# Patient Record
Sex: FEMALE | Race: WHITE | Employment: OTHER | URBAN - METROPOLITAN AREA
[De-identification: names, ages, dates, MRNs, and addresses within clinical notes are randomized per-mention and may not be internally consistent; named-entity substitution may affect disease eponyms.]

---

## 2017-04-14 ENCOUNTER — HISTORICAL (OUTPATIENT)
Dept: LAB | Facility: HOSPITAL | Age: 82
End: 2017-04-14

## 2017-04-28 ENCOUNTER — HISTORICAL (OUTPATIENT)
Dept: LAB | Facility: HOSPITAL | Age: 82
End: 2017-04-28

## 2017-08-01 ENCOUNTER — HISTORICAL (OUTPATIENT)
Dept: LAB | Facility: HOSPITAL | Age: 82
End: 2017-08-01

## 2017-08-03 LAB — FINAL CULTURE: NO GROWTH

## 2017-11-22 ENCOUNTER — HISTORICAL (OUTPATIENT)
Dept: LAB | Facility: HOSPITAL | Age: 82
End: 2017-11-22

## 2017-11-22 LAB
FERRITIN SERPL-MCNC: 11.5 NG/ML (ref 8–388)
IRON SATN MFR SERPL: 6.3 % (ref 20–50)
IRON SERPL-MCNC: 34 MCG/DL (ref 50–175)
PROT SERPL-MCNC: 7.3 GM/DL
TIBC SERPL-MCNC: 538 MCG/DL (ref 250–450)
TRANSFERRIN SERPL-MCNC: 405 MG/DL (ref 200–360)

## 2018-06-04 ENCOUNTER — HISTORICAL (OUTPATIENT)
Dept: ADMINISTRATIVE | Facility: HOSPITAL | Age: 83
End: 2018-06-04

## 2018-06-04 ENCOUNTER — HISTORICAL (OUTPATIENT)
Dept: LAB | Facility: HOSPITAL | Age: 83
End: 2018-06-04

## 2018-06-04 LAB
ABS NEUT (OLG): 11.5
ALBUMIN SERPL-MCNC: 4.2 GM/DL (ref 3.4–5)
ALBUMIN/GLOB SERPL: 1.31 {RATIO} (ref 1.5–2.5)
ALP SERPL-CCNC: 75 UNIT/L (ref 38–126)
ALT SERPL-CCNC: 9 UNIT/L (ref 7–52)
APPEARANCE, UA: CLEAR
AST SERPL-CCNC: 12 UNIT/L (ref 15–37)
BACTERIA #/AREA URNS AUTO: ABNORMAL /HPF
BILIRUB SERPL-MCNC: 0.4 MG/DL (ref 0.2–1)
BILIRUB UR QL STRIP: ABNORMAL MG/DL
BILIRUBIN DIRECT+TOT PNL SERPL-MCNC: 0.1 MG/DL (ref 0–0.5)
BILIRUBIN DIRECT+TOT PNL SERPL-MCNC: 0.3 MG/DL
BUN SERPL-MCNC: 25 MG/DL (ref 7–18)
CALCIUM SERPL-MCNC: 9.2 MG/DL (ref 8.5–10)
CHLORIDE SERPL-SCNC: 91 MMOL/L (ref 98–107)
CHOLEST SERPL-MCNC: 123 MG/DL (ref 0–200)
CHOLEST/HDLC SERPL: 2.1 {RATIO}
CO2 SERPL-SCNC: 31 MMOL/L (ref 21–32)
COLOR UR: YELLOW
CREAT SERPL-MCNC: 0.71 MG/DL (ref 0.6–1.3)
CREAT UR-MCNC: 200 MG/DL
ERYTHROCYTE [DISTWIDTH] IN BLOOD BY AUTOMATED COUNT: 14.1 % (ref 11.5–17)
EST. AVERAGE GLUCOSE BLD GHB EST-MCNC: 160 MG/DL
GLOBULIN SER-MCNC: 3.2 GM/DL (ref 1.2–3)
GLUCOSE (UA): NEGATIVE MG/DL
GLUCOSE SERPL-MCNC: 201 MG/DL (ref 74–106)
HBA1C MFR BLD: 7.2 % (ref 4.4–6.4)
HCT VFR BLD AUTO: 39.3 % (ref 37–47)
HDLC SERPL-MCNC: 58 MG/DL (ref 35–60)
HGB BLD-MCNC: 12 GM/DL (ref 12–16)
HGB UR QL STRIP: ABNORMAL UNIT/L
KETONES UR QL STRIP: ABNORMAL MG/DL
LDLC SERPL CALC-MCNC: 38 MG/DL (ref 0–129)
LEUKOCYTE ESTERASE UR QL STRIP: NEGATIVE UNIT/L
LYMPHOCYTES # BLD AUTO: 1.4 X10(3)/MCL (ref 0.6–3.4)
LYMPHOCYTES NFR BLD AUTO: 9.7 % (ref 13–40)
MCH RBC QN AUTO: 27.1 PG (ref 27–31.2)
MCHC RBC AUTO-ENTMCNC: 30 GM/DL (ref 32–36)
MCV RBC AUTO: 89 FL (ref 80–94)
MICROALBUMIN UR-MCNC: 80 MG/L
MICROALBUMIN/CREAT RATIO PNL UR: <30 MG/GM
MONOCYTES # BLD AUTO: 1.2 X10(3)/MCL (ref 0–1.8)
MONOCYTES NFR BLD AUTO: 8.5 % (ref 0.1–24)
NEUTROPHILS NFR BLD AUTO: 81.8 % (ref 47–80)
NITRITE UR QL STRIP.AUTO: NEGATIVE
PH UR STRIP: 5 [PH]
PLATELET # BLD AUTO: 430 X10(3)/MCL (ref 130–400)
PMV BLD AUTO: 8.2 FL
POTASSIUM SERPL-SCNC: 4.4 MMOL/L (ref 3.5–5.1)
PROT SERPL-MCNC: 7.4 GM/DL (ref 6.4–8.2)
PROT UR QL STRIP: NEGATIVE MG/DL
RBC # BLD AUTO: 4.43 X10(6)/MCL (ref 4.2–5.4)
RBC #/AREA URNS HPF: ABNORMAL /HPF
SODIUM SERPL-SCNC: 129 MMOL/L (ref 136–145)
SP GR UR STRIP: 1.02
SQUAMOUS EPITHELIAL, UA: ABNORMAL /LPF
TRIGL SERPL-MCNC: 98 MG/DL (ref 30–150)
UROBILINOGEN UR STRIP-ACNC: 0.2 MG/DL
VIT B12 SERPL-MCNC: 2403 PG/ML (ref 193–986)
VLDLC SERPL CALC-MCNC: 19.6 MG/DL
WBC # SPEC AUTO: 14.1 X10(3)/MCL (ref 4.5–11.5)
WBC #/AREA URNS AUTO: ABNORMAL /[HPF]

## 2018-12-04 ENCOUNTER — HISTORICAL (OUTPATIENT)
Dept: ADMINISTRATIVE | Facility: HOSPITAL | Age: 83
End: 2018-12-04

## 2018-12-04 LAB
BUN SERPL-MCNC: 18 MG/DL (ref 7–18)
CALCIUM SERPL-MCNC: 9.3 MG/DL (ref 8.5–10)
CHLORIDE SERPL-SCNC: 96 MMOL/L (ref 98–107)
CO2 SERPL-SCNC: 32 MMOL/L (ref 21–32)
CREAT SERPL-MCNC: 0.6 MG/DL (ref 0.6–1.3)
CREAT/UREA NIT SERPL: 30
EST. AVERAGE GLUCOSE BLD GHB EST-MCNC: 151 MG/DL
GLUCOSE SERPL-MCNC: 178 MG/DL (ref 74–106)
HBA1C MFR BLD: 6.9 % (ref 4.4–6.4)
POTASSIUM SERPL-SCNC: 4.2 MMOL/L (ref 3.5–5.1)
SODIUM SERPL-SCNC: 135 MMOL/L (ref 136–145)

## 2019-10-22 ENCOUNTER — HISTORICAL (OUTPATIENT)
Dept: ADMINISTRATIVE | Facility: HOSPITAL | Age: 84
End: 2019-10-22

## 2019-10-22 LAB
ALBUMIN SERPL-MCNC: 4.1 GM/DL (ref 3.4–5)
ALBUMIN/GLOB SERPL: 1.41 {RATIO} (ref 1.5–2.5)
ALP SERPL-CCNC: 53 UNIT/L (ref 38–126)
ALT SERPL-CCNC: 10 UNIT/L (ref 7–52)
AST SERPL-CCNC: 17 UNIT/L (ref 15–37)
BILIRUB SERPL-MCNC: 0.4 MG/DL (ref 0.2–1)
BILIRUBIN DIRECT+TOT PNL SERPL-MCNC: 0.1 MG/DL (ref 0–0.5)
BILIRUBIN DIRECT+TOT PNL SERPL-MCNC: 0.3 MG/DL
BUN SERPL-MCNC: 13 MG/DL (ref 7–18)
CALCIUM SERPL-MCNC: 9.3 MG/DL (ref 8.5–10)
CHLORIDE SERPL-SCNC: 96 MMOL/L (ref 98–107)
CHOLEST SERPL-MCNC: 135 MG/DL (ref 0–200)
CHOLEST/HDLC SERPL: 2.4 {RATIO}
CO2 SERPL-SCNC: 32 MMOL/L (ref 21–32)
CREAT SERPL-MCNC: 0.65 MG/DL (ref 0.6–1.3)
EST. AVERAGE GLUCOSE BLD GHB EST-MCNC: 148 MG/DL
GLOBULIN SER-MCNC: 2.9 GM/DL (ref 1.2–3)
GLUCOSE SERPL-MCNC: 135 MG/DL (ref 74–106)
HBA1C MFR BLD: 6.8 % (ref 4.4–6.4)
HDLC SERPL-MCNC: 56 MG/DL (ref 35–60)
LDLC SERPL CALC-MCNC: 42 MG/DL (ref 0–129)
POTASSIUM SERPL-SCNC: 4.4 MMOL/L (ref 3.5–5.1)
PROT SERPL-MCNC: 7 GM/DL (ref 6.4–8.2)
SODIUM SERPL-SCNC: 135 MMOL/L (ref 136–145)
T3FREE SERPL-MCNC: 2.52 PG/ML (ref 1.45–3.48)
T4 FREE SERPL-MCNC: 1.03 NG/DL (ref 0.76–1.46)
TRIGL SERPL-MCNC: 135 MG/DL (ref 30–150)
TSH SERPL-ACNC: 1.12 MIU/ML (ref 0.35–4.94)
VLDLC SERPL CALC-MCNC: 27 MG/DL

## 2020-05-01 ENCOUNTER — HISTORICAL (OUTPATIENT)
Dept: URGENT CARE | Facility: CLINIC | Age: 85
End: 2020-05-01

## 2020-05-28 ENCOUNTER — HISTORICAL (OUTPATIENT)
Dept: ADMINISTRATIVE | Facility: HOSPITAL | Age: 85
End: 2020-05-28

## 2020-05-28 LAB
EST. AVERAGE GLUCOSE BLD GHB EST-MCNC: 146 MG/DL
HBA1C MFR BLD: 6.7 % (ref 4.4–6.4)

## 2020-11-03 ENCOUNTER — HISTORICAL (OUTPATIENT)
Dept: ADMINISTRATIVE | Facility: HOSPITAL | Age: 85
End: 2020-11-03

## 2020-11-03 LAB
ABS NEUT (OLG): 4.9 X10(3)/MCL (ref 2.1–9.2)
ALBUMIN SERPL-MCNC: 4.4 GM/DL (ref 3.4–5)
ALBUMIN/GLOB SERPL: 1.47 {RATIO} (ref 1.5–2.5)
ALP SERPL-CCNC: 68 UNIT/L (ref 38–126)
ALT SERPL-CCNC: 12 UNIT/L (ref 7–52)
APPEARANCE, UA: CLEAR
AST SERPL-CCNC: 19 UNIT/L (ref 15–37)
BACTERIA #/AREA URNS AUTO: NORMAL /HPF
BILIRUB SERPL-MCNC: 0.4 MG/DL (ref 0.2–1)
BILIRUB UR QL STRIP: NEGATIVE MG/DL
BILIRUBIN DIRECT+TOT PNL SERPL-MCNC: 0.1 MG/DL (ref 0–0.5)
BILIRUBIN DIRECT+TOT PNL SERPL-MCNC: 0.3 MG/DL
BUN SERPL-MCNC: 18 MG/DL (ref 7–18)
CALCIUM SERPL-MCNC: 9.6 MG/DL (ref 8.5–10)
CHLORIDE SERPL-SCNC: 93 MMOL/L (ref 98–107)
CHOLEST SERPL-MCNC: 130 MG/DL (ref 0–200)
CHOLEST/HDLC SERPL: 1.9 {RATIO}
CO2 SERPL-SCNC: 32 MMOL/L (ref 21–32)
COLOR UR: YELLOW
CREAT SERPL-MCNC: 0.58 MG/DL (ref 0.6–1.3)
CREAT UR-MCNC: 50 MG/DL
DEPRECATED CALCIDIOL+CALCIFEROL SERPL-MC: 52 NG/ML (ref 30–80)
ERYTHROCYTE [DISTWIDTH] IN BLOOD BY AUTOMATED COUNT: 12.5 % (ref 11.5–17)
EST. AVERAGE GLUCOSE BLD GHB EST-MCNC: 151 MG/DL
GLOBULIN SER-MCNC: 3 GM/DL (ref 1.2–3)
GLUCOSE (UA): NEGATIVE MG/DL
GLUCOSE SERPL-MCNC: 160 MG/DL (ref 74–106)
HBA1C MFR BLD: 6.9 % (ref 4.4–6.4)
HCT VFR BLD AUTO: 41.1 % (ref 37–47)
HDLC SERPL-MCNC: 67 MG/DL (ref 35–60)
HGB BLD-MCNC: 13 GM/DL (ref 12–16)
HGB UR QL STRIP: NEGATIVE UNIT/L
KETONES UR QL STRIP: NEGATIVE MG/DL
LDLC SERPL CALC-MCNC: 32 MG/DL (ref 0–129)
LEUKOCYTE ESTERASE UR QL STRIP: NEGATIVE UNIT/L
LYMPHOCYTES # BLD AUTO: 2.3 X10(3)/MCL (ref 0.6–3.4)
LYMPHOCYTES NFR BLD AUTO: 28.3 % (ref 13–40)
MCH RBC QN AUTO: 29.4 PG (ref 27–31.2)
MCHC RBC AUTO-ENTMCNC: 32 GM/DL (ref 32–36)
MCV RBC AUTO: 93 FL (ref 80–94)
MICROALBUMIN UR-MCNC: 30 MG/L
MICROALBUMIN/CREAT RATIO PNL UR: ABNORMAL MG/GM
MONOCYTES # BLD AUTO: 0.8 X10(3)/MCL (ref 0.1–1.3)
MONOCYTES NFR BLD AUTO: 10.4 % (ref 0.1–24)
NEUTROPHILS NFR BLD AUTO: 61.3 % (ref 47–80)
NITRITE UR QL STRIP.AUTO: NEGATIVE
PH UR STRIP: 7 [PH]
PLATELET # BLD AUTO: 358 X10(3)/MCL (ref 130–400)
PMV BLD AUTO: 8.9 FL (ref 9.4–12.4)
POTASSIUM SERPL-SCNC: 4.6 MMOL/L (ref 3.5–5.1)
PROT SERPL-MCNC: 7.4 GM/DL (ref 6.4–8.2)
PROT UR QL STRIP: NEGATIVE MG/DL
RBC # BLD AUTO: 4.42 X10(6)/MCL (ref 4.2–5.4)
RBC #/AREA URNS HPF: NORMAL /HPF
SODIUM SERPL-SCNC: 134 MMOL/L (ref 136–145)
SP GR UR STRIP: 1.02
SQUAMOUS EPITHELIAL, UA: NORMAL /LPF
TRIGL SERPL-MCNC: 120 MG/DL (ref 30–150)
TSH SERPL-ACNC: 1.13 MIU/ML (ref 0.35–4.94)
UROBILINOGEN UR STRIP-ACNC: 0.2 MG/DL
VLDLC SERPL CALC-MCNC: 24 MG/DL
WBC # SPEC AUTO: 8 X10(3)/MCL (ref 4.5–11.5)
WBC #/AREA URNS AUTO: NORMAL /[HPF]

## 2021-05-05 ENCOUNTER — HISTORICAL (OUTPATIENT)
Dept: ADMINISTRATIVE | Facility: HOSPITAL | Age: 86
End: 2021-05-05

## 2021-05-05 LAB
BUN SERPL-MCNC: 14 MG/DL (ref 7–18)
CALCIUM SERPL-MCNC: 9.1 MG/DL (ref 8.5–10)
CHLORIDE SERPL-SCNC: 95 MMOL/L (ref 98–107)
CO2 SERPL-SCNC: 32 MMOL/L (ref 21–32)
CREAT SERPL-MCNC: 0.52 MG/DL (ref 0.6–1.3)
CREAT/UREA NIT SERPL: 26.9
EST. AVERAGE GLUCOSE BLD GHB EST-MCNC: 140 MG/DL
GLUCOSE SERPL-MCNC: 132 MG/DL (ref 74–106)
HBA1C MFR BLD: 6.5 % (ref 4.4–6.4)
POTASSIUM SERPL-SCNC: 4.3 MMOL/L (ref 3.5–5.1)
SODIUM SERPL-SCNC: 136 MMOL/L (ref 136–145)

## 2021-11-03 ENCOUNTER — HISTORICAL (OUTPATIENT)
Dept: ADMINISTRATIVE | Facility: HOSPITAL | Age: 86
End: 2021-11-03

## 2021-11-03 LAB
ALBUMIN SERPL-MCNC: 4.1 GM/DL (ref 3.4–5)
ALBUMIN/GLOB SERPL: 1.46 {RATIO} (ref 1.5–2.5)
ALP SERPL-CCNC: 59 UNIT/L (ref 38–126)
ALT SERPL-CCNC: 12 UNIT/L (ref 7–52)
AST SERPL-CCNC: 20 UNIT/L (ref 15–37)
BILIRUB SERPL-MCNC: 0.4 MG/DL (ref 0.2–1)
BILIRUBIN DIRECT+TOT PNL SERPL-MCNC: 0.1 MG/DL (ref 0–0.5)
BILIRUBIN DIRECT+TOT PNL SERPL-MCNC: 0.3 MG/DL
BUN SERPL-MCNC: 17 MG/DL (ref 7–18)
CALCIUM SERPL-MCNC: 9.4 MG/DL (ref 8.5–10)
CHLORIDE SERPL-SCNC: 97 MMOL/L (ref 98–107)
CHOLEST SERPL-MCNC: 130 MG/DL (ref 0–200)
CHOLEST/HDLC SERPL: 2.2 {RATIO}
CO2 SERPL-SCNC: 28 MMOL/L (ref 21–32)
CREAT SERPL-MCNC: 0.6 MG/DL (ref 0.6–1.3)
EST. AVERAGE GLUCOSE BLD GHB EST-MCNC: 148 MG/DL
GLOBULIN SER-MCNC: 2.8 GM/DL (ref 1.2–3)
GLUCOSE SERPL-MCNC: 165 MG/DL (ref 74–106)
HBA1C MFR BLD: 6.8 % (ref 4.4–6.4)
HDLC SERPL-MCNC: 60 MG/DL (ref 35–60)
LDLC SERPL CALC-MCNC: 25 MG/DL (ref 0–129)
POTASSIUM SERPL-SCNC: 4.7 MMOL/L (ref 3.5–5.1)
PROT SERPL-MCNC: 6.9 GM/DL (ref 6.4–8.2)
SODIUM SERPL-SCNC: 136 MMOL/L (ref 136–145)
TRIGL SERPL-MCNC: 111 MG/DL (ref 30–150)
VLDLC SERPL CALC-MCNC: 22.2 MG/DL

## 2022-01-01 PROCEDURE — 87088 URINE BACTERIA CULTURE: CPT | Performed by: FAMILY MEDICINE

## 2022-04-07 ENCOUNTER — HISTORICAL (OUTPATIENT)
Dept: ADMINISTRATIVE | Facility: HOSPITAL | Age: 87
End: 2022-04-07

## 2022-04-24 VITALS
OXYGEN SATURATION: 94 % | DIASTOLIC BLOOD PRESSURE: 70 MMHG | WEIGHT: 149.5 LBS | SYSTOLIC BLOOD PRESSURE: 142 MMHG | HEIGHT: 61 IN | BODY MASS INDEX: 28.22 KG/M2

## 2022-05-03 PROBLEM — E78.00 HYPERCHOLESTEROLEMIA: Status: ACTIVE | Noted: 2022-05-03

## 2022-05-03 PROBLEM — C67.4 MALIGNANT NEOPLASM OF POSTERIOR WALL OF URINARY BLADDER: Status: ACTIVE | Noted: 2017-10-12

## 2022-05-03 PROBLEM — C55 ADENOCARCINOMA OF UTERUS: Status: ACTIVE | Noted: 2022-05-03

## 2022-05-03 PROBLEM — C55 ADENOCARCINOMA OF UTERUS: Status: RESOLVED | Noted: 2022-05-03 | Resolved: 2022-05-03

## 2022-05-03 PROBLEM — I82.409 DEEP VEIN THROMBOSIS (DVT): Status: ACTIVE | Noted: 2022-05-03

## 2022-05-03 PROBLEM — E11.9 TYPE 2 DIABETES MELLITUS: Status: ACTIVE | Noted: 2022-05-03

## 2022-05-03 PROBLEM — G47.00 INSOMNIA: Status: ACTIVE | Noted: 2022-05-03

## 2022-05-03 PROBLEM — I26.99 PULMONARY EMBOLISM: Status: ACTIVE | Noted: 2022-05-03

## 2022-05-03 PROBLEM — K63.5 POLYP OF COLON: Status: ACTIVE | Noted: 2022-05-03

## 2022-05-03 PROBLEM — M85.80 OSTEOPENIA: Status: ACTIVE | Noted: 2022-05-03

## 2022-05-03 PROBLEM — J30.2 SEASONAL ALLERGIC RHINITIS: Status: ACTIVE | Noted: 2022-05-03

## 2022-05-03 PROBLEM — M19.90 ARTHRITIS: Status: ACTIVE | Noted: 2022-05-03

## 2022-05-03 PROBLEM — I10 PRIMARY HYPERTENSION: Status: ACTIVE | Noted: 2022-05-03

## 2023-01-01 ENCOUNTER — HOSPITAL ENCOUNTER (INPATIENT)
Facility: HOSPITAL | Age: 88
LOS: 25 days | Discharge: HOME HEALTH | DRG: 871 | End: 2023-06-05
Attending: STUDENT IN AN ORGANIZED HEALTH CARE EDUCATION/TRAINING PROGRAM | Admitting: HOSPITALIST
Payer: MEDICARE

## 2023-01-01 ENCOUNTER — LAB REQUISITION (OUTPATIENT)
Dept: LAB | Facility: HOSPITAL | Age: 88
End: 2023-01-01
Payer: MEDICARE

## 2023-01-01 ENCOUNTER — ANESTHESIA (OUTPATIENT)
Dept: CARDIOLOGY | Facility: HOSPITAL | Age: 88
DRG: 853 | End: 2023-01-01
Payer: MEDICARE

## 2023-01-01 ENCOUNTER — HOSPITAL ENCOUNTER (INPATIENT)
Facility: HOSPITAL | Age: 88
LOS: 25 days | Discharge: NURSING, REHAB OR LONG-TERM CARE | DRG: 853 | End: 2023-05-11
Attending: STUDENT IN AN ORGANIZED HEALTH CARE EDUCATION/TRAINING PROGRAM | Admitting: INTERNAL MEDICINE
Payer: MEDICARE

## 2023-01-01 ENCOUNTER — ANESTHESIA EVENT (OUTPATIENT)
Dept: CARDIOLOGY | Facility: HOSPITAL | Age: 88
DRG: 853 | End: 2023-01-01
Payer: MEDICARE

## 2023-01-01 ENCOUNTER — PATIENT OUTREACH (OUTPATIENT)
Dept: ADMINISTRATIVE | Facility: CLINIC | Age: 88
End: 2023-01-01
Payer: MEDICARE

## 2023-01-01 ENCOUNTER — HOSPITAL ENCOUNTER (EMERGENCY)
Facility: HOSPITAL | Age: 88
Discharge: HOME | End: 2023-02-14
Attending: EMERGENCY MEDICINE
Payer: MEDICARE

## 2023-01-01 ENCOUNTER — ANESTHESIA (OUTPATIENT)
Dept: SURGERY | Facility: HOSPITAL | Age: 88
DRG: 853 | End: 2023-01-01
Payer: MEDICARE

## 2023-01-01 ENCOUNTER — ANESTHESIA EVENT (OUTPATIENT)
Dept: SURGERY | Facility: HOSPITAL | Age: 88
DRG: 853 | End: 2023-01-01
Payer: MEDICARE

## 2023-01-01 VITALS
TEMPERATURE: 98 F | WEIGHT: 154.31 LBS | RESPIRATION RATE: 18 BRPM | SYSTOLIC BLOOD PRESSURE: 145 MMHG | DIASTOLIC BLOOD PRESSURE: 75 MMHG | BODY MASS INDEX: 27.34 KG/M2 | HEART RATE: 83 BPM | HEIGHT: 63 IN | OXYGEN SATURATION: 97 %

## 2023-01-01 VITALS
DIASTOLIC BLOOD PRESSURE: 63 MMHG | WEIGHT: 124.75 LBS | HEIGHT: 63 IN | BODY MASS INDEX: 22.11 KG/M2 | TEMPERATURE: 98 F | OXYGEN SATURATION: 95 % | SYSTOLIC BLOOD PRESSURE: 120 MMHG | RESPIRATION RATE: 18 BRPM | HEART RATE: 85 BPM

## 2023-01-01 VITALS
SYSTOLIC BLOOD PRESSURE: 149 MMHG | DIASTOLIC BLOOD PRESSURE: 73 MMHG | RESPIRATION RATE: 19 BRPM | OXYGEN SATURATION: 94 % | TEMPERATURE: 99 F | HEART RATE: 110 BPM

## 2023-01-01 DIAGNOSIS — I73.9 PAD (PERIPHERAL ARTERY DISEASE): ICD-10-CM

## 2023-01-01 DIAGNOSIS — N13.30 HYDRONEPHROSIS, UNSPECIFIED HYDRONEPHROSIS TYPE: ICD-10-CM

## 2023-01-01 DIAGNOSIS — I70.222 CRITICAL LIMB ISCHEMIA OF LEFT LOWER EXTREMITY: ICD-10-CM

## 2023-01-01 DIAGNOSIS — R78.81 MRSA BACTEREMIA: Primary | ICD-10-CM

## 2023-01-01 DIAGNOSIS — I83.899 RUPTURED VARICOSE VEIN: Primary | ICD-10-CM

## 2023-01-01 DIAGNOSIS — N39.0 URINARY TRACT INFECTION, SITE NOT SPECIFIED: ICD-10-CM

## 2023-01-01 DIAGNOSIS — E11.9 TYPE 2 DIABETES MELLITUS WITHOUT COMPLICATIONS: ICD-10-CM

## 2023-01-01 DIAGNOSIS — R53.81 DEBILITY: ICD-10-CM

## 2023-01-01 DIAGNOSIS — L97.821 NON-PRESSURE CHRONIC ULCER OF OTHER PART OF LEFT LOWER LEG LIMITED TO BREAKDOWN OF SKIN: ICD-10-CM

## 2023-01-01 DIAGNOSIS — L97.328 NON-PRESSURE CHRONIC ULCER OF LEFT ANKLE WITH OTHER SPECIFIED SEVERITY: ICD-10-CM

## 2023-01-01 DIAGNOSIS — R52 PAIN: ICD-10-CM

## 2023-01-01 DIAGNOSIS — R53.1 WEAKNESS: ICD-10-CM

## 2023-01-01 DIAGNOSIS — R53.83 FATIGUE, UNSPECIFIED TYPE: ICD-10-CM

## 2023-01-01 DIAGNOSIS — M19.90 ARTHRITIS: ICD-10-CM

## 2023-01-01 DIAGNOSIS — M85.80 OSTEOPENIA, UNSPECIFIED LOCATION: ICD-10-CM

## 2023-01-01 DIAGNOSIS — I83.023 VARICOSE VEINS OF LEFT LOWER EXTREMITY WITH ULCER OF ANKLE (CODE): ICD-10-CM

## 2023-01-01 DIAGNOSIS — A41.9 SEPSIS: ICD-10-CM

## 2023-01-01 DIAGNOSIS — R53.83 FATIGUE: ICD-10-CM

## 2023-01-01 DIAGNOSIS — I70.222 CRITICAL LIMB ISCHEMIA OF LEFT LOWER EXTREMITY: Primary | ICD-10-CM

## 2023-01-01 DIAGNOSIS — B95.62 MRSA BACTEREMIA: Primary | ICD-10-CM

## 2023-01-01 DIAGNOSIS — I10 PRIMARY HYPERTENSION: ICD-10-CM

## 2023-01-01 DIAGNOSIS — I50.32 CHRONIC DIASTOLIC CHF (CONGESTIVE HEART FAILURE): ICD-10-CM

## 2023-01-01 DIAGNOSIS — I21.4 NSTEMI (NON-ST ELEVATED MYOCARDIAL INFARCTION): ICD-10-CM

## 2023-01-01 LAB
ABO + RH BLD: NORMAL
ABORH RETYPE: NORMAL
ABS NEUT (OLG): 32.59 X10(3)/MCL (ref 2.1–9.2)
ACINETOBACTER CALCOACETICUS-BAUMANNII COMPLEX (OHS): NOT DETECTED
ALBUMIN SERPL-MCNC: 1.7 G/DL (ref 3.4–4.8)
ALBUMIN SERPL-MCNC: 1.8 G/DL (ref 3.4–4.8)
ALBUMIN SERPL-MCNC: 1.9 G/DL (ref 3.4–4.8)
ALBUMIN SERPL-MCNC: 1.9 G/DL (ref 3.4–4.8)
ALBUMIN SERPL-MCNC: 2 G/DL (ref 3.4–4.8)
ALBUMIN SERPL-MCNC: 2 G/DL (ref 3.4–4.8)
ALBUMIN SERPL-MCNC: 2.1 G/DL (ref 3.4–4.8)
ALBUMIN SERPL-MCNC: 2.2 G/DL (ref 3.4–4.8)
ALBUMIN SERPL-MCNC: 2.2 G/DL (ref 3.4–4.8)
ALBUMIN SERPL-MCNC: 2.5 G/DL (ref 3.4–4.8)
ALBUMIN SERPL-MCNC: 3.1 G/DL (ref 3.4–4.8)
ALBUMIN/GLOB SERPL: 0.4 RATIO (ref 1.1–2)
ALBUMIN/GLOB SERPL: 0.5 RATIO (ref 1.1–2)
ALBUMIN/GLOB SERPL: 0.6 RATIO (ref 1.1–2)
ALBUMIN/GLOB SERPL: 0.7 RATIO (ref 1.1–2)
ALBUMIN/GLOB SERPL: 0.7 RATIO (ref 1.1–2)
ALBUMIN/GLOB SERPL: 1 RATIO (ref 1.1–2)
ALP SERPL-CCNC: 134 UNIT/L (ref 40–150)
ALP SERPL-CCNC: 155 UNIT/L (ref 40–150)
ALP SERPL-CCNC: 53 UNIT/L (ref 40–150)
ALP SERPL-CCNC: 60 UNIT/L (ref 40–150)
ALP SERPL-CCNC: 66 UNIT/L (ref 40–150)
ALP SERPL-CCNC: 73 UNIT/L (ref 40–150)
ALP SERPL-CCNC: 81 UNIT/L (ref 40–150)
ALP SERPL-CCNC: 84 UNIT/L (ref 40–150)
ALP SERPL-CCNC: 84 UNIT/L (ref 40–150)
ALP SERPL-CCNC: 85 UNIT/L (ref 40–150)
ALP SERPL-CCNC: 94 UNIT/L (ref 40–150)
ALT SERPL-CCNC: 10 UNIT/L (ref 0–55)
ALT SERPL-CCNC: 11 UNIT/L (ref 0–55)
ALT SERPL-CCNC: 12 UNIT/L (ref 0–55)
ALT SERPL-CCNC: 14 UNIT/L (ref 0–55)
ALT SERPL-CCNC: 18 UNIT/L (ref 0–55)
ALT SERPL-CCNC: 35 UNIT/L (ref 0–55)
ALT SERPL-CCNC: 45 UNIT/L (ref 0–55)
ALT SERPL-CCNC: 6 UNIT/L (ref 0–55)
ALT SERPL-CCNC: 6 UNIT/L (ref 0–55)
ALT SERPL-CCNC: 8 UNIT/L (ref 0–55)
ALT SERPL-CCNC: <5 UNIT/L (ref 0–55)
AMMONIA PLAS-MSCNC: 44.1 UMOL/L (ref 18–72)
ANION GAP SERPL CALC-SCNC: 11 MEQ/L
ANION GAP SERPL CALC-SCNC: 11 MEQ/L
ANION GAP SERPL CALC-SCNC: 12 MEQ/L
ANION GAP SERPL CALC-SCNC: 3 MEQ/L
ANION GAP SERPL CALC-SCNC: 3 MEQ/L
ANION GAP SERPL CALC-SCNC: 5 MEQ/L
ANION GAP SERPL CALC-SCNC: 6 MEQ/L
ANION GAP SERPL CALC-SCNC: 7 MEQ/L
ANION GAP SERPL CALC-SCNC: 8 MEQ/L
ANION GAP SERPL CALC-SCNC: 9 MEQ/L
APPEARANCE UR: CLEAR
AST SERPL-CCNC: 10 UNIT/L (ref 5–34)
AST SERPL-CCNC: 14 UNIT/L (ref 5–34)
AST SERPL-CCNC: 17 UNIT/L (ref 5–34)
AST SERPL-CCNC: 31 UNIT/L (ref 5–34)
AST SERPL-CCNC: 37 UNIT/L (ref 5–34)
AST SERPL-CCNC: 5 UNIT/L (ref 5–34)
AST SERPL-CCNC: 7 UNIT/L (ref 5–34)
AST SERPL-CCNC: 7 UNIT/L (ref 5–34)
AST SERPL-CCNC: 9 UNIT/L (ref 5–34)
AV INDEX (PROSTH): 0.42
AV MEAN GRADIENT: 10 MMHG
AV PEAK GRADIENT: 18 MMHG
AV VALVE AREA: 1.31 CM2
AV VELOCITY RATIO: 0.46
BACTERIA #/AREA URNS AUTO: ABNORMAL /HPF
BACTERIA #/AREA URNS AUTO: ABNORMAL /HPF
BACTERIA #/AREA URNS AUTO: NORMAL /HPF
BACTERIA #/AREA URNS AUTO: NORMAL /HPF
BACTERIA BLD CULT: ABNORMAL
BACTERIA BLD CULT: NORMAL
BACTERIA BLD CULT: NORMAL
BACTERIA UR CULT: ABNORMAL
BACTEROIDES FRAGILIS (OHS): NOT DETECTED
BASOPHILS # BLD AUTO: 0.02 X10(3)/MCL
BASOPHILS # BLD AUTO: 0.02 X10(3)/MCL
BASOPHILS # BLD AUTO: 0.02 X10(3)/MCL (ref 0–0.2)
BASOPHILS # BLD AUTO: 0.03 X10(3)/MCL
BASOPHILS # BLD AUTO: 0.03 X10(3)/MCL
BASOPHILS # BLD AUTO: 0.03 X10(3)/MCL (ref 0–0.2)
BASOPHILS # BLD AUTO: 0.03 X10(3)/MCL (ref 0–0.2)
BASOPHILS # BLD AUTO: 0.04 X10(3)/MCL (ref 0–0.2)
BASOPHILS # BLD AUTO: 0.05 X10(3)/MCL (ref 0–0.2)
BASOPHILS # BLD AUTO: 0.06 X10(3)/MCL
BASOPHILS # BLD AUTO: 0.06 X10(3)/MCL (ref 0–0.2)
BASOPHILS # BLD AUTO: 0.07 X10(3)/MCL
BASOPHILS # BLD AUTO: 0.07 X10(3)/MCL (ref 0–0.2)
BASOPHILS # BLD AUTO: 0.08 X10(3)/MCL
BASOPHILS # BLD AUTO: 0.09 X10(3)/MCL
BASOPHILS NFR BLD AUTO: 0.1 %
BASOPHILS NFR BLD AUTO: 0.2 %
BASOPHILS NFR BLD AUTO: 0.3 %
BASOPHILS NFR BLD AUTO: 0.4 %
BASOPHILS NFR BLD AUTO: 0.4 %
BASOPHILS NFR BLD AUTO: 0.6 %
BASOPHILS NFR BLD AUTO: 0.6 %
BASOPHILS NFR BLD AUTO: 0.7 %
BASOPHILS NFR BLD AUTO: 0.8 %
BASOPHILS NFR BLD AUTO: 0.9 %
BASOPHILS NFR BLD AUTO: 1 %
BASOPHILS NFR BLD AUTO: 1 %
BILIRUB UR QL STRIP.AUTO: NEGATIVE
BILIRUB UR QL STRIP.AUTO: NEGATIVE MG/DL
BILIRUBIN DIRECT+TOT PNL SERPL-MCNC: 0.2 MG/DL
BILIRUBIN DIRECT+TOT PNL SERPL-MCNC: 0.3 MG/DL
BILIRUBIN DIRECT+TOT PNL SERPL-MCNC: 0.4 MG/DL
BILIRUBIN DIRECT+TOT PNL SERPL-MCNC: 0.5 MG/DL
BILIRUBIN DIRECT+TOT PNL SERPL-MCNC: 0.5 MG/DL
BLD PROD TYP BPU: NORMAL
BLOOD UNIT EXPIRATION DATE: NORMAL
BLOOD UNIT TYPE CODE: 6200
BNP BLD-MCNC: 1936.5 PG/ML
BNP BLD-MCNC: 2571.8 PG/ML
BSA FOR ECHO PROCEDURE: 1.49 M2
BSA FOR ECHO PROCEDURE: 1.49 M2
BUN SERPL-MCNC: 10.7 MG/DL (ref 9.8–20.1)
BUN SERPL-MCNC: 10.9 MG/DL (ref 9.8–20.1)
BUN SERPL-MCNC: 12.1 MG/DL (ref 9.8–20.1)
BUN SERPL-MCNC: 12.5 MG/DL (ref 9.8–20.1)
BUN SERPL-MCNC: 13.4 MG/DL (ref 9.8–20.1)
BUN SERPL-MCNC: 14 MG/DL (ref 9.8–20.1)
BUN SERPL-MCNC: 14.7 MG/DL (ref 9.8–20.1)
BUN SERPL-MCNC: 15.2 MG/DL (ref 9.8–20.1)
BUN SERPL-MCNC: 15.4 MG/DL (ref 9.8–20.1)
BUN SERPL-MCNC: 16.9 MG/DL (ref 9.8–20.1)
BUN SERPL-MCNC: 17.3 MG/DL (ref 9.8–20.1)
BUN SERPL-MCNC: 18 MG/DL (ref 9.8–20.1)
BUN SERPL-MCNC: 18.1 MG/DL (ref 9.8–20.1)
BUN SERPL-MCNC: 18.4 MG/DL (ref 9.8–20.1)
BUN SERPL-MCNC: 18.9 MG/DL (ref 9.8–20.1)
BUN SERPL-MCNC: 19.4 MG/DL (ref 9.8–20.1)
BUN SERPL-MCNC: 20.3 MG/DL (ref 9.8–20.1)
BUN SERPL-MCNC: 20.6 MG/DL (ref 9.8–20.1)
BUN SERPL-MCNC: 20.8 MG/DL (ref 9.8–20.1)
BUN SERPL-MCNC: 21 MG/DL (ref 9.8–20.1)
BUN SERPL-MCNC: 21.6 MG/DL (ref 9.8–20.1)
BUN SERPL-MCNC: 22.4 MG/DL (ref 9.8–20.1)
BUN SERPL-MCNC: 22.6 MG/DL (ref 9.8–20.1)
BUN SERPL-MCNC: 22.6 MG/DL (ref 9.8–20.1)
BUN SERPL-MCNC: 23.4 MG/DL (ref 9.8–20.1)
BUN SERPL-MCNC: 24.4 MG/DL (ref 9.8–20.1)
BUN SERPL-MCNC: 31.9 MG/DL (ref 9.8–20.1)
BUN SERPL-MCNC: 32.9 MG/DL (ref 9.8–20.1)
BUN SERPL-MCNC: 52 MG/DL (ref 9.8–20.1)
BUN SERPL-MCNC: 52.8 MG/DL (ref 9.8–20.1)
C AURIS DNA BLD POS QL NAA+NON-PROBE: NOT DETECTED
C GATTII+NEOFOR DNA CSF QL NAA+NON-PROBE: NOT DETECTED
CALCIUM SERPL-MCNC: 7.4 MG/DL (ref 8.4–10.2)
CALCIUM SERPL-MCNC: 7.6 MG/DL (ref 8.4–10.2)
CALCIUM SERPL-MCNC: 7.7 MG/DL (ref 8.4–10.2)
CALCIUM SERPL-MCNC: 7.7 MG/DL (ref 8.4–10.2)
CALCIUM SERPL-MCNC: 7.8 MG/DL (ref 8.4–10.2)
CALCIUM SERPL-MCNC: 7.8 MG/DL (ref 8.4–10.2)
CALCIUM SERPL-MCNC: 7.9 MG/DL (ref 8.4–10.2)
CALCIUM SERPL-MCNC: 7.9 MG/DL (ref 8.4–10.2)
CALCIUM SERPL-MCNC: 8 MG/DL (ref 8.4–10.2)
CALCIUM SERPL-MCNC: 8.1 MG/DL (ref 8.4–10.2)
CALCIUM SERPL-MCNC: 8.2 MG/DL (ref 8.4–10.2)
CALCIUM SERPL-MCNC: 8.3 MG/DL (ref 8.4–10.2)
CALCIUM SERPL-MCNC: 8.5 MG/DL (ref 8.4–10.2)
CALCIUM SERPL-MCNC: 8.7 MG/DL (ref 8.4–10.2)
CALCIUM SERPL-MCNC: 8.8 MG/DL (ref 8.4–10.2)
CALCIUM SERPL-MCNC: 8.9 MG/DL (ref 8.4–10.2)
CALCIUM SERPL-MCNC: 8.9 MG/DL (ref 8.4–10.2)
CALCIUM SERPL-MCNC: 9.2 MG/DL (ref 8.4–10.2)
CALCIUM SERPL-MCNC: 9.6 MG/DL (ref 8.4–10.2)
CALCIUM SERPL-MCNC: 9.6 MG/DL (ref 8.4–10.2)
CALCIUM SERPL-MCNC: 9.7 MG/DL (ref 8.4–10.2)
CANDIDA ALBICANS (OHS): NOT DETECTED
CANDIDA GLABRATA (OHS): NOT DETECTED
CANDIDA KRUSEI (OHS): NOT DETECTED
CANDIDA PARAPSILOSIS (OHS): NOT DETECTED
CANDIDA TROPICALIS (OHS): NOT DETECTED
CHLORIDE SERPL-SCNC: 83 MMOL/L (ref 98–111)
CHLORIDE SERPL-SCNC: 84 MMOL/L (ref 98–111)
CHLORIDE SERPL-SCNC: 85 MMOL/L (ref 98–111)
CHLORIDE SERPL-SCNC: 85 MMOL/L (ref 98–111)
CHLORIDE SERPL-SCNC: 87 MMOL/L (ref 98–111)
CHLORIDE SERPL-SCNC: 88 MMOL/L (ref 98–111)
CHLORIDE SERPL-SCNC: 88 MMOL/L (ref 98–111)
CHLORIDE SERPL-SCNC: 89 MMOL/L (ref 98–111)
CHLORIDE SERPL-SCNC: 90 MMOL/L (ref 98–111)
CHLORIDE SERPL-SCNC: 90 MMOL/L (ref 98–111)
CHLORIDE SERPL-SCNC: 91 MMOL/L (ref 98–111)
CHLORIDE SERPL-SCNC: 92 MMOL/L (ref 98–111)
CHLORIDE SERPL-SCNC: 93 MMOL/L (ref 98–111)
CHLORIDE SERPL-SCNC: 94 MMOL/L (ref 98–111)
CHLORIDE SERPL-SCNC: 95 MMOL/L (ref 98–111)
CHLORIDE SERPL-SCNC: 95 MMOL/L (ref 98–111)
CHLORIDE SERPL-SCNC: 96 MMOL/L (ref 98–111)
CO2 SERPL-SCNC: 22 MMOL/L (ref 23–31)
CO2 SERPL-SCNC: 23 MMOL/L (ref 23–31)
CO2 SERPL-SCNC: 23 MMOL/L (ref 23–31)
CO2 SERPL-SCNC: 24 MMOL/L (ref 23–31)
CO2 SERPL-SCNC: 24 MMOL/L (ref 23–31)
CO2 SERPL-SCNC: 25 MMOL/L (ref 23–31)
CO2 SERPL-SCNC: 26 MMOL/L (ref 23–31)
CO2 SERPL-SCNC: 27 MMOL/L (ref 23–31)
CO2 SERPL-SCNC: 28 MMOL/L (ref 23–31)
CO2 SERPL-SCNC: 28 MMOL/L (ref 23–31)
CO2 SERPL-SCNC: 29 MMOL/L (ref 23–31)
CO2 SERPL-SCNC: 30 MMOL/L (ref 23–31)
CO2 SERPL-SCNC: 30 MMOL/L (ref 23–31)
CO2 SERPL-SCNC: 31 MMOL/L (ref 23–31)
CO2 SERPL-SCNC: 33 MMOL/L (ref 23–31)
CO2 SERPL-SCNC: 34 MMOL/L (ref 23–31)
CO2 SERPL-SCNC: 35 MMOL/L (ref 23–31)
CO2 SERPL-SCNC: 35 MMOL/L (ref 23–31)
CO2 SERPL-SCNC: 36 MMOL/L (ref 23–31)
CO2 SERPL-SCNC: 36 MMOL/L (ref 23–31)
CO2 SERPL-SCNC: 37 MMOL/L (ref 23–31)
CO2 SERPL-SCNC: 37 MMOL/L (ref 23–31)
CO2 SERPL-SCNC: 40 MMOL/L (ref 23–31)
COLOR STL: NORMAL
COLOR UR AUTO: YELLOW
COLOR UR: ABNORMAL
COLOR UR: YELLOW
COLOR UR: YELLOW
CONSISTENCY STL: NORMAL
CORTIS SERPL-SCNC: 18 UG/DL
CREAT SERPL-MCNC: 0.53 MG/DL (ref 0.55–1.02)
CREAT SERPL-MCNC: 0.55 MG/DL (ref 0.55–1.02)
CREAT SERPL-MCNC: 0.58 MG/DL (ref 0.55–1.02)
CREAT SERPL-MCNC: 0.59 MG/DL (ref 0.55–1.02)
CREAT SERPL-MCNC: 0.6 MG/DL (ref 0.55–1.02)
CREAT SERPL-MCNC: 0.6 MG/DL (ref 0.55–1.02)
CREAT SERPL-MCNC: 0.62 MG/DL (ref 0.55–1.02)
CREAT SERPL-MCNC: 0.63 MG/DL (ref 0.55–1.02)
CREAT SERPL-MCNC: 0.63 MG/DL (ref 0.55–1.02)
CREAT SERPL-MCNC: 0.64 MG/DL (ref 0.55–1.02)
CREAT SERPL-MCNC: 0.64 MG/DL (ref 0.55–1.02)
CREAT SERPL-MCNC: 0.65 MG/DL (ref 0.55–1.02)
CREAT SERPL-MCNC: 0.66 MG/DL (ref 0.55–1.02)
CREAT SERPL-MCNC: 0.66 MG/DL (ref 0.55–1.02)
CREAT SERPL-MCNC: 0.67 MG/DL (ref 0.55–1.02)
CREAT SERPL-MCNC: 0.68 MG/DL (ref 0.55–1.02)
CREAT SERPL-MCNC: 0.68 MG/DL (ref 0.55–1.02)
CREAT SERPL-MCNC: 0.69 MG/DL (ref 0.55–1.02)
CREAT SERPL-MCNC: 0.71 MG/DL (ref 0.55–1.02)
CREAT SERPL-MCNC: 0.71 MG/DL (ref 0.55–1.02)
CREAT SERPL-MCNC: 0.87 MG/DL (ref 0.55–1.02)
CREAT SERPL-MCNC: 0.87 MG/DL (ref 0.55–1.02)
CREAT SERPL-MCNC: 0.97 MG/DL (ref 0.55–1.02)
CREAT SERPL-MCNC: 1.06 MG/DL (ref 0.55–1.02)
CREAT SERPL-MCNC: 1.17 MG/DL (ref 0.55–1.02)
CREAT SERPL-MCNC: 1.24 MG/DL (ref 0.55–1.02)
CREAT/UREA NIT SERPL: 17
CREAT/UREA NIT SERPL: 19
CREAT/UREA NIT SERPL: 24
CREAT/UREA NIT SERPL: 24
CREAT/UREA NIT SERPL: 26
CREAT/UREA NIT SERPL: 27
CREAT/UREA NIT SERPL: 29
CREAT/UREA NIT SERPL: 30
CREAT/UREA NIT SERPL: 31
CREAT/UREA NIT SERPL: 32
CREAT/UREA NIT SERPL: 34
CREAT/UREA NIT SERPL: 35
CREAT/UREA NIT SERPL: 37
CREAT/UREA NIT SERPL: 39
CROSSMATCH INTERPRETATION: NORMAL
CRP SERPL-MCNC: 116.9 MG/L
CTX-M (OHS): ABNORMAL
CV ECHO LV RWT: 0.47 CM
DISPENSE STATUS: NORMAL
DOP CALC AO PEAK VEL: 2.1 M/S
DOP CALC AO VTI: 38.5 CM
DOP CALC LVOT AREA: 3.1 CM2
DOP CALC LVOT DIAMETER: 2 CM
DOP CALC LVOT PEAK VEL: 0.96 M/S
DOP CALC LVOT STROKE VOLUME: 50.55 CM3
DOP CALC MV VTI: 36.6 CM
DOP CALCLVOT PEAK VEL VTI: 16.1 CM
E WAVE DECELERATION TIME: 193 MSEC
E/A RATIO: 0.84
E/E' RATIO: 20 M/S
ECHO LV POSTERIOR WALL: 1 CM (ref 0.6–1.1)
EJECTION FRACTION: 54 %
EJECTION FRACTION: 60 %
ENTEROBACTER CLOACAE COMPLEX (OHS): NOT DETECTED
ENTEROBACTERALES (OHS): NOT DETECTED
ENTEROCOCCUS FAECALIS (OHS): NOT DETECTED
ENTEROCOCCUS FAECIUM (OHS): NOT DETECTED
EOSINOPHIL # BLD AUTO: 0.01 X10(3)/MCL (ref 0–0.9)
EOSINOPHIL # BLD AUTO: 0.08 X10(3)/MCL (ref 0–0.9)
EOSINOPHIL # BLD AUTO: 0.17 X10(3)/MCL (ref 0–0.9)
EOSINOPHIL # BLD AUTO: 0.19 X10(3)/MCL (ref 0–0.9)
EOSINOPHIL # BLD AUTO: 0.21 X10(3)/MCL (ref 0–0.9)
EOSINOPHIL # BLD AUTO: 0.32 X10(3)/MCL (ref 0–0.9)
EOSINOPHIL # BLD AUTO: 0.33 X10(3)/MCL (ref 0–0.9)
EOSINOPHIL # BLD AUTO: 0.34 X10(3)/MCL (ref 0–0.9)
EOSINOPHIL # BLD AUTO: 0.44 X10(3)/MCL (ref 0–0.9)
EOSINOPHIL # BLD AUTO: 0.45 X10(3)/MCL (ref 0–0.9)
EOSINOPHIL # BLD AUTO: 0.51 X10(3)/MCL (ref 0–0.9)
EOSINOPHIL # BLD AUTO: 0.52 X10(3)/MCL (ref 0–0.9)
EOSINOPHIL # BLD AUTO: 0.54 X10(3)/MCL (ref 0–0.9)
EOSINOPHIL # BLD AUTO: 0.56 X10(3)/MCL (ref 0–0.9)
EOSINOPHIL # BLD AUTO: 0.56 X10(3)/MCL (ref 0–0.9)
EOSINOPHIL # BLD AUTO: 0.61 X10(3)/MCL (ref 0–0.9)
EOSINOPHIL # BLD AUTO: 0.74 X10(3)/MCL (ref 0–0.9)
EOSINOPHIL # BLD AUTO: 0.85 X10(3)/MCL (ref 0–0.9)
EOSINOPHIL # BLD AUTO: 0.87 X10(3)/MCL (ref 0–0.9)
EOSINOPHIL # BLD AUTO: 0.87 X10(3)/MCL (ref 0–0.9)
EOSINOPHIL # BLD AUTO: 1.06 X10(3)/MCL (ref 0–0.9)
EOSINOPHIL NFR BLD AUTO: 0 %
EOSINOPHIL NFR BLD AUTO: 0.1 %
EOSINOPHIL NFR BLD AUTO: 0.5 %
EOSINOPHIL NFR BLD AUTO: 1.3 %
EOSINOPHIL NFR BLD AUTO: 1.4 %
EOSINOPHIL NFR BLD AUTO: 1.8 %
EOSINOPHIL NFR BLD AUTO: 10.2 %
EOSINOPHIL NFR BLD AUTO: 2.8 %
EOSINOPHIL NFR BLD AUTO: 3 %
EOSINOPHIL NFR BLD AUTO: 3.6 %
EOSINOPHIL NFR BLD AUTO: 5 %
EOSINOPHIL NFR BLD AUTO: 5.2 %
EOSINOPHIL NFR BLD AUTO: 5.3 %
EOSINOPHIL NFR BLD AUTO: 5.4 %
EOSINOPHIL NFR BLD AUTO: 5.5 %
EOSINOPHIL NFR BLD AUTO: 5.7 %
EOSINOPHIL NFR BLD AUTO: 6.1 %
EOSINOPHIL NFR BLD AUTO: 7.3 %
EOSINOPHIL NFR BLD AUTO: 7.5 %
EOSINOPHIL NFR BLD AUTO: 8 %
EOSINOPHIL NFR BLD AUTO: 8.7 %
EOSINOPHIL NFR BLD AUTO: 9.8 %
ERYTHROCYTE [DISTWIDTH] IN BLOOD BY AUTOMATED COUNT: 14.4 % (ref 11.5–17)
ERYTHROCYTE [DISTWIDTH] IN BLOOD BY AUTOMATED COUNT: 14.5 % (ref 11.5–17)
ERYTHROCYTE [DISTWIDTH] IN BLOOD BY AUTOMATED COUNT: 14.5 % (ref 11.5–17)
ERYTHROCYTE [DISTWIDTH] IN BLOOD BY AUTOMATED COUNT: 14.6 % (ref 11.5–17)
ERYTHROCYTE [DISTWIDTH] IN BLOOD BY AUTOMATED COUNT: 14.6 % (ref 11.5–17)
ERYTHROCYTE [DISTWIDTH] IN BLOOD BY AUTOMATED COUNT: 14.7 % (ref 11.5–17)
ERYTHROCYTE [DISTWIDTH] IN BLOOD BY AUTOMATED COUNT: 14.7 % (ref 11.5–17)
ERYTHROCYTE [DISTWIDTH] IN BLOOD BY AUTOMATED COUNT: 14.9 % (ref 11.5–17)
ERYTHROCYTE [DISTWIDTH] IN BLOOD BY AUTOMATED COUNT: 15 % (ref 11.5–17)
ERYTHROCYTE [DISTWIDTH] IN BLOOD BY AUTOMATED COUNT: 15 % (ref 11.5–17)
ERYTHROCYTE [DISTWIDTH] IN BLOOD BY AUTOMATED COUNT: 15.1 % (ref 11.5–17)
ERYTHROCYTE [DISTWIDTH] IN BLOOD BY AUTOMATED COUNT: 15.1 % (ref 11.5–17)
ERYTHROCYTE [DISTWIDTH] IN BLOOD BY AUTOMATED COUNT: 15.2 % (ref 11.5–17)
ERYTHROCYTE [DISTWIDTH] IN BLOOD BY AUTOMATED COUNT: 15.3 % (ref 11.5–17)
ERYTHROCYTE [DISTWIDTH] IN BLOOD BY AUTOMATED COUNT: 15.4 % (ref 11.5–17)
ERYTHROCYTE [DISTWIDTH] IN BLOOD BY AUTOMATED COUNT: 15.4 % (ref 11.5–17)
ERYTHROCYTE [DISTWIDTH] IN BLOOD BY AUTOMATED COUNT: 15.8 % (ref 11.5–17)
ERYTHROCYTE [DISTWIDTH] IN BLOOD BY AUTOMATED COUNT: 15.8 % (ref 11.5–17)
ERYTHROCYTE [DISTWIDTH] IN BLOOD BY AUTOMATED COUNT: 15.9 % (ref 11.5–17)
ERYTHROCYTE [DISTWIDTH] IN BLOOD BY AUTOMATED COUNT: 15.9 % (ref 11.5–17)
ERYTHROCYTE [DISTWIDTH] IN BLOOD BY AUTOMATED COUNT: 16 % (ref 11.5–17)
ERYTHROCYTE [DISTWIDTH] IN BLOOD BY AUTOMATED COUNT: 16.1 % (ref 11.5–17)
ERYTHROCYTE [DISTWIDTH] IN BLOOD BY AUTOMATED COUNT: 16.2 % (ref 11.5–17)
ERYTHROCYTE [SEDIMENTATION RATE] IN BLOOD: 48 MM/HR (ref 0–20)
ESCHERICHIA COLI (OHS): NOT DETECTED
EST. AVERAGE GLUCOSE BLD GHB EST-MCNC: 134.1 MG/DL
FERRITIN SERPL-MCNC: 112.26 NG/ML (ref 4.63–204)
FLUAV AG UPPER RESP QL IA.RAPID: NOT DETECTED
FLUBV AG UPPER RESP QL IA.RAPID: NOT DETECTED
FRACTIONAL SHORTENING: 27 % (ref 28–44)
GFR SERPLBLD CREATININE-BSD FMLA CKD-EPI: 41 MLS/MIN/1.73/M2
GFR SERPLBLD CREATININE-BSD FMLA CKD-EPI: 44 MLS/MIN/1.73/M2
GFR SERPLBLD CREATININE-BSD FMLA CKD-EPI: 50 MLS/MIN/1.73/M2
GFR SERPLBLD CREATININE-BSD FMLA CKD-EPI: 55 MLS/MIN/1.73/M2
GFR SERPLBLD CREATININE-BSD FMLA CKD-EPI: >60 MLS/MIN/1.73/M2
GLOBULIN SER-MCNC: 2.9 GM/DL (ref 2.4–3.5)
GLOBULIN SER-MCNC: 3.1 GM/DL (ref 2.4–3.5)
GLOBULIN SER-MCNC: 3.3 GM/DL (ref 2.4–3.5)
GLOBULIN SER-MCNC: 3.4 GM/DL (ref 2.4–3.5)
GLOBULIN SER-MCNC: 3.5 GM/DL (ref 2.4–3.5)
GLOBULIN SER-MCNC: 3.8 GM/DL (ref 2.4–3.5)
GLOBULIN SER-MCNC: 3.8 GM/DL (ref 2.4–3.5)
GLOBULIN SER-MCNC: 3.9 GM/DL (ref 2.4–3.5)
GLOBULIN SER-MCNC: 4 GM/DL (ref 2.4–3.5)
GLOBULIN SER-MCNC: 4.1 GM/DL (ref 2.4–3.5)
GLOBULIN SER-MCNC: 4.1 GM/DL (ref 2.4–3.5)
GLUCOSE SERPL-MCNC: 103 MG/DL (ref 75–121)
GLUCOSE SERPL-MCNC: 118 MG/DL (ref 75–121)
GLUCOSE SERPL-MCNC: 120 MG/DL (ref 75–121)
GLUCOSE SERPL-MCNC: 125 MG/DL (ref 75–121)
GLUCOSE SERPL-MCNC: 127 MG/DL (ref 75–121)
GLUCOSE SERPL-MCNC: 128 MG/DL (ref 75–121)
GLUCOSE SERPL-MCNC: 130 MG/DL (ref 75–121)
GLUCOSE SERPL-MCNC: 131 MG/DL (ref 75–121)
GLUCOSE SERPL-MCNC: 141 MG/DL (ref 75–121)
GLUCOSE SERPL-MCNC: 143 MG/DL (ref 75–121)
GLUCOSE SERPL-MCNC: 149 MG/DL (ref 75–121)
GLUCOSE SERPL-MCNC: 150 MG/DL (ref 75–121)
GLUCOSE SERPL-MCNC: 157 MG/DL (ref 75–121)
GLUCOSE SERPL-MCNC: 162 MG/DL (ref 75–121)
GLUCOSE SERPL-MCNC: 162 MG/DL (ref 75–121)
GLUCOSE SERPL-MCNC: 163 MG/DL (ref 75–121)
GLUCOSE SERPL-MCNC: 165 MG/DL (ref 70–110)
GLUCOSE SERPL-MCNC: 167 MG/DL (ref 75–121)
GLUCOSE SERPL-MCNC: 170 MG/DL (ref 75–121)
GLUCOSE SERPL-MCNC: 184 MG/DL (ref 75–121)
GLUCOSE SERPL-MCNC: 188 MG/DL (ref 75–121)
GLUCOSE SERPL-MCNC: 188 MG/DL (ref 75–121)
GLUCOSE SERPL-MCNC: 195 MG/DL (ref 75–121)
GLUCOSE SERPL-MCNC: 206 MG/DL (ref 75–121)
GLUCOSE SERPL-MCNC: 208 MG/DL (ref 75–121)
GLUCOSE SERPL-MCNC: 256 MG/DL (ref 75–121)
GLUCOSE SERPL-MCNC: 313 MG/DL (ref 75–121)
GLUCOSE SERPL-MCNC: 51 MG/DL (ref 75–121)
GLUCOSE SERPL-MCNC: 51 MG/DL (ref 75–121)
GLUCOSE SERPL-MCNC: 54 MG/DL (ref 75–121)
GLUCOSE SERPL-MCNC: 54 MG/DL (ref 75–121)
GLUCOSE SERPL-MCNC: 63 MG/DL (ref 75–121)
GLUCOSE SERPL-MCNC: 72 MG/DL (ref 75–121)
GLUCOSE SERPL-MCNC: 76 MG/DL (ref 75–121)
GLUCOSE SERPL-MCNC: 89 MG/DL (ref 75–121)
GLUCOSE UR QL STRIP.AUTO: ABNORMAL MG/DL
GLUCOSE UR QL STRIP.AUTO: NEGATIVE
GLUCOSE UR QL STRIP.AUTO: NEGATIVE MG/DL
GLUCOSE UR QL STRIP.AUTO: NEGATIVE MG/DL
GP B STREP DNA CSF QL NAA+NON-PROBE: NOT DETECTED
GRAM STN SPEC: ABNORMAL
GROUP & RH: NORMAL
HAEM INFLU DNA CSF QL NAA+NON-PROBE: NOT DETECTED
HBA1C MFR BLD: 6.3 %
HCT VFR BLD AUTO: 25.1 % (ref 37–47)
HCT VFR BLD AUTO: 26.1 % (ref 37–47)
HCT VFR BLD AUTO: 26.6 % (ref 37–47)
HCT VFR BLD AUTO: 27.3 % (ref 37–47)
HCT VFR BLD AUTO: 27.6 % (ref 37–47)
HCT VFR BLD AUTO: 27.9 % (ref 37–47)
HCT VFR BLD AUTO: 28.5 % (ref 37–47)
HCT VFR BLD AUTO: 29.5 % (ref 37–47)
HCT VFR BLD AUTO: 30 % (ref 37–47)
HCT VFR BLD AUTO: 30.2 % (ref 37–47)
HCT VFR BLD AUTO: 30.3 % (ref 37–47)
HCT VFR BLD AUTO: 30.4 % (ref 37–47)
HCT VFR BLD AUTO: 30.4 % (ref 37–47)
HCT VFR BLD AUTO: 30.7 % (ref 37–47)
HCT VFR BLD AUTO: 31.7 % (ref 37–47)
HCT VFR BLD AUTO: 31.7 % (ref 37–47)
HCT VFR BLD AUTO: 32.1 % (ref 37–47)
HCT VFR BLD AUTO: 32.1 % (ref 37–47)
HCT VFR BLD AUTO: 32.3 % (ref 37–47)
HCT VFR BLD AUTO: 32.8 % (ref 37–47)
HCT VFR BLD AUTO: 33.4 % (ref 37–47)
HCT VFR BLD AUTO: 33.4 % (ref 37–47)
HCT VFR BLD AUTO: 34.2 % (ref 37–47)
HCT VFR BLD AUTO: 35 % (ref 37–47)
HCT VFR BLD AUTO: 35.2 % (ref 37–47)
HCT VFR BLD AUTO: 36.7 % (ref 37–47)
HEMOCCULT SP1 STL QL: NEGATIVE
HGB BLD-MCNC: 10 G/DL (ref 12–16)
HGB BLD-MCNC: 10.1 G/DL (ref 12–16)
HGB BLD-MCNC: 10.2 G/DL (ref 12–16)
HGB BLD-MCNC: 10.2 G/DL (ref 12–16)
HGB BLD-MCNC: 10.3 G/DL (ref 12–16)
HGB BLD-MCNC: 10.5 G/DL (ref 12–16)
HGB BLD-MCNC: 10.7 G/DL (ref 12–16)
HGB BLD-MCNC: 10.7 G/DL (ref 12–16)
HGB BLD-MCNC: 11 G/DL (ref 12–16)
HGB BLD-MCNC: 11 G/DL (ref 12–16)
HGB BLD-MCNC: 11.7 G/DL (ref 12–16)
HGB BLD-MCNC: 7.6 G/DL (ref 12–16)
HGB BLD-MCNC: 7.9 G/DL (ref 12–16)
HGB BLD-MCNC: 8.1 G/DL (ref 12–16)
HGB BLD-MCNC: 8.3 G/DL (ref 12–16)
HGB BLD-MCNC: 8.5 G/DL (ref 12–16)
HGB BLD-MCNC: 8.5 G/DL (ref 12–16)
HGB BLD-MCNC: 8.7 G/DL (ref 12–16)
HGB BLD-MCNC: 9 G/DL (ref 12–16)
HGB BLD-MCNC: 9.1 G/DL (ref 12–16)
HGB BLD-MCNC: 9.1 G/DL (ref 12–16)
HGB BLD-MCNC: 9.4 G/DL (ref 12–16)
HGB BLD-MCNC: 9.4 G/DL (ref 12–16)
HGB BLD-MCNC: 9.7 G/DL (ref 12–16)
HGB BLD-MCNC: 9.9 G/DL (ref 12–16)
HGB BLD-MCNC: 9.9 G/DL (ref 12–16)
IMM GRANULOCYTES # BLD AUTO: 0.02 X10(3)/MCL (ref 0–0.04)
IMM GRANULOCYTES # BLD AUTO: 0.03 X10(3)/MCL (ref 0–0.04)
IMM GRANULOCYTES # BLD AUTO: 0.04 X10(3)/MCL (ref 0–0.04)
IMM GRANULOCYTES # BLD AUTO: 0.04 X10(3)/MCL (ref 0–0.04)
IMM GRANULOCYTES # BLD AUTO: 0.06 X10(3)/MCL (ref 0–0.04)
IMM GRANULOCYTES # BLD AUTO: 0.08 X10(3)/MCL (ref 0–0.04)
IMM GRANULOCYTES # BLD AUTO: 0.08 X10(3)/MCL (ref 0–0.04)
IMM GRANULOCYTES # BLD AUTO: 0.1 X10(3)/MCL (ref 0–0.04)
IMM GRANULOCYTES # BLD AUTO: 0.11 X10(3)/MCL (ref 0–0.04)
IMM GRANULOCYTES # BLD AUTO: 0.12 X10(3)/MCL (ref 0–0.04)
IMM GRANULOCYTES # BLD AUTO: 0.12 X10(3)/MCL (ref 0–0.04)
IMM GRANULOCYTES # BLD AUTO: 0.13 X10(3)/MCL (ref 0–0.04)
IMM GRANULOCYTES # BLD AUTO: 0.32 X10(3)/MCL (ref 0–0.04)
IMM GRANULOCYTES NFR BLD AUTO: 0.2 %
IMM GRANULOCYTES NFR BLD AUTO: 0.3 %
IMM GRANULOCYTES NFR BLD AUTO: 0.4 %
IMM GRANULOCYTES NFR BLD AUTO: 0.5 %
IMM GRANULOCYTES NFR BLD AUTO: 0.6 %
IMM GRANULOCYTES NFR BLD AUTO: 0.7 %
IMM GRANULOCYTES NFR BLD AUTO: 0.7 %
IMM GRANULOCYTES NFR BLD AUTO: 0.8 %
IMM GRANULOCYTES NFR BLD AUTO: 0.8 %
IMM GRANULOCYTES NFR BLD AUTO: 1 %
IMM GRANULOCYTES NFR BLD AUTO: 1 %
IMM GRANULOCYTES NFR BLD AUTO: 1.1 %
IMP (OHS): ABNORMAL
INDIRECT COOMBS GEL: NORMAL
INR BLD: 1.29 (ref 0–1.3)
INSTRUMENT WBC (OLG): 34.3 X10(3)/MCL
INTERVENTRICULAR SEPTUM: 0.84 CM (ref 0.6–1.1)
IRON SATN MFR SERPL: 9 % (ref 20–50)
IRON SERPL-MCNC: 18 UG/DL (ref 50–170)
KETONES UR QL STRIP.AUTO: NEGATIVE
KETONES UR QL STRIP.AUTO: NEGATIVE MG/DL
KLEBSIELLA AEROGENES (OHS): NOT DETECTED
KLEBSIELLA OXYTOCA (OHS): NOT DETECTED
KLEBSIELLA PNEUMONIAE GROUP (OHS): NOT DETECTED
KPC (OHS): ABNORMAL
L MONOCYTOG DNA CSF QL NAA+NON-PROBE: NOT DETECTED
LACTATE SERPL-SCNC: 2 MMOL/L (ref 0.5–2.2)
LACTATE SERPL-SCNC: 2.4 MMOL/L (ref 0.5–2.2)
LACTATE SERPL-SCNC: 2.9 MMOL/L (ref 0.5–2.2)
LACTATE SERPL-SCNC: 3.5 MMOL/L (ref 0.5–2.2)
LACTATE SERPL-SCNC: 3.8 MMOL/L (ref 0.5–2.2)
LEFT ABI: 0.21
LEFT ARM BP: 155 MMHG
LEFT ATRIUM SIZE: 4.1 CM
LEFT ATRIUM VOLUME INDEX MOD: 30.3 ML/M2
LEFT ATRIUM VOLUME MOD: 45.4 CM3
LEFT DORSALIS PEDIS: 31 MMHG
LEFT INTERNAL DIMENSION IN SYSTOLE: 3.11 CM (ref 2.1–4)
LEFT POSTERIOR TIBIAL: 33 MMHG
LEFT VENTRICLE DIASTOLIC VOLUME INDEX: 54.8 ML/M2
LEFT VENTRICLE DIASTOLIC VOLUME: 82.2 ML
LEFT VENTRICLE MASS INDEX: 84 G/M2
LEFT VENTRICLE SYSTOLIC VOLUME INDEX: 25.5 ML/M2
LEFT VENTRICLE SYSTOLIC VOLUME: 38.2 ML
LEFT VENTRICULAR INTERNAL DIMENSION IN DIASTOLE: 4.28 CM (ref 3.5–6)
LEFT VENTRICULAR MASS: 126.08 G
LEUKOCYTE ESTERASE UR QL STRIP.AUTO: ABNORMAL UNIT/L
LEUKOCYTE ESTERASE UR QL STRIP.AUTO: ABNORMAL UNIT/L
LEUKOCYTE ESTERASE UR QL STRIP.AUTO: NEGATIVE
LEUKOCYTE ESTERASE UR QL STRIP.AUTO: NEGATIVE UNIT/L
LIPASE SERPL-CCNC: 4 U/L
LV LATERAL E/E' RATIO: 16.25 M/S
LV SEPTAL E/E' RATIO: 26 M/S
LVOT MG: 2 MMHG
LVOT MV: 0.64 CM/S
LYMPHOCYTES # BLD AUTO: 0.62 X10(3)/MCL (ref 0.6–4.6)
LYMPHOCYTES # BLD AUTO: 0.75 X10(3)/MCL (ref 0.6–4.6)
LYMPHOCYTES # BLD AUTO: 0.86 X10(3)/MCL (ref 0.6–4.6)
LYMPHOCYTES # BLD AUTO: 0.88 X10(3)/MCL (ref 0.6–4.6)
LYMPHOCYTES # BLD AUTO: 0.89 X10(3)/MCL (ref 0.6–4.6)
LYMPHOCYTES # BLD AUTO: 0.91 X10(3)/MCL (ref 0.6–4.6)
LYMPHOCYTES # BLD AUTO: 0.97 X10(3)/MCL (ref 0.6–4.6)
LYMPHOCYTES # BLD AUTO: 0.99 X10(3)/MCL (ref 0.6–4.6)
LYMPHOCYTES # BLD AUTO: 1 X10(3)/MCL (ref 0.6–4.6)
LYMPHOCYTES # BLD AUTO: 1 X10(3)/MCL (ref 0.6–4.6)
LYMPHOCYTES # BLD AUTO: 1.01 X10(3)/MCL (ref 0.6–4.6)
LYMPHOCYTES # BLD AUTO: 1.08 X10(3)/MCL (ref 0.6–4.6)
LYMPHOCYTES # BLD AUTO: 1.11 X10(3)/MCL (ref 0.6–4.6)
LYMPHOCYTES # BLD AUTO: 1.15 X10(3)/MCL (ref 0.6–4.6)
LYMPHOCYTES # BLD AUTO: 1.15 X10(3)/MCL (ref 0.6–4.6)
LYMPHOCYTES # BLD AUTO: 1.17 X10(3)/MCL (ref 0.6–4.6)
LYMPHOCYTES # BLD AUTO: 1.19 X10(3)/MCL (ref 0.6–4.6)
LYMPHOCYTES # BLD AUTO: 1.2 X10(3)/MCL (ref 0.6–4.6)
LYMPHOCYTES # BLD AUTO: 1.23 X10(3)/MCL (ref 0.6–4.6)
LYMPHOCYTES # BLD AUTO: 1.32 X10(3)/MCL (ref 0.6–4.6)
LYMPHOCYTES # BLD AUTO: 1.44 X10(3)/MCL (ref 0.6–4.6)
LYMPHOCYTES # BLD AUTO: 1.54 X10(3)/MCL (ref 0.6–4.6)
LYMPHOCYTES # BLD AUTO: 1.55 X10(3)/MCL (ref 0.6–4.6)
LYMPHOCYTES # BLD AUTO: 1.64 X10(3)/MCL (ref 0.6–4.6)
LYMPHOCYTES NFR BLD AUTO: 10.4 %
LYMPHOCYTES NFR BLD AUTO: 10.6 %
LYMPHOCYTES NFR BLD AUTO: 10.9 %
LYMPHOCYTES NFR BLD AUTO: 11.7 %
LYMPHOCYTES NFR BLD AUTO: 11.8 %
LYMPHOCYTES NFR BLD AUTO: 11.9 %
LYMPHOCYTES NFR BLD AUTO: 11.9 %
LYMPHOCYTES NFR BLD AUTO: 12.7 %
LYMPHOCYTES NFR BLD AUTO: 13 %
LYMPHOCYTES NFR BLD AUTO: 13.5 %
LYMPHOCYTES NFR BLD AUTO: 13.9 %
LYMPHOCYTES NFR BLD AUTO: 3.9 %
LYMPHOCYTES NFR BLD AUTO: 5.4 %
LYMPHOCYTES NFR BLD AUTO: 5.6 %
LYMPHOCYTES NFR BLD AUTO: 7.2 %
LYMPHOCYTES NFR BLD AUTO: 9.1 %
LYMPHOCYTES NFR BLD AUTO: 9.2 %
LYMPHOCYTES NFR BLD AUTO: 9.3 %
LYMPHOCYTES NFR BLD AUTO: 9.4 %
LYMPHOCYTES NFR BLD AUTO: 9.4 %
LYMPHOCYTES NFR BLD AUTO: 9.6 %
LYMPHOCYTES NFR BLD AUTO: 9.9 %
LYMPHOCYTES NFR BLD MANUAL: 0.34 X10(3)/MCL
LYMPHOCYTES NFR BLD MANUAL: 1 %
MAGNESIUM SERPL-MCNC: 1.3 MG/DL (ref 1.6–2.6)
MAGNESIUM SERPL-MCNC: 1.4 MG/DL (ref 1.6–2.6)
MAGNESIUM SERPL-MCNC: 1.5 MG/DL (ref 1.6–2.6)
MAGNESIUM SERPL-MCNC: 1.6 MG/DL (ref 1.6–2.6)
MAGNESIUM SERPL-MCNC: 1.7 MG/DL (ref 1.6–2.6)
MAGNESIUM SERPL-MCNC: 1.8 MG/DL (ref 1.6–2.6)
MAGNESIUM SERPL-MCNC: 1.9 MG/DL (ref 1.6–2.6)
MAGNESIUM SERPL-MCNC: 2 MG/DL (ref 1.6–2.6)
MAGNESIUM SERPL-MCNC: 2.1 MG/DL (ref 1.6–2.6)
MAGNESIUM SERPL-MCNC: 2.3 MG/DL (ref 1.6–2.6)
MCH RBC QN AUTO: 25.6 PG (ref 27–31)
MCH RBC QN AUTO: 25.7 PG (ref 27–31)
MCH RBC QN AUTO: 25.8 PG (ref 27–31)
MCH RBC QN AUTO: 25.9 PG (ref 27–31)
MCH RBC QN AUTO: 26 PG (ref 27–31)
MCH RBC QN AUTO: 26.1 PG (ref 27–31)
MCH RBC QN AUTO: 26.2 PG (ref 27–31)
MCH RBC QN AUTO: 26.4 PG (ref 27–31)
MCH RBC QN AUTO: 26.5 PG (ref 27–31)
MCH RBC QN AUTO: 26.5 PG (ref 27–31)
MCH RBC QN AUTO: 26.7 PG (ref 27–31)
MCH RBC QN AUTO: 26.9 PG (ref 27–31)
MCH RBC QN AUTO: 27.1 PG (ref 27–31)
MCH RBC QN AUTO: 27.2 PG (ref 27–31)
MCH RBC QN AUTO: 27.3 PG (ref 27–31)
MCH RBC QN AUTO: 27.5 PG (ref 27–31)
MCHC RBC AUTO-ENTMCNC: 29.6 G/DL (ref 33–36)
MCHC RBC AUTO-ENTMCNC: 29.6 G/DL (ref 33–36)
MCHC RBC AUTO-ENTMCNC: 29.8 G/DL (ref 33–36)
MCHC RBC AUTO-ENTMCNC: 30.1 G/DL (ref 33–36)
MCHC RBC AUTO-ENTMCNC: 30.3 G/DL (ref 33–36)
MCHC RBC AUTO-ENTMCNC: 30.5 G/DL (ref 33–36)
MCHC RBC AUTO-ENTMCNC: 30.5 G/DL (ref 33–36)
MCHC RBC AUTO-ENTMCNC: 30.7 G/DL (ref 33–36)
MCHC RBC AUTO-ENTMCNC: 31 G/DL (ref 33–36)
MCHC RBC AUTO-ENTMCNC: 31.1 G/DL (ref 33–36)
MCHC RBC AUTO-ENTMCNC: 31.1 G/DL (ref 33–36)
MCHC RBC AUTO-ENTMCNC: 31.2 G/DL (ref 33–36)
MCHC RBC AUTO-ENTMCNC: 31.2 G/DL (ref 33–36)
MCHC RBC AUTO-ENTMCNC: 31.3 G/DL (ref 33–36)
MCHC RBC AUTO-ENTMCNC: 31.4 G/DL (ref 33–36)
MCHC RBC AUTO-ENTMCNC: 31.5 G/DL (ref 33–36)
MCHC RBC AUTO-ENTMCNC: 31.5 G/DL (ref 33–36)
MCHC RBC AUTO-ENTMCNC: 31.6 G/DL (ref 33–36)
MCHC RBC AUTO-ENTMCNC: 31.9 G/DL (ref 33–36)
MCHC RBC AUTO-ENTMCNC: 32 G/DL (ref 33–36)
MCHC RBC AUTO-ENTMCNC: 32.1 G/DL (ref 33–36)
MCHC RBC AUTO-ENTMCNC: 32.6 G/DL (ref 33–36)
MCHC RBC AUTO-ENTMCNC: 32.6 G/DL (ref 33–36)
MCHC RBC AUTO-ENTMCNC: 32.9 G/DL (ref 33–36)
MCR-1 (OHS): ABNORMAL
MCV RBC AUTO: 82.4 FL (ref 80–94)
MCV RBC AUTO: 82.7 FL (ref 80–94)
MCV RBC AUTO: 83.2 FL (ref 80–94)
MCV RBC AUTO: 83.5 FL (ref 80–94)
MCV RBC AUTO: 83.7 FL (ref 80–94)
MCV RBC AUTO: 83.9 FL (ref 80–94)
MCV RBC AUTO: 84.5 FL (ref 80–94)
MCV RBC AUTO: 84.5 FL (ref 80–94)
MCV RBC AUTO: 84.6 FL (ref 80–94)
MCV RBC AUTO: 84.7 FL (ref 80–94)
MCV RBC AUTO: 84.7 FL (ref 80–94)
MCV RBC AUTO: 84.8 FL (ref 80–94)
MCV RBC AUTO: 84.8 FL (ref 80–94)
MCV RBC AUTO: 85 FL (ref 80–94)
MCV RBC AUTO: 85.1 FL (ref 80–94)
MCV RBC AUTO: 85.2 FL (ref 80–94)
MCV RBC AUTO: 85.4 FL (ref 80–94)
MCV RBC AUTO: 85.4 FL (ref 80–94)
MCV RBC AUTO: 85.6 FL (ref 80–94)
MCV RBC AUTO: 85.6 FL (ref 80–94)
MCV RBC AUTO: 85.8 FL (ref 80–94)
MCV RBC AUTO: 86.5 FL (ref 80–94)
MCV RBC AUTO: 86.8 FL (ref 80–94)
MCV RBC AUTO: 88.4 FL (ref 80–94)
MECA/C (OHS): ABNORMAL
MECA/C AND MREJ (MRSA)(OHS): DETECTED
MONOCYTES # BLD AUTO: 0.41 X10(3)/MCL (ref 0.1–1.3)
MONOCYTES # BLD AUTO: 0.66 X10(3)/MCL (ref 0.1–1.3)
MONOCYTES # BLD AUTO: 0.69 X10(3)/MCL (ref 0.1–1.3)
MONOCYTES # BLD AUTO: 0.72 X10(3)/MCL (ref 0.1–1.3)
MONOCYTES # BLD AUTO: 0.75 X10(3)/MCL (ref 0.1–1.3)
MONOCYTES # BLD AUTO: 0.76 X10(3)/MCL (ref 0.1–1.3)
MONOCYTES # BLD AUTO: 0.78 X10(3)/MCL (ref 0.1–1.3)
MONOCYTES # BLD AUTO: 0.79 X10(3)/MCL (ref 0.1–1.3)
MONOCYTES # BLD AUTO: 0.79 X10(3)/MCL (ref 0.1–1.3)
MONOCYTES # BLD AUTO: 0.82 X10(3)/MCL (ref 0.1–1.3)
MONOCYTES # BLD AUTO: 0.83 X10(3)/MCL (ref 0.1–1.3)
MONOCYTES # BLD AUTO: 0.87 X10(3)/MCL (ref 0.1–1.3)
MONOCYTES # BLD AUTO: 0.88 X10(3)/MCL (ref 0.1–1.3)
MONOCYTES # BLD AUTO: 0.9 X10(3)/MCL (ref 0.1–1.3)
MONOCYTES # BLD AUTO: 0.93 X10(3)/MCL (ref 0.1–1.3)
MONOCYTES # BLD AUTO: 0.94 X10(3)/MCL (ref 0.1–1.3)
MONOCYTES # BLD AUTO: 0.96 X10(3)/MCL (ref 0.1–1.3)
MONOCYTES # BLD AUTO: 0.96 X10(3)/MCL (ref 0.1–1.3)
MONOCYTES # BLD AUTO: 0.98 X10(3)/MCL (ref 0.1–1.3)
MONOCYTES # BLD AUTO: 1.17 X10(3)/MCL (ref 0.1–1.3)
MONOCYTES # BLD AUTO: 1.21 X10(3)/MCL (ref 0.1–1.3)
MONOCYTES # BLD AUTO: 1.37 X10(3)/MCL (ref 0.1–1.3)
MONOCYTES # BLD AUTO: 1.39 X10(3)/MCL (ref 0.1–1.3)
MONOCYTES # BLD AUTO: 2.23 X10(3)/MCL (ref 0.1–1.3)
MONOCYTES NFR BLD AUTO: 10.2 %
MONOCYTES NFR BLD AUTO: 10.9 %
MONOCYTES NFR BLD AUTO: 3.5 %
MONOCYTES NFR BLD AUTO: 5.6 %
MONOCYTES NFR BLD AUTO: 7 %
MONOCYTES NFR BLD AUTO: 7.6 %
MONOCYTES NFR BLD AUTO: 7.6 %
MONOCYTES NFR BLD AUTO: 7.7 %
MONOCYTES NFR BLD AUTO: 7.8 %
MONOCYTES NFR BLD AUTO: 7.8 %
MONOCYTES NFR BLD AUTO: 8 %
MONOCYTES NFR BLD AUTO: 8.1 %
MONOCYTES NFR BLD AUTO: 8.1 %
MONOCYTES NFR BLD AUTO: 8.4 %
MONOCYTES NFR BLD AUTO: 8.4 %
MONOCYTES NFR BLD AUTO: 8.6 %
MONOCYTES NFR BLD AUTO: 8.6 %
MONOCYTES NFR BLD AUTO: 8.7 %
MONOCYTES NFR BLD AUTO: 8.8 %
MONOCYTES NFR BLD AUTO: 8.8 %
MONOCYTES NFR BLD AUTO: 9 %
MONOCYTES NFR BLD AUTO: 9.3 %
MONOCYTES NFR BLD AUTO: 9.8 %
MONOCYTES NFR BLD AUTO: 9.8 %
MONOCYTES NFR BLD MANUAL: 1.37 X10(3)/MCL (ref 0.1–1.3)
MONOCYTES NFR BLD MANUAL: 4 %
MR PISA EROA: 0.12 CM2
MV MEAN GRADIENT: 7 MMHG
MV PEAK A VEL: 1.54 M/S
MV PEAK E VEL: 1.3 M/S
MV PEAK GRADIENT: 12 MMHG
MV STENOSIS PRESSURE HALF TIME: 87 MS
MV VALVE AREA BY CONTINUITY EQUATION: 1.38 CM2
MV VALVE AREA P 1/2 METHOD: 2.53 CM2
N MEN DNA CSF QL NAA+NON-PROBE: NOT DETECTED
NDM (OHS): ABNORMAL
NEUTROPHILS # BLD AUTO: 10.11 X10(3)/MCL (ref 2.1–9.2)
NEUTROPHILS # BLD AUTO: 11.2 X10(3)/MCL (ref 2.1–9.2)
NEUTROPHILS # BLD AUTO: 11.35 X10(3)/MCL (ref 2.1–9.2)
NEUTROPHILS # BLD AUTO: 13.13 X10(3)/MCL (ref 2.1–9.2)
NEUTROPHILS # BLD AUTO: 14.22 X10(3)/MCL (ref 2.1–9.2)
NEUTROPHILS # BLD AUTO: 24.53 X10(3)/MCL (ref 2.1–9.2)
NEUTROPHILS # BLD AUTO: 5.89 X10(3)/MCL (ref 2.1–9.2)
NEUTROPHILS # BLD AUTO: 5.93 X10(3)/MCL (ref 2.1–9.2)
NEUTROPHILS # BLD AUTO: 6.3 X10(3)/MCL (ref 2.1–9.2)
NEUTROPHILS # BLD AUTO: 6.42 X10(3)/MCL (ref 2.1–9.2)
NEUTROPHILS # BLD AUTO: 6.53 X10(3)/MCL (ref 2.1–9.2)
NEUTROPHILS # BLD AUTO: 6.85 X10(3)/MCL (ref 2.1–9.2)
NEUTROPHILS # BLD AUTO: 7.06 X10(3)/MCL (ref 2.1–9.2)
NEUTROPHILS # BLD AUTO: 7.07 X10(3)/MCL (ref 2.1–9.2)
NEUTROPHILS # BLD AUTO: 7.07 X10(3)/MCL (ref 2.1–9.2)
NEUTROPHILS # BLD AUTO: 7.24 X10(3)/MCL (ref 2.1–9.2)
NEUTROPHILS # BLD AUTO: 7.34 X10(3)/MCL (ref 2.1–9.2)
NEUTROPHILS # BLD AUTO: 7.52 X10(3)/MCL (ref 2.1–9.2)
NEUTROPHILS # BLD AUTO: 7.81 X10(3)/MCL (ref 2.1–9.2)
NEUTROPHILS # BLD AUTO: 8.08 X10(3)/MCL (ref 2.1–9.2)
NEUTROPHILS # BLD AUTO: 8.35 X10(3)/MCL (ref 2.1–9.2)
NEUTROPHILS # BLD AUTO: 8.42 X10(3)/MCL (ref 2.1–9.2)
NEUTROPHILS # BLD AUTO: 8.64 X10(3)/MCL (ref 2.1–9.2)
NEUTROPHILS # BLD AUTO: 9.56 X10(3)/MCL (ref 2.1–9.2)
NEUTROPHILS NFR BLD AUTO: 68.7 %
NEUTROPHILS NFR BLD AUTO: 70.9 %
NEUTROPHILS NFR BLD AUTO: 71.1 %
NEUTROPHILS NFR BLD AUTO: 71.9 %
NEUTROPHILS NFR BLD AUTO: 72.2 %
NEUTROPHILS NFR BLD AUTO: 72.4 %
NEUTROPHILS NFR BLD AUTO: 72.5 %
NEUTROPHILS NFR BLD AUTO: 72.9 %
NEUTROPHILS NFR BLD AUTO: 73.1 %
NEUTROPHILS NFR BLD AUTO: 73.9 %
NEUTROPHILS NFR BLD AUTO: 74.4 %
NEUTROPHILS NFR BLD AUTO: 74.4 %
NEUTROPHILS NFR BLD AUTO: 74.5 %
NEUTROPHILS NFR BLD AUTO: 74.7 %
NEUTROPHILS NFR BLD AUTO: 75.5 %
NEUTROPHILS NFR BLD AUTO: 76.2 %
NEUTROPHILS NFR BLD AUTO: 76.2 %
NEUTROPHILS NFR BLD AUTO: 77.6 %
NEUTROPHILS NFR BLD AUTO: 78.7 %
NEUTROPHILS NFR BLD AUTO: 82.7 %
NEUTROPHILS NFR BLD AUTO: 84.8 %
NEUTROPHILS NFR BLD AUTO: 85.5 %
NEUTROPHILS NFR BLD AUTO: 85.6 %
NEUTROPHILS NFR BLD AUTO: 89.8 %
NEUTROPHILS NFR BLD MANUAL: 95 %
NITRITE UR QL STRIP.AUTO: NEGATIVE
NITRITE UR QL STRIP.AUTO: POSITIVE
NRBC BLD AUTO-RTO: 0 %
OSMOLALITY SERPL: 277 MOSM/KG (ref 280–300)
OSMOLALITY UR: 276 MOSM/KG (ref 300–1300)
OXA-48-LIKE (OHS): ABNORMAL
PH UR STRIP.AUTO: 6.5 [PH]
PH UR STRIP.AUTO: 7 [PH]
PH UR STRIP.AUTO: 7.5 [PH]
PH UR STRIP.AUTO: 7.5 [PH]
PHOSPHATE SERPL-MCNC: 2.2 MG/DL (ref 2.3–4.7)
PHOSPHATE SERPL-MCNC: 2.5 MG/DL (ref 2.3–4.7)
PHOSPHATE SERPL-MCNC: 2.5 MG/DL (ref 2.3–4.7)
PHOSPHATE SERPL-MCNC: 3.1 MG/DL (ref 2.3–4.7)
PHOSPHATE SERPL-MCNC: 3.3 MG/DL (ref 2.3–4.7)
PHOSPHATE SERPL-MCNC: 3.4 MG/DL (ref 2.3–4.7)
PHOSPHATE SERPL-MCNC: 3.7 MG/DL (ref 2.3–4.7)
PISA MRMAX VEL: 5.69 M/S
PISA RADIUS: 0.6 CM
PISA TR MAX VEL: 3 M/S
PISA VN NYQUIST MS: 0.31 M/S
PISA VN NYQUIST: 0.31 M/S
PLATELET # BLD AUTO: 204 X10(3)/MCL (ref 130–400)
PLATELET # BLD AUTO: 227 X10(3)/MCL (ref 130–400)
PLATELET # BLD AUTO: 237 X10(3)/MCL (ref 130–400)
PLATELET # BLD AUTO: 242 X10(3)/MCL (ref 130–400)
PLATELET # BLD AUTO: 248 X10(3)/MCL (ref 130–400)
PLATELET # BLD AUTO: 258 X10(3)/MCL (ref 130–400)
PLATELET # BLD AUTO: 261 X10(3)/MCL (ref 130–400)
PLATELET # BLD AUTO: 271 X10(3)/MCL (ref 130–400)
PLATELET # BLD AUTO: 274 X10(3)/MCL (ref 130–400)
PLATELET # BLD AUTO: 283 X10(3)/MCL (ref 130–400)
PLATELET # BLD AUTO: 315 X10(3)/MCL (ref 130–400)
PLATELET # BLD AUTO: 321 X10(3)/MCL (ref 130–400)
PLATELET # BLD AUTO: 322 X10(3)/MCL (ref 130–400)
PLATELET # BLD AUTO: 323 X10(3)/MCL (ref 130–400)
PLATELET # BLD AUTO: 327 X10(3)/MCL (ref 130–400)
PLATELET # BLD AUTO: 329 X10(3)/MCL (ref 130–400)
PLATELET # BLD AUTO: 333 X10(3)/MCL (ref 130–400)
PLATELET # BLD AUTO: 338 X10(3)/MCL (ref 130–400)
PLATELET # BLD AUTO: 355 X10(3)/MCL (ref 130–400)
PLATELET # BLD AUTO: 361 X10(3)/MCL (ref 130–400)
PLATELET # BLD AUTO: 370 X10(3)/MCL (ref 130–400)
PLATELET # BLD AUTO: 378 X10(3)/MCL (ref 130–400)
PLATELET # BLD AUTO: 382 X10(3)/MCL (ref 130–400)
PLATELET # BLD AUTO: 385 X10(3)/MCL (ref 130–400)
PLATELET # BLD AUTO: 390 X10(3)/MCL (ref 130–400)
PLATELET # BLD AUTO: 412 X10(3)/MCL (ref 130–400)
PLATELET # BLD EST: NORMAL 10*3/UL
PMV BLD AUTO: 10.2 FL (ref 7.4–10.4)
PMV BLD AUTO: 8.5 FL (ref 7.4–10.4)
PMV BLD AUTO: 8.6 FL (ref 7.4–10.4)
PMV BLD AUTO: 8.7 FL (ref 7.4–10.4)
PMV BLD AUTO: 8.8 FL (ref 7.4–10.4)
PMV BLD AUTO: 8.9 FL (ref 7.4–10.4)
PMV BLD AUTO: 8.9 FL (ref 7.4–10.4)
PMV BLD AUTO: 9 FL (ref 7.4–10.4)
PMV BLD AUTO: 9 FL (ref 7.4–10.4)
PMV BLD AUTO: 9.1 FL (ref 7.4–10.4)
PMV BLD AUTO: 9.2 FL (ref 7.4–10.4)
PMV BLD AUTO: 9.5 FL (ref 7.4–10.4)
PMV BLD AUTO: 9.6 FL (ref 7.4–10.4)
PMV BLD AUTO: 9.7 FL (ref 7.4–10.4)
PMV BLD AUTO: 9.8 FL (ref 7.4–10.4)
PMV BLD AUTO: 9.9 FL (ref 7.4–10.4)
POCT GLUCOSE: 101 MG/DL (ref 70–110)
POCT GLUCOSE: 104 MG/DL (ref 70–110)
POCT GLUCOSE: 106 MG/DL (ref 70–110)
POCT GLUCOSE: 108 MG/DL (ref 70–110)
POCT GLUCOSE: 111 MG/DL (ref 70–110)
POCT GLUCOSE: 111 MG/DL (ref 70–110)
POCT GLUCOSE: 113 MG/DL (ref 70–110)
POCT GLUCOSE: 118 MG/DL (ref 70–110)
POCT GLUCOSE: 119 MG/DL (ref 70–110)
POCT GLUCOSE: 122 MG/DL (ref 70–110)
POCT GLUCOSE: 122 MG/DL (ref 70–110)
POCT GLUCOSE: 124 MG/DL (ref 70–110)
POCT GLUCOSE: 126 MG/DL (ref 70–110)
POCT GLUCOSE: 126 MG/DL (ref 70–110)
POCT GLUCOSE: 132 MG/DL (ref 70–110)
POCT GLUCOSE: 132 MG/DL (ref 70–110)
POCT GLUCOSE: 133 MG/DL (ref 70–110)
POCT GLUCOSE: 134 MG/DL (ref 70–110)
POCT GLUCOSE: 136 MG/DL (ref 70–110)
POCT GLUCOSE: 136 MG/DL (ref 70–110)
POCT GLUCOSE: 137 MG/DL (ref 70–110)
POCT GLUCOSE: 138 MG/DL (ref 70–110)
POCT GLUCOSE: 140 MG/DL (ref 70–110)
POCT GLUCOSE: 141 MG/DL (ref 70–110)
POCT GLUCOSE: 143 MG/DL (ref 70–110)
POCT GLUCOSE: 144 MG/DL (ref 70–110)
POCT GLUCOSE: 146 MG/DL (ref 70–110)
POCT GLUCOSE: 147 MG/DL (ref 70–110)
POCT GLUCOSE: 147 MG/DL (ref 70–110)
POCT GLUCOSE: 148 MG/DL (ref 70–110)
POCT GLUCOSE: 148 MG/DL (ref 70–110)
POCT GLUCOSE: 151 MG/DL (ref 70–110)
POCT GLUCOSE: 153 MG/DL (ref 70–110)
POCT GLUCOSE: 153 MG/DL (ref 70–110)
POCT GLUCOSE: 154 MG/DL (ref 70–110)
POCT GLUCOSE: 155 MG/DL (ref 70–110)
POCT GLUCOSE: 157 MG/DL (ref 70–110)
POCT GLUCOSE: 158 MG/DL (ref 70–110)
POCT GLUCOSE: 158 MG/DL (ref 70–110)
POCT GLUCOSE: 159 MG/DL (ref 70–110)
POCT GLUCOSE: 161 MG/DL (ref 70–110)
POCT GLUCOSE: 162 MG/DL (ref 70–110)
POCT GLUCOSE: 162 MG/DL (ref 70–110)
POCT GLUCOSE: 163 MG/DL (ref 70–110)
POCT GLUCOSE: 164 MG/DL (ref 70–110)
POCT GLUCOSE: 165 MG/DL (ref 70–110)
POCT GLUCOSE: 165 MG/DL (ref 70–110)
POCT GLUCOSE: 166 MG/DL (ref 70–110)
POCT GLUCOSE: 167 MG/DL (ref 70–110)
POCT GLUCOSE: 169 MG/DL (ref 70–110)
POCT GLUCOSE: 170 MG/DL (ref 70–110)
POCT GLUCOSE: 170 MG/DL (ref 70–110)
POCT GLUCOSE: 172 MG/DL (ref 70–110)
POCT GLUCOSE: 175 MG/DL (ref 70–110)
POCT GLUCOSE: 175 MG/DL (ref 70–110)
POCT GLUCOSE: 177 MG/DL (ref 70–110)
POCT GLUCOSE: 177 MG/DL (ref 70–110)
POCT GLUCOSE: 178 MG/DL (ref 70–110)
POCT GLUCOSE: 179 MG/DL (ref 70–110)
POCT GLUCOSE: 179 MG/DL (ref 70–110)
POCT GLUCOSE: 180 MG/DL (ref 70–110)
POCT GLUCOSE: 181 MG/DL (ref 70–110)
POCT GLUCOSE: 182 MG/DL (ref 70–110)
POCT GLUCOSE: 184 MG/DL (ref 70–110)
POCT GLUCOSE: 184 MG/DL (ref 70–110)
POCT GLUCOSE: 187 MG/DL (ref 70–110)
POCT GLUCOSE: 188 MG/DL (ref 70–110)
POCT GLUCOSE: 189 MG/DL (ref 70–110)
POCT GLUCOSE: 190 MG/DL (ref 70–110)
POCT GLUCOSE: 191 MG/DL (ref 70–110)
POCT GLUCOSE: 191 MG/DL (ref 70–110)
POCT GLUCOSE: 192 MG/DL (ref 70–110)
POCT GLUCOSE: 192 MG/DL (ref 70–110)
POCT GLUCOSE: 193 MG/DL (ref 70–110)
POCT GLUCOSE: 194 MG/DL (ref 70–110)
POCT GLUCOSE: 194 MG/DL (ref 70–110)
POCT GLUCOSE: 196 MG/DL (ref 70–110)
POCT GLUCOSE: 197 MG/DL (ref 70–110)
POCT GLUCOSE: 198 MG/DL (ref 70–110)
POCT GLUCOSE: 199 MG/DL (ref 70–110)
POCT GLUCOSE: 199 MG/DL (ref 70–110)
POCT GLUCOSE: 200 MG/DL (ref 70–110)
POCT GLUCOSE: 200 MG/DL (ref 70–110)
POCT GLUCOSE: 202 MG/DL (ref 70–110)
POCT GLUCOSE: 205 MG/DL (ref 70–110)
POCT GLUCOSE: 206 MG/DL (ref 70–110)
POCT GLUCOSE: 210 MG/DL (ref 70–110)
POCT GLUCOSE: 211 MG/DL (ref 70–110)
POCT GLUCOSE: 212 MG/DL (ref 70–110)
POCT GLUCOSE: 214 MG/DL (ref 70–110)
POCT GLUCOSE: 216 MG/DL (ref 70–110)
POCT GLUCOSE: 216 MG/DL (ref 70–110)
POCT GLUCOSE: 219 MG/DL (ref 70–110)
POCT GLUCOSE: 219 MG/DL (ref 70–110)
POCT GLUCOSE: 225 MG/DL (ref 70–110)
POCT GLUCOSE: 226 MG/DL (ref 70–110)
POCT GLUCOSE: 227 MG/DL (ref 70–110)
POCT GLUCOSE: 230 MG/DL (ref 70–110)
POCT GLUCOSE: 231 MG/DL (ref 70–110)
POCT GLUCOSE: 231 MG/DL (ref 70–110)
POCT GLUCOSE: 233 MG/DL (ref 70–110)
POCT GLUCOSE: 233 MG/DL (ref 70–110)
POCT GLUCOSE: 234 MG/DL (ref 70–110)
POCT GLUCOSE: 234 MG/DL (ref 70–110)
POCT GLUCOSE: 235 MG/DL (ref 70–110)
POCT GLUCOSE: 237 MG/DL (ref 70–110)
POCT GLUCOSE: 237 MG/DL (ref 70–110)
POCT GLUCOSE: 239 MG/DL (ref 70–110)
POCT GLUCOSE: 239 MG/DL (ref 70–110)
POCT GLUCOSE: 241 MG/DL (ref 70–110)
POCT GLUCOSE: 242 MG/DL (ref 70–110)
POCT GLUCOSE: 243 MG/DL (ref 70–110)
POCT GLUCOSE: 245 MG/DL (ref 70–110)
POCT GLUCOSE: 251 MG/DL (ref 70–110)
POCT GLUCOSE: 251 MG/DL (ref 70–110)
POCT GLUCOSE: 254 MG/DL (ref 70–110)
POCT GLUCOSE: 257 MG/DL (ref 70–110)
POCT GLUCOSE: 261 MG/DL (ref 70–110)
POCT GLUCOSE: 261 MG/DL (ref 70–110)
POCT GLUCOSE: 262 MG/DL (ref 70–110)
POCT GLUCOSE: 270 MG/DL (ref 70–110)
POCT GLUCOSE: 271 MG/DL (ref 70–110)
POCT GLUCOSE: 279 MG/DL (ref 70–110)
POCT GLUCOSE: 280 MG/DL (ref 70–110)
POCT GLUCOSE: 285 MG/DL (ref 70–110)
POCT GLUCOSE: 286 MG/DL (ref 70–110)
POCT GLUCOSE: 288 MG/DL (ref 70–110)
POCT GLUCOSE: 318 MG/DL (ref 70–110)
POCT GLUCOSE: 338 MG/DL (ref 70–110)
POCT GLUCOSE: 355 MG/DL (ref 70–110)
POCT GLUCOSE: 50 MG/DL (ref 70–110)
POCT GLUCOSE: 51 MG/DL (ref 70–110)
POCT GLUCOSE: 52 MG/DL (ref 70–110)
POCT GLUCOSE: 53 MG/DL (ref 70–110)
POCT GLUCOSE: 57 MG/DL (ref 70–110)
POCT GLUCOSE: 61 MG/DL (ref 70–110)
POCT GLUCOSE: 65 MG/DL (ref 70–110)
POCT GLUCOSE: 72 MG/DL (ref 70–110)
POCT GLUCOSE: 75 MG/DL (ref 70–110)
POCT GLUCOSE: 77 MG/DL (ref 70–110)
POCT GLUCOSE: 78 MG/DL (ref 70–110)
POCT GLUCOSE: 86 MG/DL (ref 70–110)
POCT GLUCOSE: 89 MG/DL (ref 70–110)
POCT GLUCOSE: 91 MG/DL (ref 70–110)
POCT GLUCOSE: 91 MG/DL (ref 70–110)
POCT GLUCOSE: 94 MG/DL (ref 70–110)
POCT GLUCOSE: 95 MG/DL (ref 70–110)
POCT GLUCOSE: 96 MG/DL (ref 70–110)
POCT GLUCOSE: 98 MG/DL (ref 70–110)
POCT GLUCOSE: 98 MG/DL (ref 70–110)
POCT GLUCOSE: 99 MG/DL (ref 70–110)
POTASSIUM SERPL-SCNC: 2.9 MMOL/L (ref 3.5–5.1)
POTASSIUM SERPL-SCNC: 3.3 MMOL/L (ref 3.5–5.1)
POTASSIUM SERPL-SCNC: 3.4 MMOL/L (ref 3.5–5.1)
POTASSIUM SERPL-SCNC: 3.5 MMOL/L (ref 3.5–5.1)
POTASSIUM SERPL-SCNC: 3.5 MMOL/L (ref 3.5–5.1)
POTASSIUM SERPL-SCNC: 3.6 MMOL/L (ref 3.5–5.1)
POTASSIUM SERPL-SCNC: 3.7 MMOL/L (ref 3.5–5.1)
POTASSIUM SERPL-SCNC: 3.8 MMOL/L (ref 3.5–5.1)
POTASSIUM SERPL-SCNC: 3.9 MMOL/L (ref 3.5–5.1)
POTASSIUM SERPL-SCNC: 4.1 MMOL/L (ref 3.5–5.1)
POTASSIUM SERPL-SCNC: 4.1 MMOL/L (ref 3.5–5.1)
POTASSIUM SERPL-SCNC: 4.2 MMOL/L (ref 3.5–5.1)
POTASSIUM SERPL-SCNC: 4.3 MMOL/L (ref 3.5–5.1)
POTASSIUM SERPL-SCNC: 4.3 MMOL/L (ref 3.5–5.1)
POTASSIUM SERPL-SCNC: 4.8 MMOL/L (ref 3.5–5.1)
POTASSIUM SERPL-SCNC: 5.3 MMOL/L (ref 3.5–5.1)
PREALB SERPL-MCNC: 18.3 MG/DL (ref 14–37)
PREALB SERPL-MCNC: 7.8 MG/DL (ref 14–37)
PREALB SERPL-MCNC: <3 MG/DL (ref 14–37)
PROLACTIN LEVEL (OHS): 17.28 NG/ML (ref 5.18–26.53)
PROT SERPL-MCNC: 4.9 GM/DL (ref 5.8–7.6)
PROT SERPL-MCNC: 5 GM/DL (ref 5.8–7.6)
PROT SERPL-MCNC: 5.5 GM/DL (ref 5.8–7.6)
PROT SERPL-MCNC: 5.6 GM/DL (ref 5.8–7.6)
PROT SERPL-MCNC: 5.7 GM/DL (ref 5.8–7.6)
PROT SERPL-MCNC: 5.7 GM/DL (ref 5.8–7.6)
PROT SERPL-MCNC: 5.8 GM/DL (ref 5.8–7.6)
PROT SERPL-MCNC: 5.8 GM/DL (ref 5.8–7.6)
PROT SERPL-MCNC: 5.9 GM/DL (ref 5.8–7.6)
PROT SERPL-MCNC: 6.2 GM/DL (ref 5.8–7.6)
PROT SERPL-MCNC: 6.3 GM/DL (ref 5.8–7.6)
PROT UR QL STRIP.AUTO: 30 MG/DL
PROT UR QL STRIP.AUTO: ABNORMAL
PROT UR QL STRIP.AUTO: ABNORMAL MG/DL
PROT UR QL STRIP.AUTO: NEGATIVE MG/DL
PROTEUS SPP. (OHS): NOT DETECTED
PROTHROMBIN TIME: 15.9 SECONDS (ref 12.5–14.5)
PSEUDOMONAS AERUGINOSA (OHS): NOT DETECTED
RA PRESSURE: 8 MMHG
RBC # BLD AUTO: 2.95 X10(6)/MCL (ref 4.2–5.4)
RBC # BLD AUTO: 3.08 X10(6)/MCL (ref 4.2–5.4)
RBC # BLD AUTO: 3.17 X10(6)/MCL (ref 4.2–5.4)
RBC # BLD AUTO: 3.21 X10(6)/MCL (ref 4.2–5.4)
RBC # BLD AUTO: 3.23 X10(6)/MCL (ref 4.2–5.4)
RBC # BLD AUTO: 3.3 X10(6)/MCL (ref 4.2–5.4)
RBC # BLD AUTO: 3.32 X10(6)/MCL (ref 4.2–5.4)
RBC # BLD AUTO: 3.44 X10(6)/MCL (ref 4.2–5.4)
RBC # BLD AUTO: 3.52 X10(6)/MCL (ref 4.2–5.4)
RBC # BLD AUTO: 3.55 X10(6)/MCL (ref 4.2–5.4)
RBC # BLD AUTO: 3.58 X10(6)/MCL (ref 4.2–5.4)
RBC # BLD AUTO: 3.61 X10(6)/MCL (ref 4.2–5.4)
RBC # BLD AUTO: 3.61 X10(6)/MCL (ref 4.2–5.4)
RBC # BLD AUTO: 3.64 X10(6)/MCL (ref 4.2–5.4)
RBC # BLD AUTO: 3.7 X10(6)/MCL (ref 4.2–5.4)
RBC # BLD AUTO: 3.74 X10(6)/MCL (ref 4.2–5.4)
RBC # BLD AUTO: 3.75 X10(6)/MCL (ref 4.2–5.4)
RBC # BLD AUTO: 3.75 X10(6)/MCL (ref 4.2–5.4)
RBC # BLD AUTO: 3.85 X10(6)/MCL (ref 4.2–5.4)
RBC # BLD AUTO: 3.91 X10(6)/MCL (ref 4.2–5.4)
RBC # BLD AUTO: 3.94 X10(6)/MCL (ref 4.2–5.4)
RBC # BLD AUTO: 3.94 X10(6)/MCL (ref 4.2–5.4)
RBC # BLD AUTO: 4.04 X10(6)/MCL (ref 4.2–5.4)
RBC # BLD AUTO: 4.09 X10(6)/MCL (ref 4.2–5.4)
RBC # BLD AUTO: 4.16 X10(6)/MCL (ref 4.2–5.4)
RBC # BLD AUTO: 4.44 X10(6)/MCL (ref 4.2–5.4)
RBC #/AREA URNS AUTO: <5 /HPF
RBC #/AREA URNS AUTO: <5 /HPF
RBC #/AREA URNS AUTO: ABNORMAL /HPF
RBC #/AREA URNS AUTO: ABNORMAL /HPF
RBC MORPH BLD: NORMAL
RBC UR QL AUTO: ABNORMAL UNIT/L
RBC UR QL AUTO: ABNORMAL UNIT/L
RBC UR QL AUTO: NEGATIVE
RBC UR QL AUTO: NEGATIVE UNIT/L
RIGHT ABI: 0.25
RIGHT DORSALIS PEDIS: 26 MMHG
RIGHT POSTERIOR TIBIAL: 38 MMHG
S ENT+BONG DNA STL QL NAA+NON-PROBE: NOT DETECTED
S PNEUM DNA CSF QL NAA+NON-PROBE: NOT DETECTED
SARS-COV-2 RNA RESP QL NAA+PROBE: NOT DETECTED
SERRATIA MARCESCENS (OHS): NOT DETECTED
SINUS: 2.5 CM
SODIUM SERPL-SCNC: 123 MMOL/L (ref 132–146)
SODIUM SERPL-SCNC: 124 MMOL/L (ref 132–146)
SODIUM SERPL-SCNC: 125 MMOL/L (ref 132–146)
SODIUM SERPL-SCNC: 126 MMOL/L (ref 132–146)
SODIUM SERPL-SCNC: 127 MMOL/L (ref 132–146)
SODIUM SERPL-SCNC: 128 MMOL/L (ref 132–146)
SODIUM SERPL-SCNC: 129 MMOL/L (ref 132–146)
SODIUM SERPL-SCNC: 130 MMOL/L (ref 132–146)
SODIUM SERPL-SCNC: 131 MMOL/L (ref 132–146)
SODIUM SERPL-SCNC: 132 MMOL/L (ref 132–146)
SODIUM SERPL-SCNC: 133 MMOL/L (ref 132–146)
SODIUM SERPL-SCNC: 134 MMOL/L (ref 132–146)
SODIUM SERPL-SCNC: 134 MMOL/L (ref 132–146)
SODIUM UR-SCNC: 29 MMOL/L
SP GR UR STRIP.AUTO: 1.01
SP GR UR STRIP.AUTO: 1.01
SP GR UR STRIP.AUTO: 1.01 (ref 1–1.03)
SP GR UR STRIP.AUTO: 1.03 (ref 1–1.03)
SPECIMEN OUTDATE: NORMAL
SQUAMOUS #/AREA URNS AUTO: <5 /HPF
SQUAMOUS #/AREA URNS AUTO: <5 /HPF
SQUAMOUS #/AREA URNS AUTO: ABNORMAL /HPF
SQUAMOUS #/AREA URNS AUTO: ABNORMAL /HPF
STAPHYLOCOCCUS AUREUS (OHS): DETECTED
STAPHYLOCOCCUS EPIDERMIDIS (OHS): NOT DETECTED
STAPHYLOCOCCUS LUGDUNENSIS (OHS): NOT DETECTED
STAPHYLOCOCCUS SPP. (OHS): DETECTED
STENOTROPHOMONAS MALTOPHILIA (OHS): NOT DETECTED
STREPTOCOCCUS PYOGENES (GROUP A)(OHS): NOT DETECTED
STREPTOCOCCUS SPP. (OHS): NOT DETECTED
T4 FREE SERPL-MCNC: 1.25 NG/DL (ref 0.7–1.48)
TDI LATERAL: 0.08 M/S
TDI SEPTAL: 0.05 M/S
TDI: 0.07 M/S
TIBC SERPL-MCNC: 188 UG/DL (ref 70–310)
TIBC SERPL-MCNC: 206 UG/DL (ref 250–450)
TR MAX PG: 36 MMHG
TRANSFERRIN SERPL-MCNC: 182 MG/DL
TRICUSPID ANNULAR PLANE SYSTOLIC EXCURSION: 1.52 CM
TROPONIN I SERPL-MCNC: 0.12 NG/ML (ref 0–0.04)
TROPONIN I SERPL-MCNC: 0.12 NG/ML (ref 0–0.04)
TROPONIN I SERPL-MCNC: 0.15 NG/ML (ref 0–0.04)
TROPONIN I SERPL-MCNC: 0.15 NG/ML (ref 0–0.04)
TSH SERPL-ACNC: 2.1 UIU/ML (ref 0.35–4.94)
TV REST PULMONARY ARTERY PRESSURE: 44 MMHG
UA DIPSTICK W REFLEX MICRO PNL UR: ABNORMAL
UNIT NUMBER: NORMAL
UROBILINOGEN UR STRIP-ACNC: 1
UROBILINOGEN UR STRIP-ACNC: 1 MG/DL
VANA/B (OHS): ABNORMAL
VANCOMYCIN SERPL-MCNC: 10 UG/ML (ref 15–20)
VANCOMYCIN SERPL-MCNC: 12.8 UG/ML (ref 15–20)
VANCOMYCIN SERPL-MCNC: 13.1 UG/ML (ref 15–20)
VANCOMYCIN SERPL-MCNC: 14.2 UG/ML (ref 15–20)
VANCOMYCIN SERPL-MCNC: 15 UG/ML (ref 15–20)
VANCOMYCIN SERPL-MCNC: 16.3 UG/ML (ref 15–20)
VANCOMYCIN SERPL-MCNC: 17 UG/ML (ref 15–20)
VANCOMYCIN SERPL-MCNC: 20.5 UG/ML (ref 15–20)
VANCOMYCIN SERPL-MCNC: 53.2 UG/ML (ref 15–20)
VANCOMYCIN SERPL-MCNC: 6.1 UG/ML (ref 15–20)
VANCOMYCIN TROUGH SERPL-MCNC: 16.5 UG/ML (ref 15–20)
VANCOMYCIN TROUGH SERPL-MCNC: 17.7 UG/ML (ref 15–20)
VANCOMYCIN TROUGH SERPL-MCNC: 18.2 UG/ML (ref 15–20)
VANCOMYCIN TROUGH SERPL-MCNC: 18.3 UG/ML (ref 15–20)
VANCOMYCIN TROUGH SERPL-MCNC: 18.8 UG/ML (ref 15–20)
VANCOMYCIN TROUGH SERPL-MCNC: 18.8 UG/ML (ref 15–20)
VANCOMYCIN TROUGH SERPL-MCNC: 19.3 UG/ML (ref 15–20)
VANCOMYCIN TROUGH SERPL-MCNC: 20 UG/ML (ref 15–20)
VANCOMYCIN TROUGH SERPL-MCNC: 20.8 UG/ML (ref 15–20)
VANCOMYCIN TROUGH SERPL-MCNC: 23 UG/ML (ref 15–20)
VANCOMYCIN TROUGH SERPL-MCNC: 23.5 UG/ML (ref 15–20)
VANCOMYCIN TROUGH SERPL-MCNC: 24.1 UG/ML (ref 15–20)
VANCOMYCIN TROUGH SERPL-MCNC: 9.6 UG/ML (ref 15–20)
VIM (OHS): ABNORMAL
WBC # SPEC AUTO: 10.36 X10(3)/MCL (ref 4.5–11.5)
WBC # SPEC AUTO: 10.82 X10(3)/MCL (ref 4.5–11.5)
WBC # SPEC AUTO: 10.83 X10(3)/MCL (ref 4.5–11.5)
WBC # SPEC AUTO: 11 X10(3)/MCL (ref 4.5–11.5)
WBC # SPEC AUTO: 11.4 X10(3)/MCL (ref 4.5–11.5)
WBC # SPEC AUTO: 11.8 X10(3)/MCL (ref 4.5–11.5)
WBC # SPEC AUTO: 12.3 X10(3)/MCL (ref 4.5–11.5)
WBC # SPEC AUTO: 13.4 X10(3)/MCL (ref 4.5–11.5)
WBC # SPEC AUTO: 14.2 X10(3)/MCL (ref 4.5–11.5)
WBC # SPEC AUTO: 15.8 X10(3)/MCL (ref 4.5–11.5)
WBC # SPEC AUTO: 15.9 X10(3)/MCL (ref 4.5–11.5)
WBC # SPEC AUTO: 28.7 X10(3)/MCL (ref 4.5–11.5)
WBC # SPEC AUTO: 34.3 X10(3)/MCL (ref 4.5–11.5)
WBC # SPEC AUTO: 8.36 X10(3)/MCL (ref 4.5–11.5)
WBC # SPEC AUTO: 8.44 X10(3)/MCL (ref 4.5–11.5)
WBC # SPEC AUTO: 8.56 X10(3)/MCL (ref 4.5–11.5)
WBC # SPEC AUTO: 8.86 X10(3)/MCL (ref 4.5–11.5)
WBC # SPEC AUTO: 8.95 X10(3)/MCL (ref 4.5–11.5)
WBC # SPEC AUTO: 9.21 X10(3)/MCL (ref 4.5–11.5)
WBC # SPEC AUTO: 9.36 X10(3)/MCL (ref 4.5–11.5)
WBC # SPEC AUTO: 9.72 X10(3)/MCL (ref 4.5–11.5)
WBC # SPEC AUTO: 9.77 X10(3)/MCL (ref 4.5–11.5)
WBC # SPEC AUTO: 9.84 X10(3)/MCL (ref 4.5–11.5)
WBC # SPEC AUTO: 9.87 X10(3)/MCL (ref 4.5–11.5)
WBC #/AREA URNS AUTO: <5 /HPF
WBC #/AREA URNS AUTO: <5 /HPF
WBC #/AREA URNS AUTO: ABNORMAL /HPF
WBC #/AREA URNS AUTO: ABNORMAL /HPF

## 2023-01-01 PROCEDURE — 25000003 PHARM REV CODE 250: Performed by: INTERNAL MEDICINE

## 2023-01-01 PROCEDURE — 37000008 HC ANESTHESIA 1ST 15 MINUTES: Performed by: UROLOGY

## 2023-01-01 PROCEDURE — C1751 CATH, INF, PER/CENT/MIDLINE: HCPCS

## 2023-01-01 PROCEDURE — 63600175 PHARM REV CODE 636 W HCPCS: Performed by: STUDENT IN AN ORGANIZED HEALTH CARE EDUCATION/TRAINING PROGRAM

## 2023-01-01 PROCEDURE — 94760 N-INVAS EAR/PLS OXIMETRY 1: CPT

## 2023-01-01 PROCEDURE — 80048 BASIC METABOLIC PNL TOTAL CA: CPT | Performed by: INTERNAL MEDICINE

## 2023-01-01 PROCEDURE — 93010 ELECTROCARDIOGRAM REPORT: CPT | Mod: ,,, | Performed by: INTERNAL MEDICINE

## 2023-01-01 PROCEDURE — 85025 COMPLETE CBC W/AUTO DIFF WBC: CPT | Performed by: STUDENT IN AN ORGANIZED HEALTH CARE EDUCATION/TRAINING PROGRAM

## 2023-01-01 PROCEDURE — 27000207 HC ISOLATION

## 2023-01-01 PROCEDURE — 80202 ASSAY OF VANCOMYCIN: CPT | Performed by: INTERNAL MEDICINE

## 2023-01-01 PROCEDURE — 11000004 HC SNF PRIVATE

## 2023-01-01 PROCEDURE — 25000003 PHARM REV CODE 250: Performed by: UROLOGY

## 2023-01-01 PROCEDURE — 99232 PR SUBSEQUENT HOSPITAL CARE,LEVL II: ICD-10-PCS | Mod: ,,, | Performed by: GENERAL PRACTICE

## 2023-01-01 PROCEDURE — 85025 COMPLETE CBC W/AUTO DIFF WBC: CPT | Performed by: UROLOGY

## 2023-01-01 PROCEDURE — 97530 THERAPEUTIC ACTIVITIES: CPT

## 2023-01-01 PROCEDURE — 63600175 PHARM REV CODE 636 W HCPCS: Performed by: INTERNAL MEDICINE

## 2023-01-01 PROCEDURE — A4216 STERILE WATER/SALINE, 10 ML: HCPCS | Performed by: INTERNAL MEDICINE

## 2023-01-01 PROCEDURE — 97535 SELF CARE MNGMENT TRAINING: CPT

## 2023-01-01 PROCEDURE — 97130 THER IVNTJ EA ADDL 15 MIN: CPT

## 2023-01-01 PROCEDURE — 25000003 PHARM REV CODE 250: Performed by: NURSE ANESTHETIST, CERTIFIED REGISTERED

## 2023-01-01 PROCEDURE — 82272 OCCULT BLD FECES 1-3 TESTS: CPT | Performed by: STUDENT IN AN ORGANIZED HEALTH CARE EDUCATION/TRAINING PROGRAM

## 2023-01-01 PROCEDURE — 99233 SBSQ HOSP IP/OBS HIGH 50: CPT | Mod: ,,, | Performed by: GENERAL PRACTICE

## 2023-01-01 PROCEDURE — 0240U COVID/FLU A&B PCR: CPT | Performed by: STUDENT IN AN ORGANIZED HEALTH CARE EDUCATION/TRAINING PROGRAM

## 2023-01-01 PROCEDURE — 63600175 PHARM REV CODE 636 W HCPCS: Performed by: UROLOGY

## 2023-01-01 PROCEDURE — P9016 RBC LEUKOCYTES REDUCED: HCPCS | Performed by: STUDENT IN AN ORGANIZED HEALTH CARE EDUCATION/TRAINING PROGRAM

## 2023-01-01 PROCEDURE — 25000003 PHARM REV CODE 250: Performed by: STUDENT IN AN ORGANIZED HEALTH CARE EDUCATION/TRAINING PROGRAM

## 2023-01-01 PROCEDURE — 97110 THERAPEUTIC EXERCISES: CPT

## 2023-01-01 PROCEDURE — 99233 PR SUBSEQUENT HOSPITAL CARE,LEVL III: ICD-10-PCS | Mod: ,,, | Performed by: GENERAL PRACTICE

## 2023-01-01 PROCEDURE — 99223 1ST HOSP IP/OBS HIGH 75: CPT | Mod: FS,,, | Performed by: GENERAL PRACTICE

## 2023-01-01 PROCEDURE — 85025 COMPLETE CBC W/AUTO DIFF WBC: CPT | Performed by: INTERNAL MEDICINE

## 2023-01-01 PROCEDURE — 83735 ASSAY OF MAGNESIUM: CPT | Performed by: STUDENT IN AN ORGANIZED HEALTH CARE EDUCATION/TRAINING PROGRAM

## 2023-01-01 PROCEDURE — 83735 ASSAY OF MAGNESIUM: CPT | Performed by: INTERNAL MEDICINE

## 2023-01-01 PROCEDURE — 21400001 HC TELEMETRY ROOM

## 2023-01-01 PROCEDURE — 97530 THERAPEUTIC ACTIVITIES: CPT | Mod: CQ

## 2023-01-01 PROCEDURE — 83735 ASSAY OF MAGNESIUM: CPT | Performed by: UROLOGY

## 2023-01-01 PROCEDURE — 83935 ASSAY OF URINE OSMOLALITY: CPT | Performed by: INTERNAL MEDICINE

## 2023-01-01 PROCEDURE — 81001 URINALYSIS AUTO W/SCOPE: CPT | Performed by: INTERNAL MEDICINE

## 2023-01-01 PROCEDURE — 97129 THER IVNTJ 1ST 15 MIN: CPT

## 2023-01-01 PROCEDURE — 11000001 HC ACUTE MED/SURG PRIVATE ROOM

## 2023-01-01 PROCEDURE — 97535 SELF CARE MNGMENT TRAINING: CPT | Mod: CO

## 2023-01-01 PROCEDURE — 85025 COMPLETE CBC W/AUTO DIFF WBC: CPT | Performed by: NURSE PRACTITIONER

## 2023-01-01 PROCEDURE — 84134 ASSAY OF PREALBUMIN: CPT | Performed by: INTERNAL MEDICINE

## 2023-01-01 PROCEDURE — 83880 ASSAY OF NATRIURETIC PEPTIDE: CPT | Performed by: INTERNAL MEDICINE

## 2023-01-01 PROCEDURE — 84484 ASSAY OF TROPONIN QUANT: CPT | Performed by: STUDENT IN AN ORGANIZED HEALTH CARE EDUCATION/TRAINING PROGRAM

## 2023-01-01 PROCEDURE — 99291 CRITICAL CARE FIRST HOUR: CPT

## 2023-01-01 PROCEDURE — 94761 N-INVAS EAR/PLS OXIMETRY MLT: CPT

## 2023-01-01 PROCEDURE — 83930 ASSAY OF BLOOD OSMOLALITY: CPT | Performed by: INTERNAL MEDICINE

## 2023-01-01 PROCEDURE — 80202 ASSAY OF VANCOMYCIN: CPT | Performed by: GENERAL PRACTICE

## 2023-01-01 PROCEDURE — 87077 CULTURE AEROBIC IDENTIFY: CPT | Performed by: FAMILY MEDICINE

## 2023-01-01 PROCEDURE — 80053 COMPREHEN METABOLIC PANEL: CPT | Performed by: STUDENT IN AN ORGANIZED HEALTH CARE EDUCATION/TRAINING PROGRAM

## 2023-01-01 PROCEDURE — 83735 ASSAY OF MAGNESIUM: CPT | Performed by: HOSPITALIST

## 2023-01-01 PROCEDURE — 97164 PT RE-EVAL EST PLAN CARE: CPT

## 2023-01-01 PROCEDURE — 80048 BASIC METABOLIC PNL TOTAL CA: CPT | Performed by: STUDENT IN AN ORGANIZED HEALTH CARE EDUCATION/TRAINING PROGRAM

## 2023-01-01 PROCEDURE — 81001 URINALYSIS AUTO W/SCOPE: CPT | Performed by: FAMILY MEDICINE

## 2023-01-01 PROCEDURE — 82728 ASSAY OF FERRITIN: CPT | Performed by: INTERNAL MEDICINE

## 2023-01-01 PROCEDURE — 99223 PR INITIAL HOSPITAL CARE,LEVL III: ICD-10-PCS | Mod: FS,,, | Performed by: GENERAL PRACTICE

## 2023-01-01 PROCEDURE — 25000003 PHARM REV CODE 250: Performed by: FAMILY MEDICINE

## 2023-01-01 PROCEDURE — C1769 GUIDE WIRE: HCPCS | Performed by: UROLOGY

## 2023-01-01 PROCEDURE — 99283 EMERGENCY DEPT VISIT LOW MDM: CPT | Mod: 25

## 2023-01-01 PROCEDURE — 97166 OT EVAL MOD COMPLEX 45 MIN: CPT

## 2023-01-01 PROCEDURE — 80202 ASSAY OF VANCOMYCIN: CPT | Performed by: HOSPITALIST

## 2023-01-01 PROCEDURE — 80202 ASSAY OF VANCOMYCIN: CPT | Performed by: UROLOGY

## 2023-01-01 PROCEDURE — 25000003 PHARM REV CODE 250: Performed by: EMERGENCY MEDICINE

## 2023-01-01 PROCEDURE — 99232 SBSQ HOSP IP/OBS MODERATE 35: CPT | Mod: ,,, | Performed by: GENERAL PRACTICE

## 2023-01-01 PROCEDURE — 80053 COMPREHEN METABOLIC PANEL: CPT | Performed by: NURSE PRACTITIONER

## 2023-01-01 PROCEDURE — 84100 ASSAY OF PHOSPHORUS: CPT | Performed by: INTERNAL MEDICINE

## 2023-01-01 PROCEDURE — 87077 CULTURE AEROBIC IDENTIFY: CPT | Performed by: INTERNAL MEDICINE

## 2023-01-01 PROCEDURE — 96361 HYDRATE IV INFUSION ADD-ON: CPT

## 2023-01-01 PROCEDURE — 83880 ASSAY OF NATRIURETIC PEPTIDE: CPT | Performed by: STUDENT IN AN ORGANIZED HEALTH CARE EDUCATION/TRAINING PROGRAM

## 2023-01-01 PROCEDURE — 87040 BLOOD CULTURE FOR BACTERIA: CPT | Performed by: INTERNAL MEDICINE

## 2023-01-01 PROCEDURE — 99223 PR INITIAL HOSPITAL CARE,LEVL III: ICD-10-PCS | Mod: ,,, | Performed by: SURGERY

## 2023-01-01 PROCEDURE — 82140 ASSAY OF AMMONIA: CPT

## 2023-01-01 PROCEDURE — 80053 COMPREHEN METABOLIC PANEL: CPT | Performed by: FAMILY MEDICINE

## 2023-01-01 PROCEDURE — 87040 BLOOD CULTURE FOR BACTERIA: CPT | Performed by: GENERAL PRACTICE

## 2023-01-01 PROCEDURE — 63600175 PHARM REV CODE 636 W HCPCS: Performed by: NURSE ANESTHETIST, CERTIFIED REGISTERED

## 2023-01-01 PROCEDURE — 92523 SPEECH SOUND LANG COMPREHEN: CPT

## 2023-01-01 PROCEDURE — 25000003 PHARM REV CODE 250: Performed by: HOSPITALIST

## 2023-01-01 PROCEDURE — 99232 SBSQ HOSP IP/OBS MODERATE 35: CPT | Mod: ,,, | Performed by: SURGERY

## 2023-01-01 PROCEDURE — 99232 PR SUBSEQUENT HOSPITAL CARE,LEVL II: ICD-10-PCS | Mod: ,,, | Performed by: SURGERY

## 2023-01-01 PROCEDURE — 83605 ASSAY OF LACTIC ACID: CPT | Performed by: STUDENT IN AN ORGANIZED HEALTH CARE EDUCATION/TRAINING PROGRAM

## 2023-01-01 PROCEDURE — D9220A PRA ANESTHESIA: Mod: CRNA,,, | Performed by: NURSE ANESTHETIST, CERTIFIED REGISTERED

## 2023-01-01 PROCEDURE — 63600175 PHARM REV CODE 636 W HCPCS: Performed by: HOSPITALIST

## 2023-01-01 PROCEDURE — D9220A PRA ANESTHESIA: Mod: ANES,,, | Performed by: ANESTHESIOLOGY

## 2023-01-01 PROCEDURE — 82565 ASSAY OF CREATININE: CPT | Performed by: INTERNAL MEDICINE

## 2023-01-01 PROCEDURE — 27000221 HC OXYGEN, UP TO 24 HOURS

## 2023-01-01 PROCEDURE — 80048 BASIC METABOLIC PNL TOTAL CA: CPT | Performed by: FAMILY MEDICINE

## 2023-01-01 PROCEDURE — 85027 COMPLETE CBC AUTOMATED: CPT | Performed by: STUDENT IN AN ORGANIZED HEALTH CARE EDUCATION/TRAINING PROGRAM

## 2023-01-01 PROCEDURE — 99223 1ST HOSP IP/OBS HIGH 75: CPT | Mod: ,,, | Performed by: SURGERY

## 2023-01-01 PROCEDURE — 96366 THER/PROPH/DIAG IV INF ADDON: CPT

## 2023-01-01 PROCEDURE — 25500020 PHARM REV CODE 255: Performed by: INTERNAL MEDICINE

## 2023-01-01 PROCEDURE — 36000706: Performed by: UROLOGY

## 2023-01-01 PROCEDURE — 80069 RENAL FUNCTION PANEL: CPT | Performed by: INTERNAL MEDICINE

## 2023-01-01 PROCEDURE — C2617 STENT, NON-COR, TEM W/O DEL: HCPCS | Performed by: UROLOGY

## 2023-01-01 PROCEDURE — 93005 ELECTROCARDIOGRAM TRACING: CPT

## 2023-01-01 PROCEDURE — 84146 ASSAY OF PROLACTIN: CPT | Performed by: INTERNAL MEDICINE

## 2023-01-01 PROCEDURE — 80048 BASIC METABOLIC PNL TOTAL CA: CPT | Performed by: UROLOGY

## 2023-01-01 PROCEDURE — 83690 ASSAY OF LIPASE: CPT | Performed by: STUDENT IN AN ORGANIZED HEALTH CARE EDUCATION/TRAINING PROGRAM

## 2023-01-01 PROCEDURE — 97535 SELF CARE MNGMENT TRAINING: CPT | Mod: CQ

## 2023-01-01 PROCEDURE — 80053 COMPREHEN METABOLIC PANEL: CPT | Performed by: INTERNAL MEDICINE

## 2023-01-01 PROCEDURE — 36569 INSJ PICC 5 YR+ W/O IMAGING: CPT

## 2023-01-01 PROCEDURE — D9220A PRA ANESTHESIA: ICD-10-PCS | Mod: CRNA,,, | Performed by: NURSE ANESTHETIST, CERTIFIED REGISTERED

## 2023-01-01 PROCEDURE — 97162 PT EVAL MOD COMPLEX 30 MIN: CPT

## 2023-01-01 PROCEDURE — 97530 THERAPEUTIC ACTIVITIES: CPT | Mod: CO

## 2023-01-01 PROCEDURE — 80202 ASSAY OF VANCOMYCIN: CPT | Performed by: STUDENT IN AN ORGANIZED HEALTH CARE EDUCATION/TRAINING PROGRAM

## 2023-01-01 PROCEDURE — 99223 PR INITIAL HOSPITAL CARE,LEVL III: ICD-10-PCS | Mod: ,,, | Performed by: PSYCHIATRY & NEUROLOGY

## 2023-01-01 PROCEDURE — 84439 ASSAY OF FREE THYROXINE: CPT | Performed by: INTERNAL MEDICINE

## 2023-01-01 PROCEDURE — 92610 EVALUATE SWALLOWING FUNCTION: CPT

## 2023-01-01 PROCEDURE — 84484 ASSAY OF TROPONIN QUANT: CPT | Performed by: INTERNAL MEDICINE

## 2023-01-01 PROCEDURE — 95819 EEG AWAKE AND ASLEEP: CPT

## 2023-01-01 PROCEDURE — 85610 PROTHROMBIN TIME: CPT | Performed by: STUDENT IN AN ORGANIZED HEALTH CARE EDUCATION/TRAINING PROGRAM

## 2023-01-01 PROCEDURE — 84443 ASSAY THYROID STIM HORMONE: CPT | Performed by: INTERNAL MEDICINE

## 2023-01-01 PROCEDURE — 99233 PR SUBSEQUENT HOSPITAL CARE,LEVL III: ICD-10-PCS | Mod: FS,,, | Performed by: GENERAL PRACTICE

## 2023-01-01 PROCEDURE — 84300 ASSAY OF URINE SODIUM: CPT | Performed by: INTERNAL MEDICINE

## 2023-01-01 PROCEDURE — 36430 TRANSFUSION BLD/BLD COMPNT: CPT

## 2023-01-01 PROCEDURE — 85027 COMPLETE CBC AUTOMATED: CPT | Performed by: INTERNAL MEDICINE

## 2023-01-01 PROCEDURE — 86900 BLOOD TYPING SEROLOGIC ABO: CPT | Performed by: STUDENT IN AN ORGANIZED HEALTH CARE EDUCATION/TRAINING PROGRAM

## 2023-01-01 PROCEDURE — 85025 COMPLETE CBC W/AUTO DIFF WBC: CPT | Performed by: FAMILY MEDICINE

## 2023-01-01 PROCEDURE — 85025 COMPLETE CBC W/AUTO DIFF WBC: CPT | Performed by: HOSPITALIST

## 2023-01-01 PROCEDURE — 97168 OT RE-EVAL EST PLAN CARE: CPT

## 2023-01-01 PROCEDURE — 86140 C-REACTIVE PROTEIN: CPT | Performed by: STUDENT IN AN ORGANIZED HEALTH CARE EDUCATION/TRAINING PROGRAM

## 2023-01-01 PROCEDURE — 87186 SC STD MICRODIL/AGAR DIL: CPT | Performed by: STUDENT IN AN ORGANIZED HEALTH CARE EDUCATION/TRAINING PROGRAM

## 2023-01-01 PROCEDURE — 25500020 PHARM REV CODE 255: Performed by: UROLOGY

## 2023-01-01 PROCEDURE — 84100 ASSAY OF PHOSPHORUS: CPT | Performed by: STUDENT IN AN ORGANIZED HEALTH CARE EDUCATION/TRAINING PROGRAM

## 2023-01-01 PROCEDURE — 37000009 HC ANESTHESIA EA ADD 15 MINS: Performed by: UROLOGY

## 2023-01-01 PROCEDURE — 83735 ASSAY OF MAGNESIUM: CPT | Performed by: NURSE PRACTITIONER

## 2023-01-01 PROCEDURE — 12001 RPR S/N/AX/GEN/TRNK 2.5CM/<: CPT

## 2023-01-01 PROCEDURE — 86920 COMPATIBILITY TEST SPIN: CPT | Performed by: STUDENT IN AN ORGANIZED HEALTH CARE EDUCATION/TRAINING PROGRAM

## 2023-01-01 PROCEDURE — 87088 URINE BACTERIA CULTURE: CPT | Performed by: STUDENT IN AN ORGANIZED HEALTH CARE EDUCATION/TRAINING PROGRAM

## 2023-01-01 PROCEDURE — 97110 THERAPEUTIC EXERCISES: CPT | Mod: CQ

## 2023-01-01 PROCEDURE — 99232 PR SUBSEQUENT HOSPITAL CARE,LEVL II: ICD-10-PCS | Mod: FS,,, | Performed by: GENERAL PRACTICE

## 2023-01-01 PROCEDURE — 80053 COMPREHEN METABOLIC PANEL: CPT

## 2023-01-01 PROCEDURE — 25500020 PHARM REV CODE 255: Performed by: STUDENT IN AN ORGANIZED HEALTH CARE EDUCATION/TRAINING PROGRAM

## 2023-01-01 PROCEDURE — 80048 BASIC METABOLIC PNL TOTAL CA: CPT | Performed by: HOSPITALIST

## 2023-01-01 PROCEDURE — 99233 SBSQ HOSP IP/OBS HIGH 50: CPT | Mod: FS,,, | Performed by: GENERAL PRACTICE

## 2023-01-01 PROCEDURE — C1758 CATHETER, URETERAL: HCPCS | Performed by: UROLOGY

## 2023-01-01 PROCEDURE — 82533 TOTAL CORTISOL: CPT | Performed by: INTERNAL MEDICINE

## 2023-01-01 PROCEDURE — 87077 CULTURE AEROBIC IDENTIFY: CPT | Performed by: GENERAL PRACTICE

## 2023-01-01 PROCEDURE — 82962 GLUCOSE BLOOD TEST: CPT

## 2023-01-01 PROCEDURE — 84134 ASSAY OF PREALBUMIN: CPT | Performed by: FAMILY MEDICINE

## 2023-01-01 PROCEDURE — 99232 SBSQ HOSP IP/OBS MODERATE 35: CPT | Mod: FS,,, | Performed by: GENERAL PRACTICE

## 2023-01-01 PROCEDURE — 84295 ASSAY OF SERUM SODIUM: CPT | Performed by: INTERNAL MEDICINE

## 2023-01-01 PROCEDURE — 87184 SC STD DISK METHOD PER PLATE: CPT | Performed by: NURSE PRACTITIONER

## 2023-01-01 PROCEDURE — 63600175 PHARM REV CODE 636 W HCPCS

## 2023-01-01 PROCEDURE — 85025 COMPLETE CBC W/AUTO DIFF WBC: CPT

## 2023-01-01 PROCEDURE — 71000033 HC RECOVERY, INTIAL HOUR: Performed by: UROLOGY

## 2023-01-01 PROCEDURE — 96368 THER/DIAG CONCURRENT INF: CPT

## 2023-01-01 PROCEDURE — 87154 CUL TYP ID BLD PTHGN 6+ TRGT: CPT | Performed by: STUDENT IN AN ORGANIZED HEALTH CARE EDUCATION/TRAINING PROGRAM

## 2023-01-01 PROCEDURE — 81001 URINALYSIS AUTO W/SCOPE: CPT | Performed by: STUDENT IN AN ORGANIZED HEALTH CARE EDUCATION/TRAINING PROGRAM

## 2023-01-01 PROCEDURE — 96375 TX/PRO/DX INJ NEW DRUG ADDON: CPT

## 2023-01-01 PROCEDURE — 96365 THER/PROPH/DIAG IV INF INIT: CPT

## 2023-01-01 PROCEDURE — D9220A PRA ANESTHESIA: ICD-10-PCS | Mod: ANES,,, | Performed by: ANESTHESIOLOGY

## 2023-01-01 PROCEDURE — 87040 BLOOD CULTURE FOR BACTERIA: CPT | Performed by: NURSE PRACTITIONER

## 2023-01-01 PROCEDURE — 87184 SC STD DISK METHOD PER PLATE: CPT | Performed by: STUDENT IN AN ORGANIZED HEALTH CARE EDUCATION/TRAINING PROGRAM

## 2023-01-01 PROCEDURE — 25500020 PHARM REV CODE 255: Performed by: HOSPITALIST

## 2023-01-01 PROCEDURE — 36000707: Performed by: UROLOGY

## 2023-01-01 PROCEDURE — 83036 HEMOGLOBIN GLYCOSYLATED A1C: CPT | Performed by: FAMILY MEDICINE

## 2023-01-01 PROCEDURE — 92507 TX SP LANG VOICE COMM INDIV: CPT

## 2023-01-01 PROCEDURE — 85651 RBC SED RATE NONAUTOMATED: CPT | Performed by: STUDENT IN AN ORGANIZED HEALTH CARE EDUCATION/TRAINING PROGRAM

## 2023-01-01 PROCEDURE — 99223 1ST HOSP IP/OBS HIGH 75: CPT | Mod: ,,, | Performed by: PSYCHIATRY & NEUROLOGY

## 2023-01-01 PROCEDURE — 83550 IRON BINDING TEST: CPT | Performed by: INTERNAL MEDICINE

## 2023-01-01 PROCEDURE — 93010 EKG 12-LEAD: ICD-10-PCS | Mod: ,,, | Performed by: INTERNAL MEDICINE

## 2023-01-01 PROCEDURE — 87088 URINE BACTERIA CULTURE: CPT | Performed by: FAMILY MEDICINE

## 2023-01-01 DEVICE — STENT SET URETERAL 6X24CM: Type: IMPLANTABLE DEVICE | Site: URETER | Status: FUNCTIONAL

## 2023-01-01 RX ORDER — MAGNESIUM SULFATE HEPTAHYDRATE 40 MG/ML
2 INJECTION, SOLUTION INTRAVENOUS ONCE
Status: COMPLETED | OUTPATIENT
Start: 2023-01-01 | End: 2023-01-01

## 2023-01-01 RX ORDER — INSULIN ASPART 100 [IU]/ML
5 INJECTION, SOLUTION INTRAVENOUS; SUBCUTANEOUS
Status: DISCONTINUED | OUTPATIENT
Start: 2023-01-01 | End: 2023-01-01

## 2023-01-01 RX ORDER — ONDANSETRON 2 MG/ML
4 INJECTION INTRAMUSCULAR; INTRAVENOUS ONCE
Status: CANCELLED | OUTPATIENT
Start: 2023-01-01 | End: 2023-01-01

## 2023-01-01 RX ORDER — DOCUSATE SODIUM 100 MG/1
200 CAPSULE, LIQUID FILLED ORAL 2 TIMES DAILY
Status: DISCONTINUED | OUTPATIENT
Start: 2023-01-01 | End: 2023-01-01

## 2023-01-01 RX ORDER — VANCOMYCIN HCL IN 5 % DEXTROSE 1G/250ML
1000 PLASTIC BAG, INJECTION (ML) INTRAVENOUS ONCE
Status: COMPLETED | OUTPATIENT
Start: 2023-01-01 | End: 2023-01-01

## 2023-01-01 RX ORDER — TOLVAPTAN 15 MG/1
15 TABLET ORAL ONCE
Status: COMPLETED | OUTPATIENT
Start: 2023-01-01 | End: 2023-01-01

## 2023-01-01 RX ORDER — MUPIROCIN 20 MG/G
OINTMENT TOPICAL 2 TIMES DAILY
Status: DISPENSED | OUTPATIENT
Start: 2023-01-01 | End: 2023-01-01

## 2023-01-01 RX ORDER — LIDOCAINE HYDROCHLORIDE 20 MG/ML
INJECTION INTRAVENOUS
Status: DISCONTINUED | OUTPATIENT
Start: 2023-01-01 | End: 2023-01-01

## 2023-01-01 RX ORDER — ACETAMINOPHEN 325 MG/1
650 TABLET ORAL EVERY 6 HOURS PRN
Status: CANCELLED | OUTPATIENT
Start: 2023-01-01

## 2023-01-01 RX ORDER — POLYETHYLENE GLYCOL 3350 17 G/17G
17 POWDER, FOR SOLUTION ORAL DAILY
Status: DISCONTINUED | OUTPATIENT
Start: 2023-01-01 | End: 2023-01-01 | Stop reason: HOSPADM

## 2023-01-01 RX ORDER — TRAZODONE HYDROCHLORIDE 50 MG/1
100 TABLET ORAL NIGHTLY
Status: DISCONTINUED | OUTPATIENT
Start: 2023-01-01 | End: 2023-01-01

## 2023-01-01 RX ORDER — DICLOFENAC SODIUM 10 MG/G
4 GEL TOPICAL 2 TIMES DAILY PRN
Status: DISCONTINUED | OUTPATIENT
Start: 2023-01-01 | End: 2023-01-01 | Stop reason: HOSPADM

## 2023-01-01 RX ORDER — POTASSIUM CHLORIDE 7.45 MG/ML
10 INJECTION INTRAVENOUS
Status: DISCONTINUED | OUTPATIENT
Start: 2023-01-01 | End: 2023-01-01

## 2023-01-01 RX ORDER — DEXAMETHASONE SODIUM PHOSPHATE 4 MG/ML
INJECTION, SOLUTION INTRA-ARTICULAR; INTRALESIONAL; INTRAMUSCULAR; INTRAVENOUS; SOFT TISSUE
Status: DISCONTINUED | OUTPATIENT
Start: 2023-01-01 | End: 2023-01-01

## 2023-01-01 RX ORDER — SODIUM CHLORIDE 0.9 % (FLUSH) 0.9 %
10 SYRINGE (ML) INJECTION
Status: DISCONTINUED | OUTPATIENT
Start: 2023-01-01 | End: 2023-01-01 | Stop reason: HOSPADM

## 2023-01-01 RX ORDER — PROPOFOL 10 MG/ML
INJECTION, EMULSION INTRAVENOUS
Status: DISCONTINUED | OUTPATIENT
Start: 2023-01-01 | End: 2023-01-01

## 2023-01-01 RX ORDER — METFORMIN HYDROCHLORIDE 500 MG/1
500 TABLET ORAL 2 TIMES DAILY WITH MEALS
Qty: 60 TABLET | Refills: 0 | Status: SHIPPED | OUTPATIENT
Start: 2023-01-01 | End: 2023-01-01

## 2023-01-01 RX ORDER — DOCUSATE SODIUM 100 MG/1
100 CAPSULE, LIQUID FILLED ORAL 2 TIMES DAILY PRN
Status: DISCONTINUED | OUTPATIENT
Start: 2023-01-01 | End: 2023-01-01 | Stop reason: HOSPADM

## 2023-01-01 RX ORDER — HYDROCODONE BITARTRATE AND ACETAMINOPHEN 5; 325 MG/1; MG/1
1 TABLET ORAL EVERY 8 HOURS PRN
Status: DISCONTINUED | OUTPATIENT
Start: 2023-01-01 | End: 2023-01-01 | Stop reason: HOSPADM

## 2023-01-01 RX ORDER — POLYETHYLENE GLYCOL 3350 17 G/17G
17 POWDER, FOR SOLUTION ORAL DAILY
Refills: 0
Start: 2023-01-01

## 2023-01-01 RX ORDER — TRAZODONE HYDROCHLORIDE 50 MG/1
50 TABLET ORAL NIGHTLY
Status: DISCONTINUED | OUTPATIENT
Start: 2023-01-01 | End: 2023-01-01

## 2023-01-01 RX ORDER — POTASSIUM CHLORIDE 14.9 MG/ML
20 INJECTION INTRAVENOUS ONCE
Status: COMPLETED | OUTPATIENT
Start: 2023-01-01 | End: 2023-01-01

## 2023-01-01 RX ORDER — IBUPROFEN 200 MG
24 TABLET ORAL
Status: DISCONTINUED | OUTPATIENT
Start: 2023-01-01 | End: 2023-01-01 | Stop reason: HOSPADM

## 2023-01-01 RX ORDER — CIPROFLOXACIN 250 MG/1
250 TABLET, FILM COATED ORAL EVERY 12 HOURS
Status: COMPLETED | OUTPATIENT
Start: 2023-01-01 | End: 2023-01-01

## 2023-01-01 RX ORDER — ENOXAPARIN SODIUM 100 MG/ML
40 INJECTION SUBCUTANEOUS EVERY 24 HOURS
Status: DISCONTINUED | OUTPATIENT
Start: 2023-01-01 | End: 2023-01-01 | Stop reason: HOSPADM

## 2023-01-01 RX ORDER — DOCUSATE SODIUM 50 MG/5ML
200 LIQUID ORAL 2 TIMES DAILY
Status: DISCONTINUED | OUTPATIENT
Start: 2023-01-01 | End: 2023-01-01 | Stop reason: HOSPADM

## 2023-01-01 RX ORDER — SODIUM CHLORIDE 9 MG/ML
INJECTION, SOLUTION INTRAVENOUS CONTINUOUS
Status: DISCONTINUED | OUTPATIENT
Start: 2023-01-01 | End: 2023-01-01

## 2023-01-01 RX ORDER — LIDOCAINE HYDROCHLORIDE 20 MG/ML
INJECTION, SOLUTION EPIDURAL; INFILTRATION; INTRACAUDAL; PERINEURAL
Status: DISCONTINUED | OUTPATIENT
Start: 2023-01-01 | End: 2023-01-01

## 2023-01-01 RX ORDER — TAMSULOSIN HYDROCHLORIDE 0.4 MG/1
0.4 CAPSULE ORAL DAILY
Status: DISCONTINUED | OUTPATIENT
Start: 2023-01-01 | End: 2023-01-01 | Stop reason: HOSPADM

## 2023-01-01 RX ORDER — PHENYLEPHRINE HCL IN 0.9% NACL 1 MG/10 ML
SYRINGE (ML) INTRAVENOUS
Status: DISCONTINUED | OUTPATIENT
Start: 2023-01-01 | End: 2023-01-01

## 2023-01-01 RX ORDER — DIPHENHYDRAMINE HYDROCHLORIDE 50 MG/ML
25 INJECTION INTRAMUSCULAR; INTRAVENOUS EVERY 6 HOURS PRN
Status: CANCELLED | OUTPATIENT
Start: 2023-01-01

## 2023-01-01 RX ORDER — SODIUM CHLORIDE 0.9 % (FLUSH) 0.9 %
10 SYRINGE (ML) INJECTION
Status: DISCONTINUED | OUTPATIENT
Start: 2023-01-01 | End: 2023-01-01 | Stop reason: SDUPTHER

## 2023-01-01 RX ORDER — TRAZODONE HYDROCHLORIDE 50 MG/1
150 TABLET ORAL NIGHTLY
Status: DISCONTINUED | OUTPATIENT
Start: 2023-01-01 | End: 2023-01-01 | Stop reason: HOSPADM

## 2023-01-01 RX ORDER — SODIUM,POTASSIUM PHOSPHATES 280-250MG
2 POWDER IN PACKET (EA) ORAL ONCE
Status: COMPLETED | OUTPATIENT
Start: 2023-01-01 | End: 2023-01-01

## 2023-01-01 RX ORDER — MAGNESIUM SULFATE HEPTAHYDRATE 40 MG/ML
2 INJECTION, SOLUTION INTRAVENOUS
Status: COMPLETED | OUTPATIENT
Start: 2023-01-01 | End: 2023-01-01

## 2023-01-01 RX ORDER — LANOLIN ALCOHOL/MO/W.PET/CERES
400 CREAM (GRAM) TOPICAL DAILY
Status: DISCONTINUED | OUTPATIENT
Start: 2023-01-01 | End: 2023-01-01 | Stop reason: HOSPADM

## 2023-01-01 RX ORDER — HYDROCODONE BITARTRATE AND ACETAMINOPHEN 500; 5 MG/1; MG/1
TABLET ORAL
Status: DISCONTINUED | OUTPATIENT
Start: 2023-01-01 | End: 2023-01-01 | Stop reason: HOSPADM

## 2023-01-01 RX ORDER — CARVEDILOL 3.12 MG/1
6.25 TABLET ORAL 2 TIMES DAILY
Status: DISCONTINUED | OUTPATIENT
Start: 2023-01-01 | End: 2023-01-01 | Stop reason: HOSPADM

## 2023-01-01 RX ORDER — TRAMADOL HYDROCHLORIDE 50 MG/1
50 TABLET ORAL NIGHTLY
Status: DISCONTINUED | OUTPATIENT
Start: 2023-01-01 | End: 2023-01-01

## 2023-01-01 RX ORDER — INSULIN ASPART 100 [IU]/ML
2 INJECTION, SOLUTION INTRAVENOUS; SUBCUTANEOUS
Status: DISCONTINUED | OUTPATIENT
Start: 2023-01-01 | End: 2023-01-01

## 2023-01-01 RX ORDER — TRAMADOL HYDROCHLORIDE 50 MG/1
50 TABLET ORAL
Status: COMPLETED | OUTPATIENT
Start: 2023-01-01 | End: 2023-01-01

## 2023-01-01 RX ORDER — ACETAMINOPHEN 325 MG/1
650 TABLET ORAL EVERY 6 HOURS PRN
Status: DISCONTINUED | OUTPATIENT
Start: 2023-01-01 | End: 2023-01-01 | Stop reason: HOSPADM

## 2023-01-01 RX ORDER — LANOLIN ALCOHOL/MO/W.PET/CERES
1 CREAM (GRAM) TOPICAL DAILY
Status: DISCONTINUED | OUTPATIENT
Start: 2023-01-01 | End: 2023-01-01 | Stop reason: HOSPADM

## 2023-01-01 RX ORDER — FUROSEMIDE 10 MG/ML
20 INJECTION INTRAMUSCULAR; INTRAVENOUS ONCE
Status: COMPLETED | OUTPATIENT
Start: 2023-01-01 | End: 2023-01-01

## 2023-01-01 RX ORDER — LANOLIN ALCOHOL/MO/W.PET/CERES
400 CREAM (GRAM) TOPICAL DAILY
Refills: 0
Start: 2023-01-01

## 2023-01-01 RX ORDER — INSULIN ASPART 100 [IU]/ML
0-5 INJECTION, SOLUTION INTRAVENOUS; SUBCUTANEOUS
Status: ON HOLD
Start: 2023-01-01 | End: 2023-01-01 | Stop reason: HOSPADM

## 2023-01-01 RX ORDER — SODIUM CHLORIDE 0.9 % (FLUSH) 0.9 %
10 SYRINGE (ML) INJECTION EVERY 6 HOURS
Status: DISCONTINUED | OUTPATIENT
Start: 2023-01-01 | End: 2023-01-01 | Stop reason: HOSPADM

## 2023-01-01 RX ORDER — GLUCAGON 1 MG
1 KIT INJECTION
Status: DISCONTINUED | OUTPATIENT
Start: 2023-01-01 | End: 2023-01-01 | Stop reason: HOSPADM

## 2023-01-01 RX ORDER — TRAMADOL HYDROCHLORIDE 50 MG/1
50 TABLET ORAL EVERY 6 HOURS PRN
Status: DISCONTINUED | OUTPATIENT
Start: 2023-01-01 | End: 2023-01-01 | Stop reason: HOSPADM

## 2023-01-01 RX ORDER — FUROSEMIDE 10 MG/ML
40 INJECTION INTRAMUSCULAR; INTRAVENOUS 3 TIMES DAILY
Status: COMPLETED | OUTPATIENT
Start: 2023-01-01 | End: 2023-01-01

## 2023-01-01 RX ORDER — LACTOBACILLUS ACIDOPHILUS 500MM CELL
1 CAPSULE ORAL
Start: 2023-01-01

## 2023-01-01 RX ORDER — SENNOSIDES 8.6 MG/1
8.6 TABLET ORAL DAILY PRN
Status: DISCONTINUED | OUTPATIENT
Start: 2023-01-01 | End: 2023-01-01 | Stop reason: HOSPADM

## 2023-01-01 RX ORDER — PRAVASTATIN SODIUM 40 MG/1
40 TABLET ORAL NIGHTLY
Status: DISCONTINUED | OUTPATIENT
Start: 2023-01-01 | End: 2023-01-01 | Stop reason: HOSPADM

## 2023-01-01 RX ORDER — LANOLIN ALCOHOL/MO/W.PET/CERES
400 CREAM (GRAM) TOPICAL 3 TIMES DAILY
Status: DISCONTINUED | OUTPATIENT
Start: 2023-01-01 | End: 2023-01-01

## 2023-01-01 RX ORDER — INSULIN ASPART 100 [IU]/ML
0-5 INJECTION, SOLUTION INTRAVENOUS; SUBCUTANEOUS
Status: DISCONTINUED | OUTPATIENT
Start: 2023-01-01 | End: 2023-01-01 | Stop reason: HOSPADM

## 2023-01-01 RX ORDER — LOPERAMIDE HYDROCHLORIDE 2 MG/1
2 CAPSULE ORAL 4 TIMES DAILY PRN
Status: DISCONTINUED | OUTPATIENT
Start: 2023-01-01 | End: 2023-01-01 | Stop reason: HOSPADM

## 2023-01-01 RX ORDER — MUPIROCIN 20 MG/G
OINTMENT TOPICAL 2 TIMES DAILY
Status: COMPLETED | OUTPATIENT
Start: 2023-01-01 | End: 2023-01-01

## 2023-01-01 RX ORDER — METFORMIN HYDROCHLORIDE 500 MG/1
1000 TABLET ORAL 2 TIMES DAILY WITH MEALS
Status: DISCONTINUED | OUTPATIENT
Start: 2023-01-01 | End: 2023-01-01

## 2023-01-01 RX ORDER — LIDOCAINE HYDROCHLORIDE AND EPINEPHRINE 10; 10 MG/ML; UG/ML
1 INJECTION, SOLUTION INFILTRATION; PERINEURAL
Status: DISCONTINUED | OUTPATIENT
Start: 2023-01-01 | End: 2023-01-01

## 2023-01-01 RX ORDER — LACTULOSE 10 G/15ML
30 SOLUTION ORAL ONCE
Status: COMPLETED | OUTPATIENT
Start: 2023-01-01 | End: 2023-01-01

## 2023-01-01 RX ORDER — METFORMIN HYDROCHLORIDE 500 MG/1
500 TABLET ORAL 2 TIMES DAILY WITH MEALS
Status: DISCONTINUED | OUTPATIENT
Start: 2023-01-01 | End: 2023-01-01 | Stop reason: HOSPADM

## 2023-01-01 RX ORDER — POTASSIUM CHLORIDE 20 MEQ/1
40 TABLET, EXTENDED RELEASE ORAL ONCE
Status: DISCONTINUED | OUTPATIENT
Start: 2023-01-01 | End: 2023-01-01

## 2023-01-01 RX ORDER — TRAZODONE HYDROCHLORIDE 100 MG/1
100 TABLET ORAL NIGHTLY
Status: DISCONTINUED | OUTPATIENT
Start: 2023-01-01 | End: 2023-01-01 | Stop reason: HOSPADM

## 2023-01-01 RX ORDER — LANOLIN ALCOHOL/MO/W.PET/CERES
400 CREAM (GRAM) TOPICAL 2 TIMES DAILY
Status: DISCONTINUED | OUTPATIENT
Start: 2023-01-01 | End: 2023-01-01

## 2023-01-01 RX ORDER — TALC
6 POWDER (GRAM) TOPICAL NIGHTLY PRN
Status: CANCELLED | OUTPATIENT
Start: 2023-01-01

## 2023-01-01 RX ORDER — LACTOBACILLUS ACIDOPHILUS 500MM CELL
1 CAPSULE ORAL
Status: DISCONTINUED | OUTPATIENT
Start: 2023-01-01 | End: 2023-01-01 | Stop reason: HOSPADM

## 2023-01-01 RX ORDER — TAMSULOSIN HYDROCHLORIDE 0.4 MG/1
0.4 CAPSULE ORAL DAILY
Qty: 30 CAPSULE | Refills: 11
Start: 2023-01-01 | End: 2024-05-11

## 2023-01-01 RX ORDER — FUROSEMIDE 40 MG/1
40 TABLET ORAL DAILY
Status: DISCONTINUED | OUTPATIENT
Start: 2023-01-01 | End: 2023-01-01

## 2023-01-01 RX ORDER — IBUPROFEN 200 MG
16 TABLET ORAL
Status: DISCONTINUED | OUTPATIENT
Start: 2023-01-01 | End: 2023-01-01 | Stop reason: HOSPADM

## 2023-01-01 RX ORDER — CARVEDILOL 6.25 MG/1
6.25 TABLET ORAL 2 TIMES DAILY
Qty: 60 TABLET | Refills: 11
Start: 2023-01-01 | End: 2023-01-01 | Stop reason: SDUPTHER

## 2023-01-01 RX ORDER — MAGNESIUM SULFATE 1 G/100ML
1 INJECTION INTRAVENOUS ONCE
Status: COMPLETED | OUTPATIENT
Start: 2023-01-01 | End: 2023-01-01

## 2023-01-01 RX ORDER — BISACODYL 10 MG
10 SUPPOSITORY, RECTAL RECTAL DAILY PRN
Status: DISCONTINUED | OUTPATIENT
Start: 2023-01-01 | End: 2023-01-01 | Stop reason: HOSPADM

## 2023-01-01 RX ORDER — PHENYLEPHRINE HYDROCHLORIDE 10 MG/ML
INJECTION INTRAVENOUS
Status: DISCONTINUED | OUTPATIENT
Start: 2023-01-01 | End: 2023-01-01

## 2023-01-01 RX ORDER — AMOXICILLIN 250 MG
1 CAPSULE ORAL 2 TIMES DAILY
Status: CANCELLED | OUTPATIENT
Start: 2023-01-01

## 2023-01-01 RX ORDER — POTASSIUM CHLORIDE 14.9 MG/ML
20 INJECTION INTRAVENOUS EVERY 4 HOURS
Status: COMPLETED | OUTPATIENT
Start: 2023-01-01 | End: 2023-01-01

## 2023-01-01 RX ORDER — FUROSEMIDE 10 MG/ML
40 INJECTION INTRAMUSCULAR; INTRAVENOUS ONCE
Status: COMPLETED | OUTPATIENT
Start: 2023-01-01 | End: 2023-01-01

## 2023-01-01 RX ORDER — POLYETHYLENE GLYCOL 3350 17 G/17G
17 POWDER, FOR SOLUTION ORAL DAILY PRN
Status: DISCONTINUED | OUTPATIENT
Start: 2023-01-01 | End: 2023-01-01

## 2023-01-01 RX ORDER — CALCIUM CARBONATE 200(500)MG
500 TABLET,CHEWABLE ORAL 2 TIMES DAILY PRN
Status: CANCELLED | OUTPATIENT
Start: 2023-01-01

## 2023-01-01 RX ORDER — FUROSEMIDE 10 MG/ML
40 INJECTION INTRAMUSCULAR; INTRAVENOUS ONCE
Status: DISCONTINUED | OUTPATIENT
Start: 2023-01-01 | End: 2023-01-01

## 2023-01-01 RX ORDER — TALC
6 POWDER (GRAM) TOPICAL NIGHTLY PRN
Status: DISCONTINUED | OUTPATIENT
Start: 2023-01-01 | End: 2023-01-01 | Stop reason: HOSPADM

## 2023-01-01 RX ORDER — GLIMEPIRIDE 2 MG/1
2 TABLET ORAL
Status: DISCONTINUED | OUTPATIENT
Start: 2023-01-01 | End: 2023-01-01

## 2023-01-01 RX ORDER — METHOCARBAMOL 500 MG/1
500 TABLET, FILM COATED ORAL NIGHTLY
Status: DISCONTINUED | OUTPATIENT
Start: 2023-01-01 | End: 2023-01-01

## 2023-01-01 RX ORDER — HYDROCODONE BITARTRATE AND ACETAMINOPHEN 5; 325 MG/1; MG/1
1 TABLET ORAL NIGHTLY
Status: DISCONTINUED | OUTPATIENT
Start: 2023-01-01 | End: 2023-01-01

## 2023-01-01 RX ORDER — DOCUSATE SODIUM 50 MG/5ML
200 LIQUID ORAL 2 TIMES DAILY
Status: DISCONTINUED | OUTPATIENT
Start: 2023-01-01 | End: 2023-01-01

## 2023-01-01 RX ORDER — SODIUM CHLORIDE 9 MG/ML
INJECTION, SOLUTION INTRAVENOUS CONTINUOUS
Status: ACTIVE | OUTPATIENT
Start: 2023-01-01 | End: 2023-01-01

## 2023-01-01 RX ORDER — POTASSIUM CHLORIDE 20 MEQ/1
40 TABLET, EXTENDED RELEASE ORAL ONCE
Status: COMPLETED | OUTPATIENT
Start: 2023-01-01 | End: 2023-01-01

## 2023-01-01 RX ORDER — HYDROCODONE BITARTRATE AND ACETAMINOPHEN 5; 325 MG/1; MG/1
2 TABLET ORAL NIGHTLY
Status: DISCONTINUED | OUTPATIENT
Start: 2023-01-01 | End: 2023-01-01

## 2023-01-01 RX ORDER — PROPOFOL 10 MG/ML
VIAL (ML) INTRAVENOUS
Status: DISCONTINUED | OUTPATIENT
Start: 2023-01-01 | End: 2023-01-01

## 2023-01-01 RX ORDER — ONDANSETRON 2 MG/ML
INJECTION INTRAMUSCULAR; INTRAVENOUS
Status: DISCONTINUED | OUTPATIENT
Start: 2023-01-01 | End: 2023-01-01

## 2023-01-01 RX ORDER — POTASSIUM CHLORIDE 20 MEQ/1
20 TABLET, EXTENDED RELEASE ORAL ONCE
Status: COMPLETED | OUTPATIENT
Start: 2023-01-01 | End: 2023-01-01

## 2023-01-01 RX ADMIN — ACETAMINOPHEN 325MG 650 MG: 325 TABLET ORAL at 08:04

## 2023-01-01 RX ADMIN — DICLOFENAC 4 G: 10 GEL TOPICAL at 10:05

## 2023-01-01 RX ADMIN — TRAZODONE HYDROCHLORIDE 50 MG: 50 TABLET ORAL at 10:04

## 2023-01-01 RX ADMIN — INSULIN DETEMIR 5 UNITS: 100 INJECTION, SOLUTION SUBCUTANEOUS at 09:05

## 2023-01-01 RX ADMIN — Medication 400 MG: at 09:05

## 2023-01-01 RX ADMIN — Medication 400 MG: at 08:05

## 2023-01-01 RX ADMIN — VANCOMYCIN HYDROCHLORIDE 500 MG: 500 INJECTION, POWDER, LYOPHILIZED, FOR SOLUTION INTRAVENOUS at 05:05

## 2023-01-01 RX ADMIN — Medication 1 CAPSULE: at 09:05

## 2023-01-01 RX ADMIN — CARVEDILOL 6.25 MG: 3.12 TABLET, FILM COATED ORAL at 09:05

## 2023-01-01 RX ADMIN — POTASSIUM CHLORIDE 20 MEQ: 14.9 INJECTION, SOLUTION INTRAVENOUS at 06:05

## 2023-01-01 RX ADMIN — TAMSULOSIN HYDROCHLORIDE 0.4 MG: 0.4 CAPSULE ORAL at 08:06

## 2023-01-01 RX ADMIN — POTASSIUM CHLORIDE 20 MEQ: 14.9 INJECTION, SOLUTION INTRAVENOUS at 02:05

## 2023-01-01 RX ADMIN — DOCUSATE SODIUM LIQUID 200 MG: 100 LIQUID ORAL at 09:05

## 2023-01-01 RX ADMIN — MUPIROCIN: 20 OINTMENT TOPICAL at 09:04

## 2023-01-01 RX ADMIN — Medication 1 CAPSULE: at 07:04

## 2023-01-01 RX ADMIN — Medication 1 CAPSULE: at 04:05

## 2023-01-01 RX ADMIN — TRAMADOL HYDROCHLORIDE 50 MG: 50 TABLET, COATED ORAL at 09:04

## 2023-01-01 RX ADMIN — TRAMADOL HYDROCHLORIDE 50 MG: 50 TABLET, COATED ORAL at 09:05

## 2023-01-01 RX ADMIN — Medication 1 CAPSULE: at 09:04

## 2023-01-01 RX ADMIN — Medication 1 CAPSULE: at 12:05

## 2023-01-01 RX ADMIN — SODIUM CHLORIDE, PRESERVATIVE FREE 10 ML: 5 INJECTION INTRAVENOUS at 05:04

## 2023-01-01 RX ADMIN — INSULIN ASPART 1 UNITS: 100 INJECTION, SOLUTION INTRAVENOUS; SUBCUTANEOUS at 08:05

## 2023-01-01 RX ADMIN — VANCOMYCIN HYDROCHLORIDE 500 MG: 500 INJECTION, POWDER, LYOPHILIZED, FOR SOLUTION INTRAVENOUS at 04:05

## 2023-01-01 RX ADMIN — TRAMADOL HYDROCHLORIDE 50 MG: 50 TABLET, COATED ORAL at 08:05

## 2023-01-01 RX ADMIN — HYDROCODONE BITARTRATE AND ACETAMINOPHEN 1 TABLET: 5; 325 TABLET ORAL at 08:05

## 2023-01-01 RX ADMIN — SODIUM CHLORIDE, PRESERVATIVE FREE 10 ML: 5 INJECTION INTRAVENOUS at 12:05

## 2023-01-01 RX ADMIN — METFORMIN HYDROCHLORIDE 500 MG: 500 TABLET ORAL at 09:05

## 2023-01-01 RX ADMIN — TRAMADOL HYDROCHLORIDE 50 MG: 50 TABLET, COATED ORAL at 11:05

## 2023-01-01 RX ADMIN — TRAZODONE HYDROCHLORIDE 100 MG: 50 TABLET ORAL at 08:05

## 2023-01-01 RX ADMIN — TAMSULOSIN HYDROCHLORIDE 0.4 MG: 0.4 CAPSULE ORAL at 09:05

## 2023-01-01 RX ADMIN — POTASSIUM CHLORIDE 20 MEQ: 1500 TABLET, EXTENDED RELEASE ORAL at 06:05

## 2023-01-01 RX ADMIN — Medication 1 CAPSULE: at 08:05

## 2023-01-01 RX ADMIN — Medication 1 CAPSULE: at 07:06

## 2023-01-01 RX ADMIN — TAMSULOSIN HYDROCHLORIDE 0.4 MG: 0.4 CAPSULE ORAL at 10:05

## 2023-01-01 RX ADMIN — VANCOMYCIN HYDROCHLORIDE 750 MG: 750 INJECTION, POWDER, LYOPHILIZED, FOR SOLUTION INTRAVENOUS at 09:04

## 2023-01-01 RX ADMIN — ENOXAPARIN SODIUM 40 MG: 40 INJECTION SUBCUTANEOUS at 05:05

## 2023-01-01 RX ADMIN — TRAZODONE HYDROCHLORIDE 100 MG: 100 TABLET ORAL at 08:05

## 2023-01-01 RX ADMIN — METFORMIN HYDROCHLORIDE 1000 MG: 500 TABLET ORAL at 05:05

## 2023-01-01 RX ADMIN — TRAMADOL HYDROCHLORIDE 50 MG: 50 TABLET, COATED ORAL at 10:06

## 2023-01-01 RX ADMIN — COLLAGENASE SANTYL: 250 OINTMENT TOPICAL at 08:04

## 2023-01-01 RX ADMIN — Medication 400 MG: at 10:05

## 2023-01-01 RX ADMIN — MUPIROCIN: 20 OINTMENT TOPICAL at 08:05

## 2023-01-01 RX ADMIN — Medication 1 CAPSULE: at 05:05

## 2023-01-01 RX ADMIN — ENOXAPARIN SODIUM 40 MG: 40 INJECTION SUBCUTANEOUS at 06:05

## 2023-01-01 RX ADMIN — METFORMIN HYDROCHLORIDE 500 MG: 500 TABLET ORAL at 08:05

## 2023-01-01 RX ADMIN — SODIUM CHLORIDE 1 G: 1 TABLET ORAL at 09:06

## 2023-01-01 RX ADMIN — Medication 1 CAPSULE: at 08:04

## 2023-01-01 RX ADMIN — SODIUM CHLORIDE, POTASSIUM CHLORIDE, SODIUM LACTATE AND CALCIUM CHLORIDE 500 ML: 600; 310; 30; 20 INJECTION, SOLUTION INTRAVENOUS at 07:04

## 2023-01-01 RX ADMIN — VANCOMYCIN HYDROCHLORIDE 750 MG: 750 INJECTION, POWDER, LYOPHILIZED, FOR SOLUTION INTRAVENOUS at 03:04

## 2023-01-01 RX ADMIN — FUROSEMIDE 40 MG: 40 TABLET ORAL at 09:04

## 2023-01-01 RX ADMIN — TRAMADOL HYDROCHLORIDE 50 MG: 50 TABLET, COATED ORAL at 10:05

## 2023-01-01 RX ADMIN — Medication 16 G: at 01:05

## 2023-01-01 RX ADMIN — FERROUS SULFATE TAB 325 MG (65 MG ELEMENTAL FE) 1 EACH: 325 (65 FE) TAB at 08:05

## 2023-01-01 RX ADMIN — COLLAGENASE SANTYL: 250 OINTMENT TOPICAL at 08:05

## 2023-01-01 RX ADMIN — CARVEDILOL 6.25 MG: 3.12 TABLET, FILM COATED ORAL at 08:06

## 2023-01-01 RX ADMIN — INSULIN DETEMIR 7 UNITS: 100 INJECTION, SOLUTION SUBCUTANEOUS at 08:04

## 2023-01-01 RX ADMIN — ENOXAPARIN SODIUM 40 MG: 40 INJECTION SUBCUTANEOUS at 04:05

## 2023-01-01 RX ADMIN — INSULIN DETEMIR 7 UNITS: 100 INJECTION, SOLUTION SUBCUTANEOUS at 08:05

## 2023-01-01 RX ADMIN — Medication 200 MCG: at 03:04

## 2023-01-01 RX ADMIN — INSULIN ASPART 3 UNITS: 100 INJECTION, SOLUTION INTRAVENOUS; SUBCUTANEOUS at 11:05

## 2023-01-01 RX ADMIN — SODIUM CHLORIDE 1 G: 1 TABLET ORAL at 08:05

## 2023-01-01 RX ADMIN — INSULIN ASPART 2 UNITS: 100 INJECTION, SOLUTION INTRAVENOUS; SUBCUTANEOUS at 05:05

## 2023-01-01 RX ADMIN — CARVEDILOL 6.25 MG: 3.12 TABLET, FILM COATED ORAL at 08:05

## 2023-01-01 RX ADMIN — POLYETHYLENE GLYCOL 3350 17 G: 17 POWDER, FOR SOLUTION ORAL at 09:05

## 2023-01-01 RX ADMIN — Medication 400 MG: at 09:06

## 2023-01-01 RX ADMIN — SODIUM CHLORIDE, PRESERVATIVE FREE 10 ML: 5 INJECTION INTRAVENOUS at 11:04

## 2023-01-01 RX ADMIN — MAGNESIUM SULFATE HEPTAHYDRATE 2 G: 40 INJECTION, SOLUTION INTRAVENOUS at 08:05

## 2023-01-01 RX ADMIN — PROPOFOL 25 MG: 10 INJECTION, EMULSION INTRAVENOUS at 03:04

## 2023-01-01 RX ADMIN — SODIUM CHLORIDE, PRESERVATIVE FREE 10 ML: 5 INJECTION INTRAVENOUS at 06:05

## 2023-01-01 RX ADMIN — PIPERACILLIN AND TAZOBACTAM 4.5 G: 4; .5 INJECTION, POWDER, LYOPHILIZED, FOR SOLUTION INTRAVENOUS; PARENTERAL at 08:04

## 2023-01-01 RX ADMIN — Medication 1 CAPSULE: at 05:04

## 2023-01-01 RX ADMIN — PRAVASTATIN SODIUM 40 MG: 40 TABLET ORAL at 08:06

## 2023-01-01 RX ADMIN — CARVEDILOL 6.25 MG: 3.12 TABLET, FILM COATED ORAL at 09:04

## 2023-01-01 RX ADMIN — VANCOMYCIN HYDROCHLORIDE 1000 MG: 1 INJECTION, POWDER, LYOPHILIZED, FOR SOLUTION INTRAVENOUS at 09:04

## 2023-01-01 RX ADMIN — CARVEDILOL 6.25 MG: 3.12 TABLET, FILM COATED ORAL at 08:04

## 2023-01-01 RX ADMIN — POLYETHYLENE GLYCOL 3350 17 G: 17 POWDER, FOR SOLUTION ORAL at 09:04

## 2023-01-01 RX ADMIN — SODIUM CHLORIDE, PRESERVATIVE FREE 10 ML: 5 INJECTION INTRAVENOUS at 11:05

## 2023-01-01 RX ADMIN — COLLAGENASE SANTYL: 250 OINTMENT TOPICAL at 09:05

## 2023-01-01 RX ADMIN — VANCOMYCIN HYDROCHLORIDE 500 MG: 500 INJECTION, POWDER, LYOPHILIZED, FOR SOLUTION INTRAVENOUS at 08:05

## 2023-01-01 RX ADMIN — FERROUS SULFATE TAB 325 MG (65 MG ELEMENTAL FE) 1 EACH: 325 (65 FE) TAB at 08:06

## 2023-01-01 RX ADMIN — COLLAGENASE SANTYL: 250 OINTMENT TOPICAL at 09:04

## 2023-01-01 RX ADMIN — TOLVAPTAN 15 MG: 15 TABLET ORAL at 01:05

## 2023-01-01 RX ADMIN — Medication 1 CAPSULE: at 11:04

## 2023-01-01 RX ADMIN — TRAZODONE HYDROCHLORIDE 150 MG: 50 TABLET ORAL at 08:05

## 2023-01-01 RX ADMIN — SODIUM CHLORIDE, PRESERVATIVE FREE 10 ML: 5 INJECTION INTRAVENOUS at 01:05

## 2023-01-01 RX ADMIN — TRAZODONE HYDROCHLORIDE 50 MG: 50 TABLET ORAL at 09:04

## 2023-01-01 RX ADMIN — POTASSIUM BICARBONATE 25 MEQ: 977.5 TABLET, EFFERVESCENT ORAL at 10:05

## 2023-01-01 RX ADMIN — DEXAMETHASONE SODIUM PHOSPHATE 8 MG: 4 INJECTION, SOLUTION INTRA-ARTICULAR; INTRALESIONAL; INTRAMUSCULAR; INTRAVENOUS; SOFT TISSUE at 02:04

## 2023-01-01 RX ADMIN — INSULIN ASPART 2 UNITS: 100 INJECTION, SOLUTION INTRAVENOUS; SUBCUTANEOUS at 11:06

## 2023-01-01 RX ADMIN — FERROUS SULFATE TAB 325 MG (65 MG ELEMENTAL FE) 1 EACH: 325 (65 FE) TAB at 09:05

## 2023-01-01 RX ADMIN — METFORMIN HYDROCHLORIDE 1000 MG: 500 TABLET ORAL at 07:05

## 2023-01-01 RX ADMIN — TRAZODONE HYDROCHLORIDE 100 MG: 50 TABLET ORAL at 09:05

## 2023-01-01 RX ADMIN — CIPROFLOXACIN 250 MG: 250 TABLET, COATED ORAL at 10:05

## 2023-01-01 RX ADMIN — COLLAGENASE SANTYL: 250 OINTMENT TOPICAL at 09:06

## 2023-01-01 RX ADMIN — METFORMIN HYDROCHLORIDE 1000 MG: 500 TABLET ORAL at 04:05

## 2023-01-01 RX ADMIN — GLIMEPIRIDE 2 MG: 2 TABLET ORAL at 09:05

## 2023-01-01 RX ADMIN — COLLAGENASE SANTYL: 250 OINTMENT TOPICAL at 10:04

## 2023-01-01 RX ADMIN — SODIUM CHLORIDE, PRESERVATIVE FREE 10 ML: 5 INJECTION INTRAVENOUS at 06:04

## 2023-01-01 RX ADMIN — VANCOMYCIN HYDROCHLORIDE 750 MG: 750 INJECTION, POWDER, LYOPHILIZED, FOR SOLUTION INTRAVENOUS at 11:04

## 2023-01-01 RX ADMIN — TAMSULOSIN HYDROCHLORIDE 0.4 MG: 0.4 CAPSULE ORAL at 08:05

## 2023-01-01 RX ADMIN — FUROSEMIDE 40 MG: 40 TABLET ORAL at 08:04

## 2023-01-01 RX ADMIN — PRAVASTATIN SODIUM 40 MG: 40 TABLET ORAL at 09:05

## 2023-01-01 RX ADMIN — INSULIN ASPART 2 UNITS: 100 INJECTION, SOLUTION INTRAVENOUS; SUBCUTANEOUS at 11:04

## 2023-01-01 RX ADMIN — VANCOMYCIN HYDROCHLORIDE 500 MG: 500 INJECTION, POWDER, LYOPHILIZED, FOR SOLUTION INTRAVENOUS at 09:06

## 2023-01-01 RX ADMIN — SENNOSIDES 8.6 MG: 8.6 TABLET, FILM COATED ORAL at 09:05

## 2023-01-01 RX ADMIN — TRAZODONE HYDROCHLORIDE 150 MG: 50 TABLET ORAL at 08:06

## 2023-01-01 RX ADMIN — VANCOMYCIN HYDROCHLORIDE 500 MG: 500 INJECTION, POWDER, LYOPHILIZED, FOR SOLUTION INTRAVENOUS at 12:05

## 2023-01-01 RX ADMIN — TRAMADOL HYDROCHLORIDE 50 MG: 50 TABLET, COATED ORAL at 03:06

## 2023-01-01 RX ADMIN — TRAMADOL HYDROCHLORIDE 50 MG: 50 TABLET, COATED ORAL at 08:04

## 2023-01-01 RX ADMIN — MAGNESIUM SULFATE HEPTAHYDRATE 2 G: 40 INJECTION, SOLUTION INTRAVENOUS at 11:04

## 2023-01-01 RX ADMIN — LIDOCAINE HYDROCHLORIDE 50 MG: 20 INJECTION, SOLUTION INTRAVENOUS at 02:04

## 2023-01-01 RX ADMIN — MUPIROCIN: 20 OINTMENT TOPICAL at 08:04

## 2023-01-01 RX ADMIN — ENOXAPARIN SODIUM 40 MG: 40 INJECTION SUBCUTANEOUS at 05:04

## 2023-01-01 RX ADMIN — PROPOFOL 25 MG: 10 INJECTION, EMULSION INTRAVENOUS at 04:04

## 2023-01-01 RX ADMIN — TRAMADOL HYDROCHLORIDE 50 MG: 50 TABLET, COATED ORAL at 05:06

## 2023-01-01 RX ADMIN — CARVEDILOL 6.25 MG: 3.12 TABLET, FILM COATED ORAL at 07:05

## 2023-01-01 RX ADMIN — INSULIN DETEMIR 7 UNITS: 100 INJECTION, SOLUTION SUBCUTANEOUS at 09:05

## 2023-01-01 RX ADMIN — Medication 1 CAPSULE: at 08:06

## 2023-01-01 RX ADMIN — METFORMIN HYDROCHLORIDE 500 MG: 500 TABLET ORAL at 05:05

## 2023-01-01 RX ADMIN — PRAVASTATIN SODIUM 40 MG: 40 TABLET ORAL at 08:05

## 2023-01-01 RX ADMIN — IOPAMIDOL 100 ML: 755 INJECTION, SOLUTION INTRAVENOUS at 07:04

## 2023-01-01 RX ADMIN — VANCOMYCIN HYDROCHLORIDE 500 MG: 500 INJECTION, POWDER, LYOPHILIZED, FOR SOLUTION INTRAVENOUS at 10:05

## 2023-01-01 RX ADMIN — Medication 400 MG: at 05:05

## 2023-01-01 RX ADMIN — Medication 400 MG: at 08:04

## 2023-01-01 RX ADMIN — METFORMIN HYDROCHLORIDE 1000 MG: 500 TABLET ORAL at 08:05

## 2023-01-01 RX ADMIN — FUROSEMIDE 40 MG: 10 INJECTION, SOLUTION INTRAMUSCULAR; INTRAVENOUS at 01:04

## 2023-01-01 RX ADMIN — TAMSULOSIN HYDROCHLORIDE 0.4 MG: 0.4 CAPSULE ORAL at 09:06

## 2023-01-01 RX ADMIN — INSULIN ASPART 2 UNITS: 100 INJECTION, SOLUTION INTRAVENOUS; SUBCUTANEOUS at 04:04

## 2023-01-01 RX ADMIN — MELATONIN TAB 3 MG 6 MG: 3 TAB at 08:06

## 2023-01-01 RX ADMIN — INSULIN ASPART 1 UNITS: 100 INJECTION, SOLUTION INTRAVENOUS; SUBCUTANEOUS at 09:06

## 2023-01-01 RX ADMIN — METFORMIN HYDROCHLORIDE 500 MG: 500 TABLET ORAL at 08:06

## 2023-01-01 RX ADMIN — SODIUM CHLORIDE 125 MG: 9 INJECTION, SOLUTION INTRAVENOUS at 09:05

## 2023-01-01 RX ADMIN — VANCOMYCIN HYDROCHLORIDE 500 MG: 500 INJECTION, POWDER, LYOPHILIZED, FOR SOLUTION INTRAVENOUS at 09:05

## 2023-01-01 RX ADMIN — Medication 1 CAPSULE: at 11:05

## 2023-01-01 RX ADMIN — INSULIN ASPART 2 UNITS: 100 INJECTION, SOLUTION INTRAVENOUS; SUBCUTANEOUS at 05:06

## 2023-01-01 RX ADMIN — DOCUSATE SODIUM 200 MG: 100 CAPSULE, LIQUID FILLED ORAL at 09:04

## 2023-01-01 RX ADMIN — FERROUS SULFATE TAB 325 MG (65 MG ELEMENTAL FE) 1 EACH: 325 (65 FE) TAB at 09:06

## 2023-01-01 RX ADMIN — ACETAMINOPHEN 325MG 650 MG: 325 TABLET ORAL at 06:04

## 2023-01-01 RX ADMIN — INSULIN ASPART 2 UNITS: 100 INJECTION, SOLUTION INTRAVENOUS; SUBCUTANEOUS at 05:04

## 2023-01-01 RX ADMIN — Medication 1 CAPSULE: at 11:06

## 2023-01-01 RX ADMIN — INSULIN ASPART 3 UNITS: 100 INJECTION, SOLUTION INTRAVENOUS; SUBCUTANEOUS at 11:04

## 2023-01-01 RX ADMIN — Medication 1 CAPSULE: at 05:06

## 2023-01-01 RX ADMIN — INSULIN ASPART 1 UNITS: 100 INJECTION, SOLUTION INTRAVENOUS; SUBCUTANEOUS at 07:04

## 2023-01-01 RX ADMIN — SODIUM CHLORIDE, PRESERVATIVE FREE 10 ML: 5 INJECTION INTRAVENOUS at 05:05

## 2023-01-01 RX ADMIN — ENOXAPARIN SODIUM 40 MG: 40 INJECTION SUBCUTANEOUS at 06:04

## 2023-01-01 RX ADMIN — MUPIROCIN: 20 OINTMENT TOPICAL at 10:05

## 2023-01-01 RX ADMIN — CIPROFLOXACIN 250 MG: 250 TABLET, COATED ORAL at 09:05

## 2023-01-01 RX ADMIN — INSULIN DETEMIR 7 UNITS: 100 INJECTION, SOLUTION SUBCUTANEOUS at 07:05

## 2023-01-01 RX ADMIN — SODIUM CHLORIDE, PRESERVATIVE FREE 10 ML: 5 INJECTION INTRAVENOUS at 04:05

## 2023-01-01 RX ADMIN — VANCOMYCIN HYDROCHLORIDE 500 MG: 500 INJECTION, POWDER, LYOPHILIZED, FOR SOLUTION INTRAVENOUS at 06:05

## 2023-01-01 RX ADMIN — FUROSEMIDE 40 MG: 40 TABLET ORAL at 08:05

## 2023-01-01 RX ADMIN — TRAMADOL HYDROCHLORIDE 50 MG: 50 TABLET, COATED ORAL at 04:05

## 2023-01-01 RX ADMIN — ENOXAPARIN SODIUM 40 MG: 40 INJECTION SUBCUTANEOUS at 08:04

## 2023-01-01 RX ADMIN — Medication 400 MG: at 04:04

## 2023-01-01 RX ADMIN — Medication 400 MG: at 07:05

## 2023-01-01 RX ADMIN — TRAMADOL HYDROCHLORIDE 50 MG: 50 TABLET, COATED ORAL at 09:06

## 2023-01-01 RX ADMIN — PIPERACILLIN AND TAZOBACTAM 4.5 G: 4; .5 INJECTION, POWDER, LYOPHILIZED, FOR SOLUTION INTRAVENOUS; PARENTERAL at 04:04

## 2023-01-01 RX ADMIN — MAGNESIUM SULFATE IN DEXTROSE 1 G: 10 INJECTION, SOLUTION INTRAVENOUS at 02:05

## 2023-01-01 RX ADMIN — TRAZODONE HYDROCHLORIDE 100 MG: 100 TABLET ORAL at 07:05

## 2023-01-01 RX ADMIN — Medication 1 CAPSULE: at 04:04

## 2023-01-01 RX ADMIN — POTASSIUM & SODIUM PHOSPHATES POWDER PACK 280-160-250 MG 2 PACKET: 280-160-250 PACK at 05:04

## 2023-01-01 RX ADMIN — MAGNESIUM SULFATE HEPTAHYDRATE 2 G: 40 INJECTION, SOLUTION INTRAVENOUS at 10:05

## 2023-01-01 RX ADMIN — HYDROCODONE BITARTRATE AND ACETAMINOPHEN 1 TABLET: 5; 325 TABLET ORAL at 08:06

## 2023-01-01 RX ADMIN — TRAZODONE HYDROCHLORIDE 50 MG: 50 TABLET ORAL at 08:04

## 2023-01-01 RX ADMIN — CARVEDILOL 6.25 MG: 3.12 TABLET, FILM COATED ORAL at 10:05

## 2023-01-01 RX ADMIN — METFORMIN HYDROCHLORIDE 500 MG: 500 TABLET ORAL at 04:05

## 2023-01-01 RX ADMIN — INSULIN DETEMIR 7 UNITS: 100 INJECTION, SOLUTION SUBCUTANEOUS at 09:04

## 2023-01-01 RX ADMIN — MELATONIN TAB 3 MG 6 MG: 3 TAB at 09:06

## 2023-01-01 RX ADMIN — TRAMADOL HYDROCHLORIDE 50 MG: 50 TABLET, COATED ORAL at 05:05

## 2023-01-01 RX ADMIN — TRAZODONE HYDROCHLORIDE 100 MG: 100 TABLET ORAL at 09:05

## 2023-01-01 RX ADMIN — POTASSIUM CHLORIDE 20 MEQ: 14.9 INJECTION, SOLUTION INTRAVENOUS at 10:05

## 2023-01-01 RX ADMIN — VANCOMYCIN HYDROCHLORIDE 500 MG: 500 INJECTION, POWDER, LYOPHILIZED, FOR SOLUTION INTRAVENOUS at 11:05

## 2023-01-01 RX ADMIN — Medication 400 MG: at 06:05

## 2023-01-01 RX ADMIN — DOCUSATE SODIUM 200 MG: 100 CAPSULE, LIQUID FILLED ORAL at 08:04

## 2023-01-01 RX ADMIN — VANCOMYCIN HYDROCHLORIDE 500 MG: 500 INJECTION, POWDER, LYOPHILIZED, FOR SOLUTION INTRAVENOUS at 08:06

## 2023-01-01 RX ADMIN — POLYETHYLENE GLYCOL 3350 17 G: 17 POWDER, FOR SOLUTION ORAL at 10:05

## 2023-01-01 RX ADMIN — CARVEDILOL 6.25 MG: 3.12 TABLET, FILM COATED ORAL at 09:06

## 2023-01-01 RX ADMIN — Medication 1 CAPSULE: at 04:06

## 2023-01-01 RX ADMIN — LACTULOSE 30 G: 10 SOLUTION ORAL at 03:04

## 2023-01-01 RX ADMIN — VANCOMYCIN HYDROCHLORIDE 750 MG: 750 INJECTION, POWDER, LYOPHILIZED, FOR SOLUTION INTRAVENOUS at 10:04

## 2023-01-01 RX ADMIN — MAGNESIUM SULFATE HEPTAHYDRATE 2 G: 40 INJECTION, SOLUTION INTRAVENOUS at 03:04

## 2023-01-01 RX ADMIN — INSULIN ASPART 2 UNITS: 100 INJECTION, SOLUTION INTRAVENOUS; SUBCUTANEOUS at 10:05

## 2023-01-01 RX ADMIN — PHENYLEPHRINE HYDROCHLORIDE 100 MCG: 10 INJECTION INTRAVENOUS at 04:04

## 2023-01-01 RX ADMIN — GLIMEPIRIDE 2 MG: 2 TABLET ORAL at 08:05

## 2023-01-01 RX ADMIN — Medication 1 CAPSULE: at 06:04

## 2023-01-01 RX ADMIN — Medication 16 G: at 05:05

## 2023-01-01 RX ADMIN — TRAMADOL HYDROCHLORIDE 50 MG: 50 TABLET, COATED ORAL at 06:02

## 2023-01-01 RX ADMIN — INSULIN DETEMIR 7 UNITS: 100 INJECTION, SOLUTION SUBCUTANEOUS at 10:05

## 2023-01-01 RX ADMIN — MAGNESIUM SULFATE HEPTAHYDRATE 2 G: 40 INJECTION, SOLUTION INTRAVENOUS at 03:05

## 2023-01-01 RX ADMIN — Medication 400 MG: at 03:04

## 2023-01-01 RX ADMIN — SODIUM CHLORIDE: 9 INJECTION, SOLUTION INTRAVENOUS at 04:04

## 2023-01-01 RX ADMIN — POLYETHYLENE GLYCOL 3350 17 G: 17 POWDER, FOR SOLUTION ORAL at 08:05

## 2023-01-01 RX ADMIN — TRAMADOL HYDROCHLORIDE 50 MG: 50 TABLET, COATED ORAL at 04:04

## 2023-01-01 RX ADMIN — Medication 400 MG: at 09:04

## 2023-01-01 RX ADMIN — Medication 1 CAPSULE: at 01:04

## 2023-01-01 RX ADMIN — IOPAMIDOL 100 ML: 755 INJECTION, SOLUTION INTRAVENOUS at 09:04

## 2023-01-01 RX ADMIN — Medication 1 CAPSULE: at 10:05

## 2023-01-01 RX ADMIN — SODIUM CHLORIDE: 9 INJECTION, SOLUTION INTRAVENOUS at 03:05

## 2023-01-01 RX ADMIN — INSULIN ASPART 3 UNITS: 100 INJECTION, SOLUTION INTRAVENOUS; SUBCUTANEOUS at 05:04

## 2023-01-01 RX ADMIN — TRAZODONE HYDROCHLORIDE 100 MG: 100 TABLET ORAL at 08:04

## 2023-01-01 RX ADMIN — LOPERAMIDE HYDROCHLORIDE 2 MG: 2 CAPSULE ORAL at 04:05

## 2023-01-01 RX ADMIN — INSULIN ASPART 2 UNITS: 100 INJECTION, SOLUTION INTRAVENOUS; SUBCUTANEOUS at 04:05

## 2023-01-01 RX ADMIN — DOCUSATE SODIUM 50 MG: 50 CAPSULE, LIQUID FILLED ORAL at 09:04

## 2023-01-01 RX ADMIN — TRAMADOL HYDROCHLORIDE 50 MG: 50 TABLET, COATED ORAL at 11:06

## 2023-01-01 RX ADMIN — Medication 1 CAPSULE: at 06:05

## 2023-01-01 RX ADMIN — Medication 1 CAPSULE: at 12:06

## 2023-01-01 RX ADMIN — VANCOMYCIN HYDROCHLORIDE 500 MG: 500 INJECTION, POWDER, LYOPHILIZED, FOR SOLUTION INTRAVENOUS at 01:05

## 2023-01-01 RX ADMIN — VANCOMYCIN HYDROCHLORIDE 500 MG: 500 INJECTION, POWDER, LYOPHILIZED, FOR SOLUTION INTRAVENOUS at 03:05

## 2023-01-01 RX ADMIN — PROPOFOL 100 MG: 10 INJECTION, EMULSION INTRAVENOUS at 02:04

## 2023-01-01 RX ADMIN — SODIUM CHLORIDE, SODIUM GLUCONATE, SODIUM ACETATE, POTASSIUM CHLORIDE AND MAGNESIUM CHLORIDE: 526; 502; 368; 37; 30 INJECTION, SOLUTION INTRAVENOUS at 03:04

## 2023-01-01 RX ADMIN — Medication 100 MCG: at 02:04

## 2023-01-01 RX ADMIN — Medication 400 MG: at 05:04

## 2023-01-01 RX ADMIN — SODIUM CHLORIDE 1 G: 1 TABLET ORAL at 09:05

## 2023-01-01 RX ADMIN — Medication 1 CAPSULE: at 01:05

## 2023-01-01 RX ADMIN — FERROUS SULFATE TAB 325 MG (65 MG ELEMENTAL FE) 1 EACH: 325 (65 FE) TAB at 10:05

## 2023-01-01 RX ADMIN — INSULIN ASPART 2 UNITS: 100 INJECTION, SOLUTION INTRAVENOUS; SUBCUTANEOUS at 11:05

## 2023-01-01 RX ADMIN — PRAVASTATIN SODIUM 40 MG: 40 TABLET ORAL at 10:05

## 2023-01-01 RX ADMIN — SODIUM CHLORIDE: 9 INJECTION, SOLUTION INTRAVENOUS at 10:04

## 2023-01-01 RX ADMIN — METFORMIN HYDROCHLORIDE 1000 MG: 500 TABLET ORAL at 09:05

## 2023-01-01 RX ADMIN — VANCOMYCIN HYDROCHLORIDE 750 MG: 750 INJECTION, POWDER, LYOPHILIZED, FOR SOLUTION INTRAVENOUS at 12:04

## 2023-01-01 RX ADMIN — SODIUM CHLORIDE: 9 INJECTION, SOLUTION INTRAVENOUS at 09:04

## 2023-01-01 RX ADMIN — BISACODYL 10 MG: 10 SUPPOSITORY RECTAL at 05:05

## 2023-01-01 RX ADMIN — LIDOCAINE HYDROCHLORIDE 3 ML: 20 INJECTION, SOLUTION EPIDURAL; INFILTRATION; INTRACAUDAL; PERINEURAL at 03:04

## 2023-01-01 RX ADMIN — Medication 1 CAPSULE: at 07:05

## 2023-01-01 RX ADMIN — GLIMEPIRIDE 2 MG: 2 TABLET ORAL at 07:05

## 2023-01-01 RX ADMIN — TRAMADOL HYDROCHLORIDE 50 MG: 50 TABLET, COATED ORAL at 03:05

## 2023-01-01 RX ADMIN — SODIUM CHLORIDE 1 G: 1 TABLET ORAL at 10:05

## 2023-01-01 RX ADMIN — Medication 1 CAPSULE: at 02:04

## 2023-01-01 RX ADMIN — CIPROFLOXACIN 250 MG: 250 TABLET, COATED ORAL at 08:05

## 2023-01-01 RX ADMIN — HYDROCODONE BITARTRATE AND ACETAMINOPHEN 2 TABLET: 5; 325 TABLET ORAL at 09:05

## 2023-01-01 RX ADMIN — ENOXAPARIN SODIUM 40 MG: 40 INJECTION SUBCUTANEOUS at 05:06

## 2023-01-01 RX ADMIN — IOPAMIDOL 100 ML: 755 INJECTION, SOLUTION INTRAVENOUS at 08:04

## 2023-01-01 RX ADMIN — METHOCARBAMOL 500 MG: 500 TABLET ORAL at 09:05

## 2023-01-01 RX ADMIN — METFORMIN HYDROCHLORIDE 500 MG: 500 TABLET ORAL at 05:06

## 2023-01-01 RX ADMIN — ENOXAPARIN SODIUM 40 MG: 40 INJECTION SUBCUTANEOUS at 04:04

## 2023-01-01 RX ADMIN — POLYETHYLENE GLYCOL 3350 17 G: 17 POWDER, FOR SOLUTION ORAL at 08:06

## 2023-01-01 RX ADMIN — TRAMADOL HYDROCHLORIDE 50 MG: 50 TABLET, COATED ORAL at 02:05

## 2023-01-01 RX ADMIN — INSULIN ASPART 2 UNITS: 100 INJECTION, SOLUTION INTRAVENOUS; SUBCUTANEOUS at 08:05

## 2023-01-01 RX ADMIN — POTASSIUM BICARBONATE 25 MEQ: 977.5 TABLET, EFFERVESCENT ORAL at 09:05

## 2023-01-01 RX ADMIN — METFORMIN HYDROCHLORIDE 500 MG: 500 TABLET ORAL at 07:06

## 2023-01-01 RX ADMIN — ACETAMINOPHEN 650 MG: 325 TABLET, FILM COATED ORAL at 05:06

## 2023-01-01 RX ADMIN — INSULIN ASPART 1 UNITS: 100 INJECTION, SOLUTION INTRAVENOUS; SUBCUTANEOUS at 09:05

## 2023-01-01 RX ADMIN — ONDANSETRON 4 MG: 2 INJECTION INTRAMUSCULAR; INTRAVENOUS at 03:04

## 2023-01-01 RX ADMIN — FUROSEMIDE 40 MG: 10 INJECTION, SOLUTION INTRAMUSCULAR; INTRAVENOUS at 02:04

## 2023-01-01 RX ADMIN — INSULIN ASPART 3 UNITS: 100 INJECTION, SOLUTION INTRAVENOUS; SUBCUTANEOUS at 04:05

## 2023-01-01 RX ADMIN — DOCUSATE SODIUM LIQUID 200 MG: 100 LIQUID ORAL at 08:05

## 2023-01-01 RX ADMIN — Medication 1 CAPSULE: at 12:04

## 2023-01-01 RX ADMIN — LOPERAMIDE HYDROCHLORIDE 2 MG: 2 CAPSULE ORAL at 07:05

## 2023-01-01 RX ADMIN — TRAZODONE HYDROCHLORIDE 100 MG: 50 TABLET ORAL at 10:05

## 2023-01-01 RX ADMIN — COLLAGENASE SANTYL: 250 OINTMENT TOPICAL at 08:06

## 2023-01-01 RX ADMIN — HYDROCODONE BITARTRATE AND ACETAMINOPHEN 1 TABLET: 5; 325 TABLET ORAL at 09:05

## 2023-01-01 RX ADMIN — TRAMADOL HYDROCHLORIDE 50 MG: 50 TABLET, COATED ORAL at 11:04

## 2023-01-01 RX ADMIN — DOCUSATE SODIUM LIQUID 200 MG: 100 LIQUID ORAL at 08:04

## 2023-01-01 RX ADMIN — MUPIROCIN: 20 OINTMENT TOPICAL at 09:05

## 2023-01-01 RX ADMIN — COLLAGENASE SANTYL: 250 OINTMENT TOPICAL at 06:05

## 2023-01-01 RX ADMIN — FUROSEMIDE 20 MG: 10 INJECTION, SOLUTION INTRAMUSCULAR; INTRAVENOUS at 10:05

## 2023-01-01 RX ADMIN — Medication: at 11:05

## 2023-01-01 RX ADMIN — VANCOMYCIN HYDROCHLORIDE 500 MG: 500 INJECTION, POWDER, LYOPHILIZED, FOR SOLUTION INTRAVENOUS at 12:04

## 2023-01-01 RX ADMIN — SODIUM CHLORIDE, POTASSIUM CHLORIDE, SODIUM LACTATE AND CALCIUM CHLORIDE 500 ML: 600; 310; 30; 20 INJECTION, SOLUTION INTRAVENOUS at 11:04

## 2023-01-01 RX ADMIN — COLLAGENASE SANTYL: 250 OINTMENT TOPICAL at 10:05

## 2023-01-01 RX ADMIN — Medication 400 MG: at 08:06

## 2023-01-01 RX ADMIN — MELATONIN TAB 3 MG 6 MG: 3 TAB at 09:05

## 2023-01-01 RX ADMIN — Medication 1 CAPSULE: at 03:04

## 2023-01-01 RX ADMIN — Medication 6 MG: at 08:05

## 2023-01-01 RX ADMIN — POTASSIUM BICARBONATE 25 MEQ: 977.5 TABLET, EFFERVESCENT ORAL at 11:05

## 2023-01-01 RX ADMIN — SENNOSIDES 8.6 MG: 8.6 TABLET, FILM COATED ORAL at 03:05

## 2023-01-01 RX ADMIN — COLLAGENASE SANTYL: 250 OINTMENT TOPICAL at 12:05

## 2023-01-01 RX ADMIN — DEXTROSE 125 ML: 10 SOLUTION INTRAVENOUS at 05:05

## 2023-01-01 RX ADMIN — TAMSULOSIN HYDROCHLORIDE 0.4 MG: 0.4 CAPSULE ORAL at 06:05

## 2023-01-01 RX ADMIN — FUROSEMIDE 40 MG: 40 TABLET ORAL at 09:05

## 2023-01-01 RX ADMIN — TRAZODONE HYDROCHLORIDE 100 MG: 100 TABLET ORAL at 10:05

## 2023-01-01 RX ADMIN — Medication 400 MG: at 02:04

## 2023-01-01 RX ADMIN — ACETAMINOPHEN 325MG 650 MG: 325 TABLET ORAL at 04:04

## 2023-01-01 RX ADMIN — ENOXAPARIN SODIUM 40 MG: 40 INJECTION SUBCUTANEOUS at 04:06

## 2023-01-01 RX ADMIN — METFORMIN HYDROCHLORIDE 500 MG: 500 TABLET ORAL at 04:06

## 2023-01-01 RX ADMIN — ACETAMINOPHEN 650 MG: 325 TABLET, FILM COATED ORAL at 06:05

## 2023-01-01 RX ADMIN — POTASSIUM CHLORIDE 40 MEQ: 1500 TABLET, EXTENDED RELEASE ORAL at 08:05

## 2023-01-01 RX ADMIN — MAGNESIUM SULFATE HEPTAHYDRATE 2 G: 40 INJECTION, SOLUTION INTRAVENOUS at 01:04

## 2023-01-01 RX ADMIN — Medication 200 MCG: at 02:04

## 2023-01-01 RX ADMIN — HYDROCODONE BITARTRATE AND ACETAMINOPHEN 1 TABLET: 5; 325 TABLET ORAL at 07:05

## 2023-01-01 RX ADMIN — SODIUM CHLORIDE: 9 INJECTION, SOLUTION INTRAVENOUS at 06:04

## 2023-01-01 RX ADMIN — DOCUSATE SODIUM 50 MG: 50 CAPSULE, LIQUID FILLED ORAL at 08:04

## 2023-01-01 RX ADMIN — COLLAGENASE SANTYL: 250 OINTMENT TOPICAL at 02:04

## 2023-01-01 RX ADMIN — Medication 400 MG: at 03:05

## 2023-01-01 RX ADMIN — ACETAMINOPHEN 650 MG: 325 TABLET, FILM COATED ORAL at 08:05

## 2023-01-01 RX ADMIN — DOCUSATE SODIUM 100 MG: 100 CAPSULE, LIQUID FILLED ORAL at 03:05

## 2023-01-01 RX ADMIN — TRAMADOL HYDROCHLORIDE 50 MG: 50 TABLET, COATED ORAL at 05:04

## 2023-01-01 RX ADMIN — FUROSEMIDE 40 MG: 10 INJECTION, SOLUTION INTRAMUSCULAR; INTRAVENOUS at 08:04

## 2023-01-01 RX ADMIN — SODIUM CHLORIDE, SODIUM GLUCONATE, SODIUM ACETATE, POTASSIUM CHLORIDE AND MAGNESIUM CHLORIDE: 526; 502; 368; 37; 30 INJECTION, SOLUTION INTRAVENOUS at 02:04

## 2023-01-01 RX ADMIN — METFORMIN HYDROCHLORIDE 500 MG: 500 TABLET ORAL at 09:06

## 2023-01-01 RX ADMIN — SODIUM CHLORIDE, PRESERVATIVE FREE 10 ML: 5 INJECTION INTRAVENOUS at 01:04

## 2023-01-01 RX ADMIN — SODIUM CHLORIDE, PRESERVATIVE FREE 10 ML: 5 INJECTION INTRAVENOUS at 12:04

## 2023-01-01 RX ADMIN — FUROSEMIDE 40 MG: 10 INJECTION, SOLUTION INTRAMUSCULAR; INTRAVENOUS at 09:04

## 2023-01-01 RX ADMIN — SODIUM CHLORIDE 125 MG: 9 INJECTION, SOLUTION INTRAVENOUS at 04:05

## 2023-01-01 RX ADMIN — SODIUM CHLORIDE 1 G: 1 TABLET ORAL at 08:06

## 2023-01-01 RX ADMIN — HYDROCODONE BITARTRATE AND ACETAMINOPHEN 1 TABLET: 5; 325 TABLET ORAL at 09:06

## 2023-01-01 RX ADMIN — TRAMADOL HYDROCHLORIDE 50 MG: 50 TABLET, COATED ORAL at 03:04

## 2023-01-01 RX ADMIN — SODIUM CHLORIDE, POTASSIUM CHLORIDE, SODIUM LACTATE AND CALCIUM CHLORIDE 1000 ML: 600; 310; 30; 20 INJECTION, SOLUTION INTRAVENOUS at 07:04

## 2023-01-01 RX ADMIN — Medication 1 CAPSULE: at 09:06

## 2023-01-01 RX ADMIN — TRAMADOL HYDROCHLORIDE 50 MG: 50 TABLET, COATED ORAL at 12:04

## 2023-01-01 RX ADMIN — ACETAMINOPHEN 650 MG: 325 TABLET, FILM COATED ORAL at 12:05

## 2023-01-01 RX ADMIN — INSULIN ASPART 3 UNITS: 100 INJECTION, SOLUTION INTRAVENOUS; SUBCUTANEOUS at 04:04

## 2023-01-01 RX ADMIN — INSULIN ASPART 2 UNITS: 100 INJECTION, SOLUTION INTRAVENOUS; SUBCUTANEOUS at 08:04

## 2023-01-01 RX ADMIN — ACETAMINOPHEN 325MG 650 MG: 325 TABLET ORAL at 05:04

## 2023-01-01 RX ADMIN — Medication 6 MG: at 08:04

## 2023-02-15 NOTE — ED PROVIDER NOTES
Encounter Date: 2/14/2023       History     Chief Complaint   Patient presents with    varicose vein rupture     Reported varicose vein rupture (L foot) while ambulating at home. On arrival, pt has large dressing completely saturated w blood. Dressing removed. Bleeding noted on arrival. Quickclot and ace wrap applied in triage. +PMS to LLE     Patient presents from home patient has chronic wounds to her left foot and in 1 of the shallow ulcerations patient with known varicose veins 1 of them ruptured in the bleeding was unable to be controlled patient    The history is provided by the patient, the EMS personnel and a relative.   Review of patient's allergies indicates:   Allergen Reactions    Ace inhibitors Other (See Comments)    Adhesive      Allergic to tape    Bactrim [sulfamethoxazole-trimethoprim] Other (See Comments)     Confusion, Hypoglycemia    Meperidine Other (See Comments)    Midazolam Other (See Comments)    Atorvastatin Nausea Only    Codeine Other (See Comments) and Anxiety    Tapentadol Rash     Past Medical History:   Diagnosis Date    Adenocarcinoma of uterus     Bladder cancer     Deep vein thrombosis     Essential (primary) hypertension     Heart murmur     High cholesterol     Hypertriglyceridemia     Insomnia     Osteopenia     Other pulmonary embolism without acute cor pulmonale     Personal history of colonic polyps     Rheumatoid arthritis, unspecified     Type 2 diabetes mellitus without complications      Past Surgical History:   Procedure Laterality Date    CHOLECYSTECTOMY      colonscopy  06/20/2012    E[facility]E  01/09/2013    estracapsular cataract removal   02/20/2013    insertion of intrpocular lens prosthesis   02/20/2013    phacoemulsifiaction and aspiration of cataract  02/20/2013    phacoemulsificaion and aspiration of cataract      total abdominal hysterectomy and bilateral salpingooophorectomy  1991     Family History   Problem Relation Age of Onset    Diabetes Mother     Heart  failure Mother     Cirrhosis Father     Cancer Sister     Hypertension Sister     Diabetes Sister     Celiac disease Sister     Coronary artery disease Sister     Lupus Sister     Uterine cancer Sister     Kidney failure Sister     Ovarian cysts Sister     Cancer Brother     Diabetes Brother     Coronary artery disease Brother      Social History     Tobacco Use    Smoking status: Never    Smokeless tobacco: Never   Substance Use Topics    Alcohol use: Never    Drug use: Never     Review of Systems   Constitutional:  Negative for fever.   HENT:  Negative for sore throat.    Respiratory:  Negative for shortness of breath.    Cardiovascular:  Negative for chest pain.   Gastrointestinal:  Negative for abdominal pain, diarrhea and nausea.   Musculoskeletal:  Negative for back pain.   Skin:  Negative for rash.   Neurological:  Negative for weakness.   Hematological:  Does not bruise/bleed easily.     Physical Exam     Initial Vitals [02/14/23 1706]   BP Pulse Resp Temp SpO2   (!) 122/48 (!) 120 16 97.9 °F (36.6 °C) 99 %      MAP       --         Physical Exam    Constitutional: She appears well-developed and well-nourished.   HENT:   Head: Normocephalic and atraumatic.   Mouth/Throat: Oropharynx is clear and moist.   Neck:   Normal range of motion.  Cardiovascular:  Normal rate and regular rhythm.           Pulmonary/Chest: Breath sounds normal. She has no wheezes. She has no rhonchi. She has no rales.   Abdominal: Abdomen is soft. There is no abdominal tenderness.   Musculoskeletal:         General: Normal range of motion.      Cervical back: Normal range of motion.     Neurological: She is alert and oriented to person, place, and time. GCS score is 15. GCS eye subscore is 4. GCS verbal subscore is 5. GCS motor subscore is 6.   Skin: Skin is warm and dry. Capillary refill takes less than 2 seconds.   Psychiatric: She has a normal mood and affect. Her behavior is normal. Judgment and thought content normal.       ED  Course   Lac Repair    Date/Time: 2/14/2023 7:14 PM  Performed by: Mack Pollock III, MD  Authorized by: Mack Pollock III, MD     Consent:     Consent obtained:  Verbal    Consent given by:  Patient    Risks, benefits, and alternatives were discussed: yes      Risks discussed:  Need for additional repair  Universal protocol:     Patient identity confirmed:  Verbally with patient  Anesthesia:     Anesthesia method:  Local infiltration    Local anesthetic:  Lidocaine 1% w/o epi  Laceration details:     Location:  Leg    Leg location:  L lower leg    Length (cm):  0.5    Depth (mm):  2  Pre-procedure details:     Preparation:  Patient was prepped and draped in usual sterile fashion  Exploration:     Limited defect created (wound extended): no      Hemostasis achieved with:  Direct pressure  Treatment:     Area cleansed with:  Povidone-iodine    Amount of cleaning:  Standard    Irrigation solution:  Sterile saline    Irrigation method:  Syringe    Visualized foreign bodies/material removed: no      Debridement:  None  Skin repair:     Repair method:  Sutures    Suture size:  3-0    Wound skin closure material used: Vicryl.    Suture technique:  Simple interrupted    Number of sutures:  2  Approximation:     Approximation:  Close  Repair type:     Repair type:  Intermediate  Post-procedure details:     Dressing:  Antibiotic ointment    Procedure completion:  Tolerated well, no immediate complications  Labs Reviewed - No data to display       Imaging Results    None          Medications   traMADoL tablet 50 mg (50 mg Oral Given 2/14/23 1800)     Medical Decision Making:   ED Management:  Hemostasis achieved with 2 3-0 Vicryl sutures and clotting dressing patient wrapped with Ace wrap for pressure prior to discharge Ace wrap was considerably loosened no active bleeding patient will be discharged has wound care tomorrow she will follow-up with                        Clinical Impression:   Final  diagnoses:  [I83.899] Ruptured varicose vein (Primary)        ED Disposition Condition    Discharge Stable          ED Prescriptions    None       Follow-up Information       Follow up With Specialties Details Why Contact Info    Wisam Espinosa Jr., MD Family Medicine In 3 days  [address] [place] [id number]  [phone]               Mack Pollock III, MD  02/14/23 1916

## 2023-04-10 PROBLEM — M54.50 ACUTE LEFT-SIDED LOW BACK PAIN WITHOUT SCIATICA: Status: ACTIVE | Noted: 2023-01-01

## 2023-04-17 PROBLEM — A41.9 SEPSIS: Status: ACTIVE | Noted: 2023-01-01

## 2023-04-17 NOTE — PROGRESS NOTES
[facility] - Emergency Dept  Wound Care    Patient Name:  Melodie Villarreal   MRN:  [id number]  Date: 4/17/2023  Diagnosis: Sepsis    History:     Past Medical History:   Diagnosis Date    Adenocarcinoma of uterus     Bladder cancer     Deep vein thrombosis     Essential (primary) hypertension     Heart murmur     High cholesterol     Hypertriglyceridemia     Insomnia     Osteopenia     Other pulmonary embolism without acute cor pulmonale     Personal history of colonic polyps     Rheumatoid arthritis, unspecified     Type 2 diabetes mellitus without complications        Social History     Socioeconomic History    Marital status:     Number of children: 2   Occupational History    Occupation: retired   Tobacco Use    Smoking status: Never    Smokeless tobacco: Never   Substance and Sexual Activity    Alcohol use: Never    Drug use: Never    Sexual activity: Not Currently       Precautions:     Allergies as of 04/16/2023 - Reviewed 04/16/2023   Allergen Reaction Noted    Ace inhibitors Other (See Comments) 05/03/2022    Adhesive  10/02/2017    Bactrim [sulfamethoxazole-trimethoprim] Other (See Comments) 01/17/2023    Meperidine Other (See Comments) 05/03/2022    Midazolam Other (See Comments) 05/03/2022    Atorvastatin Nausea Only 05/03/2022    Codeine Other (See Comments) and Anxiety 10/02/2017    Tapentadol Rash 05/03/2022       WOC Assessment Details/Treatment   WOCN consult visit related to left foot and ankle; daughter Bibi able to provide wound history including care at [facility] wound care clinic.  Cleansed posterior and lateral wound with Vashe, applied Santyl, then wet to dry (with Vashe) gauze, kerlix to cover and medipore tape to secure.  Patient tolerated this well, ordered BALJINDER bed and PUP.  Recommendations made to primary team for   Head to toe skin assessment q 12 hours;  Turn/reposition q 2 hours or schedule to prevent erythema;  Specialty bed, and wedge for 30 degree pelvic  tilt;  Encourage activity as ordered;  Ensure adequate nutrition/hydration for healing;   04/17/23 0845        Altered Skin Integrity 04/17/23 Left posterior Ankle Full thickness tissue loss. Subcutaneous fat may be visible but bone, tendon or muscle are not exposed   Date First Assessed: 04/17/23   Side: Left  Orientation: posterior  Location: Ankle  Description of Altered Skin Integrity: Full thickness tissue loss. Subcutaneous fat may be visible but bone, tendon or muscle are not exposed   Wound Image    Description of Altered Skin Integrity Full thickness tissue loss. Subcutaneous fat may be visible but bone, tendon or muscle are not exposed   Dressing Appearance Dried drainage   Drainage Amount Small   Appearance Yellow   Red (%), Wound Tissue Color 90 %   Yellow (%), Wound Tissue Color 10 %   Wound Length (cm) 5 cm   Wound Width (cm) 2 cm   Wound Surface Area (cm^2) 10 cm^2   Care Cleansed with:;Wound cleanser;Applied:  (Santyl)   Dressing Gauze, wet to moist;Rolled gauze  (with Vashe)        Altered Skin Integrity 04/17/23 Left lateral Ankle Full thickness tissue loss. Subcutaneous fat may be visible but bone, tendon or muscle are not exposed   Date First Assessed: 04/17/23   Side: Left  Orientation: lateral  Location: Ankle  Description of Altered Skin Integrity: Full thickness tissue loss. Subcutaneous fat may be visible but bone, tendon or muscle are not exposed   Wound Image     Description of Altered Skin Integrity Full thickness tissue loss. Subcutaneous fat may be visible but bone, tendon or muscle are not exposed   Dressing Appearance Dry;Intact;Dried drainage   Red (%), Wound Tissue Color 10 %   Yellow (%), Wound Tissue Color 90 %   Periwound Area Edematous;Redness   Wound Edges Rolled/closed   Wound Length (cm) 1.5 cm   Wound Width (cm) 1 cm   Wound Depth (cm) 0.3 cm   Wound Volume (cm^3) 0.45 cm^3   Wound Surface Area (cm^2) 1.5 cm^2   Care Cleansed with:;Wound cleanser;Applied:  (Santyl)    Dressing Gauze, wet to dry;Rolled gauze  (with Vashe)

## 2023-04-17 NOTE — CONSULTS
[facility]  Infectious Disease Consult        HISTORY OF PRESENT ILLNESS:   Information for HPI gathered primarily through discussion with patient's son given patient's clinical condition.  She is a 89-year-old female with a past medical history of hypertension, diabetes mellitus type 2, and rheumatoid arthritis, not on medication, who developed a wound to the left ankle several months ago.  Son reports began as a sore to the medial and lateral ankle and progressed further with a wound on the Achilles tendon.  She has been followed by wound care at [facility] and undergoing hyperbarics.  She was brought to the emergency room yesterday secondary to worsening fatigue, decreased oral intake, and weakness.  Admit labs showed a notable leukocytosis and lactic acidosis.  CT chest, abdomen, and pelvis was unremarkable.  She was initiated on vancomycin and Zosyn.  Blood cultures obtained on admit are positive for MRSA.  An x-ray of the left lower extremity has been performed and reading is pending.  Son currently at bedside.  Patient awake and at baseline mentation currently - oriented without issues.  Only complaint is back pain, not new or worse.  We have been consulted for input regarding gram-positive bacteremia.    PAST MEDICAL HISTORY:     Past Medical History:   Diagnosis Date    Adenocarcinoma of uterus     Bladder cancer     Deep vein thrombosis     Essential (primary) hypertension     Heart murmur     High cholesterol     Hypertriglyceridemia     Insomnia     Osteopenia     Other pulmonary embolism without acute cor pulmonale     Personal history of colonic polyps     Rheumatoid arthritis, unspecified     Type 2 diabetes mellitus without complications        PAST SURGICAL HISTORY:     Past Surgical History:   Procedure Laterality Date    CHOLECYSTECTOMY      colonscopy  06/20/2012    ECCE  01/09/2013    estracapsular cataract removal   02/20/2013    insertion of intrpocular lens prosthesis    02/20/2013    phacoemulsifiaction and aspiration of cataract  02/20/2013    phacoemulsificaion and aspiration of cataract      total abdominal hysterectomy and bilateral salpingooophorectomy  1991       ALLERGIES:   Ace inhibitors, Adhesive, Bactrim [sulfamethoxazole-trimethoprim], Meperidine, Midazolam, Atorvastatin, Codeine, and Tapentadol    FAMILY HISTORY:   Reviewed and non-contributory     SOCIAL HISTORY:     Social History     Tobacco Use    Smoking status: Never    Smokeless tobacco: Never   Substance Use Topics    Alcohol use: Never        MEDICATIONS:   Reviewed in EMR    REVIEW OF SYSTEMS:   Except as documented, all other systems reviewed and negative     PHYSICAL EXAM:   T 99.5 °F (37.5 °C)   BP (!) 124/59   P 104   RR 12   O2 (!) 92 %  GENERAL: awake, alert, oriented and in no acute distress, non-toxic appearing   HEENT: normocephalic atraumatic   NECK: supple   LUNGS: Clear bilaterally, no wheezing or rales, no accessory muscle use   CVS: Regular rate and rhythm, normal peripheral perfusion; +murmur  ABD: Soft, non-tender, non-distended, bowel sounds present  EXTREMITIES: no clubbing or cyanosis  SKIN: Warm, dry; left ankle with wounds to posterior and lateral aspects, some surrounding erythema - ?exposed Achilles   NEURO: alert and oriented, grossly without focal deficits   PSYCHIATRIC: Cooperative    LABS AND IMAGING:     Recent Labs     04/16/23 1855 04/17/23  0358   WBC 34.3* 28.7*   RBC 3.75* 3.70*   HGB 10.3* 10.1*   HCT 32.1* 32.1*   MCV 85.6 86.8   MCH 27.5 27.3   MCHC 32.1* 31.5*   RDW 14.5 14.6    283     Recent Labs     04/17/23  0031 04/17/23  0206 04/17/23  0358   LACTIC 2.9* 2.4* 2.0     Recent Labs     04/16/23 1855   INR 1.29     No results for input(s): HGBA1C, CHOL, TRIG, LDL, VLDL, HDL in the last 72 hours.   Recent Labs     04/16/23 1855 04/17/23  0358   * 129*   K 3.7 3.8   CHLORIDE 96* 95*   CO2 23 27   BUN 22.6* 22.6*   CREATININE 0.97 0.87   GLUCOSE 157* 162*    CALCIUM 9.7 9.6   MG 1.40* 2.10   ALBUMIN 2.5* 2.2*   GLOBULIN 3.4 3.5   ALKPHOS 53 73   ALT 14 18   AST 14 17   BILITOT 0.5 0.4   LIPASE 4  --      Recent Labs     04/16/23  1855 04/17/23  0206 04/17/23  0358 04/17/23  1003   BNP 2,571.8*  --   --   --    TROPONINI 0.115* 0.153* 0.147* 0.123*            CT Chest Abdomen Pelvis With Contrast (xpd)  Impression: Impression:    1. No focal infiltrate or consolidation is identified.    2. No filling defects are seen in the pulmonary arteries to suggest pulmonary embolus.    3. No CT evidence of pulmonary embolism or other acute intrathoracic pathology. No acute intraabdominal or pelvic solid organ or bowel pathology identified. Details and other findings as discussed above.    No significant discrepancy with overnight report.    Electronically signed by: Morteza Ballesteros  Date:    04/17/2023  Time:    06:38      ASSESSMENT & PLAN:   She is a 89-year-old female presenting with weakness and fatigue, found to have MRSA bacteremia.  Has a left ankle wound, following with Wound Care at [facility], concerning for source.  ID consulted for assistance with Gram-positive bacteremia.     MRSA bacteremia - ?L ankle wound as source  Chronic L ankle wound, ?exposed Achilles  Sepsis s/t #1  H/o DMII / HTN  H/o RA, not on medication  Advanced age    PLAN:  MRI L ankle with and without contrast.   Consult podiatry for input.   Repeat BCx in am.  F/u TTE.  Deescalate Zosyn to ceftriaxone.  Continue vancomycin, pharmacy dosing.   Maintain without lines if feasible.  Discussed with patient and son at bedside.     ED [facility]  Infectious Disease

## 2023-04-17 NOTE — NURSING
Nurses Note -- 4 Eyes      4/17/2023   5:59 PM      Skin assessed during: Admit      [] No Pressure Injuries Present    []Prevention Measures Documented      [x] Yes- Altered Skin Integrity Present or Discovered   [x] LDA Added if Not in Epic (Describe Wound)   [x] New Altered Skin Integrity was Present on Admit and Documented in LDA   [] Wound Image Taken    Wound Care Consulted? Yes    Attending Nurse:  Jessica Simmons RN     Second RN/Staff Member:  Gwen Flores RN

## 2023-04-17 NOTE — ED PROVIDER NOTES
Encounter Date: 4/16/2023    SCRIBE #1 NOTE: I, Bryanna Khan, am scribing for, and in the presence of,  Jose Miller MD. I have scribed the following portions of the note - Other sections scribed: HPI, ROS, PE.     History     Chief Complaint   Patient presents with    Fatigue     Pt to ED with c/o generalized weakness, dysuria, and dark colored urine since this morning. Hypotensive 66/36 initially on EMS arrival     89 year old female with history of bladder cancer, HTN, HLD, DM, and DDD presents to the ED for generalized weakness. Pt's daughter at bedside reports that pt has been generally weak, fatigued, and hypotensive since this morning. She notes that the pt has been eating normally but has not been drinking much. She says she has been complaining of back pain, but this is chronic for her. She denies fever and chills. Pt denies abdominal pain    The history is provided by the patient and a relative. No  was used.   Illness   The current episode started today. The problem occurs rarely. The problem has been unchanged. Nothing relieves the symptoms. Nothing aggravates the symptoms. Pertinent negatives include no fever and no abdominal pain.   Review of patient's allergies indicates:   Allergen Reactions    Ace inhibitors Other (See Comments)    Adhesive      Allergic to tape    Bactrim [sulfamethoxazole-trimethoprim] Other (See Comments)     Confusion, Hypoglycemia    Meperidine Other (See Comments)    Midazolam Other (See Comments)    Atorvastatin Nausea Only    Codeine Other (See Comments) and Anxiety    Tapentadol Rash     Past Medical History:   Diagnosis Date    Adenocarcinoma of uterus     Bladder cancer     Deep vein thrombosis     Essential (primary) hypertension     Heart murmur     High cholesterol     Hypertriglyceridemia     Insomnia     Osteopenia     Other pulmonary embolism without acute cor pulmonale     Personal history of colonic polyps     Rheumatoid arthritis,  unspecified     Type 2 diabetes mellitus without complications      Past Surgical History:   Procedure Laterality Date    CHOLECYSTECTOMY      colonscopy  06/20/2012    E[facility]E  01/09/2013    estracapsular cataract removal   02/20/2013    insertion of intrpocular lens prosthesis   02/20/2013    phacoemulsifiaction and aspiration of cataract  02/20/2013    phacoemulsificaion and aspiration of cataract      total abdominal hysterectomy and bilateral salpingooophorectomy  1991     Family History   Problem Relation Age of Onset    Diabetes Mother     Heart failure Mother     Cirrhosis Father     Cancer Sister     Hypertension Sister     Diabetes Sister     Celiac disease Sister     Coronary artery disease Sister     Lupus Sister     Uterine cancer Sister     Kidney failure Sister     Ovarian cysts Sister     Cancer Brother     Diabetes Brother     Coronary artery disease Brother      Social History     Tobacco Use    Smoking status: Never    Smokeless tobacco: Never   Substance Use Topics    Alcohol use: Never    Drug use: Never     Review of Systems   Constitutional:  Positive for fatigue. Negative for chills and fever.        Generalized weakness   Gastrointestinal:  Negative for abdominal pain.   Musculoskeletal:  Positive for back pain.     Physical Exam     Initial Vitals [04/16/23 1751]   BP Pulse Resp Temp SpO2   119/70 (!) 114 20 99.5 °F (37.5 °C) 97 %      MAP       --         Physical Exam    Nursing note and vitals reviewed.  Constitutional: She appears well-developed and well-nourished. She is not diaphoretic. No distress.   HENT:   Head: Normocephalic and atraumatic.   Nose: Nose normal.   Mouth/Throat: Oropharynx is clear and moist.   Eyes: EOM are normal. Pupils are equal, round, and reactive to light.   Neck: Neck supple.   Normal range of motion.  Cardiovascular:  Normal rate and regular rhythm.           No murmur heard.  Pulmonary/Chest: Breath sounds normal. No respiratory distress. She has no wheezes.  She has no rales.   Abdominal: Abdomen is soft. She exhibits no distension. There is no abdominal tenderness.   Musculoskeletal:      Cervical back: Normal range of motion and neck supple.     Neurological: She is alert and oriented to person, place, and time. She has normal strength. No cranial nerve deficit or sensory deficit.   Skin: Skin is warm. Capillary refill takes less than 2 seconds. No rash noted.       ED Course   Critical Care    Date/Time: 4/17/2023 1:56 AM  Performed by: Jose Miller MD  Authorized by: Jose Miller MD   Direct patient critical care time: 50 minutes  Total critical care time (exclusive of procedural time) : 50 minutes  Critical care time was exclusive of separately billable procedures and treating other patients.  Critical care was necessary to treat or prevent imminent or life-threatening deterioration of the following conditions: cardiac failure, circulatory failure and sepsis.  Critical care was time spent personally by me on the following activities: blood draw for specimens, development of treatment plan with patient or surrogate, discussions with consultants, examination of patient, evaluation of patient's response to treatment, obtaining history from patient or surrogate, ordering and performing treatments and interventions, ordering and review of laboratory studies, ordering and review of radiographic studies, pulse oximetry, re-evaluation of patient's condition and review of old charts.      Labs Reviewed   COMPREHENSIVE METABOLIC PANEL - Abnormal; Notable for the following components:       Result Value    Sodium Level 131 (*)     Chloride 96 (*)     Glucose Level 157 (*)     Blood Urea Nitrogen 22.6 (*)     Albumin Level 2.5 (*)     Albumin/Globulin Ratio 0.7 (*)     All other components within normal limits   LACTIC ACID, PLASMA - Abnormal; Notable for the following components:    Lactic Acid Level 3.8 (*)     All other components within normal limits    URINALYSIS, REFLEX TO URINE CULTURE - Abnormal; Notable for the following components:    Protein, UA Trace (*)     Glucose, UA 1+ (*)     All other components within normal limits   PROTIME-INR - Abnormal; Notable for the following components:    PT 15.9 (*)     All other components within normal limits   B-TYPE NATRIURETIC PEPTIDE - Abnormal; Notable for the following components:    Natriuretic Peptide 2,571.8 (*)     All other components within normal limits   TROPONIN I - Abnormal; Notable for the following components:    Troponin-I 0.115 (*)     All other components within normal limits   CBC WITH DIFFERENTIAL - Abnormal; Notable for the following components:    WBC 34.3 (*)     RBC 3.75 (*)     Hgb 10.3 (*)     Hct 32.1 (*)     MCHC 32.1 (*)     All other components within normal limits   MAGNESIUM - Abnormal; Notable for the following components:    Magnesium Level 1.40 (*)     All other components within normal limits   MANUAL DIFFERENTIAL - Abnormal; Notable for the following components:    Abs Mono 1.372 (*)     Abs Lymp 0.343 (*)     Abs Neut 32.585 (*)     All other components within normal limits   LACTIC ACID, PLASMA - Abnormal; Notable for the following components:    Lactic Acid Level 3.5 (*)     All other components within normal limits   LACTIC ACID, PLASMA - Abnormal; Notable for the following components:    Lactic Acid Level 2.9 (*)     All other components within normal limits   POCT GLUCOSE - Abnormal; Notable for the following components:    POCT Glucose 219 (*)     All other components within normal limits   LIPASE - Normal   URINALYSIS, MICROSCOPIC - Normal   BLOOD CULTURE [facility]   BLOOD CULTURE [facility]   CBC W/ AUTO DIFFERENTIAL    Narrative:     The following orders were created for panel order CBC auto differential.  Procedure                               Abnormality         Status                     ---------                               -----------         ------                     CBC  with Differential[[id number]]        Abnormal            Final result               Manual Differential[[id number]]          Abnormal            Final result                 Please view results for these tests on the individual orders.   COVID/FLU A&B PCR   LACTIC ACID, PLASMA   TROPONIN I   TROPONIN I   CBC W/ AUTO DIFFERENTIAL    Narrative:     The following orders were created for panel order CBC auto differential.  Procedure                               Abnormality         Status                     ---------                               -----------         ------                     CBC with Differential[[id number]]                                                         Please view results for these tests on the individual orders.   COMPREHENSIVE METABOLIC PANEL   CBC WITH DIFFERENTIAL        ECG Results              EKG 12-lead (Final result)  Result time 04/16/23 21:58:57      Final result by Interface, Lab In [facility] (04/16/23 21:58:57)                   Narrative:    Test Reason : R53.83,    Vent. Rate : 115 BPM     Atrial Rate : 115 BPM     P-R Int : 156 ms          QRS Dur : 146 ms      QT Int : 380 ms       P-R-T Axes : 057 092 -30 degrees     QTc Int : 525 ms    Sinus tachycardia with Premature atrial complexes  Right bundle branch block  T wave abnormality, consider inferior ischemia  Abnormal ECG  No previous ECGs available  Confirmed by Elijah Shoemaker MD ([id number]) on 4/16/2023 9:58:45 PM    Referred By: [email or web address]   SELF           Confirmed By:Elijah Shoemaker MD                                  Imaging Results              X-Ray Tibia Fibula 2 View Left (In process)                      X-Ray Foot Complete Left (In process)                      CT Head Without Contrast (Preliminary result)  Result time 04/16/23 20:09:20      Preliminary result by Tej Correia MD (04/16/23 20:09:20)                   Narrative:    START OF REPORT:  Technique: CT of the head was performed without intravenous contrast with  axial as well as coronal and sagittal images.    Comparison: None.    Dosage Information: Automated exposure control was utilized.    Clinical history: Ams.    Findings:  Hemorrhage: No acute intracranial hemorrhage is seen.  CSF spaces: The ventricles, sulci and basal cisterns all appear mildly prominent consistent with global cerebral atrophy.  Brain parenchyma: There is preservation of the grey white junction throughout. Moderate microvascular change is seen in portions of the periventricular and deep white matter tracts.  Cerebellum: Unremarkable.  Vascular: Mild atheromatous calcification of the intracranial arteries is seen.  Sella and skull base: The sella appears to be within normal limits for age.  Cerebellopontine angles: Within normal limits.  Herniation: None.  Intracranial calcifications: Incidental note is made of bilateral choroid plexus calcification. Incidental note is made of some pineal region calcification. Incidental note is made of some calcification of the falx.  Calvarium: No acute linear or depressed skull fracture is seen.    Maxillofacial Structures:  Paranasal sinuses: The visualized paranasal sinuses appear clear with no mucoperiosteal thickening or air fluid levels identified.  Orbits: The orbits appear unremarkable.  Zygomatic arches: The zygomatic arches are intact and unremarkable.  Temporal bones and mastoids: The temporal bones and mastoids appear unremarkable.  TMJ: The mandibular condyles appear normally placed with respect to the mandibular fossa.      Impression:  1. No acute intracranial process identified. Details and other findings as noted above.                          Preliminary result by Interface, Rad Results In (04/16/23 20:09:20)                   Narrative:    START OF REPORT:  Technique: CT of the head was performed without intravenous contrast with axial as well as coronal and sagittal images.    Comparison: None.    Dosage Information: Automated exposure control  was utilized.    Clinical history: Ams.    Findings:  Hemorrhage: No acute intracranial hemorrhage is seen.  CSF spaces: The ventricles, sulci and basal cisterns all appear mildly prominent consistent with global cerebral atrophy.  Brain parenchyma: There is preservation of the grey white junction throughout. Moderate microvascular change is seen in portions of the periventricular and deep white matter tracts.  Cerebellum: Unremarkable.  Vascular: Mild atheromatous calcification of the intracranial arteries is seen.  Sella and skull base: The sella appears to be within normal limits for age.  Cerebellopontine angles: Within normal limits.  Herniation: None.  Intracranial calcifications: Incidental note is made of bilateral choroid plexus calcification. Incidental note is made of some pineal region calcification. Incidental note is made of some calcification of the falx.  Calvarium: No acute linear or depressed skull fracture is seen.    Maxillofacial Structures:  Paranasal sinuses: The visualized paranasal sinuses appear clear with no mucoperiosteal thickening or air fluid levels identified.  Orbits: The orbits appear unremarkable.  Zygomatic arches: The zygomatic arches are intact and unremarkable.  Temporal bones and mastoids: The temporal bones and mastoids appear unremarkable.  TMJ: The mandibular condyles appear normally placed with respect to the mandibular fossa.      Impression:  1. No acute intracranial process identified. Details and other findings as noted above.                                         CT Chest Abdomen Pelvis With Contrast (xpd) (Preliminary result)  Result time 04/16/23 20:05:48      Preliminary result by Tej Correia MD (04/16/23 20:05:48)                   Narrative:    START OF REPORT:  Technique: CT Scan of the chest abdomen and pelvis was performed with intravenous contrast with axial as well as sagittal and, coronal images.    Dosage Information: Automated Exposure Control was  utilized.    Comparison: None.    Clinical History: Sepsis.    Findings:  Soft Tissues: A few calcifications are seen in the right breast tissue.  Neck: The visualized soft tissues of the neck appear unremarkable. The thyroid gland appear unremarkable. Note of a tortuous proximal right common carotid artery.  Mediastinum: The mediastinal structures are within normal limits.  Heart: The heart size is within normal limits. Pronounced coronary artery calcification is seen.  Aorta: Pronounced aortic calcification is seen in the thoracic aorta.  Pulmonary Arteries: No filling defects are seen in the pulmonary arteries to suggest pulmonary embolus.  Lungs: There is mild non specific dependent change at the lung bases. Mild paraseptal emphysematous change is seen in the left lower lobe. No focal infiltrate or consolidation is identified.  Pleura: There is a small right sided pleural effusion. There is a small left sided pleural effusion.  Bony Structures:  Spine: Pronounced spondylolytic changes are seen in the thoracic spine. Dextroconvex scoliosis of the thoracic spine.  Ribs: There are chronic-appearing cortical deformities involving the posterior aspect of the right 10th and 11th ribs. These may reflect healed fractures.  Abdomen:  Abdominal Wall: No abdominal wall pathology is seen.  Liver: The liver appears unremarkable.  Biliary System: No intrahepatic biliary duct dilatation is seen. The extra hepatic biliary system appears prominent which may reflect prior obstructive physiology in this patient status post cholecystectomy.  Gallbladder: Surgical clips are seen in the gallbladder fossa consistent with prior cholecystectomy.  Pancreas: Severe pancreatic atrophy is seen.  Spleen: There is heterogeneity of splenic enhancement likely reflecting flow artifact. The spleen otherwise appears grossly unremarkable.  Kidneys: The kidneys appear unremarkable with no stones cysts masses or hydronephrosis with IV contrast  decreasing sensitivity and specificity for stones.  Aorta: There is pronounced calcification of the abdominal aorta and its branches.  IVC: An IVC filter is in place.  Bowel:  Esophagus: There appears to be mild thickening versus underdistention of the distal esophagus.  Stomach: The stomach appears unremarkable.  Duodenum: Unremarkable appearing duodenum.  Small Bowel: The small bowel appears unremarkable.  Colon: Diverticula are seen in the colon. No associated inflammatory stranding or pericolonic fluid is seen to suggest diverticulitis.  Appendix: The appendix is not identified but no inflammatory changes are seen in the right lower quadrant to suggest appendicitis.  Peritoneum: No intraperitoneal free air or ascites is seen.    Pelvis:  Bladder: The bladder appears unremarkable.  Female:  Uterus: The uterus is not identified.  Ovaries: No adnexal masses are seen.    Bony structures:  Dorsal Spine: There is pronounced multilevel spondylosis of the visualized dorsal spine. Levoscoliosis of the lumbar spine is noted.  Bony Pelvis: The visualized bony structures of the pelvis appear unremarkable.      Impression:  1. No focal infiltrate or consolidation is identified.  2. No filling defects are seen in the pulmonary arteries to suggest pulmonary embolus.  3. No CT evidence of pulmonary embolism or other acute intrathoracic pathology. No acute intraabdominal or pelvic solid organ or bowel pathology identified. Details and other findings as discussed above.                          Preliminary result by Interface, Rad Results In (04/16/23 20:05:48)                   Narrative:    START OF REPORT:  Technique: CT Scan of the chest abdomen and pelvis was performed with intravenous contrast with axial as well as sagittal and, coronal images.    Dosage Information: Automated Exposure Control was utilized.    Comparison: None.    Clinical History: Sepsis.    Findings:  Soft Tissues: A few calcifications are seen in the  right breast tissue.  Neck: The visualized soft tissues of the neck appear unremarkable. The thyroid gland appear unremarkable. Note of a tortuous proximal right common carotid artery.  Mediastinum: The mediastinal structures are within normal limits.  Heart: The heart size is within normal limits. Pronounced coronary artery calcification is seen.  Aorta: Pronounced aortic calcification is seen in the thoracic aorta.  Pulmonary Arteries: No filling defects are seen in the pulmonary arteries to suggest pulmonary embolus.  Lungs: There is mild non specific dependent change at the lung bases. Mild paraseptal emphysematous change is seen in the left lower lobe. No focal infiltrate or consolidation is identified.  Pleura: There is a small right sided pleural effusion. There is a small left sided pleural effusion.  Bony Structures:  Spine: Pronounced spondylolytic changes are seen in the thoracic spine. Dextroconvex scoliosis of the thoracic spine.  Ribs: There are chronic-appearing cortical deformities involving the posterior aspect of the right 10th and 11th ribs. These may reflect healed fractures.  Abdomen:  Abdominal Wall: No abdominal wall pathology is seen.  Liver: The liver appears unremarkable.  Biliary System: No intrahepatic biliary duct dilatation is seen. The extra hepatic biliary system appears prominent which may reflect prior obstructive physiology in this patient status post cholecystectomy.  Gallbladder: Surgical clips are seen in the gallbladder fossa consistent with prior cholecystectomy.  Pancreas: Severe pancreatic atrophy is seen.  Spleen: There is heterogeneity of splenic enhancement likely reflecting flow artifact. The spleen otherwise appears grossly unremarkable.  Kidneys: The kidneys appear unremarkable with no stones cysts masses or hydronephrosis with IV contrast decreasing sensitivity and specificity for stones.  Aorta: There is pronounced calcification of the abdominal aorta and its  branches.  IVC: An IVC filter is in place.  Bowel:  Esophagus: There appears to be mild thickening versus underdistention of the distal esophagus.  Stomach: The stomach appears unremarkable.  Duodenum: Unremarkable appearing duodenum.  Small Bowel: The small bowel appears unremarkable.  Colon: Diverticula are seen in the colon. No associated inflammatory stranding or pericolonic fluid is seen to suggest diverticulitis.  Appendix: The appendix is not identified but no inflammatory changes are seen in the right lower quadrant to suggest appendicitis.  Peritoneum: No intraperitoneal free air or ascites is seen.    Pelvis:  Bladder: The bladder appears unremarkable.  Female:  Uterus: The uterus is not identified.  Ovaries: No adnexal masses are seen.    Bony structures:  Dorsal Spine: There is pronounced multilevel spondylosis of the visualized dorsal spine. Levoscoliosis of the lumbar spine is noted.  Bony Pelvis: The visualized bony structures of the pelvis appear unremarkable.      Impression:  1. No focal infiltrate or consolidation is identified.  2. No filling defects are seen in the pulmonary arteries to suggest pulmonary embolus.  3. No CT evidence of pulmonary embolism or other acute intrathoracic pathology. No acute intraabdominal or pelvic solid organ or bowel pathology identified. Details and other findings as discussed above.                                         X-Ray Chest AP Portable (In process)                      Medications   piperacillin-tazobactam (ZOSYN) 4.5 g in dextrose 5 % in water (D5W) 5 % 100 mL IVPB (MB+) (0 g Intravenous Stopped 4/17/23 0032)   vancomycin - pharmacy to dose (has no administration in time range)   sodium chloride 0.9% flush 10 mL (has no administration in time range)   melatonin tablet 6 mg (has no administration in time range)   enoxaparin injection 40 mg (has no administration in time range)   lactated ringers bolus 1,000 mL (0 mLs Intravenous Stopped 4/16/23 2000)  "  lactated ringers bolus 500 mL (0 mLs Intravenous Stopped 4/16/23 2030)   vancomycin in dextrose 5 % 1 gram/250 mL IVPB 1,000 mg (0 mg Intravenous Stopped 4/16/23 2308)   iopamidoL (ISOVUE-370) injection 100 mL (100 mLs Intravenous Given 4/16/23 2006)   magnesium sulfate 2g in water 50mL IVPB (premix) (0 g Intravenous Stopped 4/17/23 0123)   lactated ringers bolus 500 mL (0 mLs Intravenous Stopped 4/17/23 0030)     Medical Decision Making:   Clinical Tests:   Sepsis Perfusion Assessment: "I attest a sepsis perfusion exam was performed within 6 hours of sepsis, severe sepsis, or septic shock presentation, following fluid resuscitation."          Attending Attestation:           Physician Attestation for Scribe:  Physician Attestation Statement for Scribe #1: I, Jose Miller MD, reviewed documentation, as scribed by Bryanna Khan in my presence, and it is both accurate and complete.       Medical Decision Making  [unfilled]    Differential diagnosis (includes but is not limited to):   Sepsis, infection, dehydration, kidney injury, cellulitis, abscess, wound care, cystitis, pyelonephritis, pneumonia, ACS, arrhythmia    MDM Narrative  89-year-old female presents for evaluation of fatigue and generalized weakness in association with dysuria and dark colored urine earlier this morning.  Patient was hypotensive on EMS arrival.  On ED arrival patient was again hypotensive.  Sepsis workup initiated including blood cultures and lactic acid.  30 cc/kilos IV fluid bolus given, will continue to monitor closely.  Broad-spectrum antibiotics ordered.  Labs pending.  EKG reviewed.  Chest x-ray pending.  CT scans pending to rule out acute pathology of the chest, abdomen, and pelvis.  X-rays of the left foot and leg are pending to the patient's chronic wounds.  Urinalysis pending.  Telemetry monitoring independently reviewed and shows a sinus rhythm with heart rate in the 110s.    Update:  Labs reviewed, significant leukocytosis " of 34,000 with a left shift.  Hypomagnesemia which will be replaced.  Mild ALISON.  Troponin mildly elevated, will consult cardiology.  BNP elevated.  CT scans reviewed with no infectious source identified.  X-rays reviewed.  Patient's pressures improved after IV fluids.  Patient will require admission for IV antibiotics and further wound care, evaluation, and management.  Patient's family agrees with plan for admission and all questions answered at bedside.    Dispo:  Admit    My independent radiology interpretation:  As above  Point of care US (independently performed and interpreted):   Decision rules/clinical scoring:     Amount and/or Complexity of Data Reviewed  Independent historian: daughter    Summary of history:  History corroborated with the patient's daughter, who states the patient was much more weak than normal earlier today and has been having very little to no oral intake over the past several days  External data reviewed: notes from previous admissions, notes from previous ED visits, notes from clinic visits, notes from outside facilities (through [facility]), and prescription medications   Summary of data reviewed:  Prior notes reviewed in electronic medical record   Risk and benefits of testing: discussed   Labs: ordered and reviewed  Radiology: ordered and independent interpretation performed (see above or ED course)  ECG/medicine tests: ordered and independent interpretation performed (see above or ED course)  Discussion of management or test interpretation with external provider(s): discussed with hospitalist physician   Summary of discussion:  As above    Risk  Parenteral controlled substances   Drug therapy requiring intense monitoring for toxicity   Decision regarding hospitalization  Shared decision making     Critical Care  30-74 minutes     Data Reviewed/Counseling: I have personally reviewed the patient's vital signs, nursing notes, and other relevant tests, information, and imaging. I  had a detailed discussion regarding the historical points, exam findings, and any diagnostic results supporting the discharge diagnosis. I personally performed the history, PE, MDM and procedures as documented above and agree with the scribe's documentation.    Portions of this note were dictated using voice recognition software. Although it was reviewed for accuracy, some inherent voice recognition errors may have occurred and may be present in this document.       Amount and/or Complexity of Data Reviewed  Independent Historian:      Details: Pt's daughter at bedside reports that pt has been generally weak, fatigued, and hypotensive since this morning. She notes that the pt has been eating normally but has not been drinking much. She says she has been complaining of back pain, but this is chronic for her. She denies fever and chills.  External Data Reviewed: notes.  Labs: ordered.  Radiology: ordered and independent interpretation performed.  ECG/medicine tests: ordered and independent interpretation performed.            ED Course as of 04/17/23 0159   Sun Apr 16, 2023   2340 EKG 12-lead  EKG independently interpreted by me.  EKG: ST @ 105, no STEMI, QTc 525, RBBB []   2340 X-Ray Chest AP Portable  Independently visualized/reviewed by me during the ED visit.  - No lobar consolidation, no PTX, scoliosis []   2341 X-Ray Foot Complete Left  Independently visualized/reviewed by me during the ED visit.  - No acute fracture or dislocation []   2341 X-Ray Tibia Fibula 2 View Left  Independently visualized/reviewed by me during the ED visit.  - No acute fracture or dislocation []      ED Course User Index  [MC] Jose Miller MD                 Clinical Impression:   Final diagnoses:  [R53.83] Fatigue  [R52] Pain  [A41.9] Sepsis        ED Disposition Condition    Admit Stable                Jose Miller MD  04/17/23 0159

## 2023-04-17 NOTE — PROGRESS NOTES
Pharmacokinetic Initial Assessment: IV Vancomycin    Assessment/Plan:    Initiate intravenous vancomycin with loading dose of 1000 mg once with subsequent doses when random concentrations are less than 20 mcg/mL  Desired empiric serum trough concentration is 15 to 20 mcg/mL  Draw vancomycin random level on 04/17 at 1900.  Pharmacy will continue to follow and monitor vancomycin.      Please contact pharmacy at extension [phone] with any questions regarding this assessment.     Thank you for the consult,   Lenny Neville       Patient brief summary:  Melodie Villarreal is a 89 y.o. female initiated on antimicrobial therapy with IV Vancomycin for treatment of suspected sepsis    Drug Allergies:   Review of patient's allergies indicates:   Allergen Reactions    Ace inhibitors Other (See Comments)    Adhesive      Allergic to tape    Bactrim [sulfamethoxazole-trimethoprim] Other (See Comments)     Confusion, Hypoglycemia    Meperidine Other (See Comments)    Midazolam Other (See Comments)    Atorvastatin Nausea Only    Codeine Other (See Comments) and Anxiety    Tapentadol Rash       Actual Body Weight:   49.9 kg    Renal Function:   Estimated Creatinine Clearance: 29.8 mL/min (based on SCr of 0.97 mg/dL).,     Dialysis Method (if applicable):  N/A    CBC (last 72 hours):  Recent Labs   Lab Result Units 04/16/23  1855   WBC x10(3)/mcL 34.3*   Hgb g/dL 10.3*   Hct % 32.1*   Platelet x10(3)/mcL 327   Monocyte Man % 4       Metabolic Panel (last 72 hours):  Recent Labs   Lab Result Units 04/16/23  1855 04/16/23  2125   Sodium Level mmol/L 131*  --    Potassium Level mmol/L 3.7  --    Chloride mmol/L 96*  --    Carbon Dioxide mmol/L 23  --    Glucose Level mg/dL 157*  --    Glucose, UA mg/dL  --  1+*   Blood Urea Nitrogen mg/dL 22.6*  --    Creatinine mg/dL 0.97  --    Albumin Level g/dL 2.5*  --    Bilirubin Total mg/dL 0.5  --    Alkaline Phosphatase unit/L 53  --    Aspartate Aminotransferase unit/L 14  --    Alanine  Aminotransferase unit/L 14  --    Magnesium Level mg/dL 1.40*  --        Drug levels (last 3 results):  No results for input(s): VANCOMYCINRA, VANCORANDOM, VANCOMYCINPE, VANCOPEAK, VANCOMYCINTR, VANCOTROUGH in the last 72 hours.    Microbiologic Results:  Microbiology Results (last 7 days)       Procedure Component Value Units Date/Time    Blood culture x two cultures. Draw prior to antibiotics. [[id number]] Collected: 04/16/23 1855    Order Status: Resulted Specimen: Blood Updated: 04/16/23 1916    Blood culture x two cultures. Draw prior to antibiotics. [[id number]] Collected: 04/16/23 1855    Order Status: Resulted Specimen: Blood Updated: 04/16/23 1916

## 2023-04-17 NOTE — H&P
[facility] Hospital Medicine History & Physical Examination       Patient Name: Melodie Villarreal  MRN: [id number]  Patient Class: IP- Inpatient   Admission Date: 4/16/2023  5:52 PM  Length of Stay: 1  Admitting Service: Hospital Medicine   Attending Physician: Willie Pereira MD   Primary Care Provider: Wisam Espinosa Jr, MD  History source: EMR, patient and/or patient's family    CHIEF COMPLAINT   Fatigue (Pt to ED with c/o generalized weakness, dysuria, and dark colored urine since this morning. Hypotensive 66/36 initially on EMS arrival)    HISTORY OF PRESENT ILLNESS:   Patient is a 89-year-old female with a past medical history of essential hypertension, type 2 diabetes mellitus, prior DVT/IVC filter no longer on anticoagulation and additional past medical history as below who presents to the ER after she became lethargic on the day of presentation.  Daughter is at bedside and provides most of the history and states patient is normally alert and oriented, active and able to carry out own ADLs.  She states patient has chronic open wounds on her left lower extremity better followed closely by Wound Care.  Today she became drowsy/lethargic, and she activated EMS who found she was hypotensive with a blood pressure of 66/36 on arrival.      She arrived to the ER tachycardic and hypotensive, maintaining adequate sats on 3 L nasal cannula.  Laboratory work showed leukocytosis of 34 K, mild anemia, a lactic acid of 3.5, elevated BNP of 2000 and a troponin of 0.115.  Urinalysis was unremarkable, CT chest abdomen and pelvis was negative per of hormone layering embolus, pulmonary infiltrates or any intra-abdominal or pelvic pathology.  CT head was normal.  She was started on broad-spectrum antibiotics for undifferentiated sepsis admitted to the hospitalist service for further management.    PAST MEDICAL HISTORY:     Past Medical History:   Diagnosis Date    Adenocarcinoma of uterus      Bladder cancer     Deep vein thrombosis     Essential (primary) hypertension     Heart murmur     High cholesterol     Hypertriglyceridemia     Insomnia     Osteopenia     Other pulmonary embolism without acute cor pulmonale     Personal history of colonic polyps     Rheumatoid arthritis, unspecified     Type 2 diabetes mellitus without complications        PAST SURGICAL HISTORY:     Past Surgical History:   Procedure Laterality Date    CHOLECYSTECTOMY      colonscopy  06/20/2012    ECCE  01/09/2013    estracapsular cataract removal   02/20/2013    insertion of intrpocular lens prosthesis   02/20/2013    phacoemulsifiaction and aspiration of cataract  02/20/2013    phacoemulsificaion and aspiration of cataract      total abdominal hysterectomy and bilateral salpingooophorectomy  1991       ALLERGIES:   Ace inhibitors, Adhesive, Bactrim [sulfamethoxazole-trimethoprim], Meperidine, Midazolam, Atorvastatin, Codeine, and Tapentadol    FAMILY HISTORY:   Reviewed and non-contributory     SOCIAL HISTORY:     Social History     Tobacco Use    Smoking status: Never    Smokeless tobacco: Never   Substance Use Topics    Alcohol use: Never        HOME MEDICATIONS:     Prior to Admission medications    Medication Sig Start Date End Date Taking? Authorizing Provider   calcium-vitamin D3 (OS-SUSAN 500 + D3) 500 mg-5 mcg (200 unit) per tablet Take 1 tablet by mouth once daily.   Yes Historical Provider   ferrous gluconate (FERGON) 324 MG tablet Take 324 mg by mouth daily with breakfast.   Yes Historical Provider   glimepiride (AMARYL) 4 MG tablet 1 tablet in the AM and 1/2 tablet in the PM  Patient taking differently: Take 2 mg by mouth as needed. 1 tablet in the AM and 1/2 tablet in the PM 12/5/22  Yes Wisam Espinosa Jr., MD   lancets Misc Easy Touch Twist 30G Lancets to use with insurance preferred meter Diagnosis Code: E11.9.  Test once daily. 1/17/23  Yes Wisam Espinosa Jr., MD   losartan-hydrochlorothiazide 100-12.5 mg  (HYZAAR) 100-12.5 mg Tab Take 1 tablet by mouth once daily. 5/3/22 4/17/23 Yes Wisam Espinosa Jr., MD   metFORMIN (GLUCOPHAGE) 1000 MG tablet Take 1 tablet (1,000 mg total) by mouth 2 (two) times daily with meals. 5/3/22  Yes Wisam Espinosa Jr., MD   multivitamin capsule Take 1 capsule by mouth once daily.   Yes Historical Provider   pravastatin (PRAVACHOL) 40 MG tablet Take 1 tablet (40 mg total) by mouth once daily. 11/22/22 4/17/23 Yes Wisam Espinosa Jr., MD   traMADoL (ULTRAM) 50 mg tablet Take 1 tablet (50 mg total) by mouth every 6 (six) hours as needed for Pain. 3/20/23  Yes Wisam Espinosa Jr., MD   traZODone (DESYREL) 100 MG tablet Take 1 tablet (100 mg total) by mouth nightly as needed for Insomnia. 5/3/22 4/17/23 Yes Wisam Espinosa Jr., MD   blood sugar diagnostic (EASY TOUCH TEST STRIP) Strp 1 each by Misc.(Non-Drug; Combo Route) route once daily. Use to test blood glucose once daily 11/11/22   Wisam Espinosa Jr., MD   blood-glucose meter Misc Easy Touch use as directed 11/11/22   Wisam Espinosa Jr., MD   mupirocin (BACTROBAN) 2 % ointment Apply topically 3 (three) times daily. 11/28/22   Wisam Espinosa Jr., MD   SANTYL ointment APPLY TO LEFT FOOT WOUND DAILY 4/3/23   Historical Provider       REVIEW OF SYSTEMS:   Except as documented, all other systems reviewed and negative     PHYSICAL EXAM:   T 99.5 °F (37.5 °C)   /72   P 90   RR 18   O2 98 %  GENERAL:  Drowsy/lethargic  HEENT: normocephalic atraumatic   NECK: supple   LUNGS: Clear bilaterally, no wheezing or rales, no accessory muscle use   CVS: Regular rate and rhythm, normal peripheral perfusion  ABD: Soft, non-tender, non-distended, bowel sounds present  EXTREMITIES: no clubbing or cyanosis  SKIN: Warm, dry.   NEURO:  Drowsy, awakens to sternal rub briefly  PSYCHIATRIC:  Uncooperative    LABS AND IMAGING:     Recent Labs     04/16/23  1855   WBC 34.3*   RBC 3.75*   HGB 10.3*   HCT 32.1*   MCV 85.6   MCH 27.5   MCHC 32.1*   RDW 14.5         Recent Labs     04/16/23  1855 04/16/23  2211 04/17/23  0031   LACTIC 3.8* 3.5* 2.9*     Recent Labs     04/16/23  1855   INR 1.29     No results for input(s): HGBA1C, CHOL, TRIG, LDL, VLDL, HDL in the last 72 hours.   Recent Labs     04/16/23  1855   *   K 3.7   CHLORIDE 96*   CO2 23   BUN 22.6*   CREATININE 0.97   GLUCOSE 157*   CALCIUM 9.7   MG 1.40*   ALBUMIN 2.5*   GLOBULIN 3.4   ALKPHOS 53   ALT 14   AST 14   BILITOT 0.5   LIPASE 4     Recent Labs     04/16/23  1855   BNP 2,571.8*   TROPONINI 0.115*          CT Chest Abdomen Pelvis With Contrast (xpd)  START OF REPORT:  Technique: CT Scan of the chest abdomen and pelvis was performed with intravenous contrast with axial as well as sagittal and, coronal images.    Dosage Information: Automated Exposure Control was utilized.    Comparison: None.    Clinical History: Sepsis.    Findings:  Soft Tissues: A few calcifications are seen in the right breast tissue.  Neck: The visualized soft tissues of the neck appear unremarkable. The thyroid gland appear unremarkable. Note of a tortuous proximal right common carotid artery.  Mediastinum: The mediastinal structures are within normal limits.  Heart: The heart size is within normal limits. Pronounced coronary artery calcification is seen.  Aorta: Pronounced aortic calcification is seen in the thoracic aorta.  Pulmonary Arteries: No filling defects are seen in the pulmonary arteries to suggest pulmonary embolus.  Lungs: There is mild non specific dependent change at the lung bases. Mild paraseptal emphysematous change is seen in the left lower lobe. No focal infiltrate or consolidation is identified.  Pleura: There is a small right sided pleural effusion. There is a small left sided pleural effusion.  Bony Structures:  Spine: Pronounced spondylolytic changes are seen in the thoracic spine. Dextroconvex scoliosis of the thoracic spine.  Ribs: There are chronic-appearing cortical deformities involving  the posterior aspect of the right 10th and 11th ribs. These may reflect healed fractures.  Abdomen:  Abdominal Wall: No abdominal wall pathology is seen.  Liver: The liver appears unremarkable.  Biliary System: No intrahepatic biliary duct dilatation is seen. The extra hepatic biliary system appears prominent which may reflect prior obstructive physiology in this patient status post cholecystectomy.  Gallbladder: Surgical clips are seen in the gallbladder fossa consistent with prior cholecystectomy.  Pancreas: Severe pancreatic atrophy is seen.  Spleen: There is heterogeneity of splenic enhancement likely reflecting flow artifact. The spleen otherwise appears grossly unremarkable.  Kidneys: The kidneys appear unremarkable with no stones cysts masses or hydronephrosis with IV contrast decreasing sensitivity and specificity for stones.  Aorta: There is pronounced calcification of the abdominal aorta and its branches.  IVC: An IVC filter is in place.  Bowel:  Esophagus: There appears to be mild thickening versus underdistention of the distal esophagus.  Stomach: The stomach appears unremarkable.  Duodenum: Unremarkable appearing duodenum.  Small Bowel: The small bowel appears unremarkable.  Colon: Diverticula are seen in the colon. No associated inflammatory stranding or pericolonic fluid is seen to suggest diverticulitis.  Appendix: The appendix is not identified but no inflammatory changes are seen in the right lower quadrant to suggest appendicitis.  Peritoneum: No intraperitoneal free air or ascites is seen.    Pelvis:  Bladder: The bladder appears unremarkable.  Female:  Uterus: The uterus is not identified.  Ovaries: No adnexal masses are seen.    Bony structures:  Dorsal Spine: There is pronounced multilevel spondylosis of the visualized dorsal spine. Levoscoliosis of the lumbar spine is noted.  Bony Pelvis: The visualized bony structures of the pelvis appear unremarkable.    Impression:  1. No focal infiltrate  or consolidation is identified.  2. No filling defects are seen in the pulmonary arteries to suggest pulmonary embolus.  3. No CT evidence of pulmonary embolism or other acute intrathoracic pathology. No acute intraabdominal or pelvic solid organ or bowel pathology identified. Details and other findings as discussed above.  CT Head Without Contrast  START OF REPORT:  Technique: CT of the head was performed without intravenous contrast with axial as well as coronal and sagittal images.    Comparison: None.    Dosage Information: Automated exposure control was utilized.    Clinical history: Ams.    Findings:  Hemorrhage: No acute intracranial hemorrhage is seen.  CSF spaces: The ventricles, sulci and basal cisterns all appear mildly prominent consistent with global cerebral atrophy.  Brain parenchyma: There is preservation of the grey white junction throughout. Moderate microvascular change is seen in portions of the periventricular and deep white matter tracts.  Cerebellum: Unremarkable.  Vascular: Mild atheromatous calcification of the intracranial arteries is seen.  Sella and skull base: The sella appears to be within normal limits for age.  Cerebellopontine angles: Within normal limits.  Herniation: None.  Intracranial calcifications: Incidental note is made of bilateral choroid plexus calcification. Incidental note is made of some pineal region calcification. Incidental note is made of some calcification of the falx.  Calvarium: No acute linear or depressed skull fracture is seen.    Maxillofacial Structures:  Paranasal sinuses: The visualized paranasal sinuses appear clear with no mucoperiosteal thickening or air fluid levels identified.  Orbits: The orbits appear unremarkable.  Zygomatic arches: The zygomatic arches are intact and unremarkable.  Temporal bones and mastoids: The temporal bones and mastoids appear unremarkable.  TMJ: The mandibular condyles appear normally placed with respect to the mandibular  fossa.    Impression:  1. No acute intracranial process identified. Details and other findings as noted above.      ASSESSMENT & PLAN:   Sepsis, unclear etiology  Chronic left ankle/foot ulcerations, with no overt signs of infection  NSTEMI, likely type 2 demand ischemia   Elevated BNP with no reported history of CHF  Essential hypertension   Type 2 diabetes mellitus   Remote DVT/IVC filter placement, off anticoagulation    - even though her chronic wounds appear uninfected they may have been a source for bacteremia, otherwise no clear source of sepsis but she is responding very well to fluids and antibiotics  - continue close hemodynamic monitoring   - echo, telemetry monitoring, trend cardiac enzymes  - resume home medications as appropriate    DVT prophylaxis: lovenox  Code status:  DNI (confirmed with daughter at bedside)     If patient was admitted under observational status it is with my approval/permission.     At least 55 min was spent on this history and physical.  Time seen: 12:01 AM 4/17/23  Critical care time = 35 min; Critical care diagnosis = sepsis   Willie Pereira MD

## 2023-04-17 NOTE — CONSULTS
[facility]                       [address] [id number]    PATIENT NAME:       SANAM VILLARREAL  YOB: 1931  CSN:                [id number]   MRN:                [id number]  ADMIT DATE:         04/16/2023 17:52:00  PHYSICIAN:          Cleveland Vergara DPM                            CONSULTATION    DATE OF CONSULT:  04/17/2023 00:00:00    HISTORY OF PRESENT ILLNESS:  Ms. Villarreal is a 89-year-old  female who is   being seen in the emergency room for evaluation of poorly-healing wounds on the   left lower extremity.  She presented to the emergency room because of worsening   severe lower back pain which has been ongoing for a minimum of a week or more.    She saw her primary care physician, got a steroid injection which did nothing   for it.  As for the wounds, these have been an ongoing issue.  She has been   going to the [facility].  Recently had a debridement.  There was   a tentative plan to send her to see the vascular surgeon, Dr. Alcantara, and   unfortunately she was unable to attend that appointment due to her current back   issues.  She lives with her daughter who is present today.  The daughter states   that her mom was walking last week.  She has had some noted confusion and   lethargy.  She was found to have positive blood cultures.  She is currently on   vancomycin and Zosyn.  There were tentative plans for attempting MRI of the foot   and ankle area.  Unfortunately, they would not do that due to what appears to   be a previous IVC filter placed about 20 years ago.  There was also some   discussion about MRI of her spine to evaluate for possible infectious source.    They have also tried to schedule a STARR.  ID has seen the patient and has asked   to evaluate them.  No other issues.    PAST MEDICAL HISTORY:  Extensive and notable for previous DVT with PE,   adenocarcinoma of the  uterus, bladder cancer, hypercholesterolemia, heart   murmurs, insomnia, osteopenia, remote history of rheumatoid diabetes.    PAST SURGICAL HISTORY:  She has had hysterectomy, cataracts, lens implantation,   colonoscopies, cholecystectomy.    ALLERGIES:  TO ACE INHIBITORS, ADHESIVE, BACTRIM, DEMEROL, XANAX, ATORVASTATIN,   CODEINE.     SOCIAL HISTORY:  Denies tobacco, alcohol, or IV drug abuse.    FAMILY HISTORY:  Noncontributory.    PHYSICAL EXAMINATION:  GENERAL:  Reveals a thin, elderly female, currently lying in bed, does not   appear to be in any distress.  She is able to communicate, answers some simple   questions, but definitely appears to be little confused.  HEART:  Deferred.  LUNGS:  Deferred.  EXTREMITY EXAMINATION:  Vascular wise, I am unable to palpate any pedal pulses   to the extremities.  There is no cyanosis or rubor.  Elevation dependent tests   were deferred.  NEUROLOGICAL:  She claims to be able to perceive touch.  MUSCULOSKELETAL:  There are no severe deformities or contractures.  She is able   to move the feet.  DERM:  She has a small ecchymotic area over the medial malleolus of left ankle.    Laterally, there is a subcentimeter fibrotic wound in this particular site   along with the wound to the posterior aspect of the ankle extending down to the   Achilles area.  Surprisingly, other than fibrosis there is no pricila necrosis to   the area or significant signs of infectious process.  No fluctuance or   crepitation to the surrounding tissues.  Remainder of the integument is intact   on the legs and the right lower extremity.    LABORATORY DATA:  The labs were reviewed.  Notable leukocytosis which was up to   34 on admission, down to 28.7 today, H and H remained stable.    BUN and creatinine 22.6 and 0.87 respectively.  BNP was elevated at 2500.  UA   was negative.  Blood cultures positive for suspected Staph.    X-rays of the left foot reveals what appears to be fractures of the 4th and 5th    digits.  There is no destructive changes noted to the remainder of the foot.    There is no direct imaging of the ankle, which is where the majority of the   wounds are.  Echo is pending.    ASSESSMENT:    1. Ischemic-appearing wound, left lower extremity with some questionable   infectious process.  2. Suspected severe peripheral artery disease.  3. Acute back pain.    PLAN:  The patient and mother and daughter instructed as to the findings of the   physical exam.  Again, the wounds do not look grossly infected, other than for   some fibrotic tissues laterally, the Achilles area actively is overall viable,   and according to the daughter, looks markedly better from before.  She   apparently had some arterial ultrasounds done recently, and was scheduled to see   Vascular Surgery.  I do not have those results.  I will see if I can get in   touch with Wound Care to see if I can obtain those results and any recent   cultures that may have been done.  Unfortunately, she is not able to have an MRI   of abscess.  I do not think the main source is osteomyelitis, possibly the   lateral malleolus.  I will be more inclined to do a three-phase bone scan rather   than a CT.  I also find unlikely that the clinical appearance of this foot is   contributing to a white cell count of greater than 30,000.  We will continue to   follow up here.  We will see if we get worrisome cultures on that wound on the   left side.    Thank you for this consultation.        ______________________________  JOSUE Steele/AQS  DD:  04/17/2023  Time:  04:48PM  DT:  04/17/2023  Time:  06:32PM  Job #:  [id number]      CONSULTATION

## 2023-04-17 NOTE — PROGRESS NOTES
89-year-old female presenting with weakness and fatigue, found to have 2/2 MRSA bacteremia.  Has a left ankle wound, following with [facility] at [facility], concerning for source.  ID consulted for  Gram-positive bacteremia.     Patient seen and examined at bedside, son at bedside   Patient has no complaints she feels improved since admit   Blood pressure on the lower side currently on IV fluids slowly given extreme of age   White cell count is improving 28.7 currently on antibiotics   Blood cultures growing 2/2 MRSA.  Currently on IV vancomycin  Follow-up ID recs, follow-up echo results, follow-up with podiatry input   Electrolytes are low will replete with IV magnesium     DVT prophylaxis subcutaneous Lovenox

## 2023-04-18 NOTE — PROGRESS NOTES
[facility] 6th Floor Medical Telemetry  Wound Care    Patient Name:  Melodie Villarreal   MRN:  [id number]  Date: 4/18/2023  Diagnosis: Sepsis    History:     Past Medical History:   Diagnosis Date    Adenocarcinoma of uterus     Bladder cancer     Deep vein thrombosis     Essential (primary) hypertension     Heart murmur     High cholesterol     Hypertriglyceridemia     Insomnia     Osteopenia     Other pulmonary embolism without acute cor pulmonale     Personal history of colonic polyps     Rheumatoid arthritis, unspecified     Type 2 diabetes mellitus without complications        Social History     Socioeconomic History    Marital status:     Number of children: 2   Occupational History    Occupation: retired   Tobacco Use    Smoking status: Never    Smokeless tobacco: Never   Substance and Sexual Activity    Alcohol use: Never    Drug use: Never    Sexual activity: Not Currently       Precautions:     Allergies as of 04/16/2023 - Reviewed 04/16/2023   Allergen Reaction Noted    Ace inhibitors Other (See Comments) 05/03/2022    Adhesive  10/02/2017    Bactrim [sulfamethoxazole-trimethoprim] Other (See Comments) 01/17/2023    Meperidine Other (See Comments) 05/03/2022    Midazolam Other (See Comments) 05/03/2022    Atorvastatin Nausea Only 05/03/2022    Codeine Other (See Comments) and Anxiety 10/02/2017    Tapentadol Rash 05/03/2022       WOC Assessment Details/Treatment   WOCN follow up visit related to consult 04/17/23 for L foot; patient currently on BALJINDER mattress and does like it, do not see PUP pack.  Ordered from [employer].  Contact Precautions maintained, will continue to follow.  Recommendations:   Head to toe skin assessment q 12 hours;  Turn/reposition q 2 hours or schedule to prevent erythema;  Specialty bed, and wedge for 30 degree pelvic tilt;  Keep < 3 layers on bed;   Avoid foam dressings on sacrum;  Avoid adult briefs;  Encourage activity as ordered;  Ensure adequate  nutrition/hydration for healing;     04/18/23 1030   Skin Interventions   Pressure Reduction Devices specialty bed utilized

## 2023-04-18 NOTE — PROGRESS NOTES
Pharmacokinetic Assessment Follow Up: IV Vancomycin    Vancomycin serum concentration assessment(s):    The random level was drawn correctly and can be used to guide therapy at this time. The measurement is below the desired definitive target range of 15 to 20 mcg/mL.    Vancomycin Regimen Plan:  Will give a 1x dose of 750mg tonight and draw level in AM with AM labs  Re-dose when the random level is less than 20 mcg/mL, next level to be drawn at 0430 on 04/18    Scheduled Administration Times    04/17:   2200    Drug levels (last 3 results):  Recent Labs   Lab Result Units 04/17/23  1959   Vanc Lvl Random ug/ml 6.1*       Vancomycin Administrations:  vancomycin given in the last 96 hours                     vancomycin in dextrose 5 % 1 gram/250 mL IVPB 1,000 mg (mg) 1,000 mg New Bag 04/16/23 2138                    Pharmacy will continue to follow and monitor vancomycin.    Please contact pharmacy at extension [phone] for questions regarding this assessment.    Thank you for the consult,   Abundio Martinez       Patient brief summary:  Melodie Villarreal is a 89 y.o. female initiated on antimicrobial therapy with IV Vancomycin for treatment of sepsis    The patient's current regimen is pulse dosing    Drug Allergies:   Review of patient's allergies indicates:   Allergen Reactions    Ace inhibitors Other (See Comments)    Adhesive      Allergic to tape    Bactrim [sulfamethoxazole-trimethoprim] Other (See Comments)     Confusion, Hypoglycemia    Meperidine Other (See Comments)    Midazolam Other (See Comments)    Atorvastatin Nausea Only    Codeine Other (See Comments) and Anxiety    Tapentadol Rash       Actual Body Weight:   49.9 kg    Renal Function:   Estimated Creatinine Clearance: 33.2 mL/min (based on SCr of 0.87 mg/dL).,     Dialysis Method (if applicable):  N/A    CBC (last 72 hours):  Recent Labs   Lab Result Units 04/16/23  1855 04/17/23  0358   WBC x10(3)/mcL 34.3* 28.7*   Hgb g/dL 10.3* 10.1*   Hct % 32.1* 32.1*    Platelet x10(3)/mcL 327 283   Mono % %  --  7.8   Monocyte Man % 4  --    Eos % %  --  0.0   Basophil % %  --  0.2       Metabolic Panel (last 72 hours):  Recent Labs   Lab Result Units 04/16/23 1855 04/16/23 2125 04/17/23  0358   Sodium Level mmol/L 131*  --  129*   Potassium Level mmol/L 3.7  --  3.8   Chloride mmol/L 96*  --  95*   Carbon Dioxide mmol/L 23  --  27   Glucose Level mg/dL 157*  --  162*   Glucose, UA mg/dL  --  1+*  --    Blood Urea Nitrogen mg/dL 22.6*  --  22.6*   Creatinine mg/dL 0.97  --  0.87   Albumin Level g/dL 2.5*  --  2.2*   Bilirubin Total mg/dL 0.5  --  0.4   Alkaline Phosphatase unit/L 53  --  73   Aspartate Aminotransferase unit/L 14  --  17   Alanine Aminotransferase unit/L 14  --  18   Magnesium Level mg/dL 1.40*  --  2.10       Microbiologic Results:  Microbiology Results (last 7 days)       Procedure Component Value Units Date/Time    Wound Culture [[id number]]     Order Status: Sent Specimen: Wound from Ankle, Left     Anaerobic Culture [[id number]]     Order Status: Sent Specimen: Drainage from Ankle, Left     BCID2 Panel [[id number]]  (Abnormal) Collected: 04/16/23 1855    Order Status: Completed Specimen: Blood Updated: 04/17/23 0915     CTX-M (ESBL ) N/A     Comment: Note: Antimicrobial resistance can occur via multiple mechanisms. A Not Detected result for antimicrobial resistance gene(s) does not indicate antimicrobial susceptibility. Subculturing is required for species identification and susceptibility testing of   isolates.        IMP (Cabapenemase ) N/A     Comment: Note: Antimicrobial resistance can occur via multiple mechanisms. A Not Detected result for antimicrobial resistance gene(s) does not indicate antimicrobial susceptibility. Subculturing is required for species identification and susceptibility testing of   isolates.        KPC resistance gene (Carbapenemase ) N/A     Comment: Note: Antimicrobial resistance can occur via multiple  mechanisms. A Not Detected result for antimicrobial resistance gene(s) does not indicate antimicrobial susceptibility. Subculturing is required for species identification and susceptibility testing of   isolates.        mcr-1 N/A     Comment: Note: Antimicrobial resistance can occur via multiple mechanisms. A Not Detected result for antimicrobial resistance gene(s) does not indicate antimicrobial susceptibility. Subculturing is required for species identification and susceptibility testing of   isolates.        mecA ID N/A     Comment: Note: Antimicrobial resistance can occur via multiple mechanisms. A Not Detected result for antimicrobial resistance gene(s) does not indicate antimicrobial susceptibility. Subculturing is required for species identification and susceptibility testing of   isolates.        mecA/C and MREJ (MRSA) gene Detected     Comment: Note: Antimicrobial resistance can occur via multiple mechanisms. A Not Detected result for antimicrobial resistance gene(s) does not indicate antimicrobial susceptibility. Subculturing is required for species identification and susceptibility testing of   isolates.        NDM (Carbapenemase ) N/A     Comment: Note: Antimicrobial resistance can occur via multiple mechanisms. A Not Detected result for antimicrobial resistance gene(s) does not indicate antimicrobial susceptibility. Subculturing is required for species identification and susceptibility testing of   isolates.        OXA-48-like (Carbapenemase ) N/A     Comment: Note: Antimicrobial resistance can occur via multiple mechanisms. A Not Detected result for antimicrobial resistance gene(s) does not indicate antimicrobial susceptibility. Subculturing is required for species identification and susceptibility testing of   isolates.        Pedro/B (VRE gene) N/A     Comment: Note: Antimicrobial resistance can occur via multiple mechanisms. A Not Detected result for antimicrobial resistance gene(s)  does not indicate antimicrobial susceptibility. Subculturing is required for species identification and susceptibility testing of   isolates.        VIM (Carbapenemase ) N/A     Comment: Note: Antimicrobial resistance can occur via multiple mechanisms. A Not Detected result for antimicrobial resistance gene(s) does not indicate antimicrobial susceptibility. Subculturing is required for species identification and susceptibility testing of   isolates.        Enterococcus faecalis Not Detected     Enterococcus faecium Not Detected     Listeria monocytogenes Not Detected     Staphylococcus spp. Detected     Staphylococcus aureus Detected     Staphylococcus epidermidis Not Detected     Staphylococcus lugdunensis Not Detected     Streptococcus spp. Not Detected     Streptococcus agalactiae (Group B) Not Detected     Streptococcus pneumoniae Not Detected     Streptococcus pyogenes (Group A) Not Detected     Acinetobacter calcoaceticus/baumannii complex Not Detected     Bacteroides fragilis Not Detected     Enterobacterales Not Detected     Enterobacter cloacae complex Not Detected     Escherichia coli Not Detected     Klebsiella aerogenes Not Detected     Klebsiella oxytoca Not Detected     Klebsiella pneumoniae group Not Detected     Proteus spp. Not Detected     Salmonella spp. Not Detected     Serratia marcescens Not Detected     Haemophilus influenzae Not Detected     Neisseria meningitidis Not Detected     Pseudomonas aeruginosa Not Detected     Stenotrophomonas maltophilia Not Detected     Candida albicans Not Detected     Candida auris Not Detected     Candida glabrata Not Detected     Candida krusei Not Detected     Candida parapsilosis Not Detected     Candida tropicalis Not Detected     Cryptococcus neoformans/gattii Not Detected    Narrative:      The [employer] BCID2 Panel is a multiplexed nucleic acid test intended for the use with [employer] 2.0 or [employer]® Film[employer]® [employer] Systems for the  simultaneous qualitative detection and identification of multiple bacterial and yeast nucleic acids and select genetic determinants associated with antimicrobial resistance.  The VeliQ BCID2 Panel test is performed directly on blood culture samples identified as positive by a continuous monitoring blood culture system.  Results are intended to be interpreted in conjunction with Gram stain results.    Blood culture x two cultures. Draw prior to antibiotics. [[id number]]  (Abnormal) Collected: 04/16/23 1855    Order Status: Completed Specimen: Blood Updated: 04/17/23 0826     GRAM STAIN Gram Positive Cocci, probable Staphylococcus      Seen in gram stain of broth only      1 of 1 Aerobic bottle positive    Blood culture x two cultures. Draw prior to antibiotics. [[id number]]  (Abnormal) Collected: 04/16/23 1855    Order Status: Completed Specimen: Blood Updated: 04/17/23 0826     GRAM STAIN Gram Positive Cocci, probable Staphylococcus      Seen in gram stain of broth only      1 of 1 Aerobic bottle positive

## 2023-04-18 NOTE — PROGRESS NOTES
[facility]  Hospital Medicine Progress Note        Chief Complaint: weak    HPI:   89-year-old female with a past medical history of essential hypertension, type 2 diabetes mellitus, prior DVT/IVC filter no longer on anticoagulation .    presents to the ER after she became lethargic on the day of presentation.  Daughter is at bedside and provides most of the history and states patient is normally alert and oriented, active and able to carry out own ADLs.  She states patient has chronic open wounds on her left lower extremity better followed closely by Wound Care.  Today she became drowsy/lethargic, and she activated EMS who found she was hypotensive with a blood pressure of 66/36 on arrival.       She arrived to the ER tachycardic and hypotensive, maintaining adequate sats on 3 L nasal cannula.  Laboratory work showed leukocytosis of 34 K, mild anemia, a lactic acid of 3.5, elevated BNP of 2000 and a troponin of 0.115.  Urinalysis was unremarkable, CT chest abdomen and pelvis was negative per of hormone layering embolus, pulmonary infiltrates or any intra-abdominal or pelvic pathology.  CT head was normal.  She was started on broad-spectrum antibiotics for undifferentiated sepsis admitted to the hospitalist service for further management.    Interval Hx:   No family present at bedside.    No overnight events reported.  No new complaints.    Patient does complain of foot pain, nurse notified to give p.r.n. medication.  Infectious disease physician has been consulted.    Objective/physical exam:  Poor historian, alert and awake comprehension questionable, insight poor, oriented x1.  General: Appears comfortable, no acute distress.    Left foot with dressing in place clean dry intact, minimal swelling, trace edema bilaterally.    Integumentary: Warm, dry, intact.  Musculoskeletal: Purposeful movement noted.   Respiratory: No accessory muscle use. Breath sounds are equal.  Cardiovascular:  Regular rate    VITAL SIGNS: 24 HRS MIN & MAX LAST   Temp  Min: 97.4 °F (36.3 °C)  Max: 99.4 °F (37.4 °C) 98.3 °F (36.8 °C)   BP  Min: 105/62  Max: 158/79 122/69   Pulse  Min: 96  Max: 114  (!) 114     Resp  Min: 14  Max: 18 16   SpO2  Min: 92 %  Max: 100 % (!) 93 %       Echo  · The left ventricle is normal in size with concentric remodeling and low   normal systolic function.  · The estimated ejection fraction is 54%.  · Grade II left ventricular diastolic dysfunction.  · Normal right ventricular size with low normal right ventricular systolic   function.  · There is mild-to-moderate aortic valve stenosis.  · Aortic valve area is 1.31 cm2; peak velocity is 2.1 m/s; mean gradient   is 10 mmHg.  · There is mild mitral stenosis.  · The mean diastolic gradient across the mitral valve is 7 mmHg at a heart   rate of 94 bpm.  · Moderate mitral regurgitation.  · Mild tricuspid regurgitation.  · There is pulmonary hypertension.  · The estimated PA systolic pressure is 44 mmHg.       Recent Labs   Lab 04/16/23 1855 04/17/23 0358 04/18/23  0636   WBC 34.3* 28.7* 15.8*   RBC 3.75* 3.70* 3.94*   HGB 10.3* 10.1* 10.7*   HCT 32.1* 32.1* 33.4*   MCV 85.6 86.8 84.8   MCH 27.5 27.3 27.2   MCHC 32.1* 31.5* 32.0*   RDW 14.5 14.6 14.6    283 242   MPV 9.6 9.5 9.9       Recent Labs   Lab 04/16/23 1855 04/17/23 0358 04/18/23  0505   * 129* 130*   K 3.7 3.8 3.8   CO2 23 27 24   BUN 22.6* 22.6* 18.1   CREATININE 0.97 0.87 0.67   CALCIUM 9.7 9.6 9.6   MG 1.40* 2.10  --    ALBUMIN 2.5* 2.2*  --    ALKPHOS 53 73  --    ALT 14 18  --    AST 14 17  --    BILITOT 0.5 0.4  --           Microbiology Results (last 7 days)       Procedure Component Value Units Date/Time    Wound Culture [[id number]] Collected: 04/18/23 0805    Order Status: Sent Specimen: Wound from Ankle, Left     Anaerobic Culture [[id number]] Collected: 04/18/23 0805    Order Status: Sent Specimen: Drainage from Ankle, Left     Blood culture x two cultures. Draw  prior to antibiotics. [[id number]]  (Abnormal) Collected: 04/16/23 1855    Order Status: Completed Specimen: Blood Updated: 04/18/23 0651     CULTURE, BLOOD ([facility]) Staphylococcus aureus     GRAM STAIN Gram Positive Cocci, probable Staphylococcus      Seen in gram stain of broth only      1 of 1 Aerobic bottle positive    Blood culture x two cultures. Draw prior to antibiotics. [[id number]]  (Abnormal) Collected: 04/16/23 1855    Order Status: Completed Specimen: Blood Updated: 04/18/23 0650     CULTURE, BLOOD ([facility]) Staphylococcus aureus     GRAM STAIN Gram Positive Cocci, probable Staphylococcus      Seen in gram stain of broth only      1 of 1 Aerobic bottle positive    Blood Culture [[id number]] Collected: 04/18/23 0505    Order Status: Resulted Specimen: Blood Updated: 04/18/23 0516    Blood Culture [[id number]] Collected: 04/18/23 0505    Order Status: Resulted Specimen: Blood Updated: 04/18/23 0516    BCID2 Panel [[id number]]  (Abnormal) Collected: 04/16/23 1855    Order Status: Completed Specimen: Blood Updated: 04/17/23 0915     CTX-M (ESBL ) N/A     Comment: Note: Antimicrobial resistance can occur via multiple mechanisms. A Not Detected result for antimicrobial resistance gene(s) does not indicate antimicrobial susceptibility. Subculturing is required for species identification and susceptibility testing of   isolates.        IMP (Cabapenemase ) N/A     Comment: Note: Antimicrobial resistance can occur via multiple mechanisms. A Not Detected result for antimicrobial resistance gene(s) does not indicate antimicrobial susceptibility. Subculturing is required for species identification and susceptibility testing of   isolates.        KPC resistance gene (Carbapenemase ) N/A     Comment: Note: Antimicrobial resistance can occur via multiple mechanisms. A Not Detected result for antimicrobial resistance gene(s) does not indicate antimicrobial susceptibility. Subculturing is required for species  identification and susceptibility testing of   isolates.        mcr-1 N/A     Comment: Note: Antimicrobial resistance can occur via multiple mechanisms. A Not Detected result for antimicrobial resistance gene(s) does not indicate antimicrobial susceptibility. Subculturing is required for species identification and susceptibility testing of   isolates.        mecA ID N/A     Comment: Note: Antimicrobial resistance can occur via multiple mechanisms. A Not Detected result for antimicrobial resistance gene(s) does not indicate antimicrobial susceptibility. Subculturing is required for species identification and susceptibility testing of   isolates.        mecA/C and MREJ (MRSA) gene Detected     Comment: Note: Antimicrobial resistance can occur via multiple mechanisms. A Not Detected result for antimicrobial resistance gene(s) does not indicate antimicrobial susceptibility. Subculturing is required for species identification and susceptibility testing of   isolates.        NDM (Carbapenemase ) N/A     Comment: Note: Antimicrobial resistance can occur via multiple mechanisms. A Not Detected result for antimicrobial resistance gene(s) does not indicate antimicrobial susceptibility. Subculturing is required for species identification and susceptibility testing of   isolates.        OXA-48-like (Carbapenemase ) N/A     Comment: Note: Antimicrobial resistance can occur via multiple mechanisms. A Not Detected result for antimicrobial resistance gene(s) does not indicate antimicrobial susceptibility. Subculturing is required for species identification and susceptibility testing of   isolates.        Pedro/B (VRE gene) N/A     Comment: Note: Antimicrobial resistance can occur via multiple mechanisms. A Not Detected result for antimicrobial resistance gene(s) does not indicate antimicrobial susceptibility. Subculturing is required for species identification and susceptibility testing of   isolates.        VIM  (Carbapenemase ) N/A     Comment: Note: Antimicrobial resistance can occur via multiple mechanisms. A Not Detected result for antimicrobial resistance gene(s) does not indicate antimicrobial susceptibility. Subculturing is required for species identification and susceptibility testing of   isolates.        Enterococcus faecalis Not Detected     Enterococcus faecium Not Detected     Listeria monocytogenes Not Detected     Staphylococcus spp. Detected     Staphylococcus aureus Detected     Staphylococcus epidermidis Not Detected     Staphylococcus lugdunensis Not Detected     Streptococcus spp. Not Detected     Streptococcus agalactiae (Group B) Not Detected     Streptococcus pneumoniae Not Detected     Streptococcus pyogenes (Group A) Not Detected     Acinetobacter calcoaceticus/baumannii complex Not Detected     Bacteroides fragilis Not Detected     Enterobacterales Not Detected     Enterobacter cloacae complex Not Detected     Escherichia coli Not Detected     Klebsiella aerogenes Not Detected     Klebsiella oxytoca Not Detected     Klebsiella pneumoniae group Not Detected     Proteus spp. Not Detected     Salmonella spp. Not Detected     Serratia marcescens Not Detected     Haemophilus influenzae Not Detected     Neisseria meningitidis Not Detected     Pseudomonas aeruginosa Not Detected     Stenotrophomonas maltophilia Not Detected     Candida albicans Not Detected     Candida auris Not Detected     Candida glabrata Not Detected     Candida krusei Not Detected     Candida parapsilosis Not Detected     Candida tropicalis Not Detected     Cryptococcus neoformans/gattii Not Detected    Narrative:      The [facility] BCID2 Panel is a multiplexed nucleic acid test intended for the use with VantageILM® 2.0 or VantageILM® [employer] Systems for the simultaneous qualitative detection and identification of multiple bacterial and yeast nucleic acids and select genetic determinants associated with  antimicrobial resistance.  The [employer] BCID2 Panel test is performed directly on blood culture samples identified as positive by a continuous monitoring blood culture system.  Results are intended to be interpreted in conjunction with Gram stain results.             See below for Radiology    Scheduled Med:   collagenase   Topical (Top) Daily    enoxaparin  40 mg Subcutaneous Daily    Lactobacillus acidophilus  1 capsule Oral TID WM    mupirocin   Nasal BID        Continuous Infusions:   sodium chloride 0.9% 50 mL/hr at 04/17/23 1835        PRN Meds:  acetaminophen, dextrose 10%, dextrose 10%, glucagon (human recombinant), glucose, glucose, insulin aspart U-100, melatonin, sodium chloride 0.9%, Pharmacy to dose Vancomycin consult **AND** vancomycin - pharmacy to dose     Nutrition Status:  As tolerated, cardiac.  Diabetic.      Assessment/Plan:  Chronic left ankle/foot ulcerations, with no overt signs of infection  NSTEMI, likely type 2 demand ischemia   Elevated BNP with no reported history of CHF  Essential hypertension   Type 2 diabetes mellitus   Remote DVT/IVC filter placement, off anticoagulation    -----------------------------------------------------------------------------------------  MRSA bacteremia with left lower extremity/ankle/foot pain and fracture.    MRI of lumbar spine and left foot have been ordered.    Continue vancomycin.    Trough per pharmacy.    Order echo to further evaluate.  Reviewed and restarted appropriate home medications.       Consults infectious disease physician    Anticipated discharge and Disposition:        All diagnosis and differential diagnosis have been reviewed,  interpreted and communicated appropriately to care team. assessment and plan has been documented; I have personally reviewed the labs and test results that are presently available and pertinent to this hospital course; I have reviewed medical records based upon their availability.    All of the patient's questions  have been  addressed and answered. Patient's is agreeable to the above stated plan.   I will continue to monitor closely and make adjustments to medical management as needed.      Mariana Ulloa, DO   04/18/2023        This note was created with the assistance of Dragon voice recognition software. There may be transcription errors as a result of using this technology however minimal. Effort has been made to assure accuracy of transcription but any obvious errors or omissions should be clarified with the author of the document.

## 2023-04-18 NOTE — PROGRESS NOTES
Pharmacokinetic Assessment Follow Up: IV Vancomycin    Vancomycin serum concentration assessment(s):    The random level was drawn correctly and can be used to guide therapy at this time. The measurement is below the desired definitive target range of 15 to 20 mcg/mL.    Vancomycin Regimen Plan:    Re-dose with vancomycin 1000mg x1 and recheck random 12 hours after dose at 19:00 on 04/18.      Drug levels (last 3 results):  Recent Labs   Lab Result Units 04/17/23  1959 04/18/23  0505   Vanc Lvl Random ug/ml 6.1* 12.8*       Pharmacy will continue to follow and monitor vancomycin.    Please contact pharmacy at extension [phone] for questions regarding this assessment.    Thank you for the consult,   Cleopatra Larose       Patient brief summary:  Melodie Villarreal is a 89 y.o. female initiated on antimicrobial therapy with IV Vancomycin for treatment of bacteremia    The patient's current regimen is pulse dose.    Drug Allergies:   Review of patient's allergies indicates:   Allergen Reactions    Ace inhibitors Other (See Comments)    Adhesive      Allergic to tape    Bactrim [sulfamethoxazole-trimethoprim] Other (See Comments)     Confusion, Hypoglycemia    Meperidine Other (See Comments)    Midazolam Other (See Comments)    Atorvastatin Nausea Only    Codeine Other (See Comments) and Anxiety    Tapentadol Rash       Actual Body Weight:   49.9kg    Renal Function:   Estimated Creatinine Clearance: 43.1 mL/min (based on SCr of 0.67 mg/dL).,     Dialysis Method (if applicable):  N/A    CBC (last 72 hours):  Recent Labs   Lab Result Units 04/16/23  1855 04/17/23  0358 04/18/23  0636   WBC x10(3)/mcL 34.3* 28.7* 15.8*   Hgb g/dL 10.3* 10.1* 10.7*   Hct % 32.1* 32.1* 33.4*   Platelet x10(3)/mcL 327 283 242   Mono % %  --  7.8 5.6   Monocyte Man % 4  --   --    Eos % %  --  0.0 0.1   Basophil % %  --  0.2 0.1       Metabolic Panel (last 72 hours):  Recent Labs   Lab Result Units 04/16/23  1855 04/16/23  2125 04/17/23  0358  04/18/23 0505   Sodium Level mmol/L 131*  --  129* 130*   Potassium Level mmol/L 3.7  --  3.8 3.8   Chloride mmol/L 96*  --  95* 95*   Carbon Dioxide mmol/L 23  --  27 24   Glucose Level mg/dL 157*  --  162* 131*   Glucose, UA mg/dL  --  1+*  --   --    Blood Urea Nitrogen mg/dL 22.6*  --  22.6* 18.1   Creatinine mg/dL 0.97  --  0.87 0.67   Albumin Level g/dL 2.5*  --  2.2*  --    Bilirubin Total mg/dL 0.5  --  0.4  --    Alkaline Phosphatase unit/L 53  --  73  --    Aspartate Aminotransferase unit/L 14  --  17  --    Alanine Aminotransferase unit/L 14  --  18  --    Magnesium Level mg/dL 1.40*  --  2.10  --        Vancomycin Administrations:  vancomycin given in the last 96 hours                     vancomycin 750 mg in dextrose 5 % 250 mL IVPB (ready to mix) (mg) 750 mg New Bag 04/17/23 2236    vancomycin in dextrose 5 % 1 gram/250 mL IVPB 1,000 mg (mg) 1,000 mg New Bag 04/16/23 2138                    Microbiologic Results:  Microbiology Results (last 7 days)       Procedure Component Value Units Date/Time    Blood culture x two cultures. Draw prior to antibiotics. [[id number]]  (Abnormal) Collected: 04/16/23 1855    Order Status: Completed Specimen: Blood Updated: 04/18/23 0651     CULTURE, BLOOD ([facility]) Staphylococcus aureus     GRAM STAIN Gram Positive Cocci, probable Staphylococcus      Seen in gram stain of broth only      1 of 1 Aerobic bottle positive    Blood culture x two cultures. Draw prior to antibiotics. [[id number]]  (Abnormal) Collected: 04/16/23 1855    Order Status: Completed Specimen: Blood Updated: 04/18/23 0650     CULTURE, BLOOD ([facility]) Staphylococcus aureus     GRAM STAIN Gram Positive Cocci, probable Staphylococcus      Seen in gram stain of broth only      1 of 1 Aerobic bottle positive    Blood Culture [[id number]] Collected: 04/18/23 0505    Order Status: Sent Specimen: Blood Updated: 04/18/23 0516    Blood Culture [[id number]] Collected: 04/18/23 0505    Order Status: Sent Specimen: Blood  Updated: 04/18/23 0516    Wound Culture [[id number]]     Order Status: Sent Specimen: Wound from Ankle, Left     Anaerobic Culture [[id number]]     Order Status: Sent Specimen: Drainage from Ankle, Left     BCID2 Panel [[id number]]  (Abnormal) Collected: 04/16/23 1855    Order Status: Completed Specimen: Blood Updated: 04/17/23 0915     CTX-M (ESBL ) N/A     Comment: Note: Antimicrobial resistance can occur via multiple mechanisms. A Not Detected result for antimicrobial resistance gene(s) does not indicate antimicrobial susceptibility. Subculturing is required for species identification and susceptibility testing of   isolates.        IMP (Cabapenemase ) N/A     Comment: Note: Antimicrobial resistance can occur via multiple mechanisms. A Not Detected result for antimicrobial resistance gene(s) does not indicate antimicrobial susceptibility. Subculturing is required for species identification and susceptibility testing of   isolates.        KPC resistance gene (Carbapenemase ) N/A     Comment: Note: Antimicrobial resistance can occur via multiple mechanisms. A Not Detected result for antimicrobial resistance gene(s) does not indicate antimicrobial susceptibility. Subculturing is required for species identification and susceptibility testing of   isolates.        mcr-1 N/A     Comment: Note: Antimicrobial resistance can occur via multiple mechanisms. A Not Detected result for antimicrobial resistance gene(s) does not indicate antimicrobial susceptibility. Subculturing is required for species identification and susceptibility testing of   isolates.        mecA ID N/A     Comment: Note: Antimicrobial resistance can occur via multiple mechanisms. A Not Detected result for antimicrobial resistance gene(s) does not indicate antimicrobial susceptibility. Subculturing is required for species identification and susceptibility testing of   isolates.        mecA/C and MREJ (MRSA) gene Detected     Comment:  Note: Antimicrobial resistance can occur via multiple mechanisms. A Not Detected result for antimicrobial resistance gene(s) does not indicate antimicrobial susceptibility. Subculturing is required for species identification and susceptibility testing of   isolates.        NDM (Carbapenemase ) N/A     Comment: Note: Antimicrobial resistance can occur via multiple mechanisms. A Not Detected result for antimicrobial resistance gene(s) does not indicate antimicrobial susceptibility. Subculturing is required for species identification and susceptibility testing of   isolates.        OXA-48-like (Carbapenemase ) N/A     Comment: Note: Antimicrobial resistance can occur via multiple mechanisms. A Not Detected result for antimicrobial resistance gene(s) does not indicate antimicrobial susceptibility. Subculturing is required for species identification and susceptibility testing of   isolates.        Pedro/B (VRE gene) N/A     Comment: Note: Antimicrobial resistance can occur via multiple mechanisms. A Not Detected result for antimicrobial resistance gene(s) does not indicate antimicrobial susceptibility. Subculturing is required for species identification and susceptibility testing of   isolates.        VIM (Carbapenemase ) N/A     Comment: Note: Antimicrobial resistance can occur via multiple mechanisms. A Not Detected result for antimicrobial resistance gene(s) does not indicate antimicrobial susceptibility. Subculturing is required for species identification and susceptibility testing of   isolates.        Enterococcus faecalis Not Detected     Enterococcus faecium Not Detected     Listeria monocytogenes Not Detected     Staphylococcus spp. Detected     Staphylococcus aureus Detected     Staphylococcus epidermidis Not Detected     Staphylococcus lugdunensis Not Detected     Streptococcus spp. Not Detected     Streptococcus agalactiae (Group B) Not Detected     Streptococcus pneumoniae Not Detected      Streptococcus pyogenes (Group A) Not Detected     Acinetobacter calcoaceticus/baumannii complex Not Detected     Bacteroides fragilis Not Detected     Enterobacterales Not Detected     Enterobacter cloacae complex Not Detected     Escherichia coli Not Detected     Klebsiella aerogenes Not Detected     Klebsiella oxytoca Not Detected     Klebsiella pneumoniae group Not Detected     Proteus spp. Not Detected     Salmonella spp. Not Detected     Serratia marcescens Not Detected     Haemophilus influenzae Not Detected     Neisseria meningitidis Not Detected     Pseudomonas aeruginosa Not Detected     Stenotrophomonas maltophilia Not Detected     Candida albicans Not Detected     Candida auris Not Detected     Candida glabrata Not Detected     Candida krusei Not Detected     Candida parapsilosis Not Detected     Candida tropicalis Not Detected     Cryptococcus neoformans/gattii Not Detected    Narrative:      The [employer] BCID2 Panel is a multiplexed nucleic acid test intended for the use with [employer]® 2.0 or [employer]® [email or web address] Systems for the simultaneous qualitative detection and identification of multiple bacterial and yeast nucleic acids and select genetic determinants associated with antimicrobial resistance.  The [employer] BCID2 Panel test is performed directly on blood culture samples identified as positive by a continuous monitoring blood culture system.  Results are intended to be interpreted in conjunction with Gram stain results.

## 2023-04-18 NOTE — PROGRESS NOTES
[facility]  Infectious Disease Progress Note        SUBJECTIVE:   AF, VSS.  No events overnight.  Restless somewhat.      MEDICATIONS:   Reviewed in EMR    REVIEW OF SYSTEMS:   Except as documented, all other systems reviewed and negative     PHYSICAL EXAM:     Vitals:    04/18/23 1250   BP: 122/69   Pulse: (!) 114   Resp: 16   Temp: 98.3 °F (36.8 °C)       GENERAL: awake, alert, oriented and in no acute distress, non-toxic appearing   HEENT: normocephalic atraumatic   NECK: supple   LUNGS: Clear bilaterally, no wheezing or rales, no accessory muscle use   CVS: Regular rate and rhythm, normal peripheral perfusion; +murmur  ABD: Soft, non-tender, non-distended, bowel sounds present  EXTREMITIES: no clubbing or cyanosis  SKIN: Warm, dry  NEURO: alert and oriented, grossly without focal deficits   PSYCHIATRIC: Cooperative    LABS AND IMAGING:   Reviewed      ASSESSMENT & PLAN:   She is a 89-year-old female presenting with weakness and fatigue, found to have MRSA bacteremia.  Has a left ankle wound, following with Wound Care at [facility], concerning for source.  ID consulted for assistance with Gram-positive bacteremia.     MRSA bacteremia - ?L ankle wound as source  Chronic L ankle wound, ?exposed Achilles  Sepsis s/t #1  H/o DMII / HTN  H/o RA, not on medication  Advanced age    PLAN:  Get tagged WBC scan as patient unable to have MRI (s/t IVC filter).  Podiatry following - no plans for intervention, awaiting records from vascular and wound care.   F/u repeat BCx sent this am.   TTE without evidence of endocarditis.  Continue vancomycin, pharmacy dosing.   Maintain without lines if feasible.  Discussed with patient and daughter at bedside.     ED Castro  Infectious Disease

## 2023-04-19 NOTE — PROGRESS NOTES
Inpatient Nutrition Assessment    Admit Date: 4/16/2023   Total duration of encounter: 3 days     Nutrition Recommendation/Prescription     Continue current diet as tolerated  Add Boost Glucose Control TID (provides 250 kcal and 14 g protein per container)  Add MANDY BID (provides 90 kcal and 2.5 g protein per serving)  Encouraged adequate PO intake  Monitor PO intake, weight, labs    Communication of Recommendations: reviewed with patient and reviewed with family    Nutrition Assessment     Malnutrition Assessment/Nutrition-Focused Physical Exam    Malnutrition in the context of acute illness or injury  Degree of Malnutrition: non-severe (moderate) malnutrition  Energy Intake: does not meet criteria  Interpretation of Weight Loss: unable to obtain  Body Fat:mild depletion  Area of Body Fat Loss: upper arm region - triceps / biceps  Muscle Mass Loss: mild depletion  Area of Muscle Mass Loss: clavicle bone region - pectoralis major, deltoid, trapezius muscles, clavicle and acromion bone region - deltoid muscle, and scapular bone region - trapezius, supraspinus, infraspinus muscles  Fluid Accumulation: unable to obtain  Edema: unable to obtain   Reduced  Strength: unable to obtain  A minimum of two characteristics is recommended for diagnosis of either severe or non-severe malnutrition.    Chart Review    Reason Seen: continuous nutrition monitoring    Malnutrition Screening Tool Results   Have you recently lost weight without trying?: No  Have you been eating poorly because of a decreased appetite?: No   MST Score: 0     Diagnosis:  Chronic left ankle/foot ulcerations, with no overt signs of infection  NSTEMI, likely type 2 demand ischemia   Elevated BNP with no reported history of CHF  Essential hypertension   Type 2 diabetes mellitus   Remote DVT/IVC filter placement, off anticoagulation    Relevant Medical History:    Adenocarcinoma of uterus      Bladder cancer      Deep vein thrombosis      Essential  "(primary) hypertension      Heart murmur      High cholesterol      Hypertriglyceridemia      Insomnia      Osteopenia      Other pulmonary embolism without acute cor pulmonale      Personal history of colonic polyps      Rheumatoid arthritis, unspecified      Type 2 diabetes mellitus without complications        Nutrition-Related Medications: Scheduled Meds:   collagenase   Topical (Top) Daily    enoxaparin  40 mg Subcutaneous Daily    Lactobacillus acidophilus  1 capsule Oral TID WM    mupirocin   Nasal BID     Continuous Infusions:   sodium chloride 0.9% 50 mL/hr at 04/18/23 1603     PRN Meds:.acetaminophen, dextrose 10%, dextrose 10%, glucagon (human recombinant), glucose, glucose, insulin aspart U-100, melatonin, sodium chloride 0.9%, Pharmacy to dose Vancomycin consult **AND** vancomycin - pharmacy to dose    Calorie Containing IV Medications: no significant kcals from medications at this time    Nutrition-Related Labs:  4/18/2023: WBC 15.8, H/H 10.7/33.4, Na 130, Cl 95, Gluc 131    Diet/PN Order: Diet diabetic  Oral Supplement Order: none  Tube Feeding Order: none  Appetite/Oral Intake: fair/50-75% of meals  Factors Affecting Nutritional Intake: decreased appetite  Food/Oriental orthodox/Cultural Preferences: none reported  Food Allergies: none reported    Skin Integrity: wound  Wound(s):   2 full thickness wounds per report (left posterior ankle and left lateral ankle)    Comments    4/19/2023: Pt's daughter reports good appetite/PO intake prior to admit with fair appetite/PO intake currently. Pt agreeable to vanilla Boost Glucose Control. Pt denies N/V/D and chewing/swallowing difficulties. The daughter reports constipation, last BM documented as 4/15/2023. The daughter reports possible wt loss within the past 3-4 months, unable to specify wt loss. Will add MANDY BID to promote wound healing.  Encouraged adequate PO intake. Will monitor.    Anthropometrics    Height: 5' 3" (160 cm) Height Method: Stated  Last " Weight: 49.9 kg (110 lb) (23 2234) Weight Method: Bed Scale  BMI (Calculated): 19.5  BMI Classification: underweight (BMI less than 22 if >65 years of age)     Ideal Body Weight (IBW), Female: 115 lb     % Ideal Body Weight, Female (lb): 95.65 %                             Usual Weight Provided By: unable to obtain usual weight    Wt Readings from Last 5 Encounters:   23 49.9 kg (110 lb)   04/10/23 54.4 kg (120 lb)   23 85.3 kg (188 lb)   23 55.8 kg (123 lb)   12/15/22 59 kg (130 lb)     Weight Change(s) Since Admission:  Admit Weight: 49.9 kg (110 lb) (23 1751)  2023: 49.9 kg    Estimated Needs    Weight Used For Calorie Calculations: 49.9 kg (110 lb 0.2 oz)  Energy Calorie Requirements (kcal): 4641-5184 (30-35 kcal/kg)  Energy Need Method: Kcal/kg  Weight Used For Protein Calculations: 49.9 kg (110 lb 0.2 oz)  Protein Requirements:  (1.5-2.0 g/kg)  Fluid Requirements (mL): 1497 (1 mL/kcal)  Temp (24hrs), Av °F (36.7 °C), Min:97.5 °F (36.4 °C), Max:98.5 °F (36.9 °C)         Enteral Nutrition    Patient not receiving enteral nutrition at this time.    Parenteral Nutrition    Patient not receiving parenteral nutrition support at this time.    Evaluation of Received Nutrient Intake    Calories: not meeting estimated needs  Protein: not meeting estimated needs    Patient Education    Not applicable.    Nutrition Diagnosis     PES: Malnutrition related to acute illness as evidenced by mild fat/muscle wasting. (new)    Interventions/Goals     Intervention(s): general/healthful diet and commercial beverage  Goal: Consume % of meals/snacks by follow-up. (new)    Monitoring & Evaluation     Dietitian will monitor food and beverage intake, weight, electrolyte/renal panel, glucose/endocrine profile, and gastrointestinal profile.  Nutrition Risk/Follow-Up: moderate (follow-up in 3-5 days)   Please consult if re-assessment needed sooner.

## 2023-04-19 NOTE — NURSING
Gram+ cocci, probable staphylococcus x 2 aerobic bottles. Pt is on vancomycin. ID in on the case. Pt has MRSA right ankle wound. Dr Adin Pereira notified via secured message. No new orders noted. TM

## 2023-04-19 NOTE — PROGRESS NOTES
[facility]  Infectious Disease Progress Note      Patient not seen - in [place] getting tagged WBC scan.  Spoke with nursing.  No events reported overnight.  AF, VSS.    ASSESSMENT & PLAN:   She is a 89-year-old female presenting with weakness and fatigue, found to have MRSA bacteremia.  Has a left ankle wound, following with [facility] at [facility], concerning for source.  ID consulted for assistance with Gram-positive bacteremia.     MRSA bacteremia - ?L ankle wound as source  Chronic L ankle wound, ?exposed Achilles  Sepsis s/t #1  H/o DMII / HTN  H/o RA, not on medication  Advanced age    PLAN:  F/U tagged WBC scan.  BCx from yesterday remain positive.   Repeats sent today.   Likely needs STARR.   Continue vancomycin, pharmacy dosing.   Maintain without lines if feasible.  Discussed with nursing.    ED Castro  Infectious Disease

## 2023-04-19 NOTE — PROGRESS NOTES
Pharmacokinetic Assessment Follow Up: IV Vancomycin    Vancomycin serum concentration assessment(s):    The random level was drawn correctly and can be used to guide therapy at this time. The measurement is above the desired definitive target range of 15 to 20 mcg/mL.    Vancomycin Regimen Plan:    Re-dose when the random level is less than 20 mcg/mL, next level to be drawn at 0430 on 04/20    Drug levels (last 3 results):  Recent Labs   Lab Result Units 04/18/23  0505 04/18/23  1906 04/19/23  0839   Vanc Lvl Random ug/ml 12.8* 17.0 53.2*       Pharmacy will continue to follow and monitor vancomycin.    Please contact pharmacy at extension [phone] for questions regarding this assessment.    Thank you for the consult,   Cleopatra Larose       Patient brief summary:  Melodie Villarreal is a 89 y.o. female initiated on antimicrobial therapy with IV Vancomycin for treatment of bacteremia    The patient's current regimen is pulse dose    Drug Allergies:   Review of patient's allergies indicates:   Allergen Reactions    Ace inhibitors Other (See Comments)    Adhesive      Allergic to tape    Bactrim [sulfamethoxazole-trimethoprim] Other (See Comments)     Confusion, Hypoglycemia    Meperidine Other (See Comments)    Midazolam Other (See Comments)    Atorvastatin Nausea Only    Codeine Other (See Comments) and Anxiety    Tapentadol Rash       Actual Body Weight:   49.9kg    Renal Function:   Estimated Creatinine Clearance: 44.4 mL/min (based on SCr of 0.65 mg/dL).,     Dialysis Method (if applicable):  N/A    CBC (last 72 hours):  Recent Labs   Lab Result Units 04/16/23  1855 04/17/23  0358 04/18/23  0636   WBC x10(3)/mcL 34.3* 28.7* 15.8*   Hgb g/dL 10.3* 10.1* 10.7*   Hct % 32.1* 32.1* 33.4*   Platelet x10(3)/mcL 327 283 242   Mono % %  --  7.8 5.6   Monocyte Man % 4  --   --    Eos % %  --  0.0 0.1   Basophil % %  --  0.2 0.1       Metabolic Panel (last 72 hours):  Recent Labs   Lab Result Units 04/16/23  1855 04/16/23  2125  04/17/23  0358 04/18/23  0505 04/19/23  0839   Sodium Level mmol/L 131*  --  129* 130*  --    Potassium Level mmol/L 3.7  --  3.8 3.8  --    Chloride mmol/L 96*  --  95* 95*  --    Carbon Dioxide mmol/L 23  --  27 24  --    Glucose Level mg/dL 157*  --  162* 131*  --    Glucose, UA mg/dL  --  1+*  --   --   --    Blood Urea Nitrogen mg/dL 22.6*  --  22.6* 18.1  --    Creatinine mg/dL 0.97  --  0.87 0.67 0.65   Albumin Level g/dL 2.5*  --  2.2*  --   --    Bilirubin Total mg/dL 0.5  --  0.4  --   --    Alkaline Phosphatase unit/L 53  --  73  --   --    Aspartate Aminotransferase unit/L 14  --  17  --   --    Alanine Aminotransferase unit/L 14  --  18  --   --    Magnesium Level mg/dL 1.40*  --  2.10  --   --        Vancomycin Administrations:  vancomycin given in the last 96 hours                     vancomycin 750 mg in dextrose 5 % 250 mL IVPB (ready to mix) (mg) 750 mg New Bag 04/18/23 2125    vancomycin in dextrose 5 % 1 gram/250 mL IVPB 1,000 mg (mg) 1,000 mg New Bag 04/18/23 0913    vancomycin 750 mg in dextrose 5 % 250 mL IVPB (ready to mix) (mg) 750 mg New Bag 04/17/23 2236    vancomycin in dextrose 5 % 1 gram/250 mL IVPB 1,000 mg (mg) 1,000 mg New Bag 04/16/23 2138                    Microbiologic Results:  Microbiology Results (last 7 days)       Procedure Component Value Units Date/Time    Blood Culture [[id number]]  (Abnormal) Collected: 04/18/23 0505    Order Status: Completed Specimen: Blood Updated: 04/19/23 0858     CULTURE, BLOOD ([facility]) Staphylococcus aureus     GRAM STAIN Gram Positive Cocci, probable Staphylococcus      Seen in gram stain of broth only      2 of 2 bottles positive    Blood Culture [[id number]]  (Abnormal) Collected: 04/18/23 0505    Order Status: Completed Specimen: Blood Updated: 04/19/23 0857     CULTURE, BLOOD ([facility]) Staphylococcus aureus     GRAM STAIN Gram Positive Cocci, probable Staphylococcus      Seen in gram stain of broth only      2 of 2 bottles positive    Blood Culture  [[id number]] Collected: 04/19/23 0839    Order Status: Resulted Specimen: Blood from Arm, Left Updated: 04/19/23 0843    Blood Culture [[id number]] Collected: 04/19/23 0407    Order Status: Resulted Specimen: Blood Updated: 04/19/23 0746    Blood culture x two cultures. Draw prior to antibiotics. [[id number]]  (Abnormal)  (Susceptibility) Collected: 04/16/23 1855    Order Status: Completed Specimen: Blood Updated: 04/19/23 0732     CULTURE, BLOOD ([facility]) Methicillin resistant Staphylococcus aureus     GRAM STAIN Gram Positive Cocci, probable Staphylococcus      Seen in gram stain of broth only      1 of 1 Aerobic bottle positive    Blood culture x two cultures. Draw prior to antibiotics. [[id number]]  (Abnormal)  (Susceptibility) Collected: 04/16/23 1855    Order Status: Completed Specimen: Blood Updated: 04/19/23 0729     CULTURE, BLOOD ([facility]) Methicillin resistant Staphylococcus aureus     GRAM STAIN Gram Positive Cocci, probable Staphylococcus      Seen in gram stain of broth only      1 of 1 Aerobic bottle positive    Wound Culture [[id number]] Collected: 04/18/23 0805    Order Status: Sent Specimen: Wound from Ankle, Left     Anaerobic Culture [[id number]] Collected: 04/18/23 0805    Order Status: Sent Specimen: Drainage from Ankle, Left     BCID2 Panel [[id number]]  (Abnormal) Collected: 04/16/23 1855    Order Status: Completed Specimen: Blood Updated: 04/17/23 0915     CTX-M (ESBL ) N/A     Comment: Note: Antimicrobial resistance can occur via multiple mechanisms. A Not Detected result for antimicrobial resistance gene(s) does not indicate antimicrobial susceptibility. Subculturing is required for species identification and susceptibility testing of   isolates.        IMP (Cabapenemase ) N/A     Comment: Note: Antimicrobial resistance can occur via multiple mechanisms. A Not Detected result for antimicrobial resistance gene(s) does not indicate antimicrobial susceptibility. Subculturing is required for  species identification and susceptibility testing of   isolates.        KPC resistance gene (Carbapenemase ) N/A     Comment: Note: Antimicrobial resistance can occur via multiple mechanisms. A Not Detected result for antimicrobial resistance gene(s) does not indicate antimicrobial susceptibility. Subculturing is required for species identification and susceptibility testing of   isolates.        mcr-1 N/A     Comment: Note: Antimicrobial resistance can occur via multiple mechanisms. A Not Detected result for antimicrobial resistance gene(s) does not indicate antimicrobial susceptibility. Subculturing is required for species identification and susceptibility testing of   isolates.        mecA ID N/A     Comment: Note: Antimicrobial resistance can occur via multiple mechanisms. A Not Detected result for antimicrobial resistance gene(s) does not indicate antimicrobial susceptibility. Subculturing is required for species identification and susceptibility testing of   isolates.        mecA/C and MREJ (MRSA) gene Detected     Comment: Note: Antimicrobial resistance can occur via multiple mechanisms. A Not Detected result for antimicrobial resistance gene(s) does not indicate antimicrobial susceptibility. Subculturing is required for species identification and susceptibility testing of   isolates.        NDM (Carbapenemase ) N/A     Comment: Note: Antimicrobial resistance can occur via multiple mechanisms. A Not Detected result for antimicrobial resistance gene(s) does not indicate antimicrobial susceptibility. Subculturing is required for species identification and susceptibility testing of   isolates.        OXA-48-like (Carbapenemase ) N/A     Comment: Note: Antimicrobial resistance can occur via multiple mechanisms. A Not Detected result for antimicrobial resistance gene(s) does not indicate antimicrobial susceptibility. Subculturing is required for species identification and susceptibility  testing of   isolates.        Pedro/B (VRE gene) N/A     Comment: Note: Antimicrobial resistance can occur via multiple mechanisms. A Not Detected result for antimicrobial resistance gene(s) does not indicate antimicrobial susceptibility. Subculturing is required for species identification and susceptibility testing of   isolates.        VIM (Carbapenemase ) N/A     Comment: Note: Antimicrobial resistance can occur via multiple mechanisms. A Not Detected result for antimicrobial resistance gene(s) does not indicate antimicrobial susceptibility. Subculturing is required for species identification and susceptibility testing of   isolates.        Enterococcus faecalis Not Detected     Enterococcus faecium Not Detected     Listeria monocytogenes Not Detected     Staphylococcus spp. Detected     Staphylococcus aureus Detected     Staphylococcus epidermidis Not Detected     Staphylococcus lugdunensis Not Detected     Streptococcus spp. Not Detected     Streptococcus agalactiae (Group B) Not Detected     Streptococcus pneumoniae Not Detected     Streptococcus pyogenes (Group A) Not Detected     Acinetobacter calcoaceticus/baumannii complex Not Detected     Bacteroides fragilis Not Detected     Enterobacterales Not Detected     Enterobacter cloacae complex Not Detected     Escherichia coli Not Detected     Klebsiella aerogenes Not Detected     Klebsiella oxytoca Not Detected     Klebsiella pneumoniae group Not Detected     Proteus spp. Not Detected     Salmonella spp. Not Detected     Serratia marcescens Not Detected     Haemophilus influenzae Not Detected     Neisseria meningitidis Not Detected     Pseudomonas aeruginosa Not Detected     Stenotrophomonas maltophilia Not Detected     Candida albicans Not Detected     Candida auris Not Detected     Candida glabrata Not Detected     Candida krusei Not Detected     Candida parapsilosis Not Detected     Candida tropicalis Not Detected     Cryptococcus neoformans/gattii  Not Detected    Narrative:      The [employer] BCID2 Panel is a multiplexed nucleic acid test intended for the use with [employer]® 2.0 or [employer]® Digital [employer] Systems for the simultaneous qualitative detection and identification of multiple bacterial and yeast nucleic acids and select genetic determinants associated with antimicrobial resistance.  The [employer] BCID2 Panel test is performed directly on blood culture samples identified as positive by a continuous monitoring blood culture system.  Results are intended to be interpreted in conjunction with Gram stain results.

## 2023-04-19 NOTE — PROGRESS NOTES
Pharmacokinetic Assessment Follow Up: IV Vancomycin    Vancomycin serum concentration assessment(s):    Random level =17.0 mcg/ml.  Goal trough is 10-20 mcg/ml    Vancomycin Regimen Plan:    Vancomycin 750 mg x 1 and check random level on 4/19/23 at 0900.      Drug levels (last 3 results):  Recent Labs   Lab Result Units 04/17/23 1959 04/18/23  0505 04/18/23  1906   Vanc Lvl Random ug/ml 6.1* 12.8* 17.0       Pharmacy will continue to follow and monitor vancomycin.       Patient brief summary:  Melodie Villarreal is a 89 y.o. female initiated on antimicrobial therapy with IV Vancomycin for treatment of sepsis    Drug Allergies:   Review of patient's allergies indicates:   Allergen Reactions    Ace inhibitors Other (See Comments)    Adhesive      Allergic to tape    Bactrim [sulfamethoxazole-trimethoprim] Other (See Comments)     Confusion, Hypoglycemia    Meperidine Other (See Comments)    Midazolam Other (See Comments)    Atorvastatin Nausea Only    Codeine Other (See Comments) and Anxiety    Tapentadol Rash       Actual Body Weight:   49.9 kg    Renal Function:   Estimated Creatinine Clearance: 43.1 mL/min (based on SCr of 0.67 mg/dL).,     Dialysis Method (if applicable):  N/A    CBC (last 72 hours):  Recent Labs   Lab Result Units 04/16/23 1855 04/17/23 0358 04/18/23  0636   WBC x10(3)/mcL 34.3* 28.7* 15.8*   Hgb g/dL 10.3* 10.1* 10.7*   Hct % 32.1* 32.1* 33.4*   Platelet x10(3)/mcL 327 283 242   Mono % %  --  7.8 5.6   Monocyte Man % 4  --   --    Eos % %  --  0.0 0.1   Basophil % %  --  0.2 0.1       Metabolic Panel (last 72 hours):  Recent Labs   Lab Result Units 04/16/23 1855 04/16/23 2125 04/17/23 0358 04/18/23  0505   Sodium Level mmol/L 131*  --  129* 130*   Potassium Level mmol/L 3.7  --  3.8 3.8   Chloride mmol/L 96*  --  95* 95*   Carbon Dioxide mmol/L 23  --  27 24   Glucose Level mg/dL 157*  --  162* 131*   Glucose, UA mg/dL  --  1+*  --   --    Blood Urea Nitrogen mg/dL 22.6*  --  22.6* 18.1    Creatinine mg/dL 0.97  --  0.87 0.67   Albumin Level g/dL 2.5*  --  2.2*  --    Bilirubin Total mg/dL 0.5  --  0.4  --    Alkaline Phosphatase unit/L 53  --  73  --    Aspartate Aminotransferase unit/L 14  --  17  --    Alanine Aminotransferase unit/L 14  --  18  --    Magnesium Level mg/dL 1.40*  --  2.10  --        Vancomycin Administrations:  vancomycin given in the last 96 hours                     vancomycin in dextrose 5 % 1 gram/250 mL IVPB 1,000 mg (mg) 1,000 mg New Bag 04/18/23 0913    vancomycin 750 mg in dextrose 5 % 250 mL IVPB (ready to mix) (mg) 750 mg New Bag 04/17/23 2236    vancomycin in dextrose 5 % 1 gram/250 mL IVPB 1,000 mg (mg) 1,000 mg New Bag 04/16/23 2138                    Microbiologic Results:  Microbiology Results (last 7 days)       Procedure Component Value Units Date/Time    Wound Culture [[id number]] Collected: 04/18/23 0805    Order Status: Sent Specimen: Wound from Ankle, Left     Anaerobic Culture [[id number]] Collected: 04/18/23 0805    Order Status: Sent Specimen: Drainage from Ankle, Left     Blood culture x two cultures. Draw prior to antibiotics. [[id number]]  (Abnormal) Collected: 04/16/23 1855    Order Status: Completed Specimen: Blood Updated: 04/18/23 0651     CULTURE, BLOOD ([facility]) Staphylococcus aureus     GRAM STAIN Gram Positive Cocci, probable Staphylococcus      Seen in gram stain of broth only      1 of 1 Aerobic bottle positive    Blood culture x two cultures. Draw prior to antibiotics. [[id number]]  (Abnormal) Collected: 04/16/23 1855    Order Status: Completed Specimen: Blood Updated: 04/18/23 0650     CULTURE, BLOOD ([facility]) Staphylococcus aureus     GRAM STAIN Gram Positive Cocci, probable Staphylococcus      Seen in gram stain of broth only      1 of 1 Aerobic bottle positive    Blood Culture [[id number]] Collected: 04/18/23 0505    Order Status: Resulted Specimen: Blood Updated: 04/18/23 0516    Blood Culture [[id number]] Collected: 04/18/23 0505    Order Status:  Resulted Specimen: Blood Updated: 04/18/23 0516    BCID2 Panel [[id number]]  (Abnormal) Collected: 04/16/23 1855    Order Status: Completed Specimen: Blood Updated: 04/17/23 0915     CTX-M (ESBL ) N/A     Comment: Note: Antimicrobial resistance can occur via multiple mechanisms. A Not Detected result for antimicrobial resistance gene(s) does not indicate antimicrobial susceptibility. Subculturing is required for species identification and susceptibility testing of   isolates.        IMP (Cabapenemase ) N/A     Comment: Note: Antimicrobial resistance can occur via multiple mechanisms. A Not Detected result for antimicrobial resistance gene(s) does not indicate antimicrobial susceptibility. Subculturing is required for species identification and susceptibility testing of   isolates.        KPC resistance gene (Carbapenemase ) N/A     Comment: Note: Antimicrobial resistance can occur via multiple mechanisms. A Not Detected result for antimicrobial resistance gene(s) does not indicate antimicrobial susceptibility. Subculturing is required for species identification and susceptibility testing of   isolates.        mcr-1 N/A     Comment: Note: Antimicrobial resistance can occur via multiple mechanisms. A Not Detected result for antimicrobial resistance gene(s) does not indicate antimicrobial susceptibility. Subculturing is required for species identification and susceptibility testing of   isolates.        mecA ID N/A     Comment: Note: Antimicrobial resistance can occur via multiple mechanisms. A Not Detected result for antimicrobial resistance gene(s) does not indicate antimicrobial susceptibility. Subculturing is required for species identification and susceptibility testing of   isolates.        mecA/C and MREJ (MRSA) gene Detected     Comment: Note: Antimicrobial resistance can occur via multiple mechanisms. A Not Detected result for antimicrobial resistance gene(s) does not indicate  antimicrobial susceptibility. Subculturing is required for species identification and susceptibility testing of   isolates.        NDM (Carbapenemase ) N/A     Comment: Note: Antimicrobial resistance can occur via multiple mechanisms. A Not Detected result for antimicrobial resistance gene(s) does not indicate antimicrobial susceptibility. Subculturing is required for species identification and susceptibility testing of   isolates.        OXA-48-like (Carbapenemase ) N/A     Comment: Note: Antimicrobial resistance can occur via multiple mechanisms. A Not Detected result for antimicrobial resistance gene(s) does not indicate antimicrobial susceptibility. Subculturing is required for species identification and susceptibility testing of   isolates.        Pedro/B (VRE gene) N/A     Comment: Note: Antimicrobial resistance can occur via multiple mechanisms. A Not Detected result for antimicrobial resistance gene(s) does not indicate antimicrobial susceptibility. Subculturing is required for species identification and susceptibility testing of   isolates.        VIM (Carbapenemase ) N/A     Comment: Note: Antimicrobial resistance can occur via multiple mechanisms. A Not Detected result for antimicrobial resistance gene(s) does not indicate antimicrobial susceptibility. Subculturing is required for species identification and susceptibility testing of   isolates.        Enterococcus faecalis Not Detected     Enterococcus faecium Not Detected     Listeria monocytogenes Not Detected     Staphylococcus spp. Detected     Staphylococcus aureus Detected     Staphylococcus epidermidis Not Detected     Staphylococcus lugdunensis Not Detected     Streptococcus spp. Not Detected     Streptococcus agalactiae (Group B) Not Detected     Streptococcus pneumoniae Not Detected     Streptococcus pyogenes (Group A) Not Detected     Acinetobacter calcoaceticus/baumannii complex Not Detected     Bacteroides fragilis Not  Detected     Enterobacterales Not Detected     Enterobacter cloacae complex Not Detected     Escherichia coli Not Detected     Klebsiella aerogenes Not Detected     Klebsiella oxytoca Not Detected     Klebsiella pneumoniae group Not Detected     Proteus spp. Not Detected     Salmonella spp. Not Detected     Serratia marcescens Not Detected     Haemophilus influenzae Not Detected     Neisseria meningitidis Not Detected     Pseudomonas aeruginosa Not Detected     Stenotrophomonas maltophilia Not Detected     Candida albicans Not Detected     Candida auris Not Detected     Candida glabrata Not Detected     Candida krusei Not Detected     Candida parapsilosis Not Detected     Candida tropicalis Not Detected     Cryptococcus neoformans/gattii Not Detected    Narrative:      The [employer] BCID2 Panel is a multiplexed nucleic acid test intended for the use with [employer]® 2.0 or [employer] [address]® [employer] for the simultaneous qualitative detection and identification of multiple bacterial and yeast nucleic acids and select genetic determinants associated with antimicrobial resistance.  The [employer] BCID2 Panel test is performed directly on blood culture samples identified as positive by a continuous monitoring blood culture system.  Results are intended to be interpreted in conjunction with Gram stain results.

## 2023-04-19 NOTE — PLAN OF CARE
04/19/23 1049   Discharge Assessment   Assessment Type Discharge Planning Assessment   Confirmed/corrected address, phone number and insurance Yes   Confirmed Demographics Correct on Facesheet   Source of Information patient;family   When was your last doctors appointment?   (Patient's daughter-in-law reports within the month)   Communicated ERIN with patient/caregiver Yes   Reason For Admission Fatigue   People in Home child(kailey), adult   Do you expect to return to your current living situation? Yes   Do you have help at home or someone to help you manage your care at home? Yes   Who are your caregiver(s) and their phone number(s)? Daughter: William Villarreal: [phone]   Prior to hospitilization cognitive status: Unable to Assess   Current cognitive status: Alert/Oriented   Walking or Climbing Stairs ambulation difficulty, requires equipment;ambulation difficulty, assistance 1 person   Dressing/Bathing bathing difficulty, requires equipment;bathing difficulty, assistance 1 person   Home Accessibility stairs to enter home   Number of Stairs, Main Entrance four   Home Layout Able to live on 1st floor   Equipment Currently Used at Home bedside commode;glucometer;shower chair;walker, rolling;wheelchair   Readmission within 30 days? No   Patient currently being followed by outpatient case management? No   Do you currently have service(s) that help you manage your care at home? Yes   Name and Contact number of agency [facility]   Is the pt/caregiver preference to resume services with current agency Yes   Do you take prescription medications? Yes   Do you have prescription coverage? Yes   Coverage Medicare Part A & B   Do you have any problems affording any of your prescribed medications? No   Is the patient taking medications as prescribed? yes   Who is going to help you get home at discharge? Patient's son, Tao Villarreal ([phone]) or Daughter-in-law, Lakshmi Villarreal ([phone]).   How do you get to doctors  appointments? family or friend will provide   Are you on dialysis? No   Do you take coumadin? No   Discharge Plan A Home with family   Discharge Plan B Home Health   DME Needed Upon Discharge  none   Discharge Plan discussed with: Patient;Caregiver   Name(s) and Number(s) Daughter-in-law: Lakshmi Villarreal: [phone]   Discharge Barriers Identified None   OTHER   Name(s) of People in Home Patient resides with her daughter, William Villarreal.       Patient's PCP is Dr. Wisam Espinosa.  Patient is active with [facility]. SW sent available clinicals via [employer]. Patient is receiving wound care services via [facility] prior to admission.  No barriers to discharge at this time.

## 2023-04-19 NOTE — PROGRESS NOTES
[facility]  Hospital Medicine Progress Note        Chief Complaint: weak    HPI:   89-year-old female with a past medical history of essential hypertension, type 2 diabetes mellitus, prior DVT/IVC filter no longer on anticoagulation .    presents to the ER after she became lethargic on the day of presentation.  Daughter is at bedside and provides most of the history and states patient is normally alert and oriented, active and able to carry out own ADLs.  She states patient has chronic open wounds on her left lower extremity better followed closely by Wound Care.  Today she became drowsy/lethargic, and she activated EMS who found she was hypotensive with a blood pressure of 66/36 on arrival.       She arrived to the ER tachycardic and hypotensive, maintaining adequate sats on 3 L nasal cannula.  Laboratory work showed leukocytosis of 34 K, mild anemia, a lactic acid of 3.5, elevated BNP of 2000 and a troponin of 0.115.  Urinalysis was unremarkable, CT chest abdomen and pelvis was negative per of hormone layering embolus, pulmonary infiltrates or any intra-abdominal or pelvic pathology.  CT head was normal.  She was started on broad-spectrum antibiotics for undifferentiated sepsis admitted to the hospitalist service for further management.    Interval Hx:   4/4 blood cultures positive for staph aureus methicillin-resistant bacteremia, echocardiogram reveals EF of 54% with grade 2 diastolic dysfunction, mild to moderate aortic valve stenosis with velocity of 2.1 and area of 1.31.  Pulmonary hypertension is present with a systolic pressure of 44.    No overnight events reported.  Patient remains afebrile.      Infectious disease physician has been consulted, tagged white blood scan and repeat blood cultures have been ordered.  Recommend STARR if no clearance of cultures.    Spoke to daughter via phone, she seems to be very concerned that her mother is fidgety/anxious however during my  evaluation patient is calmly resting quietly in bed, and has been doing so on each evaluation.  I explained this to the daughter and will notify the nurse that if patient does become agitated/visually/anxious to please let me know so I can re-evaluate.    No indication for benzo at this time and would refrain from benzo use given patient's age.    Daughter also concern that her mother's not sleeping at night, melatonin does not work we will consider trazodone as needed.  Avoid Seroquel.    MRI was ordered of the foot and back however unable to be done at this time because patient has a old IVC filter that is incompatible with MRI machine.  NucMed Bone scan has been ordered.    Objective/physical exam:  Poor historian, alert and awake comprehension questionable, insight poor, oriented x1.  General: Appears comfortable, no acute distress.  Non fistulae, nonaggressive, nonagitated.  Resting comfortably    Left foot with dressing in place clean dry intact, minimal swelling, trace edema bilaterally.    Integumentary: Warm, dry, intact.  Musculoskeletal: Purposeful movement noted.   Respiratory: No accessory muscle use. Breath sounds are equal.  Cardiovascular: Regular rate    VITAL SIGNS: 24 HRS MIN & MAX LAST   Temp  Min: 97.5 °F (36.4 °C)  Max: 98.5 °F (36.9 °C) 97.5 °F (36.4 °C)   BP  Min: 120/69  Max: 156/87 (!) 155/81   Pulse  Min: 94  Max: 114  101     Resp  Min: 16  Max: 18 16   SpO2  Min: 91 %  Max: 96 % (!) 93 %       Echo  · The left ventricle is normal in size with concentric remodeling and low   normal systolic function.  · The estimated ejection fraction is 54%.  · Grade II left ventricular diastolic dysfunction.  · Normal right ventricular size with low normal right ventricular systolic   function.  · There is mild-to-moderate aortic valve stenosis.  · Aortic valve area is 1.31 cm2; peak velocity is 2.1 m/s; mean gradient   is 10 mmHg.  · There is mild mitral stenosis.  · The mean diastolic gradient across  the mitral valve is 7 mmHg at a heart   rate of 94 bpm.  · Moderate mitral regurgitation.  · Mild tricuspid regurgitation.  · There is pulmonary hypertension.  · The estimated PA systolic pressure is 44 mmHg.       Recent Labs   Lab 04/16/23 1855 04/17/23 0358 04/18/23  0636   WBC 34.3* 28.7* 15.8*   RBC 3.75* 3.70* 3.94*   HGB 10.3* 10.1* 10.7*   HCT 32.1* 32.1* 33.4*   MCV 85.6 86.8 84.8   MCH 27.5 27.3 27.2   MCHC 32.1* 31.5* 32.0*   RDW 14.5 14.6 14.6    283 242   MPV 9.6 9.5 9.9       Recent Labs   Lab 04/16/23 1855 04/17/23 0358 04/18/23  0505   * 129* 130*   K 3.7 3.8 3.8   CO2 23 27 24   BUN 22.6* 22.6* 18.1   CREATININE 0.97 0.87 0.67   CALCIUM 9.7 9.6 9.6   MG 1.40* 2.10  --    ALBUMIN 2.5* 2.2*  --    ALKPHOS 53 73  --    ALT 14 18  --    AST 14 17  --    BILITOT 0.5 0.4  --           Microbiology Results (last 7 days)       Procedure Component Value Units Date/Time    Blood Culture [[id number]]  (Abnormal) Collected: 04/18/23 0505    Order Status: Completed Specimen: Blood Updated: 04/19/23 0858     CULTURE, BLOOD ([facility]) Staphylococcus aureus     GRAM STAIN Gram Positive Cocci, probable Staphylococcus      Seen in gram stain of broth only      2 of 2 bottles positive    Blood Culture [[id number]]  (Abnormal) Collected: 04/18/23 0505    Order Status: Completed Specimen: Blood Updated: 04/19/23 0857     CULTURE, BLOOD ([facility]) Staphylococcus aureus     GRAM STAIN Gram Positive Cocci, probable Staphylococcus      Seen in gram stain of broth only      2 of 2 bottles positive    Blood Culture [[id number]] Collected: 04/19/23 0839    Order Status: Resulted Specimen: Blood from Arm, Left Updated: 04/19/23 0843    Blood Culture [[id number]] Collected: 04/19/23 0407    Order Status: Resulted Specimen: Blood Updated: 04/19/23 0746    Blood culture x two cultures. Draw prior to antibiotics. [[id number]]  (Abnormal)  (Susceptibility) Collected: 04/16/23 6822    Order Status: Completed Specimen: Blood  Updated: 04/19/23 0732     CULTURE, BLOOD ([facility]) Methicillin resistant Staphylococcus aureus     GRAM STAIN Gram Positive Cocci, probable Staphylococcus      Seen in gram stain of broth only      1 of 1 Aerobic bottle positive    Blood culture x two cultures. Draw prior to antibiotics. [[id number]]  (Abnormal)  (Susceptibility) Collected: 04/16/23 1855    Order Status: Completed Specimen: Blood Updated: 04/19/23 0729     CULTURE, BLOOD ([facility]) Methicillin resistant Staphylococcus aureus     GRAM STAIN Gram Positive Cocci, probable Staphylococcus      Seen in gram stain of broth only      1 of 1 Aerobic bottle positive    Wound Culture [[id number]] Collected: 04/18/23 0805    Order Status: Sent Specimen: Wound from Ankle, Left     Anaerobic Culture [[id number]] Collected: 04/18/23 0805    Order Status: Sent Specimen: Drainage from Ankle, Left     BCID2 Panel [[id number]]  (Abnormal) Collected: 04/16/23 1855    Order Status: Completed Specimen: Blood Updated: 04/17/23 0915     CTX-M (ESBL ) N/A     Comment: Note: Antimicrobial resistance can occur via multiple mechanisms. A Not Detected result for antimicrobial resistance gene(s) does not indicate antimicrobial susceptibility. Subculturing is required for species identification and susceptibility testing of   isolates.        IMP (Cabapenemase ) N/A     Comment: Note: Antimicrobial resistance can occur via multiple mechanisms. A Not Detected result for antimicrobial resistance gene(s) does not indicate antimicrobial susceptibility. Subculturing is required for species identification and susceptibility testing of   isolates.        KPC resistance gene (Carbapenemase ) N/A     Comment: Note: Antimicrobial resistance can occur via multiple mechanisms. A Not Detected result for antimicrobial resistance gene(s) does not indicate antimicrobial susceptibility. Subculturing is required for species identification and susceptibility testing of   isolates.         mcr-1 N/A     Comment: Note: Antimicrobial resistance can occur via multiple mechanisms. A Not Detected result for antimicrobial resistance gene(s) does not indicate antimicrobial susceptibility. Subculturing is required for species identification and susceptibility testing of   isolates.        mecA ID N/A     Comment: Note: Antimicrobial resistance can occur via multiple mechanisms. A Not Detected result for antimicrobial resistance gene(s) does not indicate antimicrobial susceptibility. Subculturing is required for species identification and susceptibility testing of   isolates.        mecA/C and MREJ (MRSA) gene Detected     Comment: Note: Antimicrobial resistance can occur via multiple mechanisms. A Not Detected result for antimicrobial resistance gene(s) does not indicate antimicrobial susceptibility. Subculturing is required for species identification and susceptibility testing of   isolates.        NDM (Carbapenemase ) N/A     Comment: Note: Antimicrobial resistance can occur via multiple mechanisms. A Not Detected result for antimicrobial resistance gene(s) does not indicate antimicrobial susceptibility. Subculturing is required for species identification and susceptibility testing of   isolates.        OXA-48-like (Carbapenemase ) N/A     Comment: Note: Antimicrobial resistance can occur via multiple mechanisms. A Not Detected result for antimicrobial resistance gene(s) does not indicate antimicrobial susceptibility. Subculturing is required for species identification and susceptibility testing of   isolates.        Pedro/B (VRE gene) N/A     Comment: Note: Antimicrobial resistance can occur via multiple mechanisms. A Not Detected result for antimicrobial resistance gene(s) does not indicate antimicrobial susceptibility. Subculturing is required for species identification and susceptibility testing of   isolates.        VIM (Carbapenemase ) N/A     Comment: Note: Antimicrobial  resistance can occur via multiple mechanisms. A Not Detected result for antimicrobial resistance gene(s) does not indicate antimicrobial susceptibility. Subculturing is required for species identification and susceptibility testing of   isolates.        Enterococcus faecalis Not Detected     Enterococcus faecium Not Detected     Listeria monocytogenes Not Detected     Staphylococcus spp. Detected     Staphylococcus aureus Detected     Staphylococcus epidermidis Not Detected     Staphylococcus lugdunensis Not Detected     Streptococcus spp. Not Detected     Streptococcus agalactiae (Group B) Not Detected     Streptococcus pneumoniae Not Detected     Streptococcus pyogenes (Group A) Not Detected     Acinetobacter calcoaceticus/baumannii complex Not Detected     Bacteroides fragilis Not Detected     Enterobacterales Not Detected     Enterobacter cloacae complex Not Detected     Escherichia coli Not Detected     Klebsiella aerogenes Not Detected     Klebsiella oxytoca Not Detected     Klebsiella pneumoniae group Not Detected     Proteus spp. Not Detected     Salmonella spp. Not Detected     Serratia marcescens Not Detected     Haemophilus influenzae Not Detected     Neisseria meningitidis Not Detected     Pseudomonas aeruginosa Not Detected     Stenotrophomonas maltophilia Not Detected     Candida albicans Not Detected     Candida auris Not Detected     Candida glabrata Not Detected     Candida krusei Not Detected     Candida parapsilosis Not Detected     Candida tropicalis Not Detected     Cryptococcus neoformans/gattii Not Detected    Narrative:      The [employer] BCID2 Panel is a multiplexed nucleic acid test intended for the use with CTMG® 2.0 or CTMG® [employer] for the simultaneous qualitative detection and identification of multiple bacterial and yeast nucleic acids and select genetic determinants associated with antimicrobial resistance.  The [employer] BCID2 Panel test is performed  directly on blood culture samples identified as positive by a continuous monitoring blood culture system.  Results are intended to be interpreted in conjunction with Gram stain results.             See below for Radiology    Scheduled Med:   collagenase   Topical (Top) Daily    enoxaparin  40 mg Subcutaneous Daily    Lactobacillus acidophilus  1 capsule Oral TID WM    mupirocin   Nasal BID        Continuous Infusions:   sodium chloride 0.9% 50 mL/hr at 04/18/23 1603        PRN Meds:  acetaminophen, dextrose 10%, dextrose 10%, glucagon (human recombinant), glucose, glucose, insulin aspart U-100, melatonin, sodium chloride 0.9%, Pharmacy to dose Vancomycin consult **AND** vancomycin - pharmacy to dose     Nutrition Status:  As tolerated, cardiac.  Diabetic.      Assessment/Plan:  Chronic left ankle/foot ulcerations, with no overt signs of infection  NSTEMI, likely type 2 demand ischemia   Elevated BNP with no reported history of CHF  Essential hypertension   Type 2 diabetes mellitus   Remote DVT/IVC filter placement, off anticoagulation    -----------------------------------------------------------------------------------------  MRSA bacteremia with left lower extremity/ankle/foot pain and fracture---likely source but definitive source unknown at this time.  STARR may need to be done.    NucMed bone scan ordered--followup with results.   4/4 blood cultures positive for staph aureus  Continue vancomycin, trough per pharmacy, continue to monitor renal function daily.    Trazadone as needed at bedtime.  Fall precautions in place, diabetic diet ordered.  Continue sliding scale insulin regimen, will increase to moderate dose for better control of blood glucose.    Avoid hypoglycemia.  Fasting blood glucose goal less than 180.    Reviewed and restarted appropriate home medications.   Consult PT/OT when appropriate.    Consults infectious disease physician, podiatrist    Anticipated discharge and Disposition:  Pending.     All  diagnosis and differential diagnosis have been reviewed,  interpreted and communicated appropriately to care team. assessment and plan has been documented; I have personally reviewed the labs and test results that are presently available and pertinent to this hospital course; I have reviewed medical records based upon their availability.    All of the patient's questions have been  addressed and answered. Patient's is agreeable to the above stated plan.   I will continue to monitor closely and make adjustments to medical management as needed.      Mariana Ulloa, DO   04/19/2023        This note was created with the assistance of Dragon voice recognition software. There may be transcription errors as a result of using this technology however minimal. Effort has been made to assure accuracy of transcription but any obvious errors or omissions should be clarified with the author of the document.

## 2023-04-19 NOTE — PROGRESS NOTES
[facility]                       [address] [id number]    PATIENT NAME:       SANAM BELTRAN  YOB: 1931  CSN:                [id number]   MRN:                [id number]  ADMIT DATE:         04/16/2023 17:52:00  PHYSICIAN:          Cleveland Vergara DPM                            PROGRESS NOTE    DATE:  04/18/2023 00:00:00    SUBJECTIVE:  The patient was seen today.  Family member is present.  The patient   is a little restless.  No other issues reported.  Remains on antibiotics.  She   definitely is a little bit more alert today than she was down in the ER   yesterday.    PHYSICAL EXAMINATION:  VITAL SIGNS:  Stable.  She is afebrile.    LABORATORY DATA:  Reviewed.  White cell count is trending down to 15.8.    Followup blood culture still pending.  Initial wounds grew out Staphylococcus   aureus.    Dressings are intact, left lower extremity.  There is no significant strike   through from any of the areas.  The patient still complains of discomfort to the   extremity.  No palpable pedal pulses.    ASSESSMENT:    1. Left lower extremity wounds, improving as per family.  2. History of peripheral artery disease.  3. Sepsis.    PLAN:  Continue with the current care.  Plans are for white cell labeled scan   tomorrow.  No recent cultures have been obtained at Wound Care.  Muscular,   consult Dr. Alcantara, since patient was scheduled to see him in the office and was   unable to make it because of back pain.  Continue with all other care.        ______________________________  Cleveland Vergara DPM    GAS/AQS  DD:  04/18/2023  Time:  07:02PM  DT:  04/18/2023  Time:  07:39PM  Job #:  [id number]      PROGRESS NOTE

## 2023-04-20 PROBLEM — I70.222 CRITICAL LIMB ISCHEMIA OF LEFT LOWER EXTREMITY: Status: ACTIVE | Noted: 2023-01-01

## 2023-04-20 NOTE — PROGRESS NOTES
Pharmacokinetic Assessment Follow Up: IV Vancomycin    Vancomycin serum concentration assessment(s):    The random level was drawn correctly and can be used to guide therapy at this time. The measurement is within the desired definitive target range of 15 to 20 mcg/mL.    Vancomycin Regimen Plan:    Re-dose with 15mg/kg (=750mg) x1 and re-check random on 04/21 at 0430.      Drug levels (last 3 results):  Recent Labs   Lab Result Units 04/18/23  1906 04/19/23  0839 04/20/23  0922   Vanc Lvl Random ug/ml 17.0 53.2* 16.3       Pharmacy will continue to follow and monitor vancomycin.    Please contact pharmacy at extension [phone] for questions regarding this assessment.    Thank you for the consult,   Cleopatra Larose       Patient brief summary:  Melodie Villarreal is a 89 y.o. female initiated on antimicrobial therapy with IV Vancomycin for treatment of sepsis    The patient's current regimen is pulse dose.    Drug Allergies:   Review of patient's allergies indicates:   Allergen Reactions    Ace inhibitors Other (See Comments)    Adhesive      Allergic to tape    Bactrim [sulfamethoxazole-trimethoprim] Other (See Comments)     Confusion, Hypoglycemia    Meperidine Other (See Comments)    Midazolam Other (See Comments)    Atorvastatin Nausea Only    Codeine Other (See Comments) and Anxiety    Tapentadol Rash       Actual Body Weight:   49.9kg    Renal Function:   Estimated Creatinine Clearance: 48.1 mL/min (based on SCr of 0.6 mg/dL).,     Dialysis Method (if applicable):  N/A    CBC (last 72 hours):  Recent Labs   Lab Result Units 04/18/23  0636 04/20/23  0921   WBC x10(3)/mcL 15.8* 13.4*   Hgb g/dL 10.7* 9.9*   Hct % 33.4* 30.4*   Platelet x10(3)/mcL 242 204   Mono % % 5.6 8.7   Eos % % 0.1 0.1   Basophil % % 0.1 0.1       Metabolic Panel (last 72 hours):  Recent Labs   Lab Result Units 04/18/23  0505 04/19/23  0839 04/20/23  0922   Sodium Level mmol/L 130*  --  124*   Potassium Level mmol/L 3.8  --  3.9   Chloride mmol/L 95*   --  92*   Carbon Dioxide mmol/L 24  --  23   Glucose Level mg/dL 131*  --  256*   Blood Urea Nitrogen mg/dL 18.1  --  13.4   Creatinine mg/dL 0.67 0.65 0.60   Albumin Level g/dL  --   --  2.0*   Bilirubin Total mg/dL  --   --  0.5   Alkaline Phosphatase unit/L  --   --  134   Aspartate Aminotransferase unit/L  --   --  37*   Alanine Aminotransferase unit/L  --   --  45   Magnesium Level mg/dL  --   --  1.30*   Phosphorus Level mg/dL  --   --  2.2*       Vancomycin Administrations:  vancomycin given in the last 96 hours                     vancomycin 750 mg in dextrose 5 % 250 mL IVPB (ready to mix) (mg) 750 mg New Bag 04/18/23 2125    vancomycin in dextrose 5 % 1 gram/250 mL IVPB 1,000 mg (mg) 1,000 mg New Bag 04/18/23 0913    vancomycin 750 mg in dextrose 5 % 250 mL IVPB (ready to mix) (mg) 750 mg New Bag 04/17/23 2236    vancomycin in dextrose 5 % 1 gram/250 mL IVPB 1,000 mg (mg) 1,000 mg New Bag 04/16/23 2138                    Microbiologic Results:  Microbiology Results (last 7 days)       Procedure Component Value Units Date/Time    Blood Culture [[id number]]  (Abnormal) Collected: 04/19/23 0839    Order Status: Completed Specimen: Blood from Arm, Left Updated: 04/20/23 0652     CULTURE, BLOOD ([facility]) Gram-positive coccus probable staph     GRAM STAIN Gram Positive Cocci, probable Staphylococcus      Seen in gram stain of broth only      2 of 2 bottles positive    Blood Culture [[id number]]  (Abnormal) Collected: 04/19/23 0407    Order Status: Completed Specimen: Blood Updated: 04/20/23 0652     CULTURE, BLOOD ([facility]) Gram-positive coccus probable staph     GRAM STAIN Gram Positive Cocci, probable Staphylococcus      Seen in gram stain of broth only      2 of 2 bottles positive    Blood culture x two cultures. Draw prior to antibiotics. [[id number]]  (Abnormal)  (Susceptibility) Collected: 04/16/23 1855    Order Status: Completed Specimen: Blood Updated: 04/20/23 0639     CULTURE, BLOOD ([facility]) Methicillin resistant  Staphylococcus aureus     GRAM STAIN Gram Positive Cocci, probable Staphylococcus      Seen in gram stain of broth only      1 of 1 Aerobic bottle positive    Blood Culture [[id number]]  (Abnormal)  (Susceptibility) Collected: 04/18/23 0505    Order Status: Completed Specimen: Blood Updated: 04/20/23 0636     CULTURE, BLOOD ([facility]) Methicillin resistant Staphylococcus aureus     GRAM STAIN Gram Positive Cocci, probable Staphylococcus      Seen in gram stain of broth only      2 of 2 bottles positive    Blood Culture [[id number]]  (Abnormal)  (Susceptibility) Collected: 04/18/23 0505    Order Status: Completed Specimen: Blood Updated: 04/20/23 0636     CULTURE, BLOOD ([facility]) Methicillin resistant Staphylococcus aureus     GRAM STAIN Gram Positive Cocci, probable Staphylococcus      Seen in gram stain of broth only      2 of 2 bottles positive    Blood culture x two cultures. Draw prior to antibiotics. [[id number]]  (Abnormal)  (Susceptibility) Collected: 04/16/23 1855    Order Status: Completed Specimen: Blood Updated: 04/19/23 0732     CULTURE, BLOOD ([facility]) Methicillin resistant Staphylococcus aureus     GRAM STAIN Gram Positive Cocci, probable Staphylococcus      Seen in gram stain of broth only      1 of 1 Aerobic bottle positive    Wound Culture [[id number]] Collected: 04/18/23 0805    Order Status: Sent Specimen: Wound from Ankle, Left     Anaerobic Culture [[id number]] Collected: 04/18/23 0805    Order Status: Sent Specimen: Drainage from Ankle, Left     BCID2 Panel [[id number]]  (Abnormal) Collected: 04/16/23 1855    Order Status: Completed Specimen: Blood Updated: 04/17/23 0915     CTX-M (ESBL ) N/A     Comment: Note: Antimicrobial resistance can occur via multiple mechanisms. A Not Detected result for antimicrobial resistance gene(s) does not indicate antimicrobial susceptibility. Subculturing is required for species identification and susceptibility testing of   isolates.        IMP (Cabapenemase ) N/A      Comment: Note: Antimicrobial resistance can occur via multiple mechanisms. A Not Detected result for antimicrobial resistance gene(s) does not indicate antimicrobial susceptibility. Subculturing is required for species identification and susceptibility testing of   isolates.        KPC resistance gene (Carbapenemase ) N/A     Comment: Note: Antimicrobial resistance can occur via multiple mechanisms. A Not Detected result for antimicrobial resistance gene(s) does not indicate antimicrobial susceptibility. Subculturing is required for species identification and susceptibility testing of   isolates.        mcr-1 N/A     Comment: Note: Antimicrobial resistance can occur via multiple mechanisms. A Not Detected result for antimicrobial resistance gene(s) does not indicate antimicrobial susceptibility. Subculturing is required for species identification and susceptibility testing of   isolates.        mecA ID N/A     Comment: Note: Antimicrobial resistance can occur via multiple mechanisms. A Not Detected result for antimicrobial resistance gene(s) does not indicate antimicrobial susceptibility. Subculturing is required for species identification and susceptibility testing of   isolates.        mecA/C and MREJ (MRSA) gene Detected     Comment: Note: Antimicrobial resistance can occur via multiple mechanisms. A Not Detected result for antimicrobial resistance gene(s) does not indicate antimicrobial susceptibility. Subculturing is required for species identification and susceptibility testing of   isolates.        NDM (Carbapenemase ) N/A     Comment: Note: Antimicrobial resistance can occur via multiple mechanisms. A Not Detected result for antimicrobial resistance gene(s) does not indicate antimicrobial susceptibility. Subculturing is required for species identification and susceptibility testing of   isolates.        OXA-48-like (Carbapenemase ) N/A     Comment: Note: Antimicrobial resistance  can occur via multiple mechanisms. A Not Detected result for antimicrobial resistance gene(s) does not indicate antimicrobial susceptibility. Subculturing is required for species identification and susceptibility testing of   isolates.        Pedro/B (VRE gene) N/A     Comment: Note: Antimicrobial resistance can occur via multiple mechanisms. A Not Detected result for antimicrobial resistance gene(s) does not indicate antimicrobial susceptibility. Subculturing is required for species identification and susceptibility testing of   isolates.        VIM (Carbapenemase ) N/A     Comment: Note: Antimicrobial resistance can occur via multiple mechanisms. A Not Detected result for antimicrobial resistance gene(s) does not indicate antimicrobial susceptibility. Subculturing is required for species identification and susceptibility testing of   isolates.        Enterococcus faecalis Not Detected     Enterococcus faecium Not Detected     Listeria monocytogenes Not Detected     Staphylococcus spp. Detected     Staphylococcus aureus Detected     Staphylococcus epidermidis Not Detected     Staphylococcus lugdunensis Not Detected     Streptococcus spp. Not Detected     Streptococcus agalactiae (Group B) Not Detected     Streptococcus pneumoniae Not Detected     Streptococcus pyogenes (Group A) Not Detected     Acinetobacter calcoaceticus/baumannii complex Not Detected     Bacteroides fragilis Not Detected     Enterobacterales Not Detected     Enterobacter cloacae complex Not Detected     Escherichia coli Not Detected     Klebsiella aerogenes Not Detected     Klebsiella oxytoca Not Detected     Klebsiella pneumoniae group Not Detected     Proteus spp. Not Detected     Salmonella spp. Not Detected     Serratia marcescens Not Detected     Haemophilus influenzae Not Detected     Neisseria meningitidis Not Detected     Pseudomonas aeruginosa Not Detected     Stenotrophomonas maltophilia Not Detected     Candida albicans Not  Detected     Candida auris Not Detected     Candida glabrata Not Detected     Candida krusei Not Detected     Candida parapsilosis Not Detected     Candida tropicalis Not Detected     Cryptococcus neoformans/gattii Not Detected    Narrative:      The iTagged BCID2 Panel is a multiplexed nucleic acid test intended for the use with Tape TV® 2.0 or Tape TV® Fashion To Figure Systems for the simultaneous qualitative detection and identification of multiple bacterial and yeast nucleic acids and select genetic determinants associated with antimicrobial resistance.  The iTagged BCID2 Panel test is performed directly on blood culture samples identified as positive by a continuous monitoring blood culture system.  Results are intended to be interpreted in conjunction with Gram stain results.

## 2023-04-20 NOTE — CONSULTS
[facility]   Consult Note       SUBJECTIVE:     Chief Complaint/Reason for Visit:   Chief Complaint   Patient presents with    Fatigue     Pt to ED with c/o generalized weakness, dysuria, and dark colored urine since this morning. Hypotensive 66/36 initially on EMS arrival        History of Present Illness:  Melodie Villarreal is a 89 y.o. female who presents with sepsis.  She presented 2 days ago with altered mental status and hypotension.  She was found to be bacteremic with MRSA.  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  The majority of her history was obtained from her son who was in the room.  She was given some trazodone to sleep and slightly responds to some questions.  He says she is been undergoing hyperbaric therapy at [facility] for the last month or so.  She initially had a wound in the medial aspect of her foot and now just 1 on the lateral aspect of her left ankle.  He says this has been improving.  She is diabetic.  She does not smoke.  He says she was somewhat ambulatory recently, but has significantly slowed down in the last couple of weeks to a month..    Review of patient's allergies indicates:   Allergen Reactions    Ace inhibitors Other (See Comments)    Adhesive      Allergic to tape    Bactrim [sulfamethoxazole-trimethoprim] Other (See Comments)     Confusion, Hypoglycemia    Meperidine Other (See Comments)    Midazolam Other (See Comments)    Atorvastatin Nausea Only    Codeine Other (See Comments) and Anxiety    Tapentadol Rash       Past Medical History:   Diagnosis Date    Adenocarcinoma of uterus     Bladder cancer     Deep vein thrombosis     Essential (primary) hypertension     Heart murmur     High cholesterol     Hypertriglyceridemia     Insomnia     Osteopenia     Other pulmonary embolism without acute cor pulmonale     Personal history of colonic polyps     Rheumatoid arthritis, unspecified     Type 2 diabetes mellitus without complications      Past Surgical  History:   Procedure Laterality Date    CHOLECYSTECTOMY      colonscopy  06/20/2012    ECCE  01/09/2013    estracapsular cataract removal   02/20/2013    insertion of intrpocular lens prosthesis   02/20/2013    phacoemulsifiaction and aspiration of cataract  02/20/2013    phacoemulsificaion and aspiration of cataract      total abdominal hysterectomy and bilateral salpingooophorectomy  1991     Family History   Problem Relation Age of Onset    Diabetes Mother     Heart failure Mother     Cirrhosis Father     Cancer Sister     Hypertension Sister     Diabetes Sister     Celiac disease Sister     Coronary artery disease Sister     Lupus Sister     Uterine cancer Sister     Kidney failure Sister     Ovarian cysts Sister     Cancer Brother     Diabetes Brother     Coronary artery disease Brother      Social History     Tobacco Use    Smoking status: Never    Smokeless tobacco: Never   Substance Use Topics    Alcohol use: Never    Drug use: Never        Review of Systems:  Review of Systems   Constitutional:  Negative for chills and fever.   Respiratory: Negative.     Cardiovascular: Negative.    Gastrointestinal: Negative.      OBJECTIVE:     Vital Signs (Most Recent):  Temp: 98.1 °F (36.7 °C) (04/20/23 1600)  Pulse: 97 (04/20/23 1600)  Resp: 18 (04/20/23 1600)  BP: 104/67 (04/20/23 1600)  SpO2: 96 % (04/20/23 1600)    Admission: Weight: 49.9 kg (110 lb) (04/16/23 1751)   Most Recent: Weight: 49.9 kg (110 lb) (04/17/23 2234)    Physical Exam:  Physical Exam  Constitutional:       Appearance: She is not ill-appearing.   Neck:      Vascular: No carotid bruit.   Cardiovascular:      Rate and Rhythm: Normal rate and regular rhythm.      Pulses:           Radial pulses are 2+ on the right side and 2+ on the left side.        Femoral pulses are 2+ on the right side and 2+ on the left side.       Dorsalis pedis pulses are 0 on the right side and 0 on the left side.        Posterior tibial pulses are 0 on the right side and 0  on the left side.      Comments: 2+ Brachial pulses bilaterally  Pulmonary:      Effort: Pulmonary effort is normal.      Breath sounds: Normal breath sounds. No stridor. No wheezing.   Musculoskeletal:      Right lower leg: No edema.      Left lower leg: No edema.   Feet:      Right foot:      Skin integrity: Skin integrity normal. No ulcer.      Left foot:      Skin integrity: Ulcer (there is a small ulcerated lesion on the lateral aspect of the medial malleolus about 3 mm in diameter.  It does not appear with surrounding erythema.  It does not appear actively infected.) present.      Comments: The left foot behind the medial malleolus has evidence of a previously healed wound  Skin:     General: Skin is warm.   She is obtunded but responds to verbal stimuli by opening her eyes.          ASSESSMENT/PLAN:     Bacteremia-she is being followed by ID.  This may limit any type of intervention should she require anything.  Continue antibiotics for now  Left lower extremity nonhealing wound-continue current wound care for now  Suspected peripheral arterial disease.  The patient has nonpalpable pulses in the left lower extremity.  I discussed with the family at this point the 1st step is to proceed with noninvasive studies.  We will order a left lower extremity duplex.  I did discuss with them that any intervention will need to be weighed with her functional status as well as any limitations caused by her bacteremia.  Will make further recommendations following the results of the duplex

## 2023-04-20 NOTE — PROGRESS NOTES
[facility]  Infectious Disease Progress Note        SUBJECTIVE:   AF, VSS.  No events overnight.  No complaints.  No family at bedside.     MEDICATIONS:   Reviewed in EMR    REVIEW OF SYSTEMS:   Except as documented, all other systems reviewed and negative     PHYSICAL EXAM:     Vitals:    04/20/23 1237   BP: 137/83   Pulse: 97   Resp: 18   Temp: 97.6 °F (36.4 °C)       GENERAL: awake, alert, oriented and in no acute distress, non-toxic appearing   HEENT: normocephalic atraumatic   NECK: supple   LUNGS: Clear bilaterally, no wheezing or rales, no accessory muscle use   CVS: Regular rate and rhythm, normal peripheral perfusion; +murmur  ABD: Soft, non-tender, non-distended, bowel sounds present  EXTREMITIES: no clubbing or cyanosis  SKIN: Warm, dry  NEURO: alert and oriented, grossly without focal deficits   PSYCHIATRIC: Cooperative    LABS AND IMAGING:   Reviewed      ASSESSMENT & PLAN:   She is a 89-year-old female presenting with weakness and fatigue, found to have MRSA bacteremia.  Has a left ankle wound, following with Wound Care at [facility], concerning for source.  ID consulted for assistance with Gram-positive bacteremia.     Persistent MRSA bacteremia   Chronic L ankle wound  Sepsis s/t #1  H/o DMII / HTN  H/o RA, not on medication  Advanced age    PLAN:  BCx remain positive.   Consult cardiology for STARR.  Continue vancomycin, pharmacy dosing.  Level today good.  Repeat BCx in am.  Maintain without lines if feasible.  Discussed with nursing.    ED Castro  Infectious Disease

## 2023-04-20 NOTE — PLAN OF CARE
Problem: Infection  Goal: Absence of Infection Signs and Symptoms  Outcome: Ongoing, Progressing     Problem: Adult Inpatient Plan of Care  Goal: Plan of Care Review  Outcome: Ongoing, Progressing  Goal: Patient-Specific Goal (Individualized)  Outcome: Ongoing, Progressing  Goal: Absence of Hospital-Acquired Illness or Injury  Outcome: Ongoing, Progressing  Goal: Optimal Comfort and Wellbeing  Outcome: Ongoing, Progressing  Goal: Readiness for Transition of Care  Outcome: Ongoing, Progressing     Problem: Diabetes Comorbidity  Goal: Blood Glucose Level Within Targeted Range  Outcome: Ongoing, Progressing     Problem: Adjustment to Illness (Sepsis/Septic Shock)  Goal: Optimal Coping  Outcome: Ongoing, Progressing     Problem: Glycemic Control Impaired (Sepsis/Septic Shock)  Goal: Blood Glucose Level Within Desired Range  Outcome: Ongoing, Progressing     Problem: Infection Progression (Sepsis/Septic Shock)  Goal: Absence of Infection Signs and Symptoms  Outcome: Ongoing, Progressing     Problem: Impaired Wound Healing  Goal: Optimal Wound Healing  Outcome: Ongoing, Progressing     Problem: Nutrition Impaired (Sepsis/Septic Shock)  Goal: Optimal Nutrition Intake  Outcome: Ongoing, Progressing     Problem: Fall Injury Risk  Goal: Absence of Fall and Fall-Related Injury  Outcome: Ongoing, Progressing

## 2023-04-20 NOTE — PROGRESS NOTES
[facility]                       [address] [id number]    PATIENT NAME:       SANAM BELTRAN  YOB: 1931  CSN:                [id number]   MRN:                [id number]  ADMIT DATE:         04/16/2023 17:52:00  PHYSICIAN:          Cleveland Vergara DPM                            PROGRESS NOTE    DATE:  04/20/2023 00:00:00    SUBJECTIVE:  The patient is seen today, doing well, no major complaints.  Son is   present.  Still with some positive blood cultures.  Now plans on STARR.  The   patient has not yet been seen by Vascular.  Apparently, the consult was never   called in.    OBJECTIVE:  CURRENT VITAL SIGNS:  Stable and she is afebrile.  EXTREMITIES:  Remain unchanged.  Wounds to the heel and ankle area.  More   fibrotic laterally.  There is no bone exposure.    LABORATORY DATA:  Her labs were reviewed.  White cell count is slowly coming   down to 13.4, H and H 9.9 and 30.4.  Sed rate 48, , BUN and creatinine   13.4 and 0.6.    Again, serial blood cultures have been positive.    DIAGNOSTIC DATA:  Nuclear medicine scan again indicative of cellulitis of left   lower extremity, but no isolated uptakes to suggest osteomyelitic or abscess   type process.    PLAN:  We will continue with the current care.  Again STARR scheduled.  We will   await vascular input, concerning vascular status in case we do have to do some   type of formal debridement of these areas.        ______________________________  Cleveland Vergara DPM    GAS/AQS  DD:  04/20/2023  Time:  04:51PM  DT:  04/20/2023  Time:  06:29PM  Job #:  [id number]      PROGRESS NOTE

## 2023-04-21 NOTE — ANESTHESIA PREPROCEDURE EVALUATION
04/21/2023  Melodie Villarreal is a 89 y.o., female.    Melodie Villarreal    Pre-op Diagnosis: * No surgery found *     ADMIT DIAGNOSIS 4/17/2023    Review of patient's allergies indicates:   Allergen Reactions    Ace inhibitors Other (See Comments)    Adhesive      Allergic to tape    Bactrim [sulfamethoxazole-trimethoprim] Other (See Comments)     Confusion, Hypoglycemia    Meperidine Other (See Comments)    Midazolam Other (See Comments)    Atorvastatin Nausea Only    Codeine Other (See Comments) and Anxiety    Tapentadol Rash       Current Outpatient Medications   Medication Instructions    blood sugar diagnostic (EASY TOUCH TEST STRIP) Strp 1 each, Misc.(Non-Drug; Combo Route), Daily, Use to test blood glucose once daily    blood-glucose meter Misc Easy Touch use as directed    calcium-vitamin D3 (OS-SUSAN 500 + D3) 500 mg-5 mcg (200 unit) per tablet 1 tablet, Oral, Daily    ferrous gluconate (FERGON) 324 mg, Oral, With breakfast    glimepiride (AMARYL) 4 MG tablet 1 tablet in the AM and 1/2 tablet in the PM    lancets Misc Easy Touch Twist 30G Lancets to use with insurance preferred meter Diagnosis Code: E11.9.  Test once daily.    losartan-hydrochlorothiazide 100-12.5 mg (HYZAAR) 100-12.5 mg Tab 1 tablet, Oral, Daily    metFORMIN (GLUCOPHAGE) 1,000 mg, Oral, 2 times daily with meals    multivitamin capsule 1 capsule, Oral, Daily    mupirocin (BACTROBAN) 2 % ointment Topical (Top), 3 times daily    pravastatin (PRAVACHOL) 40 mg, Oral, Daily    SANTYL ointment APPLY TO LEFT FOOT WOUND DAILY    traMADoL (ULTRAM) 50 mg, Oral, Every 6 hours PRN    traZODone (DESYREL) 100 mg, Oral, Nightly PRN       Past Medical History:   Diagnosis Date    Adenocarcinoma of uterus     Bladder cancer     Deep vein thrombosis     Essential (primary) hypertension     Heart murmur     High cholesterol      Hypertriglyceridemia     Insomnia     Osteopenia     Other pulmonary embolism without acute cor pulmonale     Personal history of colonic polyps     Rheumatoid arthritis, unspecified     Type 2 diabetes mellitus without complications    PMH includes AS, MS, MR, TR    Past Surgical History:   Procedure Laterality Date    CHOLECYSTECTOMY      colonscopy  06/20/2012    ECCE  01/09/2013    estracapsular cataract removal   02/20/2013    insertion of intrpocular lens prosthesis   02/20/2013    phacoemulsifiaction and aspiration of cataract  02/20/2013    phacoemulsificaion and aspiration of cataract      total abdominal hysterectomy and bilateral salpingooophorectomy  1991     Lab Results   Component Value Date    WBC 15.9 (H) 04/21/2023    HGB 11.7 (L) 04/21/2023    HCT 36.7 (L) 04/21/2023    MCV 82.7 04/21/2023     04/21/2023   BMP  Lab Results   Component Value Date     (L) 04/21/2023    K 3.5 04/21/2023    CO2 22 (L) 04/21/2023    BUN 15.2 04/21/2023    CREATININE 0.64 04/21/2023    CALCIUM 8.5 04/21/2023    EGFRNONAA >60 05/03/2022          Results for orders placed during the hospital encounter of 04/16/23    Echo    Interpretation Summary  · The left ventricle is normal in size with concentric remodeling and low normal systolic function.  · The estimated ejection fraction is 54%.  · Grade II left ventricular diastolic dysfunction.  · Normal right ventricular size with low normal right ventricular systolic function.  · There is mild-to-moderate aortic valve stenosis.  · Aortic valve area is 1.31 cm2; peak velocity is 2.1 m/s; mean gradient is 10 mmHg.  · There is mild mitral stenosis.  · The mean diastolic gradient across the mitral valve is 7 mmHg at a heart rate of 94 bpm.  · Moderate mitral regurgitation.  · Mild tricuspid regurgitation.  · There is pulmonary hypertension.  · The estimated PA systolic pressure is 44 mmHg.          Pre-op Assessment    I have reviewed the Patient Summary  Reports.    I have reviewed the NPO Status.   I have reviewed the Medications.     Review of Systems  Anesthesia Hx:  No problems with previous Anesthesia  Denies Family Hx of Anesthesia complications.   Denies Personal Hx of Anesthesia complications.   Social:  Non-Smoker    Hematology/Oncology:        Current/Recent Cancer. Oncology Comments: UTERINE CANCER, BLADDER CANCER   Cardiovascular:   Exercise tolerance: good Hypertension Dysrhythmias  Denies Angina.  Denies Orthopnea.  Denies PND. hyperlipidemia  Denies STERLING.  Functional Capacity good / => 4 METS  Deep Venous Thrombosis (DVT) (& PULMONARY EMBOLI)    Musculoskeletal:   Arthritis (RA)     Neurological:   Denies TIA. Denies CVA.    Endocrine:   Diabetes, type 2    Psych:  Psychiatric Normal           Physical Exam  General: Well nourished, Alert and Oriented    Airway:  Mallampati: III   Mouth Opening: Normal  TM Distance: Normal  Tongue: Normal  Neck ROM: Normal ROM    Dental:  Intact    Chest/Lungs:  Clear to auscultation    Heart:  Rate: Normal  Rhythm: Regular Rhythm  No pretibial edema  No carotid bruits      Anesthesia Plan  Type of Anesthesia, risks & benefits discussed:    Anesthesia Type: Gen Natural Airway  Intra-op Monitoring Plan: Standard ASA Monitors  Post Op Pain Control Plan: IV/PO Opioids PRN  Induction:  IV  Informed Consent: Informed consent signed with the Patient and all parties understand the risks and agree with anesthesia plan.  All questions answered. Patient consented to blood products? No  ASA Score: 4  Day of Surgery Review of History & Physical: H&P Update referred to the surgeon/provider.  Anesthesia Plan Notes: GA TIVA    Ready For Surgery From Anesthesia Perspective.     .

## 2023-04-21 NOTE — PROGRESS NOTES
[facility]  Progress Note     [facility]  Progress Note      Patient: Melodie Villarreal  MRN: [id number]  STATUS: IP- Inpatient   DOS: 4/21/2023   PCP: Wisam Espinosa Jr, MD      SUBJECTIVE  89 y.o. female with bacteremia and a left foot wound.  Patient underwent PAM and duplex yesterday.  Repeat blood cultures did show continued bacteremia.      ALLERGIES  Reviewed as documented in EMR  Ace inhibitors, Adhesive, Bactrim [sulfamethoxazole-trimethoprim], Meperidine, Midazolam, Atorvastatin, Codeine, and Tapentadol    REVIEW OF SYSTEMS:  Patient received pain medication earlier today and is somewhat obtunded making review of systems unable to be obtained    PHYSICAL EXAM  VITALS:  Temp: 97.6 °F (36.4 °C) (04/21/23 1632)  Pulse: 88 (04/21/23 1632)  Resp: 18 (04/21/23 1519)  BP: 123/77 (04/21/23 1519)  SpO2: 95 % (04/21/23 1632)    Physical Exam  Lying in bed sleeping.  Faintly arousable  Left foot dressed      RADIOLOGY  I reviewed the patient's ABIs which show severe decreased bilaterally.  This has a value of 0.25 on the right and 0.21 on the left.  Left lower extremity duplex shows monophasic flow throughout the left lower extremity suggestive of more proximal stenosis like in the aortoiliac segment.      ASSESSMENT  Critical limb ischemia     PLAN  I discussed with the patient's son that at this point she has severe PAD.  This is likely the cause of the nonhealing of her wound.  As she does have aortoiliac inflow this makes intervention more complex due to both her medical comorbidities and her age.  Even with endovascular intervention she will require placement of a stent which can not be done in the face of bacteremia.  I will continue to follow the patient.  Should she have some improvement and clear her bacteremia we can reconsider intervention, but would need a CTA of the abdomen and pelvis with runoff prior to any intervention.          Darwin Bueno MD

## 2023-04-21 NOTE — PROGRESS NOTES
Pharmacokinetic Assessment Follow Up: IV Vancomycin    Vancomycin serum concentration assessment(s):    The random level was drawn correctly and can be used to guide therapy at this time. The measurement is below the desired definitive target range of 15 to 20 mcg/mL.    Vancomycin Regimen Plan:    I will continue to pulse dose the vancomycin with a 500 mg dose due to her receiving contrast on 04/20 which can negatively effect her crcl.  I will get a random level on 04/22 with AM labs at 0430.    Drug levels (last 3 results):  Recent Labs   Lab Result Units 04/19/23  0839 04/20/23  0922 04/21/23  0630   Vanc Lvl Random ug/ml 53.2* 16.3 13.1*       Pharmacy will continue to follow and monitor vancomycin.    Please contact pharmacy at extension [phone] for questions regarding this assessment.    Thank you for the consult,   Denny Farr       Patient brief summary:  Melodie Villarreal is a 89 y.o. female initiated on antimicrobial therapy with IV Vancomycin for treatment of sepsis    The patient's current regimen is pulse dosing    Drug Allergies:   Review of patient's allergies indicates:   Allergen Reactions    Ace inhibitors Other (See Comments)    Adhesive      Allergic to tape    Bactrim [sulfamethoxazole-trimethoprim] Other (See Comments)     Confusion, Hypoglycemia    Meperidine Other (See Comments)    Midazolam Other (See Comments)    Atorvastatin Nausea Only    Codeine Other (See Comments) and Anxiety    Tapentadol Rash       Actual Body Weight:   49.9 kg    Renal Function:   Estimated Creatinine Clearance: 47.4 mL/min (based on SCr of 0.64 mg/dL).,     Dialysis Method (if applicable):  N/A    CBC (last 72 hours):  Recent Labs   Lab Result Units 04/20/23  0921 04/21/23  0630   WBC x10(3)/mcL 13.4* 15.9*   Hgb g/dL 9.9* 11.7*   Hct % 30.4* 36.7*   Platelet x10(3)/mcL 204 227   Mono % % 8.7 8.6   Eos % % 0.1 0.5   Basophil % % 0.1 0.2       Metabolic Panel (last 72 hours):  Recent Labs   Lab Result Units 04/19/23  0839  04/20/23  0922 04/21/23 0630   Sodium Level mmol/L  --  124* 127*   Potassium Level mmol/L  --  3.9 3.5   Chloride mmol/L  --  92* 93*   Carbon Dioxide mmol/L  --  23 22*   Glucose Level mg/dL  --  256* 195*   Blood Urea Nitrogen mg/dL  --  13.4 15.2   Creatinine mg/dL 0.65 0.60 0.64   Albumin Level g/dL  --  2.0*  --    Bilirubin Total mg/dL  --  0.5  --    Alkaline Phosphatase unit/L  --  134  --    Aspartate Aminotransferase unit/L  --  37*  --    Alanine Aminotransferase unit/L  --  45  --    Magnesium Level mg/dL  --  1.30* 1.80   Phosphorus Level mg/dL  --  2.2* 2.5       Vancomycin Administrations:  vancomycin given in the last 96 hours                     vancomycin 750 mg in dextrose 5 % 250 mL IVPB (ready to mix) (mg) 750 mg New Bag 04/20/23 1052    vancomycin 750 mg in dextrose 5 % 250 mL IVPB (ready to mix) (mg) 750 mg New Bag 04/18/23 2125    vancomycin in dextrose 5 % 1 gram/250 mL IVPB 1,000 mg (mg) 1,000 mg New Bag 04/18/23 0913    vancomycin 750 mg in dextrose 5 % 250 mL IVPB (ready to mix) (mg) 750 mg New Bag 04/17/23 2236                    Microbiologic Results:  Microbiology Results (last 7 days)       Procedure Component Value Units Date/Time    Blood Culture [[id number]]  (Abnormal)  (Susceptibility) Collected: 04/19/23 0407    Order Status: Completed Specimen: Blood Updated: 04/21/23 0645     CULTURE, BLOOD ([facility]) Methicillin resistant Staphylococcus aureus     GRAM STAIN Gram Positive Cocci, probable Staphylococcus      Seen in gram stain of broth only      2 of 2 bottles positive    Blood Culture [[id number]]  (Abnormal)  (Susceptibility) Collected: 04/19/23 0839    Order Status: Completed Specimen: Blood from Arm, Left Updated: 04/21/23 0645     CULTURE, BLOOD ([facility]) Methicillin resistant Staphylococcus aureus     GRAM STAIN Gram Positive Cocci, probable Staphylococcus      Seen in gram stain of broth only      2 of 2 bottles positive    Blood Culture [[id number]] Collected: 04/21/23 0630     Order Status: Sent Specimen: Blood from Hand, Left Updated: 04/21/23 0639    Blood Culture [[id number]]     Order Status: Sent Specimen: Blood     Blood culture x two cultures. Draw prior to antibiotics. [[id number]]  (Abnormal)  (Susceptibility) Collected: 04/16/23 1855    Order Status: Completed Specimen: Blood Updated: 04/20/23 0639     CULTURE, BLOOD ([facility]) Methicillin resistant Staphylococcus aureus     GRAM STAIN Gram Positive Cocci, probable Staphylococcus      Seen in gram stain of broth only      1 of 1 Aerobic bottle positive    Blood Culture [[id number]]  (Abnormal)  (Susceptibility) Collected: 04/18/23 0505    Order Status: Completed Specimen: Blood Updated: 04/20/23 0636     CULTURE, BLOOD ([facility]) Methicillin resistant Staphylococcus aureus     GRAM STAIN Gram Positive Cocci, probable Staphylococcus      Seen in gram stain of broth only      2 of 2 bottles positive    Blood Culture [[id number]]  (Abnormal)  (Susceptibility) Collected: 04/18/23 0505    Order Status: Completed Specimen: Blood Updated: 04/20/23 0636     CULTURE, BLOOD ([facility]) Methicillin resistant Staphylococcus aureus     GRAM STAIN Gram Positive Cocci, probable Staphylococcus      Seen in gram stain of broth only      2 of 2 bottles positive    Blood culture x two cultures. Draw prior to antibiotics. [[id number]]  (Abnormal)  (Susceptibility) Collected: 04/16/23 1855    Order Status: Completed Specimen: Blood Updated: 04/19/23 0732     CULTURE, BLOOD ([facility]) Methicillin resistant Staphylococcus aureus     GRAM STAIN Gram Positive Cocci, probable Staphylococcus      Seen in gram stain of broth only      1 of 1 Aerobic bottle positive    Wound Culture [[id number]] Collected: 04/18/23 0805    Order Status: Sent Specimen: Wound from Ankle, Left     Anaerobic Culture [[id number]] Collected: 04/18/23 0805    Order Status: Sent Specimen: Drainage from Ankle, Left     BCID2 Panel [[id number]]  (Abnormal) Collected: 04/16/23 1855    Order Status: Completed  Specimen: Blood Updated: 04/17/23 0915     CTX-M (ESBL ) N/A     Comment: Note: Antimicrobial resistance can occur via multiple mechanisms. A Not Detected result for antimicrobial resistance gene(s) does not indicate antimicrobial susceptibility. Subculturing is required for species identification and susceptibility testing of   isolates.        IMP (Cabapenemase ) N/A     Comment: Note: Antimicrobial resistance can occur via multiple mechanisms. A Not Detected result for antimicrobial resistance gene(s) does not indicate antimicrobial susceptibility. Subculturing is required for species identification and susceptibility testing of   isolates.        KPC resistance gene (Carbapenemase ) N/A     Comment: Note: Antimicrobial resistance can occur via multiple mechanisms. A Not Detected result for antimicrobial resistance gene(s) does not indicate antimicrobial susceptibility. Subculturing is required for species identification and susceptibility testing of   isolates.        mcr-1 N/A     Comment: Note: Antimicrobial resistance can occur via multiple mechanisms. A Not Detected result for antimicrobial resistance gene(s) does not indicate antimicrobial susceptibility. Subculturing is required for species identification and susceptibility testing of   isolates.        mecA ID N/A     Comment: Note: Antimicrobial resistance can occur via multiple mechanisms. A Not Detected result for antimicrobial resistance gene(s) does not indicate antimicrobial susceptibility. Subculturing is required for species identification and susceptibility testing of   isolates.        mecA/C and MREJ (MRSA) gene Detected     Comment: Note: Antimicrobial resistance can occur via multiple mechanisms. A Not Detected result for antimicrobial resistance gene(s) does not indicate antimicrobial susceptibility. Subculturing is required for species identification and susceptibility testing of   isolates.        NDM (Carbapenemase  ) N/A     Comment: Note: Antimicrobial resistance can occur via multiple mechanisms. A Not Detected result for antimicrobial resistance gene(s) does not indicate antimicrobial susceptibility. Subculturing is required for species identification and susceptibility testing of   isolates.        OXA-48-like (Carbapenemase ) N/A     Comment: Note: Antimicrobial resistance can occur via multiple mechanisms. A Not Detected result for antimicrobial resistance gene(s) does not indicate antimicrobial susceptibility. Subculturing is required for species identification and susceptibility testing of   isolates.        Pedro/B (VRE gene) N/A     Comment: Note: Antimicrobial resistance can occur via multiple mechanisms. A Not Detected result for antimicrobial resistance gene(s) does not indicate antimicrobial susceptibility. Subculturing is required for species identification and susceptibility testing of   isolates.        VIM (Carbapenemase ) N/A     Comment: Note: Antimicrobial resistance can occur via multiple mechanisms. A Not Detected result for antimicrobial resistance gene(s) does not indicate antimicrobial susceptibility. Subculturing is required for species identification and susceptibility testing of   isolates.        Enterococcus faecalis Not Detected     Enterococcus faecium Not Detected     Listeria monocytogenes Not Detected     Staphylococcus spp. Detected     Staphylococcus aureus Detected     Staphylococcus epidermidis Not Detected     Staphylococcus lugdunensis Not Detected     Streptococcus spp. Not Detected     Streptococcus agalactiae (Group B) Not Detected     Streptococcus pneumoniae Not Detected     Streptococcus pyogenes (Group A) Not Detected     Acinetobacter calcoaceticus/baumannii complex Not Detected     Bacteroides fragilis Not Detected     Enterobacterales Not Detected     Enterobacter cloacae complex Not Detected     Escherichia coli Not Detected     Klebsiella aerogenes  Not Detected     Klebsiella oxytoca Not Detected     Klebsiella pneumoniae group Not Detected     Proteus spp. Not Detected     Salmonella spp. Not Detected     Serratia marcescens Not Detected     Haemophilus influenzae Not Detected     Neisseria meningitidis Not Detected     Pseudomonas aeruginosa Not Detected     Stenotrophomonas maltophilia Not Detected     Candida albicans Not Detected     Candida auris Not Detected     Candida glabrata Not Detected     Candida krusei Not Detected     Candida parapsilosis Not Detected     Candida tropicalis Not Detected     Cryptococcus neoformans/gattii Not Detected    Narrative:      The [employer] BCID2 Panel is a multiplexed nucleic acid test intended for the use with [employer]® 2.0 or [employer]® [employer] Systems for the simultaneous qualitative detection and identification of multiple bacterial and yeast nucleic acids and select genetic determinants associated with antimicrobial resistance.  The [employer] BCID2 Panel test is performed directly on blood culture samples identified as positive by a continuous monitoring blood culture system.  Results are intended to be interpreted in conjunction with Gram stain results.

## 2023-04-21 NOTE — CONSULTS
Inpatient consult to Cardiology  Consult performed by: ED Lindo  Consult ordered by: ED Travis  Reason for consult: STARR    [facility] - 6th Floor Medical Telemetry  Cardiology  Consult Note    Patient Name: Melodie Villarreal  MRN: [id number]  Admission Date: 4/16/2023  Hospital Length of Stay: 5 days  Code Status: Partial Code   Attending Provider: Willie Pereira MD   Consulting Provider: ED Lindo  Primary Care Physician: Wisam Espinosa Jr, MD  Principal Problem:Sepsis    Patient information was obtained from patient and ER records.     Subjective:     Chief Complaint:  Consult for STARR    HPI:   Ms. Villarreal is a 89 year old, known to Dr. Walker who presented to [facility] on 4.16.23 with c/o generalized weakness, dysuria, and dark colored urine. She was initially hypotensive upon arrival. She has a history of bladder cancer, HTN, HLD, DM, and DDD. She has a chronic wound to her LLE and nonpalpable pulse to her LLE. Upon workup she was found to have MRSA bacteremia. [facility] is being consulted for STARR to rule out endocarditis.      PMH: bladder cancer, HTN, HLD, DM, DVT, PE and DDD.   PSH: Cholecystectomy, colonoscopy, cataract surgery  Family History: Noncontributory  Social History: Denies tobacco, alcohol, and illicit drugs    Previous Cardiac Diagnostics:   Echo 4.17.23  The left ventricle is normal in size with concentric remodeling and low normal systolic function.  The estimated ejection fraction is 54%.  Grade II left ventricular diastolic dysfunction.  Normal right ventricular size with low normal right ventricular systolic function.  There is mild-to-moderate aortic valve stenosis.  Aortic valve area is 1.31 cm2; peak velocity is 2.1 m/s; mean gradient is 10 mmHg.  There is mild mitral stenosis.  The mean diastolic gradient across the mitral valve is 7 mmHg at a heart rate of 94 bpm.  Moderate mitral regurgitation.  Mild tricuspid regurgitation.  There is pulmonary  hypertension.  The estimated PA systolic pressure is 44 mmHg.      Echo 6.1.21  The study quality is average.   The left ventricle is decreased in size.  The left ventricular ejection fraction is 50%.   Mild to moderate (1-2+) mitral regurgitation. Mitral stenosis is moderate,  Moderate calcification of the mitral valve is noted.  Mild (1+) aortic regurgitation.   Mild to moderate (1-2+) tricuspid regurgitation.  The pulmonary artery systolic pressure is 54 mmHg.       Past Medical History:   Diagnosis Date    Adenocarcinoma of uterus     Bladder cancer     Deep vein thrombosis     Essential (primary) hypertension     Heart murmur     High cholesterol     Hypertriglyceridemia     Insomnia     Osteopenia     Other pulmonary embolism without acute cor pulmonale     Personal history of colonic polyps     Rheumatoid arthritis, unspecified     Type 2 diabetes mellitus without complications        Past Surgical History:   Procedure Laterality Date    CHOLECYSTECTOMY      colonscopy  06/20/2012    E[facility]E  01/09/2013    estracapsular cataract removal   02/20/2013    insertion of intrpocular lens prosthesis   02/20/2013    phacoemulsifiaction and aspiration of cataract  02/20/2013    phacoemulsificaion and aspiration of cataract      total abdominal hysterectomy and bilateral salpingooophorectomy  1991       Review of patient's allergies indicates:   Allergen Reactions    Ace inhibitors Other (See Comments)    Adhesive      Allergic to tape    Bactrim [sulfamethoxazole-trimethoprim] Other (See Comments)     Confusion, Hypoglycemia    Meperidine Other (See Comments)    Midazolam Other (See Comments)    Atorvastatin Nausea Only    Codeine Other (See Comments) and Anxiety    Tapentadol Rash       No current facility-administered medications on file prior to encounter.     Current Outpatient Medications on File Prior to Encounter   Medication Sig    calcium-vitamin D3 (OS-SUSAN 500 + D3) 500 mg-5 mcg (200 unit) per tablet Take 1  tablet by mouth once daily.    ferrous gluconate (FERGON) 324 MG tablet Take 324 mg by mouth daily with breakfast.    glimepiride (AMARYL) 4 MG tablet 1 tablet in the AM and 1/2 tablet in the PM (Patient taking differently: Take 2 mg by mouth as needed. 1 tablet in the AM and 1/2 tablet in the PM)    lancets Misc Easy Touch Twist 30G Lancets to use with insurance preferred meter Diagnosis Code: E11.9.  Test once daily.    losartan-hydrochlorothiazide 100-12.5 mg (HYZAAR) 100-12.5 mg Tab Take 1 tablet by mouth once daily.    metFORMIN (GLUCOPHAGE) 1000 MG tablet Take 1 tablet (1,000 mg total) by mouth 2 (two) times daily with meals.    multivitamin capsule Take 1 capsule by mouth once daily.    pravastatin (PRAVACHOL) 40 MG tablet Take 1 tablet (40 mg total) by mouth once daily.    traMADoL (ULTRAM) 50 mg tablet Take 1 tablet (50 mg total) by mouth every 6 (six) hours as needed for Pain.    traZODone (DESYREL) 100 MG tablet Take 1 tablet (100 mg total) by mouth nightly as needed for Insomnia.    blood sugar diagnostic (EASY TOUCH TEST STRIP) Strp 1 each by Misc.(Non-Drug; Combo Route) route once daily. Use to test blood glucose once daily    blood-glucose meter Misc Easy Touch use as directed    mupirocin (BACTROBAN) 2 % ointment Apply topically 3 (three) times daily.    SANTYL ointment APPLY TO LEFT FOOT WOUND DAILY     Family History       Problem Relation (Age of Onset)    Cancer Sister, Brother    Celiac disease Sister    Cirrhosis Father    Coronary artery disease Sister, Brother    Diabetes Mother, Sister, Brother    Heart failure Mother    Hypertension Sister    Kidney failure Sister    Lupus Sister    Ovarian cysts Sister    Uterine cancer Sister          Tobacco Use    Smoking status: Never    Smokeless tobacco: Never   Substance and Sexual Activity    Alcohol use: Never    Drug use: Never    Sexual activity: Not Currently       Review of Systems   Constitutional:  Positive for fatigue.   Neurological:   Positive for weakness.   All other systems reviewed and are negative.    Objective:     Vital Signs (Most Recent):  Temp: 97.1 °F (36.2 °C) (04/21/23 0700)  Pulse: 97 (04/21/23 0700)  Resp: 18 (04/21/23 0819)  BP: (!) 130/91 (04/21/23 0700)  SpO2: 95 % (04/21/23 0700)   Vital Signs (24h Range):  Temp:  [97.1 °F (36.2 °C)-98.5 °F (36.9 °C)] 97.1 °F (36.2 °C)  Pulse:  [] 97  Resp:  [16-18] 18  SpO2:  [91 %-98 %] 95 %  BP: (104-144)/(67-91) 130/91     Weight: 57.6 kg (126 lb 15.8 oz)  Body mass index is 22.49 kg/m².    SpO2: 95 %         Intake/Output Summary (Last 24 hours) at 4/21/2023 0825  Last data filed at 4/21/2023 0600  Gross per 24 hour   Intake 1940 ml   Output 3300 ml   Net -1360 ml       Lines/Drains/Airways       Drain  Duration                  Urethral Catheter 04/16/23 2127 Silicone 16 Fr. 4 days              Peripheral Intravenous Line  Duration                  Peripheral IV - Single Lumen 04/19/23 0400 22 G Anterior;Right Forearm 2 days                    Significant Labs:  Recent Results (from the past 72 hour(s))   Vancomycin, Random    Collection Time: 04/18/23  7:06 PM   Result Value Ref Range    Vanc Lvl Random 17.0 15.0 - 20.0 ug/ml   POCT glucose    Collection Time: 04/18/23  7:39 PM   Result Value Ref Range    POCT Glucose 205 (H) 70 - 110 mg/dL   POCT glucose    Collection Time: 04/19/23  3:55 AM   Result Value Ref Range    POCT Glucose 242 (H) 70 - 110 mg/dL   Blood Culture    Collection Time: 04/19/23  4:07 AM    Specimen: Blood   Result Value Ref Range    CULTURE, BLOOD ([facility]) Methicillin resistant Staphylococcus aureus (A)     GRAM STAIN Gram Positive Cocci, probable Staphylococcus (AA)     GRAM STAIN Seen in gram stain of broth only (AA)     GRAM STAIN 2 of 2 bottles positive (AA)        Susceptibility    Methicillin resistant Staphylococcus aureus -  (no method available)     Clindamycin >=8 Resistant      Oxacillin >=4 Resistant      Trimethoprim/Sulfamethoxazole <=10 Sensitive       Tetracycline 2 Sensitive      Vancomycin 1 Sensitive    Blood Culture    Collection Time: 04/19/23  8:39 AM    Specimen: Arm, Left; Blood   Result Value Ref Range    CULTURE, BLOOD ([facility]) Methicillin resistant Staphylococcus aureus (A)     GRAM STAIN Gram Positive Cocci, probable Staphylococcus (AA)     GRAM STAIN Seen in gram stain of broth only (AA)     GRAM STAIN 2 of 2 bottles positive (AA)        Susceptibility    Methicillin resistant Staphylococcus aureus -  (no method available)     Clindamycin >=8 Resistant      Oxacillin >=4 Resistant      Trimethoprim/Sulfamethoxazole <=10 Sensitive      Tetracycline 2 Sensitive      Vancomycin 1 Sensitive    Vancomycin, Random    Collection Time: 04/19/23  8:39 AM   Result Value Ref Range    Vanc Lvl Random 53.2 (H) 15.0 - 20.0 ug/ml   Creatinine, Serum    Collection Time: 04/19/23  8:39 AM   Result Value Ref Range    Creatinine 0.65 0.55 - 1.02 mg/dL    eGFR >60 mls/min/1.73/m2   POCT glucose    Collection Time: 04/19/23 10:54 AM   Result Value Ref Range    POCT Glucose 237 (H) 70 - 110 mg/dL   POCT glucose    Collection Time: 04/19/23  3:46 PM   Result Value Ref Range    POCT Glucose 234 (H) 70 - 110 mg/dL   POCT glucose    Collection Time: 04/19/23  7:56 PM   Result Value Ref Range    POCT Glucose 177 (H) 70 - 110 mg/dL   POCT glucose    Collection Time: 04/20/23  5:20 AM   Result Value Ref Range    POCT Glucose 191 (H) 70 - 110 mg/dL   Sedimentation rate    Collection Time: 04/20/23  9:21 AM   Result Value Ref Range    Sed Rate 48 (H) 0 - 20 mm/hr   CBC with Differential    Collection Time: 04/20/23  9:21 AM   Result Value Ref Range    WBC 13.4 (H) 4.5 - 11.5 x10(3)/mcL    RBC 3.64 (L) 4.20 - 5.40 x10(6)/mcL    Hgb 9.9 (L) 12.0 - 16.0 g/dL    Hct 30.4 (L) 37.0 - 47.0 %    MCV 83.5 80.0 - 94.0 fL    MCH 27.2 27.0 - 31.0 pg    MCHC 32.6 (L) 33.0 - 36.0 g/dL    RDW 14.4 11.5 - 17.0 %    Platelet 204 130 - 400 x10(3)/mcL    MPV 9.7 7.4 - 10.4 fL    Neut % 84.8 %     Lymph % 5.6 %    Mono % 8.7 %    Eos % 0.1 %    Basophil % 0.1 %    Lymph # 0.75 0.6 - 4.6 x10(3)/mcL    Neut # 11.35 (H) 2.1 - 9.2 x10(3)/mcL    Mono # 1.17 0.1 - 1.3 x10(3)/mcL    Eos # 0.01 0 - 0.9 x10(3)/mcL    Baso # 0.02 0 - 0.2 x10(3)/mcL    IG# 0.10 (H) 0 - 0.04 x10(3)/mcL    IG% 0.7 %    NRBC% 0.0 %   Vancomycin, Random    Collection Time: 04/20/23  9:22 AM   Result Value Ref Range    Vanc Lvl Random 16.3 15.0 - 20.0 ug/ml   Comprehensive Metabolic Panel    Collection Time: 04/20/23  9:22 AM   Result Value Ref Range    Sodium Level 124 (L) 132 - 146 mmol/L    Potassium Level 3.9 3.5 - 5.1 mmol/L    Chloride 92 (L) 98 - 111 mmol/L    Carbon Dioxide 23 23 - 31 mmol/L    Glucose Level 256 (H) 75 - 121 mg/dL    Blood Urea Nitrogen 13.4 9.8 - 20.1 mg/dL    Creatinine 0.60 0.55 - 1.02 mg/dL    Calcium Level Total 8.1 (L) 8.4 - 10.2 mg/dL    Protein Total 4.9 (L) 5.8 - 7.6 gm/dL    Albumin Level 2.0 (L) 3.4 - 4.8 g/dL    Globulin 2.9 2.4 - 3.5 gm/dL    Albumin/Globulin Ratio 0.7 (L) 1.1 - 2.0 ratio    Bilirubin Total 0.5 <=1.5 mg/dL    Alkaline Phosphatase 134 40 - 150 unit/L    Alanine Aminotransferase 45 0 - 55 unit/L    Aspartate Aminotransferase 37 (H) 5 - 34 unit/L    eGFR >60 mls/min/1.73/m2   Magnesium    Collection Time: 04/20/23  9:22 AM   Result Value Ref Range    Magnesium Level 1.30 (L) 1.60 - 2.60 mg/dL   Phosphorus    Collection Time: 04/20/23  9:22 AM   Result Value Ref Range    Phosphorus Level 2.2 (L) 2.3 - 4.7 mg/dL   C-Reactive Protein    Collection Time: 04/20/23  9:22 AM   Result Value Ref Range    C-Reactive Protein 116.90 (H) <5.00 mg/L   POCT glucose    Collection Time: 04/20/23 10:51 AM   Result Value Ref Range    POCT Glucose 231 (H) 70 - 110 mg/dL   POCT glucose    Collection Time: 04/20/23  3:26 PM   Result Value Ref Range    POCT Glucose 279 (H) 70 - 110 mg/dL   Ankle Brachial Indices (PAM)    Collection Time: 04/20/23 10:41 PM   Result Value Ref Range    Left arm  mmHg    Left  posterior tibial 33 mmHg    Right posterior tibial 38 mmHg    Left dorsalis pedis 31 mmHg    Right dorsalis pedis 26 mmHg    Left PAM 0.21     Right PAM 0.25    Vancomycin, Random    Collection Time: 04/21/23  6:30 AM   Result Value Ref Range    Vanc Lvl Random 13.1 (L) 15.0 - 20.0 ug/ml   Basic Metabolic Panel    Collection Time: 04/21/23  6:30 AM   Result Value Ref Range    Sodium Level 127 (L) 132 - 146 mmol/L    Potassium Level 3.5 3.5 - 5.1 mmol/L    Chloride 93 (L) 98 - 111 mmol/L    Carbon Dioxide 22 (L) 23 - 31 mmol/L    Glucose Level 195 (H) 75 - 121 mg/dL    Blood Urea Nitrogen 15.2 9.8 - 20.1 mg/dL    Creatinine 0.64 0.55 - 1.02 mg/dL    BUN/Creatinine Ratio 24     Calcium Level Total 8.5 8.4 - 10.2 mg/dL    Anion Gap 12.0 mEq/L    eGFR >60 mls/min/1.73/m2   Magnesium    Collection Time: 04/21/23  6:30 AM   Result Value Ref Range    Magnesium Level 1.80 1.60 - 2.60 mg/dL   Phosphorus    Collection Time: 04/21/23  6:30 AM   Result Value Ref Range    Phosphorus Level 2.5 2.3 - 4.7 mg/dL   CBC with Differential    Collection Time: 04/21/23  6:30 AM   Result Value Ref Range    WBC 15.9 (H) 4.5 - 11.5 x10(3)/mcL    RBC 4.44 4.20 - 5.40 x10(6)/mcL    Hgb 11.7 (L) 12.0 - 16.0 g/dL    Hct 36.7 (L) 37.0 - 47.0 %    MCV 82.7 80.0 - 94.0 fL    MCH 26.4 (L) 27.0 - 31.0 pg    MCHC 31.9 (L) 33.0 - 36.0 g/dL    RDW 14.5 11.5 - 17.0 %    Platelet 227 130 - 400 x10(3)/mcL    MPV 9.5 7.4 - 10.4 fL    Neut % 82.7 %    Lymph % 7.2 %    Mono % 8.6 %    Eos % 0.5 %    Basophil % 0.2 %    Lymph # 1.15 0.6 - 4.6 x10(3)/mcL    Neut # 13.13 (H) 2.1 - 9.2 x10(3)/mcL    Mono # 1.37 (H) 0.1 - 1.3 x10(3)/mcL    Eos # 0.08 0 - 0.9 x10(3)/mcL    Baso # 0.03 0 - 0.2 x10(3)/mcL    IG# 0.13 (H) 0 - 0.04 x10(3)/mcL    IG% 0.8 %    NRBC% 0.0 %       Significant Imaging:  Imaging Results              X-Ray Tibia Fibula 2 View Left (Final result)  Result time 04/17/23 12:16:49      Final result by Ed Dee MD (04/17/23 12:16:49)                    Impression:      No acute osseous process appreciated.      Electronically signed by: Ed Dee  Date:    04/17/2023  Time:    12:16               Narrative:    EXAMINATION:  XR TIBIA FIBULA 2 VIEW LEFT    CLINICAL HISTORY:  Pain, unspecified    TECHNIQUE:  AP and lateral views of the left tibia and fibula.    COMPARISON:  No relevant comparison studies available.    FINDINGS:  Suspected soft tissue ulcer along the posterior portion of the lower leg.  No tracking subcutaneous gas appreciated.  Numerous vascular calcifications.  No acute fracture or dislocation.                                        X-Ray Foot Complete Left (Final result)  Result time 04/17/23 12:14:20      Final result by Ed Dee MD (04/17/23 12:14:20)                   Impression:      Minimally displaced 5th proximal phalanx fracture.  Possible minimally displaced 4th proximal phalanx fracture.    This report was flagged in Epic as abnormal.      Electronically signed by: Ed Dee  Date:    04/17/2023  Time:    12:14               Narrative:    EXAMINATION:  XR FOOT COMPLETE 3 VIEW LEFT    CLINICAL HISTORY:  Pain, unspecified    TECHNIQUE:  AP, lateral and oblique views of the left foot    COMPARISON:  None    FINDINGS:  Decreased overall bone mineralization.  Minimally displaced fracture of the midportion of the 5th proximal phalanx.  There may be a minimally displaced 4th proximal phalanx fracture as well.  Joint alignments are maintained.  Numerous vascular calcifications in the lower leg.                                       CT Head Without Contrast (Final result)  Result time 04/17/23 06:35:13      Final result by Morteza Ballesteros MD (04/17/23 06:35:13)                   Impression:      No acute intracranial findings identified.    No significant discrepancy with overnight report.      Electronically signed by: Morteza Ballesteros  Date:    04/17/2023  Time:    06:35               Narrative:    EXAMINATION:  CT HEAD  WITHOUT CONTRAST    CLINICAL HISTORY:  Mental status change, unknown cause;    TECHNIQUE:  Sequential axial images were performed of the brain without contrast.    Dose product length of 2679 mGycm. Automated exposure control was utilized to minimize radiation dose.    COMPARISON:  None available.    FINDINGS:  There is no intracranial mass effect, midline shift, hydrocephalus or hemorrhage. There is no sulcal effacement or low attenuation changes to suggest recent large vessel territory infarction. Chronic appearing periventricular and subcortical white matter low attenuation changes are present and are consistent with marked chronic microangiopathic ischemia. The ventricular system and sulcal markings prominence is consistent with atrophy. There is no acute extra axial fluid collection.  There is no acute depressed skull fracture.  Visualized paranasal sinuses are clear without mucosal thickening, polypoidal abnormality or air-fluid levels. Mastoid air cells aeration is optimal.                        Preliminary result by Morteza Ballesteros MD (04/16/23 20:09:20)                   Narrative:    START OF REPORT:  Technique: CT of the head was performed without intravenous contrast with axial as well as coronal and sagittal images.    Comparison: None.    Dosage Information: Automated exposure control was utilized.    Clinical history: Ams.    Findings:  Hemorrhage: No acute intracranial hemorrhage is seen.  CSF spaces: The ventricles, sulci and basal cisterns all appear mildly prominent consistent with global cerebral atrophy.  Brain parenchyma: There is preservation of the grey white junction throughout. Moderate microvascular change is seen in portions of the periventricular and deep white matter tracts.  Cerebellum: Unremarkable.  Vascular: Mild atheromatous calcification of the intracranial arteries is seen.  Sella and skull base: The sella appears to be within normal limits for age.  Cerebellopontine angles:  Within normal limits.  Herniation: None.  Intracranial calcifications: Incidental note is made of bilateral choroid plexus calcification. Incidental note is made of some pineal region calcification. Incidental note is made of some calcification of the falx.  Calvarium: No acute linear or depressed skull fracture is seen.    Maxillofacial Structures:  Paranasal sinuses: The visualized paranasal sinuses appear clear with no mucoperiosteal thickening or air fluid levels identified.  Orbits: The orbits appear unremarkable.  Zygomatic arches: The zygomatic arches are intact and unremarkable.  Temporal bones and mastoids: The temporal bones and mastoids appear unremarkable.  TMJ: The mandibular condyles appear normally placed with respect to the mandibular fossa.      Impression:  1. No acute intracranial process identified. Details and other findings as noted above.                          Preliminary result by Tej Correia MD (04/16/23 20:09:20)                   Narrative:    START OF REPORT:  Technique: CT of the head was performed without intravenous contrast with axial as well as coronal and sagittal images.    Comparison: None.    Dosage Information: Automated exposure control was utilized.    Clinical history: Ams.    Findings:  Hemorrhage: No acute intracranial hemorrhage is seen.  CSF spaces: The ventricles, sulci and basal cisterns all appear mildly prominent consistent with global cerebral atrophy.  Brain parenchyma: There is preservation of the grey white junction throughout. Moderate microvascular change is seen in portions of the periventricular and deep white matter tracts.  Cerebellum: Unremarkable.  Vascular: Mild atheromatous calcification of the intracranial arteries is seen.  Sella and skull base: The sella appears to be within normal limits for age.  Cerebellopontine angles: Within normal limits.  Herniation: None.  Intracranial calcifications: Incidental note is made of bilateral choroid plexus  calcification. Incidental note is made of some pineal region calcification. Incidental note is made of some calcification of the falx.  Calvarium: No acute linear or depressed skull fracture is seen.    Maxillofacial Structures:  Paranasal sinuses: The visualized paranasal sinuses appear clear with no mucoperiosteal thickening or air fluid levels identified.  Orbits: The orbits appear unremarkable.  Zygomatic arches: The zygomatic arches are intact and unremarkable.  Temporal bones and mastoids: The temporal bones and mastoids appear unremarkable.  TMJ: The mandibular condyles appear normally placed with respect to the mandibular fossa.      Impression:  1. No acute intracranial process identified. Details and other findings as noted above.                                         CT Chest Abdomen Pelvis With Contrast (xpd) (Final result)  Result time 04/17/23 06:38:06      Final result by Morteza Ballesteros MD (04/17/23 06:38:06)                   Impression:    Impression:    1. No focal infiltrate or consolidation is identified.    2. No filling defects are seen in the pulmonary arteries to suggest pulmonary embolus.    3. No CT evidence of pulmonary embolism or other acute intrathoracic pathology. No acute intraabdominal or pelvic solid organ or bowel pathology identified. Details and other findings as discussed above.    No significant discrepancy with overnight report.      Electronically signed by: Morteza Ballesteros  Date:    04/17/2023  Time:    06:38               Narrative:      Technique:CT Scan of the chest abdomen and pelvis was performed with intravenous contrast with axial as well as sagittal and, coronal images.    Dosage Information:Automated Exposure Control was utilized.      Comparison:None.    Clinical History:Sepsis.    Findings:    Soft Tissues:A few calcifications are seen in the right breast tissue.    Neck:The visualized soft tissues of the neck appear unremarkable. The thyroid gland appear  unremarkable. Note of a tortuous proximal right common carotid artery.    Mediastinum:The mediastinal structures are within normal limits.    Heart:The heart size is within normal limits. Pronounced coronary artery calcification is seen.    Aorta:Pronounced aortic calcification is seen in the thoracic aorta.    Lungs:There is mild non specific dependent change at the lung bases. Mild paraseptal emphysematous change is seen in the left lower lobe. No focal infiltrate or consolidation is identified.    Pleura:There is a small right sided pleural effusion. There is a small left sided pleural effusion.    Bony Structures:    Spine:Pronounced spondylolytic changes are seen in the thoracic spine. Dextroconvex scoliosis of the thoracic spine.    Ribs:There are chronic-appearing cortical deformities involving the posterior aspect of the right 10th and 11th ribs. These may reflect healed fractures.    Abdomen:    Abdominal Wall:No abdominal wall pathology is seen.    Liver:The liver appears unremarkable.    Biliary System:No intrahepatic biliary duct dilatation is seen. The extra hepatic biliary system appears prominent which may reflect prior obstructive physiology in this patient status post cholecystectomy.    Gallbladder:Surgical clips are seen in the gallbladder fossa consistent with prior cholecystectomy.    Pancreas:Severe pancreatic atrophy is seen.    Spleen:There is heterogeneity of splenic enhancement likely reflecting flow artifact. The spleen otherwise appears grossly unremarkable.    Kidneys:The kidneys appear unremarkable with no stones cysts masses or hydronephrosis with IV contrast decreasing sensitivity and specificity for stones.    Aorta:There is pronounced calcification of the abdominal aorta and its branches.    IVC:An IVC filter is in place.    Bowel:    Esophagus:There appears to be mild thickening versus underdistention of the distal esophagus.    Stomach:The stomach appears  unremarkable.    Duodenum:Unremarkable appearing duodenum.    Small Bowel:The small bowel appears unremarkable.    Colon:Diverticula are seen in the colon. No associated inflammatory stranding or pericolonic fluid is seen to suggest diverticulitis.    Appendix:The appendix is not identified but no inflammatory changes are seen in the right lower quadrant to suggest appendicitis.    Peritoneum:No intraperitoneal free air or ascites is seen.    Pelvis:    Bladder:The bladder appears unremarkable.    Female:    Uterus:The uterus is not identified.    Ovaries:No adnexal masses are seen.    Bony structures:    Dorsal Spine:There is pronounced multilevel spondylosis of the visualized dorsal spine. Levoscoliosis of the lumbar spine is noted.    Bony Pelvis:The visualized bony structures of the pelvis appear unremarkable.                        Preliminary result by Morteza Ballesteros MD (04/16/23 20:05:48)                   Narrative:    START OF REPORT:  Technique: CT Scan of the chest abdomen and pelvis was performed with intravenous contrast with axial as well as sagittal and, coronal images.    Dosage Information: Automated Exposure Control was utilized.    Comparison: None.    Clinical History: Sepsis.    Findings:  Soft Tissues: A few calcifications are seen in the right breast tissue.  Neck: The visualized soft tissues of the neck appear unremarkable. The thyroid gland appear unremarkable. Note of a tortuous proximal right common carotid artery.  Mediastinum: The mediastinal structures are within normal limits.  Heart: The heart size is within normal limits. Pronounced coronary artery calcification is seen.  Aorta: Pronounced aortic calcification is seen in the thoracic aorta.  Pulmonary Arteries: No filling defects are seen in the pulmonary arteries to suggest pulmonary embolus.  Lungs: There is mild non specific dependent change at the lung bases. Mild paraseptal emphysematous change is seen in the left lower lobe.  No focal infiltrate or consolidation is identified.  Pleura: There is a small right sided pleural effusion. There is a small left sided pleural effusion.  Bony Structures:  Spine: Pronounced spondylolytic changes are seen in the thoracic spine. Dextroconvex scoliosis of the thoracic spine.  Ribs: There are chronic-appearing cortical deformities involving the posterior aspect of the right 10th and 11th ribs. These may reflect healed fractures.  Abdomen:  Abdominal Wall: No abdominal wall pathology is seen.  Liver: The liver appears unremarkable.  Biliary System: No intrahepatic biliary duct dilatation is seen. The extra hepatic biliary system appears prominent which may reflect prior obstructive physiology in this patient status post cholecystectomy.  Gallbladder: Surgical clips are seen in the gallbladder fossa consistent with prior cholecystectomy.  Pancreas: Severe pancreatic atrophy is seen.  Spleen: There is heterogeneity of splenic enhancement likely reflecting flow artifact. The spleen otherwise appears grossly unremarkable.  Kidneys: The kidneys appear unremarkable with no stones cysts masses or hydronephrosis with IV contrast decreasing sensitivity and specificity for stones.  Aorta: There is pronounced calcification of the abdominal aorta and its branches.  IVC: An IVC filter is in place.  Bowel:  Esophagus: There appears to be mild thickening versus underdistention of the distal esophagus.  Stomach: The stomach appears unremarkable.  Duodenum: Unremarkable appearing duodenum.  Small Bowel: The small bowel appears unremarkable.  Colon: Diverticula are seen in the colon. No associated inflammatory stranding or pericolonic fluid is seen to suggest diverticulitis.  Appendix: The appendix is not identified but no inflammatory changes are seen in the right lower quadrant to suggest appendicitis.  Peritoneum: No intraperitoneal free air or ascites is seen.    Pelvis:  Bladder: The bladder appears  unremarkable.  Female:  Uterus: The uterus is not identified.  Ovaries: No adnexal masses are seen.    Bony structures:  Dorsal Spine: There is pronounced multilevel spondylosis of the visualized dorsal spine. Levoscoliosis of the lumbar spine is noted.  Bony Pelvis: The visualized bony structures of the pelvis appear unremarkable.      Impression:  1. No focal infiltrate or consolidation is identified.  2. No filling defects are seen in the pulmonary arteries to suggest pulmonary embolus.  3. No CT evidence of pulmonary embolism or other acute intrathoracic pathology. No acute intraabdominal or pelvic solid organ or bowel pathology identified. Details and other findings as discussed above.                          Preliminary result by Tej Correia MD (04/16/23 20:05:48)                   Narrative:    START OF REPORT:  Technique: CT Scan of the chest abdomen and pelvis was performed with intravenous contrast with axial as well as sagittal and, coronal images.    Dosage Information: Automated Exposure Control was utilized.    Comparison: None.    Clinical History: Sepsis.    Findings:  Soft Tissues: A few calcifications are seen in the right breast tissue.  Neck: The visualized soft tissues of the neck appear unremarkable. The thyroid gland appear unremarkable. Note of a tortuous proximal right common carotid artery.  Mediastinum: The mediastinal structures are within normal limits.  Heart: The heart size is within normal limits. Pronounced coronary artery calcification is seen.  Aorta: Pronounced aortic calcification is seen in the thoracic aorta.  Pulmonary Arteries: No filling defects are seen in the pulmonary arteries to suggest pulmonary embolus.  Lungs: There is mild non specific dependent change at the lung bases. Mild paraseptal emphysematous change is seen in the left lower lobe. No focal infiltrate or consolidation is identified.  Pleura: There is a small right sided pleural effusion. There is a small  left sided pleural effusion.  Bony Structures:  Spine: Pronounced spondylolytic changes are seen in the thoracic spine. Dextroconvex scoliosis of the thoracic spine.  Ribs: There are chronic-appearing cortical deformities involving the posterior aspect of the right 10th and 11th ribs. These may reflect healed fractures.  Abdomen:  Abdominal Wall: No abdominal wall pathology is seen.  Liver: The liver appears unremarkable.  Biliary System: No intrahepatic biliary duct dilatation is seen. The extra hepatic biliary system appears prominent which may reflect prior obstructive physiology in this patient status post cholecystectomy.  Gallbladder: Surgical clips are seen in the gallbladder fossa consistent with prior cholecystectomy.  Pancreas: Severe pancreatic atrophy is seen.  Spleen: There is heterogeneity of splenic enhancement likely reflecting flow artifact. The spleen otherwise appears grossly unremarkable.  Kidneys: The kidneys appear unremarkable with no stones cysts masses or hydronephrosis with IV contrast decreasing sensitivity and specificity for stones.  Aorta: There is pronounced calcification of the abdominal aorta and its branches.  IVC: An IVC filter is in place.  Bowel:  Esophagus: There appears to be mild thickening versus underdistention of the distal esophagus.  Stomach: The stomach appears unremarkable.  Duodenum: Unremarkable appearing duodenum.  Small Bowel: The small bowel appears unremarkable.  Colon: Diverticula are seen in the colon. No associated inflammatory stranding or pericolonic fluid is seen to suggest diverticulitis.  Appendix: The appendix is not identified but no inflammatory changes are seen in the right lower quadrant to suggest appendicitis.  Peritoneum: No intraperitoneal free air or ascites is seen.    Pelvis:  Bladder: The bladder appears unremarkable.  Female:  Uterus: The uterus is not identified.  Ovaries: No adnexal masses are seen.    Bony structures:  Dorsal Spine: There  is pronounced multilevel spondylosis of the visualized dorsal spine. Levoscoliosis of the lumbar spine is noted.  Bony Pelvis: The visualized bony structures of the pelvis appear unremarkable.      Impression:  1. No focal infiltrate or consolidation is identified.  2. No filling defects are seen in the pulmonary arteries to suggest pulmonary embolus.  3. No CT evidence of pulmonary embolism or other acute intrathoracic pathology. No acute intraabdominal or pelvic solid organ or bowel pathology identified. Details and other findings as discussed above.                                         X-Ray Chest AP Portable (Final result)  Result time 04/17/23 08:59:12      Final result by Sean Javier MD (04/17/23 08:59:12)                   Impression:      No acute chest disease is identified.      Electronically signed by: Sean Javier  Date:    04/17/2023  Time:    08:59               Narrative:    EXAMINATION:  XR CHEST AP PORTABLE    CLINICAL HISTORY:  Sepsis;, .    FINDINGS:  No alveolar consolidation, effusion, or pneumothorax is seen.   The thoracic aorta is normal  cardiac silhouette, central pulmonary vessels and mediastinum are normal in size and are grossly unremarkable.  There are scoliotic changes of the thoracolumbar spine                                      EKG:    Results for orders placed or performed during the hospital encounter of 04/16/23   EKG 12-lead    Narrative    Test Reason : R53.83,    Vent. Rate : 115 BPM     Atrial Rate : 115 BPM     P-R Int : 156 ms          QRS Dur : 146 ms      QT Int : 380 ms       P-R-T Axes : 057 092 -30 degrees     QTc Int : 525 ms    Sinus tachycardia with Premature atrial complexes  Right bundle branch block  T wave abnormality, consider inferior ischemia  Abnormal ECG  No previous ECGs available  Confirmed by Elijah Shoemaker MD ([id number]) on 4/16/2023 9:58:45 PM    Referred By: DEBIERR   SELF           Confirmed By:Elijah Shoemaker MD       Telemetry:   ST    Physical Exam  Vitals reviewed.   Constitutional:       Appearance: Normal appearance.   Cardiovascular:      Rate and Rhythm: Regular rhythm. Tachycardia present.      Pulses: Normal pulses.      Heart sounds: Normal heart sounds.      Comments: murmur  Pulmonary:      Effort: Pulmonary effort is normal.      Breath sounds: Normal breath sounds.   Abdominal:      General: Bowel sounds are normal.      Palpations: Abdomen is soft.   Musculoskeletal:         General: Normal range of motion.   Skin:     General: Skin is warm and dry.      Capillary Refill: Capillary refill takes less than 2 seconds.   Neurological:      General: No focal deficit present.      Mental Status: She is alert and oriented to person, place, and time.      Motor: Weakness present.       Home Medications:   No current facility-administered medications on file prior to encounter.     Current Outpatient Medications on File Prior to Encounter   Medication Sig Dispense Refill    calcium-vitamin D3 (OS-SUSAN 500 + D3) 500 mg-5 mcg (200 unit) per tablet Take 1 tablet by mouth once daily.      ferrous gluconate (FERGON) 324 MG tablet Take 324 mg by mouth daily with breakfast.      glimepiride (AMARYL) 4 MG tablet 1 tablet in the AM and 1/2 tablet in the PM (Patient taking differently: Take 2 mg by mouth as needed. 1 tablet in the AM and 1/2 tablet in the PM) 135 tablet 3    lancets Misc Easy Touch Twist 30G Lancets to use with insurance preferred meter Diagnosis Code: E11.9.  Test once daily. 100 each 11    losartan-hydrochlorothiazide 100-12.5 mg (HYZAAR) 100-12.5 mg Tab Take 1 tablet by mouth once daily. 90 tablet 3    metFORMIN (GLUCOPHAGE) 1000 MG tablet Take 1 tablet (1,000 mg total) by mouth 2 (two) times daily with meals. 180 tablet 3    multivitamin capsule Take 1 capsule by mouth once daily.      pravastatin (PRAVACHOL) 40 MG tablet Take 1 tablet (40 mg total) by mouth once daily. 90 tablet 3    traMADoL (ULTRAM) 50 mg tablet Take 1  tablet (50 mg total) by mouth every 6 (six) hours as needed for Pain. 120 tablet 1    traZODone (DESYREL) 100 MG tablet Take 1 tablet (100 mg total) by mouth nightly as needed for Insomnia. 90 tablet 3    blood sugar diagnostic (EASY TOUCH TEST STRIP) Strp 1 each by Misc.(Non-Drug; Combo Route) route once daily. Use to test blood glucose once daily 100 strip 3    blood-glucose meter Misc Easy Touch use as directed 1 each 0    mupirocin (BACTROBAN) 2 % ointment Apply topically 3 (three) times daily. 30 g 2    SANTYL ointment APPLY TO LEFT FOOT WOUND DAILY         Current Inpatient Medications:    Current Facility-Administered Medications:     acetaminophen tablet 650 mg, 650 mg, Oral, Q6H PRN, Willie Pereira MD, 650 mg at 04/20/23 0825    collagenase ointment, , Topical (Top), Daily, Willie Pereira MD, Given at 04/21/23 0820    dextrose 10% bolus 125 mL 125 mL, 12.5 g, Intravenous, PRN, Willie Pereira MD    dextrose 10% bolus 250 mL 250 mL, 25 g, Intravenous, PRN, Willie Pereira MD    docusate sodium capsule 50 mg, 50 mg, Oral, Daily, Mariana Ulloa DO, 50 mg at 04/20/23 0817    enoxaparin injection 40 mg, 40 mg, Subcutaneous, Daily, Willie Pereira MD, 40 mg at 04/20/23 1701    glucagon (human recombinant) injection 1 mg, 1 mg, Intramuscular, PRN, Willie Pereira MD    glucose chewable tablet 16 g, 16 g, Oral, PRN, Willie Pereira MD    glucose chewable tablet 24 g, 24 g, Oral, PRN, Willie Pereira MD    insulin aspart U-100 injection 0-5 Units, 0-5 Units, Subcutaneous, QID (AC + HS) PRN, iWllie Pereira MD, 3 Units at 04/20/23 1656    Lactobacillus acidophilus capsule 1 capsule, 1 capsule, Oral, TID WM, Shawn Gomez MD, 1 capsule at 04/21/23 0819    melatonin tablet 6 mg, 6 mg, Oral, Nightly PRN, Willie Pereira MD, 6 mg at 04/18/23 2055    mupirocin 2 % ointment, , Nasal, BID, Willie Pereira MD, Given at 04/21/23 0819    sodium chloride  0.9% flush 10 mL, 10 mL, Intravenous, PRN, Willie Pereira MD    traMADoL tablet 50 mg, 50 mg, Oral, Q6H PRN, Mariela Wesley MD, 50 mg at 04/21/23 0819    traZODone tablet 50 mg, 50 mg, Oral, QHS, Mariana Ulloa DO, 50 mg at 04/20/23 2246    Pharmacy to dose Vancomycin consult, , , Once **AND** vancomycin - pharmacy to dose, , Intravenous, pharmacy to manage frequency, Willie Pereira MD    vancomycin 500 mg in dextrose 5 % 100 mL IVPB (ready to mix), 500 mg, Intravenous, Once, Willie Pereira MD         VTE Risk Mitigation (From admission, onward)           Ordered     enoxaparin injection 40 mg  Daily         04/17/23 0156     IP VTE HIGH RISK PATIENT  Once         04/17/23 0156     Place sequential compression device  Until discontinued         04/17/23 0156                    Assessment:   MRSA bacteremia  LLE wound  Mild-mod AS  Mod MR  Pulmonary HTN  HTN  HLD  DM  DDD      Plan:     STARR today to rule out endocarditis  R/B/A discussed with patient and she wishes to proceed  Continue meds    Thank you for your consult.     ED Mcintosh  Cardiology  [facility] - 6th Floor Medical Telemetry  04/21/2023 8:25 AM

## 2023-04-21 NOTE — PROGRESS NOTES
[facility] Hospital Medicine Progress Note        Chief Complaint: Inpatient Follow-up for weakness     HPI:   89-year-old female with a past medical history of essential hypertension, type 2 diabetes mellitus, prior DVT/IVC filter no longer on anticoagulation .     presents to the ER after she became lethargic on the day of presentation.  Daughter is at bedside and provides most of the history and states patient is normally alert and oriented, active and able to carry out own ADLs.  She states patient has chronic open wounds on her left lower extremity better followed closely by Wound Care.  Today she became drowsy/lethargic, and she activated EMS who found she was hypotensive with a blood pressure of 66/36 on arrival.       She arrived to the ER tachycardic and hypotensive, maintaining adequate sats on 3 L nasal cannula.  Laboratory work showed leukocytosis of 34 K, mild anemia, a lactic acid of 3.5, elevated BNP of 2000 and a troponin of 0.115.  Urinalysis was unremarkable, CT chest abdomen and pelvis was negative for pulmonary embolus, pulmonary infiltrates or any intra-abdominal or pelvic pathology.  CT head was normal.     She was started on broad-spectrum antibiotics for undifferentiated sepsis admitted to the hospitalist service for further management.    Blood cultures remains persistently positive for MRSA bacteremia since admission. ID consulted and pt continued on IV Vancomycin. Noted left lower ext non healing ulcer. NM bone scan without clear evidence of OM. LLE arterial U/S showed monophasic flow throughout suggestive of inflow disease. CT T and L spine  with no evidence of discitis/osteomyelitis. 2D TTE is neg for valvular vegetation. Cardiology consulted for STARR.     Interval Hx:   Afebrile. STARR planned today. LLE arterial U/S showed monophasic flow throughout suggestive of inflow disease. CT T and L spine  with no evidence of discitis/osteomyelitis . Blood cultures  are repeated today.    Objective/physical exam:    General: In no acute distress, afebrile  Chest: crackles at bases , herb   Heart: RRR, +S1, S2, no appreciable murmur  Abdomen: Soft, nontender, BS +  MSK: Warm, trace to 1+ lower extremity edema, no clubbing or cyanosis  Neurologic: Alert and oriented x4, Cranial nerve II-XII intact, moves all ext      VITAL SIGNS: 24 HRS MIN & MAX LAST   Temp  Min: 96.4 °F (35.8 °C)  Max: 98.4 °F (36.9 °C) 96.4 °F (35.8 °C)   BP  Min: 104/66  Max: 130/91 104/66   Pulse  Min: 84  Max: 103  84   Resp  Min: 18  Max: 18 18   SpO2  Min: 91 %  Max: 96 % (!) 93 %         Recent Labs   Lab 04/18/23  0636 04/20/23  0921 04/21/23  0630   WBC 15.8* 13.4* 15.9*   RBC 3.94* 3.64* 4.44   HGB 10.7* 9.9* 11.7*   HCT 33.4* 30.4* 36.7*   MCV 84.8 83.5 82.7   MCH 27.2 27.2 26.4*   MCHC 32.0* 32.6* 31.9*   RDW 14.6 14.4 14.5    204 227   MPV 9.9 9.7 9.5       Recent Labs   Lab 04/16/23  1855 04/17/23  0358 04/18/23  0505 04/19/23  0839 04/20/23  0922 04/21/23  0630   * 129* 130*  --  124* 127*   K 3.7 3.8 3.8  --  3.9 3.5   CO2 23 27 24  --  23 22*   BUN 22.6* 22.6* 18.1  --  13.4 15.2   CREATININE 0.97 0.87 0.67 0.65 0.60 0.64   CALCIUM 9.7 9.6 9.6  --  8.1* 8.5   MG 1.40* 2.10  --   --  1.30* 1.80   ALBUMIN 2.5* 2.2*  --   --  2.0*  --    ALKPHOS 53 73  --   --  134  --    ALT 14 18  --   --  45  --    AST 14 17  --   --  37*  --    BILITOT 0.5 0.4  --   --  0.5  --           Microbiology Results (last 7 days)       Procedure Component Value Units Date/Time    Blood Culture [[id number]] Collected: 04/21/23 0958    Order Status: Resulted Specimen: Blood from Arm, Right Updated: 04/21/23 1001    Blood Culture [[id number]]  (Abnormal)  (Susceptibility) Collected: 04/19/23 0407    Order Status: Completed Specimen: Blood Updated: 04/21/23 0645     CULTURE, BLOOD ([facility]) Methicillin resistant Staphylococcus aureus     GRAM STAIN Gram Positive Cocci, probable Staphylococcus      Seen in gram stain  of broth only      2 of 2 bottles positive    Blood Culture [[id number]]  (Abnormal)  (Susceptibility) Collected: 04/19/23 0839    Order Status: Completed Specimen: Blood from Arm, Left Updated: 04/21/23 0645     CULTURE, BLOOD ([facility]) Methicillin resistant Staphylococcus aureus     GRAM STAIN Gram Positive Cocci, probable Staphylococcus      Seen in gram stain of broth only      2 of 2 bottles positive    Blood Culture [[id number]] Collected: 04/21/23 0630    Order Status: Resulted Specimen: Blood from Hand, Left Updated: 04/21/23 0639    Blood culture x two cultures. Draw prior to antibiotics. [[id number]]  (Abnormal)  (Susceptibility) Collected: 04/16/23 1855    Order Status: Completed Specimen: Blood Updated: 04/20/23 0639     CULTURE, BLOOD ([facility]) Methicillin resistant Staphylococcus aureus     GRAM STAIN Gram Positive Cocci, probable Staphylococcus      Seen in gram stain of broth only      1 of 1 Aerobic bottle positive    Blood Culture [[id number]]  (Abnormal)  (Susceptibility) Collected: 04/18/23 0505    Order Status: Completed Specimen: Blood Updated: 04/20/23 0636     CULTURE, BLOOD ([facility]) Methicillin resistant Staphylococcus aureus     GRAM STAIN Gram Positive Cocci, probable Staphylococcus      Seen in gram stain of broth only      2 of 2 bottles positive    Blood Culture [[id number]]  (Abnormal)  (Susceptibility) Collected: 04/18/23 0505    Order Status: Completed Specimen: Blood Updated: 04/20/23 0636     CULTURE, BLOOD ([facility]) Methicillin resistant Staphylococcus aureus     GRAM STAIN Gram Positive Cocci, probable Staphylococcus      Seen in gram stain of broth only      2 of 2 bottles positive    Blood culture x two cultures. Draw prior to antibiotics. [[id number]]  (Abnormal)  (Susceptibility) Collected: 04/16/23 1855    Order Status: Completed Specimen: Blood Updated: 04/19/23 0732     CULTURE, BLOOD ([facility]) Methicillin resistant Staphylococcus aureus     GRAM STAIN Gram Positive Cocci, probable Staphylococcus       Seen in gram stain of broth only      1 of 1 Aerobic bottle positive    Wound Culture [[id number]] Collected: 04/18/23 0805    Order Status: Sent Specimen: Wound from Ankle, Left     Anaerobic Culture [[id number]] Collected: 04/18/23 0805    Order Status: Sent Specimen: Drainage from Ankle, Left     BCID2 Panel [[id number]]  (Abnormal) Collected: 04/16/23 1855    Order Status: Completed Specimen: Blood Updated: 04/17/23 0915     CTX-M (ESBL ) N/A     Comment: Note: Antimicrobial resistance can occur via multiple mechanisms. A Not Detected result for antimicrobial resistance gene(s) does not indicate antimicrobial susceptibility. Subculturing is required for species identification and susceptibility testing of   isolates.        IMP (Cabapenemase ) N/A     Comment: Note: Antimicrobial resistance can occur via multiple mechanisms. A Not Detected result for antimicrobial resistance gene(s) does not indicate antimicrobial susceptibility. Subculturing is required for species identification and susceptibility testing of   isolates.        KPC resistance gene (Carbapenemase ) N/A     Comment: Note: Antimicrobial resistance can occur via multiple mechanisms. A Not Detected result for antimicrobial resistance gene(s) does not indicate antimicrobial susceptibility. Subculturing is required for species identification and susceptibility testing of   isolates.        mcr-1 N/A     Comment: Note: Antimicrobial resistance can occur via multiple mechanisms. A Not Detected result for antimicrobial resistance gene(s) does not indicate antimicrobial susceptibility. Subculturing is required for species identification and susceptibility testing of   isolates.        mecA ID N/A     Comment: Note: Antimicrobial resistance can occur via multiple mechanisms. A Not Detected result for antimicrobial resistance gene(s) does not indicate antimicrobial susceptibility. Subculturing is required for species identification  and susceptibility testing of   isolates.        mecA/C and MREJ (MRSA) gene Detected     Comment: Note: Antimicrobial resistance can occur via multiple mechanisms. A Not Detected result for antimicrobial resistance gene(s) does not indicate antimicrobial susceptibility. Subculturing is required for species identification and susceptibility testing of   isolates.        NDM (Carbapenemase ) N/A     Comment: Note: Antimicrobial resistance can occur via multiple mechanisms. A Not Detected result for antimicrobial resistance gene(s) does not indicate antimicrobial susceptibility. Subculturing is required for species identification and susceptibility testing of   isolates.        OXA-48-like (Carbapenemase ) N/A     Comment: Note: Antimicrobial resistance can occur via multiple mechanisms. A Not Detected result for antimicrobial resistance gene(s) does not indicate antimicrobial susceptibility. Subculturing is required for species identification and susceptibility testing of   isolates.        Pedro/B (VRE gene) N/A     Comment: Note: Antimicrobial resistance can occur via multiple mechanisms. A Not Detected result for antimicrobial resistance gene(s) does not indicate antimicrobial susceptibility. Subculturing is required for species identification and susceptibility testing of   isolates.        VIM (Carbapenemase ) N/A     Comment: Note: Antimicrobial resistance can occur via multiple mechanisms. A Not Detected result for antimicrobial resistance gene(s) does not indicate antimicrobial susceptibility. Subculturing is required for species identification and susceptibility testing of   isolates.        Enterococcus faecalis Not Detected     Enterococcus faecium Not Detected     Listeria monocytogenes Not Detected     Staphylococcus spp. Detected     Staphylococcus aureus Detected     Staphylococcus epidermidis Not Detected     Staphylococcus lugdunensis Not Detected     Streptococcus spp. Not  Detected     Streptococcus agalactiae (Group B) Not Detected     Streptococcus pneumoniae Not Detected     Streptococcus pyogenes (Group A) Not Detected     Acinetobacter calcoaceticus/baumannii complex Not Detected     Bacteroides fragilis Not Detected     Enterobacterales Not Detected     Enterobacter cloacae complex Not Detected     Escherichia coli Not Detected     Klebsiella aerogenes Not Detected     Klebsiella oxytoca Not Detected     Klebsiella pneumoniae group Not Detected     Proteus spp. Not Detected     Salmonella spp. Not Detected     Serratia marcescens Not Detected     Haemophilus influenzae Not Detected     Neisseria meningitidis Not Detected     Pseudomonas aeruginosa Not Detected     Stenotrophomonas maltophilia Not Detected     Candida albicans Not Detected     Candida auris Not Detected     Candida glabrata Not Detected     Candida krusei Not Detected     Candida parapsilosis Not Detected     Candida tropicalis Not Detected     Cryptococcus neoformans/gattii Not Detected    Narrative:      The [employer] BCID2 Panel is a multiplexed nucleic acid test intended for the use with [employer] or [employer] for the simultaneous qualitative detection and identification of multiple bacterial and yeast nucleic acids and select genetic determinants associated with antimicrobial resistance.  The [employer] [employer] BCID2 Panel test is performed directly on blood culture samples identified as positive by a continuous monitoring blood culture system.  Results are intended to be interpreted in conjunction with Gram stain results.             See below for Radiology    Scheduled Med:   collagenase   Topical (Top) Daily    docusate sodium  50 mg Oral Daily    enoxaparin  40 mg Subcutaneous Daily    [START ON 4/22/2023] furosemide  40 mg Oral Daily    Lactobacillus acidophilus  1 capsule Oral TID WM    magnesium oxide  400 mg Oral BID    mupirocin   Nasal BID    trazodone  50 mg Oral QHS         Continuous Infusions:       PRN Meds:  acetaminophen, dextrose 10%, dextrose 10%, glucagon (human recombinant), glucose, glucose, insulin aspart U-100, melatonin, sodium chloride 0.9%, traMADoL, Pharmacy to dose Vancomycin consult **AND** vancomycin - pharmacy to dose       Assessment/Plan:  Persistent MRSA Bacteremia   Sepsis due to MRSA bacteremia of unclear source   Left ankle chronic non healing wound   Acute on chronic diastolic congestive heart failure   Mild to moderate Aortic stenosis , Mod MR  Pulmonary HTN, PA pressure 44 per Echo  Diabetes Mellitus , type 2 uncontrolled   Leukocytosis   Normochromic anemia   Hyponatremia due to hypervolemia   Hypomagnesemia   Chronic low back pain      Plan-   STARR planned today.     LLE arterial U/S showed monophasic flow throughout suggestive of inflow disease. Podiatry plans for debridement based on arterial flow.     CT T and L spine  with no evidence of discitis/osteomyelitis .   Blood cultures are repeated today.  Continue Vancomycin per ID guidance     Lasix IV transitioned to oral   CXR with evidence of pulmonary vascular congestion.   Hyponatremia is due to hypervolemia in the setting of diastolic CHF exacerbation   Replete magnesium with goal Mg 2.0 or higher.      Monitor clinical course        VTE prophylaxis: Lovenox    Patient condition:  Fair    Anticipated discharge and Disposition:     TBD. Likely Home with family/Home health     All diagnosis and differential diagnosis have been reviewed; assessment and plan has been documented; I have personally reviewed the labs and test results that are presently available; I have reviewed the patients medication list; I have reviewed the consulting providers response and recommendations. I have reviewed or attempted to review medical records based upon their availability    All of the patient's questions have been  addressed and answered. Patient's is agreeable to the above stated plan. I will continue to monitor  closely and make adjustments to medical management as needed.  _____________________________________________________________________    Nutrition Status:    Radiology:  US Retroperitoneal Limited  Narrative: EXAMINATION:  US RETROPERITONEAL LIMITED    CLINICAL HISTORY:  hydronephrosis;, .    TECHNIQUE:  Transverse and longitudinal images of the kidneys  and bladder were obtained.    COMPARISON:  None    FINDINGS:  Right Kidney:    Length: 10.5 x 4.5 x 5.5 cm    Appearance: Normal echogenicity.    Collecting system: There is prominence of the left collecting system indicating a mild-to-moderate degree of hydronephrosis    Stones: None    Cyst/Mass: None    Left Kidney:    Length: 9.5 x 4.9 x 4.5 cm    Appearance: There appears to be a trace amount of perinephric fluid    Collecting system: No hydronephrosis    Stones: None    Cyst/Mass: None    Bladder:    Newton catheter is identified    Vessels:    Visualized portions of the IVC and aorta have a normal grayscale appearance.  Impression: Mild to moderate hydronephrosis on the right.    Trace amount of perinephric fluid on the left.    No other abnormalities    Electronically signed by: Sean Javier  Date:    04/21/2023  Time:    13:42  CT Thoracic Spine With Contrast  Narrative: EXAMINATION:  CT THORACIC SPINE WITH CONTRAST    CLINICAL HISTORY:  OSteomyelitis, abscess, cannot get MRI;    TECHNIQUE:  Postcontrast CT images of the thoracic spine. Axial, coronal, and sagittal reformatted images were obtained. Dose length product is 1033 mGycm. Automatic exposure control, adjustment of mA/kV or iterative reconstruction technique was used to limit radiation dose.    COMPARISON:  CT chest, abdomen and pelvis dated 04/16/2023    FINDINGS:  There is a rightward scoliotic curvature of the thoracic spine with exaggerated kyphosis.  The vertebral body heights are maintained.  There is no acute fracture identified.  There are multilevel degenerative changes with disc height  loss, marginal osteophyte formation and facet arthropathy.  There are no acute destructive bone changes identified.  There is no evidence of a drainable fluid collection.  There are small bilateral pleural effusions with overlying subsegmental atelectasis.  Impression: No evidence to suggest discitis/osteomyelitis or drainable fluid collection.    Electronically signed by: Jennifer Pineda  Date:    04/21/2023  Time:    10:42  CT Lumbar Spine With Contrast  Narrative: EXAMINATION:  CT LUMBAR SPINE WITH CONTRAST    CLINICAL HISTORY:  osteomyelitis/abscess cannot get MRI;    TECHNIQUE:  Postcontrast CT images of the lumbar spine. Axial, coronal, and sagittal reformatted images were obtained. Dose length product is 1033 mGycm. Automatic exposure control, adjustment of mA/kV or iterative reconstruction technique was used to limit radiation dose.    COMPARISON:  CT chest, abdomen and pelvis dated 04/16/2023    FINDINGS:  There are 5 non-rib-bearing lumbar type vertebral bodies. There is a leftward scoliotic curvature of the lumbar spine with grade 1 anterolisthesis of L3 and L4. The vertebral body heights are maintained. There are multilevel degenerate endplate changes. There are no definite acute destructive bone changes identified.    There is multilevel canal stenosis, moderate at L3-L4 and L4-5.  There is severe neural foraminal stenosis on the right at L3-L4 and L4-5 and on the left at L4-5 and L5-S1.    There is no drainable fluid collection identified.  There is right-sided hydronephrosis, new compared to 04/16/2023.  Impression: 1. No evidence to suggest discitis/osteomyelitis or drainable fluid collection.  2. New right-sided hydronephrosis.    Electronically signed by: Jennifer Pineda  Date:    04/21/2023  Time:    10:36      Mariela Wesley MD   04/21/2023

## 2023-04-21 NOTE — PROGRESS NOTES
[facility] Hospital Medicine Progress Note        Chief Complaint: Inpatient Follow-up for weakness     HPI:   89-year-old female with a past medical history of essential hypertension, type 2 diabetes mellitus, prior DVT/IVC filter no longer on anticoagulation .     presents to the ER after she became lethargic on the day of presentation.  Daughter is at bedside and provides most of the history and states patient is normally alert and oriented, active and able to carry out own ADLs.  She states patient has chronic open wounds on her left lower extremity better followed closely by Wound Care.  Today she became drowsy/lethargic, and she activated EMS who found she was hypotensive with a blood pressure of 66/36 on arrival.       She arrived to the ER tachycardic and hypotensive, maintaining adequate sats on 3 L nasal cannula.  Laboratory work showed leukocytosis of 34 K, mild anemia, a lactic acid of 3.5, elevated BNP of 2000 and a troponin of 0.115.  Urinalysis was unremarkable, CT chest abdomen and pelvis was negative per of hormone layering embolus, pulmonary infiltrates or any intra-abdominal or pelvic pathology.  CT head was normal.  She was started on broad-spectrum antibiotics for undifferentiated sepsis admitted to the hospitalist service for further management.  Interval Hx:   Afebrile. Repeat blood cultures from 4/19/23 again positive for Staph aureus. Pt continues to c/o weakness.  Wants to take Tramadol for chronic low back pain which has gotten worse lately. Appetite has  improved . Crackles appreciated at both lung bases on exam. Will start diuresis with Lasix. Na 124 , WBC down to 13.4    Objective/physical exam:  General: In no acute distress, afebrile  Chest: crackles at bases , herb   Heart: RRR, +S1, S2, no appreciable murmur  Abdomen: Soft, nontender, BS +  MSK: Warm, trace to 1+ lower extremity edema, no clubbing or cyanosis  Neurologic: Alert and oriented x4, Cranial  nerve II-XII intact, moves all ex    VITAL SIGNS: 24 HRS MIN & MAX LAST   Temp  Min: 97.4 °F (36.3 °C)  Max: 98.5 °F (36.9 °C) 98.4 °F (36.9 °C)   BP  Min: 104/67  Max: 144/73 114/71   Pulse  Min: 94  Max: 106  95   Resp  Min: 16  Max: 18 18   SpO2  Min: 96 %  Max: 98 % 96 %       Recent Labs   Lab 04/17/23  0358 04/18/23  0636 04/20/23  0921   WBC 28.7* 15.8* 13.4*   RBC 3.70* 3.94* 3.64*   HGB 10.1* 10.7* 9.9*   HCT 32.1* 33.4* 30.4*   MCV 86.8 84.8 83.5   MCH 27.3 27.2 27.2   MCHC 31.5* 32.0* 32.6*   RDW 14.6 14.6 14.4    242 204   MPV 9.5 9.9 9.7       Recent Labs   Lab 04/16/23  1855 04/17/23  0358 04/18/23  0505 04/19/23  0839 04/20/23  0922   * 129* 130*  --  124*   K 3.7 3.8 3.8  --  3.9   CO2 23 27 24  --  23   BUN 22.6* 22.6* 18.1  --  13.4   CREATININE 0.97 0.87 0.67 0.65 0.60   CALCIUM 9.7 9.6 9.6  --  8.1*   MG 1.40* 2.10  --   --  1.30*   ALBUMIN 2.5* 2.2*  --   --  2.0*   ALKPHOS 53 73  --   --  134   ALT 14 18  --   --  45   AST 14 17  --   --  37*   BILITOT 0.5 0.4  --   --  0.5          Microbiology Results (last 7 days)       Procedure Component Value Units Date/Time    Blood Culture [[id number]]  (Abnormal) Collected: 04/19/23 0839    Order Status: Completed Specimen: Blood from Arm, Left Updated: 04/20/23 1215     CULTURE, BLOOD ([facility]) Staphylococcus aureus     GRAM STAIN Gram Positive Cocci, probable Staphylococcus      Seen in gram stain of broth only      2 of 2 bottles positive    Blood Culture [[id number]]  (Abnormal) Collected: 04/19/23 0407    Order Status: Completed Specimen: Blood Updated: 04/20/23 1215     CULTURE, BLOOD ([facility]) Staphylococcus aureus     GRAM STAIN Gram Positive Cocci, probable Staphylococcus      Seen in gram stain of broth only      2 of 2 bottles positive    Blood culture x two cultures. Draw prior to antibiotics. [[id number]]  (Abnormal)  (Susceptibility) Collected: 04/16/23 1855    Order Status: Completed Specimen: Blood Updated: 04/20/23 0639     CULTURE,  BLOOD ([facility]) Methicillin resistant Staphylococcus aureus     GRAM STAIN Gram Positive Cocci, probable Staphylococcus      Seen in gram stain of broth only      1 of 1 Aerobic bottle positive    Blood Culture [[id number]]  (Abnormal)  (Susceptibility) Collected: 04/18/23 0505    Order Status: Completed Specimen: Blood Updated: 04/20/23 0636     CULTURE, BLOOD ([facility]) Methicillin resistant Staphylococcus aureus     GRAM STAIN Gram Positive Cocci, probable Staphylococcus      Seen in gram stain of broth only      2 of 2 bottles positive    Blood Culture [[id number]]  (Abnormal)  (Susceptibility) Collected: 04/18/23 0505    Order Status: Completed Specimen: Blood Updated: 04/20/23 0636     CULTURE, BLOOD ([facility]) Methicillin resistant Staphylococcus aureus     GRAM STAIN Gram Positive Cocci, probable Staphylococcus      Seen in gram stain of broth only      2 of 2 bottles positive    Blood culture x two cultures. Draw prior to antibiotics. [[id number]]  (Abnormal)  (Susceptibility) Collected: 04/16/23 1855    Order Status: Completed Specimen: Blood Updated: 04/19/23 0732     CULTURE, BLOOD ([facility]) Methicillin resistant Staphylococcus aureus     GRAM STAIN Gram Positive Cocci, probable Staphylococcus      Seen in gram stain of broth only      1 of 1 Aerobic bottle positive    Wound Culture [[id number]] Collected: 04/18/23 0805    Order Status: Sent Specimen: Wound from Ankle, Left     Anaerobic Culture [[id number]] Collected: 04/18/23 0805    Order Status: Sent Specimen: Drainage from Ankle, Left     BCID2 Panel [[id number]]  (Abnormal) Collected: 04/16/23 1855    Order Status: Completed Specimen: Blood Updated: 04/17/23 0915     CTX-M (ESBL ) N/A     Comment: Note: Antimicrobial resistance can occur via multiple mechanisms. A Not Detected result for antimicrobial resistance gene(s) does not indicate antimicrobial susceptibility. Subculturing is required for species identification and susceptibility testing of   isolates.         IMP (Cabapenemase ) N/A     Comment: Note: Antimicrobial resistance can occur via multiple mechanisms. A Not Detected result for antimicrobial resistance gene(s) does not indicate antimicrobial susceptibility. Subculturing is required for species identification and susceptibility testing of   isolates.        KPC resistance gene (Carbapenemase ) N/A     Comment: Note: Antimicrobial resistance can occur via multiple mechanisms. A Not Detected result for antimicrobial resistance gene(s) does not indicate antimicrobial susceptibility. Subculturing is required for species identification and susceptibility testing of   isolates.        mcr-1 N/A     Comment: Note: Antimicrobial resistance can occur via multiple mechanisms. A Not Detected result for antimicrobial resistance gene(s) does not indicate antimicrobial susceptibility. Subculturing is required for species identification and susceptibility testing of   isolates.        mecA ID N/A     Comment: Note: Antimicrobial resistance can occur via multiple mechanisms. A Not Detected result for antimicrobial resistance gene(s) does not indicate antimicrobial susceptibility. Subculturing is required for species identification and susceptibility testing of   isolates.        mecA/C and MREJ (MRSA) gene Detected     Comment: Note: Antimicrobial resistance can occur via multiple mechanisms. A Not Detected result for antimicrobial resistance gene(s) does not indicate antimicrobial susceptibility. Subculturing is required for species identification and susceptibility testing of   isolates.        NDM (Carbapenemase ) N/A     Comment: Note: Antimicrobial resistance can occur via multiple mechanisms. A Not Detected result for antimicrobial resistance gene(s) does not indicate antimicrobial susceptibility. Subculturing is required for species identification and susceptibility testing of   isolates.        OXA-48-like (Carbapenemase ) N/A      Comment: Note: Antimicrobial resistance can occur via multiple mechanisms. A Not Detected result for antimicrobial resistance gene(s) does not indicate antimicrobial susceptibility. Subculturing is required for species identification and susceptibility testing of   isolates.        Pedro/B (VRE gene) N/A     Comment: Note: Antimicrobial resistance can occur via multiple mechanisms. A Not Detected result for antimicrobial resistance gene(s) does not indicate antimicrobial susceptibility. Subculturing is required for species identification and susceptibility testing of   isolates.        VIM (Carbapenemase ) N/A     Comment: Note: Antimicrobial resistance can occur via multiple mechanisms. A Not Detected result for antimicrobial resistance gene(s) does not indicate antimicrobial susceptibility. Subculturing is required for species identification and susceptibility testing of   isolates.        Enterococcus faecalis Not Detected     Enterococcus faecium Not Detected     Listeria monocytogenes Not Detected     Staphylococcus spp. Detected     Staphylococcus aureus Detected     Staphylococcus epidermidis Not Detected     Staphylococcus lugdunensis Not Detected     Streptococcus spp. Not Detected     Streptococcus agalactiae (Group B) Not Detected     Streptococcus pneumoniae Not Detected     Streptococcus pyogenes (Group A) Not Detected     Acinetobacter calcoaceticus/baumannii complex Not Detected     Bacteroides fragilis Not Detected     Enterobacterales Not Detected     Enterobacter cloacae complex Not Detected     Escherichia coli Not Detected     Klebsiella aerogenes Not Detected     Klebsiella oxytoca Not Detected     Klebsiella pneumoniae group Not Detected     Proteus spp. Not Detected     Salmonella spp. Not Detected     Serratia marcescens Not Detected     Haemophilus influenzae Not Detected     Neisseria meningitidis Not Detected     Pseudomonas aeruginosa Not Detected     Stenotrophomonas maltophilia  Not Detected     Candida albicans Not Detected     Candida auris Not Detected     Candida glabrata Not Detected     Candida krusei Not Detected     Candida parapsilosis Not Detected     Candida tropicalis Not Detected     Cryptococcus neoformans/gattii Not Detected    Narrative:      The Imaging3 BCID2 Panel is a multiplexed nucleic acid test intended for the use with BioFire® FilmArray® 2.0 or BioFire® FilmArray® [employer] for the simultaneous qualitative detection and identification of multiple bacterial and yeast nucleic acids and select genetic determinants associated with antimicrobial resistance.  The BioHypercontexte BCID2 Panel test is performed directly on blood culture samples identified as positive by a continuous monitoring blood culture system.  Results are intended to be interpreted in conjunction with Gram stain results.             See below for Radiology    Scheduled Med:   collagenase   Topical (Top) Daily    docusate sodium  50 mg Oral Daily    enoxaparin  40 mg Subcutaneous Daily    furosemide (LASIX) injection  40 mg Intravenous TID    Lactobacillus acidophilus  1 capsule Oral TID WM    mupirocin   Nasal BID    trazodone  50 mg Oral QHS        Continuous Infusions:       PRN Meds:  acetaminophen, dextrose 10%, dextrose 10%, glucagon (human recombinant), glucose, glucose, insulin aspart U-100, melatonin, sodium chloride 0.9%, traMADoL, Pharmacy to dose Vancomycin consult **AND** vancomycin - pharmacy to dose       Assessment/Plan:  Persistent MRSA Bacteremia   Sepsis due to MRSA bacteremia of unclear source   Left ankle chronic non healing wound   Acute on chronic diastolic congestive heart failure   Mild to moderate Aortic stenosis , Mod MR  Pulmonary HTN, PA pressure 44 per Echo  Diabetes Mellitus , type 2 uncontrolled   Leukocytosis   Normochromic anemia   Hyponatremia due to hypervolemia   Hypomagnesemia   Chronic low back pain     Plan-   Continue Vancomycin per ID guidance   Repeat blood cultures  in am   ID suggested  CT thoracic and lumbar spine   ID suggested Cardiology consult for STARR  Vascular surgery consulted for evaluation of PAD in the setting of chronic non healing ulcer left foot  NM bone scan without clear evidence of OM  Podiatry is following     Lasix IV trial today for CHF exacerbation. CXR with evidence of pulmonary vascular congestion   Hyponatremia is due to hypervolemia in the setting of diastolic CHF exacerbation   Replete magnesium with goal Mg 2.0 or higher.     Monitor clinical course     Critical care diagnosis:   HF exacerbation requiring IV diuresis and moderate hypomagnesemia requiring IV replacement.     Critical care interventions: Hands-on evaluation, review of labs/radiographs/records and discussion with patient and family if present  Critical care time spent: 35 minutes          VTE prophylaxis: Lovenox    Patient condition:  fair    Anticipated discharge and Disposition:     TBD    All diagnosis and differential diagnosis have been reviewed; assessment and plan has been documented; I have personally reviewed the labs and test results that are presently available; I have reviewed the patients medication list; I have reviewed the consulting providers response and recommendations. I have reviewed or attempted to review medical records based upon their availability    All of the patient's questions have been  addressed and answered. Patient's is agreeable to the above stated plan. I will continue to monitor closely and make adjustments to medical management as needed.  _____________________________________________________________________    Nutrition Status:    Radiology:  X-Ray Chest 1 View  Narrative: EXAMINATION:  XR CHEST 1 VIEW    CPT 74570    CLINICAL HISTORY:  CHF;    COMPARISON:  April 16, 2023    FINDINGS:  Examination reveals cardiomediastinal silhouette to be unchanged as compared with the previous exam there is some increase in interstitial and pulmonary vascular  markings indicating some degree of pulmonary vascular congestion and cardiac decompensation.    There is some increased left retrocardiac density and silhouetting of the left hemidiaphragm which might be related to an infiltrate/atelectasis.    No focal consolidative changes no other interval change  Impression: Increase in interstitial and pulmonary vascular markings indicating a mild degree of pulmonary vascular congestion and cardiac decompensation.    Increased left retrocardiac density and silhouetting of the left hemidiaphragm as above    Electronically signed by: Sean Javier  Date:    04/20/2023  Time:    14:58      Mariela Wesley MD   04/20/2023

## 2023-04-21 NOTE — OP NOTE
Transesophageal Echocardiogram in Cath Lab    Procedure: STARR  Indication: Bacteremia  Physician: Adam Suarez MD  Findings:  Normal LV systolic function  Biatrial enlargement  No valvular vegetations seen  Moderate to severe aortic valvular stenosis with an QIAN of 1.0cm^2 by planimetry. Mild aortic regurgitation.  Mitral annular calcifications with moderate calcifications of the mitral valve leaflets and supporting chordae. Mild to moderate mitral regurgitation.  Mild tricuspid regurgitation  No thrombus in the left atrial appendage  Moderate plaque noted in the aorta        There were no complications.    Full Report to Follow in [facility]:    Adam Suarez MD  04/21/2023, 4:16 PM

## 2023-04-21 NOTE — PLAN OF CARE
Problem: Infection  Goal: Absence of Infection Signs and Symptoms  Outcome: Ongoing, Progressing     Problem: Adult Inpatient Plan of Care  Goal: Plan of Care Review  Outcome: Ongoing, Progressing  Goal: Patient-Specific Goal (Individualized)  Outcome: Ongoing, Progressing  Goal: Absence of Hospital-Acquired Illness or Injury  Outcome: Ongoing, Progressing  Goal: Optimal Comfort and Wellbeing  Outcome: Ongoing, Progressing  Goal: Readiness for Transition of Care  Outcome: Ongoing, Progressing     Problem: Diabetes Comorbidity  Goal: Blood Glucose Level Within Targeted Range  Outcome: Ongoing, Progressing     Problem: Adjustment to Illness (Sepsis/Septic Shock)  Goal: Optimal Coping  Outcome: Ongoing, Progressing     Problem: Bleeding (Sepsis/Septic Shock)  Goal: Absence of Bleeding  Outcome: Ongoing, Progressing     Problem: Glycemic Control Impaired (Sepsis/Septic Shock)  Goal: Blood Glucose Level Within Desired Range  Outcome: Ongoing, Progressing     Problem: Infection Progression (Sepsis/Septic Shock)  Goal: Absence of Infection Signs and Symptoms  Outcome: Ongoing, Progressing     Problem: Nutrition Impaired (Sepsis/Septic Shock)  Goal: Optimal Nutrition Intake  Outcome: Ongoing, Progressing     Problem: Impaired Wound Healing  Goal: Optimal Wound Healing  Outcome: Ongoing, Progressing     Problem: Fall Injury Risk  Goal: Absence of Fall and Fall-Related Injury  Outcome: Ongoing, Progressing     Problem: Skin Injury Risk Increased  Goal: Skin Health and Integrity  Outcome: Ongoing, Progressing

## 2023-04-21 NOTE — PHYSICIAN QUERY
PT Name: Melodie Villarreal  MR #: [id number]    DOCUMENTATION CLARIFICATION     CDS/: Steph Rankin  RN, BSN              Contact information:  [email or web address]     This form is a permanent document in the medical record.     Query Date: April 21, 2023    By submitting this query, we are merely seeking further clarification of documentation. Please utilize your independent clinical judgment when addressing the question(s) below.    The Medical Record contains the following:   Indicator   Supporting Clinical Findings Location in Medical Record   x Ulcer/Injury chronic nonhealing wound of the left foot.  She initially had a wound in the medial aspect of her foot and now just 1 on the lateral aspect of her left ankle.    Wounds to the heel and ankle area  There is no bone exposure.    Ischemic-appearing wound, left lower extremity with some questionable   infectious process.  the Achilles area actively is overall viable   Vascular Surgery, 4/20      Podiatry, 4/20       x Wound Care Consult Left posterior Ankle     Full thickness tissue loss.  Subcutaneous fat may be visible but bone, tendon or muscle are not exposed          Left lateral Ankle     Full thickness tissue loss. Subcutaneous fat may be visible but bone, tendon or muscle are not exposed       WOCN, 4/17   x Radiology Findings Nuclear medicine scan again indicative of cellulitis of left   lower extremity, but no isolated uptakes to suggest osteomyelitic or abscess   type process.   Podiatry, 4/20   x Acute/Chronic Illness History of peripheral artery disease.    do not think the main source is osteomyelitis, possibly the   lateral malleolus.    Suspected peripheral arterial disease.  The patient has nonpalpable pulses in the left lower extremity    Adenocarcinoma of uterus    Bladder cancer    Deep vein thrombosis    Essential (primary) hypertension    Heart murmur    High cholesterol    Hypertriglyceridemia    Insomnia    Osteopenia    Other pulmonary  embolism without acute cor pulmonale    Personal history of colonic polyps    Rheumatoid arthritis, unspecified    Type 2 diabetes mellitus without complications      Podiatry, 4/18    Podiatry, 4/17      Vascular Surgery, 4/20   x Medication/Treatment 1st step is to proceed with noninvasive studies.  We will order a left lower extremity duplex.  I did discuss with them that any intervention will need to be weighed with her functional status as well as any limitations caused by her bacteremia.     We will continue with the current care.  Again STARR scheduled.  We will   await vascular input, concerning vascular status in case we do have to do some   type of formal debridement of these areas.    She has been going to the [facility] [place].  Recently had a debridement.  There was   a tentative plan to send her to see the vascular surgeon   Vascular Surgery, 4/20      Podiatry, 4/20        Podiatry, 4/19    Other       Provider, please provide the integumentary diagnosis related to the documentation of Left Lateral Ankle:     [ x  ] Non-pressure ulcer, exposed fat layer   [   ] Non-pressure ulcer, other exposed depth (please specify): ____________   [  ] Clinically Undetermined     Provider, please provide the integumentary diagnosis related to the documentation of Left Posterior Ankle:     [   ] Non-pressure ulcer, exposed fat layer   [ x  ] Non-pressure ulcer, other exposed depth (please specify): ___tendon_________   [  ] Clinically Undetermined       Please document in your progress notes daily for the duration of treatment, until resolved, and include in your discharge summary.    Form No. [id number]

## 2023-04-21 NOTE — PROGRESS NOTES
[facility]                       [address] [address]    PATIENT NAME:       SANAM BELTRAN  YOB: 1931  CSN:                [id number]   MRN:                [id number]  ADMIT DATE:         04/16/2023 17:52:00  PHYSICIAN:          Cleveland Vergara DPM                            PROGRESS NOTE    DATE:  04/21/2023 00:00:00    SUBJECTIVE:  The patient was reevaluated today.  The patient's general plan is   about the same.  Plan is for STARR.  Was seen by Vascular yesterday.  Their note   was reviewed.    OBJECTIVE:  EXTREMITIES:  Remain about the same.  Lesion sites of left ankle   posterior ankle at the Achilles.  These are unchanged.    Nonpalpable pedal pulses.    ASSESSMENT:  Slow healing of left lower extremity wounds with recent cellulitis   and sepsis.    PLAN:  Continue with current care.        ______________________________  Cleveland Vergara DPM    GAS/AQS  DD:  04/21/2023  Time:  03:29PM  DT:  04/21/2023  Time:  06:25PM  Job #:  [id number]      PROGRESS NOTE

## 2023-04-21 NOTE — TRANSFER OF CARE
"Anesthesia Transfer of Care Note    Patient: Melodie Villarreal    Procedure(s) Performed: * No procedures listed *    Patient location: Cath Lab    Anesthesia Type: MAC    Transport from OR: Transported from OR on room air with adequate spontaneous ventilation    Post pain: adequate analgesia    Post assessment: no apparent anesthetic complications and tolerated procedure well    Post vital signs: stable    Level of consciousness: awake and alert    Nausea/Vomiting: no nausea/vomiting    Complications: none    Transfer of care protocol was followed      Last vitals:   Visit Vitals  /77   Pulse 99   Temp 36.1 °C (97 °F) (Axillary)   Resp 18   Ht 5' 3" (1.6 m)   Wt 57.6 kg (126 lb 15.8 oz)   SpO2 (!) 92%   Breastfeeding No   BMI 22.49 kg/m²     "

## 2023-04-22 NOTE — PROGRESS NOTES
Pharmacokinetic Assessment Follow Up: IV Vancomycin    Vancomycin serum concentration assessment(s):    The trough level was drawn correctly and can be used to guide therapy at this time. The measurement is below the desired definitive target range of 15 to 20 mcg/mL.    Vancomycin Regimen Plan:    Change regimen to Vancomycin 750 mg IV every 24 hours with next serum trough concentration measured at 60 min prior to 1000 dose on 04/25/23    Drug levels (last 3 results):  Recent Labs   Lab Result Units 04/20/23  0922 04/21/23  0630 04/22/23  0427   Vanc Lvl Random ug/ml 16.3 13.1* 10.0*       Pharmacy will continue to follow and monitor vancomycin.    Please contact pharmacy at extension [phone] for questions regarding this assessment.    Thank you for the consult,   Yanely Julian       Patient brief summary:  Melodie Villarreal is a 89 y.o. female initiated on antimicrobial therapy with IV Vancomycin for treatment of sepsis    The patient's current regimen is Vancomycin 750 mg q24h    Drug Allergies:   Review of patient's allergies indicates:   Allergen Reactions    Ace inhibitors Other (See Comments)    Adhesive      Allergic to tape    Bactrim [sulfamethoxazole-trimethoprim] Other (See Comments)     Confusion, Hypoglycemia    Meperidine Other (See Comments)    Midazolam Other (See Comments)    Atorvastatin Nausea Only    Codeine Other (See Comments) and Anxiety    Tapentadol Rash       Actual Body Weight:   57.6 kg    Renal Function:   Estimated Creatinine Clearance: 48.1 mL/min (based on SCr of 0.63 mg/dL).,     Dialysis Method (if applicable):  N/A    CBC (last 72 hours):  Recent Labs   Lab Result Units 04/20/23  0921 04/21/23  0630 04/22/23  0523   WBC x10(3)/mcL 13.4* 15.9* 14.2*   Hgb g/dL 9.9* 11.7* 10.7*   Hct % 30.4* 36.7* 32.8*   Platelet x10(3)/mcL 204 227 321   Mono % % 8.7 8.6 9.8   Eos % % 0.1 0.5 1.3   Basophil % % 0.1 0.2 0.1       Metabolic Panel (last 72 hours):  Recent Labs   Lab Result Units  04/20/23  0922 04/21/23  0630 04/22/23  0427   Sodium Level mmol/L 124* 127* 129*   Potassium Level mmol/L 3.9 3.5 4.8   Chloride mmol/L 92* 93* 91*   Carbon Dioxide mmol/L 23 22* 27   Glucose Level mg/dL 256* 195* 163*   Blood Urea Nitrogen mg/dL 13.4 15.2 17.3   Creatinine mg/dL 0.60 0.64 0.63   Albumin Level g/dL 2.0*  --  2.2*   Bilirubin Total mg/dL 0.5  --  0.4   Alkaline Phosphatase unit/L 134  --  155*   Aspartate Aminotransferase unit/L 37*  --  31   Alanine Aminotransferase unit/L 45  --  35   Magnesium Level mg/dL 1.30* 1.80 1.60   Phosphorus Level mg/dL 2.2* 2.5  --        Vancomycin Administrations:  vancomycin given in the last 96 hours                     vancomycin 500 mg in dextrose 5 % 100 mL IVPB (ready to mix) (mg) 500 mg New Bag 04/21/23 1200    vancomycin 750 mg in dextrose 5 % 250 mL IVPB (ready to mix) (mg) 750 mg New Bag 04/20/23 1052    vancomycin 750 mg in dextrose 5 % 250 mL IVPB (ready to mix) (mg) 750 mg New Bag 04/18/23 2125    vancomycin in dextrose 5 % 1 gram/250 mL IVPB 1,000 mg (mg) 1,000 mg New Bag 04/18/23 0913                    Microbiologic Results:  Microbiology Results (last 7 days)       Procedure Component Value Units Date/Time    Blood Culture [[id number]]  (Abnormal) Collected: 04/21/23 0630    Order Status: Completed Specimen: Blood from Hand, Left Updated: 04/22/23 0223     GRAM STAIN Gram Positive Cocci, probable Staphylococcus      Seen in gram stain of broth only      1 of 1 Aerobic bottle positive    Blood Culture [[id number]]  (Abnormal) Collected: 04/21/23 0958    Order Status: Completed Specimen: Blood from Arm, Right Updated: 04/22/23 0218     GRAM STAIN Gram Positive Cocci, probable Staphylococcus      Seen in gram stain of broth only      2 of 2 bottles positive    Blood Culture [[id number]]  (Abnormal)  (Susceptibility) Collected: 04/19/23 0407    Order Status: Completed Specimen: Blood Updated: 04/21/23 0645     CULTURE, BLOOD ([facility]) Methicillin resistant  Staphylococcus aureus     GRAM STAIN Gram Positive Cocci, probable Staphylococcus      Seen in gram stain of broth only      2 of 2 bottles positive    Blood Culture [[id number]]  (Abnormal)  (Susceptibility) Collected: 04/19/23 0839    Order Status: Completed Specimen: Blood from Arm, Left Updated: 04/21/23 0645     CULTURE, BLOOD ([facility]) Methicillin resistant Staphylococcus aureus     GRAM STAIN Gram Positive Cocci, probable Staphylococcus      Seen in gram stain of broth only      2 of 2 bottles positive    Blood culture x two cultures. Draw prior to antibiotics. [[id number]]  (Abnormal)  (Susceptibility) Collected: 04/16/23 1855    Order Status: Completed Specimen: Blood Updated: 04/20/23 0639     CULTURE, BLOOD ([facility]) Methicillin resistant Staphylococcus aureus     GRAM STAIN Gram Positive Cocci, probable Staphylococcus      Seen in gram stain of broth only      1 of 1 Aerobic bottle positive    Blood Culture [[id number]]  (Abnormal)  (Susceptibility) Collected: 04/18/23 0505    Order Status: Completed Specimen: Blood Updated: 04/20/23 0636     CULTURE, BLOOD ([facility]) Methicillin resistant Staphylococcus aureus     GRAM STAIN Gram Positive Cocci, probable Staphylococcus      Seen in gram stain of broth only      2 of 2 bottles positive    Blood Culture [[id number]]  (Abnormal)  (Susceptibility) Collected: 04/18/23 0505    Order Status: Completed Specimen: Blood Updated: 04/20/23 0636     CULTURE, BLOOD ([facility]) Methicillin resistant Staphylococcus aureus     GRAM STAIN Gram Positive Cocci, probable Staphylococcus      Seen in gram stain of broth only      2 of 2 bottles positive    Blood culture x two cultures. Draw prior to antibiotics. [[id number]]  (Abnormal)  (Susceptibility) Collected: 04/16/23 1855    Order Status: Completed Specimen: Blood Updated: 04/19/23 0732     CULTURE, BLOOD ([facility]) Methicillin resistant Staphylococcus aureus     GRAM STAIN Gram Positive Cocci, probable Staphylococcus      Seen in gram stain of  broth only      1 of 1 Aerobic bottle positive    Wound Culture [[id number]] Collected: 04/18/23 0805    Order Status: Sent Specimen: Wound from Ankle, Left     Anaerobic Culture [[id number]] Collected: 04/18/23 0805    Order Status: Sent Specimen: Drainage from Ankle, Left     BCID2 Panel [[id number]]  (Abnormal) Collected: 04/16/23 1855    Order Status: Completed Specimen: Blood Updated: 04/17/23 0915     CTX-M (ESBL ) N/A     Comment: Note: Antimicrobial resistance can occur via multiple mechanisms. A Not Detected result for antimicrobial resistance gene(s) does not indicate antimicrobial susceptibility. Subculturing is required for species identification and susceptibility testing of   isolates.        IMP (Cabapenemase ) N/A     Comment: Note: Antimicrobial resistance can occur via multiple mechanisms. A Not Detected result for antimicrobial resistance gene(s) does not indicate antimicrobial susceptibility. Subculturing is required for species identification and susceptibility testing of   isolates.        KPC resistance gene (Carbapenemase ) N/A     Comment: Note: Antimicrobial resistance can occur via multiple mechanisms. A Not Detected result for antimicrobial resistance gene(s) does not indicate antimicrobial susceptibility. Subculturing is required for species identification and susceptibility testing of   isolates.        mcr-1 N/A     Comment: Note: Antimicrobial resistance can occur via multiple mechanisms. A Not Detected result for antimicrobial resistance gene(s) does not indicate antimicrobial susceptibility. Subculturing is required for species identification and susceptibility testing of   isolates.        mecA ID N/A     Comment: Note: Antimicrobial resistance can occur via multiple mechanisms. A Not Detected result for antimicrobial resistance gene(s) does not indicate antimicrobial susceptibility. Subculturing is required for species identification and susceptibility testing  of   isolates.        mecA/C and MREJ (MRSA) gene Detected     Comment: Note: Antimicrobial resistance can occur via multiple mechanisms. A Not Detected result for antimicrobial resistance gene(s) does not indicate antimicrobial susceptibility. Subculturing is required for species identification and susceptibility testing of   isolates.        NDM (Carbapenemase ) N/A     Comment: Note: Antimicrobial resistance can occur via multiple mechanisms. A Not Detected result for antimicrobial resistance gene(s) does not indicate antimicrobial susceptibility. Subculturing is required for species identification and susceptibility testing of   isolates.        OXA-48-like (Carbapenemase ) N/A     Comment: Note: Antimicrobial resistance can occur via multiple mechanisms. A Not Detected result for antimicrobial resistance gene(s) does not indicate antimicrobial susceptibility. Subculturing is required for species identification and susceptibility testing of   isolates.        Pedro/B (VRE gene) N/A     Comment: Note: Antimicrobial resistance can occur via multiple mechanisms. A Not Detected result for antimicrobial resistance gene(s) does not indicate antimicrobial susceptibility. Subculturing is required for species identification and susceptibility testing of   isolates.        VIM (Carbapenemase ) N/A     Comment: Note: Antimicrobial resistance can occur via multiple mechanisms. A Not Detected result for antimicrobial resistance gene(s) does not indicate antimicrobial susceptibility. Subculturing is required for species identification and susceptibility testing of   isolates.        Enterococcus faecalis Not Detected     Enterococcus faecium Not Detected     Listeria monocytogenes Not Detected     Staphylococcus spp. Detected     Staphylococcus aureus Detected     Staphylococcus epidermidis Not Detected     Staphylococcus lugdunensis Not Detected     Streptococcus spp. Not Detected     Streptococcus  agalactiae (Group B) Not Detected     Streptococcus pneumoniae Not Detected     Streptococcus pyogenes (Group A) Not Detected     Acinetobacter calcoaceticus/baumannii complex Not Detected     Bacteroides fragilis Not Detected     Enterobacterales Not Detected     Enterobacter cloacae complex Not Detected     Escherichia coli Not Detected     Klebsiella aerogenes Not Detected     Klebsiella oxytoca Not Detected     Klebsiella pneumoniae group Not Detected     Proteus spp. Not Detected     Salmonella spp. Not Detected     Serratia marcescens Not Detected     Haemophilus influenzae Not Detected     Neisseria meningitidis Not Detected     Pseudomonas aeruginosa Not Detected     Stenotrophomonas maltophilia Not Detected     Candida albicans Not Detected     Candida auris Not Detected     Candida glabrata Not Detected     Candida krusei Not Detected     Candida parapsilosis Not Detected     Candida tropicalis Not Detected     Cryptococcus neoformans/gattii Not Detected    Narrative:      The [employer] BCID2 Panel is a multiplexed nucleic acid test intended for the use with [employer]0 or [employer] NXTM Systems for the simultaneous qualitative detection and identification of multiple bacterial and yeast nucleic acids and select genetic determinants associated with antimicrobial resistance.  The [employer] BCID2 Panel test is performed directly on blood culture samples identified as positive by a continuous monitoring blood culture system.  Results are intended to be interpreted in conjunction with Gram stain results.

## 2023-04-22 NOTE — CONSULTS
consultation     Reason for consultation right-sided hydronephrosis    History of present illness:  This patient is a 89-year-old white female who is admitted to the hospital with diagnosis of sepsis.  Her initial urinalysis was clear and CT scan with contrast was not suggestive of any stones or obstruction.  She had an ultrasound recently which showed some right-sided hydronephrosis.  I was asked to see the patient for further evaluation.      Chart was reviewed for past history is and medications and allergies.      Vital signs:  Patient is afebrile with normal vital signs with some hypoxemia.      Physical exam:  Elderly white female in no acute distress    Laboratory:  Patient has hyponatremia and hypochloremia with good renal function and she has leukocytosis which is improving and stable anemia at 10.7 and 32.8.      Radiologic studies:  I reviewed the images of the ultrasound as well as report from the ultrasound and the CT scan.  CT scan is in perfect study for possible stone given use of contrast.      Impression:  Persistent sepsis of unclear etiology with new right-sided hydronephrosis    Recommendation:  We will get noncontrast CT scan of the abdomen and pelvis to further elucidate cause of hydronephrosis.    Clayton Del Valle MD

## 2023-04-22 NOTE — ANESTHESIA POSTPROCEDURE EVALUATION
Anesthesia Post Evaluation    Patient: Melodie Villarreal    Procedure(s) Performed: * No procedures listed *    Final Anesthesia Type: MAC      Patient location during evaluation: Cath Lab  Patient participation: Yes- Able to Participate  Level of consciousness: awake  Post-procedure vital signs: reviewed and stable  Pain management: adequate  Airway patency: patent  OMAR mitigation strategies: Multimodal analgesia    Anesthetic complications: no      Cardiovascular status: stable  Respiratory status: unassisted  Hydration status: euvolemic  Follow-up not needed.          Vitals Value Taken Time   /78 04/21/23 1923   Temp 36.7 °C (98.1 °F) 04/21/23 1923   Pulse 92 04/21/23 1923   Resp 20 04/21/23 1923   SpO2 92 % 04/21/23 1923         No case tracking events are documented in the log.      Pain/Malou Score: Pain Rating Prior to Med Admin: 5 (4/21/2023  8:19 AM)  Pain Rating Post Med Admin: 1 (4/21/2023  9:19 AM)

## 2023-04-22 NOTE — PROGRESS NOTES
[facility]                       [address] [address]    PATIENT NAME:       SANAM BELTRAN  YOB: 1931  CSN:                [id number]   MRN:                [id number]  ADMIT DATE:         04/16/2023 17:52:00  PHYSICIAN:          Cleveland Vergara DPM                            PROGRESS NOTE    DATE:  04/22/2023 00:00:00    SUBJECTIVE:  Ms. Beltran is seen this morning.  The daughter-in-law is present   today.  The patient overall is doing about the same.  Daughter-in-law is   concerned that the patient is not getting antibiotics nor that she has had a   bowel movement in several days.  She has MiraLAX scheduled for today.  The   patient is on vancomycin.  She has had serial positive blood cultures.  She was   also re-evaluated by Vascular Surgery yesterday.  Their note reviewed.  Her   vascular status is quite severely depressed with ABIs at 0.25 on the right and   0.21 on the left.  No potential plans for intervention at this point secondary   to the persistent bacteremia.  She has had sequential positive blood cultures.    Her white cell labelled scan was suggestive of cellulitis in the left lower   extremity, but no evidence of any retained abscess and/or osteomyelitic process  localized.  She cannot have an MRI secondary to previous vascular stents.    She had a wound care done.  Pains appear to be controlled.  The potential for   change in antibiotics as per ID's note and Urology consult.    CURRENT PHYSICAL EXAM:  Her vital signs are stable and she is afebrile.    LABORATORY DATA:  Labs reveal continued slow improvement in her white cell count   down to 14.2 today, H and H remained stable.  Most recent sed rate on the 20th   was 48.  CRP was 116.  BUN and creatinine are 17.3 and 0.63 respectively.    Dressings are intact. Did not evaluate wounds today secondary to dressings having been  done.    ASSESSMENT:  Persistent bacteremia.  Source is not yet clear.    PLAN:  We will continue with the current local wound care.  Antibiotics as per   ID.  Potential for Urology consult secondary to persistent hydronephrosis.  The   concern would be as if there is no other source that can be found, then the only   reasonable etiology would be of the left lower extremity wounds.  I would be   very concerned about having to do any sort of debridements on these areas   because of the severity of her arterial disease and the potential that this   would potentially worsen the situation in that area.  Again, as per the family,   there has been improvement in the extremities, primarily in the wound   appearance.  We will continue with the current care.  I assured the   daughter-in-law that she is getting antibiotics, her vancomycin daily, and she   does have MiraLAX schedule for today.        ______________________________  JOSUE Steele/MARIA LUISA  DD:  04/22/2023  Time:  09:46AM  DT:  04/22/2023  Time:  10:11AM  Job #:  [id number]      PROGRESS NOTE

## 2023-04-22 NOTE — PROGRESS NOTES
[facility] - 6th Floor Medical Telemetry  Cardiology  Progress Note    Patient Name: Melodie Villarreal  MRN: [id number]  Admission Date: 4/16/2023  Hospital Length of Stay: 6 days  Code Status: Partial Code   Attending Provider: Mariela Wesley MD   Consulting Provider: ED Jackson  Primary Care Physician: Wisam Espinosa Jr, MD  Principal Problem:Sepsis    Patient information was obtained from patient and ER records.     Subjective:     Chief Complaint:  Consult for STARR    HPI:   Ms. Villarreal is a 89 year old, known to Dr. Walker who presented to [facility] on 4.16.23 with c/o generalized weakness, dysuria, and dark colored urine. She was initially hypotensive upon arrival. She has a history of bladder cancer, HTN, HLD, DM, and DDD. She has a chronic wound to her LLE and nonpalpable pulse to her LLE. Upon workup she was found to have MRSA bacteremia. [facility] is being consulted for STARR to rule out endocarditis.    Hospital Course:  04.22.23: Patient s/p STARR which was negative for valvular vegetations but does reveal moderate to severe AS. Family is not interested in pursuing further evaluation of valve due to age.     PMH: bladder cancer, HTN, HLD, DM, DVT, PE and DDD.   PSH: Cholecystectomy, colonoscopy, cataract surgery  Family History: Noncontributory  Social History: Denies tobacco, alcohol, and illicit drugs    Previous Cardiac Diagnostics:   STARR 04.21.23:  Normal LV systolic function.Biatrial enlargement. No valvular vegetations seen. Moderate to severe aortic valvular stenosis with an QIAN of 1.0cm^2 by planimetry. Mild aortic regurgitation. Mitral annular calcifications with moderate calcifications of the mitral valve leaflets and supporting chordae. Mild to moderate mitral regurgitation. Mild tricuspid regurgitation. No thrombus in the left atrial appendage. Moderate plaque noted in the aorta    Arterial US LLE 04.20.23:  LLE monophasic flow throughout suggestive of inflow disease. No focal stenotic  lesion noted.   Bilaterally severely decreased ABIs     Echo 4.17.23  The left ventricle is normal in size with concentric remodeling and low normal systolic function.  The estimated ejection fraction is 54%.  Grade II left ventricular diastolic dysfunction.  Normal right ventricular size with low normal right ventricular systolic function.  There is mild-to-moderate aortic valve stenosis.  Aortic valve area is 1.31 cm2; peak velocity is 2.1 m/s; mean gradient is 10 mmHg.  There is mild mitral stenosis.  The mean diastolic gradient across the mitral valve is 7 mmHg at a heart rate of 94 bpm.  Moderate mitral regurgitation.  Mild tricuspid regurgitation.  There is pulmonary hypertension.  The estimated PA systolic pressure is 44 mmHg.    Echo 6.1.21  The study quality is average.   The left ventricle is decreased in size.  The left ventricular ejection fraction is 50%.   Mild to moderate (1-2+) mitral regurgitation. Mitral stenosis is moderate,  Moderate calcification of the mitral valve is noted.  Mild (1+) aortic regurgitation.   Mild to moderate (1-2+) tricuspid regurgitation.  The pulmonary artery systolic pressure is 54 mmHg.     Review of patient's allergies indicates:   Allergen Reactions    Ace inhibitors Other (See Comments)    Adhesive      Allergic to tape    Bactrim [sulfamethoxazole-trimethoprim] Other (See Comments)     Confusion, Hypoglycemia    Meperidine Other (See Comments)    Midazolam Other (See Comments)    Atorvastatin Nausea Only    Codeine Other (See Comments) and Anxiety    Tapentadol Rash       Review of Systems   Constitutional:  Positive for fatigue.   Neurological:  Positive for weakness.   All other systems reviewed and are negative.    Objective:     Vital Signs (Most Recent):  Temp: 98.7 °F (37.1 °C) (04/22/23 0831)  Pulse: 100 (04/22/23 0831)  Resp: 14 (04/22/23 0831)  BP: 119/64 (04/22/23 0831)  SpO2: (!) 92 % (04/22/23 0831)   Vital Signs (24h Range):  Temp:  [96.4 °F (35.8 °C)-98.7  °F (37.1 °C)] 98.7 °F (37.1 °C)  Pulse:  [] 100  Resp:  [14-20] 14  SpO2:  [92 %-95 %] 92 %  BP: (104-136)/(64-85) 119/64     Weight: 57.6 kg (126 lb 15.8 oz)  Body mass index is 22.49 kg/m².    SpO2: (!) 92 %         Intake/Output Summary (Last 24 hours) at 4/22/2023 1048  Last data filed at 4/22/2023 0528  Gross per 24 hour   Intake 250 ml   Output 450 ml   Net -200 ml         Lines/Drains/Airways       Drain  Duration                  Urethral Catheter 04/16/23 2127 Silicone 16 Fr. 5 days              Peripheral Intravenous Line  Duration                  Peripheral IV - Single Lumen 04/19/23 0400 22 G Anterior;Right Forearm 3 days                    Significant Labs:  Recent Results (from the past 72 hour(s))   POCT glucose    Collection Time: 04/19/23 10:54 AM   Result Value Ref Range    POCT Glucose 237 (H) 70 - 110 mg/dL   POCT glucose    Collection Time: 04/19/23  3:46 PM   Result Value Ref Range    POCT Glucose 234 (H) 70 - 110 mg/dL   POCT glucose    Collection Time: 04/19/23  7:56 PM   Result Value Ref Range    POCT Glucose 177 (H) 70 - 110 mg/dL   POCT glucose    Collection Time: 04/20/23  5:20 AM   Result Value Ref Range    POCT Glucose 191 (H) 70 - 110 mg/dL   Sedimentation rate    Collection Time: 04/20/23  9:21 AM   Result Value Ref Range    Sed Rate 48 (H) 0 - 20 mm/hr   CBC with Differential    Collection Time: 04/20/23  9:21 AM   Result Value Ref Range    WBC 13.4 (H) 4.5 - 11.5 x10(3)/mcL    RBC 3.64 (L) 4.20 - 5.40 x10(6)/mcL    Hgb 9.9 (L) 12.0 - 16.0 g/dL    Hct 30.4 (L) 37.0 - 47.0 %    MCV 83.5 80.0 - 94.0 fL    MCH 27.2 27.0 - 31.0 pg    MCHC 32.6 (L) 33.0 - 36.0 g/dL    RDW 14.4 11.5 - 17.0 %    Platelet 204 130 - 400 x10(3)/mcL    MPV 9.7 7.4 - 10.4 fL    Neut % 84.8 %    Lymph % 5.6 %    Mono % 8.7 %    Eos % 0.1 %    Basophil % 0.1 %    Lymph # 0.75 0.6 - 4.6 x10(3)/mcL    Neut # 11.35 (H) 2.1 - 9.2 x10(3)/mcL    Mono # 1.17 0.1 - 1.3 x10(3)/mcL    Eos # 0.01 0 - 0.9 x10(3)/mcL     Baso # 0.02 0 - 0.2 x10(3)/mcL    IG# 0.10 (H) 0 - 0.04 x10(3)/mcL    IG% 0.7 %    NRBC% 0.0 %   Vancomycin, Random    Collection Time: 04/20/23  9:22 AM   Result Value Ref Range    Vanc Lvl Random 16.3 15.0 - 20.0 ug/ml   Comprehensive Metabolic Panel    Collection Time: 04/20/23  9:22 AM   Result Value Ref Range    Sodium Level 124 (L) 132 - 146 mmol/L    Potassium Level 3.9 3.5 - 5.1 mmol/L    Chloride 92 (L) 98 - 111 mmol/L    Carbon Dioxide 23 23 - 31 mmol/L    Glucose Level 256 (H) 75 - 121 mg/dL    Blood Urea Nitrogen 13.4 9.8 - 20.1 mg/dL    Creatinine 0.60 0.55 - 1.02 mg/dL    Calcium Level Total 8.1 (L) 8.4 - 10.2 mg/dL    Protein Total 4.9 (L) 5.8 - 7.6 gm/dL    Albumin Level 2.0 (L) 3.4 - 4.8 g/dL    Globulin 2.9 2.4 - 3.5 gm/dL    Albumin/Globulin Ratio 0.7 (L) 1.1 - 2.0 ratio    Bilirubin Total 0.5 <=1.5 mg/dL    Alkaline Phosphatase 134 40 - 150 unit/L    Alanine Aminotransferase 45 0 - 55 unit/L    Aspartate Aminotransferase 37 (H) 5 - 34 unit/L    eGFR >60 mls/min/1.73/m2   Magnesium    Collection Time: 04/20/23  9:22 AM   Result Value Ref Range    Magnesium Level 1.30 (L) 1.60 - 2.60 mg/dL   Phosphorus    Collection Time: 04/20/23  9:22 AM   Result Value Ref Range    Phosphorus Level 2.2 (L) 2.3 - 4.7 mg/dL   C-Reactive Protein    Collection Time: 04/20/23  9:22 AM   Result Value Ref Range    C-Reactive Protein 116.90 (H) <5.00 mg/L   POCT glucose    Collection Time: 04/20/23 10:51 AM   Result Value Ref Range    POCT Glucose 231 (H) 70 - 110 mg/dL   POCT glucose    Collection Time: 04/20/23  3:26 PM   Result Value Ref Range    POCT Glucose 279 (H) 70 - 110 mg/dL   POCT glucose    Collection Time: 04/20/23  9:53 PM   Result Value Ref Range    POCT Glucose 164 (H) 70 - 110 mg/dL   Ankle Brachial Indices (PAM)    Collection Time: 04/20/23 10:41 PM   Result Value Ref Range    Left arm  mmHg    Left posterior tibial 33 mmHg    Right posterior tibial 38 mmHg    Left dorsalis pedis 31 mmHg    Right  dorsalis pedis 26 mmHg    Left PAM 0.21     Right PAM 0.25    Vancomycin, Random    Collection Time: 04/21/23  6:30 AM   Result Value Ref Range    Vanc Lvl Random 13.1 (L) 15.0 - 20.0 ug/ml   Blood Culture    Collection Time: 04/21/23  6:30 AM    Specimen: Hand, Left; Blood   Result Value Ref Range    GRAM STAIN Gram Positive Cocci, probable Staphylococcus (AA)     GRAM STAIN Seen in gram stain of broth only (AA)     GRAM STAIN 1 of 1 Aerobic bottle positive (AA)    Basic Metabolic Panel    Collection Time: 04/21/23  6:30 AM   Result Value Ref Range    Sodium Level 127 (L) 132 - 146 mmol/L    Potassium Level 3.5 3.5 - 5.1 mmol/L    Chloride 93 (L) 98 - 111 mmol/L    Carbon Dioxide 22 (L) 23 - 31 mmol/L    Glucose Level 195 (H) 75 - 121 mg/dL    Blood Urea Nitrogen 15.2 9.8 - 20.1 mg/dL    Creatinine 0.64 0.55 - 1.02 mg/dL    BUN/Creatinine Ratio 24     Calcium Level Total 8.5 8.4 - 10.2 mg/dL    Anion Gap 12.0 mEq/L    eGFR >60 mls/min/1.73/m2   Magnesium    Collection Time: 04/21/23  6:30 AM   Result Value Ref Range    Magnesium Level 1.80 1.60 - 2.60 mg/dL   Phosphorus    Collection Time: 04/21/23  6:30 AM   Result Value Ref Range    Phosphorus Level 2.5 2.3 - 4.7 mg/dL   CBC with Differential    Collection Time: 04/21/23  6:30 AM   Result Value Ref Range    WBC 15.9 (H) 4.5 - 11.5 x10(3)/mcL    RBC 4.44 4.20 - 5.40 x10(6)/mcL    Hgb 11.7 (L) 12.0 - 16.0 g/dL    Hct 36.7 (L) 37.0 - 47.0 %    MCV 82.7 80.0 - 94.0 fL    MCH 26.4 (L) 27.0 - 31.0 pg    MCHC 31.9 (L) 33.0 - 36.0 g/dL    RDW 14.5 11.5 - 17.0 %    Platelet 227 130 - 400 x10(3)/mcL    MPV 9.5 7.4 - 10.4 fL    Neut % 82.7 %    Lymph % 7.2 %    Mono % 8.6 %    Eos % 0.5 %    Basophil % 0.2 %    Lymph # 1.15 0.6 - 4.6 x10(3)/mcL    Neut # 13.13 (H) 2.1 - 9.2 x10(3)/mcL    Mono # 1.37 (H) 0.1 - 1.3 x10(3)/mcL    Eos # 0.08 0 - 0.9 x10(3)/mcL    Baso # 0.03 0 - 0.2 x10(3)/mcL    IG# 0.13 (H) 0 - 0.04 x10(3)/mcL    IG% 0.8 %    NRBC% 0.0 %   Blood Culture     Collection Time: 04/21/23  9:58 AM    Specimen: Arm, Right; Blood   Result Value Ref Range    GRAM STAIN Gram Positive Cocci, probable Staphylococcus (AA)     GRAM STAIN Seen in gram stain of broth only (AA)     GRAM STAIN 2 of 2 bottles positive (AA)    POCT glucose    Collection Time: 04/21/23 10:49 AM   Result Value Ref Range    POCT Glucose 202 (H) 70 - 110 mg/dL   Transesophageal echo (STARR)    Collection Time: 04/21/23  4:17 PM   Result Value Ref Range    BSA 1.49 m2   POCT glucose    Collection Time: 04/21/23  4:42 PM   Result Value Ref Range    POCT Glucose 167 (H) 70 - 110 mg/dL   POCT glucose    Collection Time: 04/21/23  9:20 PM   Result Value Ref Range    POCT Glucose 262 (H) 70 - 110 mg/dL   Vancomycin, Random    Collection Time: 04/22/23  4:27 AM   Result Value Ref Range    Vanc Lvl Random 10.0 (L) 15.0 - 20.0 ug/ml   Magnesium    Collection Time: 04/22/23  4:27 AM   Result Value Ref Range    Magnesium Level 1.60 1.60 - 2.60 mg/dL   Comprehensive Metabolic Panel    Collection Time: 04/22/23  4:27 AM   Result Value Ref Range    Sodium Level 129 (L) 132 - 146 mmol/L    Potassium Level 4.8 3.5 - 5.1 mmol/L    Chloride 91 (L) 98 - 111 mmol/L    Carbon Dioxide 27 23 - 31 mmol/L    Glucose Level 163 (H) 75 - 121 mg/dL    Blood Urea Nitrogen 17.3 9.8 - 20.1 mg/dL    Creatinine 0.63 0.55 - 1.02 mg/dL    Calcium Level Total 8.8 8.4 - 10.2 mg/dL    Protein Total 6.3 5.8 - 7.6 gm/dL    Albumin Level 2.2 (L) 3.4 - 4.8 g/dL    Globulin 4.1 (H) 2.4 - 3.5 gm/dL    Albumin/Globulin Ratio 0.5 (L) 1.1 - 2.0 ratio    Bilirubin Total 0.4 <=1.5 mg/dL    Alkaline Phosphatase 155 (H) 40 - 150 unit/L    Alanine Aminotransferase 35 0 - 55 unit/L    Aspartate Aminotransferase 31 5 - 34 unit/L    eGFR >60 mls/min/1.73/m2   CBC with Differential    Collection Time: 04/22/23  5:23 AM   Result Value Ref Range    WBC 14.2 (H) 4.5 - 11.5 x10(3)/mcL    RBC 3.94 (L) 4.20 - 5.40 x10(6)/mcL    Hgb 10.7 (L) 12.0 - 16.0 g/dL    Hct 32.8 (L)  37.0 - 47.0 %    MCV 83.2 80.0 - 94.0 fL    MCH 27.2 27.0 - 31.0 pg    MCHC 32.6 (L) 33.0 - 36.0 g/dL    RDW 14.7 11.5 - 17.0 %    Platelet 321 130 - 400 x10(3)/mcL    MPV 9.8 7.4 - 10.4 fL    Neut % 78.7 %    Lymph % 9.3 %    Mono % 9.8 %    Eos % 1.3 %    Basophil % 0.1 %    Lymph # 1.32 0.6 - 4.6 x10(3)/mcL    Neut # 11.20 (H) 2.1 - 9.2 x10(3)/mcL    Mono # 1.39 (H) 0.1 - 1.3 x10(3)/mcL    Eos # 0.19 0 - 0.9 x10(3)/mcL    Baso # 0.02 0 - 0.2 x10(3)/mcL    IG# 0.12 (H) 0 - 0.04 x10(3)/mcL    IG% 0.8 %    NRBC% 0.0 %       Significant Imaging:  Imaging Results              X-Ray Tibia Fibula 2 View Left (Final result)  Result time 04/17/23 12:16:49      Final result by Ed Dee MD (04/17/23 12:16:49)                   Impression:      No acute osseous process appreciated.      Electronically signed by: Ed Dee  Date:    04/17/2023  Time:    12:16               Narrative:    EXAMINATION:  XR TIBIA FIBULA 2 VIEW LEFT    CLINICAL HISTORY:  Pain, unspecified    TECHNIQUE:  AP and lateral views of the left tibia and fibula.    COMPARISON:  No relevant comparison studies available.    FINDINGS:  Suspected soft tissue ulcer along the posterior portion of the lower leg.  No tracking subcutaneous gas appreciated.  Numerous vascular calcifications.  No acute fracture or dislocation.                                        X-Ray Foot Complete Left (Final result)  Result time 04/17/23 12:14:20      Final result by Ed Dee MD (04/17/23 12:14:20)                   Impression:      Minimally displaced 5th proximal phalanx fracture.  Possible minimally displaced 4th proximal phalanx fracture.    This report was flagged in Epic as abnormal.      Electronically signed by: Ed Dee  Date:    04/17/2023  Time:    12:14               Narrative:    EXAMINATION:  XR FOOT COMPLETE 3 VIEW LEFT    CLINICAL HISTORY:  Pain, unspecified    TECHNIQUE:  AP, lateral and oblique views of the left  foot    COMPARISON:  None    FINDINGS:  Decreased overall bone mineralization.  Minimally displaced fracture of the midportion of the 5th proximal phalanx.  There may be a minimally displaced 4th proximal phalanx fracture as well.  Joint alignments are maintained.  Numerous vascular calcifications in the lower leg.                                       CT Head Without Contrast (Final result)  Result time 04/17/23 06:35:13      Final result by Morteza Ballesteros MD (04/17/23 06:35:13)                   Impression:      No acute intracranial findings identified.    No significant discrepancy with overnight report.      Electronically signed by: Morteza Ballesteros  Date:    04/17/2023  Time:    06:35               Narrative:    EXAMINATION:  CT HEAD WITHOUT CONTRAST    CLINICAL HISTORY:  Mental status change, unknown cause;    TECHNIQUE:  Sequential axial images were performed of the brain without contrast.    Dose product length of 2679 mGycm. Automated exposure control was utilized to minimize radiation dose.    COMPARISON:  None available.    FINDINGS:  There is no intracranial mass effect, midline shift, hydrocephalus or hemorrhage. There is no sulcal effacement or low attenuation changes to suggest recent large vessel territory infarction. Chronic appearing periventricular and subcortical white matter low attenuation changes are present and are consistent with marked chronic microangiopathic ischemia. The ventricular system and sulcal markings prominence is consistent with atrophy. There is no acute extra axial fluid collection.  There is no acute depressed skull fracture.  Visualized paranasal sinuses are clear without mucosal thickening, polypoidal abnormality or air-fluid levels. Mastoid air cells aeration is optimal.                        Preliminary result by Morteza Ballesteros MD (04/16/23 20:09:20)                   Narrative:    START OF REPORT:  Technique: CT of the head was performed without intravenous contrast  with axial as well as coronal and sagittal images.    Comparison: None.    Dosage Information: Automated exposure control was utilized.    Clinical history: Ams.    Findings:  Hemorrhage: No acute intracranial hemorrhage is seen.  CSF spaces: The ventricles, sulci and basal cisterns all appear mildly prominent consistent with global cerebral atrophy.  Brain parenchyma: There is preservation of the grey white junction throughout. Moderate microvascular change is seen in portions of the periventricular and deep white matter tracts.  Cerebellum: Unremarkable.  Vascular: Mild atheromatous calcification of the intracranial arteries is seen.  Sella and skull base: The sella appears to be within normal limits for age.  Cerebellopontine angles: Within normal limits.  Herniation: None.  Intracranial calcifications: Incidental note is made of bilateral choroid plexus calcification. Incidental note is made of some pineal region calcification. Incidental note is made of some calcification of the falx.  Calvarium: No acute linear or depressed skull fracture is seen.    Maxillofacial Structures:  Paranasal sinuses: The visualized paranasal sinuses appear clear with no mucoperiosteal thickening or air fluid levels identified.  Orbits: The orbits appear unremarkable.  Zygomatic arches: The zygomatic arches are intact and unremarkable.  Temporal bones and mastoids: The temporal bones and mastoids appear unremarkable.  TMJ: The mandibular condyles appear normally placed with respect to the mandibular fossa.      Impression:  1. No acute intracranial process identified. Details and other findings as noted above.                          Preliminary result by Tej Correia MD (04/16/23 20:09:20)                   Narrative:    START OF REPORT:  Technique: CT of the head was performed without intravenous contrast with axial as well as coronal and sagittal images.    Comparison: None.    Dosage Information: Automated exposure control  was utilized.    Clinical history: Ams.    Findings:  Hemorrhage: No acute intracranial hemorrhage is seen.  CSF spaces: The ventricles, sulci and basal cisterns all appear mildly prominent consistent with global cerebral atrophy.  Brain parenchyma: There is preservation of the grey white junction throughout. Moderate microvascular change is seen in portions of the periventricular and deep white matter tracts.  Cerebellum: Unremarkable.  Vascular: Mild atheromatous calcification of the intracranial arteries is seen.  Sella and skull base: The sella appears to be within normal limits for age.  Cerebellopontine angles: Within normal limits.  Herniation: None.  Intracranial calcifications: Incidental note is made of bilateral choroid plexus calcification. Incidental note is made of some pineal region calcification. Incidental note is made of some calcification of the falx.  Calvarium: No acute linear or depressed skull fracture is seen.    Maxillofacial Structures:  Paranasal sinuses: The visualized paranasal sinuses appear clear with no mucoperiosteal thickening or air fluid levels identified.  Orbits: The orbits appear unremarkable.  Zygomatic arches: The zygomatic arches are intact and unremarkable.  Temporal bones and mastoids: The temporal bones and mastoids appear unremarkable.  TMJ: The mandibular condyles appear normally placed with respect to the mandibular fossa.      Impression:  1. No acute intracranial process identified. Details and other findings as noted above.                                         CT Chest Abdomen Pelvis With Contrast (xpd) (Final result)  Result time 04/17/23 06:38:06      Final result by Morteza Ballesteros MD (04/17/23 06:38:06)                   Impression:    Impression:    1. No focal infiltrate or consolidation is identified.    2. No filling defects are seen in the pulmonary arteries to suggest pulmonary embolus.    3. No CT evidence of pulmonary embolism or other acute  intrathoracic pathology. No acute intraabdominal or pelvic solid organ or bowel pathology identified. Details and other findings as discussed above.    No significant discrepancy with overnight report.      Electronically signed by: Morteza Ballesteros  Date:    04/17/2023  Time:    06:38               Narrative:      Technique:CT Scan of the chest abdomen and pelvis was performed with intravenous contrast with axial as well as sagittal and, coronal images.    Dosage Information:Automated Exposure Control was utilized.      Comparison:None.    Clinical History:Sepsis.    Findings:    Soft Tissues:A few calcifications are seen in the right breast tissue.    Neck:The visualized soft tissues of the neck appear unremarkable. The thyroid gland appear unremarkable. Note of a tortuous proximal right common carotid artery.    Mediastinum:The mediastinal structures are within normal limits.    Heart:The heart size is within normal limits. Pronounced coronary artery calcification is seen.    Aorta:Pronounced aortic calcification is seen in the thoracic aorta.    Lungs:There is mild non specific dependent change at the lung bases. Mild paraseptal emphysematous change is seen in the left lower lobe. No focal infiltrate or consolidation is identified.    Pleura:There is a small right sided pleural effusion. There is a small left sided pleural effusion.    Bony Structures:    Spine:Pronounced spondylolytic changes are seen in the thoracic spine. Dextroconvex scoliosis of the thoracic spine.    Ribs:There are chronic-appearing cortical deformities involving the posterior aspect of the right 10th and 11th ribs. These may reflect healed fractures.    Abdomen:    Abdominal Wall:No abdominal wall pathology is seen.    Liver:The liver appears unremarkable.    Biliary System:No intrahepatic biliary duct dilatation is seen. The extra hepatic biliary system appears prominent which may reflect prior obstructive physiology in this patient  status post cholecystectomy.    Gallbladder:Surgical clips are seen in the gallbladder fossa consistent with prior cholecystectomy.    Pancreas:Severe pancreatic atrophy is seen.    Spleen:There is heterogeneity of splenic enhancement likely reflecting flow artifact. The spleen otherwise appears grossly unremarkable.    Kidneys:The kidneys appear unremarkable with no stones cysts masses or hydronephrosis with IV contrast decreasing sensitivity and specificity for stones.    Aorta:There is pronounced calcification of the abdominal aorta and its branches.    IVC:An IVC filter is in place.    Bowel:    Esophagus:There appears to be mild thickening versus underdistention of the distal esophagus.    Stomach:The stomach appears unremarkable.    Duodenum:Unremarkable appearing duodenum.    Small Bowel:The small bowel appears unremarkable.    Colon:Diverticula are seen in the colon. No associated inflammatory stranding or pericolonic fluid is seen to suggest diverticulitis.    Appendix:The appendix is not identified but no inflammatory changes are seen in the right lower quadrant to suggest appendicitis.    Peritoneum:No intraperitoneal free air or ascites is seen.    Pelvis:    Bladder:The bladder appears unremarkable.    Female:    Uterus:The uterus is not identified.    Ovaries:No adnexal masses are seen.    Bony structures:    Dorsal Spine:There is pronounced multilevel spondylosis of the visualized dorsal spine. Levoscoliosis of the lumbar spine is noted.    Bony Pelvis:The visualized bony structures of the pelvis appear unremarkable.                        Preliminary result by Morteza Ballesteros MD (04/16/23 20:05:48)                   Narrative:    START OF REPORT:  Technique: CT Scan of the chest abdomen and pelvis was performed with intravenous contrast with axial as well as sagittal and, coronal images.    Dosage Information: Automated Exposure Control was utilized.    Comparison: None.    Clinical History:  Sepsis.    Findings:  Soft Tissues: A few calcifications are seen in the right breast tissue.  Neck: The visualized soft tissues of the neck appear unremarkable. The thyroid gland appear unremarkable. Note of a tortuous proximal right common carotid artery.  Mediastinum: The mediastinal structures are within normal limits.  Heart: The heart size is within normal limits. Pronounced coronary artery calcification is seen.  Aorta: Pronounced aortic calcification is seen in the thoracic aorta.  Pulmonary Arteries: No filling defects are seen in the pulmonary arteries to suggest pulmonary embolus.  Lungs: There is mild non specific dependent change at the lung bases. Mild paraseptal emphysematous change is seen in the left lower lobe. No focal infiltrate or consolidation is identified.  Pleura: There is a small right sided pleural effusion. There is a small left sided pleural effusion.  Bony Structures:  Spine: Pronounced spondylolytic changes are seen in the thoracic spine. Dextroconvex scoliosis of the thoracic spine.  Ribs: There are chronic-appearing cortical deformities involving the posterior aspect of the right 10th and 11th ribs. These may reflect healed fractures.  Abdomen:  Abdominal Wall: No abdominal wall pathology is seen.  Liver: The liver appears unremarkable.  Biliary System: No intrahepatic biliary duct dilatation is seen. The extra hepatic biliary system appears prominent which may reflect prior obstructive physiology in this patient status post cholecystectomy.  Gallbladder: Surgical clips are seen in the gallbladder fossa consistent with prior cholecystectomy.  Pancreas: Severe pancreatic atrophy is seen.  Spleen: There is heterogeneity of splenic enhancement likely reflecting flow artifact. The spleen otherwise appears grossly unremarkable.  Kidneys: The kidneys appear unremarkable with no stones cysts masses or hydronephrosis with IV contrast decreasing sensitivity and specificity for stones.  Aorta:  There is pronounced calcification of the abdominal aorta and its branches.  IVC: An IVC filter is in place.  Bowel:  Esophagus: There appears to be mild thickening versus underdistention of the distal esophagus.  Stomach: The stomach appears unremarkable.  Duodenum: Unremarkable appearing duodenum.  Small Bowel: The small bowel appears unremarkable.  Colon: Diverticula are seen in the colon. No associated inflammatory stranding or pericolonic fluid is seen to suggest diverticulitis.  Appendix: The appendix is not identified but no inflammatory changes are seen in the right lower quadrant to suggest appendicitis.  Peritoneum: No intraperitoneal free air or ascites is seen.    Pelvis:  Bladder: The bladder appears unremarkable.  Female:  Uterus: The uterus is not identified.  Ovaries: No adnexal masses are seen.    Bony structures:  Dorsal Spine: There is pronounced multilevel spondylosis of the visualized dorsal spine. Levoscoliosis of the lumbar spine is noted.  Bony Pelvis: The visualized bony structures of the pelvis appear unremarkable.      Impression:  1. No focal infiltrate or consolidation is identified.  2. No filling defects are seen in the pulmonary arteries to suggest pulmonary embolus.  3. No CT evidence of pulmonary embolism or other acute intrathoracic pathology. No acute intraabdominal or pelvic solid organ or bowel pathology identified. Details and other findings as discussed above.                          Preliminary result by Tej Correia MD (04/16/23 20:05:48)                   Narrative:    START OF REPORT:  Technique: CT Scan of the chest abdomen and pelvis was performed with intravenous contrast with axial as well as sagittal and, coronal images.    Dosage Information: Automated Exposure Control was utilized.    Comparison: None.    Clinical History: Sepsis.    Findings:  Soft Tissues: A few calcifications are seen in the right breast tissue.  Neck: The visualized soft tissues of the neck  appear unremarkable. The thyroid gland appear unremarkable. Note of a tortuous proximal right common carotid artery.  Mediastinum: The mediastinal structures are within normal limits.  Heart: The heart size is within normal limits. Pronounced coronary artery calcification is seen.  Aorta: Pronounced aortic calcification is seen in the thoracic aorta.  Pulmonary Arteries: No filling defects are seen in the pulmonary arteries to suggest pulmonary embolus.  Lungs: There is mild non specific dependent change at the lung bases. Mild paraseptal emphysematous change is seen in the left lower lobe. No focal infiltrate or consolidation is identified.  Pleura: There is a small right sided pleural effusion. There is a small left sided pleural effusion.  Bony Structures:  Spine: Pronounced spondylolytic changes are seen in the thoracic spine. Dextroconvex scoliosis of the thoracic spine.  Ribs: There are chronic-appearing cortical deformities involving the posterior aspect of the right 10th and 11th ribs. These may reflect healed fractures.  Abdomen:  Abdominal Wall: No abdominal wall pathology is seen.  Liver: The liver appears unremarkable.  Biliary System: No intrahepatic biliary duct dilatation is seen. The extra hepatic biliary system appears prominent which may reflect prior obstructive physiology in this patient status post cholecystectomy.  Gallbladder: Surgical clips are seen in the gallbladder fossa consistent with prior cholecystectomy.  Pancreas: Severe pancreatic atrophy is seen.  Spleen: There is heterogeneity of splenic enhancement likely reflecting flow artifact. The spleen otherwise appears grossly unremarkable.  Kidneys: The kidneys appear unremarkable with no stones cysts masses or hydronephrosis with IV contrast decreasing sensitivity and specificity for stones.  Aorta: There is pronounced calcification of the abdominal aorta and its branches.  IVC: An IVC filter is in place.  Bowel:  Esophagus: There  appears to be mild thickening versus underdistention of the distal esophagus.  Stomach: The stomach appears unremarkable.  Duodenum: Unremarkable appearing duodenum.  Small Bowel: The small bowel appears unremarkable.  Colon: Diverticula are seen in the colon. No associated inflammatory stranding or pericolonic fluid is seen to suggest diverticulitis.  Appendix: The appendix is not identified but no inflammatory changes are seen in the right lower quadrant to suggest appendicitis.  Peritoneum: No intraperitoneal free air or ascites is seen.    Pelvis:  Bladder: The bladder appears unremarkable.  Female:  Uterus: The uterus is not identified.  Ovaries: No adnexal masses are seen.    Bony structures:  Dorsal Spine: There is pronounced multilevel spondylosis of the visualized dorsal spine. Levoscoliosis of the lumbar spine is noted.  Bony Pelvis: The visualized bony structures of the pelvis appear unremarkable.      Impression:  1. No focal infiltrate or consolidation is identified.  2. No filling defects are seen in the pulmonary arteries to suggest pulmonary embolus.  3. No CT evidence of pulmonary embolism or other acute intrathoracic pathology. No acute intraabdominal or pelvic solid organ or bowel pathology identified. Details and other findings as discussed above.                                         X-Ray Chest AP Portable (Final result)  Result time 04/17/23 08:59:12      Final result by Sean Javier MD (04/17/23 08:59:12)                   Impression:      No acute chest disease is identified.      Electronically signed by: Sean Javier  Date:    04/17/2023  Time:    08:59               Narrative:    EXAMINATION:  XR CHEST AP PORTABLE    CLINICAL HISTORY:  Sepsis;, .    FINDINGS:  No alveolar consolidation, effusion, or pneumothorax is seen.   The thoracic aorta is normal  cardiac silhouette, central pulmonary vessels and mediastinum are normal in size and are grossly unremarkable.  There are  scoliotic changes of the thoracolumbar spine                                    Telemetry:  SR    Physical Exam  Vitals reviewed.   Constitutional:       Appearance: Normal appearance.   Cardiovascular:      Rate and Rhythm: Normal rate and regular rhythm.      Pulses: Normal pulses.      Heart sounds: Murmur heard.      Comments: murmur  Pulmonary:      Effort: Pulmonary effort is normal.      Breath sounds: Normal breath sounds.   Abdominal:      General: Bowel sounds are normal.      Palpations: Abdomen is soft.   Musculoskeletal:         General: Normal range of motion.   Skin:     General: Skin is warm and dry.      Capillary Refill: Capillary refill takes less than 2 seconds.   Neurological:      General: No focal deficit present.      Mental Status: She is alert and oriented to person, place, and time.      Motor: Weakness present.     Current Inpatient Medications:    Current Facility-Administered Medications:     acetaminophen tablet 650 mg, 650 mg, Oral, Q6H PRN, Willie Pereira MD, 650 mg at 04/20/23 0825    collagenase ointment, , Topical (Top), Daily, Willie Pereira MD, Given at 04/22/23 0929    dextrose 10% bolus 125 mL 125 mL, 12.5 g, Intravenous, PRN, Willie Pereira MD    dextrose 10% bolus 250 mL 250 mL, 25 g, Intravenous, PRN, Willie Pereira MD    docusate sodium capsule 50 mg, 50 mg, Oral, Daily, Mariana Ulloa, , 50 mg at 04/22/23 0927    enoxaparin injection 40 mg, 40 mg, Subcutaneous, Daily, Willie Pereira MD, 40 mg at 04/21/23 1640    furosemide tablet 40 mg, 40 mg, Oral, Daily, Mariela Wesley MD, 40 mg at 04/22/23 0948    glucagon (human recombinant) injection 1 mg, 1 mg, Intramuscular, PRN, Willie Pereira MD    glucose chewable tablet 16 g, 16 g, Oral, PRN, Willie Pereira MD    glucose chewable tablet 24 g, 24 g, Oral, PRN, Willie Pereira MD    insulin aspart U-100 injection 0-5 Units, 0-5 Units, Subcutaneous, QID (AC + HS) PRN,  Willie Pereira MD, 3 Units at 04/20/23 1656    insulin detemir U-100 injection 7 Units, 7 Units, Subcutaneous, QHS, Mariela Wesley MD, 7 Units at 04/21/23 2100    Lactobacillus acidophilus capsule 1 capsule, 1 capsule, Oral, TID WM, Shawn Gomez MD, 1 capsule at 04/22/23 0927    magnesium oxide tablet 400 mg, 400 mg, Oral, BID, Mariela Wesley MD, 400 mg at 04/22/23 0927    melatonin tablet 6 mg, 6 mg, Oral, Nightly PRN, Willie Pereira MD, 6 mg at 04/18/23 2055    mupirocin 2 % ointment, , Nasal, BID, Willie Pereira MD, Given at 04/22/23 0929    polyethylene glycol packet 17 g, 17 g, Oral, Daily PRN, Mariela Wesley MD, 17 g at 04/22/23 0927    sodium chloride 0.9% flush 10 mL, 10 mL, Intravenous, PRN, Willie Pereira MD    traMADoL tablet 50 mg, 50 mg, Oral, Q6H PRN, Mariela Wesley MD, 50 mg at 04/21/23 2111    traZODone tablet 50 mg, 50 mg, Oral, QHS, Mariana Ulloa DO, 50 mg at 04/21/23 2100    Pharmacy to dose Vancomycin consult, , , Once **AND** vancomycin - pharmacy to dose, , Intravenous, pharmacy to manage frequency, Willie Pereira MD    vancomycin 750 mg in dextrose 5 % 250 mL IVPB (ready to mix), 750 mg, Intravenous, Q24H, Willie Pereira MD, Last Rate: 250 mL/hr at 04/22/23 0927, 750 mg at 04/22/23 0927         VTE Risk Mitigation (From admission, onward)           Ordered     enoxaparin injection 40 mg  Daily         04/17/23 0156     IP VTE HIGH RISK PATIENT  Once         04/17/23 0156     Place sequential compression device  Until discontinued         04/17/23 0156                    Assessment:   MRSA bacteremia  --STARR 04.21.23: no valvular vegetations  VHD  --moderate to severe AS  --Mod MR  HTN  HLD  T2DM  PAD LLE with wound  DDD    Plan:   Family does not want to undergo evaluation of severe AS due to age.  STARR was negative for endocarditis    CIS signing off. Reconsult if needed.     Carola Thompson, ED  Cardiology  [facility] -  6th Floor Medical Telemetry  04/22/2023 8:25 AM

## 2023-04-22 NOTE — PROGRESS NOTES
I was not able to physically see the patient today but I have reviewed her chart, labs and imaging.     Patient with no clear source of her bacteremia. CT of the spine with no clear spinal involvement, no abscess, STARR done with no vegetation, yet she remains bacteremic. She has no abscess in her lower extremity and no evidence of joint seeding on physical exam. She has also reached appropriate Vancomycin levels. If blood cultures collected today remain positive, we may have to switch her coverage to Daptomycin and Ceftaroline as salvage therapy as there is no clear source that has not been controlled yet.     She does have a R sided hydronephrosis despite martinez catheter in place, would consult urology in am for evaluation as this could be a source of her persistent bacteremia.

## 2023-04-22 NOTE — PROGRESS NOTES
[facility] Hospital Medicine Progress Note        Chief Complaint: Inpatient Follow-up for weakness    HPI:   89-year-old female with a past medical history of essential hypertension, type 2 diabetes mellitus, prior DVT/IVC filter no longer on anticoagulation .     presents to the ER after she became lethargic on the day of presentation.  Daughter is at bedside and provides most of the history and states patient is normally alert and oriented, active and able to carry out own ADLs.  She states patient has chronic open wounds on her left lower extremity better followed closely by Wound Care.  Today she became drowsy/lethargic, and she activated EMS who found she was hypotensive with a blood pressure of 66/36 on arrival.       She arrived to the ER tachycardic and hypotensive, maintaining adequate sats on 3 L nasal cannula.  Laboratory work showed leukocytosis of 34 K, mild anemia, a lactic acid of 3.5, elevated BNP of 2000 and a troponin of 0.115.  Urinalysis was unremarkable, CT chest abdomen and pelvis was negative for pulmonary embolus, pulmonary infiltrates or any intra-abdominal or pelvic pathology.  CT head was normal.      She was started on broad-spectrum antibiotics for undifferentiated sepsis admitted to the hospitalist service for further management.     Blood cultures remains persistently positive for MRSA bacteremia since admission. ID consulted and pt continued on IV Vancomycin. Noted left lower ext non healing ulcer. NM bone scan without clear evidence of OM. LLE arterial U/S showed monophasic flow throughout suggestive of inflow disease. CT T and L spine  with no evidence of discitis/osteomyelitis. 2D TTE is neg for valvular vegetation. Cardiology consulted for STARR.       Interval Hx:   Blood cultures from 4/21 is again positive for GPC probable staph. S/P STARR yesterday no vegetation. ID note reviewed. Plan to switch to Daptomycin and Ceftaroline if blood culture remains  positive. Will wait for ID to address . I will go ahead repeat another set of blood cultures tomorrow am. Urology consult placed to evaluate Rt hydronephrosis not relieved by Newton decompression.      Objective/physical exam:  General: In no acute distress, afebrile  Chest: crackles at bases , herb   Heart: RRR, +S1, S2, no appreciable murmur  Abdomen: Soft, nontender, BS +  MSK: Warm, trace to 1+ lower extremity edema, no clubbing or cyanosis  Neurologic: Alert and oriented x4, Cranial nerve II-XII intact, moves all ext    VITAL SIGNS: 24 HRS MIN & MAX LAST   Temp  Min: 97.8 °F (36.6 °C)  Max: 98.7 °F (37.1 °C) 97.8 °F (36.6 °C)   BP  Min: 118/63  Max: 136/85 133/71   Pulse  Min: 77  Max: 100  100   Resp  Min: 14  Max: 20 16   SpO2  Min: 85 %  Max: 95 % 95 %       Recent Labs   Lab 04/20/23  0921 04/21/23  0630 04/22/23  0523   WBC 13.4* 15.9* 14.2*   RBC 3.64* 4.44 3.94*   HGB 9.9* 11.7* 10.7*   HCT 30.4* 36.7* 32.8*   MCV 83.5 82.7 83.2   MCH 27.2 26.4* 27.2   MCHC 32.6* 31.9* 32.6*   RDW 14.4 14.5 14.7    227 321   MPV 9.7 9.5 9.8       Recent Labs   Lab 04/17/23  0358 04/18/23  0505 04/20/23  0922 04/21/23  0630 04/22/23  0427   *   < > 124* 127* 129*   K 3.8   < > 3.9 3.5 4.8   CO2 27   < > 23 22* 27   BUN 22.6*   < > 13.4 15.2 17.3   CREATININE 0.87   < > 0.60 0.64 0.63   CALCIUM 9.6   < > 8.1* 8.5 8.8   MG 2.10  --  1.30* 1.80 1.60   ALBUMIN 2.2*  --  2.0*  --  2.2*   ALKPHOS 73  --  134  --  155*   ALT 18  --  45  --  35   AST 17  --  37*  --  31   BILITOT 0.4  --  0.5  --  0.4    < > = values in this interval not displayed.          Microbiology Results (last 7 days)       Procedure Component Value Units Date/Time    Blood Culture [[id number]]  (Abnormal) Collected: 04/21/23 0630    Order Status: Completed Specimen: Blood from Hand, Left Updated: 04/22/23 0223     GRAM STAIN Gram Positive Cocci, probable Staphylococcus      Seen in gram stain of broth only      1 of 1 Aerobic bottle positive     Blood Culture [[id number]]  (Abnormal) Collected: 04/21/23 0958    Order Status: Completed Specimen: Blood from Arm, Right Updated: 04/22/23 0218     GRAM STAIN Gram Positive Cocci, probable Staphylococcus      Seen in gram stain of broth only      2 of 2 bottles positive    Blood Culture [[id number]]  (Abnormal)  (Susceptibility) Collected: 04/19/23 0407    Order Status: Completed Specimen: Blood Updated: 04/21/23 0645     CULTURE, BLOOD ([facility]) Methicillin resistant Staphylococcus aureus     GRAM STAIN Gram Positive Cocci, probable Staphylococcus      Seen in gram stain of broth only      2 of 2 bottles positive    Blood Culture [[id number]]  (Abnormal)  (Susceptibility) Collected: 04/19/23 0839    Order Status: Completed Specimen: Blood from Arm, Left Updated: 04/21/23 0645     CULTURE, BLOOD ([facility]) Methicillin resistant Staphylococcus aureus     GRAM STAIN Gram Positive Cocci, probable Staphylococcus      Seen in gram stain of broth only      2 of 2 bottles positive    Blood culture x two cultures. Draw prior to antibiotics. [[id number]]  (Abnormal)  (Susceptibility) Collected: 04/16/23 1855    Order Status: Completed Specimen: Blood Updated: 04/20/23 0639     CULTURE, BLOOD ([facility]) Methicillin resistant Staphylococcus aureus     GRAM STAIN Gram Positive Cocci, probable Staphylococcus      Seen in gram stain of broth only      1 of 1 Aerobic bottle positive    Blood Culture [[id number]]  (Abnormal)  (Susceptibility) Collected: 04/18/23 0505    Order Status: Completed Specimen: Blood Updated: 04/20/23 0636     CULTURE, BLOOD ([facility]) Methicillin resistant Staphylococcus aureus     GRAM STAIN Gram Positive Cocci, probable Staphylococcus      Seen in gram stain of broth only      2 of 2 bottles positive    Blood Culture [[id number]]  (Abnormal)  (Susceptibility) Collected: 04/18/23 0505    Order Status: Completed Specimen: Blood Updated: 04/20/23 0636     CULTURE, BLOOD ([facility]) Methicillin resistant Staphylococcus aureus     GRAM  STAIN Gram Positive Cocci, probable Staphylococcus      Seen in gram stain of broth only      2 of 2 bottles positive    Blood culture x two cultures. Draw prior to antibiotics. [[id number]]  (Abnormal)  (Susceptibility) Collected: 04/16/23 1855    Order Status: Completed Specimen: Blood Updated: 04/19/23 0732     CULTURE, BLOOD ([facility]) Methicillin resistant Staphylococcus aureus     GRAM STAIN Gram Positive Cocci, probable Staphylococcus      Seen in gram stain of broth only      1 of 1 Aerobic bottle positive    Wound Culture [[id number]] Collected: 04/18/23 0805    Order Status: Sent Specimen: Wound from Ankle, Left     Anaerobic Culture [[id number]] Collected: 04/18/23 0805    Order Status: Sent Specimen: Drainage from Ankle, Left     BCID2 Panel [[id number]]  (Abnormal) Collected: 04/16/23 1855    Order Status: Completed Specimen: Blood Updated: 04/17/23 0915     CTX-M (ESBL ) N/A     Comment: Note: Antimicrobial resistance can occur via multiple mechanisms. A Not Detected result for antimicrobial resistance gene(s) does not indicate antimicrobial susceptibility. Subculturing is required for species identification and susceptibility testing of   isolates.        IMP (Cabapenemase ) N/A     Comment: Note: Antimicrobial resistance can occur via multiple mechanisms. A Not Detected result for antimicrobial resistance gene(s) does not indicate antimicrobial susceptibility. Subculturing is required for species identification and susceptibility testing of   isolates.        KPC resistance gene (Carbapenemase ) N/A     Comment: Note: Antimicrobial resistance can occur via multiple mechanisms. A Not Detected result for antimicrobial resistance gene(s) does not indicate antimicrobial susceptibility. Subculturing is required for species identification and susceptibility testing of   isolates.        mcr-1 N/A     Comment: Note: Antimicrobial resistance can occur via multiple mechanisms. A Not Detected  result for antimicrobial resistance gene(s) does not indicate antimicrobial susceptibility. Subculturing is required for species identification and susceptibility testing of   isolates.        mecA ID N/A     Comment: Note: Antimicrobial resistance can occur via multiple mechanisms. A Not Detected result for antimicrobial resistance gene(s) does not indicate antimicrobial susceptibility. Subculturing is required for species identification and susceptibility testing of   isolates.        mecA/C and MREJ (MRSA) gene Detected     Comment: Note: Antimicrobial resistance can occur via multiple mechanisms. A Not Detected result for antimicrobial resistance gene(s) does not indicate antimicrobial susceptibility. Subculturing is required for species identification and susceptibility testing of   isolates.        NDM (Carbapenemase ) N/A     Comment: Note: Antimicrobial resistance can occur via multiple mechanisms. A Not Detected result for antimicrobial resistance gene(s) does not indicate antimicrobial susceptibility. Subculturing is required for species identification and susceptibility testing of   isolates.        OXA-48-like (Carbapenemase ) N/A     Comment: Note: Antimicrobial resistance can occur via multiple mechanisms. A Not Detected result for antimicrobial resistance gene(s) does not indicate antimicrobial susceptibility. Subculturing is required for species identification and susceptibility testing of   isolates.        Pedro/B (VRE gene) N/A     Comment: Note: Antimicrobial resistance can occur via multiple mechanisms. A Not Detected result for antimicrobial resistance gene(s) does not indicate antimicrobial susceptibility. Subculturing is required for species identification and susceptibility testing of   isolates.        VIM (Carbapenemase ) N/A     Comment: Note: Antimicrobial resistance can occur via multiple mechanisms. A Not Detected result for antimicrobial resistance gene(s) does  not indicate antimicrobial susceptibility. Subculturing is required for species identification and susceptibility testing of   isolates.        Enterococcus faecalis Not Detected     Enterococcus faecium Not Detected     Listeria monocytogenes Not Detected     Staphylococcus spp. Detected     Staphylococcus aureus Detected     Staphylococcus epidermidis Not Detected     Staphylococcus lugdunensis Not Detected     Streptococcus spp. Not Detected     Streptococcus agalactiae (Group B) Not Detected     Streptococcus pneumoniae Not Detected     Streptococcus pyogenes (Group A) Not Detected     Acinetobacter calcoaceticus/baumannii complex Not Detected     Bacteroides fragilis Not Detected     Enterobacterales Not Detected     Enterobacter cloacae complex Not Detected     Escherichia coli Not Detected     Klebsiella aerogenes Not Detected     Klebsiella oxytoca Not Detected     Klebsiella pneumoniae group Not Detected     Proteus spp. Not Detected     Salmonella spp. Not Detected     Serratia marcescens Not Detected     Haemophilus influenzae Not Detected     Neisseria meningitidis Not Detected     Pseudomonas aeruginosa Not Detected     Stenotrophomonas maltophilia Not Detected     Candida albicans Not Detected     Candida auris Not Detected     Candida glabrata Not Detected     Candida krusei Not Detected     Candida parapsilosis Not Detected     Candida tropicalis Not Detected     Cryptococcus neoformans/gattii Not Detected    Narrative:      The pbsi BCID2 Panel is a multiplexed nucleic acid test intended for the use with Neurovance® 2.0 or Neurovance® Treventis Systems for the simultaneous qualitative detection and identification of multiple bacterial and yeast nucleic acids and select genetic determinants associated with antimicrobial resistance.  The [employer] test companye BCID2 Panel test is performed directly on blood culture samples identified as positive by a continuous monitoring blood culture system.   Results are intended to be interpreted in conjunction with Gram stain results.             See below for Radiology    Scheduled Med:   collagenase   Topical (Top) Daily    docusate sodium  50 mg Oral Daily    enoxaparin  40 mg Subcutaneous Daily    furosemide  40 mg Oral Daily    insulin detemir U-100  7 Units Subcutaneous QHS    Lactobacillus acidophilus  1 capsule Oral TID WM    magnesium oxide  400 mg Oral BID    mupirocin   Nasal BID    trazodone  50 mg Oral QHS    vancomycin (VANCOCIN) IVPB  750 mg Intravenous Q24H        Continuous Infusions:       PRN Meds:  acetaminophen, dextrose 10%, dextrose 10%, glucagon (human recombinant), glucose, glucose, insulin aspart U-100, melatonin, polyethylene glycol, sodium chloride 0.9%, traMADoL, Pharmacy to dose Vancomycin consult **AND** vancomycin - pharmacy to dose       Assessment/Plan:  Persistent MRSA Bacteremia   Sepsis due to MRSA bacteremia of unclear source   Left ankle chronic non healing wound   Acute on chronic diastolic congestive heart failure   Mild to moderate Aortic stenosis , Mod MR  Pulmonary HTN, PA pressure 44 per Echo  Diabetes Mellitus , type 2 uncontrolled   Leukocytosis   Normochromic anemia   Hyponatremia due to hypervolemia   Hypomagnesemia   Chronic low back pain      Plan-   Blood cultures from 4/21 is again positive for GPC probable staph.   S/P STARR yesterday no vegetation.   ID note reviewed. Plan to switch to Daptomycin and Ceftaroline if blood culture remains positive.   Will wait for ID to address .   I will go ahead repeat another set of blood cultures tomorrow am.   Urology consult placed to evaluate Rt hydronephrosis not relieved by Newton decompression.          LLE arterial U/S showed monophasic flow throughout suggestive of inflow disease. Podiatry plans for debridement based on arterial flow.      CT T and L spine  with no evidence of discitis/osteomyelitis .   Blood cultures are repeated today.  Continue Vancomycin per ID guidance       Lasix IV transitioned to oral   CXR with evidence of pulmonary vascular congestion.   Hyponatremia is due to hypervolemia in the setting of diastolic CHF exacerbation   Replete magnesium with goal Mg 2.0 or higher.      Monitor clinical course     VTE prophylaxis: Lovenox     Patient condition:  Fair     Anticipated discharge and Disposition:     TBD. Likely Home with family/Home health        All diagnosis and differential diagnosis have been reviewed; assessment and plan has been documented; I have personally reviewed the labs and test results that are presently available; I have reviewed the patients medication list; I have reviewed the consulting providers response and recommendations. I have reviewed or attempted to review medical records based upon their availability    All of the patient's questions have been  addressed and answered. Patient's is agreeable to the above stated plan. I will continue to monitor closely and make adjustments to medical management as needed.  _____________________________________________________________________    Nutrition Status:    Radiology:  Ankle Brachial Indices (PAM)  Bilaterally severely decreased ABIs.  CV Ultrasound doppler arterial leg left  Left lower extremity monophasic flow throughout is suggestive of inflow   disease. No focal stenotic lesion was noted.  US Retroperitoneal Limited  Narrative: EXAMINATION:  US RETROPERITONEAL LIMITED    CLINICAL HISTORY:  hydronephrosis;, .    TECHNIQUE:  Transverse and longitudinal images of the kidneys  and bladder were obtained.    COMPARISON:  None    FINDINGS:  Right Kidney:    Length: 10.5 x 4.5 x 5.5 cm    Appearance: Normal echogenicity.    Collecting system: There is prominence of the left collecting system indicating a mild-to-moderate degree of hydronephrosis    Stones: None    Cyst/Mass: None    Left Kidney:    Length: 9.5 x 4.9 x 4.5 cm    Appearance: There appears to be a trace amount of perinephric  fluid    Collecting system: No hydronephrosis    Stones: None    Cyst/Mass: None    Bladder:    Newton catheter is identified    Vessels:    Visualized portions of the IVC and aorta have a normal grayscale appearance.  Impression: Mild to moderate hydronephrosis on the right.    Trace amount of perinephric fluid on the left.    No other abnormalities    Electronically signed by: Sean Javier  Date:    04/21/2023  Time:    13:42  CT Thoracic Spine With Contrast  Narrative: EXAMINATION:  CT THORACIC SPINE WITH CONTRAST    CLINICAL HISTORY:  OSteomyelitis, abscess, cannot get MRI;    TECHNIQUE:  Postcontrast CT images of the thoracic spine. Axial, coronal, and sagittal reformatted images were obtained. Dose length product is 1033 mGycm. Automatic exposure control, adjustment of mA/kV or iterative reconstruction technique was used to limit radiation dose.    COMPARISON:  CT chest, abdomen and pelvis dated 04/16/2023    FINDINGS:  There is a rightward scoliotic curvature of the thoracic spine with exaggerated kyphosis.  The vertebral body heights are maintained.  There is no acute fracture identified.  There are multilevel degenerative changes with disc height loss, marginal osteophyte formation and facet arthropathy.  There are no acute destructive bone changes identified.  There is no evidence of a drainable fluid collection.  There are small bilateral pleural effusions with overlying subsegmental atelectasis.  Impression: No evidence to suggest discitis/osteomyelitis or drainable fluid collection.    Electronically signed by: Jennifer Pineda  Date:    04/21/2023  Time:    10:42  CT Lumbar Spine With Contrast  Narrative: EXAMINATION:  CT LUMBAR SPINE WITH CONTRAST    CLINICAL HISTORY:  osteomyelitis/abscess cannot get MRI;    TECHNIQUE:  Postcontrast CT images of the lumbar spine. Axial, coronal, and sagittal reformatted images were obtained. Dose length product is 1033 mGycm. Automatic exposure control,  adjustment of mA/kV or iterative reconstruction technique was used to limit radiation dose.    COMPARISON:  CT chest, abdomen and pelvis dated 04/16/2023    FINDINGS:  There are 5 non-rib-bearing lumbar type vertebral bodies. There is a leftward scoliotic curvature of the lumbar spine with grade 1 anterolisthesis of L3 and L4. The vertebral body heights are maintained. There are multilevel degenerate endplate changes. There are no definite acute destructive bone changes identified.    There is multilevel canal stenosis, moderate at L3-L4 and L4-5.  There is severe neural foraminal stenosis on the right at L3-L4 and L4-5 and on the left at L4-5 and L5-S1.    There is no drainable fluid collection identified.  There is right-sided hydronephrosis, new compared to 04/16/2023.  Impression: 1. No evidence to suggest discitis/osteomyelitis or drainable fluid collection.  2. New right-sided hydronephrosis.    Electronically signed by: Jennifer Pineda  Date:    04/21/2023  Time:    10:36      Mariela Wesley MD   04/22/2023

## 2023-04-23 NOTE — PROGRESS NOTES
progress note    Follow-up regarding new right-sided hydronephrosis and ongoing staph sepsis    I reviewed available studies.    Vital signs:  Patient afebrile.    Physical exam: Patient in no distress     Laboratory:  Improving leukocytosis.  Serum electrolytes reveal hyponatremia and hypochloremia.  Renal function is good and stable or improving    Radiologic studies:  I reviewed the CT scan and some other older studies.  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  Diuretic renal scan would answer questions regarding obstruction.    Impression: Right-sided hydronephrosis which is probably not clinically relevant oral contributing to staph sepsis.      Recommendation:  I will schedule her for a diuretic renal scan and consider stenting if anyone feels strongly that this is contributing to her staph sepsis.      Clayton Del Valle MD

## 2023-04-23 NOTE — PROGRESS NOTES
[facility] Hospital Medicine Progress Note        Chief Complaint: Inpatient Follow-up for weakness.     HPI:   89-year-old female with a past medical history of essential hypertension, type 2 diabetes mellitus, prior DVT/IVC filter no longer on anticoagulation .     presents to the ER after she became lethargic on the day of presentation.  Daughter is at bedside and provides most of the history and states patient is normally alert and oriented, active and able to carry out own ADLs.  She states patient has chronic open wounds on her left lower extremity better followed closely by Wound Care.  Today she became drowsy/lethargic, and she activated EMS who found she was hypotensive with a blood pressure of 66/36 on arrival.       She arrived to the ER tachycardic and hypotensive, maintaining adequate sats on 3 L nasal cannula.  Laboratory work showed leukocytosis of 34 K, mild anemia, a lactic acid of 3.5, elevated BNP of 2000 and a troponin of 0.115.  Urinalysis was unremarkable, CT chest abdomen and pelvis was negative for pulmonary embolus, pulmonary infiltrates or any intra-abdominal or pelvic pathology.  CT head was normal.      She was started on broad-spectrum antibiotics for undifferentiated sepsis admitted to the hospitalist service for further management.     Blood cultures remains persistently positive for MRSA bacteremia since admission. ID consulted and pt continued on IV Vancomycin. Noted left lower ext non healing ulcer. NM bone scan without clear evidence of OM. LLE arterial U/S showed monophasic flow throughout suggestive of inflow disease. CT T and L spine  with no evidence of discitis/osteomyelitis. 2D TTE is neg for valvular vegetation. Cardiology consulted for STARR and pt underwent on 4/22/23 revealed no valvular vegetations. Pt is noted to have RT hydronephrosis despite Newton decompression . Urology consulted to evaluate persistent Rt hydronephrosis.      Interval Hx:    Blood cultures from 4/21 is again positive for GPC probable staph. STARR on 4/22 no vegetation. ID note reviewed. Plan to switch to Daptomycin and Ceftaroline if blood culture remains positive. Will wait for ID to address . New set of blood cultures repeated today. Urology consult obtained for Rt hydronephrosis not relieved by Newton decompression. CT abd plevis showed rectal fecal impaction     Objective/physical exam:    General: In no acute distress, afebrile  Chest: crackles at bases , herb   Heart: RRR, +S1, S2, no appreciable murmur  Abdomen: Soft, nontender, BS +  MSK: Warm, trace to 1+ lower extremity edema, no clubbing or cyanosis  Neurologic: Alert and oriented x4, Cranial nerve II-XII intact, moves all ext    VITAL SIGNS: 24 HRS MIN & MAX LAST   Temp  Min: 97.4 °F (36.3 °C)  Max: 98.7 °F (37.1 °C) 97.5 °F (36.4 °C)   BP  Min: 99/58  Max: 163/71 (!) 163/71     Pulse  Min: 89  Max: 103  94   Resp  Min: 18  Max: 18 18   SpO2  Min: 91 %  Max: 97 % (!) 93 %         Recent Labs   Lab 04/21/23  0630 04/22/23  0523 04/23/23  0820   WBC 15.9* 14.2* 11.8*   RBC 4.44 3.94* 3.58*   HGB 11.7* 10.7* 9.7*   HCT 36.7* 32.8* 29.5*   MCV 82.7 83.2 82.4   MCH 26.4* 27.2 27.1   MCHC 31.9* 32.6* 32.9*   RDW 14.5 14.7 14.7    321 355   MPV 9.5 9.8 9.2       Recent Labs   Lab 04/17/23  0358 04/18/23  0505 04/20/23  0922 04/21/23  0630 04/22/23  0427 04/23/23  0820   *   < > 124* 127* 129* 128*   K 3.8   < > 3.9 3.5 4.8 3.7   CO2 27   < > 23 22* 27 30   BUN 22.6*   < > 13.4 15.2 17.3 16.9   CREATININE 0.87   < > 0.60 0.64 0.63 0.53*   CALCIUM 9.6   < > 8.1* 8.5 8.8 8.3*   MG 2.10  --  1.30* 1.80 1.60 1.50*   ALBUMIN 2.2*  --  2.0*  --  2.2*  --    ALKPHOS 73  --  134  --  155*  --    ALT 18  --  45  --  35  --    AST 17  --  37*  --  31  --    BILITOT 0.4  --  0.5  --  0.4  --     < > = values in this interval not displayed.          Microbiology Results (last 7 days)       Procedure Component Value Units Date/Time     Blood Culture [[id number]]  (Abnormal) Collected: 04/21/23 0958    Order Status: Completed Specimen: Blood from Arm, Right Updated: 04/23/23 0935     CULTURE, BLOOD ([facility]) Staphylococcus aureus     GRAM STAIN Gram Positive Cocci, probable Staphylococcus      Seen in gram stain of broth only      2 of 2 bottles positive    Blood Culture [[id number]]  (Abnormal) Collected: 04/21/23 0630    Order Status: Completed Specimen: Blood from Hand, Left Updated: 04/23/23 0935     CULTURE, BLOOD ([facility]) Staphylococcus aureus     GRAM STAIN Gram Positive Cocci, probable Staphylococcus      Seen in gram stain of broth only      1 of 1 Aerobic bottle positive    Blood Culture [[id number]] Collected: 04/23/23 0820    Order Status: Resulted Specimen: Blood from Arm, Right Updated: 04/23/23 0830    Blood Culture [[id number]] Collected: 04/23/23 0826    Order Status: Resulted Specimen: Blood from Arm, Right Updated: 04/23/23 0830    Blood Culture [[id number]]  (Abnormal)  (Susceptibility) Collected: 04/19/23 0407    Order Status: Completed Specimen: Blood Updated: 04/21/23 0645     CULTURE, BLOOD ([facility]) Methicillin resistant Staphylococcus aureus     GRAM STAIN Gram Positive Cocci, probable Staphylococcus      Seen in gram stain of broth only      2 of 2 bottles positive    Blood Culture [[id number]]  (Abnormal)  (Susceptibility) Collected: 04/19/23 0839    Order Status: Completed Specimen: Blood from Arm, Left Updated: 04/21/23 0645     CULTURE, BLOOD ([facility]) Methicillin resistant Staphylococcus aureus     GRAM STAIN Gram Positive Cocci, probable Staphylococcus      Seen in gram stain of broth only      2 of 2 bottles positive    Blood culture x two cultures. Draw prior to antibiotics. [[id number]]  (Abnormal)  (Susceptibility) Collected: 04/16/23 1855    Order Status: Completed Specimen: Blood Updated: 04/20/23 0639     CULTURE, BLOOD ([facility]) Methicillin resistant Staphylococcus aureus     GRAM STAIN Gram Positive Cocci, probable Staphylococcus       Seen in gram stain of broth only      1 of 1 Aerobic bottle positive    Blood Culture [[id number]]  (Abnormal)  (Susceptibility) Collected: 04/18/23 0505    Order Status: Completed Specimen: Blood Updated: 04/20/23 0636     CULTURE, BLOOD ([facility]) Methicillin resistant Staphylococcus aureus     GRAM STAIN Gram Positive Cocci, probable Staphylococcus      Seen in gram stain of broth only      2 of 2 bottles positive    Blood Culture [[id number]]  (Abnormal)  (Susceptibility) Collected: 04/18/23 0505    Order Status: Completed Specimen: Blood Updated: 04/20/23 0636     CULTURE, BLOOD ([facility]) Methicillin resistant Staphylococcus aureus     GRAM STAIN Gram Positive Cocci, probable Staphylococcus      Seen in gram stain of broth only      2 of 2 bottles positive    Blood culture x two cultures. Draw prior to antibiotics. [[id number]]  (Abnormal)  (Susceptibility) Collected: 04/16/23 1855    Order Status: Completed Specimen: Blood Updated: 04/19/23 0732     CULTURE, BLOOD ([facility]) Methicillin resistant Staphylococcus aureus     GRAM STAIN Gram Positive Cocci, probable Staphylococcus      Seen in gram stain of broth only      1 of 1 Aerobic bottle positive    Wound Culture [[id number]] Collected: 04/18/23 0805    Order Status: Sent Specimen: Wound from Ankle, Left     Anaerobic Culture [[id number]] Collected: 04/18/23 0805    Order Status: Sent Specimen: Drainage from Ankle, Left     BCID2 Panel [[id number]]  (Abnormal) Collected: 04/16/23 1855    Order Status: Completed Specimen: Blood Updated: 04/17/23 0915     CTX-M (ESBL ) N/A     Comment: Note: Antimicrobial resistance can occur via multiple mechanisms. A Not Detected result for antimicrobial resistance gene(s) does not indicate antimicrobial susceptibility. Subculturing is required for species identification and susceptibility testing of   isolates.        IMP (Cabapenemase ) N/A     Comment: Note: Antimicrobial resistance can occur via multiple mechanisms. A Not  Detected result for antimicrobial resistance gene(s) does not indicate antimicrobial susceptibility. Subculturing is required for species identification and susceptibility testing of   isolates.        KPC resistance gene (Carbapenemase ) N/A     Comment: Note: Antimicrobial resistance can occur via multiple mechanisms. A Not Detected result for antimicrobial resistance gene(s) does not indicate antimicrobial susceptibility. Subculturing is required for species identification and susceptibility testing of   isolates.        mcr-1 N/A     Comment: Note: Antimicrobial resistance can occur via multiple mechanisms. A Not Detected result for antimicrobial resistance gene(s) does not indicate antimicrobial susceptibility. Subculturing is required for species identification and susceptibility testing of   isolates.        mecA ID N/A     Comment: Note: Antimicrobial resistance can occur via multiple mechanisms. A Not Detected result for antimicrobial resistance gene(s) does not indicate antimicrobial susceptibility. Subculturing is required for species identification and susceptibility testing of   isolates.        mecA/C and MREJ (MRSA) gene Detected     Comment: Note: Antimicrobial resistance can occur via multiple mechanisms. A Not Detected result for antimicrobial resistance gene(s) does not indicate antimicrobial susceptibility. Subculturing is required for species identification and susceptibility testing of   isolates.        NDM (Carbapenemase ) N/A     Comment: Note: Antimicrobial resistance can occur via multiple mechanisms. A Not Detected result for antimicrobial resistance gene(s) does not indicate antimicrobial susceptibility. Subculturing is required for species identification and susceptibility testing of   isolates.        OXA-48-like (Carbapenemase ) N/A     Comment: Note: Antimicrobial resistance can occur via multiple mechanisms. A Not Detected result for antimicrobial resistance  gene(s) does not indicate antimicrobial susceptibility. Subculturing is required for species identification and susceptibility testing of   isolates.        Pedro/B (VRE gene) N/A     Comment: Note: Antimicrobial resistance can occur via multiple mechanisms. A Not Detected result for antimicrobial resistance gene(s) does not indicate antimicrobial susceptibility. Subculturing is required for species identification and susceptibility testing of   isolates.        VIM (Carbapenemase ) N/A     Comment: Note: Antimicrobial resistance can occur via multiple mechanisms. A Not Detected result for antimicrobial resistance gene(s) does not indicate antimicrobial susceptibility. Subculturing is required for species identification and susceptibility testing of   isolates.        Enterococcus faecalis Not Detected     Enterococcus faecium Not Detected     Listeria monocytogenes Not Detected     Staphylococcus spp. Detected     Staphylococcus aureus Detected     Staphylococcus epidermidis Not Detected     Staphylococcus lugdunensis Not Detected     Streptococcus spp. Not Detected     Streptococcus agalactiae (Group B) Not Detected     Streptococcus pneumoniae Not Detected     Streptococcus pyogenes (Group A) Not Detected     Acinetobacter calcoaceticus/baumannii complex Not Detected     Bacteroides fragilis Not Detected     Enterobacterales Not Detected     Enterobacter cloacae complex Not Detected     Escherichia coli Not Detected     Klebsiella aerogenes Not Detected     Klebsiella oxytoca Not Detected     Klebsiella pneumoniae group Not Detected     Proteus spp. Not Detected     Salmonella spp. Not Detected     Serratia marcescens Not Detected     Haemophilus influenzae Not Detected     Neisseria meningitidis Not Detected     Pseudomonas aeruginosa Not Detected     Stenotrophomonas maltophilia Not Detected     Candida albicans Not Detected     Candida auris Not Detected     Candida glabrata Not Detected     Candida  krusei Not Detected     Candida parapsilosis Not Detected     Candida tropicalis Not Detected     Cryptococcus neoformans/gattii Not Detected    Narrative:      The [employer] (previously [employer] BCID2 Panel is a multiplexed nucleic acid test intended for the use with Central Desktop® 2.0 or Central Desktop® [employer] Systems for the simultaneous qualitative detection and identification of multiple bacterial and yeast nucleic acids and select genetic determinants associated with antimicrobial resistance.  The [employer] BCID2 Panel test is performed directly on blood culture samples identified as positive by a continuous monitoring blood culture system.  Results are intended to be interpreted in conjunction with Gram stain results.             See below for Radiology    Scheduled Med:   collagenase   Topical (Top) Daily    docusate sodium  50 mg Oral Daily    enoxaparin  40 mg Subcutaneous Daily    furosemide  40 mg Oral Daily    insulin detemir U-100  7 Units Subcutaneous QHS    Lactobacillus acidophilus  1 capsule Oral TID WM    lactulose 10 gram/15 ml  30 g Oral Once    magnesium oxide  400 mg Oral BID    trazodone  50 mg Oral QHS    vancomycin (VANCOCIN) IVPB  750 mg Intravenous Q24H        Continuous Infusions:       PRN Meds:  acetaminophen, dextrose 10%, dextrose 10%, glucagon (human recombinant), glucose, glucose, insulin aspart U-100, melatonin, polyethylene glycol, sodium chloride 0.9%, traMADoL, Pharmacy to dose Vancomycin consult **AND** vancomycin - pharmacy to dose       Assessment/Plan:  Persistent MRSA Bacteremia   Sepsis due to MRSA bacteremia of unclear source   Left ankle chronic non healing wound   Acute on chronic diastolic congestive heart failure   Mild to moderate Aortic stenosis , Mod MR  Pulmonary HTN, PA pressure 44 per Echo  Diabetes Mellitus , type 2 uncontrolled   Leukocytosis   Normochromic anemia   Hyponatremia due to hypervolemia   Hypomagnesemia   Chronic low back pain    Constipation and colonic fecal  impaction     Plan-   Blood cultures from 4/21 is again positive for GPC probable staph.   S/P STARR on 4/22/23  no vegetation.   ID note reviewed. Plan to switch to Daptomycin and Ceftaroline if blood culture remains positive.   Will wait for ID to address .   New  set of blood cultures repeated today   Urology consulted  to evaluate Rt hydronephrosis not relieved by Newton decompression.   CT abd/pelvis showed colonic fecal impaction. Bowel regimen initiated         Lasix IV transitioned to oral   CXR with evidence of pulmonary vascular congestion.   Hyponatremia is due to hypervolemia in the setting of diastolic CHF exacerbation   Replete magnesium with goal Mg 2.0 or higher.      Monitor clinical course      VTE prophylaxis: Lovenox      Patient condition:  Fair      Anticipated discharge and Disposition:     TBD. Likely Home with family/Home health         All diagnosis and differential diagnosis have been reviewed; assessment and plan has been documented; I have personally reviewed the labs and test results that are presently available; I have reviewed the patients medication list; I have reviewed the consulting providers response and recommendations. I have reviewed or attempted to review medical records based upon their availability    All of the patient's questions have been  addressed and answered. Patient's is agreeable to the above stated plan. I will continue to monitor closely and make adjustments to medical management as needed.  _____________________________________________________________________    Nutrition Status:    Radiology:  CT Abdomen Pelvis  Without Contrast  Narrative: EXAMINATION:  CT ABDOMEN PELVIS WITHOUT CONTRAST    CLINICAL HISTORY:  Sepsis;New right hydronephrosis;    TECHNIQUE:  CT imaging of the abdomen and pelvis without intravenous contrast. Axial, coronal and sagittal reformatted images reviewed. Dose length product is 630 mGycm. Automatic exposure control, adjustment of mA/kV or  iterative reconstruction technique used to limit radiation dose.    COMPARISON:  CT 04/16/2023    FINDINGS:  Assessment of the visceral organs and vasculature is limited by the lack of IV contrast.    Liver/biliary: No concerning hepatic findings. Prior cholecystectomy.  Stable biliary tree.    Pancreas: Atrophy of the pancreatic body and tail.  Cystic lesion in the pancreatic head measures up to 24 mm.    Spleen: Normal.    Adrenals: Normal.    Genitourinary: Mild-to-moderate right-sided hydronephrosis.  Mildly hyperdense material within the right renal collecting system.  No significant right ureteral or left-sided collecting system dilatation.  Bladder decompressed with a catheter.    Stomach/bowel: Moderate to large volume stool in the colon with mild stranding adjacent to the rectum.  No small bowel dilatation.    Lymph nodes and peritoneum: No pathologically enlarged lymph node identified with noncontrast technique. No ascites or free air.    Vasculature: Numerous aortic and iliac artery calcifications.  IVC filter.    Abdominal wall: Mild subcutaneous edema along the flanks.    Lung bases: Trace left pleural fluid.    Bones: No acute osseous findings.  Impression: 1. Mild to moderate right hydronephrosis new since 04/16/2023 with no significant right ureteral dilatation.  Hyperdense material within the right renal collecting system is favored excreted IV contrast, although hemorrhage also possible.  2. Moderate to large volume colonic stool with suspected mild proctitis.  3. 24 mm cystic lesion in the head of the pancreas.  Depending on prior imaging and comorbidities, six-month follow-up abdominal MRI/MRCP can be considered for surveillance.    Electronically signed by: Ed Dee  Date:    04/23/2023  Time:    08:35      Mariela Wesley MD   04/23/2023

## 2023-04-24 NOTE — PROGRESS NOTES
[facility]  Infectious Disease Progress Note        SUBJECTIVE:   Afebrile over the weekend with stable vital signs. No complaints on my exam      MEDICATIONS:   Reviewed in EMR    REVIEW OF SYSTEMS:   Except as documented, all other systems reviewed and negative     PHYSICAL EXAM:   T 97.9 °F (36.6 °C)   /63   P 110   RR 16   O2 95 %  GENERAL: awake, alert, oriented and in no acute distress, non-toxic appearing   HEENT: normocephalic atraumatic   NECK: supple   LUNGS: Clear bilaterally, no wheezing or rales, no accessory muscle use   CVS: Regular rate and rhythm, normal peripheral perfusion  ABD: Soft, non-tender, non-distended, bowel sounds present  EXTREMITIES: no clubbing or cyanosis  SKIN: Warm, dry.   NEURO: alert and oriented, grossly without focal deficits   PSYCHIATRIC: Cooperative    LABS AND IMAGING:     Recent Labs     04/22/23  0523 04/23/23  0820   WBC 14.2* 11.8*   RBC 3.94* 3.58*   HGB 10.7* 9.7*   HCT 32.8* 29.5*   MCV 83.2 82.4   MCH 27.2 27.1   MCHC 32.6* 32.9*   RDW 14.7 14.7    355     No results for input(s): LACTIC in the last 72 hours.  No results for input(s): INR, APTT, D-DIMER in the last 72 hours.  No results for input(s): HGBA1C, CHOL, TRIG, LDL, VLDL, HDL in the last 72 hours.   Recent Labs     04/22/23  0427 04/23/23  0820   * 128*   K 4.8 3.7   CHLORIDE 91* 91*   CO2 27 30   BUN 17.3 16.9   CREATININE 0.63 0.53*   GLUCOSE 163* 141*   CALCIUM 8.8 8.3*   MG 1.60 1.50*   PHOS  --  2.5   ALBUMIN 2.2*  --    GLOBULIN 4.1*  --    ALKPHOS 155*  --    ALT 35  --    AST 31  --    BILITOT 0.4  --      No results for input(s): BNP, CPK, TROPONINI in the last 72 hours.       NM Kidney W Flow and Function W Pharmacological  Narrative: EXAMINATION:  NM KIDNEY W FLOW AND FUNCTION W PHARMACOLOGICAL    CLINICAL HISTORY:  UPJ obstruction suspected (Ped 0-18y);    TECHNIQUE:  Dynamic renal scintigraphy was performed in the posterior projection following  intravenous administration of 6.2 mCi of Mercaptylacetyltriglycine. Clearance phase of the study was stimulated with 28 mg of Lasix intravenously.    COMPARISON:  CT abdomen pelvis April 22, 2023    FINDINGS:  There was delayed perfusion to the right kidney during flow phase of the exam.  There was also delay in achievement of the right kidney cortical plateau and time to peak occurred in 25.48 minutes.  There was subsequent accumulation of radioisotope within dilated right kidney collecting system.  There was no drop of the right kidney excretory curve both prior to and post administration of Lasix and right kidney time half was not achieved.    Left kidney cortical plateau was achieved within 3.48 minutes.  Left kidney transcortical phase is unremarkable.  Left kidney collecting system is without dilatation.  There was adequate drop of the left kidney excretory curve both prior to and post administration of Lasix.    Quantitative analysis of the kidneys was performed and show mild split function discrepancy. The right kidney total function is 45 % and left kidney 55 %. The right renal normalized effective plasma flow is 129 ml/minute and the left kidney 159 ml/minute.  Impression: 1. Dilated and obstructed right kidney collecting system without response post Lasix.    2. Unremarkable left kidney.    Electronically signed by: Morteza Ballesteros  Date:    04/24/2023  Time:    14:39      ASSESSMENT & PLAN:   She is a 89-year-old female presenting with weakness and fatigue, found to have MRSA bacteremia.  Has a left ankle wound, following with Wound Care at [facility], concerning for source.  ID consulted for assistance with Gram-positive bacteremia.      Persistent MRSA bacteremia   Chronic L ankle wound  Sepsis s/t #1  H/o DMII / HTN  H/o RA, not on medication  Advanced age     PLAN:  - 4/23 Blood cultures negative thus far  - STARR without any evidence of vegetation  - No clear source of bacteremia  - Further urologic eval with  diuretic renal scan today. May need renal stenting. Will await urology recs  - Continue vancomycin dosing per pharmacy  - Maintain IV access with peripheral lines if feasible.   - Continue to monitor labs and clinical status  - Discussed with patient and daughter at bedside.     ED [place]  Infectious Disease

## 2023-04-24 NOTE — PHYSICIAN QUERY
PT Name: Melodie Villarreal  MR #: [id number]    DOCUMENTATION CLARIFICATION     CDS/: Steph Rankin RN, BSN              Contact information: [email or web address]   This form is a permanent document in the medical record.     Query Date: April 24, 2023    By submitting this query, we are merely seeking further clarification of documentation. Please utilize your independent clinical judgment when addressing the question(s) below.    The Medical Record contains the following:   Indicators   Supporting Clinical Findings Location in Medical Record   x AMS, Confusion,  LOC, etc.  confusion and lethargy  appears to be little confused    still tenuous mental status but could consider low dose anxiolytic  Daughter who lives with her reports that on 04/15/2023, patient was in her usual state of health but on 04/16 she was more fatigued from waking up and developed confusion and lethargy through the day     alert and awake comprehension questionable, insight poor, oriented x1    Obtunded  son at bedside reports she received pain medication for back pain    obtunded but responds to verbal stimuli by opening her eyes.  was given some trazodone to sleep and slightly responds to some questions    Alert and oriented x4   Podiatry, 4/17      ID, 4/17        H&P, 4/19    ID, 4/20      Vascular Surgery, 4/20      HM, PN, 4/23   x Acute/Chronic Illness Sepsis, unclear etiology  Chronic left ankle/foot ulcerations, with no overt signs of infection  NSTEMI, likely type 2 demand ischemia     ultrasound recently which showed some right-sided hydronephrosis  hyponatremia and hypochloremia   stable anemia     Hyponatremia due to hypervolemia   Hypomagnesemia   Chronic low back pain    Constipation and colonic fecal impaction    H&P, 4/17        Urology, 4/22        HM, PN, 4/23     x Radiology Findings Impression:   No acute intracranial findings identified.   No significant discrepancy with overnight report. CTH, 4/16         x Electrolyte  Imbalance  04/16/23 18:55 04/17/23 03:58 04/18/23 05:05 04/20/23 09:22   Sodium 131 (L) 129 (L) 130 (L) 124 (L)      04/16/23 18:55 04/17/23 03:58 04/18/23 05:05 04/20/23 09:22   Chloride 96 (L) 95 (L) 95 (L) 92 (L)        04/16/23 18:55 04/17/23 03:58 04/18/23 05:05 04/20/23 09:22   Calcium 9.7 9.6 9.6 8.1 (L)      04/20/23 09:22 04/21/23 06:30   Phosphorus 2.2 (L) 2.5      04/16/23 18:55 04/17/23 03:58 04/20/23 09:22 04/21/23 06:30 04/22/23 04:27   Magnesium 1.40 (L) 2.10 1.30 (L) 1.80 1.60    LAB                                                     x Medication melatonin tablet 6 mg       magnesium oxide tablet 400 mg       traMADoL tablet 50 mg       traZODone tablet 50 mg       magnesium sulfate 2g in water 50mL IVPB    potassium, sodium phosphates 280-160-250 mg packet 2 packet    MAR, 4/18    MAR, 4/22-23    MAR, 4/22-23    MAR, 4/21-23    MAR, 4/20    MAR, 4/20       x Treatment         responding very well to fluids and antibiotics  - continue close hemodynamic monitoring   - echo, telemetry monitoring, trend cardiac enzymes  - resume home medications as appropriate    still tenuous mental status but could consider low dose anxiolytic    Continue vancomycin, trough per pharmacy, continue to monitor renal function daily.  Trazadone as needed at bedtime.  Fall precautions in place, diabetic diet ordered    We will get noncontrast CT scan of the abdomen and pelvis to further elucidate cause of hydronephrosis.   H&P, 4/17          ID, 4/17    H&P, 4/19        Urology, 4/22      Other       The noted clinical guidelines are only system guidelines and do not replace the providers clinical judgment.    The National [place] of Neurologic Disorders and Stroke (NINDS) of the NIH describes encephalopathy as any diffuse disease of the brain that alters brain function or structure.    Provider, please specify the diagnosis or diagnoses associated with above clinical findings.  [ x  ] Metabolic Encephalopathy - Due  to electrolyte imbalance, metabolic derangements, or infectious processes, includes Septic Encephalopathy, Uremic Encephalopathy     [   ] Toxic Encephalopathy - Due to drugs, chemicals, or other toxic substances     [   ] Other Encephalopathy (please specify): ____________________     [   ] Other neurological condition- Includes Post-ictal altered mental status (please specify condition): __________     [   ]  Clinically Undetermined       Please document in your progress notes daily for the duration of treatment until resolved, and include in your discharge summary.    References:  ALEJANDRO Larios RN, CCDS. (2018, June 9). Notes from the Instructor: Encephalopathy tips. Retrieved October 22, 2020, from [email or web address]    ICD-9-CM Coding Clinic First Quarter 2013, Effective with discharges: October 21, 2013 [facility] [employer] § Seizure with encephalopathy due to postictal state (2013).    ICD-10-CM/[employer] Integrated Codebook (Version V.20.8.10.0) [Computer software]. (2020). Retrieved October 21, 2020.    National [employer] of Neurological Disorders and Stroke. (2019, March 27). Retrieved October 22, 2020, from https://www.ninds.nih.gov/Disorders/All-Disorders/Atkosudlgocgvi-Asmgpakjtus-Aqra    Form No. 11444

## 2023-04-24 NOTE — PROGRESS NOTES
Inpatient Nutrition Assessment    Admit Date: 4/16/2023   Total duration of encounter: 8 days     Nutrition Recommendation/Prescription     Continue current diet as tolerated  Continue Boost Glucose Control TID (provides 250 kcal and 14 g protein per container)  Continue MANDY BID (provides 90 kcal and 2.5 g protein per serving)  Encouraged adequate PO intake  Monitor PO intake, weight, labs    Communication of Recommendations: reviewed with nurse    Nutrition Assessment     Malnutrition Assessment/Nutrition-Focused Physical Exam    Malnutrition in the context of acute illness or injury  Degree of Malnutrition: non-severe (moderate) malnutrition  Energy Intake: does not meet criteria  Interpretation of Weight Loss: unable to obtain  Body Fat:mild depletion  Area of Body Fat Loss: upper arm region - triceps / biceps  Muscle Mass Loss: mild depletion  Area of Muscle Mass Loss: clavicle bone region - pectoralis major, deltoid, trapezius muscles, clavicle and acromion bone region - deltoid muscle, and scapular bone region - trapezius, supraspinus, infraspinus muscles  Fluid Accumulation: unable to obtain  Edema: unable to obtain   Reduced  Strength: unable to obtain  A minimum of two characteristics is recommended for diagnosis of either severe or non-severe malnutrition.    Chart Review    Reason Seen: continuous nutrition monitoring and follow-up    Malnutrition Screening Tool Results   Have you recently lost weight without trying?: No  Have you been eating poorly because of a decreased appetite?: No   MST Score: 0     Diagnosis:  Chronic left ankle/foot ulcerations, with no overt signs of infection  NSTEMI, likely type 2 demand ischemia   Elevated BNP with no reported history of CHF  Essential hypertension   Type 2 diabetes mellitus   Remote DVT/IVC filter placement, off anticoagulation    Relevant Medical History:    Adenocarcinoma of uterus      Bladder cancer      Deep vein thrombosis      Essential (primary)  hypertension      Heart murmur      High cholesterol      Hypertriglyceridemia      Insomnia      Osteopenia      Other pulmonary embolism without acute cor pulmonale      Personal history of colonic polyps      Rheumatoid arthritis, unspecified      Type 2 diabetes mellitus without complications        Nutrition-Related Medications: Scheduled Meds:   carvediloL  6.25 mg Oral BID    collagenase   Topical (Top) Daily    docusate sodium  200 mg Oral BID    enoxaparin  40 mg Subcutaneous Daily    furosemide  40 mg Oral Daily    insulin detemir U-100  7 Units Subcutaneous QHS    Lactobacillus acidophilus  1 capsule Oral TID WM    magnesium oxide  400 mg Oral TID    polyethylene glycol  17 g Oral Daily    trazodone  50 mg Oral QHS    vancomycin (VANCOCIN) IVPB  750 mg Intravenous Q24H     Continuous Infusions:      PRN Meds:.acetaminophen, dextrose 10%, dextrose 10%, glucagon (human recombinant), glucose, glucose, insulin aspart U-100, melatonin, sodium chloride 0.9%, traMADoL, Pharmacy to dose Vancomycin consult **AND** vancomycin - pharmacy to dose    Calorie Containing IV Medications: no significant kcals from medications at this time    Nutrition-Related Labs:  4/18/2023: WBC 15.8, H/H 10.7/33.4, Na 130, Cl 95, Gluc 131  4/23/2023: WBC 11.8, H/H 9.7/29.5, Na 128, Cl 91, Crea 0.53, Ca 8.3, Mg 1.50, Gluc 141    Diet/PN Order: Diet diabetic  Oral Supplement Order: Boost Glucose Control and Tanner  Tube Feeding Order: none  Appetite/Oral Intake: fair/50-75% of meals  Factors Affecting Nutritional Intake: decreased appetite  Food/Congregational/Cultural Preferences: none reported  Food Allergies: none reported    Skin Integrity: wound  Wound(s):   2 full thickness wounds per report (left posterior ankle and left lateral ankle)    Comments    4/19/2023: Pt's daughter reports good appetite/PO intake prior to admit with fair appetite/PO intake currently. Pt agreeable to vanilla Boost Glucose Control. Pt denies N/V/D and  "chewing/swallowing difficulties. The daughter reports constipation, last BM documented as 4/15/2023. The daughter reports possible wt loss within the past 3-4 months, unable to specify wt loss. Will add MANDY BID to promote wound healing.  Encouraged adequate PO intake. Will monitor.    2023: Pt busy at time of rounds. Pt has ~50% PO intake documented. No reported N/V. CT of abdomen/pelvis showed rectal  fecal impaction. Last BM documented as . No tolerance issues reported. Boost Glucose Control TID and MANDY BID ordered. Will monitor.    Anthropometrics    Height: 5' 3" (160 cm) Height Method: Stated  Last Weight: 56.6 kg (124 lb 12.5 oz) (23 0508) Weight Method: Bed Scale  BMI (Calculated): 22.1  BMI Classification: underweight (BMI less than 22 if >65 years of age)     Ideal Body Weight (IBW), Female: 115 lb     % Ideal Body Weight, Female (lb): 95.65 %                             Usual Weight Provided By: unable to obtain usual weight    Wt Readings from Last 5 Encounters:   23 56.6 kg (124 lb 12.5 oz)   04/10/23 54.4 kg (120 lb)   23 85.3 kg (188 lb)   23 55.8 kg (123 lb)   12/15/22 59 kg (130 lb)     Weight Change(s) Since Admission:  Admit Weight: 49.9 kg (110 lb) (23 1751)  2023: 49.9 kg  2023: 56.6 kg    Estimated Needs    Weight Used For Calorie Calculations: 49.9 kg (110 lb 0.2 oz)  Energy Calorie Requirements (kcal): 9963-9652 (30-35 kcal/kg)  Energy Need Method: Kcal/kg  Weight Used For Protein Calculations: 49.9 kg (110 lb 0.2 oz)  Protein Requirements:  (1.5-2.0 g/kg)  Fluid Requirements (mL): 1497 (1 mL/kcal)  Temp (24hrs), Av.8 °F (36.6 °C), Min:97.1 °F (36.2 °C), Max:98.1 °F (36.7 °C)         Enteral Nutrition    Patient not receiving enteral nutrition at this time.    Parenteral Nutrition    Patient not receiving parenteral nutrition support at this time.    Evaluation of Received Nutrient Intake    Calories: not meeting estimated " needs  Protein: not meeting estimated needs    Patient Education    Not applicable.    Nutrition Diagnosis     PES: Malnutrition related to acute illness as evidenced by mild fat/muscle wasting. (continues)    Interventions/Goals     Intervention(s): general/healthful diet and commercial beverage  Goal: Consume % of meals/snacks by follow-up. (goal progressing)    Monitoring & Evaluation     Dietitian will monitor food and beverage intake, weight, electrolyte/renal panel, glucose/endocrine profile, and gastrointestinal profile.  Nutrition Risk/Follow-Up: moderate (follow-up in 3-5 days)   Please consult if re-assessment needed sooner.

## 2023-04-24 NOTE — PROGRESS NOTES
.UROLOGY  PROGRESS  NOTE    Melodie Villarreal 8/12/1931  [id number]  4/24/2023    Lying in bed, daughter at bedside  No labs today  Slightly hypertensive, stable HR, afebrile  Good urine output      Intake/Output:  No intake/output data recorded.  I/O last 3 completed shifts:  In: 600 [P.O.:600]  Out: 1500 [Urine:1500]       Exam:    NAD  Card RRR  Resp unlabored      Recent Results (from the past 24 hour(s))   POCT glucose    Collection Time: 04/23/23  8:09 PM   Result Value Ref Range    POCT Glucose 210 (H) 70 - 110 mg/dL         Assessment:  Sepsis, staph  Right hydornephrosis      Plan:  Awaiting renal scan, will make further recs once this is complete  Hold Lovenox today in case we need to place stent tomorrow    ED Forbes

## 2023-04-24 NOTE — PROGRESS NOTES
[facility]                       [address] [address]    PATIENT NAME:       SANAM BELTRAN  YOB: 1931  CSN:                [id number]   MRN:                [id number]  ADMIT DATE:         04/16/2023 17:52:00  PHYSICIAN:          Cleveland Vergara DPM                            PROGRESS NOTE    DATE:  04/24/2023 00:00:00    SUBJECTIVE:  The patient is seen today.  She was also seen by Wound Care   earlier.  Potential plans for stent placement tomorrow secondary to   hydronephrosis.  No other issues.  The patient overall states that she is not   feeling well.  Son is present.    PHYSICAL EXAMINATION:  VITAL SIGNS:  Stable.  Currently afebrile.    LABORATORY STUDIES:  Labs reviewed.  White cell counts again down to 11.8   yesterday and nothing posted for today.    Repeat cultures thus far are pending, but negative at 24 hours.    No changes in the left lower extremity wounds again, a fibrous plug type   lesion over the lateral malleolus with area over the posterior Achilles area, in   which there is a tendon noted to the area.  Heel and medial area are unchanged.    No palpable pulses.  Right side remains stable.    ASSESSMENT:    1. Severe peripheral artery disease with slow healing of lower extremity wounds.  2. Bacteremia.    PLAN:  Continue with current care.  No plans on the surgical debridement.        ______________________________  Cleveland Vergara DPM    GAS/AQS  DD:  04/24/2023  Time:  05:22PM  DT:  04/24/2023  Time:  06:40PM  Job #:  [id number]      PROGRESS NOTE

## 2023-04-24 NOTE — PROGRESS NOTES
[facility] Hospital Medicine Progress Note        Chief Complaint: Inpatient Follow-up for weakness.     HPI:   89-year-old female with a past medical history of essential hypertension, type 2 diabetes mellitus, prior DVT/IVC filter no longer on anticoagulation .     presents to the ER after she became lethargic on the day of presentation.  Daughter is at bedside and provides most of the history and states patient is normally alert and oriented, active and able to carry out own ADLs.  She states patient has chronic open wounds on her left lower extremity better followed closely by Wound Care.  Today she became drowsy/lethargic, and she activated EMS who found she was hypotensive with a blood pressure of 66/36 on arrival.       She arrived to the ER tachycardic and hypotensive, maintaining adequate sats on 3 L nasal cannula.  Laboratory work showed leukocytosis of 34 K, mild anemia, a lactic acid of 3.5, elevated BNP of 2000 and a troponin of 0.115.  Urinalysis was unremarkable, CT chest abdomen and pelvis was negative for pulmonary embolus, pulmonary infiltrates or any intra-abdominal or pelvic pathology.  CT head was normal.      She was started on broad-spectrum antibiotics for undifferentiated sepsis admitted to the hospitalist service for further management.     Blood cultures remains persistently positive for MRSA bacteremia since admission. ID consulted and pt continued on IV Vancomycin. Noted left lower ext non healing ulcer. NM bone scan without clear evidence of OM. LLE arterial U/S showed monophasic flow throughout suggestive of inflow disease. CT T and L spine  with no evidence of discitis/osteomyelitis. 2D TTE is neg for valvular vegetation. Cardiology consulted for STARR and pt underwent on 4/22/23 revealed no valvular vegetations. Pt is noted to have RT hydronephrosis despite Newton decompression . Urology consulted to evaluate persistent Rt hydronephrosis.  Interval Hx:    Afebrile. Blood cultures from 4/23 x 2 now no growth x 24h . This is the fist neg blood culture. Urology plans for diuretic renal scan  and possible renal stenting tomorrow. Lovenox held. NPO after midnight.     Objective/physical exam:  General: In no acute distress, afebrile  Chest: crackles at bases , herb   Heart: RRR, +S1, S2, no appreciable murmur  Abdomen: Soft, nontender, BS +  MSK: Warm, trace to 1+ lower extremity edema, no clubbing or cyanosis  Neurologic: Alert and oriented x4, Cranial nerve II-XII intact, moves all ext.      VITAL SIGNS: 24 HRS MIN & MAX LAST   Temp  Min: 97.1 °F (36.2 °C)  Max: 98.1 °F (36.7 °C) 97.9 °F (36.6 °C)   BP  Min: 101/57  Max: 159/85 123/63   Pulse  Min: 83  Max: 118  110   Resp  Min: 16  Max: 18 16   SpO2  Min: 93 %  Max: 96 % 95 %       Recent Labs   Lab 04/21/23  0630 04/22/23  0523 04/23/23  0820   WBC 15.9* 14.2* 11.8*   RBC 4.44 3.94* 3.58*   HGB 11.7* 10.7* 9.7*   HCT 36.7* 32.8* 29.5*   MCV 82.7 83.2 82.4   MCH 26.4* 27.2 27.1   MCHC 31.9* 32.6* 32.9*   RDW 14.5 14.7 14.7    321 355   MPV 9.5 9.8 9.2       Recent Labs   Lab 04/20/23  0922 04/21/23  0630 04/22/23  0427 04/23/23  0820   * 127* 129* 128*   K 3.9 3.5 4.8 3.7   CO2 23 22* 27 30   BUN 13.4 15.2 17.3 16.9   CREATININE 0.60 0.64 0.63 0.53*   CALCIUM 8.1* 8.5 8.8 8.3*   MG 1.30* 1.80 1.60 1.50*   ALBUMIN 2.0*  --  2.2*  --    ALKPHOS 134  --  155*  --    ALT 45  --  35  --    AST 37*  --  31  --    BILITOT 0.5  --  0.4  --           Microbiology Results (last 7 days)       Procedure Component Value Units Date/Time    Blood Culture [[id number]]  (Normal) Collected: 04/23/23 0820    Order Status: Completed Specimen: Blood from Arm, Right Updated: 04/24/23 1101     CULTURE, BLOOD ([facility]) No Growth At 24 Hours    Blood Culture [[id number]]  (Normal) Collected: 04/23/23 0826    Order Status: Completed Specimen: Blood from Arm, Right Updated: 04/24/23 1101     CULTURE, BLOOD ([facility]) No Growth At 24 Hours     Blood Culture [[id number]]  (Abnormal)  (Susceptibility) Collected: 04/21/23 0630    Order Status: Completed Specimen: Blood from Hand, Left Updated: 04/24/23 0635     CULTURE, BLOOD ([facility]) Methicillin resistant Staphylococcus aureus     GRAM STAIN Gram Positive Cocci, probable Staphylococcus      Seen in gram stain of broth only      1 of 1 Aerobic bottle positive    Blood Culture [[id number]]  (Abnormal)  (Susceptibility) Collected: 04/21/23 0958    Order Status: Completed Specimen: Blood from Arm, Right Updated: 04/24/23 0634     CULTURE, BLOOD ([facility]) Methicillin resistant Staphylococcus aureus     GRAM STAIN Gram Positive Cocci, probable Staphylococcus      Seen in gram stain of broth only      2 of 2 bottles positive    Blood Culture [[id number]]  (Abnormal)  (Susceptibility) Collected: 04/19/23 0407    Order Status: Completed Specimen: Blood Updated: 04/21/23 0645     CULTURE, BLOOD ([facility]) Methicillin resistant Staphylococcus aureus     GRAM STAIN Gram Positive Cocci, probable Staphylococcus      Seen in gram stain of broth only      2 of 2 bottles positive    Blood Culture [[id number]]  (Abnormal)  (Susceptibility) Collected: 04/19/23 0839    Order Status: Completed Specimen: Blood from Arm, Left Updated: 04/21/23 0645     CULTURE, BLOOD ([facility]) Methicillin resistant Staphylococcus aureus     GRAM STAIN Gram Positive Cocci, probable Staphylococcus      Seen in gram stain of broth only      2 of 2 bottles positive    Blood culture x two cultures. Draw prior to antibiotics. [[id number]]  (Abnormal)  (Susceptibility) Collected: 04/16/23 1855    Order Status: Completed Specimen: Blood Updated: 04/20/23 0639     CULTURE, BLOOD ([facility]) Methicillin resistant Staphylococcus aureus     GRAM STAIN Gram Positive Cocci, probable Staphylococcus      Seen in gram stain of broth only      1 of 1 Aerobic bottle positive    Blood Culture [[id number]]  (Abnormal)  (Susceptibility) Collected: 04/18/23 0505    Order Status: Completed  Specimen: Blood Updated: 04/20/23 0636     CULTURE, BLOOD ([facility]) Methicillin resistant Staphylococcus aureus     GRAM STAIN Gram Positive Cocci, probable Staphylococcus      Seen in gram stain of broth only      2 of 2 bottles positive    Blood Culture [[id number]]  (Abnormal)  (Susceptibility) Collected: 04/18/23 0505    Order Status: Completed Specimen: Blood Updated: 04/20/23 0636     CULTURE, BLOOD ([facility]) Methicillin resistant Staphylococcus aureus     GRAM STAIN Gram Positive Cocci, probable Staphylococcus      Seen in gram stain of broth only      2 of 2 bottles positive    Blood culture x two cultures. Draw prior to antibiotics. [[id number]]  (Abnormal)  (Susceptibility) Collected: 04/16/23 1855    Order Status: Completed Specimen: Blood Updated: 04/19/23 0732     CULTURE, BLOOD ([facility]) Methicillin resistant Staphylococcus aureus     GRAM STAIN Gram Positive Cocci, probable Staphylococcus      Seen in gram stain of broth only      1 of 1 Aerobic bottle positive    Wound Culture [[id number]] Collected: 04/18/23 0805    Order Status: Sent Specimen: Wound from Ankle, Left     Anaerobic Culture [[id number]] Collected: 04/18/23 0805    Order Status: Sent Specimen: Drainage from Ankle, Left              See below for Radiology    Scheduled Med:   carvediloL  6.25 mg Oral BID    collagenase   Topical (Top) Daily    docusate sodium  200 mg Oral BID    enoxaparin  40 mg Subcutaneous Daily    furosemide  40 mg Oral Daily    insulin detemir U-100  7 Units Subcutaneous QHS    Lactobacillus acidophilus  1 capsule Oral TID WM    magnesium oxide  400 mg Oral TID    polyethylene glycol  17 g Oral Daily    trazodone  50 mg Oral QHS    vancomycin (VANCOCIN) IVPB  750 mg Intravenous Q24H        Continuous Infusions:       PRN Meds:  acetaminophen, dextrose 10%, dextrose 10%, glucagon (human recombinant), glucose, glucose, insulin aspart U-100, melatonin, sodium chloride 0.9%, traMADoL, Pharmacy to dose Vancomycin consult **AND**  vancomycin - pharmacy to dose       Assessment/Plan:  Persistent MRSA Bacteremia   Sepsis due to MRSA bacteremia of unclear source   Left ankle chronic non healing wound   Acute on chronic diastolic congestive heart failure   Mild to moderate Aortic stenosis , Mod MR  Pulmonary HTN, PA pressure 44 per Echo  Diabetes Mellitus , type 2 uncontrolled   Leukocytosis   Normochromic anemia   Hyponatremia due to hypervolemia   Hypomagnesemia   Chronic low back pain    Constipation and colonic fecal impaction      Plan-   Blood cultures x2  from 4/23 is now neg x 24h. This is first neg blood culture     S/P STARR on 4/22/23  no vegetation.   Urology consulted  to evaluate Rt hydronephrosis not relieved by Newton decompression. plans for diuretic renal scan  and possible renal stenting tomorrow.        Lasix IV transitioned to oral    Hyponatremia is due to hypervolemia in the setting of diastolic CHF exacerbation   Replete magnesium with goal Mg 2.0 or higher.     CT abd/pelvis showed colonic fecal impaction. Bowel regimen initiated     Monitor clinical course        VTE prophylaxis: Lovenox     Patient condition:  Fair     Anticipated discharge and Disposition:     TBD. Likely Home with family/Home health     All diagnosis and differential diagnosis have been reviewed; assessment and plan has been documented; I have personally reviewed the labs and test results that are presently available; I have reviewed the patients medication list; I have reviewed the consulting providers response and recommendations. I have reviewed or attempted to review medical records based upon their availability    All of the patient's questions have been  addressed and answered. Patient's is agreeable to the above stated plan. I will continue to monitor closely and make adjustments to medical management as needed.  _____________________________________________________________________    Nutrition Status:    Radiology:  NM Kidney W Flow and Function  W Pharmacological  Narrative: EXAMINATION:  NM KIDNEY W FLOW AND FUNCTION W PHARMACOLOGICAL    CLINICAL HISTORY:  UPJ obstruction suspected (Ped 0-18y);    TECHNIQUE:  Dynamic renal scintigraphy was performed in the posterior projection following intravenous administration of 6.2 mCi of Mercaptylacetyltriglycine. Clearance phase of the study was stimulated with 28 mg of Lasix intravenously.    COMPARISON:  CT abdomen pelvis April 22, 2023    FINDINGS:  There was delayed perfusion to the right kidney during flow phase of the exam.  There was also delay in achievement of the right kidney cortical plateau and time to peak occurred in 25.48 minutes.  There was subsequent accumulation of radioisotope within dilated right kidney collecting system.  There was no drop of the right kidney excretory curve both prior to and post administration of Lasix and right kidney time half was not achieved.    Left kidney cortical plateau was achieved within 3.48 minutes.  Left kidney transcortical phase is unremarkable.  Left kidney collecting system is without dilatation.  There was adequate drop of the left kidney excretory curve both prior to and post administration of Lasix.    Quantitative analysis of the kidneys was performed and show mild split function discrepancy. The right kidney total function is 45 % and left kidney 55 %. The right renal normalized effective plasma flow is 129 ml/minute and the left kidney 159 ml/minute.  Impression: 1. Dilated and obstructed right kidney collecting system without response post Lasix.    2. Unremarkable left kidney.    Electronically signed by: Morteza Ballesteros  Date:    04/24/2023  Time:    14:39      Mariela Wesley MD   04/24/2023

## 2023-04-24 NOTE — PROGRESS NOTES
[facility] 6th Floor Medical Telemetry  Wound Care    Patient Name:  Melodie Villarreal   MRN:  [id number]  Date: 4/24/2023  Diagnosis: Sepsis    History:     Past Medical History:   Diagnosis Date    Adenocarcinoma of uterus     Bladder cancer     Deep vein thrombosis     Essential (primary) hypertension     Heart murmur     High cholesterol     Hypertriglyceridemia     Insomnia     Osteopenia     Other pulmonary embolism without acute cor pulmonale     Personal history of colonic polyps     Rheumatoid arthritis, unspecified     Type 2 diabetes mellitus without complications        Social History     Socioeconomic History    Marital status:     Number of children: 2   Occupational History    Occupation: retired   Tobacco Use    Smoking status: Never    Smokeless tobacco: Never   Substance and Sexual Activity    Alcohol use: Never    Drug use: Never    Sexual activity: Not Currently       Precautions:     Allergies as of 04/16/2023 - Reviewed 04/16/2023   Allergen Reaction Noted    Ace inhibitors Other (See Comments) 05/03/2022    Adhesive  10/02/2017    Bactrim [sulfamethoxazole-trimethoprim] Other (See Comments) 01/17/2023    Meperidine Other (See Comments) 05/03/2022    Midazolam Other (See Comments) 05/03/2022    Atorvastatin Nausea Only 05/03/2022    Codeine Other (See Comments) and Anxiety 10/02/2017    Tapentadol Rash 05/03/2022       WOC Assessment Details/Treatment   WOCN follow up visit related to consult 04/17/23 for left foot and ankle wounds; Contact precautions maintained, daughter Bibi present, Achilles and malleolus wounds cleansed with Vashe, Santyl then Vashe soaked wet to dry 4x4s, kerlix to cover, medipore to secure.  L heel DTI padded during kerlix wrap. Orders placed for podous boots, will continue to follow.  Recommendations:   Head to toe skin assessment q 12 hours;  Turn/reposition q 2 hours or schedule to prevent erythema;  Specialty bed, and wedge for 30 degree pelvic  tilt;  Avoid adult briefs;  Encourage activity as ordered;  Ensure adequate nutrition/hydration for healing;     04/24/23 1110        Altered Skin Integrity 04/17/23 Left posterior Ankle Full thickness tissue loss. Subcutaneous fat may be visible but bone, tendon or muscle are not exposed   Date First Assessed: 04/17/23   Side: Left  Orientation: posterior  Location: Ankle  Description of Altered Skin Integrity: Full thickness tissue loss. Subcutaneous fat may be visible but bone, tendon or muscle are not exposed   Wound Image    Red (%), Wound Tissue Color 60 %   Yellow (%), Wound Tissue Color 40 %   Care Cleansed with:  (Vashe)   Dressing   (Santyl/Vashe wet to dry/Kerlix)        Altered Skin Integrity 04/17/23 Left lateral Ankle Full thickness tissue loss. Subcutaneous fat may be visible but bone, tendon or muscle are not exposed   Date First Assessed: 04/17/23   Side: Left  Orientation: lateral  Location: Ankle  Description of Altered Skin Integrity: Full thickness tissue loss. Subcutaneous fat may be visible but bone, tendon or muscle are not exposed   Wound Image    Red (%), Wound Tissue Color 10 %   Yellow (%), Wound Tissue Color 90 %   Care Cleansed with:  (Vashe)   Dressing   (Santyl/Vashe wet to dry/Kerlix)        Altered Skin Integrity 04/24/23 Left Heel Purple or maroon localized area of discolored intact skin or non-intact skin or a blood-filled blister.   Date First Assessed: 04/24/23   Side: Left  Location: Heel  Description of Altered Skin Integrity: Purple or maroon localized area of discolored intact skin or non-intact skin or a blood-filled blister.   Wound Image    Care Cleansed with:  (Remedy)

## 2023-04-25 PROBLEM — N13.30 HYDRONEPHROSIS: Status: ACTIVE | Noted: 2023-01-01

## 2023-04-25 NOTE — OP NOTE
[facility]  Surgery Department  Operative Note    SUMMARY     Date of Procedure: 4/25/2023     Procedure:  Cystoscopy with bilateral retrograde pyelograms and right ureteral stent placement.    Surgeon: Anyi Bearden        Pre-Operative Diagnosis:  Right hydronephrosis with ureteral obstruction.    Post-Operative Diagnosis:   Same  Anesthesia: General    Operative Findings:  Urologic consultation was requested on this patient with a history of bladder cancer for right-sided hydronephrosis.  She was admitted with MRSA sepsis.  No obvious primary was identified.  CT and ultrasound demonstrated right-sided hydronephrosis.  Although this appeared to be at the UPJ with no obvious obstructing lesion, a diuretic renal scan confirmed obstruction of the right collecting system.  She was consented for the above procedure.      Notably she has a history of superficial bladder cancer.  Her most recent surveillance cystoscopy was in August of 2022 and she was found to be with no evidence of disease.      After signed written consent was obtained she was taken to the operating suite.  General anesthesia was administered.  She was placed in the dorsal lithotomy position the area of the genitalia was prepped and draped sterilely.  The 22 Syriac cystourethroscope was inserted into the urethra.  The bladder was viewed in its entirety and found to be without urothelial lesions.  There was some small areas of erythema in the midline bladder base consistent with Newton catheter irritation.  There was moderate trabeculation but no urothelial lesions.  Clear efflux was noted from each ureteral orifice.  Right retrograde pyelogram was performed in the usual fashion with normal ureter.  There was some mild narrowing at the UPJ and filling of a hydronephrotic renal pelvis but no calyceal blunting.  Upon removal of the catheter, prompt drainage of the dye from the collecting system was  noted.  This disputes the findings on the renal scan of obstruction.  Nevertheless with the concern for MRSA in her urine, the ureteral catheter was advanced to the renal pelvis followed by a guidewire.  No pus was seen emanating from the collecting system.    Despite what appeared to be normal drainage from the collecting system, it was felt most prudent to leave a ureteral stent to see if this improves her clinical picture.  A 6 Angolan by 24 cm double-J ureteral stent was advanced in the usual fashion with a good proximal and distal coil.      Left retrograde pyelogram was performed with with normal findings.  The renal pelvis was not dilated there was prompt drainage from the collecting system with no filling defects.    The bladder was drained the endoscope was removed and a 16 Angolan Newton catheter was inserted for postoperative bladder drainage.  Patient was awakened transferred to recovery stable.    Estimated Blood Loss (EBL): * No values recorded between 4/25/2023  3:07 PM and 4/25/2023  3:21 PM *           Implants:   Implant Name Type Inv. Item Serial No.  Lot No. LRB No. Used Action   STENT SET URETERAL [id number] - [id number]  STENT SET URETERAL [id number]  [employer]. [id number] Right 1 Implanted       Specimens:   Specimen (24h ago, onward)      None                    Condition: Stable    Disposition: PACU - hemodynamically stable.    Attestation: I was present and scrubbed for the entire procedure.

## 2023-04-25 NOTE — PT/OT/SLP PROGRESS
Physical Therapy      Patient Name:  Melodie Villarreal   MRN:  [id number]    Patient not seen today secondary to pt just leaving for sx per RN . Will follow-up tomorrow.

## 2023-04-25 NOTE — ANESTHESIA PROCEDURE NOTES
Intubation    Date/Time: 4/25/2023 2:45 PM  Performed by: Naun Petty CRNA  Authorized by: Peyton Munoz MD     Intubation:     Induction:  Intravenous    Intubated:  Postinduction    Mask Ventilation:  Easy with oral airway    Attempts:  1    Attempted By:  CRNA    Difficult Airway Encountered?: No      Airway Device:  Supraglottic airway/LMA    Airway Device Size:  3.0    Style/Cuff Inflation:  Cuffed (inflated to minimal occlusive pressure)    Inflation Amount (mL):  18    Placement Verified By:  Capnometry    Complicating Factors:  None    Findings Post-Intubation:  BS equal bilateral

## 2023-04-25 NOTE — PLAN OF CARE
Problem: Infection  Goal: Absence of Infection Signs and Symptoms  Outcome: Ongoing, Progressing     Problem: Adult Inpatient Plan of Care  Goal: Plan of Care Review  Outcome: Ongoing, Progressing  Goal: Patient-Specific Goal (Individualized)  Outcome: Ongoing, Progressing  Goal: Absence of Hospital-Acquired Illness or Injury  Outcome: Ongoing, Progressing  Goal: Optimal Comfort and Wellbeing  Outcome: Ongoing, Progressing  Goal: Readiness for Transition of Care  Outcome: Ongoing, Progressing     Problem: Diabetes Comorbidity  Goal: Blood Glucose Level Within Targeted Range  Outcome: Ongoing, Progressing     Problem: Adjustment to Illness (Sepsis/Septic Shock)  Goal: Optimal Coping  Outcome: Ongoing, Progressing     Problem: Bleeding (Sepsis/Septic Shock)  Goal: Absence of Bleeding  Outcome: Ongoing, Progressing     Problem: Infection Progression (Sepsis/Septic Shock)  Goal: Absence of Infection Signs and Symptoms  Outcome: Ongoing, Progressing     Problem: Fall Injury Risk  Goal: Absence of Fall and Fall-Related Injury  Outcome: Ongoing, Progressing

## 2023-04-25 NOTE — PROGRESS NOTES
[facility] - 6th Floor Medical Telemetry  Wound Care    Patient Name:  Melodie Villarreal   MRN:  [id number]  Date: 4/25/2023  Diagnosis: Sepsis    History:     Past Medical History:   Diagnosis Date    Adenocarcinoma of uterus     Bladder cancer     Deep vein thrombosis     Essential (primary) hypertension     Heart murmur     High cholesterol     Hypertriglyceridemia     Insomnia     Osteopenia     Other pulmonary embolism without acute cor pulmonale     Personal history of colonic polyps     Rheumatoid arthritis, unspecified     Type 2 diabetes mellitus without complications        Social History     Socioeconomic History    Marital status:     Number of children: 2   Occupational History    Occupation: retired   Tobacco Use    Smoking status: Never    Smokeless tobacco: Never   Substance and Sexual Activity    Alcohol use: Never    Drug use: Never    Sexual activity: Not Currently       Precautions:     Allergies as of 04/16/2023 - Reviewed 04/16/2023   Allergen Reaction Noted    Ace inhibitors Other (See Comments) 05/03/2022    Adhesive  10/02/2017    Bactrim [sulfamethoxazole-trimethoprim] Other (See Comments) 01/17/2023    Meperidine Other (See Comments) 05/03/2022    Midazolam Other (See Comments) 05/03/2022    Atorvastatin Nausea Only 05/03/2022    Codeine Other (See Comments) and Anxiety 10/02/2017    Tapentadol Rash 05/03/2022       WO Assessment Details/Treatment     Follow up visit, left foot wounds cleansed and assessed and redressed, noting PODUS boots in use.        04/25/23 1030        Altered Skin Integrity 04/17/23 Left posterior Ankle Full thickness tissue loss. Subcutaneous fat may be visible but bone, tendon or muscle are not exposed   Date First Assessed: 04/17/23   Side: Left  Orientation: posterior  Location: Ankle  Description of Altered Skin Integrity: Full thickness tissue loss. Subcutaneous fat may be visible but bone, tendon or muscle are not exposed   Wound Image     Description of Altered Skin Integrity Full thickness tissue loss. Subcutaneous fat may be visible but bone, tendon or muscle are not exposed   Dressing Appearance Moist drainage   Drainage Amount Scant   Drainage Characteristics/Odor Serosanguineous   Appearance Red   Tissue loss description Full thickness   Red (%), Wound Tissue Color 95 %   Periwound Area Intact   Wound Edges Defined   Wound Length (cm) 7 cm   Wound Width (cm) 1 cm   Wound Depth (cm) 0.2 cm   Wound Volume (cm^3) 1.4 cm^3   Wound Surface Area (cm^2) 7 cm^2   Care Antimicrobial agent   Dressing Gauze, wet to dry   Off Loading Off loading shoe  (PODUS)   Dressing Change Due 04/26/23        Altered Skin Integrity 04/17/23 Left lateral Ankle Full thickness tissue loss. Subcutaneous fat may be visible but bone, tendon or muscle are not exposed   Date First Assessed: 04/17/23   Side: Left  Orientation: lateral  Location: Ankle  Description of Altered Skin Integrity: Full thickness tissue loss. Subcutaneous fat may be visible but bone, tendon or muscle are not exposed   Wound Image    Description of Altered Skin Integrity Full thickness tissue loss. Subcutaneous fat may be visible but bone, tendon or muscle are not exposed   Dressing Appearance Moist drainage   Drainage Amount Small   Drainage Characteristics/Odor Serous   Appearance Red;Slough;Yellow   Tissue loss description Full thickness   Yellow (%), Wound Tissue Color 80 %   Wound Edges Defined   Wound Length (cm) 1.5 cm   Wound Width (cm) 1.5 cm   Wound Depth (cm) 0.2 cm   Wound Volume (cm^3) 0.45 cm^3   Wound Surface Area (cm^2) 2.25 cm^2   Care Antimicrobial agent   Dressing Gauze, wet to dry   Off Loading Off loading shoe   Dressing Change Due 04/26/23       Recommendations made to primary team for continued local wound care and pressure injury prevention measures . Orders placed.     04/25/2023

## 2023-04-25 NOTE — PROGRESS NOTES
[facility] Hospital Medicine Progress Note        Chief Complaint: Inpatient Follow-up for  weakness.      HPI:   89-year-old female with a past medical history of essential hypertension, type 2 diabetes mellitus, prior DVT/IVC filter no longer on anticoagulation .     presents to the ER after she became lethargic on the day of presentation.  Daughter is at bedside and provides most of the history and states patient is normally alert and oriented, active and able to carry out own ADLs.  She states patient has chronic open wounds on her left lower extremity better followed closely by Wound Care.  Today she became drowsy/lethargic, and she activated EMS who found she was hypotensive with a blood pressure of 66/36 on arrival.       She arrived to the ER tachycardic and hypotensive, maintaining adequate sats on 3 L nasal cannula.  Laboratory work showed leukocytosis of 34 K, mild anemia, a lactic acid of 3.5, elevated BNP of 2000 and a troponin of 0.115.  Urinalysis was unremarkable, CT chest abdomen and pelvis was negative for pulmonary embolus, pulmonary infiltrates or any intra-abdominal or pelvic pathology.  CT head was normal.      She was started on broad-spectrum antibiotics for undifferentiated sepsis admitted to the hospitalist service for further management.     Blood cultures remains persistently positive for MRSA bacteremia since admission. ID consulted and pt continued on IV Vancomycin. Noted left lower ext non healing ulcer. NM bone scan without clear evidence of OM. LLE arterial U/S showed monophasic flow throughout suggestive of inflow disease. CT T and L spine  with no evidence of discitis/osteomyelitis. 2D TTE is neg for valvular vegetation. Cardiology consulted for STARR and pt underwent on 4/22/23 revealed no valvular vegetations. Pt is noted to have RT hydronephrosis despite Newton decompression . Urology consulted to evaluate persistent Rt hydronephrosis.    Interval Hx:    No acute events overnight.  Patient NPO for cystoscopy with possible stent placement today with Urology.  No new complaints.  Afebrile overnight.  Vitals are stable.     Objective/physical exam:  General: In no acute distress, afebrile  Chest: crackles at bases , herb   Heart: RRR, +S1, S2, no appreciable murmur  Abdomen: Soft, nontender, BS +  MSK: Warm, trace to 1+ lower extremity edema, no clubbing or cyanosis  Neurologic: Alert and oriented x4, Cranial nerve II-XII intact, moves all ext.  VITAL SIGNS: 24 HRS MIN & MAX LAST   Temp  Min: 97.7 °F (36.5 °C)  Max: 98.2 °F (36.8 °C) 97.7 °F (36.5 °C)   BP  Min: 101/57  Max: 159/85 127/72   Pulse  Min: 79  Max: 110  92   Resp  Min: 16  Max: 18 17   SpO2  Min: 93 %  Max: 97 % (!) 93 %         Recent Labs   Lab 04/22/23  0523 04/23/23  0820 04/25/23  0350   WBC 14.2* 11.8* 12.3*   RBC 3.94* 3.58* 4.16*   HGB 10.7* 9.7* 11.0*   HCT 32.8* 29.5* 35.2*   MCV 83.2 82.4 84.6   MCH 27.2 27.1 26.4*   MCHC 32.6* 32.9* 31.3*   RDW 14.7 14.7 15.0    355 361   MPV 9.8 9.2 9.0       Recent Labs   Lab 04/20/23  0922 04/21/23  0630 04/22/23  0427 04/23/23  0820 04/25/23  0350   *   < > 129* 128* 129*   K 3.9   < > 4.8 3.7 3.9   CO2 23   < > 27 30 33*   BUN 13.4   < > 17.3 16.9 20.3*   CREATININE 0.60   < > 0.63 0.53* 0.59   CALCIUM 8.1*   < > 8.8 8.3* 8.7   MG 1.30*   < > 1.60 1.50* 1.60   ALBUMIN 2.0*  --  2.2*  --   --    ALKPHOS 134  --  155*  --   --    ALT 45  --  35  --   --    AST 37*  --  31  --   --    BILITOT 0.5  --  0.4  --   --     < > = values in this interval not displayed.          Microbiology Results (last 7 days)       Procedure Component Value Units Date/Time    Blood Culture [[id number]]  (Normal) Collected: 04/23/23 0820    Order Status: Completed Specimen: Blood from Arm, Right Updated: 04/24/23 1101     CULTURE, BLOOD ([facility]) No Growth At 24 Hours    Blood Culture [[id number]]  (Normal) Collected: 04/23/23 0826    Order Status: Completed Specimen:  Blood from Arm, Right Updated: 04/24/23 1101     CULTURE, BLOOD ([facility]) No Growth At 24 Hours    Blood Culture [[id number]]  (Abnormal)  (Susceptibility) Collected: 04/21/23 0630    Order Status: Completed Specimen: Blood from Hand, Left Updated: 04/24/23 0635     CULTURE, BLOOD ([facility]) Methicillin resistant Staphylococcus aureus     GRAM STAIN Gram Positive Cocci, probable Staphylococcus      Seen in gram stain of broth only      1 of 1 Aerobic bottle positive    Blood Culture [[id number]]  (Abnormal)  (Susceptibility) Collected: 04/21/23 0958    Order Status: Completed Specimen: Blood from Arm, Right Updated: 04/24/23 0634     CULTURE, BLOOD ([facility]) Methicillin resistant Staphylococcus aureus     GRAM STAIN Gram Positive Cocci, probable Staphylococcus      Seen in gram stain of broth only      2 of 2 bottles positive    Blood Culture [[id number]]  (Abnormal)  (Susceptibility) Collected: 04/19/23 0407    Order Status: Completed Specimen: Blood Updated: 04/21/23 0645     CULTURE, BLOOD ([facility]) Methicillin resistant Staphylococcus aureus     GRAM STAIN Gram Positive Cocci, probable Staphylococcus      Seen in gram stain of broth only      2 of 2 bottles positive    Blood Culture [[id number]]  (Abnormal)  (Susceptibility) Collected: 04/19/23 0839    Order Status: Completed Specimen: Blood from Arm, Left Updated: 04/21/23 0645     CULTURE, BLOOD ([facility]) Methicillin resistant Staphylococcus aureus     GRAM STAIN Gram Positive Cocci, probable Staphylococcus      Seen in gram stain of broth only      2 of 2 bottles positive    Blood culture x two cultures. Draw prior to antibiotics. [[id number]]  (Abnormal)  (Susceptibility) Collected: 04/16/23 1855    Order Status: Completed Specimen: Blood Updated: 04/20/23 0639     CULTURE, BLOOD ([facility]) Methicillin resistant Staphylococcus aureus     GRAM STAIN Gram Positive Cocci, probable Staphylococcus      Seen in gram stain of broth only      1 of 1 Aerobic bottle positive    Blood Culture  [[id number]]  (Abnormal)  (Susceptibility) Collected: 04/18/23 0505    Order Status: Completed Specimen: Blood Updated: 04/20/23 0636     CULTURE, BLOOD ([facility]) Methicillin resistant Staphylococcus aureus     GRAM STAIN Gram Positive Cocci, probable Staphylococcus      Seen in gram stain of broth only      2 of 2 bottles positive    Blood Culture [[id number]]  (Abnormal)  (Susceptibility) Collected: 04/18/23 0505    Order Status: Completed Specimen: Blood Updated: 04/20/23 0636     CULTURE, BLOOD ([facility]) Methicillin resistant Staphylococcus aureus     GRAM STAIN Gram Positive Cocci, probable Staphylococcus      Seen in gram stain of broth only      2 of 2 bottles positive    Blood culture x two cultures. Draw prior to antibiotics. [[id number]]  (Abnormal)  (Susceptibility) Collected: 04/16/23 1855    Order Status: Completed Specimen: Blood Updated: 04/19/23 0732     CULTURE, BLOOD ([facility]) Methicillin resistant Staphylococcus aureus     GRAM STAIN Gram Positive Cocci, probable Staphylococcus      Seen in gram stain of broth only      1 of 1 Aerobic bottle positive    Wound Culture [[id number]] Collected: 04/18/23 0805    Order Status: Sent Specimen: Wound from Ankle, Left     Anaerobic Culture [[id number]] Collected: 04/18/23 0805    Order Status: Sent Specimen: Drainage from Ankle, Left              See below for Radiology    Scheduled Med:   carvediloL  6.25 mg Oral BID    collagenase   Topical (Top) Daily    docusate sodium  200 mg Oral BID    enoxaparin  40 mg Subcutaneous Daily    furosemide  40 mg Oral Daily    insulin detemir U-100  7 Units Subcutaneous QHS    Lactobacillus acidophilus  1 capsule Oral TID WM    magnesium oxide  400 mg Oral TID    polyethylene glycol  17 g Oral Daily    trazodone  50 mg Oral QHS    vancomycin (VANCOCIN) IVPB  750 mg Intravenous Q24H        Continuous Infusions:       PRN Meds:  acetaminophen, dextrose 10%, dextrose 10%, glucagon (human recombinant), glucose, glucose, insulin aspart U-100,  melatonin, sodium chloride 0.9%, traMADoL, Pharmacy to dose Vancomycin consult **AND** vancomycin - pharmacy to dose       Assessment/Plan:   Persistent MRSA Bacteremia   Sepsis due to MRSA bacteremia of unclear source   Left ankle chronic non healing wound   Acute on chronic diastolic congestive heart failure   Mild to moderate Aortic stenosis , Mod MR  Pulmonary HTN, PA pressure 44 per Echo  Diabetes Mellitus , type 2 uncontrolled   Leukocytosis   Normochromic anemia   Hyponatremia due to hypervolemia   Hypomagnesemia   Chronic low back pain    Constipation and colonic fecal impaction      Blood cultures from 04/23 remained negative at this time.  She will get a 48 hour update later today.    There was a mild bump in her leukocytosis today.  Twelve point 3  Infectious Disease is following along.  If cultures do turn out positive there was consideration for adding rifampin.  Will continue with vancomycin for now.  Unfortunately still do not have a source of her bacteremia.  Jace from 04/22 showed no vegetation.  She does have some lower extremity wound secondary to severe peripheral arterial disease.  Podiatry is following along but wound seem to be healing.    Urology taken to the OR today for cystoscopy and likely stent placement.  She is NPO after midnight.  Will follow up any further recommendations.  Her labs are stable.  Monitoring her hyponatremia.  One hundred twenty-nine today.  Her Lasix was changed to oral yesterday.    Otherwise her vital signs are stable.  Will continue to monitor her closely.    Critical care diagnosis: sepsis, iv antibiotics  Critical care interventions: hands on evaluation, review of labs/radiographs/records and discussions with family  Critical care time spent: >32 minutes    Patient condition:  Stable    Anticipated discharge and Disposition: TBD      All diagnosis and differential diagnosis have been reviewed; assessment and plan has been documented; I have personally reviewed the  labs and test results that are presently available; I have reviewed the patients medication list; I have reviewed the consulting providers response and recommendations. I have reviewed or attempted to review medical records based upon their availability    All of the patient's questions have been  addressed and answered. Patient's is agreeable to the above stated plan. I will continue to monitor closely and make adjustments to medical management as needed.  _____________________________________________________________________      Radiology:  NM Kidney W Flow and Function W Pharmacological  Narrative: EXAMINATION:  NM KIDNEY W FLOW AND FUNCTION W PHARMACOLOGICAL    CLINICAL HISTORY:  UPJ obstruction suspected (Ped 0-18y);    TECHNIQUE:  Dynamic renal scintigraphy was performed in the posterior projection following intravenous administration of 6.2 mCi of Mercaptylacetyltriglycine. Clearance phase of the study was stimulated with 28 mg of Lasix intravenously.    COMPARISON:  CT abdomen pelvis April 22, 2023    FINDINGS:  There was delayed perfusion to the right kidney during flow phase of the exam.  There was also delay in achievement of the right kidney cortical plateau and time to peak occurred in 25.48 minutes.  There was subsequent accumulation of radioisotope within dilated right kidney collecting system.  There was no drop of the right kidney excretory curve both prior to and post administration of Lasix and right kidney time half was not achieved.    Left kidney cortical plateau was achieved within 3.48 minutes.  Left kidney transcortical phase is unremarkable.  Left kidney collecting system is without dilatation.  There was adequate drop of the left kidney excretory curve both prior to and post administration of Lasix.    Quantitative analysis of the kidneys was performed and show mild split function discrepancy. The right kidney total function is 45 % and left kidney 55 %. The right renal normalized  effective plasma flow is 129 ml/minute and the left kidney 159 ml/minute.  Impression: 1. Dilated and obstructed right kidney collecting system without response post Lasix.    2. Unremarkable left kidney.    Electronically signed by: Morteza Ballesteros  Date:    04/24/2023  Time:    14:39      Sheldon Ely MD   04/25/2023

## 2023-04-25 NOTE — PROGRESS NOTES
H&P reviewed. The patient was examined and there are no changes to the H&P.         Patient on surgery schedule for cysto with right ureteral stent placement. Today. Patient and family deny questions.

## 2023-04-25 NOTE — PROGRESS NOTES
Pharmacokinetic Assessment Follow Up: IV Vancomycin    Vancomycin serum concentration assessment(s):  The trough level was drawn correctly and can be used to guide therapy at this time. The measurement is below the desired definitive target range of 15 to 20 mcg/mL.    Vancomycin Regimen Plan:  Change regimen to Vancomycin 750 mg IV every 12 hours with next serum trough concentration measured at 2200 prior to fourth dose on 04/26    Scheduled Administration Times  1100  2300      Drug levels (last 3 results):  Recent Labs   Lab Result Units 04/25/23  0916   Vancomycin Trough ug/ml 9.6*       Vancomycin Administrations:  vancomycin given in the last 96 hours                     vancomycin 750 mg in dextrose 5 % 250 mL IVPB (ready to mix) (mg) 750 mg New Bag 04/24/23 0933     750 mg New Bag 04/23/23 0958     750 mg New Bag 04/22/23 0927    vancomycin 500 mg in dextrose 5 % 100 mL IVPB (ready to mix) (mg) 500 mg New Bag 04/21/23 1200                    Pharmacy will continue to follow and monitor vancomycin.    Please contact pharmacy at extension [phone] for questions regarding this assessment.    Thank you for the consult,   Maira Hunter, JoaoD       Patient brief summary:  Melodie Villarreal is a 89 y.o. female initiated on antimicrobial therapy with IV Vancomycin for treatment of bacteremia    The patient's current regimen is 750 mg IVPB Q12H    Drug Allergies:   Review of patient's allergies indicates:   Allergen Reactions    Ace inhibitors Other (See Comments)    Adhesive      Allergic to tape    Bactrim [sulfamethoxazole-trimethoprim] Other (See Comments)     Confusion, Hypoglycemia    Meperidine Other (See Comments)    Midazolam Other (See Comments)    Atorvastatin Nausea Only    Codeine Other (See Comments) and Anxiety    Tapentadol Rash       Actual Body Weight:   56.6 kg    Renal Function:   Estimated Creatinine Clearance: 51.4 mL/min (based on SCr of 0.59 mg/dL).,     Dialysis Method (if applicable):  N/A    CBC  (last 72 hours):  Recent Labs   Lab Result Units 04/23/23  0820 04/25/23  0350   WBC x10(3)/mcL 11.8* 12.3*   Hgb g/dL 9.7* 11.0*   Hct % 29.5* 35.2*   Platelet x10(3)/mcL 355 361   Mono % % 10.2 7.6   Eos % % 1.8 1.4   Basophil % % 0.2 0.6       Metabolic Panel (last 72 hours):  Recent Labs   Lab Result Units 04/23/23  0820 04/25/23  0350   Sodium Level mmol/L 128* 129*   Potassium Level mmol/L 3.7 3.9   Chloride mmol/L 91* 87*   Carbon Dioxide mmol/L 30 33*   Glucose Level mg/dL 141* 143*   Blood Urea Nitrogen mg/dL 16.9 20.3*   Creatinine mg/dL 0.53* 0.59   Magnesium Level mg/dL 1.50* 1.60   Phosphorus Level mg/dL 2.5  --        Microbiologic Results:  Microbiology Results (last 7 days)       Procedure Component Value Units Date/Time    Blood Culture [[id number]]  (Normal) Collected: 04/23/23 0820    Order Status: Completed Specimen: Blood from Arm, Right Updated: 04/24/23 1101     CULTURE, BLOOD ([facility]) No Growth At 24 Hours    Blood Culture [[id number]]  (Normal) Collected: 04/23/23 0826    Order Status: Completed Specimen: Blood from Arm, Right Updated: 04/24/23 1101     CULTURE, BLOOD ([facility]) No Growth At 24 Hours    Blood Culture [[id number]]  (Abnormal)  (Susceptibility) Collected: 04/21/23 0630    Order Status: Completed Specimen: Blood from Hand, Left Updated: 04/24/23 0635     CULTURE, BLOOD ([facility]) Methicillin resistant Staphylococcus aureus     GRAM STAIN Gram Positive Cocci, probable Staphylococcus      Seen in gram stain of broth only      1 of 1 Aerobic bottle positive    Blood Culture [[id number]]  (Abnormal)  (Susceptibility) Collected: 04/21/23 0958    Order Status: Completed Specimen: Blood from Arm, Right Updated: 04/24/23 0634     CULTURE, BLOOD ([facility]) Methicillin resistant Staphylococcus aureus     GRAM STAIN Gram Positive Cocci, probable Staphylococcus      Seen in gram stain of broth only      2 of 2 bottles positive    Blood Culture [[id number]]  (Abnormal)  (Susceptibility) Collected: 04/19/23 0407     Order Status: Completed Specimen: Blood Updated: 04/21/23 0645     CULTURE, BLOOD ([facility]) Methicillin resistant Staphylococcus aureus     GRAM STAIN Gram Positive Cocci, probable Staphylococcus      Seen in gram stain of broth only      2 of 2 bottles positive    Blood Culture [[id number]]  (Abnormal)  (Susceptibility) Collected: 04/19/23 0839    Order Status: Completed Specimen: Blood from Arm, Left Updated: 04/21/23 0645     CULTURE, BLOOD ([facility]) Methicillin resistant Staphylococcus aureus     GRAM STAIN Gram Positive Cocci, probable Staphylococcus      Seen in gram stain of broth only      2 of 2 bottles positive    Blood culture x two cultures. Draw prior to antibiotics. [[id number]]  (Abnormal)  (Susceptibility) Collected: 04/16/23 1855    Order Status: Completed Specimen: Blood Updated: 04/20/23 0639     CULTURE, BLOOD ([facility]) Methicillin resistant Staphylococcus aureus     GRAM STAIN Gram Positive Cocci, probable Staphylococcus      Seen in gram stain of broth only      1 of 1 Aerobic bottle positive    Blood Culture [[id number]]  (Abnormal)  (Susceptibility) Collected: 04/18/23 0505    Order Status: Completed Specimen: Blood Updated: 04/20/23 0636     CULTURE, BLOOD ([facility]) Methicillin resistant Staphylococcus aureus     GRAM STAIN Gram Positive Cocci, probable Staphylococcus      Seen in gram stain of broth only      2 of 2 bottles positive    Blood Culture [[id number]]  (Abnormal)  (Susceptibility) Collected: 04/18/23 0505    Order Status: Completed Specimen: Blood Updated: 04/20/23 0636     CULTURE, BLOOD ([facility]) Methicillin resistant Staphylococcus aureus     GRAM STAIN Gram Positive Cocci, probable Staphylococcus      Seen in gram stain of broth only      2 of 2 bottles positive    Blood culture x two cultures. Draw prior to antibiotics. [[id number]]  (Abnormal)  (Susceptibility) Collected: 04/16/23 1855    Order Status: Completed Specimen: Blood Updated: 04/19/23 0732     CULTURE, BLOOD ([facility]) Methicillin  resistant Staphylococcus aureus     GRAM STAIN Gram Positive Cocci, probable Staphylococcus      Seen in gram stain of broth only      1 of 1 Aerobic bottle positive

## 2023-04-25 NOTE — TRANSFER OF CARE
"Anesthesia Transfer of Care Note    Patient: Melodie Villarreal    Procedure(s) Performed: Procedure(s) (LRB):  CYSTOSCOPY, WITH URETERAL STENT INSERTION (Right)    Patient location: PACU    Anesthesia Type: general    Transport from OR: Transported from OR on room air with adequate spontaneous ventilation    Post pain: adequate analgesia    Post assessment: no apparent anesthetic complications and tolerated procedure well    Post vital signs: stable    Level of consciousness: awake    Nausea/Vomiting: no nausea/vomiting    Complications: none    Transfer of care protocol was followed      Last vitals:   Visit Vitals  BP (!) 109/56 (BP Location: Right arm, Patient Position: Lying)   Pulse 92   Temp 36.3 °C (97.4 °F) (Skin)   Resp 20   Ht 5' 3" (1.6 m)   Wt 56.6 kg (124 lb 12.5 oz)   SpO2 99%   Breastfeeding No   BMI 22.10 kg/m²     "

## 2023-04-25 NOTE — ANESTHESIA PREPROCEDURE EVALUATION
04/25/2023  Melodie Villarreal is a 89 y.o., female with a past medical history of essential hypertension, type 2 diabetes mellitus, prior DVT/IVC filter no longer on anticoagulation .  Admitted with sepsis/bacteremia  Unknown source.  Hydronephroaia found on CT  Had STARR: EF 60% QIAN 1 cm2    Pre-op Assessment    I have reviewed the Patient Summary Reports.     I have reviewed the Nursing Notes. I have reviewed the NPO Status.   I have reviewed the Medications.     Review of Systems      Physical Exam  General: Well nourished, Cooperative, Alert and Oriented    Airway:  Mallampati: II   Mouth Opening: Normal  TM Distance: Normal  Tongue: Normal  Neck ROM: Normal ROM    Dental:  Edentulous        Anesthesia Plan  Type of Anesthesia, risks & benefits discussed:    Anesthesia Type: Gen Supraglottic Airway  Intra-op Monitoring Plan: Standard ASA Monitors  Post Op Pain Control Plan: multimodal analgesia  Induction:  IV  Airway Plan: Direct, Post-Induction  Informed Consent: Informed consent signed with the Patient representative and all parties understand the risks and agree with anesthesia plan.  All questions answered. Patient consented to blood products? Yes  ASA Score: 3  Day of Surgery Review of History & Physical: H&P Update referred to the surgeon/provider.    Ready For Surgery From Anesthesia Perspective.     .

## 2023-04-25 NOTE — ANESTHESIA POSTPROCEDURE EVALUATION
Anesthesia Post Evaluation    Patient: Melodie Villarreal    Procedure(s) Performed: Procedure(s) (LRB):  CYSTOSCOPY, WITH URETERAL STENT INSERTION (Right)    Final Anesthesia Type: general      Patient location during evaluation: PACU  Patient participation: Yes- Able to Participate  Level of consciousness: awake and alert  Post-procedure vital signs: reviewed and stable  Pain management: adequate  Airway patency: patent      Anesthetic complications: no      Cardiovascular status: hemodynamically stable  Respiratory status: unassisted  Hydration status: euvolemic  Follow-up not needed.          Vitals Value Taken Time   /64 04/25/23 1621   Temp 36.3 °C (97.4 °F) 04/25/23 1543   Pulse 83 04/25/23 1625   Resp 16 04/25/23 1625   SpO2 100 % 04/25/23 1617   Vitals shown include unvalidated device data.      Event Time   Out of Recovery 04/25/2023 16:25:32         Pain/Malou Score: Pain Rating Prior to Med Admin: 10 (4/25/2023  8:25 AM)  Pain Rating Post Med Admin: 2 (4/25/2023  9:25 AM)  Malou Score: 8 (4/25/2023  3:40 PM)

## 2023-04-26 NOTE — PROGRESS NOTES
[facility]  Infectious Disease Progress Note        SUBJECTIVE:   Had JJ stent placed in ureter by urology today, poorly communicative on my evaluation shortly post op. Son reports slow improvement in the past 2-3 days.       MEDICATIONS:   Reviewed in EMR    REVIEW OF SYSTEMS:   Except as documented, all other systems reviewed and negative     PHYSICAL EXAM:   T 97.8 °F (36.6 °C)   /63   P 91   RR 16   O2 97 %  GENERAL: awake, poorly communicative, seen post operatively  HEENT: normocephalic atraumatic   NECK: supple   LUNGS: Clear bilaterally, no wheezing or rales, no accessory muscle use   CVS: Regular rate and rhythm, normal peripheral perfusion  ABD: Soft, non-tender, non-distended, bowel sounds present  EXTREMITIES: LLE wound in dressing   SKIN: Warm, dry.   NEURO: alert and oriented, grossly without focal deficits   PSYCHIATRIC: Cooperative    LABS AND IMAGING:     Recent Labs     04/23/23  0820 04/25/23  0350   WBC 11.8* 12.3*   RBC 3.58* 4.16*   HGB 9.7* 11.0*   HCT 29.5* 35.2*   MCV 82.4 84.6   MCH 27.1 26.4*   MCHC 32.9* 31.3*   RDW 14.7 15.0    361     No results for input(s): LACTIC in the last 72 hours.  No results for input(s): INR, APTT, D-DIMER in the last 72 hours.  No results for input(s): HGBA1C, CHOL, TRIG, LDL, VLDL, HDL in the last 72 hours.   Recent Labs     04/23/23  0820 04/25/23  0350   * 129*   K 3.7 3.9   CHLORIDE 91* 87*   CO2 30 33*   BUN 16.9 20.3*   CREATININE 0.53* 0.59   GLUCOSE 141* 143*   CALCIUM 8.3* 8.7   MG 1.50* 1.60   PHOS 2.5  --      No results for input(s): BNP, CPK, TROPONINI in the last 72 hours.       SURG FL Surgery Fluoro Usage  See OP Notes for results.     IMPRESSION: See OP Notes for results.     This procedure was auto-finalized by: Virtual Radiologist  Transesophageal echo (STARR)  · Normal left ventricular systolic function. The estimated ejection   fraction is 60%.  · Normal right ventricular size with normal  right ventricular systolic   function.  · Mild biatrial enlargement.  · No valvular vegetations seen.  · There is moderate-to-severe aortic valve stenosis. Aortic valve area is   1cm^2 by planimetry.  · Mild aortic regurgitation.  · Mild mitral regurgitation.  · Mild tricuspid regurgitation.  · Grade 3 plaque present in the ascending aorta, transverse aorta and   descending aorta.  · No pericardial effusion.         ASSESSMENT & PLAN:   She is a 89-year-old female presenting with weakness and fatigue, found to have MRSA bacteremia.  Has a left ankle wound, following with Wound Care at [facility], concerning for source.  ID consulted for assistance with Gram-positive bacteremia.      Persistent MRSA bacteremia   Chronic L ankle wound  Sepsis s/t #1  H/o DMII / HTN  H/o RA, not on medication  Advanced age    -4/23 Blood cultures remain without growth at 48hrs  -STARR without any evidence of vegetation  -Cystoscopy with placement of JJ stent by urology 04/25 with no signs of purulence behind the obstruction, unlikely to be the source  -Remaining concern of the lower extremity should the cultures from 04/23 remain positive      PLAN:  - Continue vancomycin dosing per pharmacy  - Maintain IV access with peripheral lines if feasible.   - Continue to monitor labs and clinical status  - Discussed with patient and son at bedside as well as primary team     Martin Ruvalcaba MD  Infectious Disease  [facility]

## 2023-04-26 NOTE — PROGRESS NOTES
UROLOGY  PROGRESS  NOTE    Melodie Villarreal 8/12/1931  [id number]  4/26/2023    S/p cysto with bilateral retrograde pyelograms and right ureteral stent placement POD 1    Lying in bed, daughter at bedside  No complaints  Urine with a little bloody sediment, to be expected  WBC 11.8, H&H 11 & 35  VSS, afebrile  400 mL UO charted overnight however,  bag with adequate urine during rounds      Intake/Output:  No intake/output data recorded.  I/O last 3 completed shifts:  In: 980 [P.O.:480; IV Piggyback:500]  Out: 2900 [Urine:2900]       Exam:    NAD  Card RRR  Resp unlabored   yellow urine with some bloody sediment draining to  bag      Recent Results (from the past 24 hour(s))   POCT glucose    Collection Time: 04/25/23  4:42 PM   Result Value Ref Range    POCT Glucose 179 (H) 70 - 110 mg/dL   POCT glucose    Collection Time: 04/25/23  7:54 PM   Result Value Ref Range    POCT Glucose 338 (H) 70 - 110 mg/dL   POCT glucose    Collection Time: 04/25/23  8:46 PM   Result Value Ref Range    POCT Glucose 355 (H) 70 - 110 mg/dL   POCT glucose    Collection Time: 04/26/23  5:13 AM   Result Value Ref Range    POCT Glucose 288 (H) 70 - 110 mg/dL   CBC with Differential    Collection Time: 04/26/23  5:39 AM   Result Value Ref Range    WBC 11.8 (H) 4.5 - 11.5 x10(3)/mcL    RBC 4.09 (L) 4.20 - 5.40 x10(6)/mcL    Hgb 11.0 (L) 12.0 - 16.0 g/dL    Hct 35.0 (L) 37.0 - 47.0 %    MCV 85.6 80.0 - 94.0 fL    MCH 26.9 (L) 27.0 - 31.0 pg    MCHC 31.4 (L) 33.0 - 36.0 g/dL    RDW 15.1 11.5 - 17.0 %    Platelet 382 130 - 400 x10(3)/mcL    MPV 8.8 7.4 - 10.4 fL    Neut % 85.6 %    Lymph % 9.4 %    Mono % 3.5 %    Eos % 0.1 %    Basophil % 0.3 %    Lymph # 1.11 0.6 - 4.6 x10(3)/mcL    Neut # 10.11 (H) 2.1 - 9.2 x10(3)/mcL    Mono # 0.41 0.1 - 1.3 x10(3)/mcL    Eos # 0.01 0 - 0.9 x10(3)/mcL    Baso # 0.03 0 - 0.2 x10(3)/mcL    IG# 0.13 (H) 0 - 0.04 x10(3)/mcL    IG% 1.1 %    NRBC% 0.0 %   Basic Metabolic Panel    Collection Time: 04/26/23  6:20 AM    Result Value Ref Range    Sodium Level 127 (L) 132 - 146 mmol/L    Potassium Level 4.3 3.5 - 5.1 mmol/L    Chloride 85 (L) 98 - 111 mmol/L    Carbon Dioxide 31 23 - 31 mmol/L    Glucose Level 313 (H) 75 - 121 mg/dL    Blood Urea Nitrogen 24.4 (H) 9.8 - 20.1 mg/dL    Creatinine 0.69 0.55 - 1.02 mg/dL    BUN/Creatinine Ratio 35     Calcium Level Total 8.5 8.4 - 10.2 mg/dL    Anion Gap 11.0 mEq/L    eGFR >60 mls/min/1.73/m2   Magnesium    Collection Time: 04/26/23  6:20 AM   Result Value Ref Range    Magnesium Level 1.80 1.60 - 2.60 mg/dL   POCT glucose    Collection Time: 04/26/23 10:29 AM   Result Value Ref Range    POCT Glucose 237 (H) 70 - 110 mg/dL         Assessment:  -Sepsis, staph  -Right hydronephrosis s/p cysto with bilateral RGP and right ureteral stent placement, there was no purulent drainage from her right kidney or evidence of tumor recurrence and her both kidneys were draining well despite reading of renal scan showing obstruction on the right side. Therefore, her urine or kidney's are not the source of her sepsis.      Plan:  D/c Aman in AM, will have her follow up in a few weeks to remove her stent in the office as an outpatient. No further  recs at this time.     Anne Shelton, SEBASTIANP

## 2023-04-26 NOTE — PLAN OF CARE
Problem: Occupational Therapy  Goal: Occupational Therapy Goal  Description: Goals to be met by: 5/10/23     Patient will increase functional independence with ADLs by performing:    UE Dressing with Contact Guard Assistance.  Grooming while EOB with Contact Guard Assistance.  Sitting at edge of bed x10 minutes with Contact Guard Assistance.  Pt will perform sit <> stand with mod A provided using RW in order to increase pt's safety and independence with LB dressing and toileting tasks.    Outcome: Ongoing, Progressing

## 2023-04-26 NOTE — PLAN OF CARE
Problem: Adult Inpatient Plan of Care  Goal: Plan of Care Review  Outcome: Ongoing, Progressing  Goal: Patient-Specific Goal (Individualized)  Outcome: Ongoing, Progressing  Goal: Absence of Hospital-Acquired Illness or Injury  Outcome: Ongoing, Progressing  Goal: Optimal Comfort and Wellbeing  Outcome: Ongoing, Progressing  Goal: Readiness for Transition of Care  Outcome: Ongoing, Progressing     Problem: Diabetes Comorbidity  Goal: Blood Glucose Level Within Targeted Range  Outcome: Ongoing, Progressing     Problem: Adjustment to Illness (Sepsis/Septic Shock)  Goal: Optimal Coping  Outcome: Ongoing, Progressing     Problem: Bleeding (Sepsis/Septic Shock)  Goal: Absence of Bleeding  Outcome: Ongoing, Progressing     Problem: Glycemic Control Impaired (Sepsis/Septic Shock)  Goal: Blood Glucose Level Within Desired Range  Outcome: Ongoing, Progressing     Problem: Infection Progression (Sepsis/Septic Shock)  Goal: Absence of Infection Signs and Symptoms  Outcome: Ongoing, Progressing     Problem: Fall Injury Risk  Goal: Absence of Fall and Fall-Related Injury  Outcome: Ongoing, Progressing     Problem: Skin Injury Risk Increased  Goal: Skin Health and Integrity  Outcome: Ongoing, Progressing

## 2023-04-26 NOTE — PLAN OF CARE
Problem: Physical Therapy  Goal: Physical Therapy Goal  Description: Goals to be met by: 2023     Patient will increase functional independence with mobility by performin. Supine to sit with Stand-by Assistance  2. Sit to stand transfer with Contact Guard Assistance  3. Gait  x 150 feet with Contact Guard Assistance using Rolling Walker.   4. Pt will ascend/descend 3 steps with HR and Min A.     Outcome: Ongoing, Progressing

## 2023-04-26 NOTE — PROGRESS NOTES
[facility] Hospital Medicine Progress Note        Chief Complaint: Inpatient Follow-up for weakness.      HPI:   89-year-old female with a past medical history of essential hypertension, type 2 diabetes mellitus, prior DVT/IVC filter no longer on anticoagulation .     presents to the ER after she became lethargic on the day of presentation.  Daughter is at bedside and provides most of the history and states patient is normally alert and oriented, active and able to carry out own ADLs.  She states patient has chronic open wounds on her left lower extremity better followed closely by Wound Care.  Today she became drowsy/lethargic, and she activated EMS who found she was hypotensive with a blood pressure of 66/36 on arrival.       She arrived to the ER tachycardic and hypotensive, maintaining adequate sats on 3 L nasal cannula.  Laboratory work showed leukocytosis of 34 K, mild anemia, a lactic acid of 3.5, elevated BNP of 2000 and a troponin of 0.115.  Urinalysis was unremarkable, CT chest abdomen and pelvis was negative for pulmonary embolus, pulmonary infiltrates or any intra-abdominal or pelvic pathology.  CT head was normal.      She was started on broad-spectrum antibiotics for undifferentiated sepsis admitted to the hospitalist service for further management.     Blood cultures remains persistently positive for MRSA bacteremia since admission. ID consulted and pt continued on IV Vancomycin. Noted left lower ext non healing ulcer. NM bone scan without clear evidence of OM. LLE arterial U/S showed monophasic flow throughout suggestive of inflow disease. CT T and L spine  with no evidence of discitis/osteomyelitis. 2D TTE is neg for valvular vegetation. Cardiology consulted for STARR and pt underwent on 4/22/23 revealed no valvular vegetations. Pt is noted to have RT hydronephrosis despite Newton decompression . Urology consulted to evaluate persistent Rt hydronephrosis.  She was taken  to the OR on 04/25 for cystoscopy and right JJ stent placement     Interval Hx:   Afebrile and vital stable.  No changes overnight.  Family at the bedside.  Mental status unchanged     Objective/physical exam:  General: In no acute distress, afebrile  Chest: crackles at bases , herb   Heart: RRR, +S1, S2, no appreciable murmur  Abdomen: Soft, nontender, BS +  MSK: Warm, trace to 1+ lower extremity edema, no clubbing or cyanosis  Neurologic: Alert and oriented x4, Cranial nerve II-XII intact, moves all ext.    VITAL SIGNS: 24 HRS MIN & MAX LAST   Temp  Min: 97.4 °F (36.3 °C)  Max: 98.1 °F (36.7 °C) 98.1 °F (36.7 °C)   BP  Min: 109/56  Max: 149/68 111/80   Pulse  Min: 68  Max: 101  89   Resp  Min: 15  Max: 24 18   SpO2  Min: 93 %  Max: 100 % (!) 93 %         Recent Labs   Lab 04/23/23  0820 04/25/23  0350 04/26/23  0539   WBC 11.8* 12.3* 11.8*   RBC 3.58* 4.16* 4.09*   HGB 9.7* 11.0* 11.0*   HCT 29.5* 35.2* 35.0*   MCV 82.4 84.6 85.6   MCH 27.1 26.4* 26.9*   MCHC 32.9* 31.3* 31.4*   RDW 14.7 15.0 15.1    361 382   MPV 9.2 9.0 8.8       Recent Labs   Lab 04/20/23  0922 04/21/23  0630 04/22/23  0427 04/23/23  0820 04/25/23  0350 04/26/23  0620   *   < > 129* 128* 129* 127*   K 3.9   < > 4.8 3.7 3.9 4.3   CO2 23   < > 27 30 33* 31   BUN 13.4   < > 17.3 16.9 20.3* 24.4*   CREATININE 0.60   < > 0.63 0.53* 0.59 0.69   CALCIUM 8.1*   < > 8.8 8.3* 8.7 8.5   MG 1.30*   < > 1.60 1.50* 1.60 1.80   ALBUMIN 2.0*  --  2.2*  --   --   --    ALKPHOS 134  --  155*  --   --   --    ALT 45  --  35  --   --   --    AST 37*  --  31  --   --   --    BILITOT 0.5  --  0.4  --   --   --     < > = values in this interval not displayed.          Microbiology Results (last 7 days)       Procedure Component Value Units Date/Time    Blood Culture [[id number]]  (Normal) Collected: 04/23/23 0820    Order Status: Completed Specimen: Blood from Arm, Right Updated: 04/25/23 1100     CULTURE, BLOOD ([facility]) No Growth At 48 Hours    Blood Culture  [[id number]]  (Normal) Collected: 04/23/23 0826    Order Status: Completed Specimen: Blood from Arm, Right Updated: 04/25/23 1100     CULTURE, BLOOD ([facility]) No Growth At 48 Hours    Blood Culture [[id number]]  (Abnormal)  (Susceptibility) Collected: 04/21/23 0630    Order Status: Completed Specimen: Blood from Hand, Left Updated: 04/24/23 0635     CULTURE, BLOOD ([facility]) Methicillin resistant Staphylococcus aureus     GRAM STAIN Gram Positive Cocci, probable Staphylococcus      Seen in gram stain of broth only      1 of 1 Aerobic bottle positive    Blood Culture [[id number]]  (Abnormal)  (Susceptibility) Collected: 04/21/23 0958    Order Status: Completed Specimen: Blood from Arm, Right Updated: 04/24/23 0634     CULTURE, BLOOD ([facility]) Methicillin resistant Staphylococcus aureus     GRAM STAIN Gram Positive Cocci, probable Staphylococcus      Seen in gram stain of broth only      2 of 2 bottles positive    Blood Culture [[id number]]  (Abnormal)  (Susceptibility) Collected: 04/19/23 0407    Order Status: Completed Specimen: Blood Updated: 04/21/23 0645     CULTURE, BLOOD ([facility]) Methicillin resistant Staphylococcus aureus     GRAM STAIN Gram Positive Cocci, probable Staphylococcus      Seen in gram stain of broth only      2 of 2 bottles positive    Blood Culture [[id number]]  (Abnormal)  (Susceptibility) Collected: 04/19/23 0839    Order Status: Completed Specimen: Blood from Arm, Left Updated: 04/21/23 0645     CULTURE, BLOOD ([facility]) Methicillin resistant Staphylococcus aureus     GRAM STAIN Gram Positive Cocci, probable Staphylococcus      Seen in gram stain of broth only      2 of 2 bottles positive    Blood culture x two cultures. Draw prior to antibiotics. [[id number]]  (Abnormal)  (Susceptibility) Collected: 04/16/23 1855    Order Status: Completed Specimen: Blood Updated: 04/20/23 0639     CULTURE, BLOOD ([facility]) Methicillin resistant Staphylococcus aureus     GRAM STAIN Gram Positive Cocci, probable Staphylococcus      Seen in  gram stain of broth only      1 of 1 Aerobic bottle positive    Blood Culture [[id number]]  (Abnormal)  (Susceptibility) Collected: 04/18/23 0505    Order Status: Completed Specimen: Blood Updated: 04/20/23 0636     CULTURE, BLOOD ([facility]) Methicillin resistant Staphylococcus aureus     GRAM STAIN Gram Positive Cocci, probable Staphylococcus      Seen in gram stain of broth only      2 of 2 bottles positive    Blood Culture [[id number]]  (Abnormal)  (Susceptibility) Collected: 04/18/23 0505    Order Status: Completed Specimen: Blood Updated: 04/20/23 0636     CULTURE, BLOOD ([facility]) Methicillin resistant Staphylococcus aureus     GRAM STAIN Gram Positive Cocci, probable Staphylococcus      Seen in gram stain of broth only      2 of 2 bottles positive    Blood culture x two cultures. Draw prior to antibiotics. [[id number]]  (Abnormal)  (Susceptibility) Collected: 04/16/23 1855    Order Status: Completed Specimen: Blood Updated: 04/19/23 0732     CULTURE, BLOOD ([facility]) Methicillin resistant Staphylococcus aureus     GRAM STAIN Gram Positive Cocci, probable Staphylococcus      Seen in gram stain of broth only      1 of 1 Aerobic bottle positive             See below for Radiology    Scheduled Med:   carvediloL  6.25 mg Oral BID    collagenase   Topical (Top) Daily    docusate sodium  200 mg Oral BID    enoxaparin  40 mg Subcutaneous Daily    furosemide  40 mg Oral Daily    insulin detemir U-100  7 Units Subcutaneous QHS    Lactobacillus acidophilus  1 capsule Oral TID WM    magnesium oxide  400 mg Oral TID    polyethylene glycol  17 g Oral Daily    trazodone  50 mg Oral QHS    vancomycin (VANCOCIN) IVPB  750 mg Intravenous Q12H        Continuous Infusions:       PRN Meds:  acetaminophen, dextrose 10%, dextrose 10%, docusate sodium, glucagon (human recombinant), glucose, glucose, insulin aspart U-100, melatonin, senna, sodium chloride 0.9%, traMADoL, Pharmacy to dose Vancomycin consult **AND** vancomycin - pharmacy to dose        Assessment/Plan:   Persistent MRSA Bacteremia   Sepsis due to MRSA bacteremia of unclear source   Left ankle chronic non healing wound   Acute on chronic diastolic congestive heart failure   Mild to moderate Aortic stenosis , Mod MR  Pulmonary HTN, PA pressure 44 per Echo  Diabetes Mellitus , type 2 uncontrolled   Leukocytosis   Normochromic anemia   Hyponatremia due to hypervolemia   Hypomagnesemia   Chronic low back pain    Constipation and colonic fecal impaction      Blood cultures from 04/23 are negative times 48 hours.    Discussed the case with Infectious Disease yesterday.  Unfortunately we do not have a true source of her MRSA growing in previous blood cultures.  We are originally suspecting that this may be coming from the kidney but on cystoscopy yesterday did not appear that there was true obstruction.  However stent was placed in her leukocytosis is coming back down.    She remains on IV vancomycin for now and will likely require an extended course.    Podiatry is following along for left lower extremity ischemic wounds.  He is are healing well.  If her cultures do end up coming back positive may need further exploration though.    She is back on oral Lasix at 40 mg daily after being held yesterday.  Monitoring her sodium.  127 today.  Overall due to her age and multiple comorbidities her long-term prognosis remains guarded.     Critical care diagnosis: sepsis, iv antibiotics  Critical care interventions: hands on evaluation, review of labs/radiographs/records and discussions with family  Critical care time spent: >32 minutes       Patient condition:  Stable    Anticipated discharge and Disposition: TBD      All diagnosis and differential diagnosis have been reviewed; assessment and plan has been documented; I have personally reviewed the labs and test results that are presently available; I have reviewed the patients medication list; I have reviewed the consulting providers response and  recommendations. I have reviewed or attempted to review medical records based upon their availability    All of the patient's questions have been  addressed and answered. Patient's is agreeable to the above stated plan. I will continue to monitor closely and make adjustments to medical management as needed.  _____________________________________________________________________      Radiology:  SURG FL Surgery Fluoro Usage  See OP Notes for results.     IMPRESSION: See OP Notes for results.     This procedure was auto-finalized by: Virtual Radiologist  Transesophageal echo (STARR)  · Normal left ventricular systolic function. The estimated ejection   fraction is 60%.  · Normal right ventricular size with normal right ventricular systolic   function.  · Mild biatrial enlargement.  · No valvular vegetations seen.  · There is moderate-to-severe aortic valve stenosis. Aortic valve area is   1cm^2 by planimetry.  · Mild aortic regurgitation.  · Mild mitral regurgitation.  · Mild tricuspid regurgitation.  · Grade 3 plaque present in the ascending aorta, transverse aorta and   descending aorta.  · No pericardial effusion.         Sheldon Ely MD   04/26/2023

## 2023-04-26 NOTE — PT/OT/SLP EVAL
Physical Therapy Evaluation    Patient Name:  Melodie Villarreal   MRN:  [id number]    Recommendations:     Discharge Recommendations: nursing facility, skilled   Discharge Equipment Recommendations: none (manish lift if pt returns home)  Barriers to discharge: None    Assessment:     Melodie Villarreal is a 89 y.o. female admitted with a medical diagnosis of Sepsis, MRSA, L ankle/foot ulcers, NSTEMI, bacteremia, severe PAD, R hydronephrosis s/p stent placement, CHF, AS, MR.  She presents with the following impairments/functional limitations: weakness, impaired endurance, impaired self care skills, impaired functional mobility, gait instability, impaired balance, pain, impaired skin. At baseline, pt ambulates short distances with a rollator walker and lives with her daughter. Pt's daughter also assists her with ADLs. Currently, the pt is needing Max A for bed mobility and transfers w RW. Pt with poor balance and is very tender to LLE. Pt may require SNF placement at d/c. If pt and dtr insist on returning home, they may require a manish lift.     Rehab Prognosis: Good; patient would benefit from acute skilled PT services to address these deficits and reach maximum level of function.    Recent Surgery: Procedure(s) (LRB):  CYSTOSCOPY, WITH URETERAL STENT INSERTION (Right) 1 Day Post-Op    Plan:     During this hospitalization, patient to be seen 6 x/week to address the identified rehab impairments via gait training, therapeutic activities, therapeutic exercises, wheelchair management/training and progress toward the following goals:    Plan of Care Expires:  05/26/23    Subjective     Chief Complaint: LLE soreness  Patient/Family Comments/goals: to get stronger  Pain/Comfort:  Pain Rating 1: 0/10    Patients cultural, spiritual, Latter day conflicts given the current situation: no    Living Environment:  One story home w dtr, 3 steps to enter w HR.   Prior to admission, patients level of function was ambulatory with walker, needed A  with ADLS.  Equipment used at home: rollator, wheelchair, shower chair, bedside commode, glucometer, hospital bed.  Upon discharge, patient will have assistance from dtr.    Objective:     Communicated with nurse prior to session.  Patient found supine with SCD, PRAFO, martinez catheter  upon PT entry to room.    General Precautions: Standard, contact, fall  Orthopedic Precautions:    Braces: N/A  Respiratory Status: Room air      Exams:  Cognitive Exam:  Patient is oriented to Person  RLE ROM: WNL  RLE Strength: Deficits: 3/5 grossly  LLE ROM: WNL  LLE Strength: Deficits: 3/5 grossly  Skin integrity: Wound L achilles, dressing on.      Functional Mobility:  Bed Mobility:     Rolling Left:  maximal assistance  Rolling Right: maximal assistance  Scooting: maximal assistance  Supine to Sit: maximal assistance  Sit to Supine: maximal assistance  Transfers:     Sit to Stand:  maximal assistance with rolling walker  Balance: Min-Mod A for static sitting balance due to retropulsion.       AM-PAC 6 CLICK MOBILITY  Total Score:10       Treatment & Education:      Patient and daughter/s provided with verbal education regarding turning Q2.  Understanding was verbalized.  Cleaned pavithra area due to small BM and applied barrier cream.    Patient left right sidelying with all lines intact and call button in reach.    GOALS:   Multidisciplinary Problems       Physical Therapy Goals          Problem: Physical Therapy    Goal Priority Disciplines Outcome Goal Variances Interventions   Physical Therapy Goal     PT, PT/OT Ongoing, Progressing     Description: Goals to be met by: 2023     Patient will increase functional independence with mobility by performin. Supine to sit with Stand-by Assistance  2. Sit to stand transfer with Contact Guard Assistance  3. Gait  x 150 feet with Contact Guard Assistance using Rolling Walker.   4. Pt will ascend/descend 3 steps with HR and Min A.                          History:     Past  Medical History:   Diagnosis Date    Adenocarcinoma of uterus     Bladder cancer     Deep vein thrombosis     Essential (primary) hypertension     Heart murmur     High cholesterol     Hydronephrosis 4/25/2023    Hypertriglyceridemia     Insomnia     Osteopenia     Other pulmonary embolism without acute cor pulmonale     Personal history of colonic polyps     Rheumatoid arthritis, unspecified     Type 2 diabetes mellitus without complications        Past Surgical History:   Procedure Laterality Date    CHOLECYSTECTOMY      colonscopy  06/20/2012    CYSTOSCOPY W/ URETERAL STENT PLACEMENT Right 4/25/2023    Procedure: CYSTOSCOPY, WITH URETERAL STENT INSERTION;  Surgeon: Anyi Bearden MD;  Location: [facility];  Service: Urology;  Laterality: Right;    ECCE  01/09/2013    estracapsular cataract removal   02/20/2013    insertion of intrpocular lens prosthesis   02/20/2013    phacoemulsifiaction and aspiration of cataract  02/20/2013    phacoemulsificaion and aspiration of cataract      total abdominal hysterectomy and bilateral salpingooophorectomy  1991       Time Tracking:     PT Received On: 04/26/23  PT Start Time: 0938     PT Stop Time: 1013  PT Total Time (min): 35 min     Billable Minutes: Evaluation 35      04/26/2023

## 2023-04-26 NOTE — PROGRESS NOTES
[facility]                       [address] [id number]    PATIENT NAME:       SANAM BELTRAN  YOB: 1931  CSN:                [id number]   MRN:                [id number]  ADMIT DATE:         04/16/2023 17:52:00  PHYSICIAN:          Cleveland Vergara DPM                            PROGRESS NOTE    DATE:  04/25/2023 00:00:00    SUBJECTIVE:  The patient is seen early today.  No major issues.  Overall, still   feeling about the same.  Her last blood culture was negative.    PHYSICAL EXAMINATION:  VITAL SIGNS:  Stable and afebrile.    Dressings were changed by Wound Care.  Photos are evaluated.  No changes in the   wound appearance at all.    ASSESSMENT:  Ischemic left lower extremity wounds.    PLAN:  Continue with local care.        ______________________________  Cleveland Vergara DPM    GAS/AQS  DD:  04/25/2023  Time:  07:20PM  DT:  04/25/2023  Time:  09:15PM  Job #:  [id number]      PROGRESS NOTE

## 2023-04-26 NOTE — PT/OT/SLP EVAL
Occupational Therapy  Evaluation    Name: Melodie Villarreal  MRN: [id number]  Admitting Diagnosis: Sepsis  Recent Surgery: Procedure(s) (LRB):  CYSTOSCOPY, WITH URETERAL STENT INSERTION (Right) 1 Day Post-Op    Recommendations:     Discharge Recommendations: nursing facility, skilled  Discharge Equipment Recommendations: TBD, pending progress  Barriers to discharge: medical dx    Assessment:     Melodie Villarreal is a 89 y.o. female with a medical diagnosis of Sepsis. She presents with c/o pain in BU/LE with mvt and pain in back while seated EOB. Performance deficits affecting function: weakness, impaired endurance, impaired self care skills, impaired functional mobility, impaired balance, impaired cognition, decreased lower extremity function, decreased safety awareness, pain, impaired skin.      Rehab Prognosis: Fair; patient would benefit from acute skilled OT services to address these deficits and reach maximum level of function.       Plan:     Patient to be seen 5 x/week to address the above listed problems via self-care/home management, therapeutic activities, therapeutic exercises  Plan of Care Expires: 05/10/23  Plan of Care Reviewed with: patient, daughter, son    Subjective     Chief Complaint: Pt c/o pain in BU/LE with mvt and pain in back while seated EOB. RN notified.  Patient/Family Comments/goals: Return to PLOF.    Occupational Profile:  Living Environment: Lives with daughter in [facility] with 3 YESENIA and R HR. Owns walk-in shower.  Previous level of function: Pt required assist to perform ADLs, dep A with IADLs, and pt was not driving. Pt also required SBA to ambulate short distances using rollator.  DME: Pt owns rollator, transport wheelchair, shower chair, bedside commode, hospital bed, glucometer  Assistance upon Discharge: Pt's daughter and son. However, pt's daughter uses SC to ambulate at times. Pt's son lives next door and is retired, but he reported that he still performs small jobs. Pt would benefit from  placement at this time to increase functional independence and decrease burden of care.    Patients cultural, spiritual, Adventism conflicts given the current situation: no    Objective:     Communicated with: RN prior to session.  Patient found HOB elevated with R wedge, B pressure relief boots, peripheral IV, telemetry, martinez catheter upon OT entry to room.    General Precautions: Standard, fall, contact pxns, NPO  Respiratory Status: Room air    Occupational Performance:    Bed Mobility:    Pt t/f from lying with HOB elevated to seated EOB with max A and vc/tcs provided, using bed rail.  Patient completed Sit to Supine with dep A.    Functional Mobility/Transfers:  Pt attempted to perform sit <> stand with max A and vcs provided, using RW. However, pt was unsuccessful 2/2 RLE sliding forward. Pt was unable to attempt sit <> stand again 2/2 posterior trunk lean and increased difficulty following simple command to anteriorly shift weight, requiring dep A to t/f from sit > supine in order to increase pt's safety and decrease risk of fall.    Activities of Daily Living:  Lower Body Dressing: Dep A to don/doff B socks.    Cognitive/Visual Perceptual:  Cognitive/Psychosocial Skills:     -       Oriented to: Pt was only able to recall name. Required education on , place, time, and situation.   -       Follows Commands/attention: Difficulty following simple commands.  -       Memory: Impaired.  -       Safety awareness/insight to disability: impaired     Physical Exam:  Balance:    -       Pt required min-mod A to assume static sitting balance initially, and was then able to briefly maintain balance with SBA provided. However, pt began to require max A and max vcs to maintain static sitting balance 2/2 posterior trunk lean.  Upper Extremity Range of Motion:     -       BUEs: WFL with only min deficits noted in AROM of B shoulder flex  Upper Extremity Strength:    -       BUEs: Grossly 4- to 4/5 except B shoulder  flex=3-/5    Therapeutic Positioning  Risk for acquired pressure injuries is increased due to impaired mobility.    OT interventions performed during the course of today's session in an effort to prevent and/or reduce acquired pressure injuries:   Therapeutic positioning completed   Positioning plan established with care team (OT spoke with RN about placing pt on turning schedule)    Skin assessment:  Body skin assessment completed.   Findings: known area of altered skin integrity at LLE (dressing in place)    OT recommendations for therapeutic positioning throughout hospitalization:   Follow [facility] Pressure Injury Prevention Protocol  Geomat recommended for sacral protection while [facility]  [employer]    Patient Education:  Patient and son provided with verbal education regarding OT role/goals/POC and current discharge recommendation.     Patient left HOB elevated with wedge positioned underneath L aspect of trunk, B pressure relief boots donned, all lines intact, call button in reach, pt's son present, and RN aware.    GOALS:   Multidisciplinary Problems       Occupational Therapy Goals          Problem: Occupational Therapy    Goal Priority Disciplines Outcome Interventions   Occupational Therapy Goal     OT, PT/OT Ongoing, Progressing    Description: Goals to be met by: 5/10/23     Patient will increase functional independence with ADLs by performing:    UE Dressing with Contact Guard Assistance.  LE Dressing with Moderate Assistance and Assistive Devices as needed.  Grooming while EOB with Contact Guard Assistance.  Sitting at edge of bed x10 minutes with Contact Guard Assistance.                         History:     Past Medical History:   Diagnosis Date    Adenocarcinoma of uterus     Bladder cancer     Deep vein thrombosis     Essential (primary) hypertension     Heart murmur     High cholesterol     Hydronephrosis 4/25/2023    Hypertriglyceridemia     Insomnia     Osteopenia     Other pulmonary embolism  without acute cor pulmonale     Personal history of colonic polyps     Rheumatoid arthritis, unspecified     Type 2 diabetes mellitus without complications          Past Surgical History:   Procedure Laterality Date    CHOLECYSTECTOMY      colonscopy  06/20/2012    CYSTOSCOPY W/ URETERAL STENT PLACEMENT Right 4/25/2023    Procedure: CYSTOSCOPY, WITH URETERAL STENT INSERTION;  Surgeon: Anyi Bearden MD;  Location: [facility];  Service: Urology;  Laterality: Right;    ECCE  01/09/2013    estracapsular cataract removal   02/20/2013    insertion of intrpocular lens prosthesis   02/20/2013    phacoemulsifiaction and aspiration of cataract  02/20/2013    phacoemulsificaion and aspiration of cataract      total abdominal hysterectomy and bilateral salpingooophorectomy  1991       Time Tracking:     OT Date of Treatment: 4/26/23  OT Start Time: 1235  OT Stop Time: 1308  OT Total Time (min): 33 min    Billable Minutes: Evaluation Mod complexity 33 mins    4/26/2023

## 2023-04-26 NOTE — PROGRESS NOTES
[facility]  Infectious Disease Progress Note        SUBJECTIVE:   AF, VSS.  No issues overnight.  Daughter at bedside.  Patient without complaints.       MEDICATIONS:   Reviewed in EMR    REVIEW OF SYSTEMS:   Except as documented, all other systems reviewed and negative     PHYSICAL EXAM:   T 97.6 °F (36.4 °C)   /66   P 78   RR 18   O2 95 %  GENERAL: awake, in bed; NAD  HEENT: normocephalic atraumatic   NECK: supple   LUNGS: Clear bilaterally, no wheezing or rales, no accessory muscle use   CVS: Regular rate and rhythm, normal peripheral perfusion  ABD: Soft, non-tender, non-distended, bowel sounds present  EXTREMITIES: LLE wound in dressing   SKIN: Warm, dry.   NEURO: alert and oriented, grossly without focal deficits   PSYCHIATRIC: Cooperative    LABS AND IMAGING:     Recent Labs     04/25/23  0350 04/26/23  0539   WBC 12.3* 11.8*   RBC 4.16* 4.09*   HGB 11.0* 11.0*   HCT 35.2* 35.0*   MCV 84.6 85.6   MCH 26.4* 26.9*   MCHC 31.3* 31.4*   RDW 15.0 15.1    382       No results for input(s): LACTIC in the last 72 hours.  No results for input(s): INR, APTT, D-DIMER in the last 72 hours.  No results for input(s): HGBA1C, CHOL, TRIG, LDL, VLDL, HDL in the last 72 hours.   Recent Labs     04/25/23  0350 04/26/23  0620   * 127*   K 3.9 4.3   CHLORIDE 87* 85*   CO2 33* 31   BUN 20.3* 24.4*   CREATININE 0.59 0.69   GLUCOSE 143* 313*   CALCIUM 8.7 8.5   MG 1.60 1.80       No results for input(s): BNP, CPK, TROPONINI in the last 72 hours.       ASSESSMENT & PLAN:   She is a 89-year-old female presenting with weakness and fatigue, found to have MRSA bacteremia.  Has a left ankle wound, following with Wound Care at [facility], concerning for source.  ID consulted for assistance with Gram-positive bacteremia.      Persistent MRSA bacteremia   Chronic L ankle wound  Sepsis s/t #1  H/o DMII / HTN  H/o RA, not on medication  Advanced age        PLAN:  Repeat BCx negative thus far.    Continue vancomycin, pharmacy adjusting dose for subtherapeutic trough.  F/u repeat level later today.    Maintain IV access with peripheral lines if feasible.   Monitor clinically.   Discussed with nursing and daughter.

## 2023-04-27 NOTE — PLAN OF CARE
04/27/23 1607   Discharge Reassessment   Assessment Type Discharge Planning Reassessment   Discharge Plan discussed with: Adult children   Discharge Plan A Home Health   Discharge Plan B Home Health   Post-Acute Status   Post-Acute Authorization Home Health     Patient is current with [facility]. List of infusion providers given to son.  FOC obtained for [facility]. Referral sent via [facility]. Notified Rafy

## 2023-04-27 NOTE — PT/OT/SLP PROGRESS
Physical Therapy Treatment    Patient Name:  Melodie Villarreal   MRN:  [id number]    Recommendations:     Discharge Recommendations: nursing facility, skilled (or d/c home with 24hr assistance)  Discharge Equipment Recommendations: lift device  Barriers to discharge: None    Assessment:     Melodie Villarreal is a 89 y.o. female admitted with a medical diagnosis of Sepsis.  She presents with the following impairments/functional limitations: weakness, impaired endurance, impaired self care skills, impaired functional mobility, gait instability, impaired balance, pain, impaired skin.  Pt assisted to EOB 3 times today, however pt kept sliding forward due to retropulsion and the fact that the bed is too high and pt is unable to touch her feet to the ground in sitting. Pt is unable to sit safely for very long at EOB. Also performed supine ROM exercises BLE. Called sizewize bed rep to replace bed with one that lowers closer to the ground so that pt can actually stand and get OOB safely. Daughter reports they plan on bringing pt home after d/c, and not to SNF. Pt will require manish lift at this time if she d/c home soon.     Rehab Prognosis: Fair; patient would benefit from acute skilled PT services to address these deficits and reach maximum level of function.    Recent Surgery: Procedure(s) (LRB):  CYSTOSCOPY, WITH URETERAL STENT INSERTION (Right) 2 Days Post-Op    Plan:     During this hospitalization, patient to be seen 6 x/week to address the identified rehab impairments via gait training, therapeutic activities, therapeutic exercises, wheelchair management/training and progress toward the following goals:    Plan of Care Expires:  05/26/23    Subjective     Chief Complaint: RLE soreness with movement  Patient/Family Comments/goals: to get OOB  Pain/Comfort:  Pain Rating 1: 0/10      Objective:     Communicated with nurse prior to session.  Patient found right sidelying with peripheral IV, pressure relief boots, telemetry upon PT  entry to room.     General Precautions: Standard, contact, fall  Orthopedic Precautions:    Braces: N/A  Respiratory Status: Room air  Skin Integrity: Wound L heel      Functional Mobility:  Bed Mobility:     Scooting: maximal assistance  Supine to Sit: maximal assistance and of 2 persons  Sit to Supine: maximal assistance and of 2 persons  Balance: Mod-Max A for sitting balance at EOB. Attempted 3 x. Pt worked on forward weight shifting. Retropulsion and tries to keep LE in extension, VC to bend knees.     Therapeutic Activities/Exercises:      Education:  Patient and daughter/s provided with verbal education regarding PT POC.  Understanding was verbalized.     Patient left right sidelying with all lines intact, call button in reach, and nurse notified..    GOALS:   Multidisciplinary Problems       Physical Therapy Goals          Problem: Physical Therapy    Goal Priority Disciplines Outcome Goal Variances Interventions   Physical Therapy Goal     PT, PT/OT Ongoing, Progressing     Description: Goals to be met by: 2023     Patient will increase functional independence with mobility by performin. Supine to sit with Stand-by Assistance  2. Sit to stand transfer with Contact Guard Assistance  3. Gait  x 150 feet with Contact Guard Assistance using Rolling Walker.   4. Pt will ascend/descend 3 steps with HR and Min A.                          Time Tracking:     PT Received On: 23  PT Start Time: 1043     PT Stop Time: 1117  PT Total Time (min): 34 min     Billable Minutes: Therapeutic Activity 34    Treatment Type: Treatment  PT/PTA: PT     Number of PTA visits since last PT visit: 2023

## 2023-04-27 NOTE — PROGRESS NOTES
[facility]  Infectious Disease Progress Note        SUBJECTIVE:   Doing well today.  No events overnight.  AF, VSS.  Son at bedside.  Patient without complaints.       MEDICATIONS:   Reviewed in EMR    REVIEW OF SYSTEMS:   Except as documented, all other systems reviewed and negative     PHYSICAL EXAM:   T 97.7 °F (36.5 °C)   /67   P 76   RR 18   O2 (!) 90 %  GENERAL: awake, in bed; NAD  HEENT: normocephalic atraumatic   NECK: supple   LUNGS: Clear bilaterally, no wheezing or rales, no accessory muscle use   CVS: Regular rate and rhythm, normal peripheral perfusion  ABD: Soft, non-tender, non-distended, bowel sounds present  EXTREMITIES: LLE wound in dressing   SKIN: Warm, dry.   NEURO: alert and oriented, grossly without focal deficits   PSYCHIATRIC: Cooperative    LABS AND IMAGING:     Recent Labs     04/26/23  0539 04/27/23 0419   WBC 11.8* 11.4   RBC 4.09* 3.85*   HGB 11.0* 10.2*   HCT 35.0* 32.3*   MCV 85.6 83.9   MCH 26.9* 26.5*   MCHC 31.4* 31.6*   RDW 15.1 15.0    385       No results for input(s): LACTIC in the last 72 hours.  No results for input(s): INR, APTT, D-DIMER in the last 72 hours.  No results for input(s): HGBA1C, CHOL, TRIG, LDL, VLDL, HDL in the last 72 hours.   Recent Labs     04/25/23  0350 04/26/23  0620 04/27/23  0419   * 127* 126*   K 3.9 4.3 3.6   CHLORIDE 87* 85* 84*   CO2 33* 31 36*   BUN 20.3* 24.4* 23.4*   CREATININE 0.59 0.69 0.63   GLUCOSE 143* 313* 127*   CALCIUM 8.7 8.5 8.9   MG 1.60 1.80  --        No results for input(s): BNP, CPK, TROPONINI in the last 72 hours.       ASSESSMENT & PLAN:   She is a 89-year-old female presenting with weakness and fatigue, found to have MRSA bacteremia.  Has a left ankle wound, following with Wound Care at [facility], concerning for source.  ID consulted for assistance with Gram-positive bacteremia.      Persistent MRSA bacteremia   Chronic L ankle wound  Sepsis s/t #1  H/o DMII / HTN  H/o RA, not on  medication  Advanced age        PLAN:  Repeat BCx remain negative.  OK to place PICC today.  Consult CM for assistance with OPAT.  Continue vancomycin, pharmacy dosing - plan a 6 week course from clearance, end date: 6/4.  Weekly CBC, CMP, CRP, ESR, vancomycin level.  Fax results to us at [phone].  F/u with us as clinic schedule allows.   Will sign off.  Please call if need arises.   Discussed with son and Dr. Villeda.

## 2023-04-27 NOTE — PT/OT/SLP PROGRESS
Occupational Therapy   Treatment    Name: Melodie Villarreal  MRN: [id number]  Admitting Diagnosis:  Sepsis  2 Days Post-Op    Recommendations:     Discharge Recommendations: nursing facility, skilled. However, pt's family is insisting on taking pt home post-d/c. Pt will require 24/7 care.  Discharge Equipment Recommendations: Tashia lift (Note: OT provided education to pt's son on Tashia Lift during session. Pt's family will require training prior to d/c if they are still adamant about taking pt home)  Barriers to discharge: medical dx    Assessment:     Melodie Villarreal is a 89 y.o. female with a medical diagnosis of Sepsis. She presents with pain in BLE with mvt and pain in back. Performance deficits affecting function are weakness, impaired endurance, impaired self care skills, impaired functional mobility, impaired balance, impaired cognition, decreased lower extremity function, decreased safety awareness, pain, impaired skin.     Rehab Prognosis: Fair-Poor; patient would benefit from acute skilled OT services to address these deficits and reach maximum level of function.       Plan:     Patient to be seen 5 x/week to address the above listed problems via self-care/home management, therapeutic activities, therapeutic exercises  Plan of Care Expires: 05/10/23  Plan of Care Reviewed with: patient, son    Subjective     Pain/Comfort:  Pt presents with pain in BLE with mvt and pain in back.    Objective:     Communicated with: RN prior to session.  Patient found HOB elevated with R wedge, telemetry, PRAFOs upon OT entry to room.    General Precautions: Standard, fall, contact pxns  Respiratory Status: Room air     Occupational Performance:     Bed Mobility:    Pt t/f from lying with HOB elevated to seated EOB using bed rail with max A x2, increased time, and vc/tcs provided.  Patient completed Sit to Supine with max A x2 provided.  Pt performed lateral t/f to new hospital bed with dep A x3 provided.    Functional  Mobility/Transfers:  Patient completed Sit <> Stand Transfer with max A x2, no AD, and with pt's B feet blocked.    Activities of Daily Living:  Lower Body Dressing: Dep A to don/doff B socks.    Balance:   - Pt required max A and max vcs to assume static sitting balance while EOB 2/2 posterior trunk lean. Pt also required assist to flex B knees while EOB. However, pt able to progress to SBA, using bed rail for support.    Therapeutic Positioning  OT interventions performed during the course of today's session in an effort to prevent and/or reduce acquired pressure injuries:   Therapeutic positioning completed     Education:  Pt's son provided with verbal education regarding DME recommendation (Tashia Lift) since pt's family is adamant about taking pt home. Understanding was verbalized, however additional teaching warranted.    Patient left HOB elevated with B PRAFOs, PureWick, small anil pad positioned between pt's BLEs, pillow positioned underneath each UE, all lines intact, call button in reach, pt's son present, and RN aware.  Note: Wedge was not utilized in order to adhere to turning schedule. RN aware.    GOALS:   Multidisciplinary Problems       Occupational Therapy Goals          Problem: Occupational Therapy    Goal Priority Disciplines Outcome Interventions   Occupational Therapy Goal     OT, PT/OT Ongoing, Progressing    Description: Goals to be met by: 5/10/23     Patient will increase functional independence with ADLs by performing:    UE Dressing with Contact Guard Assistance.  LE Dressing with Moderate Assistance and Assistive Devices as needed.  Grooming while EOB with Contact Guard Assistance.  Sitting at edge of bed x10 minutes with Contact Guard Assistance.                         Time Tracking:     OT Date of Treatment: 4/27/23  OT Start Time: 1545  OT Stop Time: 1611  OT Total Time (min): 26 min    Billable Minutes: Self Care/Home Management 26 mins    OT/STANISLAW: OT     Number of STANISLAW visits since  last OT visit: 1 4/27/2023

## 2023-04-27 NOTE — PROGRESS NOTES
Hospital Medicine  Progress Note    Patient Name: Melodie Villarreal  MRN: [id number]  Status: IP- Inpatient   Admission Date: 4/16/2023  Length of Stay: 11      CC: hospital follow-up for MRSA bacteremia       SUBJECTIVE   89-year-old female with a history that includes type 2 diabetes mellitus, prior DVT/IVC filter no longer on anticoagulation and non-healing left ankle wound, presented to the ED on 4/16 after she became lethargic. Patient normally alert and oriented, active and able to carry out own ADLs.  She apparently has chronic open wounds on her left lower extremity  followed closely by Wound Care.  On the day of presentation she became drowsy/lethargic, and EMS activated, she was found to be hypotensive with a blood pressure of 66/36 on arrival, requiring 3 L nasal cannula.  Laboratory work showed leukocytosis of 34,000, mild anemia, lactic acid of 3.5, elevated BNP of 2000 and a troponin of 0.115.  Urinalysis was unremarkable, CT chest abdomen and pelvis was negative for pulmonary embolus, pulmonary infiltrates or any intra-abdominal or pelvic pathology.  CT head was normal.  She was started on broad-spectrum antibiotics for undifferentiated sepsis admitted to the hospitalist services for further management.  Blood cultures returned positive for MRSA bacteremia. ID consulted and pt continued on IV Vancomycin. Noted left lower extremity non-healing ulcer. NM bone scan without clear evidence of OM. LLE arterial U/S showed monophasic flow throughout suggestive of inflow disease. CT T and L spine  with no evidence of discitis/osteomyelitis. 2D TTE negative for valvular vegetation. Cardiology consulted for STARR, performed on 4/22, negative valvular vegetations. Pt is noted to have  hydronephrosis despite Newton decompression. Urology consulted to evaluate persistent Rt hydronephrosis.  She was taken to the OR on 04/25 for cystoscopy and right ureteral stent placement.  Blood cultures persistently positive, 4/16, 4/18,  4/19, 4/21.  Eventually cultures from 4/23 remained negative.  Vascular surgery consulted for PAD, CTA run-off noted severe flow-limiting disease in the left common femoral.  Vascular surgery considering left femoral endarterectomy.    Today: Patient seen and examined at bedside, and chart reviewed.  CTA results noted, Vascular surgery was considering left femoral endarterectomy, though family would like to defer intervention for now.  Most recent blood cultures now negative @ 4 days.  No new complaints.      MEDICATIONS   Scheduled   carvediloL  6.25 mg Oral BID    collagenase   Topical (Top) Daily    docusate sodium  200 mg Oral BID    enoxaparin  40 mg Subcutaneous Daily    furosemide  40 mg Oral Daily    insulin detemir U-100  7 Units Subcutaneous QHS    Lactobacillus acidophilus  1 capsule Oral TID WM    magnesium oxide  400 mg Oral TID    polyethylene glycol  17 g Oral Daily    trazodone  50 mg Oral QHS    vancomycin (VANCOCIN) IVPB  750 mg Intravenous Q12H     Continuous Infusions  None      PHYSICAL EXAM   VITALS: T 97.9 °F (36.6 °C)   /64   P 74   RR 18   O2 (!) 91 %    GENERAL: Awake and in NAD  LUNGS: CTA anteriorly  CVS: Normal rate  GI/: Soft, NT, bowel sounds positive.  EXTREMITIES: Radial pulse 2+  NEURO: AAOx3  PSYCH: Cooperative      LABS   CBC  Recent Labs     04/26/23  0539 04/27/23  0419   WBC 11.8* 11.4   RBC 4.09* 3.85*   HGB 11.0* 10.2*   HCT 35.0* 32.3*   MCV 85.6 83.9   MCH 26.9* 26.5*   MCHC 31.4* 31.6*   RDW 15.1 15.0    385     CHEM  Recent Labs     04/25/23  0350 04/26/23  0620 04/27/23  0419   * 127* 126*   K 3.9 4.3 3.6   CHLORIDE 87* 85* 84*   CO2 33* 31 36*   BUN 20.3* 24.4* 23.4*   CREATININE 0.59 0.69 0.63   GLUCOSE 143* 313* 127*   CALCIUM 8.7 8.5 8.9   MG 1.60 1.80  --          MICROBIOLOGY     Microbiology Results (last 7 days)       Procedure Component Value Units Date/Time    Blood Culture [[id number]]  (Normal) Collected: 04/23/23 0820    Order Status:  Completed Specimen: Blood from Arm, Right Updated: 04/27/23 1100     CULTURE, BLOOD ([facility]) No Growth At 96 Hours    Blood Culture [[id number]]  (Normal) Collected: 04/23/23 0826    Order Status: Completed Specimen: Blood from Arm, Right Updated: 04/27/23 1100     CULTURE, BLOOD ([facility]) No Growth At 96 Hours    Blood Culture [[id number]]  (Abnormal)  (Susceptibility) Collected: 04/21/23 0630    Order Status: Completed Specimen: Blood from Hand, Left Updated: 04/24/23 0635     CULTURE, BLOOD ([facility]) Methicillin resistant Staphylococcus aureus     GRAM STAIN Gram Positive Cocci, probable Staphylococcus      Seen in gram stain of broth only      1 of 1 Aerobic bottle positive    Blood Culture [[id number]]  (Abnormal)  (Susceptibility) Collected: 04/21/23 0958    Order Status: Completed Specimen: Blood from Arm, Right Updated: 04/24/23 0634     CULTURE, BLOOD ([facility]) Methicillin resistant Staphylococcus aureus     GRAM STAIN Gram Positive Cocci, probable Staphylococcus      Seen in gram stain of broth only      2 of 2 bottles positive    Blood Culture [[id number]]  (Abnormal)  (Susceptibility) Collected: 04/19/23 0407    Order Status: Completed Specimen: Blood Updated: 04/21/23 0645     CULTURE, BLOOD ([facility]) Methicillin resistant Staphylococcus aureus     GRAM STAIN Gram Positive Cocci, probable Staphylococcus      Seen in gram stain of broth only      2 of 2 bottles positive    Blood Culture [[id number]]  (Abnormal)  (Susceptibility) Collected: 04/19/23 0839    Order Status: Completed Specimen: Blood from Arm, Left Updated: 04/21/23 0645     CULTURE, BLOOD ([facility]) Methicillin resistant Staphylococcus aureus     GRAM STAIN Gram Positive Cocci, probable Staphylococcus      Seen in gram stain of broth only      2 of 2 bottles positive              DIAGNOSTICS   CTA Runoff ABD PEL Bilat Lower Ext  Narrative:  VASCULATURE:  Aorta: Atherosclerosis without aneurysm.  Mesenteric arteries: Greater than 50% stenosis at the origin of the  superior mesenteric artery.  Celiac and ZACHARY are patent.  Renal arteries:Atherosclerosis with less than 50% stenosis at the origin of the renal arteries.  Right:  Iliac arteries: Atherosclerosis with estimated 50% stenosis at the proximal common iliac artery.  Patent runoff to the groin.  Femoral: Diffuse, multifocal atherosclerosis with less than 50% stenosis.  Popliteal: Atherosclerosis without significant stenosis.  Tibioperoneal trunk: Patent tibioperoneal trunk.  Diffuse calcific atherosclerotic change limits evaluation for luminal patency below the trifurcation.  Anterior tibialis does appear patent through the dorsalis pedis.  Left:  Iliac arteries: Atherosclerosis with greater than 50% stenosis of the common iliac artery.  Femoral: Calcific atherosclerosis with occlusion versus near complete occlusion at the common femoral artery.  There is flow within the superficial femoral artery and deep femoral branches.  Diffuse atherosclerosis of the superficial femoral artery with occlusion distally.  Popliteal: Reconstitution of flow at the popliteal artery.  Atherosclerosis without significant stenosis.  Tibioperoneal trunk: Patent tibioperoneal trunk.  Diffuse calcific atherosclerotic change limits evaluation for luminal patency below the trifurcation.  Anterior tibialis does appear patent through the dorsalis pedis.  IVC filter.  HEART: Cardiomegaly.  There are dense calcifications at the mitral valve annulus.  There are calcifications at the aortic valve.  Coronary artery calcifications.  LUNG BASES: Small pleural effusions with basilar atelectasis.  LIVER: No appreciable mass by arterial phase evaluation.  BILIARY: Gallbladder is surgically absent.  PANCREAS: Cyst at the head of the pancreas.  Please see diagnostic CT report 04/22/2023.  SPLEEN: Normal in size  ADRENALS: No mass.  KIDNEYS/URETERS: Right ureteral stent is seen.  No hydronephrosis.  GI TRACT/MESENTERY:  Colonic diverticulosis without acute  inflammatory change. Moderate stool burden.  PERITONEUM: No free fluid.No free air.  LYMPH NODES: No enlarged lymph nodes by size criteria.  BLADDER: Collapsed about a Newton catheter.  REPRODUCTIVE ORGANS: Uterus is surgically absent.  SOFT TISSUES: Body wall edema.  Knee joint effusions.  BONES: Polyarticular arthritis.  Impression:   1. Severe, diffuse calcific atherosclerosis.  This particularly limits evaluation of flow below the trifurcation bilaterally.  2. Significant stenosis of the left common iliac artery, severe narrowing versus occlusion at the left common femoral artery, occlusion at the distal superficial femoral artery.  Collateral reconstitution of flow at the popliteal artery.  3. Stenosis at the origin of the superior mesenteric artery.  Patent celiac and inferior mesenteric artery.  Electronically signed by: Loretta Mcclain  Date:    04/26/2023  Time:    13:36        ASSESSMENT   Persistent MRSA Sepsis/Bacteremia, now with clearance as of 4/23  Left ankle chronic non healing wound   Acute on chronic diastolic heart failure   Mild to moderate Aortic stenosis  Pulmonary HTN  NIDDM II  Normochromic anemia   Hyponatremia due to hypervolemia   Hypomagnesemia   Chronic low back pain   Constipation associated with colonic fecal impaction    PLAN   Cultures no growth at 4 days, suspect may be able to get PICC soon  Family declining LLE intervention at this time  Continue with IV Vanc as planned x 6 weeks from negative culture  Will consult CM for Home health and IV antibiotics  Otherwise continue current management at this time        Prophylaxis: SC Lovenox        Crescencio Villeda MD  [facility]

## 2023-04-27 NOTE — PROGRESS NOTES
Pharmacokinetic Assessment Follow Up: IV Vancomycin    Vancomycin serum concentration assessment(s):    The trough level was drawn correctly and can be used to guide therapy at this time. The measurement is within the desired definitive target range of 15 to 20 mcg/mL.    Vancomycin Regimen Plan:    Continue regimen to Vancomycin 750 mg IV every 12 hours with next serum trough concentration measured at 60 min prior to 1200 dose on 04/28    Drug levels (last 3 results):  Recent Labs   Lab Result Units 04/25/23  0916 04/26/23  2309   Vancomycin Trough ug/ml 9.6* 20.0       Pharmacy will continue to follow and monitor vancomycin.    Please contact pharmacy at extension [phone] for questions regarding this assessment.    Thank you for the consult,   Johny Momin       Patient brief summary:  Melodie Villarreal is a 89 y.o. female initiated on antimicrobial therapy with IV Vancomycin for treatment of bacteremia    The patient's current regimen is 750mg iv every 12 hrs    Drug Allergies:   Review of patient's allergies indicates:   Allergen Reactions    Ace inhibitors Other (See Comments)    Adhesive      Allergic to tape    Bactrim [sulfamethoxazole-trimethoprim] Other (See Comments)     Confusion, Hypoglycemia    Meperidine Other (See Comments)    Midazolam Other (See Comments)    Atorvastatin Nausea Only    Codeine Other (See Comments) and Anxiety    Tapentadol Rash       Actual Body Weight:   56.6kg    Renal Function:   Estimated Creatinine Clearance: 43.9 mL/min (based on SCr of 0.69 mg/dL).,     Dialysis Method (if applicable):  N/A    CBC (last 72 hours):  Recent Labs   Lab Result Units 04/25/23  0350 04/26/23  0539   WBC x10(3)/mcL 12.3* 11.8*   Hgb g/dL 11.0* 11.0*   Hct % 35.2* 35.0*   Platelet x10(3)/mcL 361 382   Mono % % 7.6 3.5   Eos % % 1.4 0.1   Basophil % % 0.6 0.3       Metabolic Panel (last 72 hours):  Recent Labs   Lab Result Units 04/25/23  0350 04/26/23  0620   Sodium Level mmol/L 129* 127*   Potassium Level  mmol/L 3.9 4.3   Chloride mmol/L 87* 85*   Carbon Dioxide mmol/L 33* 31   Glucose Level mg/dL 143* 313*   Blood Urea Nitrogen mg/dL 20.3* 24.4*   Creatinine mg/dL 0.59 0.69   Magnesium Level mg/dL 1.60 1.80       Vancomycin Administrations:  vancomycin given in the last 96 hours                     vancomycin 750 mg in dextrose 5 % 250 mL IVPB (ready to mix) (mg) 750 mg New Bag 04/26/23 1125     750 mg New Bag 04/25/23 2355     750 mg New Bag  1032    vancomycin 750 mg in dextrose 5 % 250 mL IVPB (ready to mix) (mg) 750 mg New Bag 04/24/23 0933     750 mg New Bag 04/23/23 0958                    Microbiologic Results:  Microbiology Results (last 7 days)       Procedure Component Value Units Date/Time    Blood Culture [[id number]]  (Normal) Collected: 04/23/23 0820    Order Status: Completed Specimen: Blood from Arm, Right Updated: 04/26/23 1100     CULTURE, BLOOD ([facility]) No Growth At 72 Hours    Blood Culture [[id number]]  (Normal) Collected: 04/23/23 0826    Order Status: Completed Specimen: Blood from Arm, Right Updated: 04/26/23 1100     CULTURE, BLOOD ([facility]) No Growth At 72 Hours    Blood Culture [[id number]]  (Abnormal)  (Susceptibility) Collected: 04/21/23 0630    Order Status: Completed Specimen: Blood from Hand, Left Updated: 04/24/23 0635     CULTURE, BLOOD ([facility]) Methicillin resistant Staphylococcus aureus     GRAM STAIN Gram Positive Cocci, probable Staphylococcus      Seen in gram stain of broth only      1 of 1 Aerobic bottle positive    Blood Culture [[id number]]  (Abnormal)  (Susceptibility) Collected: 04/21/23 0958    Order Status: Completed Specimen: Blood from Arm, Right Updated: 04/24/23 0634     CULTURE, BLOOD ([facility]) Methicillin resistant Staphylococcus aureus     GRAM STAIN Gram Positive Cocci, probable Staphylococcus      Seen in gram stain of broth only      2 of 2 bottles positive    Blood Culture [[id number]]  (Abnormal)  (Susceptibility) Collected: 04/19/23 0407    Order Status: Completed Specimen:  Blood Updated: 04/21/23 0645     CULTURE, BLOOD ([facility]) Methicillin resistant Staphylococcus aureus     GRAM STAIN Gram Positive Cocci, probable Staphylococcus      Seen in gram stain of broth only      2 of 2 bottles positive    Blood Culture [[id number]]  (Abnormal)  (Susceptibility) Collected: 04/19/23 0839    Order Status: Completed Specimen: Blood from Arm, Left Updated: 04/21/23 0645     CULTURE, BLOOD ([facility]) Methicillin resistant Staphylococcus aureus     GRAM STAIN Gram Positive Cocci, probable Staphylococcus      Seen in gram stain of broth only      2 of 2 bottles positive    Blood culture x two cultures. Draw prior to antibiotics. [[id number]]  (Abnormal)  (Susceptibility) Collected: 04/16/23 1855    Order Status: Completed Specimen: Blood Updated: 04/20/23 0639     CULTURE, BLOOD ([facility]) Methicillin resistant Staphylococcus aureus     GRAM STAIN Gram Positive Cocci, probable Staphylococcus      Seen in gram stain of broth only      1 of 1 Aerobic bottle positive    Blood Culture [[id number]]  (Abnormal)  (Susceptibility) Collected: 04/18/23 0505    Order Status: Completed Specimen: Blood Updated: 04/20/23 0636     CULTURE, BLOOD ([facility]) Methicillin resistant Staphylococcus aureus     GRAM STAIN Gram Positive Cocci, probable Staphylococcus      Seen in gram stain of broth only      2 of 2 bottles positive    Blood Culture [[id number]]  (Abnormal)  (Susceptibility) Collected: 04/18/23 0505    Order Status: Completed Specimen: Blood Updated: 04/20/23 0636     CULTURE, BLOOD ([facility]) Methicillin resistant Staphylococcus aureus     GRAM STAIN Gram Positive Cocci, probable Staphylococcus      Seen in gram stain of broth only      2 of 2 bottles positive

## 2023-04-27 NOTE — PROGRESS NOTES
[facility] [place]ascular Surgery  Progress Note     I have reviewed the images of the CTA performed on Ms. Villarreal.  This shows some occlusive disease in the iliacs bilaterally, however none of it appears to be severely flow-limiting.  Her left common femoral artery has a high-grade calcified plaque which is near occlusive.  Beyond this the SFA and popliteal are patent, however her SFA does occlude for a short to moderate segment and reconstitutes the popliteal above the knee.  From this she does appear to have three-vessel runoff.  I discussed with her daughter that I think at this point it is not unreasonable to attempt a femoral endarterectomy.  This will likely provide a significant improvement in flow to the left lower extremity allow her to heal her wound.  I did discuss with her that there is a chance this may be an adequate and we may need to come back later and try to treat her SFA occlusion, however I think it her age trying to do the least amount possible at this point is likely the best course of action.  Her daughter stated that she will discuss this with her brother as they are both almendarez of  for their mother.  I did explain to her that there is risk with the procedure based on her age, however I think she is at risk for nonhealing of her wound and possible amputation of the lower extremity if she does not have revascularization.  I did also discuss that they can consider no further procedures on her based on her age.  At this point her blood cultures have had no growth to date over the last 72 hours.  We will wait to plan to do any of this until she has cleared blood cultures.  They want to wait until tomorrow to make a final decision.

## 2023-04-28 NOTE — PROGRESS NOTES
Pharmacokinetic Assessment Follow Up: IV Vancomycin    Vancomycin serum concentration assessment(s):    The trough level was drawn correctly and can be used to guide therapy at this time. The measurement is above the desired definitive target range of 15 to 20 mcg/mL.    Vancomycin Regimen Plan:    Discontinue the scheduled vancomycin regimen and re-dose when the random level is less than 20 mcg/mL, next level to be drawn at 0430 on 04/29/23.    Drug levels (last 3 results):  Recent Labs   Lab Result Units 04/26/23  2309 04/28/23  1059   Vancomycin Trough ug/ml 20.0 23.5*       Pharmacy will continue to follow and monitor vancomycin.    Please contact pharmacy at extension [phone] for questions regarding this assessment.    Thank you for the consult,   Yanely Julian       Patient brief summary:  Melodie Villarreal is a 89 y.o. female initiated on antimicrobial therapy with IV Vancomycin for treatment of bacteremia    The patient's current regimen is on hold. Will re-dose when level is less than 20 mcg/ml    Drug Allergies:   Review of patient's allergies indicates:   Allergen Reactions    Ace inhibitors Other (See Comments)    Adhesive      Allergic to tape    Bactrim [sulfamethoxazole-trimethoprim] Other (See Comments)     Confusion, Hypoglycemia    Meperidine Other (See Comments)    Midazolam Other (See Comments)    Atorvastatin Nausea Only    Codeine Other (See Comments) and Anxiety    Tapentadol Rash       Actual Body Weight:   56.6 kg    Renal Function:   Estimated Creatinine Clearance: 55.1 mL/min (based on SCr of 0.55 mg/dL).,     Dialysis Method (if applicable):  N/A    CBC (last 72 hours):  Recent Labs   Lab Result Units 04/26/23  0539 04/27/23  0419 04/28/23  0459   WBC x10(3)/mcL 11.8* 11.4 11.0   Hgb g/dL 11.0* 10.2* 9.9*   Hct % 35.0* 32.3* 31.7*   Platelet x10(3)/mcL 382 385 412*   Mono % % 3.5 8.4 8.8   Eos % % 0.1 2.8 3.0   Basophil % % 0.3 0.4 0.4       Metabolic Panel (last 72 hours):  Recent Labs   Lab  Result Units 04/26/23  0620 04/27/23  0419 04/28/23  0459   Sodium Level mmol/L 127* 126* 132   Potassium Level mmol/L 4.3 3.6 3.9   Chloride mmol/L 85* 84* 85*   Carbon Dioxide mmol/L 31 36* 40*   Glucose Level mg/dL 313* 127* 120   Blood Urea Nitrogen mg/dL 24.4* 23.4* 21.6*   Creatinine mg/dL 0.69 0.63 0.55   Magnesium Level mg/dL 1.80  --  1.90       Vancomycin Administrations:  vancomycin given in the last 96 hours                     vancomycin 750 mg in dextrose 5 % 250 mL IVPB (ready to mix) (mg) 750 mg New Bag 04/28/23 1126     750 mg New Bag 04/27/23 2310     750 mg New Bag  1149     750 mg New Bag  0057     750 mg New Bag 04/26/23 1125     750 mg New Bag 04/25/23 2355     750 mg New Bag  1032                    Microbiologic Results:  Microbiology Results (last 7 days)       Procedure Component Value Units Date/Time    Blood Culture [[id number]]  (Normal) Collected: 04/23/23 0820    Order Status: Completed Specimen: Blood from Arm, Right Updated: 04/28/23 1100     CULTURE, BLOOD ([facility]) No Growth at 5 days    Blood Culture [[id number]]  (Normal) Collected: 04/23/23 0826    Order Status: Completed Specimen: Blood from Arm, Right Updated: 04/28/23 1100     CULTURE, BLOOD ([facility]) No Growth at 5 days    Blood Culture [[id number]]  (Abnormal)  (Susceptibility) Collected: 04/21/23 0630    Order Status: Completed Specimen: Blood from Hand, Left Updated: 04/24/23 0635     CULTURE, BLOOD ([facility]) Methicillin resistant Staphylococcus aureus     GRAM STAIN Gram Positive Cocci, probable Staphylococcus      Seen in gram stain of broth only      1 of 1 Aerobic bottle positive    Blood Culture [[id number]]  (Abnormal)  (Susceptibility) Collected: 04/21/23 0958    Order Status: Completed Specimen: Blood from Arm, Right Updated: 04/24/23 0634     CULTURE, BLOOD ([facility]) Methicillin resistant Staphylococcus aureus     GRAM STAIN Gram Positive Cocci, probable Staphylococcus      Seen in gram stain of broth only      2 of 2 bottles  positive

## 2023-04-28 NOTE — PROGRESS NOTES
Pharmacokinetic Assessment Follow Up: IV Vancomycin    Vancomycin serum concentration assessment(s):    The trough level was drawn correctly and can be used to guide therapy at this time. The measurement is above the desired definitive target range of 15 to 20 mcg/mL.    Vancomycin Regimen Plan:    Discontinue the scheduled vancomycin regimen and re-dose when the random level is less than 20 mcg/mL, next level to be drawn at 0430 on 04/29/23.    Drug levels (last 3 results):  Recent Labs   Lab Result Units 04/26/23  2309 04/28/23  1059   Vancomycin Trough ug/ml 20.0 23.5*       Pharmacy will continue to follow and monitor vancomycin.    Please contact pharmacy at extension [phone] for questions regarding this assessment.    Thank you for the consult,   Yanely Julian       Patient brief summary:  Melodie Villarreal is a 89 y.o. female initiated on antimicrobial therapy with IV Vancomycin for treatment of sepsis    The patient's current regimen is on hold. Will re-dose when Vancomycin level is less than 20 mcg/ml     Drug Allergies:   Review of patient's allergies indicates:   Allergen Reactions    Ace inhibitors Other (See Comments)    Adhesive      Allergic to tape    Bactrim [sulfamethoxazole-trimethoprim] Other (See Comments)     Confusion, Hypoglycemia    Meperidine Other (See Comments)    Midazolam Other (See Comments)    Atorvastatin Nausea Only    Codeine Other (See Comments) and Anxiety    Tapentadol Rash       Actual Body Weight:   56.6 kg    Renal Function:   Estimated Creatinine Clearance: 55.1 mL/min (based on SCr of 0.55 mg/dL).,     Dialysis Method (if applicable):  N/A    CBC (last 72 hours):  Recent Labs   Lab Result Units 04/26/23  0539 04/27/23  0419 04/28/23  0459   WBC x10(3)/mcL 11.8* 11.4 11.0   Hgb g/dL 11.0* 10.2* 9.9*   Hct % 35.0* 32.3* 31.7*   Platelet x10(3)/mcL 382 385 412*   Mono % % 3.5 8.4 8.8   Eos % % 0.1 2.8 3.0   Basophil % % 0.3 0.4 0.4       Metabolic Panel (last 72 hours):  Recent Labs    Lab Result Units 04/26/23  0620 04/27/23  0419 04/28/23  0459   Sodium Level mmol/L 127* 126* 132   Potassium Level mmol/L 4.3 3.6 3.9   Chloride mmol/L 85* 84* 85*   Carbon Dioxide mmol/L 31 36* 40*   Glucose Level mg/dL 313* 127* 120   Blood Urea Nitrogen mg/dL 24.4* 23.4* 21.6*   Creatinine mg/dL 0.69 0.63 0.55   Magnesium Level mg/dL 1.80  --  1.90       Vancomycin Administrations:  vancomycin given in the last 96 hours                     vancomycin 750 mg in dextrose 5 % 250 mL IVPB (ready to mix) (mg) 750 mg New Bag 04/28/23 1126     750 mg New Bag 04/27/23 2310     750 mg New Bag  1149     750 mg New Bag  0057     750 mg New Bag 04/26/23 1125     750 mg New Bag 04/25/23 2355     750 mg New Bag  1032                    Microbiologic Results:  Microbiology Results (last 7 days)       Procedure Component Value Units Date/Time    Blood Culture [[id number]]  (Normal) Collected: 04/23/23 0820    Order Status: Completed Specimen: Blood from Arm, Right Updated: 04/28/23 1100     CULTURE, BLOOD ([facility]) No Growth at 5 days    Blood Culture [[id number]]  (Normal) Collected: 04/23/23 0826    Order Status: Completed Specimen: Blood from Arm, Right Updated: 04/28/23 1100     CULTURE, BLOOD ([facility]) No Growth at 5 days    Blood Culture [[id number]]  (Abnormal)  (Susceptibility) Collected: 04/21/23 0630    Order Status: Completed Specimen: Blood from Hand, Left Updated: 04/24/23 0635     CULTURE, BLOOD ([facility]) Methicillin resistant Staphylococcus aureus     GRAM STAIN Gram Positive Cocci, probable Staphylococcus      Seen in gram stain of broth only      1 of 1 Aerobic bottle positive    Blood Culture [[id number]]  (Abnormal)  (Susceptibility) Collected: 04/21/23 0958    Order Status: Completed Specimen: Blood from Arm, Right Updated: 04/24/23 0634     CULTURE, BLOOD ([facility]) Methicillin resistant Staphylococcus aureus     GRAM STAIN Gram Positive Cocci, probable Staphylococcus      Seen in gram stain of broth only      2 of 2 bottles  positive

## 2023-04-28 NOTE — PT/OT/SLP PROGRESS
Occupational Therapy      Patient Name:  Melodie Villarreal   MRN:  [id number]    Patient currently with wound care nurse, will follow up as able.    4/28/2023

## 2023-04-28 NOTE — PLAN OF CARE
04/28/23 1327   Medicare Message   Important Message from Medicare regarding Discharge Appeal Rights Given to patient/caregiver;Explained to patient/caregiver     IMM reviewed and given to pt's son Tao. He voiced understanding.

## 2023-04-28 NOTE — PLAN OF CARE
Sunday with [employer] talked with pt's dgt Lesvia and son Tao about cost which will be approx $310/ week.   I went and spoke to Tao who is present in room and he stated he and his wife are discussing if they can afford to do the IV meds at home. I told him another option is SNF. He said he needed a couple of days to figure out the finance. I told him I would f/u on Monday. We discuss SNF/Swing options and he stated [facility] would be close to them and a good choice if they can not do the home IV abx. I updated Dr Villeda and Sunday with [employer].

## 2023-04-28 NOTE — PROGRESS NOTES
Hospital Medicine  Progress Note    Patient Name: Melodie Villarreal  MRN: [id number]  Status: IP- Inpatient   Admission Date: 4/16/2023  Length of Stay: 12      CC: hospital follow-up for MRSA bacteremia       SUBJECTIVE   89-year-old female with a history that includes type 2 diabetes mellitus, prior DVT/IVC filter no longer on anticoagulation and non-healing left ankle wound, presented to the ED on 4/16 after she became lethargic. Patient normally alert and oriented, active and able to carry out own ADLs.  She apparently has chronic open wounds on her left lower extremity  followed closely by Wound Care.  On the day of presentation she became drowsy/lethargic, and EMS activated, she was found to be hypotensive with a blood pressure of 66/36 on arrival, requiring 3 L nasal cannula.  Laboratory work showed leukocytosis of 34,000, mild anemia, lactic acid of 3.5, elevated BNP of 2000 and a troponin of 0.115.  Urinalysis was unremarkable, CT chest abdomen and pelvis was negative for pulmonary embolus, pulmonary infiltrates or any intra-abdominal or pelvic pathology.  CT head was normal.  She was started on broad-spectrum antibiotics for undifferentiated sepsis admitted to the hospitalist services for further management.  Blood cultures returned positive for MRSA bacteremia. ID consulted and pt continued on IV Vancomycin. Noted left lower extremity non-healing ulcer. NM bone scan without clear evidence of OM. LLE arterial U/S showed monophasic flow throughout suggestive of inflow disease. CT T and L spine  with no evidence of discitis/osteomyelitis. 2D TTE negative for valvular vegetation. Cardiology consulted for STARR, performed on 4/22, negative valvular vegetations. Pt is noted to have  hydronephrosis despite Newton decompression. Urology consulted to evaluate persistent Rt hydronephrosis.  She was taken to the OR on 04/25 for cystoscopy and right ureteral stent placement.  Blood cultures persistently positive, 4/16, 4/18,  4/19, 4/21.  Eventually cultures from 4/23 remained negative.  Vascular surgery consulted for PAD, CTA run-off noted severe flow-limiting disease in the left common femoral.  Vascular surgery considering left femoral endarterectomy, though family defering intervention for now.    Today: Patient seen and examined at bedside, and chart reviewed.        MEDICATIONS   Scheduled   carvediloL  6.25 mg Oral BID    collagenase   Topical (Top) Daily    docusate sodium  200 mg Oral BID    enoxaparin  40 mg Subcutaneous Daily    furosemide  40 mg Oral Daily    insulin detemir U-100  7 Units Subcutaneous QHS    Lactobacillus acidophilus  1 capsule Oral TID WM    magnesium oxide  400 mg Oral TID    polyethylene glycol  17 g Oral Daily    sodium chloride 0.9%  10 mL Intravenous Q6H    trazodone  50 mg Oral QHS     Continuous Infusions  None      PHYSICAL EXAM   VITALS: T 98.4 °F (36.9 °C)   /60   P 89   RR 20   O2 (!) 81 %    GENERAL: Awake and in NAD  LUNGS: CTA anteriorly  CVS: Normal rate  GI/: Soft, NT, bowel sounds positive.  EXTREMITIES: Radial pulse 2+  NEURO: AAOx3  PSYCH: Cooperative      LABS   CBC  Recent Labs     04/27/23  0419 04/28/23  0459   WBC 11.4 11.0   RBC 3.85* 3.75*   HGB 10.2* 9.9*   HCT 32.3* 31.7*   MCV 83.9 84.5   MCH 26.5* 26.4*   MCHC 31.6* 31.2*   RDW 15.0 14.9    412*       CHEM  Recent Labs     04/26/23  0620 04/27/23  0419 04/28/23  0459   * 126* 132   K 4.3 3.6 3.9   CHLORIDE 85* 84* 85*   CO2 31 36* 40*   BUN 24.4* 23.4* 21.6*   CREATININE 0.69 0.63 0.55   GLUCOSE 313* 127* 120   CALCIUM 8.5 8.9 8.5   MG 1.80  --  1.90         MICROBIOLOGY     Microbiology Results (last 7 days)       Procedure Component Value Units Date/Time    Blood Culture [[id number]]  (Normal) Collected: 04/23/23 0820    Order Status: Completed Specimen: Blood from Arm, Right Updated: 04/28/23 1100     CULTURE, BLOOD ([facility]) No Growth at 5 days    Blood Culture [[id number]]  (Normal) Collected: 04/23/23 0806     Order Status: Completed Specimen: Blood from Arm, Right Updated: 04/28/23 1100     CULTURE, BLOOD ([facility]) No Growth at 5 days    Blood Culture [[id number]]  (Abnormal)  (Susceptibility) Collected: 04/21/23 0630    Order Status: Completed Specimen: Blood from Hand, Left Updated: 04/24/23 0635     CULTURE, BLOOD ([facility]) Methicillin resistant Staphylococcus aureus     GRAM STAIN Gram Positive Cocci, probable Staphylococcus      Seen in gram stain of broth only      1 of 1 Aerobic bottle positive    Blood Culture [[id number]]  (Abnormal)  (Susceptibility) Collected: 04/21/23 0958    Order Status: Completed Specimen: Blood from Arm, Right Updated: 04/24/23 0634     CULTURE, BLOOD ([facility]) Methicillin resistant Staphylococcus aureus     GRAM STAIN Gram Positive Cocci, probable Staphylococcus      Seen in gram stain of broth only      2 of 2 bottles positive            ASSESSMENT   Persistent MRSA Sepsis/Bacteremia, now with clearance as of 4/23  Left ankle chronic non healing wound   Acute on chronic diastolic heart failure   Mild to moderate Aortic stenosis  Pulmonary HTN  NIDDM II  Normochromic anemia   Hyponatremia due to hypervolemia   Hypomagnesemia   Chronic low back pain   Constipation associated with colonic fecal impaction    PLAN   Continue with IV Vanc as planned x 6 weeks end date 6/4  CM working on Home health and IV antibiotics  Monitoring H&H, which has been down trending  Plan for discharge once H&H stable.  Otherwise continue current management at this time        Prophylaxis: OPAL Villeda MD  [facility]

## 2023-04-28 NOTE — PLAN OF CARE
Problem: Infection  Goal: Absence of Infection Signs and Symptoms  Outcome: Ongoing, Not Progressing     Problem: Diabetes Comorbidity  Goal: Blood Glucose Level Within Targeted Range  Outcome: Ongoing, Not Progressing     Problem: Adult Inpatient Plan of Care  Goal: Plan of Care Review  Outcome: Ongoing, Not Progressing  Goal: Patient-Specific Goal (Individualized)  Outcome: Ongoing, Not Progressing  Goal: Absence of Hospital-Acquired Illness or Injury  Outcome: Ongoing, Not Progressing  Goal: Optimal Comfort and Wellbeing  Outcome: Ongoing, Not Progressing  Goal: Readiness for Transition of Care  Outcome: Ongoing, Not Progressing     Problem: Adjustment to Illness (Sepsis/Septic Shock)  Goal: Optimal Coping  Outcome: Ongoing, Not Progressing     Problem: Bleeding (Sepsis/Septic Shock)  Goal: Absence of Bleeding  Outcome: Ongoing, Not Progressing     Problem: Glycemic Control Impaired (Sepsis/Septic Shock)  Goal: Blood Glucose Level Within Desired Range  Outcome: Ongoing, Not Progressing     Problem: Infection Progression (Sepsis/Septic Shock)  Goal: Absence of Infection Signs and Symptoms  Outcome: Ongoing, Not Progressing

## 2023-04-28 NOTE — PROGRESS NOTES
Inpatient Nutrition Assessment    Admit Date: 4/16/2023   Total duration of encounter: 12 days     Nutrition Recommendation/Prescription     Continue current diet as tolerated  Implement food preferences.  Continue Boost Glucose Control TID (provides 250 kcal and 14 g protein per container)  Continue MANDY BID (provides 90 kcal and 2.5 g protein per serving)  Encouraged adequate PO intake  Monitor PO intake, weight, labs    Communication of Recommendations: reviewed with patient and reviewed with family    Nutrition Assessment     Malnutrition Assessment/Nutrition-Focused Physical Exam    Malnutrition in the context of acute illness or injury  Degree of Malnutrition: non-severe (moderate) malnutrition  Energy Intake: does not meet criteria  Interpretation of Weight Loss: unable to obtain  Body Fat:mild depletion  Area of Body Fat Loss: upper arm region - triceps / biceps  Muscle Mass Loss: mild depletion  Area of Muscle Mass Loss: clavicle bone region - pectoralis major, deltoid, trapezius muscles, clavicle and acromion bone region - deltoid muscle, and scapular bone region - trapezius, supraspinus, infraspinus muscles  Fluid Accumulation: unable to obtain  Edema: unable to obtain   Reduced  Strength: unable to obtain  A minimum of two characteristics is recommended for diagnosis of either severe or non-severe malnutrition.    Chart Review    Reason Seen: continuous nutrition monitoring and follow-up    Malnutrition Screening Tool Results   Have you recently lost weight without trying?: No  Have you been eating poorly because of a decreased appetite?: No   MST Score: 0     Diagnosis:  Chronic left ankle/foot ulcerations, with no overt signs of infection  NSTEMI, likely type 2 demand ischemia   Elevated BNP with no reported history of CHF  Essential hypertension   Type 2 diabetes mellitus   Remote DVT/IVC filter placement, off anticoagulation    Relevant Medical History:    Adenocarcinoma of uterus      Bladder  cancer      Deep vein thrombosis      Essential (primary) hypertension      Heart murmur      High cholesterol      Hypertriglyceridemia      Insomnia      Osteopenia      Other pulmonary embolism without acute cor pulmonale      Personal history of colonic polyps      Rheumatoid arthritis, unspecified      Type 2 diabetes mellitus without complications        Nutrition-Related Medications: Scheduled Meds:   carvediloL  6.25 mg Oral BID    collagenase   Topical (Top) Daily    docusate sodium  200 mg Oral BID    enoxaparin  40 mg Subcutaneous Daily    furosemide  40 mg Oral Daily    insulin detemir U-100  7 Units Subcutaneous QHS    Lactobacillus acidophilus  1 capsule Oral TID WM    magnesium oxide  400 mg Oral TID    polyethylene glycol  17 g Oral Daily    sodium chloride 0.9%  10 mL Intravenous Q6H    trazodone  50 mg Oral QHS     Continuous Infusions:      PRN Meds:.acetaminophen, dextrose 10%, dextrose 10%, docusate sodium, glucagon (human recombinant), glucose, glucose, insulin aspart U-100, melatonin, senna, sodium chloride 0.9%, Flushing PICC Protocol **AND** sodium chloride 0.9% **AND** sodium chloride 0.9%, traMADoL, Pharmacy to dose Vancomycin consult **AND** vancomycin - pharmacy to dose    Calorie Containing IV Medications: no significant kcals from medications at this time    Nutrition-Related Labs:  4/18/2023: WBC 15.8, H/H 10.7/33.4, Na 130, Cl 95, Gluc 131  4/23/2023: WBC 11.8, H/H 9.7/29.5, Na 128, Cl 91, Crea 0.53, Ca 8.3, Mg 1.50, Gluc 141  4/28/2023: H/H 9.9/31.7, Cl 85, BUN 21.6, Gluc 120    Diet/PN Order: Diet diabetic  Oral Supplement Order: Boost Glucose Control and Tanner  Tube Feeding Order: none  Appetite/Oral Intake: fair/50-75% of meals  Factors Affecting Nutritional Intake: decreased appetite  Food/Jewish/Cultural Preferences: none reported  Food Allergies: none reported    Skin Integrity: wound  Wound(s):      Altered Skin Integrity 04/17/23 Left posterior Ankle Full thickness tissue  "loss. Subcutaneous fat may be visible but bone, tendon or muscle are not exposed-Tissue loss description: Full thickness       Altered Skin Integrity 04/17/23 Left lateral Ankle Full thickness tissue loss. Subcutaneous fat may be visible but bone, tendon or muscle are not exposed-Tissue loss description: Full thickness 2 full thickness wounds per report (left posterior ankle and left lateral ankle)    Comments    4/19/2023: Pt's daughter reports good appetite/PO intake prior to admit with fair appetite/PO intake currently. Pt agreeable to vanilla Boost Glucose Control. Pt denies N/V/D and chewing/swallowing difficulties. The daughter reports constipation, last BM documented as 4/15/2023. The daughter reports possible wt loss within the past 3-4 months, unable to specify wt loss. Will add MANDY BID to promote wound healing.  Encouraged adequate PO intake. Will monitor.    4/24/2023: Pt busy at time of rounds. Pt has ~50% PO intake documented. No reported N/V. CT of abdomen/pelvis showed rectal  fecal impaction. Last BM documented as 4/23. No tolerance issues reported. Boost Glucose Control TID and MANDY BID ordered. Will monitor.    4/28/2023: The family member reports fair/PO intake. Pt receives Boost Glucose Control and MANDY BID. Obtained and implemented food preferences. Pt's family member denies N/V/D but reports constipation. Last BM documented as 4/23/2023. Pt receiving docusate sodium and miralax. Encouraged adequate PO intake. Will monitor.    Anthropometrics    Height: 5' 3" (160 cm) Height Method: Stated  Last Weight: 56.6 kg (124 lb 12.5 oz) (04/24/23 0508) Weight Method: Bed Scale  BMI (Calculated): 22.1  BMI Classification: underweight (BMI less than 22 if >65 years of age)     Ideal Body Weight (IBW), Female: 115 lb     % Ideal Body Weight, Female (lb): 95.65 %                             Usual Weight Provided By: unable to obtain usual weight    Wt Readings from Last 5 Encounters:   04/24/23 56.6 kg " (124 lb 12.5 oz)   04/10/23 54.4 kg (120 lb)   23 85.3 kg (188 lb)   23 55.8 kg (123 lb)   12/15/22 59 kg (130 lb)     Weight Change(s) Since Admission:  Admit Weight: 49.9 kg (110 lb) (23 1751)  2023: 49.9 kg  2023: 56.6 kg  2023: no new wts logged.    Estimated Needs    Weight Used For Calorie Calculations: 49.9 kg (110 lb 0.2 oz)  Energy Calorie Requirements (kcal): 3854-8407 (30-35 kcal/kg)  Energy Need Method: Kcal/kg  Weight Used For Protein Calculations: 49.9 kg (110 lb 0.2 oz)  Protein Requirements:  (1.5-2.0 g/kg)  Fluid Requirements (mL): 1497 (1 mL/kcal)  Temp (24hrs), Av °F (36.7 °C), Min:97.3 °F (36.3 °C), Max:98.4 °F (36.9 °C)         Enteral Nutrition    Patient not receiving enteral nutrition at this time.    Parenteral Nutrition    Patient not receiving parenteral nutrition support at this time.    Evaluation of Received Nutrient Intake    Calories: meeting estimated needs  Protein: not meeting estimated needs    Patient Education    Not applicable.    Nutrition Diagnosis     PES: Malnutrition related to acute illness as evidenced by mild fat/muscle wasting. (continues)    Interventions/Goals     Intervention(s): general/healthful diet and commercial beverage  Goal: Consume % of meals/snacks by follow-up. (goal progressing)    Monitoring & Evaluation     Dietitian will monitor food and beverage intake, weight, electrolyte/renal panel, glucose/endocrine profile, and gastrointestinal profile.  Nutrition Risk/Follow-Up: moderate (follow-up in 3-5 days)   Please consult if re-assessment needed sooner.

## 2023-04-28 NOTE — PROCEDURES
"Melodie Villarreal is a 89 y.o. female patient.    Temp: 97.3 °F (36.3 °C) (04/27/23 2001)  Pulse: 88 (04/27/23 2001)  Resp: 18 (04/27/23 2001)  BP: 121/62 (04/27/23 2001)  SpO2: (!) 93 % (04/27/23 2001)  Weight: 56.6 kg (124 lb 12.5 oz) (04/24/23 5688)  Height: 5' 3" (160 cm) (04/17/23 8829)    PICC  Date/Time: 4/27/2023 8:45 PM  Consent Done: Yes  Time out: Immediately prior to procedure a time out was called to verify the correct patient, procedure, equipment, support staff and site/side marked as required  Indications: vascular access and med administration  Anesthesia: local infiltration  Local anesthetic: lidocaine 1% without epinephrine  Anesthetic Total (mL): 4  Preparation: skin prepped with ChloraPrep  Skin prep agent dried: skin prep agent completely dried prior to procedure  Sterile barriers: all five maximum sterile barriers used - cap, mask, sterile gown, sterile gloves, and large sterile sheet  Hand hygiene: hand hygiene performed prior to central venous catheter insertion  Location details: left brachial  Catheter type: double lumen  Catheter size: 5 Fr  Catheter Length: 38cm    Ultrasound guidance: yes  Vessel Caliber: medium and patent, compressibility normal  Needle advanced into vessel with real time Ultrasound guidance.  Guidewire confirmed in vessel.  Sterile sheath used.  Number of attempts: 1  Post-procedure: blood return through all ports, chlorhexidine patch and sterile dressing applied    Assessment: placement verified by x-ray        Name Romi Gaitan  4/27/2023    "

## 2023-04-28 NOTE — PROGRESS NOTES
[facility]  Progress Note     Discussed with the patient her family this morning.  They have elected to forego surgery at this time.  They understand the risk of nonhealing of the wound.  We have discussed that they would like to take her home and let her get some strength before considering any procedures.  I have discussed with them that we will see her in the office in about 6 weeks to evaluate her and see how she is doing.  If they have any issues prior to this they can call for an appointment and I will get her seen sooner.

## 2023-04-28 NOTE — PROGRESS NOTES
[facility] 6th Floor Medical Telemetry  Wound Care    Patient Name:  Melodie Villarreal   MRN:  [id number]  Date: 4/28/2023  Diagnosis: Sepsis    History:     Past Medical History:   Diagnosis Date    Adenocarcinoma of uterus     Bladder cancer     Deep vein thrombosis     Essential (primary) hypertension     Heart murmur     High cholesterol     Hydronephrosis 4/25/2023    Hypertriglyceridemia     Insomnia     Osteopenia     Other pulmonary embolism without acute cor pulmonale     Personal history of colonic polyps     Rheumatoid arthritis, unspecified     Type 2 diabetes mellitus without complications        Social History     Socioeconomic History    Marital status:     Number of children: 2   Occupational History    Occupation: retired   Tobacco Use    Smoking status: Never    Smokeless tobacco: Never   Substance and Sexual Activity    Alcohol use: Never    Drug use: Never    Sexual activity: Not Currently       Precautions:     Allergies as of 04/16/2023 - Reviewed 04/16/2023   Allergen Reaction Noted    Ace inhibitors Other (See Comments) 05/03/2022    Adhesive  10/02/2017    Bactrim [sulfamethoxazole-trimethoprim] Other (See Comments) 01/17/2023    Meperidine Other (See Comments) 05/03/2022    Midazolam Other (See Comments) 05/03/2022    Atorvastatin Nausea Only 05/03/2022    Codeine Other (See Comments) and Anxiety 10/02/2017    Tapentadol Rash 05/03/2022       WOC Assessment Details/Treatment   WOCN follow up visit related to consult 04/17/23 for left foot and ankle;  daughter Bibi present in room.  Dressing change L lateral ankle, Achilles, heel.  Recommendations made to primary team:  Head to toe skin assessment q 12 hours;  Turn/reposition q 2 hours or schedule to prevent erythema;  Specialty bed, and wedge for 30 degree pelvic tilt, podous boots on for offloading;  Keep clean and dry with < 3 layers on bed;   Avoid foam dressings on sacrum;  Avoid adult briefs;  Encourage activity as  ordered;  Ensure adequate nutrition/hydration for healing;     04/28/23 0950        Altered Skin Integrity 04/17/23 Left posterior Ankle Full thickness tissue loss. Subcutaneous fat may be visible but bone, tendon or muscle are not exposed   Date First Assessed: 04/17/23   Altered Skin Integrity Present on Admission - Did Patient arrive to the hospital with altered skin?: yes  Side: Left  Orientation: posterior  Location: Ankle  Description of Altered Skin Integrity: Full thickness tissue...   Wound Image    Description of Altered Skin Integrity Full thickness tissue loss. Subcutaneous fat may be visible but bone, tendon or muscle are not exposed   Dressing Appearance Dried drainage   Tissue loss description Full thickness   Black (%), Wound Tissue Color 10 %   Red (%), Wound Tissue Color 80 %   Yellow (%), Wound Tissue Color 10 %   Wound Edges Rolled/closed   Wound Length (cm) 7 cm   Wound Width (cm) 1.5 cm   Wound Surface Area (cm^2) 10.5 cm^2   Care Cleansed with:  (Vashe)   Dressing Gauze, wet to dry;Rolled gauze  (Santyl)   Dressing Change Due 04/29/23        Altered Skin Integrity 04/17/23 Left lateral Ankle Full thickness tissue loss. Subcutaneous fat may be visible but bone, tendon or muscle are not exposed   Date First Assessed: 04/17/23   Altered Skin Integrity Present on Admission - Did Patient arrive to the hospital with altered skin?: yes  Side: Left  Orientation: lateral  Location: Ankle  Description of Altered Skin Integrity: Full thickness tissue l...   Wound Image    Description of Altered Skin Integrity Full thickness tissue loss. Subcutaneous fat may be visible but bone, tendon or muscle are not exposed   Dressing Appearance Dried drainage   Tissue loss description Full thickness   Red (%), Wound Tissue Color 10 %   Yellow (%), Wound Tissue Color 90 %   Wound Edges Rolled/closed   Wound Length (cm) 1.5 cm   Wound Width (cm) 1.5 cm   Wound Surface Area (cm^2) 2.25 cm^2   Care Cleansed  with:;Applied:  (Vashe)   Dressing Gauze, wet to dry;Rolled gauze  (Santyl)        Altered Skin Integrity 04/24/23 Left Heel Purple or maroon localized area of discolored intact skin or non-intact skin or a blood-filled blister.   Date First Assessed: 04/24/23   Altered Skin Integrity Present on Admission - Did Patient arrive to the hospital with altered skin?: yes  Side: Left  Location: Heel  Description of Altered Skin Integrity: Purple or maroon localized area of discolored ...   Wound Image    Description of Altered Skin Integrity Purple or maroon localized area of discolored intact skin or non-intact skin or a blood-filled blister.   Drainage Amount None   Care Cleansed with:  (Vashe)   Skin Interventions   Pressure Reduction Devices specialty bed utilized   Pressure Reduction Techniques heels elevated off bed  (podous boots)

## 2023-04-28 NOTE — PLAN OF CARE
Lisa with [employer] called me this am and stated pt's oop cost is high and she spoke to a family member who told her they will discuss with family. Pt's nurse told me family in room wanted to speak to CM.  I went and spoke to pt's sone Tao Villarreal [phone] and he said the price [employer] gave them was too expensive and inquired if there were other companies to check prices. Mr Burger stated he did want his mom to come home and not to a facility.  Verbal FOC obtained and [facility] [employer] [facility]. I spoke to Sunday with [employer] and she will run insurance to check cost.

## 2023-04-28 NOTE — PT/OT/SLP PROGRESS
Physical Therapy      Patient Name:  Mleodie Villarreal   MRN:  [id number]    Pt asleep upon arrival with son present. Son stated that pt didn't sleep the night before. Checked in with pt again at 1510, still asleep. Will f/u tomorrow.

## 2023-04-29 NOTE — PROGRESS NOTES
Hospital Medicine  Progress Note    Patient Name: Melodie Villarreal  MRN: [id number]  Status: IP- Inpatient   Admission Date: 4/16/2023  Length of Stay: 13      CC: hospital follow-up for MRSA bacteremia       SUBJECTIVE   89-year-old female with a history that includes type 2 diabetes mellitus, prior DVT/IVC filter no longer on anticoagulation and non-healing left ankle wound, presented to the ED on 4/16 after she became lethargic. Patient normally alert and oriented, active and able to carry out own ADLs.  She apparently has chronic open wounds on her left lower extremity  followed closely by Wound Care.  On the day of presentation she became drowsy/lethargic, and EMS activated, she was found to be hypotensive with a blood pressure of 66/36 on arrival, requiring 3 L nasal cannula.  Laboratory work showed leukocytosis of 34,000, mild anemia, lactic acid of 3.5, elevated BNP of 2000 and a troponin of 0.115.  Urinalysis was unremarkable, CT chest abdomen and pelvis was negative for pulmonary embolus, pulmonary infiltrates or any intra-abdominal or pelvic pathology.  CT head was normal.  She was started on broad-spectrum antibiotics for undifferentiated sepsis admitted to the hospitalist services for further management.  Blood cultures returned positive for MRSA bacteremia. ID consulted and pt continued on IV Vancomycin. Noted left lower extremity non-healing ulcer. NM bone scan without clear evidence of OM. LLE arterial U/S showed monophasic flow throughout suggestive of inflow disease. CT T and L spine  with no evidence of discitis/osteomyelitis. 2D TTE negative for valvular vegetation. Cardiology consulted for STARR, performed on 4/22, negative valvular vegetations. Pt is noted to have  hydronephrosis despite Newton decompression. Urology consulted to evaluate persistent Rt hydronephrosis.  She was taken to the OR on 04/25 for cystoscopy and right ureteral stent placement.  Blood cultures persistently positive, 4/16, 4/18,  4/19, 4/21.  Eventually cultures from 4/23 remained negative.  Vascular surgery consulted for PAD, CTA run-off noted severe flow-limiting disease in the left common femoral.  Vascular surgery considering left femoral endarterectomy, though family defering intervention for now.  CM consulted for home IV antibiotics.    Today: Patient seen and examined at bedside, and chart reviewed.   No new complaints today.  Family members she eating much more.  H&H is trending up now.      MEDICATIONS   Scheduled   carvediloL  6.25 mg Oral BID    collagenase   Topical (Top) Daily    docusate  200 mg Oral BID    enoxaparin  40 mg Subcutaneous Daily    furosemide  40 mg Oral Daily    insulin detemir U-100  7 Units Subcutaneous QHS    Lactobacillus acidophilus  1 capsule Oral TID WM    magnesium oxide  400 mg Oral TID    polyethylene glycol  17 g Oral Daily    sodium chloride 0.9%  10 mL Intravenous Q6H    trazodone  50 mg Oral QHS     Continuous Infusions  None      PHYSICAL EXAM   VITALS: T 97.9 °F (36.6 °C)   /71   P 86   RR 17   O2 95 %    GENERAL: Awake and in NAD  LUNGS: CTA anteriorly  CVS: Normal rate  GI/: Soft, NT, bowel sounds positive.  EXTREMITIES: Radial pulse 2+  NEURO: AAOx3  PSYCH: Cooperative      LABS   CBC  Recent Labs     04/28/23  0459 04/29/23  0420   WBC 11.0 11.8*   RBC 3.75* 3.91*   HGB 9.9* 10.2*   HCT 31.7* 33.4*   MCV 84.5 85.4   MCH 26.4* 26.1*   MCHC 31.2* 30.5*   RDW 14.9 15.1   * 370       CHEM  Recent Labs     04/27/23  0419 04/28/23  0459   * 132   K 3.6 3.9   CHLORIDE 84* 85*   CO2 36* 40*   BUN 23.4* 21.6*   CREATININE 0.63 0.55   GLUCOSE 127* 120   CALCIUM 8.9 8.5   MG  --  1.90         MICROBIOLOGY     Microbiology Results (last 7 days)       Procedure Component Value Units Date/Time    Blood Culture [[id number]]  (Normal) Collected: 04/23/23 0820    Order Status: Completed Specimen: Blood from Arm, Right Updated: 04/28/23 1100     CULTURE, BLOOD ([facility]) No Growth at 5 days     Blood Culture [[id number]]  (Normal) Collected: 04/23/23 0826    Order Status: Completed Specimen: Blood from Arm, Right Updated: 04/28/23 1100     CULTURE, BLOOD ([facility]) No Growth at 5 days    Blood Culture [[id number]]  (Abnormal)  (Susceptibility) Collected: 04/21/23 0630    Order Status: Completed Specimen: Blood from Hand, Left Updated: 04/24/23 0635     CULTURE, BLOOD ([facility]) Methicillin resistant Staphylococcus aureus     GRAM STAIN Gram Positive Cocci, probable Staphylococcus      Seen in gram stain of broth only      1 of 1 Aerobic bottle positive    Blood Culture [[id number]]  (Abnormal)  (Susceptibility) Collected: 04/21/23 0958    Order Status: Completed Specimen: Blood from Arm, Right Updated: 04/24/23 0634     CULTURE, BLOOD ([facility]) Methicillin resistant Staphylococcus aureus     GRAM STAIN Gram Positive Cocci, probable Staphylococcus      Seen in gram stain of broth only      2 of 2 bottles positive            ASSESSMENT   Persistent MRSA Sepsis/Bacteremia, now with clearance as of 4/23  Left ankle chronic non healing wound   Acute on chronic diastolic heart failure   Mild to moderate Aortic stenosis  Pulmonary HTN  NIDDM II  Normochromic anemia   Hyponatremia due to hypervolemia   Hypomagnesemia   Chronic low back pain   Constipation associated with colonic fecal impaction    PLAN   Continue with IV Vanc as planned x 6 weeks end date 6/4  CM working on Home health and feasibility IV antibiotics  Medically stable once arrangements for ongoing IV antibiotics arranged  Otherwise continue current management in the interim        Prophylaxis: OPAL Villeda MD  [facility]

## 2023-04-29 NOTE — PROGRESS NOTES
Pharmacokinetic Assessment Follow Up: IV Vancomycin    Vancomycin serum concentration assessment(s):    The random level was drawn correctly and can be used to guide therapy at this time. The measurement is within the desired definitive target range of 15 to 20 mcg/mL.    Vancomycin Regimen Plan:    Vancomycin 750 mg x 1 and check random on 4/30/23 at 1400.    Drug levels (last 3 results):  Recent Labs   Lab Result Units 04/26/23  2309 04/28/23  1059 04/29/23  0420   Vanc Lvl Random ug/ml  --   --  20.5*   Vancomycin Trough ug/ml 20.0 23.5*  --             Patient brief summary:  Melodie Villarreal is a 89 y.o. female initiated on antimicrobial therapy with IV Vancomycin for treatment of bacteremia      Drug Allergies:   Review of patient's allergies indicates:   Allergen Reactions    Ace inhibitors Other (See Comments)    Adhesive      Allergic to tape    Bactrim [sulfamethoxazole-trimethoprim] Other (See Comments)     Confusion, Hypoglycemia    Meperidine Other (See Comments)    Midazolam Other (See Comments)    Atorvastatin Nausea Only    Codeine Other (See Comments) and Anxiety    Tapentadol Rash       Actual Body Weight:   56.5 kg    Renal Function:   Estimated Creatinine Clearance: 45.9 mL/min (based on SCr of 0.66 mg/dL).,     Dialysis Method (if applicable):  N/A    CBC (last 72 hours):  Recent Labs   Lab Result Units 04/27/23 0419 04/28/23 0459 04/29/23  0420   WBC x10(3)/mcL 11.4 11.0 11.8*   Hgb g/dL 10.2* 9.9* 10.2*   Hct % 32.3* 31.7* 33.4*   Platelet x10(3)/mcL 385 412* 370   Mono % % 8.4 8.8  --    Eos % % 2.8 3.0  --    Basophil % % 0.4 0.4  --        Metabolic Panel (last 72 hours):  Recent Labs   Lab Result Units 04/27/23 0419 04/28/23  0459 04/29/23  0420   Sodium Level mmol/L 126* 132  --    Potassium Level mmol/L 3.6 3.9  --    Chloride mmol/L 84* 85*  --    Carbon Dioxide mmol/L 36* 40*  --    Glucose Level mg/dL 127* 120  --    Blood Urea Nitrogen mg/dL 23.4* 21.6*  --    Creatinine mg/dL 0.63  0.55 0.66   Magnesium Level mg/dL  --  1.90  --        Vancomycin Administrations:  vancomycin given in the last 96 hours                     vancomycin 750 mg in dextrose 5 % 250 mL IVPB (ready to mix) (mg) 750 mg New Bag 04/28/23 1126     750 mg New Bag 04/27/23 2310     750 mg New Bag  1149     750 mg New Bag  0057     750 mg New Bag 04/26/23 1125     750 mg New Bag 04/25/23 2355                    Microbiologic Results:  Microbiology Results (last 7 days)       Procedure Component Value Units Date/Time    Blood Culture [[id number]]  (Normal) Collected: 04/23/23 0820    Order Status: Completed Specimen: Blood from Arm, Right Updated: 04/28/23 1100     CULTURE, BLOOD ([facility]) No Growth at 5 days    Blood Culture [[id number]]  (Normal) Collected: 04/23/23 0826    Order Status: Completed Specimen: Blood from Arm, Right Updated: 04/28/23 1100     CULTURE, BLOOD ([facility]) No Growth at 5 days    Blood Culture [[id number]]  (Abnormal)  (Susceptibility) Collected: 04/21/23 0630    Order Status: Completed Specimen: Blood from Hand, Left Updated: 04/24/23 0635     CULTURE, BLOOD ([facility]) Methicillin resistant Staphylococcus aureus     GRAM STAIN Gram Positive Cocci, probable Staphylococcus      Seen in gram stain of broth only      1 of 1 Aerobic bottle positive    Blood Culture [[id number]]  (Abnormal)  (Susceptibility) Collected: 04/21/23 0958    Order Status: Completed Specimen: Blood from Arm, Right Updated: 04/24/23 0634     CULTURE, BLOOD ([facility]) Methicillin resistant Staphylococcus aureus     GRAM STAIN Gram Positive Cocci, probable Staphylococcus      Seen in gram stain of broth only      2 of 2 bottles positive

## 2023-04-29 NOTE — PLAN OF CARE
Problem: Infection  Goal: Absence of Infection Signs and Symptoms  Outcome: Ongoing, Not Progressing     Problem: Adult Inpatient Plan of Care  Goal: Plan of Care Review  Outcome: Ongoing, Not Progressing  Goal: Patient-Specific Goal (Individualized)  Outcome: Ongoing, Not Progressing  Goal: Absence of Hospital-Acquired Illness or Injury  Outcome: Ongoing, Not Progressing  Goal: Optimal Comfort and Wellbeing  Outcome: Ongoing, Not Progressing  Goal: Readiness for Transition of Care  Outcome: Ongoing, Not Progressing     Problem: Diabetes Comorbidity  Goal: Blood Glucose Level Within Targeted Range  Outcome: Ongoing, Not Progressing

## 2023-04-30 NOTE — PT/OT/SLP PROGRESS
Physical Therapy Treatment    Patient Name:  Melodie Villarreal   MRN:  [id number]    Recommendations:     Discharge Recommendations: nursing facility, skilled (or d/c home with 24hr assistance)  Discharge Equipment Recommendations: lift device  Barriers to discharge: None    Assessment:     Melodie Villarreal is a 89 y.o. female admitted with a medical diagnosis of Sepsis.  She presents with the following impairments/functional limitations: weakness, impaired endurance, impaired self care skills, impaired functional mobility, gait instability, impaired balance, pain, impaired skin.    Rehab Prognosis: Good; patient would benefit from acute skilled PT services to address these deficits and reach maximum level of function.    Recent Surgery: Procedure(s) (LRB):  CYSTOSCOPY, WITH URETERAL STENT INSERTION (Right) 5 Days Post-Op    Plan:     During this hospitalization, patient to be seen 6 x/week to address the identified rehab impairments via gait training, therapeutic activities, therapeutic exercises, wheelchair management/training and progress toward the following goals:    Plan of Care Expires:  05/26/23    Subjective     Chief Complaint: None  Pain/Comfort:  0/10      Objective:     Communicated with NSG prior to session.  Patient found right sidelying upon PT entry to room.     General Precautions: Standard, contact, fall  Orthopedic Precautions:    Braces: N/A  Respiratory Status: Room air  Skin Integrity: Visible skin intact      Functional Mobility:  Bed Mobility:     Rolling Left:  moderate assistance  Rolling Right: moderate assistance  Scooting: moderate assistance  Bridging: moderate assistance  Supine to Sit: moderate assistance  Transfers:     Sit to Stand:  moderate assistance with rolling walker  Bed to Chair: moderate assistance with  hand-held assist  using  Squat Pivot  Balance: max assist sitting EOB until posture corrected to break posterior lean, then pt was sba for EOB sitting balance       PT began to  mobilize patient and she was noted soiled with large BM. Nsg notified and PT assisted in cleaning patient and changing. Patient left up in chair with all lines intact and call button in reach.    GOALS:   Multidisciplinary Problems       Physical Therapy Goals          Problem: Physical Therapy    Goal Priority Disciplines Outcome Goal Variances Interventions   Physical Therapy Goal     PT, PT/OT Ongoing, Progressing     Description: Goals to be met by: 2023     Patient will increase functional independence with mobility by performin. Supine to sit with Stand-by Assistance  2. Sit to stand transfer with Contact Guard Assistance  3. Gait  x 150 feet with Contact Guard Assistance using Rolling Walker.   4. Pt will ascend/descend 3 steps with HR and Min A.                          Time Tracking:     Billable Minutes: Therapeutic Activity 38    Treatment Type: Treatment  PT/PTA: PTA     Number of PTA visits since last PT visit: 2     2023

## 2023-04-30 NOTE — PROGRESS NOTES
Pharmacokinetic Assessment Follow Up: IV Vancomycin    Vancomycin serum concentration assessment(s):    The random level was drawn correctly and can be used to guide therapy at this time. The measurement is within the desired definitive target range of 15 to 20 mcg/mL.    Vancomycin Regimen Plan:    Vancomycin 750 mg x 1 and check random level at 1500 pm 5/1/23    Drug levels (last 3 results):  Recent Labs   Lab Result Units 04/28/23  1059 04/29/23  0420 04/30/23  1412   Vanc Lvl Random ug/ml  --  20.5* 15.0   Vancomycin Trough ug/ml 23.5*  --   --           Patient brief summary:  Melodie Villarreal is a 89 y.o. female initiated on antimicrobial therapy with IV Vancomycin for treatment of bacteremia    Drug Allergies:   Review of patient's allergies indicates:   Allergen Reactions    Ace inhibitors Other (See Comments)    Adhesive      Allergic to tape    Bactrim [sulfamethoxazole-trimethoprim] Other (See Comments)     Confusion, Hypoglycemia    Meperidine Other (See Comments)    Midazolam Other (See Comments)    Atorvastatin Nausea Only    Codeine Other (See Comments) and Anxiety    Tapentadol Rash       Actual Body Weight:   56.6 kg    Renal Function:   Estimated Creatinine Clearance: 45.9 mL/min (based on SCr of 0.66 mg/dL).,     Dialysis Method (if applicable):  N/A    CBC (last 72 hours):  Recent Labs   Lab Result Units 04/28/23  0459 04/29/23  0420   WBC x10(3)/mcL 11.0 11.8*   Hgb g/dL 9.9* 10.2*   Hct % 31.7* 33.4*   Platelet x10(3)/mcL 412* 370   Mono % % 8.8  --    Eos % % 3.0  --    Basophil % % 0.4  --        Metabolic Panel (last 72 hours):  Recent Labs   Lab Result Units 04/28/23  0459 04/29/23  0420   Sodium Level mmol/L 132  --    Potassium Level mmol/L 3.9  --    Chloride mmol/L 85*  --    Carbon Dioxide mmol/L 40*  --    Glucose Level mg/dL 120  --    Blood Urea Nitrogen mg/dL 21.6*  --    Creatinine mg/dL 0.55 0.66   Magnesium Level mg/dL 1.90  --        Vancomycin Administrations:  vancomycin given in  the last 96 hours                     vancomycin 750 mg in dextrose 5 % 250 mL IVPB (ready to mix) (mg) 750 mg New Bag 04/29/23 1556    vancomycin 750 mg in dextrose 5 % 250 mL IVPB (ready to mix) (mg) 750 mg New Bag 04/28/23 1126     750 mg New Bag 04/27/23 2310     750 mg New Bag  1149     750 mg New Bag  0057                    Microbiologic Results:  Microbiology Results (last 7 days)       Procedure Component Value Units Date/Time    Blood Culture [[id number]]  (Normal) Collected: 04/23/23 0820    Order Status: Completed Specimen: Blood from Arm, Right Updated: 04/28/23 1100     CULTURE, BLOOD ([facility]) No Growth at 5 days    Blood Culture [[id number]]  (Normal) Collected: 04/23/23 0826    Order Status: Completed Specimen: Blood from Arm, Right Updated: 04/28/23 1100     CULTURE, BLOOD ([facility]) No Growth at 5 days    Blood Culture [[id number]]  (Abnormal)  (Susceptibility) Collected: 04/21/23 0630    Order Status: Completed Specimen: Blood from Hand, Left Updated: 04/24/23 0635     CULTURE, BLOOD ([facility]) Methicillin resistant Staphylococcus aureus     GRAM STAIN Gram Positive Cocci, probable Staphylococcus      Seen in gram stain of broth only      1 of 1 Aerobic bottle positive    Blood Culture [[id number]]  (Abnormal)  (Susceptibility) Collected: 04/21/23 0958    Order Status: Completed Specimen: Blood from Arm, Right Updated: 04/24/23 0634     CULTURE, BLOOD ([facility]) Methicillin resistant Staphylococcus aureus     GRAM STAIN Gram Positive Cocci, probable Staphylococcus      Seen in gram stain of broth only      2 of 2 bottles positive

## 2023-04-30 NOTE — PROGRESS NOTES
Hospital Medicine  Progress Note    Patient Name: Melodie Villarreal  MRN: [id number]  Status: IP- Inpatient   Admission Date: 4/16/2023  Length of Stay: 14      CC: hospital follow-up for MRSA bacteremia       SUBJECTIVE   89-year-old female with a history that includes type 2 diabetes mellitus, prior DVT/IVC filter no longer on anticoagulation and non-healing left ankle wound, presented to the ED on 4/16 after she became lethargic. Patient normally alert and oriented, active and able to carry out own ADLs.  She apparently has chronic open wounds on her left lower extremity  followed closely by Wound Care.  On the day of presentation she became drowsy/lethargic, and EMS activated, she was found to be hypotensive with a blood pressure of 66/36 on arrival, requiring 3 L nasal cannula.  Laboratory work showed leukocytosis of 34,000, mild anemia, lactic acid of 3.5, elevated BNP of 2000 and a troponin of 0.115.  Urinalysis was unremarkable, CT chest abdomen and pelvis was negative for pulmonary embolus, pulmonary infiltrates or any intra-abdominal or pelvic pathology.  CT head was normal.  She was started on broad-spectrum antibiotics for undifferentiated sepsis admitted to the hospitalist services for further management.  Blood cultures returned positive for MRSA bacteremia. ID consulted and pt continued on IV Vancomycin. Noted left lower extremity non-healing ulcer. NM bone scan without clear evidence of OM. LLE arterial U/S showed monophasic flow throughout suggestive of inflow disease. CT T and L spine  with no evidence of discitis/osteomyelitis. 2D TTE negative for valvular vegetation. Cardiology consulted for STARR, performed on 4/22, negative valvular vegetations. Pt is noted to have  hydronephrosis despite Newton decompression. Urology consulted to evaluate persistent Rt hydronephrosis.  She was taken to the OR on 04/25 for cystoscopy and right ureteral stent placement.  Blood cultures persistently positive, 4/16, 4/18,  Lm to rc to nl 4/19, 4/21.  Eventually cultures from 4/23 remained negative.  Vascular surgery consulted for PAD, CTA run-off noted severe flow-limiting disease in the left common femoral.  Vascular surgery considering left femoral endarterectomy, though family defering intervention for now.  CM consulted for home IV antibiotics.    Today: Patient seen and examined at bedside, and chart reviewed.   No new complaints today.  Doing well.      MEDICATIONS   Scheduled   carvediloL  6.25 mg Oral BID    collagenase   Topical (Top) Daily    docusate  200 mg Oral BID    enoxaparin  40 mg Subcutaneous Daily    furosemide  40 mg Oral Daily    insulin detemir U-100  7 Units Subcutaneous QHS    Lactobacillus acidophilus  1 capsule Oral TID WM    magnesium oxide  400 mg Oral TID    polyethylene glycol  17 g Oral Daily    sodium chloride 0.9%  10 mL Intravenous Q6H    trazodone  100 mg Oral QHS     Continuous Infusions  None      PHYSICAL EXAM   VITALS: T 98 °F (36.7 °C)   BP (!) 147/66   P 82   RR 20   O2 (!) 92 %    GENERAL: Awake and in NAD  LUNGS: CTA anteriorly  CVS: Normal rate  GI/: Soft, NT, bowel sounds positive.  EXTREMITIES: Radial pulse 2+  NEURO: AAOx3  PSYCH: Cooperative      LABS   CBC  Recent Labs     04/28/23 0459 04/29/23 0420   WBC 11.0 11.8*   RBC 3.75* 3.91*   HGB 9.9* 10.2*   HCT 31.7* 33.4*   MCV 84.5 85.4   MCH 26.4* 26.1*   MCHC 31.2* 30.5*   RDW 14.9 15.1   * 370     CHEM  Recent Labs     04/28/23 0459 04/29/23  0420     --    K 3.9  --    CHLORIDE 85*  --    CO2 40*  --    BUN 21.6*  --    CREATININE 0.55 0.66   GLUCOSE 120  --    CALCIUM 8.5  --    MG 1.90  --        MICROBIOLOGY     Microbiology Results (last 7 days)       Procedure Component Value Units Date/Time    Blood Culture [[id number]]  (Normal) Collected: 04/23/23 0820    Order Status: Completed Specimen: Blood from Arm, Right Updated: 04/28/23 1100     CULTURE, BLOOD ([facility]) No Growth at 5 days    Blood Culture [[id number]]  (Normal)  Collected: 04/23/23 0826    Order Status: Completed Specimen: Blood from Arm, Right Updated: 04/28/23 1100     CULTURE, BLOOD ([facility]) No Growth at 5 days    Blood Culture [[id number]]  (Abnormal)  (Susceptibility) Collected: 04/21/23 0630    Order Status: Completed Specimen: Blood from Hand, Left Updated: 04/24/23 0635     CULTURE, BLOOD ([facility]) Methicillin resistant Staphylococcus aureus     GRAM STAIN Gram Positive Cocci, probable Staphylococcus      Seen in gram stain of broth only      1 of 1 Aerobic bottle positive    Blood Culture [[id number]]  (Abnormal)  (Susceptibility) Collected: 04/21/23 0958    Order Status: Completed Specimen: Blood from Arm, Right Updated: 04/24/23 0634     CULTURE, BLOOD ([facility]) Methicillin resistant Staphylococcus aureus     GRAM STAIN Gram Positive Cocci, probable Staphylococcus      Seen in gram stain of broth only      2 of 2 bottles positive            ASSESSMENT   Persistent MRSA Sepsis/Bacteremia, now with clearance as of 4/23  Left ankle chronic non healing wound   Acute on chronic diastolic heart failure   Mild to moderate Aortic stenosis  Pulmonary HTN  NIDDM II  Normochromic anemia   Hyponatremia due to hypervolemia   Hypomagnesemia   Chronic low back pain   Constipation associated with colonic fecal impaction    PLAN   Continue with IV Vanc as planned x 6 weeks end date 6/4  CM working on Home health and feasibility of outpatient IV antibiotics  Medically stable once arrangements for ongoing IV antibiotics arranged  Otherwise continue current management in the interim        Prophylaxis: OPAL Villeda MD  [facility]

## 2023-05-01 NOTE — PLAN OF CARE
I spoke to pt's dgt Lesvia and she stated she would like referral to [facility] for the long term IV abx. Verbal FOC obtained and placed in pt chart. Referral faxed to [facility] d/t unable to send via [facility] at this time. I spoke to Margo at [facility] in regards of fax.

## 2023-05-01 NOTE — PT/OT/SLP PROGRESS
Physical Therapy Treatment    Patient Name:  Melodie Villarreal   MRN:  [id number]    Recommendations:     Discharge Recommendations: nursing facility, skilled, other (see comments) (noted family is hoping pt will be able to come home. Daughter has used a Tashia lift in the past)  Discharge Equipment Recommendations: lift device  Barriers to discharge:  Decreased functional mobility    Assessment:     Melodie Villarreal is a 89 y.o. female admitted with a medical diagnosis of Sepsis.  She presents with the following impairments/functional limitations: weakness, gait instability, impaired endurance, impaired balance, impaired functional mobility, impaired self care skills, impaired cognition, decreased safety awareness.    Rehab Prognosis: Good; patient would benefit from acute skilled PT services to address these deficits and reach maximum level of function.    Recent Surgery: Procedure(s) (LRB):  CYSTOSCOPY, WITH URETERAL STENT INSERTION (Right) 6 Days Post-Op    Plan:     During this hospitalization, patient to be seen 6 x/week to address the identified rehab impairments via gait training, therapeutic activities, therapeutic exercises and progress toward the following goals:    Plan of Care Expires:  05/26/23    Subjective     Chief Complaint: chronic pain  Pain/Comfort:  0/10      Objective:     Communicated with NSG prior to session.  Patient found HOB elevated upon PTA entry to room. Daughter in room    General Precautions: Standard, contact, fall  Orthopedic Precautions: N/A  Braces: N/A  Respiratory Status: Room air  Skin Integrity: Visible skin intact      Functional Mobility:  Bed Mobility:   Supine<->sit with max assist  Transfers:     Sit to Stand:  max assist sit<->stand poor ability to stance fully erect  Balance: L lateral lean, able to correct and maintain ~4 minutes before fatigue noted and increased L lateral lean.        Education provided with daughter using demonstration for transfers and gait belt provided.  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 Declined education on tashia lift today.     GOALS:   Multidisciplinary Problems       Physical Therapy Goals          Problem: Physical Therapy    Goal Priority Disciplines Outcome Goal Variances Interventions   Physical Therapy Goal     PT, PT/OT Ongoing, Progressing     Description: Goals to be met by: 2023     Patient will increase functional independence with mobility by performin. Supine to sit with Stand-by Assistance  2. Sit to stand transfer with Contact Guard Assistance  3. Gait  x 150 feet with Contact Guard Assistance using Rolling Walker.   4. Pt will ascend/descend 3 steps with HR and Min A.                          Time Tracking:     Billable Minutes: Therapeutic Activity 2 units    Treatment Type: Treatment  PT/PTA: PTA     Number of PTA visits since last PT visit: 3     2023

## 2023-05-01 NOTE — PROGRESS NOTES
Pharmacokinetic Assessment Follow Up: IV Vancomycin    Vancomycin serum concentration assessment(s):    The trough level was drawn correctly and can be used to guide therapy at this time. The measurement is above the desired definitive target range of 15 to 20 mcg/mL.    Vancomycin Regimen Plan:    Change regimen to Vancomycin 500 mg IV every 12 hours with next serum trough concentration measured at 0530 prior to 3rd dose on 5/03    Scheduled Administration Times    5/01:  1830  5/02:  0630  1830  5/01:  0630* - TROUGH DUE AT 0530      Drug levels (last 3 results):  Recent Labs   Lab Result Units 04/29/23  0420 04/30/23  1412 05/01/23  1458   Vanc Lvl Random ug/ml 20.5* 15.0 14.2*       Vancomycin Administrations:  vancomycin given in the last 96 hours                     vancomycin 750 mg in dextrose 5 % 250 mL IVPB (ready to mix) (mg) 750 mg New Bag 04/30/23 1551    vancomycin 750 mg in dextrose 5 % 250 mL IVPB (ready to mix) (mg) 750 mg New Bag 04/29/23 1556    vancomycin 750 mg in dextrose 5 % 250 mL IVPB (ready to mix) (mg) 750 mg New Bag 04/28/23 1126     750 mg New Bag 04/27/23 2310                    Pharmacy will continue to follow and monitor vancomycin.    Please contact pharmacy at extension [phone] for questions regarding this assessment.    Thank you for the consult,   Abundio Martinez       Patient brief summary:  Melodie Villarreal is a 89 y.o. female initiated on antimicrobial therapy with IV Vancomycin for treatment of bacteremia    The patient's current regimen is 750mg q12h    Drug Allergies:   Review of patient's allergies indicates:   Allergen Reactions    Ace inhibitors Other (See Comments)    Adhesive      Allergic to tape    Bactrim [sulfamethoxazole-trimethoprim] Other (See Comments)     Confusion, Hypoglycemia    Meperidine Other (See Comments)    Midazolam Other (See Comments)    Atorvastatin Nausea Only    Codeine Other (See Comments) and Anxiety    Tapentadol Rash       Actual Body Weight:   56.6  kg     Renal Function:   Estimated Creatinine Clearance: 45.9 mL/min (based on SCr of 0.66 mg/dL).,     Dialysis Method (if applicable):  N/A    CBC (last 72 hours):  Recent Labs   Lab Result Units 04/29/23  0420   WBC x10(3)/mcL 11.8*   Hgb g/dL 10.2*   Hct % 33.4*   Platelet x10(3)/mcL 370       Metabolic Panel (last 72 hours):  Recent Labs   Lab Result Units 04/29/23  0420   Creatinine mg/dL 0.66       Microbiologic Results:  Microbiology Results (last 7 days)       Procedure Component Value Units Date/Time    Blood Culture [[id number]]  (Normal) Collected: 04/23/23 0820    Order Status: Completed Specimen: Blood from Arm, Right Updated: 04/28/23 1100     CULTURE, BLOOD ([facility]) No Growth at 5 days    Blood Culture [[id number]]  (Normal) Collected: 04/23/23 0826    Order Status: Completed Specimen: Blood from Arm, Right Updated: 04/28/23 1100     CULTURE, BLOOD ([facility]) No Growth at 5 days

## 2023-05-01 NOTE — PT/OT/SLP PROGRESS
Occupational Therapy   Treatment    Name: Melodie Villarreal  MRN: [id number]  Admitting Diagnosis:  Sepsis  6 Days Post-Op    Recommendations:     Discharge Recommendations: nursing facility, skilled (noted that family is weighing on d/c home with family vs to SNF)  Discharge Equipment Recommendations:  walker, rolling  Barriers to discharge:       Assessment:     Melodie Villarreal is a 89 y.o. female with a medical diagnosis of Sepsis.  Performance deficits affecting function are weakness, impaired endurance, impaired self care skills, impaired functional mobility, impaired balance, impaired cognition, decreased lower extremity function, decreased safety awareness, pain, impaired skin.     Rehab Prognosis:  Fair; patient would benefit from acute skilled OT services to address these deficits and reach maximum level of function.       Plan:     Patient to be seen 5 x/week to address the above listed problems via self-care/home management, therapeutic activities, therapeutic exercises  Plan of Care Expires: 05/10/23  Plan of Care Reviewed with: patient, son    Subjective     Pain/Comfort:       Objective:     Communicated with: RN prior to session.  Patient found HOB elevated with   upon OT entry to room.    General Precautions: Standard,      Orthopedic Precautions:   Braces:       Occupational Performance:     Bed Mobility:    Patient completed Rolling/Turning to Left with  total assistance  Patient completed Rolling/Turning to Right with total assistance  Patient completed Supine to Sit with total assistance  Patient completed Sit to Supine with total assistance     Activities of Daily Living:  Grooming: setup assistance sitting EOB brushing teeth.  Toileting: total assistance in sidelying following bowel movement    Therapeutic Positioning    OT interventions performed during the course of today's session in an effort to prevent and/or reduce acquired pressure injuries:   Education on Pressure Ulcer Prevention  provided  Therapeutic positioning completed     Patient Education:  Patient and daughter/s provided with verbal education regarding OT role/goals/POC and pressure ulcer prevention.  Understanding was verbalized.      Patient left HOB elevated with all lines intact and call button in reach    GOALS:   Multidisciplinary Problems       Occupational Therapy Goals          Problem: Occupational Therapy    Goal Priority Disciplines Outcome Interventions   Occupational Therapy Goal     OT, PT/OT Ongoing, Progressing    Description: Goals to be met by: 5/10/23     Patient will increase functional independence with ADLs by performing:    UE Dressing with Contact Guard Assistance.  LE Dressing with Moderate Assistance and Assistive Devices as needed.  Grooming while EOB with Contact Guard Assistance.  Sitting at edge of bed x10 minutes with Contact Guard Assistance.                         Time Tracking:     OT Date of Treatment: 05/01/23  OT Start Time: 1020  OT Stop Time: 1058  OT Total Time (min): 38 min    Billable Minutes:Self Care/Home Management 2  Therapeutic Activity 1    OT/STANISLAW: STANISLAW     Number of STANISLAW visits since last OT visit: 2    5/1/2023

## 2023-05-01 NOTE — PLAN OF CARE
Spoke with pt's dgt Lesvia this am. She is asking for a total price from [facility] and also to send referral to [facility] for a comparison quote. I will send referral to [facility] via [employer]. I messaged Sunday with [employer] and she will contact Lesvia. Lesvia did stated if the home IV infusion is not feasible for them they would like referral to [facility].

## 2023-05-01 NOTE — PROGRESS NOTES
[facility] Hospital Medicine Progress Note           CC: hospital follow-up for MRSA bacteremia         SUBJECTIVE   89-year-old female with a history that includes type 2 diabetes mellitus, prior DVT/IVC filter no longer on anticoagulation and non-healing left ankle wound, presented to the ED on 4/16 after she became lethargic. Patient normally alert and oriented, active and able to carry out own ADLs.  She apparently has chronic open wounds on her left lower extremity  followed closely by Wound Care.  On the day of presentation she became drowsy/lethargic, and EMS activated, she was found to be hypotensive with a blood pressure of 66/36 on arrival, requiring 3 L nasal cannula.  Laboratory work showed leukocytosis of 34,000, mild anemia, lactic acid of 3.5, elevated BNP of 2000 and a troponin of 0.115.  Urinalysis was unremarkable, CT chest abdomen and pelvis was negative for pulmonary embolus, pulmonary infiltrates or any intra-abdominal or pelvic pathology.  CT head was normal.  She was started on broad-spectrum antibiotics for undifferentiated sepsis admitted to the hospitalist services for further management.  Blood cultures returned positive for MRSA bacteremia. ID consulted and pt continued on IV Vancomycin. Noted left lower extremity non-healing ulcer. NM bone scan without clear evidence of OM. LLE arterial U/S showed monophasic flow throughout suggestive of inflow disease. CT T and L spine  with no evidence of discitis/osteomyelitis. 2D TTE negative for valvular vegetation. Cardiology consulted for STARR, performed on 4/22, negative valvular vegetations. Pt is noted to have  hydronephrosis despite Newton decompression. Urology consulted to evaluate persistent Rt hydronephrosis.  She was taken to the OR on 04/25 for cystoscopy and right ureteral stent placement.  Blood cultures persistently positive, 4/16, 4/18, 4/19, 4/21.  Eventually cultures from 4/23 remained negative.   Vascular surgery consulted for PAD, CTA run-off noted severe flow-limiting disease in the left common femoral.  Vascular surgery considering left femoral endarterectomy, though family defering intervention for now.  CM consulted for home IV antibiotics.       Today: Patient seen and examined at bedside, and chart reviewed.   No new complaints today.  Doing well.  No new complaints - patient stool is soft   Plans for swing bed in place       Exam  GENERAL: Awake and in NAD  LUNGS: CTA anteriorly  CVS: Normal rate  GI/: Soft, NT, bowel sounds positive.  EXTREMITIES: Radial pulse 2+  NEURO: AAOx3  PSYCH: Cooperative     ASSESSMENT   Persistent MRSA Sepsis/Bacteremia, now with clearance as of 4/23  Left ankle chronic non healing wound   Acute on chronic diastolic heart failure   Mild to moderate Aortic stenosis  Pulmonary HTN  NIDDM II  Normochromic anemia   Hyponatremia due to hypervolemia   Hypomagnesemia   Chronic low back pain   Constipation associated with colonic fecal impaction     PLAN   Continue with IV Vanc as planned x 6 weeks end date 6/4  CM working on Home health and feasibility of outpatient IV antibiotics  Medically stable once arrangements for ongoing IV antibiotics arranged  Otherwise continue current management in the interim           Prophylaxis: SC Lovenox         VITAL SIGNS: 24 HRS MIN & MAX LAST   Temp  Min: 97.7 °F (36.5 °C)  Max: 99.3 °F (37.4 °C) 97.8 °F (36.6 °C)   BP  Min: 93/53  Max: 142/56 (!) 93/53     Pulse  Min: 84  Max: 99  84   Resp  Min: 18  Max: 18 18   SpO2  Min: 83 %  Max: 94 % (!) 94 %         Recent Labs   Lab 04/27/23  0419 04/28/23  0459 04/29/23  0420   WBC 11.4 11.0 11.8*   RBC 3.85* 3.75* 3.91*   HGB 10.2* 9.9* 10.2*   HCT 32.3* 31.7* 33.4*   MCV 83.9 84.5 85.4   MCH 26.5* 26.4* 26.1*   MCHC 31.6* 31.2* 30.5*   RDW 15.0 14.9 15.1    412* 370   MPV 8.8 8.6 8.7       Recent Labs   Lab 04/25/23  0350 04/26/23  0620 04/27/23  0419 04/28/23  0459 04/29/23  0420   NA  129* 127* 126* 132  --    K 3.9 4.3 3.6 3.9  --    CO2 33* 31 36* 40*  --    BUN 20.3* 24.4* 23.4* 21.6*  --    CREATININE 0.59 0.69 0.63 0.55 0.66   CALCIUM 8.7 8.5 8.9 8.5  --    MG 1.60 1.80  --  1.90  --           Microbiology Results (last 7 days)       Procedure Component Value Units Date/Time    Blood Culture [[id number]]  (Normal) Collected: 04/23/23 0820    Order Status: Completed Specimen: Blood from Arm, Right Updated: 04/28/23 1100     CULTURE, BLOOD ([facility]) No Growth at 5 days    Blood Culture [[id number]]  (Normal) Collected: 04/23/23 0826    Order Status: Completed Specimen: Blood from Arm, Right Updated: 04/28/23 1100     CULTURE, BLOOD ([facility]) No Growth at 5 days             See below for Radiology    Scheduled Med:   carvediloL  6.25 mg Oral BID    collagenase   Topical (Top) Daily    docusate  200 mg Oral BID    enoxaparin  40 mg Subcutaneous Daily    furosemide  40 mg Oral Daily    insulin detemir U-100  7 Units Subcutaneous QHS    Lactobacillus acidophilus  1 capsule Oral TID WM    magnesium oxide  400 mg Oral TID    polyethylene glycol  17 g Oral Daily    sodium chloride 0.9%  10 mL Intravenous Q6H    trazodone  100 mg Oral QHS        Continuous Infusions:       PRN Meds:  acetaminophen, dextrose 10%, dextrose 10%, docusate sodium, glucagon (human recombinant), glucose, glucose, insulin aspart U-100, melatonin, senna, Flushing PICC Protocol **AND** sodium chloride 0.9% **AND** sodium chloride 0.9%, traMADoL, Pharmacy to dose Vancomycin consult **AND** vancomycin - pharmacy to dose       Assessment/Plan:      VTE prophylaxis:     Patient condition:  Stable/Fair/Guarded/ Serious/ Critical    Anticipated discharge and Disposition:         All diagnosis and differential diagnosis have been reviewed; assessment and plan has been documented; I have personally reviewed the labs and test results that are presently available; I have reviewed the patients medication list; I have reviewed the consulting  providers response and recommendations. I have reviewed or attempted to review medical records based upon their availability    All of the patient's questions have been  addressed and answered. Patient's is agreeable to the above stated plan. I will continue to monitor closely and make adjustments to medical management as needed.  _____________________________________________________________________    Nutrition Status:    Radiology:  X-Ray Chest 1 View for Line/Tube Placement  Narrative: EXAMINATION:  XR CHEST 1 VIEW FOR LINE/TUBE PLACEMENT    CLINICAL HISTORY:  PICC line;    TECHNIQUE:  Single frontal portable view of the chest was performed.    COMPARISON:  04/20/2023    FINDINGS:  Left-sided PICC line projects over the distal SVC.  Impression: As above.    Electronically signed by: Jerzy Sweet  Date:    04/27/2023  Time:    21:38      Shawn Gomez MD   05/01/2023

## 2023-05-02 PROBLEM — R53.83 FATIGUE: Status: ACTIVE | Noted: 2023-01-01

## 2023-05-02 PROBLEM — R53.1 WEAKNESS: Status: ACTIVE | Noted: 2023-01-01

## 2023-05-02 NOTE — PT/OT/SLP PROGRESS
Physical Therapy Treatment    Patient Name:  Melodie Villarreal   MRN:  [id number]    Recommendations:     Discharge Recommendations: Swing bed  Discharge Equipment Recommendations: lift device  Barriers to discharge:  Decreased functional mobility    Assessment:     Melodie Villarreal is a 89 y.o. female admitted with a medical diagnosis of Sepsis.  She presents with the following impairments/functional limitations: weakness, gait instability, impaired endurance, impaired balance, impaired functional mobility, impaired self care skills, impaired cognition, decreased safety awareness.    Rehab Prognosis: Fair; patient would benefit from acute skilled PT services to address these deficits and reach maximum level of function.    Recent Surgery: Procedure(s) (LRB):  CYSTOSCOPY, WITH URETERAL STENT INSERTION (Right) 7 Days Post-Op    Plan:     During this hospitalization, patient to be seen 6 x/week to address the identified rehab impairments via gait training, therapeutic activities, therapeutic exercises and progress toward the following goals:    Plan of Care Expires:  05/26/23    Subjective     Chief Complaint: L ankle; unable to rate  Pain/Comfort:  0/10      Objective:     Communicated with RN prior to session.  Patient found HOB elevated upon PTA entry to room. Daughter in room    General Precautions: Standard, contact, fall  Orthopedic Precautions: N/A  Braces: N/A  Respiratory Status: Room air  Skin Integrity: Visible skin intact      Functional Mobility:  Bed Mobility:   Supine<->sit with total assist  Transfers:     Sit to Stand x 2 trials with max assist sit<->stand poor ability to stance fully erect  Balance: Improved sitting balance without UE support. Initially had a L lateral lean but easily improved with tactile cues.          GOALS:   Multidisciplinary Problems       Physical Therapy Goals          Problem: Physical Therapy    Goal Priority Disciplines Outcome Goal Variances Interventions   Physical Therapy Goal      PT, PT/OT Ongoing, Progressing     Description: Goals to be met by: 2023     Patient will increase functional independence with mobility by performin. Supine to sit with Stand-by Assistance  2. Sit to stand transfer with Contact Guard Assistance  3. Gait  x 150 feet with Contact Guard Assistance using Rolling Walker.   4. Pt will ascend/descend 3 steps with HR and Min A.                          Time Tracking:     Billable Minutes: Therapeutic Activity 2 units    Treatment Type: Treatment  PT/PTA: PTA     Number of PTA visits since last PT visit: 4     2023

## 2023-05-02 NOTE — PROGRESS NOTES
[facility] Hospital Medicine Progress Note           CC: hospital follow-up for MRSA bacteremia         SUBJECTIVE   89-year-old female with a history that includes type 2 diabetes mellitus, prior DVT/IVC filter no longer on anticoagulation and non-healing left ankle wound, presented to the ED on 4/16 after she became lethargic. Patient normally alert and oriented, active and able to carry out own ADLs.  She apparently has chronic open wounds on her left lower extremity  followed closely by Wound Care.  On the day of presentation she became drowsy/lethargic, and EMS activated, she was found to be hypotensive with a blood pressure of 66/36 on arrival, requiring 3 L nasal cannula.  Laboratory work showed leukocytosis of 34,000, mild anemia, lactic acid of 3.5, elevated BNP of 2000 and a troponin of 0.115.  Urinalysis was unremarkable, CT chest abdomen and pelvis was negative for pulmonary embolus, pulmonary infiltrates or any intra-abdominal or pelvic pathology.  CT head was normal.  She was started on broad-spectrum antibiotics for undifferentiated sepsis admitted to the hospitalist services for further management.  Blood cultures returned positive for MRSA bacteremia. ID consulted and pt continued on IV Vancomycin. Noted left lower extremity non-healing ulcer. NM bone scan without clear evidence of OM. LLE arterial U/S showed monophasic flow throughout suggestive of inflow disease. CT T and L spine  with no evidence of discitis/osteomyelitis. 2D TTE negative for valvular vegetation. Cardiology consulted for STARR, performed on 4/22, negative valvular vegetations. Pt is noted to have  hydronephrosis despite Newton decompression. Urology consulted to evaluate persistent Rt hydronephrosis.  She was taken to the OR on 04/25 for cystoscopy and right ureteral stent placement.  Blood cultures persistently positive, 4/16, 4/18, 4/19, 4/21.  Eventually cultures from 4/23 remained negative.   "Vascular surgery consulted for PAD, CTA run-off noted severe flow-limiting disease in the left common femoral.  Vascular surgery considering left femoral endarterectomy, though family defering intervention for now.  CM consulted for home IV antibiotics.         Interval Hx:  Patient seen and examined at bedside, and chart reviewed.   No new complaints from patient today.  Family and Nursing staff were concerned about multiple episodes of "staring spells"    Chart was reviewed,HDS  , On Labs- Hyponatremia, Hypokalemic    Exam  GENERAL: Awake and in NAD  LUNGS: CTA anteriorly  CVS: Normal rate  GI/: Soft, NT, bowel sounds positive.  EXTREMITIES: Radial pulse 2+  NEURO: AAOx3  PSYCH: Cooperative              VITAL SIGNS: 24 HRS MIN & MAX LAST   Temp  Min: 97.5 °F (36.4 °C)  Max: 99.1 °F (37.3 °C) 97.8 °F (36.6 °C)   BP  Min: 123/67  Max: 148/71 138/72     Pulse  Min: 78  Max: 95  83   Resp  Min: 16  Max: 18 18   SpO2  Min: 89 %  Max: 96 % (!) 90 %         Recent Labs   Lab 04/28/23  0459 04/29/23  0420 05/02/23  1545   WBC 11.0 11.8* 10.36   RBC 3.75* 3.91* 3.74*   HGB 9.9* 10.2* 10.0*   HCT 31.7* 33.4* 31.7*   MCV 84.5 85.4 84.8   MCH 26.4* 26.1* 26.7*   MCHC 31.2* 30.5* 31.5*   RDW 14.9 15.1 15.2   * 370 378   MPV 8.6 8.7 9.2       Recent Labs   Lab 04/26/23  0620 04/27/23  0419 04/28/23  0459 04/29/23  0420 05/02/23  1203   * 126* 132  --  127*   K 4.3 3.6 3.9  --  2.9*   CO2 31 36* 40*  --  37*   BUN 24.4* 23.4* 21.6*  --  14.0   CREATININE 0.69 0.63 0.55 0.66 0.64   CALCIUM 8.5 8.9 8.5  --  8.3*   MG 1.80  --  1.90  --   --    ALBUMIN  --   --   --   --  1.9*   ALKPHOS  --   --   --   --  84   ALT  --   --   --   --  11   AST  --   --   --   --  14   BILITOT  --   --   --   --  0.4          Microbiology Results (last 7 days)       Procedure Component Value Units Date/Time    Blood Culture [[id number]]  (Normal) Collected: 04/23/23 0820    Order Status: Completed Specimen: Blood from Arm, Right " Updated: 04/28/23 1100     CULTURE, BLOOD ([facility]) No Growth at 5 days    Blood Culture [[id number]]  (Normal) Collected: 04/23/23 0826    Order Status: Completed Specimen: Blood from Arm, Right Updated: 04/28/23 1100     CULTURE, BLOOD ([facility]) No Growth at 5 days             See below for Radiology    Scheduled Med:   carvediloL  6.25 mg Oral BID    collagenase   Topical (Top) Daily    docusate  200 mg Oral BID    enoxaparin  40 mg Subcutaneous Daily    furosemide  40 mg Oral Daily    insulin detemir U-100  7 Units Subcutaneous QHS    Lactobacillus acidophilus  1 capsule Oral TID WM    magnesium oxide  400 mg Oral TID    polyethylene glycol  17 g Oral Daily    sodium chloride 0.9%  10 mL Intravenous Q6H    tamsulosin  0.4 mg Oral Daily    trazodone  100 mg Oral QHS    vancomycin (VANCOCIN) IVPB  500 mg Intravenous Q12H        Continuous Infusions:       PRN Meds:  acetaminophen, dextrose 10%, dextrose 10%, docusate sodium, glucagon (human recombinant), glucose, glucose, insulin aspart U-100, melatonin, senna, Flushing PICC Protocol **AND** sodium chloride 0.9% **AND** sodium chloride 0.9%, traMADoL, Pharmacy to dose Vancomycin consult **AND** vancomycin - pharmacy to dose       Assessment/Plan:    ASSESSMENT   Persistent MRSA Sepsis/Bacteremia, now with clearance as of 4/23  Left ankle chronic non healing wound   Encephalopathy, Staring Spells  Acute on chronic diastolic heart failure   Mild to moderate Aortic stenosis  Pulmonary HTN  NIDDM II  Normochromic anemia   Hyponatremia   Hypokalemia  Hypomagnesemia   Chronic low back pain   Constipation associated with colonic fecal impaction     PLAN   Continue with IV Vanc as planned x 6 weeks end date 6/4  CM working on Home health and feasibility of outpatient IV antibiotics  CT head and EEG were ordered,Neurology was consulted for staring spells given concern for seizures  Monitor Sodium,Check Urine Sodium, Osm, Serum Osm, Avoid overcorrection  Replete  potassium, Monitor  Potassium  BMP ordered   Continue appropriate home medications        Critical care Time Spent>35 min   VTE prophylaxis: lovenox    Patient condition:  Critical    Anticipated discharge and Disposition:   TBD      All diagnosis and differential diagnosis have been reviewed; assessment and plan has been documented; I have personally reviewed the labs and test results that are presently available; I have reviewed the patients medication list; I have reviewed the consulting providers response and recommendations. I have reviewed or attempted to review medical records based upon their availability    All of the patient's questions have been  addressed and answered. Patient's is agreeable to the above stated plan. I will continue to monitor closely and make adjustments to medical management as needed.  _____________________________________________________________________    Nutrition Status:    Radiology:  CT Head Without Contrast  Narrative: EXAMINATION:  CT HEAD WITHOUT CONTRAST    CLINICAL HISTORY:  Mental status change, unknown cause;    TECHNIQUE:  Low dose axial images were obtained through the head.  Coronal and sagittal reformations were also performed. Contrast was not administered.    Automatic exposure control was utilized to reduce the patient's radiation dose.    DLP= 936    COMPARISON:  04/16/2023    FINDINGS:  No acute intracranial hemorrhage, edema or mass. No acute parenchymal abnormality.    Diffuse cerebral atrophy with concordant ventricular enlargement.    Scattered hypodensities throughout the deep periventricular white matter.    The osseous structures are normal.    The mastoid air cells are clear.    Debris within the bilateral auditory canals.    The globes and orbital contents are normal bilaterally.    The visualized maxillary, ethmoid and sphenoid sinuses are clear.  Impression: No acute intracranial abnormality identified.  Findings of mild microvascular ischemic disease.    Electronically  signed by: Jerzy Sweet  Date:    05/02/2023  Time:    11:34      Mady Diaz MD   05/02/2023

## 2023-05-02 NOTE — PROCEDURES
EEG REPORT         DATE OF THE STUDY:    05/1/2023      REFERRING PROVIDER:   Dr. Crescencio Villeda      REASON FOR THE STUDY:    Encephalopathy/staring spells      CONDITION OF THE RECORDING:    This is an 19 channel EEG utilizing the 10-20 International System for electrode placement including (18) cephalic and (1) EKG and standard montages.  All impedance were verified and noted to be within standards.  The recording was done for a period of 25 minutes.      DESCRIPTION:    The EEG is characterized by an admixture of medium voltage irregular theta and some delta waves.  Best background rhythm attained is 5 Hz.  Definite lateralizing or epileptiform abnormalities were not detected.        IMPRESSION:   Abnormal EEG due to findings consistent with a nonspecific bilateral brain dysfunction.  Electrographic seizures were not detected.                  Tin Alarcon MD

## 2023-05-02 NOTE — PT/OT/SLP PROGRESS
Occupational Therapy      Patient Name:  Melodie Villarreal   MRN:  [id number]    Patient off floor at this time. Will follow-up as able.    5/2/2023

## 2023-05-03 PROBLEM — G93.40 ENCEPHALOPATHY: Status: ACTIVE | Noted: 2023-01-01

## 2023-05-03 NOTE — PROGRESS NOTES
[facility] Hospital Medicine Progress Note           CC: Hospital follow-up for MRSA bacteremia         SUBJECTIVE   89-year-old female with a history that includes type 2 diabetes mellitus, prior DVT/IVC filter no longer on anticoagulation and non-healing left ankle wound, presented to the ED on 4/16 after she became lethargic. Patient normally alert and oriented, active and able to carry out own ADLs.  She apparently has chronic open wounds on her left lower extremity  followed closely by Wound Care.  On the day of presentation she became drowsy/lethargic, and EMS activated, she was found to be hypotensive with a blood pressure of 66/36 on arrival, requiring 3 L nasal cannula.  Laboratory work showed leukocytosis of 34,000, mild anemia, lactic acid of 3.5, elevated BNP of 2000 and a troponin of 0.115.  Urinalysis was unremarkable, CT chest abdomen and pelvis was negative for pulmonary embolus, pulmonary infiltrates or any intra-abdominal or pelvic pathology.  CT head was normal.  She was started on broad-spectrum antibiotics for undifferentiated sepsis admitted to the hospitalist services for further management.  Blood cultures returned positive for MRSA bacteremia. ID consulted and pt continued on IV Vancomycin. Noted left lower extremity non-healing ulcer. NM bone scan without clear evidence of OM. LLE arterial U/S showed monophasic flow throughout suggestive of inflow disease. CT T and L spine  with no evidence of discitis/osteomyelitis. 2D TTE negative for valvular vegetation. Cardiology consulted for STARR, performed on 4/22, negative valvular vegetations. Pt is noted to have  hydronephrosis despite Newton decompression. Urology consulted to evaluate persistent Rt hydronephrosis.  She was taken to the OR on 04/25 for cystoscopy and right ureteral stent placement.  Blood cultures persistently positive, 4/16, 4/18, 4/19, 4/21.  Eventually cultures from 4/23 remained negative.   "Vascular surgery consulted for PAD, CTA run-off noted severe flow-limiting disease in the left common femoral.  Vascular surgery considering left femoral endarterectomy, though family defering intervention for now.  CM consulted for home IV antibiotics.         Interval Hx:  Patient seen and examined at bedside, and chart reviewed.   No new complaints from patient today.  Family and Nursing staff were concerned about multiple episodes of "staring spells", EEG was ordered, Neurology was consulted.    Chart was reviewed,HDS  , On Labs- Hyponatremia          Exam  GENERAL: Awake and in NAD  LUNGS: CTA anteriorly  CVS: Normal rate  GI/: Soft, NT, bowel sounds positive.  EXTREMITIES: Radial pulse 2+  NEURO: AAOx3  PSYCH: Cooperative              VITAL SIGNS: 24 HRS MIN & MAX LAST   Temp  Min: 97.5 °F (36.4 °C)  Max: 99 °F (37.2 °C) 97.6 °F (36.4 °C)   BP  Min: 94/54  Max: 148/70 (!) 148/70     Pulse  Min: 79  Max: 89  84   Resp  Min: 16  Max: 20 20   SpO2  Min: 86 %  Max: 96 % (!) 92 %         Recent Labs   Lab 04/28/23  0459 04/29/23  0420 05/02/23  1545   WBC 11.0 11.8* 10.36   RBC 3.75* 3.91* 3.74*   HGB 9.9* 10.2* 10.0*   HCT 31.7* 33.4* 31.7*   MCV 84.5 85.4 84.8   MCH 26.4* 26.1* 26.7*   MCHC 31.2* 30.5* 31.5*   RDW 14.9 15.1 15.2   * 370 378   MPV 8.6 8.7 9.2       Recent Labs   Lab 04/28/23  0459 04/29/23  0420 05/02/23  1203 05/02/23  1647 05/03/23  1111     --  127* 128* 128*   K 3.9  --  2.9* 3.4* 4.2   CO2 40*  --  37* 37* 35*   BUN 21.6*  --  14.0 20.8* 18.4   CREATININE 0.55   < > 0.64 0.68 0.62   CALCIUM 8.5  --  8.3* 8.3* 8.3*   MG 1.90  --   --   --   --    ALBUMIN  --   --  1.9*  --  2.0*   ALKPHOS  --   --  84  --   --    ALT  --   --  11  --   --    AST  --   --  14  --   --    BILITOT  --   --  0.4  --   --     < > = values in this interval not displayed.          Microbiology Results (last 7 days)       Procedure Component Value Units Date/Time    Blood Culture [[id number]]  (Normal) " Collected: 04/23/23 0820    Order Status: Completed Specimen: Blood from Arm, Right Updated: 04/28/23 1100     CULTURE, BLOOD ([facility]) No Growth at 5 days    Blood Culture [[id number]]  (Normal) Collected: 04/23/23 0826    Order Status: Completed Specimen: Blood from Arm, Right Updated: 04/28/23 1100     CULTURE, BLOOD ([facility]) No Growth at 5 days             See below for Radiology    Scheduled Med:   carvediloL  6.25 mg Oral BID    collagenase   Topical (Top) Daily    docusate  200 mg Oral BID    enoxaparin  40 mg Subcutaneous Daily    insulin detemir U-100  7 Units Subcutaneous QHS    Lactobacillus acidophilus  1 capsule Oral TID WM    magnesium oxide  400 mg Oral TID    polyethylene glycol  17 g Oral Daily    sodium chloride 0.9%  10 mL Intravenous Q6H    tamsulosin  0.4 mg Oral Daily    trazodone  100 mg Oral QHS    vancomycin (VANCOCIN) IVPB  500 mg Intravenous Q12H            Continuous Infusions:   sodium chloride 0.9% 75 mL/hr at 05/03/23 1502            PRN Meds:  acetaminophen, dextrose 10%, dextrose 10%, docusate sodium, glucagon (human recombinant), glucose, glucose, insulin aspart U-100, melatonin, senna, Flushing PICC Protocol **AND** sodium chloride 0.9% **AND** sodium chloride 0.9%, traMADoL, Pharmacy to dose Vancomycin consult **AND** vancomycin - pharmacy to dose       Assessment/Plan:    ASSESSMENT   Persistent MRSA Sepsis/Bacteremia, now with clearance as of 4/23  Left ankle chronic non healing wound   Encephalopathy, Staring Spells  Hyponatremia   Hypokalemia  Hypomagnesemia   Acute on chronic diastolic heart failure   Mild to moderate Aortic stenosis  Pulmonary HTN  NIDDM II  Normochromic anemia   Chronic low back pain   Constipation associated with colonic fecal impaction     PLAN   Continue with IV Vanc as planned x 6 weeks end date 6/4  CM working on Home health and feasibility of outpatient IV antibiotics  CT head and EEG were ordered,Neurology was consulted for staring spells given concern for  seizures  Monitor Sodium,Nephrology was consulted,UrNa low and UrOsm low in the setting of loop diuretics,will stop Loop diuretics, giving 1 L NS @ 75/hr, rpt BMP ordered   Replete  electrolytes  Continue appropriate home medications        Critical care Time Spent>35 min       VTE prophylaxis: lovenox    Patient condition:  Critical    Anticipated discharge and Disposition:   TBD, placement      All diagnosis and differential diagnosis have been reviewed; assessment and plan has been documented; I have personally reviewed the labs and test results that are presently available; I have reviewed the patients medication list; I have reviewed the consulting providers response and recommendations. I have reviewed or attempted to review medical records based upon their availability    All of the patient's questions have been  addressed and answered. Patient's is agreeable to the above stated plan. I will continue to monitor closely and make adjustments to medical management as needed.  _____________________________________________________________________    Nutrition Status:    Radiology:  CT Head Without Contrast  Narrative: EXAMINATION:  CT HEAD WITHOUT CONTRAST    CLINICAL HISTORY:  Mental status change, unknown cause;    TECHNIQUE:  Low dose axial images were obtained through the head.  Coronal and sagittal reformations were also performed. Contrast was not administered.    Automatic exposure control was utilized to reduce the patient's radiation dose.    DLP= 936    COMPARISON:  04/16/2023    FINDINGS:  No acute intracranial hemorrhage, edema or mass. No acute parenchymal abnormality.    Diffuse cerebral atrophy with concordant ventricular enlargement.    Scattered hypodensities throughout the deep periventricular white matter.    The osseous structures are normal.    The mastoid air cells are clear.    Debris within the bilateral auditory canals.    The globes and orbital contents are normal bilaterally.    The  visualized maxillary, ethmoid and sphenoid sinuses are clear.  Impression: No acute intracranial abnormality identified.  Findings of mild microvascular ischemic disease.    Electronically signed by: Jerzy Sweet  Date:    05/02/2023  Time:    11:34      Mady Diaz MD   05/03/2023

## 2023-05-03 NOTE — PHYSICIAN QUERY
PT Name: Melodie Villarreal  MR #: [id number]     DOCUMENTATION CLARIFICATION      CDS/: Steph Rankin  RN, BSN             Contact information: [email or web address]   This form is a permanent document in the medical record.    Query Date: May 3, 2023    By submitting this query, we are merely seeking further clarification of documentation to reflect the severity of illness of your patient. Please utilize your independent clinical judgment when addressing the question(s) below.     The Medical Record contains the following:   Indicators   Supporting Clinical Findings Location in Medical Record    Chest Pain, Angina      Coronary Artery Disease     x EKG  EKG, 4/16                                                                                     x Troponin  04/16/23 18:55 04/17/23 02:06 04/17/23 03:58 04/17/23 10:03   Troponin I 0.115 (H) 0.153 (H) 0.147 (H) 0.123 (H      LAB   x Echo Results The left ventricle is normal in size with concentric remodeling and low normal systolic function.  The estimated ejection fraction is 54%.  Grade II left ventricular diastolic dysfunction.  Normal right ventricular size with low normal right ventricular systolic function.  There is mild-to-moderate aortic valve stenosis.  Aortic valve area is 1.31 cm2; peak velocity is 2.1 m/s; mean gradient is 10 mmHg.  There is mild mitral stenosis.  The mean diastolic gradient across the mitral valve is 7 mmHg at a heart rate of 94 bpm.  Moderate mitral regurgitation.  Mild tricuspid regurgitation.  There is pulmonary hypertension.  The estimated PA systolic pressure is 44 mmHg.    Echo, 4/17    Angiography     x Documentation of acute cardiac condition NSTEMI, likely type 2 demand ischemia     Acute on chronic diastolic congestive heart failure    H&P, 4/17    HM, PN, 4/26   x Medication/Treatment - echo, telemetry monitoring, trend cardiac enzymes  - resume home medications as appropriate    STARR today to rule out endocarditis  Continue  meds   H&P, 4/17      Cardiology, 4/21    Other        Provider, please clarify the diagnosis related to the above documentation:  [   ] NSTEMI     [  x ] NSTEMI/Myocardial Infarction Type 2 due to (please specify)sepsis: __________     [   ] Demand Ischemia     [   ] Demand Ischemia without Acute Myocardial Infarction     [   ] Elevated troponin only (without corresponding diagnosis)     [   ] Other Cardiac Diagnosis (please specify): _______________       Please document in your progress notes daily for the duration of treatment until resolved, and include in your discharge summary.    Form No. [id number]

## 2023-05-03 NOTE — PLAN OF CARE
"Rec'd communication from Yumiko CM with [facility] that transfer could take place today.  Per nurse Wright pt would have to transfer by [facility].  Communication to Dr. Diaz re: d/c and Dr. Diaz states she is awaiting Neuro as pt was c/o " dizzy spells" yesterday.  Communication sent to Yumiko with [facility] and updated nurse Adriana.  "

## 2023-05-03 NOTE — ASSESSMENT & PLAN NOTE
Multifactorial    Will order extended EEG   MRI brain w w/o (if able to have it with IVC filter)  Metabolic insults, including electrolyte correction, per primary team   Fall precautions  Continue PT/OT

## 2023-05-03 NOTE — PT/OT/SLP PROGRESS
Physical Therapy Treatment    Patient Name:  Melodie Villarreal   MRN:  [id number]    Recommendations:     Discharge Recommendations: Swing bed  Discharge Equipment Recommendations: lift device  Barriers to discharge:  Decreased functional mobility    Assessment:     Melodie Villarreal is a 89 y.o. female admitted with a medical diagnosis of Sepsis.  She presents with the following impairments/functional limitations: weakness, gait instability, impaired endurance, impaired balance, impaired functional mobility, impaired self care skills, impaired cognition, decreased safety awareness.    Noted questionable change in neuro status. Neurology consulted, EEG and CT of head negative thus far. Cleared to continue therapy. Conference with evaluating PT prior to session.     Rehab Prognosis: Fair; patient would benefit from acute skilled PT services to address these deficits and reach maximum level of function.    Recent Surgery: Procedure(s) (LRB):  CYSTOSCOPY, WITH URETERAL STENT INSERTION (Right) 8 Days Post-Op    Plan:     During this hospitalization, patient to be seen 6 x/week to address the identified rehab impairments via gait training, therapeutic activities, therapeutic exercises and progress toward the following goals:    Plan of Care Expires:  05/26/23    Subjective     Chief Complaint: no pain at rest noted; R knee with ROM  Pain/Comfort:  0/10      Objective:     Communicated with RN prior to session.  Patient found HOB elevated upon PTA entry to room. Son in room.     General Precautions: Standard, fall, contact  Orthopedic Precautions: N/A  Braces: N/A  Respiratory Status: Room air  Skin Integrity: Visible skin intact      Functional Mobility:  Bed Mobility:   Supine<->sit with total assist  Balance: Anxiety and fear of falling upon sitting EOB however able to calm and distract pt. Improved posterior lean with distraction. Max assist to maintain midline followed by ~ 1 minute of min assist then began to require max assist  again. Pt requesting to return to bed.     Therex:   RLE LAQ's x 9 reps; pain with knee flexion noted.   LLE LAQ's x 2 reps; pt declined to continue and requested to return to bed.          GOALS:   Multidisciplinary Problems       Physical Therapy Goals          Problem: Physical Therapy    Goal Priority Disciplines Outcome Goal Variances Interventions   Physical Therapy Goal     PT, PT/OT Ongoing, Progressing     Description: Goals to be met by: 2023     Patient will increase functional independence with mobility by performin. Supine to sit with Stand-by Assistance  2. Sit to stand transfer with Contact Guard Assistance  3. Gait  x 150 feet with Contact Guard Assistance using Rolling Walker.   4. Pt will ascend/descend 3 steps with HR and Min A.                          Time Tracking:     Billable Minutes: Therapeutic Activity 1 unit    Treatment Type: Treatment  PT/PTA: PTA     Number of PTA visits since last PT visit: 2023

## 2023-05-03 NOTE — PROGRESS NOTES
Pharmacokinetic Assessment Follow Up: IV Vancomycin    Vancomycin serum concentration assessment(s):    The trough level was drawn correctly and can be used to guide therapy at this time. The measurement is within the desired definitive target range of 15 to 20 mcg/mL.    Vancomycin Regimen Plan:    Continue regimen to Vancomycin 500 mg IV every 12 hours with next serum trough concentration measured at 1100 prior to the dose on 05/06    Drug levels (last 3 results):  Recent Labs   Lab Result Units 04/30/23  1412 05/01/23  1458 05/03/23  1111   Vanc Lvl Random ug/ml 15.0 14.2*  --    Vancomycin Trough ug/ml  --   --  17.7       Pharmacy will continue to follow and monitor vancomycin.    Please contact pharmacy at extension [phone] for questions regarding this assessment.    Thank you for the consult,   Roney Delvalle, PharmD       Patient brief summary:  Melodie Villarreal is a 89 y.o. female initiated on antimicrobial therapy with IV Vancomycin for treatment of sepsis    The patient's current regimen is vancomycin 500 mg IV Q12H    Drug Allergies:   Review of patient's allergies indicates:   Allergen Reactions    Ace inhibitors Other (See Comments)    Adhesive      Allergic to tape    Bactrim [sulfamethoxazole-trimethoprim] Other (See Comments)     Confusion, Hypoglycemia    Meperidine Other (See Comments)    Midazolam Other (See Comments)    Atorvastatin Nausea Only    Codeine Other (See Comments) and Anxiety    Tapentadol Rash       Actual Body Weight:   56.6 kg    Renal Function:   Estimated Creatinine Clearance: 44.6 mL/min (based on SCr of 0.68 mg/dL).,     Dialysis Method (if applicable):  N/A    CBC (last 72 hours):  Recent Labs   Lab Result Units 05/02/23  1545   WBC x10(3)/mcL 10.36   Hgb g/dL 10.0*   Hct % 31.7*   Platelet x10(3)/mcL 378   Mono % % 9.0   Eos % % 5.4   Basophil % % 0.8       Metabolic Panel (last 72 hours):  Recent Labs   Lab Result Units 05/02/23  1203 05/02/23  1647 05/03/23  0500   Sodium Level  mmol/L 127* 128*  --    Urine Sodium mmol/L  --   --  29.0   Potassium Level mmol/L 2.9* 3.4*  --    Chloride mmol/L 84* 84*  --    Carbon Dioxide mmol/L 37* 37*  --    Glucose Level mg/dL 54* 150*  --    Blood Urea Nitrogen mg/dL 14.0 20.8*  --    Creatinine mg/dL 0.64 0.68  --    Albumin Level g/dL 1.9*  --   --    Bilirubin Total mg/dL 0.4  --   --    Alkaline Phosphatase unit/L 84  --   --    Aspartate Aminotransferase unit/L 14  --   --    Alanine Aminotransferase unit/L 11  --   --        Vancomycin Administrations:  vancomycin given in the last 96 hours                     vancomycin 500 mg in dextrose 5 % 100 mL IVPB (ready to mix) (mg) 500 mg New Bag 05/02/23 2358     500 mg New Bag  1152     500 mg New Bag 05/01/23 2051    vancomycin 750 mg in dextrose 5 % 250 mL IVPB (ready to mix) (mg) 750 mg New Bag 04/30/23 1551    vancomycin 750 mg in dextrose 5 % 250 mL IVPB (ready to mix) (mg) 750 mg New Bag 04/29/23 1556                    Microbiologic Results:  Microbiology Results (last 7 days)       Procedure Component Value Units Date/Time    Blood Culture [[id number]]  (Normal) Collected: 04/23/23 0820    Order Status: Completed Specimen: Blood from Arm, Right Updated: 04/28/23 1100     CULTURE, BLOOD ([facility]) No Growth at 5 days    Blood Culture [[id number]]  (Normal) Collected: 04/23/23 0826    Order Status: Completed Specimen: Blood from Arm, Right Updated: 04/28/23 1100     CULTURE, BLOOD ([facility]) No Growth at 5 days

## 2023-05-03 NOTE — CONSULTS
Nephrology  Consults  Chief Complaint   Patient presents with    Fatigue     Pt to ED with c/o generalized weakness, dysuria, and dark colored urine since this morning. Hypotensive 66/36 initially on EMS arrival     HPI: 89-year-old female presents to the ER on 4/17/21 after she became lethargic.  She hypotensive and has chronic open wounds on her left lower extremity better followed closely by Wound Care.  She was admitted and treated for MRSA sepsis, d-CHF, (R) hydro with stent placement, non-healing LLE wounds with bilat iliac occlusive disease and encephalopathy. We were consulted for hyponatremia.  Daughter states patient is normally alert and oriented, active and able to carry out own ADLs.  She states patient     Review of Systems   All other systems negative except for HPI      Past Medical History:   Diagnosis Date    Adenocarcinoma of uterus     Bladder cancer     Deep vein thrombosis     Essential (primary) hypertension     Heart murmur     High cholesterol     Hydronephrosis 4/25/2023    Hypertriglyceridemia     Insomnia     Osteopenia     Other pulmonary embolism without acute cor pulmonale     Personal history of colonic polyps     Rheumatoid arthritis, unspecified     Type 2 diabetes mellitus without complications         Past Surgical History:   Procedure Laterality Date    CHOLECYSTECTOMY      colonscopy  06/20/2012    CYSTOSCOPY W/ URETERAL STENT PLACEMENT Right 4/25/2023    Procedure: CYSTOSCOPY, WITH URETERAL STENT INSERTION;  Surgeon: Anyi Bearden MD;  Location: [facility];  Service: Urology;  Laterality: Right;    ECCE  01/09/2013    estracapsular cataract removal   02/20/2013    insertion of intrpocular lens prosthesis   02/20/2013    phacoemulsifiaction and aspiration of cataract  02/20/2013    phacoemulsificaion and aspiration of cataract      total abdominal hysterectomy and bilateral salpingooophorectomy  1991        Family History   Problem Relation Age of Onset    Diabetes  Mother     Heart failure Mother     Cirrhosis Father     Cancer Sister     Hypertension Sister     Diabetes Sister     Celiac disease Sister     Coronary artery disease Sister     Lupus Sister     Uterine cancer Sister     Kidney failure Sister     Ovarian cysts Sister     Cancer Brother     Diabetes Brother     Coronary artery disease Brother         Social History     Socioeconomic History    Marital status:     Number of children: 2   Occupational History    Occupation: retired   Tobacco Use    Smoking status: Never    Smokeless tobacco: Never   Substance and Sexual Activity    Alcohol use: Never    Drug use: Never    Sexual activity: Not Currently        Review of patient's allergies indicates:   Allergen Reactions    Ace inhibitors Other (See Comments)    Adhesive      Allergic to tape    Bactrim [sulfamethoxazole-trimethoprim] Other (See Comments)     Confusion, Hypoglycemia    Meperidine Other (See Comments)    Midazolam Other (See Comments)    Atorvastatin Nausea Only    Codeine Other (See Comments) and Anxiety    Tapentadol Rash     Current Outpatient Medications   Medication Instructions    blood sugar diagnostic (EASY TOUCH TEST STRIP) Strp 1 each, Misc.(Non-Drug; Combo Route), Daily, Use to test blood glucose once daily    blood-glucose meter Misc Easy Touch use as directed    calcium-vitamin D3 (OS-SUSAN 500 + D3) 500 mg-5 mcg (200 unit) per tablet 1 tablet, Oral, Daily    ferrous gluconate (FERGON) 324 mg, Oral, With breakfast    glimepiride (AMARYL) 4 MG tablet 1 tablet in the AM and 1/2 tablet in the PM    lancets Misc Easy Touch Twist 30G Lancets to use with insurance preferred meter Diagnosis Code: E11.9.  Test once daily.    losartan-hydrochlorothiazide 100-12.5 mg (HYZAAR) 100-12.5 mg Tab 1 tablet, Oral, Daily    metFORMIN (GLUCOPHAGE) 1,000 mg, Oral, 2 times daily with meals    multivitamin capsule 1 capsule, Oral, Daily    mupirocin (BACTROBAN) 2 % ointment Topical (Top), 3 times daily     pravastatin (PRAVACHOL) 40 mg, Oral, Daily    SANTYL ointment APPLY TO LEFT FOOT WOUND DAILY    traMADoL (ULTRAM) 50 mg, Oral, Every 6 hours PRN    traZODone (DESYREL) 100 mg, Oral, Nightly PRN     Objective   VITAL SIGNS: 24 HR MIN & MAX LAST    Temp  Min: 97.5 °F (36.4 °C)  Max: 99 °F (37.2 °C)  97.5 °F (36.4 °C)        BP  Min: 94/54  Max: 138/72  118/69     Pulse  Min: 79  Max: 97  80     Resp  Min: 16  Max: 18  16    SpO2  Min: 86 %  Max: 96 %  96 %      Wt Readings from Last 3 Encounters:   04/24/23 56.6 kg (124 lb 12.5 oz)   04/10/23 54.4 kg (120 lb)   02/09/23 85.3 kg (188 lb)       Intake/Output Summary (Last 24 hours) at 5/3/2023 1306  Last data filed at 5/3/2023 0500  Gross per 24 hour   Intake 1500 ml   Output 180 ml   Net 1320 ml     General:  Alert and answering questions  Psychiatric:  Cooperative, Appropriate mood & affect.    Eye:  Within normal limits, Normal conjunctiva.    HENT:  Normocephalic, Oral mucosa is moist.    Respiratory: Bilaterally CTA, un-labored.    Cardiovascular:  Normal rate, 1-2 BLE edema.    Gastrointestinal:  Soft, NT, ND  Musculoskeletal:  Normal strength, No deformity.    Integumentary:  Warm, No rash.    Recent Labs     05/02/23  1203 05/02/23  1647 05/03/23  1111   * 128* 128*   K 2.9* 3.4* 4.2   CHLORIDE 84* 84* 83*   CO2 37* 37* 35*   BUN 14.0 20.8* 18.4   CREATININE 0.64 0.68 0.62   GLUCOSE 54* 150* 63*   CALCIUM 8.3* 8.3* 8.3*   PHOS  --   --  3.3   ALBUMIN 1.9*  --  2.0*      Recent Labs     05/02/23  1545   WBC 10.36   HGB 10.0*             ASSESSMENT PLAN      HypoNa  Met Alk  D-CHF, grade 2  VHD, mm-AS, mi-MS, mo-MR, mi-TR  BLE PAD with non healing LLE wound  MRSA sepsis  (R) hydro, s/p stent    UrNa low and UrOsm low in the setting of loop diuretics.  Stop Loop, give 1 L NS @ 75/hr, monitor Resp status (d-CHF)

## 2023-05-03 NOTE — HPI
89-year-old female with PMH DM 2, DVT s/p IVC filter, and nonhealing left ankle wound who presented to ED on 04/16 for lethargy and blank staring spells.  Hospitalization has been complicated by bacteremia/sepsis, acute on chronic diastolic heart failure, pulmonary hypertension, hyponatremia, hypokalemia, and constipation.  Patient's daughter at bedside reports nonhealing left ankle wound began around November of 2022 which is when family also started to notice changes in her cognition.  Patient's daughter reports prior to this patient was able to perform ADLs independently and ambulate without assistance.  Patient's daughter reports since then patient has had altered cognition, ambulates with walker, and she now lives with her mother.  She also reported that blank staring spells began just PTA, and have happened periodically throughout this admission.  Denies known history of seizures.    CT head without on 05/02 unrevealing for acute intracranial abnormalities.  Labs significant for normocytic anemia, hyponatremia, and hypokalemia, otherwise unremarkable.  Initial EEG unrevealing for seizure activity.

## 2023-05-03 NOTE — SUBJECTIVE & OBJECTIVE
Past Medical History:   Diagnosis Date    Adenocarcinoma of uterus     Bladder cancer     Deep vein thrombosis     Essential (primary) hypertension     Heart murmur     High cholesterol     Hydronephrosis 4/25/2023    Hypertriglyceridemia     Insomnia     Osteopenia     Other pulmonary embolism without acute cor pulmonale     Personal history of colonic polyps     Rheumatoid arthritis, unspecified     Type 2 diabetes mellitus without complications        Past Surgical History:   Procedure Laterality Date    CHOLECYSTECTOMY      colonscopy  06/20/2012    CYSTOSCOPY W/ URETERAL STENT PLACEMENT Right 4/25/2023    Procedure: CYSTOSCOPY, WITH URETERAL STENT INSERTION;  Surgeon: Anyi Bearden MD;  Location: [facility];  Service: Urology;  Laterality: Right;    ECCE  01/09/2013    estracapsular cataract removal   02/20/2013    insertion of intrpocular lens prosthesis   02/20/2013    phacoemulsifiaction and aspiration of cataract  02/20/2013    phacoemulsificaion and aspiration of cataract      total abdominal hysterectomy and bilateral salpingooophorectomy  1991       Review of patient's allergies indicates:   Allergen Reactions    Ace inhibitors Other (See Comments)    Adhesive      Allergic to tape    Bactrim [sulfamethoxazole-trimethoprim] Other (See Comments)     Confusion, Hypoglycemia    Meperidine Other (See Comments)    Midazolam Other (See Comments)    Atorvastatin Nausea Only    Codeine Other (See Comments) and Anxiety    Tapentadol Rash       Current Neurological Medications:     No current facility-administered medications on file prior to encounter.     Current Outpatient Medications on File Prior to Encounter   Medication Sig    calcium-vitamin D3 (OS-SUSAN 500 + D3) 500 mg-5 mcg (200 unit) per tablet Take 1 tablet by mouth once daily.    ferrous gluconate (FERGON) 324 MG tablet Take 324 mg by mouth daily with breakfast.    glimepiride (AMARYL) 4 MG tablet 1 tablet in the AM and 1/2 tablet in the PM  (Patient taking differently: Take 2 mg by mouth as needed. 1 tablet in the AM and 1/2 tablet in the PM)    lancets Misc Easy Touch Twist 30G Lancets to use with insurance preferred meter Diagnosis Code: E11.9.  Test once daily.    losartan-hydrochlorothiazide 100-12.5 mg (HYZAAR) 100-12.5 mg Tab Take 1 tablet by mouth once daily.    metFORMIN (GLUCOPHAGE) 1000 MG tablet Take 1 tablet (1,000 mg total) by mouth 2 (two) times daily with meals.    multivitamin capsule Take 1 capsule by mouth once daily.    pravastatin (PRAVACHOL) 40 MG tablet Take 1 tablet (40 mg total) by mouth once daily.    traMADoL (ULTRAM) 50 mg tablet Take 1 tablet (50 mg total) by mouth every 6 (six) hours as needed for Pain.    traZODone (DESYREL) 100 MG tablet Take 1 tablet (100 mg total) by mouth nightly as needed for Insomnia.    blood sugar diagnostic (EASY TOUCH TEST STRIP) Strp 1 each by Misc.(Non-Drug; Combo Route) route once daily. Use to test blood glucose once daily    blood-glucose meter Misc Easy Touch use as directed    mupirocin (BACTROBAN) 2 % ointment Apply topically 3 (three) times daily.    SANTYL ointment APPLY TO LEFT FOOT WOUND DAILY     Family History       Problem Relation (Age of Onset)    Cancer Sister, Brother    Celiac disease Sister    Cirrhosis Father    Coronary artery disease Sister, Brother    Diabetes Mother, Sister, Brother    Heart failure Mother    Hypertension Sister    Kidney failure Sister    Lupus Sister    Ovarian cysts Sister    Uterine cancer Sister          Tobacco Use    Smoking status: Never    Smokeless tobacco: Never   Substance and Sexual Activity    Alcohol use: Never    Drug use: Never    Sexual activity: Not Currently     Review of Systems  A 14pt ros was reviewed & is negative unless o/w documented in the hpi    Objective:     Vital Signs (Most Recent):  Temp: 97.5 °F (36.4 °C) (05/03/23 0859)  Pulse: 80 (05/03/23 0859)  Resp: 16 (05/03/23 0859)  BP: 118/69 (05/03/23 0859)  SpO2: 96 %  (05/03/23 0859) Vital Signs (24h Range):  Temp:  [97.5 °F (36.4 °C)-99 °F (37.2 °C)] 97.5 °F (36.4 °C)  Pulse:  [79-97] 80  Resp:  [16-18] 16  SpO2:  [86 %-96 %] 96 %  BP: ()/(54-73) 118/69     Weight: 56.6 kg (124 lb 12.5 oz)  Body mass index is 22.1 kg/m².    Physical Exam  Vitals reviewed.   Constitutional:       General: She is awake.      Appearance: Normal appearance.   HENT:      Head: Normocephalic and atraumatic.      Nose: Nose normal.      Mouth/Throat:      Mouth: Mucous membranes are moist.      Pharynx: Oropharynx is clear.   Eyes:      Extraocular Movements: Extraocular movements intact and EOM normal.      Pupils: Pupils are equal, round, and reactive to light.   Pulmonary:      Effort: Pulmonary effort is normal.   Musculoskeletal:         General: Normal range of motion.      Cervical back: Normal range of motion.   Skin:     General: Skin is warm and dry.   Neurological:      Mental Status: She is alert.   Psychiatric:         Speech: Speech normal.         Behavior: Behavior is cooperative.       NEUROLOGICAL EXAMINATION:     MENTAL STATUS   Oriented to person.   Oriented to place.   Disoriented to time. Disoriented to year and day.   Follows 1 step commands.   Attention: decreased. Concentration: decreased.   Speech: speech is normal   Level of consciousness: alert  Knowledge: poor.   Able to name object.     CRANIAL NERVES     CN II   Visual fields full to confrontation.     CN III, IV, VI   Pupils are equal, round, and reactive to light.  Extraocular motions are normal.   Nystagmus: none   Conjugate gaze: present    CN V   Facial sensation intact.     CN VII   Facial expression full, symmetric.     MOTOR EXAM   Right arm pronator drift: absent  Left arm pronator drift: absent    Strength   Left strength: LLE weaker d/t infection  Right deltoid: 5/5  Left deltoid: 5/5  Right biceps: 5/5  Left biceps: 5/5  Right triceps: 5/5  Left triceps: 5/5  Right quadriceps: 4/5  Left quadriceps:  3/5  Right hamstrin/5  Left hamstring: 3/5  Right glutei: 4/5  Left glutei: 3/5  Right anterior tibial: 4/5  Left anterior tibial: 2/5  Right posterior tibial: 4/5  Left posterior tibial: 2/5    REFLEXES     Reflexes   Right plantar: normal  Left plantar: normal  Right ankle clonus: absent  Left ankle clonus: absent    SENSORY EXAM   Light touch normal.     Significant Labs:   Recent Lab Results         23  1111   23  0500   23  1647   23  1545        Ammonia       44.1       Anion Gap     7.0         Baso #       0.08       Basophil %       0.8       BUN     20.8         BUN/CREAT RATIO     31         Calcium     8.3         Chloride     84         CO2     37         Creatinine     0.68         eGFR     >60         Eos #       0.56       Eosinophil %       5.4       Glucose     150         Hematocrit       31.7       Hemoglobin       10.0       Immature Grans (Abs)       0.04       Immature Granulocytes       0.4       Lymph #       1.23       LYMPH %       11.9       MCH       26.7       MCHC       31.5       MCV       84.8       Mono #       0.93       Mono %       9.0       MPV       9.2       Neut #       7.52       Neut %       72.5       nRBC       0.0       Osmolality     277         Osmolality, Urine   276           Platelets       378       Potassium     3.4         RBC       3.74       RDW       15.2       Sodium     128         Sodium, Urine   29.0           Vancomycin-Trough 17.7             WBC       10.36               Significant Imaging:   CT head w/o 2023:  FINDINGS:  No acute intracranial hemorrhage, edema or mass. No acute parenchymal abnormality.     Diffuse cerebral atrophy with concordant ventricular enlargement.     Scattered hypodensities throughout the deep periventricular white matter.     The osseous structures are normal.     The mastoid air cells are clear.     Debris within the bilateral auditory canals.     The globes and orbital contents are normal  bilaterally.     The visualized maxillary, ethmoid and sphenoid sinuses are clear.     Impression:     No acute intracranial abnormality identified.  Findings of mild microvascular ischemic disease.     EEG 5/2/2023:  IMPRESSION:   Abnormal EEG due to findings consistent with a nonspecific bilateral brain dysfunction.  Electrographic seizures were not detected.    I have reviewed all pertinent imaging results/findings within the past 24 hours.

## 2023-05-03 NOTE — PT/OT/SLP PROGRESS
Occupational Therapy   Treatment    Name: Melodie Villarreal  MRN: [id number]  Admitting Diagnosis:  Sepsis  8 Days Post-Op    Recommendations:     Discharge Recommendations: nursing facility, basic  Discharge Equipment Recommendations:  walker, rolling  Barriers to discharge:       Assessment:     Melodie Villarreal is a 89 y.o. female with a medical diagnosis of Sepsis.  Performance deficits affecting function are weakness, impaired endurance, impaired self care skills, impaired functional mobility, impaired balance, impaired cognition, decreased lower extremity function, decreased safety awareness, pain, impaired skin.     Rehab Prognosis:  Good; patient would benefit from acute skilled OT services to address these deficits and reach maximum level of function.       Plan:     Patient to be seen 5 x/week to address the above listed problems via self-care/home management, therapeutic activities, therapeutic exercises  Plan of Care Expires: 05/10/23  Plan of Care Reviewed with: patient, son    Subjective     Pain/Comfort:       Objective:     Communicated with: RN prior to session.  Patient found HOB elevated with   upon OT entry to room.    General Precautions: Standard,      Orthopedic Precautions:   Braces:       Occupational Performance:     Bed Mobility:    Patient completed Rolling/Turning to Left with  maximal assistance  Patient completed Rolling/Turning to Right with maximal assistance       Activities of Daily Living:  Toileting: total assistance in sidelying following urination and bowel movement with Rn called to change sacral protection barrier.      Therapeutic Positioning    OT interventions performed during the course of today's session in an effort to prevent and/or reduce acquired pressure injuries:   Education on Pressure Ulcer Prevention provided      Patient Education:  daughter/s provided with verbal education regarding OT role/goals/POC.  Understanding was verbalized.      Patient left HOB elevated with all  lines intact and call button in reach    GOALS:   Multidisciplinary Problems       Occupational Therapy Goals          Problem: Occupational Therapy    Goal Priority Disciplines Outcome Interventions   Occupational Therapy Goal     OT, PT/OT Ongoing, Progressing    Description: Goals to be met by: 5/10/23     Patient will increase functional independence with ADLs by performing:    UE Dressing with Contact Guard Assistance.  LE Dressing with Moderate Assistance and Assistive Devices as needed.  Grooming while EOB with Contact Guard Assistance.  Sitting at edge of bed x10 minutes with Contact Guard Assistance.                         Time Tracking:     OT Date of Treatment: 05/03/23  OT Start Time: 1209  OT Stop Time: 1233  OT Total Time (min): 24 min    Billable Minutes:Self Care/Home Management 2    OT/STANISLAW: STANISLAW     Number of STANISLAW visits since last OT visit: 3    5/3/2023

## 2023-05-03 NOTE — CONSULTS
[facility] - 6th Floor Medical Telemetry  Neurology  Consult Note    Patient Name: Melodie Villarreal  MRN: [id number]  Admission Date: 4/16/2023  Hospital Length of Stay: 17 days  Code Status: Partial Code   Attending Provider: Crescencio Villeda MD   Consulting Provider: Valarie Daniel [facility]  Primary Care Physician: Wisam Espinosa Jr, MD  Principal Problem:Sepsis    Inpatient consult to Neurology  Consult performed by: SEBASTIAN Sterling  Consult ordered by: Mady Diaz MD         Subjective:     Chief Complaint:       HPI:   89-year-old female with PMH DM 2, DVT s/p IVC filter, and nonhealing left ankle wound who presented to ED on 04/16 for lethargy and blank staring spells.  Hospitalization has been complicated by bacteremia/sepsis, acute on chronic diastolic heart failure, pulmonary hypertension, hyponatremia, hypokalemia, and constipation.  Patient's daughter at bedside reports nonhealing left ankle wound began around November of 2022 which is when family also started to notice changes in her cognition.  Patient's daughter reports prior to this patient was able to perform ADLs independently and ambulate without assistance.  Patient's daughter reports since then patient has had altered cognition, ambulates with walker, and she now lives with her mother.  She also reported that blank staring spells began just PTA, and have happened periodically throughout this admission.  Denies known history of seizures.    CT head without on 05/02 unrevealing for acute intracranial abnormalities.  Labs significant for normocytic anemia, hyponatremia, and hypokalemia, otherwise unremarkable.  Initial EEG unrevealing for seizure activity.       Past Medical History:   Diagnosis Date    Adenocarcinoma of uterus     Bladder cancer     Deep vein thrombosis     Essential (primary) hypertension     Heart murmur     High cholesterol     Hydronephrosis 4/25/2023    Hypertriglyceridemia     Insomnia     Osteopenia      Other pulmonary embolism without acute cor pulmonale     Personal history of colonic polyps     Rheumatoid arthritis, unspecified     Type 2 diabetes mellitus without complications        Past Surgical History:   Procedure Laterality Date    CHOLECYSTECTOMY      colonscopy  06/20/2012    CYSTOSCOPY W/ URETERAL STENT PLACEMENT Right 4/25/2023    Procedure: CYSTOSCOPY, WITH URETERAL STENT INSERTION;  Surgeon: Anyi Bearden MD;  Location: [facility];  Service: Urology;  Laterality: Right;    ECCE  01/09/2013    estracapsular cataract removal   02/20/2013    insertion of intrpocular lens prosthesis   02/20/2013    phacoemulsifiaction and aspiration of cataract  02/20/2013    phacoemulsificaion and aspiration of cataract      total abdominal hysterectomy and bilateral salpingooophorectomy  1991       Review of patient's allergies indicates:   Allergen Reactions    Ace inhibitors Other (See Comments)    Adhesive      Allergic to tape    Bactrim [sulfamethoxazole-trimethoprim] Other (See Comments)     Confusion, Hypoglycemia    Meperidine Other (See Comments)    Midazolam Other (See Comments)    Atorvastatin Nausea Only    Codeine Other (See Comments) and Anxiety    Tapentadol Rash       Current Neurological Medications:     No current facility-administered medications on file prior to encounter.     Current Outpatient Medications on File Prior to Encounter   Medication Sig    calcium-vitamin D3 (OS-SUSAN 500 + D3) 500 mg-5 mcg (200 unit) per tablet Take 1 tablet by mouth once daily.    ferrous gluconate (FERGON) 324 MG tablet Take 324 mg by mouth daily with breakfast.    glimepiride (AMARYL) 4 MG tablet 1 tablet in the AM and 1/2 tablet in the PM (Patient taking differently: Take 2 mg by mouth as needed. 1 tablet in the AM and 1/2 tablet in the PM)    lancets Misc Easy Touch Twist 30G Lancets to use with insurance preferred meter Diagnosis Code: E11.9.  Test once daily.     losartan-hydrochlorothiazide 100-12.5 mg (HYZAAR) 100-12.5 mg Tab Take 1 tablet by mouth once daily.    metFORMIN (GLUCOPHAGE) 1000 MG tablet Take 1 tablet (1,000 mg total) by mouth 2 (two) times daily with meals.    multivitamin capsule Take 1 capsule by mouth once daily.    pravastatin (PRAVACHOL) 40 MG tablet Take 1 tablet (40 mg total) by mouth once daily.    traMADoL (ULTRAM) 50 mg tablet Take 1 tablet (50 mg total) by mouth every 6 (six) hours as needed for Pain.    traZODone (DESYREL) 100 MG tablet Take 1 tablet (100 mg total) by mouth nightly as needed for Insomnia.    blood sugar diagnostic (EASY TOUCH TEST STRIP) Strp 1 each by Misc.(Non-Drug; Combo Route) route once daily. Use to test blood glucose once daily    blood-glucose meter Misc Easy Touch use as directed    mupirocin (BACTROBAN) 2 % ointment Apply topically 3 (three) times daily.    SANTYL ointment APPLY TO LEFT FOOT WOUND DAILY     Family History       Problem Relation (Age of Onset)    Cancer Sister, Brother    Celiac disease Sister    Cirrhosis Father    Coronary artery disease Sister, Brother    Diabetes Mother, Sister, Brother    Heart failure Mother    Hypertension Sister    Kidney failure Sister    Lupus Sister    Ovarian cysts Sister    Uterine cancer Sister          Tobacco Use    Smoking status: Never    Smokeless tobacco: Never   Substance and Sexual Activity    Alcohol use: Never    Drug use: Never    Sexual activity: Not Currently     Review of Systems  A 14pt ros was reviewed & is negative unless o/w documented in the hpi    Objective:     Vital Signs (Most Recent):  Temp: 97.5 °F (36.4 °C) (05/03/23 0859)  Pulse: 80 (05/03/23 0859)  Resp: 16 (05/03/23 0859)  BP: 118/69 (05/03/23 0859)  SpO2: 96 % (05/03/23 0859) Vital Signs (24h Range):  Temp:  [97.5 °F (36.4 °C)-99 °F (37.2 °C)] 97.5 °F (36.4 °C)  Pulse:  [79-97] 80  Resp:  [16-18] 16  SpO2:  [86 %-96 %] 96 %  BP: ()/(54-73) 118/69     Weight: 56.6 kg (124 lb  12.5 oz)  Body mass index is 22.1 kg/m².    Physical Exam  Vitals reviewed.   Constitutional:       General: She is awake.      Appearance: Normal appearance.   HENT:      Head: Normocephalic and atraumatic.      Nose: Nose normal.      Mouth/Throat:      Mouth: Mucous membranes are moist.      Pharynx: Oropharynx is clear.   Eyes:      Extraocular Movements: Extraocular movements intact and EOM normal.      Pupils: Pupils are equal, round, and reactive to light.   Pulmonary:      Effort: Pulmonary effort is normal.   Musculoskeletal:         General: Normal range of motion.      Cervical back: Normal range of motion.   Skin:     General: Skin is warm and dry.   Neurological:      Mental Status: She is alert.   Psychiatric:         Speech: Speech normal.         Behavior: Behavior is cooperative.       NEUROLOGICAL EXAMINATION:     MENTAL STATUS   Oriented to person.   Oriented to place.   Disoriented to time. Disoriented to year and day.   Follows 1 step commands.   Attention: decreased. Concentration: decreased.   Speech: speech is normal   Level of consciousness: alert  Knowledge: poor.   Able to name object.     CRANIAL NERVES     CN II   Visual fields full to confrontation.     CN III, IV, VI   Pupils are equal, round, and reactive to light.  Extraocular motions are normal.   Nystagmus: none   Conjugate gaze: present    CN V   Facial sensation intact.     CN VII   Facial expression full, symmetric.     MOTOR EXAM   Right arm pronator drift: absent  Left arm pronator drift: absent    Strength   Left strength: LLE weaker d/t infection  Right deltoid: 5/5  Left deltoid: 5/5  Right biceps: 5/5  Left biceps: 5/5  Right triceps: 5/5  Left triceps: 5/5  Right quadriceps: 4/5  Left quadriceps: 3/5  Right hamstrin/5  Left hamstring: 3/5  Right glutei: 4/5  Left glutei: 3/5  Right anterior tibial: 4/5  Left anterior tibial: 2/5  Right posterior tibial: 4/5  Left posterior tibial: 2/5    REFLEXES     Reflexes   Right  plantar: normal  Left plantar: normal  Right ankle clonus: absent  Left ankle clonus: absent    SENSORY EXAM   Light touch normal.     Significant Labs:   Recent Lab Results         05/03/23  1111   05/03/23  0500   05/02/23  1647   05/02/23  1545        Ammonia       44.1       Anion Gap     7.0         Baso #       0.08       Basophil %       0.8       BUN     20.8         BUN/CREAT RATIO     31         Calcium     8.3         Chloride     84         CO2     37         Creatinine     0.68         eGFR     >60         Eos #       0.56       Eosinophil %       5.4       Glucose     150         Hematocrit       31.7       Hemoglobin       10.0       Immature Grans (Abs)       0.04       Immature Granulocytes       0.4       Lymph #       1.23       LYMPH %       11.9       MCH       26.7       MCHC       31.5       MCV       84.8       Mono #       0.93       Mono %       9.0       MPV       9.2       Neut #       7.52       Neut %       72.5       nRBC       0.0       Osmolality     277         Osmolality, Urine   276           Platelets       378       Potassium     3.4         RBC       3.74       RDW       15.2       Sodium     128         Sodium, Urine   29.0           Vancomycin-Trough 17.7             WBC       10.36               Significant Imaging:   CT head w/o 5/2/2023:  FINDINGS:  No acute intracranial hemorrhage, edema or mass. No acute parenchymal abnormality.     Diffuse cerebral atrophy with concordant ventricular enlargement.     Scattered hypodensities throughout the deep periventricular white matter.     The osseous structures are normal.     The mastoid air cells are clear.     Debris within the bilateral auditory canals.     The globes and orbital contents are normal bilaterally.     The visualized maxillary, ethmoid and sphenoid sinuses are clear.     Impression:     No acute intracranial abnormality identified.  Findings of mild microvascular ischemic disease.     EEG 5/2/2023:  IMPRESSION:    Abnormal EEG due to findings consistent with a nonspecific bilateral brain dysfunction.  Electrographic seizures were not detected.    I have reviewed all pertinent imaging results/findings within the past 24 hours.    Assessment and Plan:     Encephalopathy  Multifactorial    Will order extended EEG   MRI brain w w/o (if able to have it with IVC filter)  Metabolic insults, including electrolyte correction, per primary team   Fall precautions  Continue PT/OT        VTE Risk Mitigation (From admission, onward)         Ordered     enoxaparin injection 40 mg  Daily         04/17/23 0156     IP VTE HIGH RISK PATIENT  Once         04/17/23 0156     Place sequential compression device  Until discontinued         04/17/23 0156                Thank you for your consult. Further recommendations to follow per MD Valarie Daniel, [facility]  Inpatient Neurology  [facility] - 6th Floor Medical Telemetry

## 2023-05-03 NOTE — PROGRESS NOTES
Inpatient Nutrition Assessment    Admit Date: 4/16/2023   Total duration of encounter: 17 days     Nutrition Recommendation/Prescription     Continue current diet as tolerated  Implement food preferences.  Continue Boost Glucose Control TID (provides 250 kcal and 14 g protein per container)  Continue MANDY BID (provides 90 kcal and 2.5 g protein per serving)  Encouraged adequate PO intake  Monitor PO intake, weight, labs    Communication of Recommendations: reviewed with patient and reviewed with family    Nutrition Assessment     Malnutrition Assessment/Nutrition-Focused Physical Exam    Malnutrition in the context of acute illness or injury  Degree of Malnutrition: non-severe (moderate) malnutrition  Energy Intake: does not meet criteria  Interpretation of Weight Loss: unable to obtain  Body Fat:mild depletion  Area of Body Fat Loss: upper arm region - triceps / biceps  Muscle Mass Loss: mild depletion  Area of Muscle Mass Loss: clavicle bone region - pectoralis major, deltoid, trapezius muscles, clavicle and acromion bone region - deltoid muscle, and scapular bone region - trapezius, supraspinus, infraspinus muscles  Fluid Accumulation: unable to obtain  Edema: unable to obtain   Reduced  Strength: unable to obtain  A minimum of two characteristics is recommended for diagnosis of either severe or non-severe malnutrition.    Chart Review    Reason Seen: continuous nutrition monitoring and follow-up    Malnutrition Screening Tool Results   Have you recently lost weight without trying?: No  Have you been eating poorly because of a decreased appetite?: No   MST Score: 0     Diagnosis:  Chronic left ankle/foot ulcerations, with no overt signs of infection  NSTEMI, likely type 2 demand ischemia   Elevated BNP with no reported history of CHF  Essential hypertension   Type 2 diabetes mellitus   Remote DVT/IVC filter placement, off anticoagulation    Relevant Medical History:    Adenocarcinoma of uterus      Bladder  cancer      Deep vein thrombosis      Essential (primary) hypertension      Heart murmur      High cholesterol      Hypertriglyceridemia      Insomnia      Osteopenia      Other pulmonary embolism without acute cor pulmonale      Personal history of colonic polyps      Rheumatoid arthritis, unspecified      Type 2 diabetes mellitus without complications        Nutrition-Related Medications: Scheduled Meds:   carvediloL  6.25 mg Oral BID    collagenase   Topical (Top) Daily    docusate  200 mg Oral BID    enoxaparin  40 mg Subcutaneous Daily    insulin detemir U-100  7 Units Subcutaneous QHS    Lactobacillus acidophilus  1 capsule Oral TID WM    magnesium oxide  400 mg Oral TID    polyethylene glycol  17 g Oral Daily    sodium chloride 0.9%  10 mL Intravenous Q6H    tamsulosin  0.4 mg Oral Daily    trazodone  100 mg Oral QHS    vancomycin (VANCOCIN) IVPB  500 mg Intravenous Q12H     Continuous Infusions:   sodium chloride 0.9%         PRN Meds:.acetaminophen, dextrose 10%, dextrose 10%, docusate sodium, glucagon (human recombinant), glucose, glucose, insulin aspart U-100, melatonin, senna, Flushing PICC Protocol **AND** sodium chloride 0.9% **AND** sodium chloride 0.9%, traMADoL, Pharmacy to dose Vancomycin consult **AND** vancomycin - pharmacy to dose    Calorie Containing IV Medications: no significant kcals from medications at this time    Nutrition-Related Labs:  4/18/2023: WBC 15.8, H/H 10.7/33.4, Na 130, Cl 95, Gluc 131  4/23/2023: WBC 11.8, H/H 9.7/29.5, Na 128, Cl 91, Crea 0.53, Ca 8.3, Mg 1.50, Gluc 141  4/28/2023: H/H 9.9/31.7, Cl 85, BUN 21.6, Gluc 120  5/3/2023: Na 128, Cl 83, Ca 8.3, Alb 2.0, Gluc 63    Diet/PN Order: Diet diabetic  Oral Supplement Order: Boost Glucose Control and Tanner  Tube Feeding Order: none  Appetite/Oral Intake: fair/50-75% of meals  Factors Affecting Nutritional Intake: decreased appetite  Food/Protestant/Cultural Preferences: none reported  Food Allergies: none reported    Skin  "Integrity: wound  Wound(s):      Altered Skin Integrity 04/17/23 Left posterior Ankle Full thickness tissue loss. Subcutaneous fat may be visible but bone, tendon or muscle are not exposed-Tissue loss description: Full thickness       Altered Skin Integrity 04/17/23 Left lateral Ankle Full thickness tissue loss. Subcutaneous fat may be visible but bone, tendon or muscle are not exposed-Tissue loss description: Full thickness 2 full thickness wounds per report (left posterior ankle and left lateral ankle)    Comments    4/19/2023: Pt's daughter reports good appetite/PO intake prior to admit with fair appetite/PO intake currently. Pt agreeable to vanilla Boost Glucose Control. Pt denies N/V/D and chewing/swallowing difficulties. The daughter reports constipation, last BM documented as 4/15/2023. The daughter reports possible wt loss within the past 3-4 months, unable to specify wt loss. Will add MANDY BID to promote wound healing.  Encouraged adequate PO intake. Will monitor.    4/24/2023: Pt busy at time of rounds. Pt has ~50% PO intake documented. No reported N/V. CT of abdomen/pelvis showed rectal  fecal impaction. Last BM documented as 4/23. No tolerance issues reported. Boost Glucose Control TID and MANDY BID ordered. Will monitor.    4/28/2023: The family member reports fair/PO intake. Pt receives Boost Glucose Control and MANDY BID. Obtained and implemented food preferences. Pt's family member denies N/V/D but reports constipation. Last BM documented as 4/23/2023. Pt receiving docusate sodium and miralax. Encouraged adequate PO intake. Will monitor.    5/3/2023: Pt's daughter reports 50-75% PO intake with a good appetite. The daughter denies N/V/D/C. Obtained and implemented food preferences. Boost Glucose Control TID and MANDY BID ordered. Last BM documented as 5/3/2023. Encouraged adequate PO intake. Will monitor.    Anthropometrics    Height: 5' 3" (160 cm) Height Method: Stated  Last Weight: 56.6 kg (124 lb " 12.5 oz) (23 0508) Weight Method: Bed Scale  BMI (Calculated): 22.1  BMI Classification: underweight (BMI less than 22 if >65 years of age)     Ideal Body Weight (IBW), Female: 115 lb     % Ideal Body Weight, Female (lb): 95.65 %                             Usual Weight Provided By: unable to obtain usual weight    Wt Readings from Last 5 Encounters:   23 56.6 kg (124 lb 12.5 oz)   04/10/23 54.4 kg (120 lb)   23 85.3 kg (188 lb)   23 55.8 kg (123 lb)   12/15/22 59 kg (130 lb)     Weight Change(s) Since Admission:  Admit Weight: 49.9 kg (110 lb) (23 1751)  2023: 49.9 kg  2023: 56.6 kg  2023: no new wts logged.  5/3/2023: no new wts logged.    Estimated Needs    Weight Used For Calorie Calculations: 49.9 kg (110 lb 0.2 oz)  Energy Calorie Requirements (kcal): 5200-7065 (30-35 kcal/kg)  Energy Need Method: Kcal/kg  Weight Used For Protein Calculations: 49.9 kg (110 lb 0.2 oz)  Protein Requirements:  (1.5-2.0 g/kg)  Fluid Requirements (mL): 1497 (1 mL/kcal)  Temp (24hrs), Av.1 °F (36.7 °C), Min:97.5 °F (36.4 °C), Max:99 °F (37.2 °C)         Enteral Nutrition    Patient not receiving enteral nutrition at this time.    Parenteral Nutrition    Patient not receiving parenteral nutrition support at this time.    Evaluation of Received Nutrient Intake    Calories: meeting estimated needs (with ONS)  Protein: meeting estimated needs (with ONS)    Patient Education    Not applicable.    Nutrition Diagnosis     PES: Malnutrition related to acute illness as evidenced by mild fat/muscle wasting. (continues)    Interventions/Goals     Intervention(s): general/healthful diet and commercial beverage  Goal: Consume % of meals/snacks by follow-up. (goal progressing)    Monitoring & Evaluation     Dietitian will monitor food and beverage intake, weight, electrolyte/renal panel, glucose/endocrine profile, and gastrointestinal profile.  Nutrition Risk/Follow-Up: moderate  (follow-up in 3-5 days)   Please consult if re-assessment needed sooner.

## 2023-05-03 NOTE — PHYSICIAN QUERY
PT Name: Melodie Villarreal  MR #: [id number]    DOCUMENTATION CLARIFICATION     CDS/: Steph Rankin  RN, BSN             Contact information:  [email or web address]     This form is a permanent document in the medical record.     Query Date: May 3, 2023    By submitting this query, we are merely seeking further clarification of documentation.. Please utilize your independent clinical judgment when addressing the question(s) below.    The medical record contains the following:   Indicators  Supporting Clinical Findings Location in Medical Record   x Energy Intake ~50% PO intake documented    5/3/2023: Pt's daughter reports 50-75% PO intake with a good appetite   RD, 5/3   x Weight Loss daughter reports possible wt loss within the past 3-4 months, unable to specify wt loss.    RD, 5/3   x Fat Loss Body Fat:mild depletion  Area of Body Fat Loss: upper arm region - triceps / biceps   RD, 5/3   x Muscle Loss Muscle Mass Loss: mild depletion  Area of Muscle Mass Loss: clavicle bone region - pectoralis major, deltoid, trapezius muscles, clavicle and acromion bone region - deltoid muscle, and scapular bone region - trapezius, supraspinus, infraspinus muscles   RD, 5/3    Edema/Fluid Accumulation      Reduced  Strength (by dynamometer)     x Weight, BMI, Usual Body Weight BMI (Calculated): 22.1    Last Weight: 56.6 kg (124 lb 12.5 oz) (04/24/23 0508)     04/10/23 54.4 kg (120 lb)  02/09/23 85.3 kg (188 lb)     RD, 5/3    Delayed Wound Healing     x Registered Dietician Diagnosis non-severe (moderate) malnutrition   RD, 5/3   x Acute or Chronic Illness history that includes type 2 diabetes mellitus, prior DVT/IVC filter no longer on anticoagulation and non-healing left ankle wound      Persistent MRSA Sepsis/Bacteremia, now with clearance as of 4/23  Left ankle chronic non healing wound   Encephalopathy, Staring Spells  Acute on chronic diastolic heart failure   Mild to moderate Aortic stenosis  Pulmonary HTN  NIDDM  II  Normochromic anemia   Hyponatremia   Hypokalemia  Hypomagnesemia   Chronic low back pain   Constipation associated with colonic fecal impaction   HM, PN, 5/2    Social or Environmental Circumstances     x Treatment Will add MANDY BID to promote wound healing.  Encouraged adequate PO intake. Will monitor.    Pt receiving docusate sodium and miralax. Encouraged adequate PO intake.    Monitor Sodium,Check Urine Sodium, Osm, Serum Osm, Avoid overcorrection  Replete  potassium, Monitor Potassium   RD, 5/3        HM, PN, 5/2    Other       Academy of Nutrition and Dietetics (Academy) and the American Society for Parenteral and Enteral Nutrition (A.S.P.E.N.) Clinical Characteristics to support Malnutrition   Malnutrition in the Context of Acute Illness or Injury Malnutrition in the Context of Chronic Illness or Injury Malnutrition in the Context of Social or Environmental Circumstances   Malnutrition Level Moderate Severe Moderate Severe   Moderate   Severe   Energy Intake <75%                   >7 days <50%                 >5 days <75%           >1 month <75%                      >1 month   <75% for >3 months   <50% for >1 month   Weight Loss   1-2% in 1 week >2% in 1 week 5% in 1 month >5% in 1 month 5% in 1 month >5% in 1 month    5% in 1 month >5% in 1 month 7.5% in 3 months >7.5% in 3 months 7.5% in 3 months >7.5% in 3 months    7.5% in 3 months >7.5% in 3 months 10% in 6 months >10% in 6 months 10% in 6 months >10% in 6 months        20% in 1 year                    >20% in 1 year                                                                  20% in 1 year                            >20% in 1 year                                                  Subcutaneous Fat Loss Mild  Moderate  Mild  Severe    Mild   Severe   Muscle Loss Mild  Moderate  Mild  Severe    Mild   Severe   Edema/Fluid Accumulation Mild Moderate to severe  Mild  Severe   Mild   Severe   Reduced  Strength         (based on standards supplied  by  of dynamometer) N/A Measurably reduced N/A Measurably reduced N/A Measurably reduced     Criteria for mild malnutrition is defined as 1 characteristic outlined above within the established moderate or severe parameters.  A minimum of 2 out of the 6 characteristics noted above are recommended for a diagnosis of moderate or severe malnutrition.  Chronic illness/injury is a disease/condition lasting 3 months or longer.    The noted clinical guidelines are only system guidelines and do not replace the providers clinical judgment.    Provider, please specify diagnosis or diagnoses associated with above clinical findings.    [  ] Mild Malnutrition - 1 characteristic outlined above within the established moderate or severe parameters     [ x ] Moderate Malnutrition - a minimum of 2 of the 6 moderate malnutrition characteristics noted above      [  ] Other Nutritional Diagnosis (please specify): _______     [  ] Clinically Undetermined       Please document in your progress notes daily for the duration of treatment until resolved and  include in your discharge summary.      References:    BLANQUITA Campbell, & BRANDYN Mathews (2022, April). Assessment and management of anorexia and cachexia in palliative care. Retrieved May 23, 2022, from [email or web address]/assessment-and-management-[email or web address]-anorexia-and-cachexia-in-palliative-care?kebipCbt=4560&source=see_link     ERASTO Uribe, PhD, RD, JUDI, CATHY Gomez, PhD, RN, SANDRA Beth MD, PhD, Jania WALKER A., MS, RD, [facility], ISIDRO Bingham, MS, RD, The Academy Malnutrition Work Group, The A.S.P.E.N. Board of Directors. (2012). Consensus Statement: Academy of Nutrition and Dietetics and American Society for Parenteral and Enteral Nutrition: Characteristics Recommended for the Identification and Documentation of Adult Malnutrition (Undernutrition). Journal of Parenteral and Enteral Nutrition, 36(3), 275-283. doi:10.1177/7091092697481362     Form No. 34308

## 2023-05-04 NOTE — PT/OT/SLP PROGRESS
Occupational Therapy   Treatment    Name: Melodie Villarreal  MRN: [id number]  Admitting Diagnosis:  Sepsis  9 Days Post-Op    Recommendations:     Discharge Recommendations: nursing facility, basic, nursing facility, skilled  Discharge Equipment Recommendations:  walker, rolling  Barriers to discharge:       Assessment:     Melodie Villarreal is a 89 y.o. female with a medical diagnosis of Sepsis.  Performance deficits affecting function are weakness, impaired endurance, impaired self care skills, impaired functional mobility, impaired balance, impaired cognition, decreased lower extremity function, decreased safety awareness, pain, impaired skin.     Rehab Prognosis:  Good; patient would benefit from acute skilled OT services to address these deficits and reach maximum level of function.       Plan:     Patient to be seen 5 x/week to address the above listed problems via self-care/home management, therapeutic activities, therapeutic exercises  Plan of Care Expires: 05/10/23  Plan of Care Reviewed with: patient, son    Subjective     Pain/Comfort:       Objective:     Communicated with: RN prior to session.  Patient found HOB elevated with   upon OT entry to room.    General Precautions: Standard,      Orthopedic Precautions:   Braces:       Occupational Performance:     Bed Mobility:    Patient completed Rolling/Turning to Left with  maximal assistance  Patient completed Rolling/Turning to Right with maximal assistance     Functional Mobility:   Bed to chair transfer using manish lift requiring total A for back to bed transfer    Activities of Daily Living:  Toileting: total assistance in sidelying following urination and bowel movement with Rn called to change sacral protection barrier.      Therapeutic Positioning    OT interventions performed during the course of today's session in an effort to prevent and/or reduce acquired pressure injuries:   Education on Pressure Ulcer Prevention provided      Patient  Education:  daughter/s provided with verbal education regarding OT role/goals/POC.  Understanding was verbalized.      Patient left HOB elevated with all lines intact and call button in reach    GOALS:   Multidisciplinary Problems       Occupational Therapy Goals          Problem: Occupational Therapy    Goal Priority Disciplines Outcome Interventions   Occupational Therapy Goal     OT, PT/OT Ongoing, Progressing    Description: Goals to be met by: 5/10/23     Patient will increase functional independence with ADLs by performing:    UE Dressing with Contact Guard Assistance.  LE Dressing with Moderate Assistance and Assistive Devices as needed.  Grooming while EOB with Contact Guard Assistance.  Sitting at edge of bed x10 minutes with Contact Guard Assistance.                         Time Tracking:     OT Date of Treatment: 05/04/23  OT Start Time: 1227  OT Stop Time: 1308  OT Total Time (min): 41 min    Billable Minutes:Self Care/Home Management 2  Therapeutic Activity 1    OT/STANISLAW: STANISLAW     Number of STANISLAW visits since last OT visit: 4    5/4/2023

## 2023-05-04 NOTE — PROGRESS NOTES
[facility] Hospital Medicine Progress Note        Chief Complaint: Inpatient Follow-up for MRSA bacteremia    HPI:   89-year-old female with a history that includes type 2 diabetes mellitus, prior DVT/IVC filter no longer on anticoagulation and non-healing left ankle wound, presented to the ED on 4/16 after she became lethargic. Patient normally alert and oriented, active and able to carry out own ADLs.  She apparently has chronic open wounds on her left lower extremity  followed closely by Wound Care.  On the day of presentation she became drowsy/lethargic, and EMS activated, she was found to be hypotensive with a blood pressure of 66/36 on arrival, requiring 3 L nasal cannula.  Laboratory work showed leukocytosis of 34,000, mild anemia, lactic acid of 3.5, elevated BNP of 2000 and a troponin of 0.115.  Urinalysis was unremarkable, CT chest abdomen and pelvis was negative for pulmonary embolus, pulmonary infiltrates or any intra-abdominal or pelvic pathology.  CT head was normal.  She was started on broad-spectrum antibiotics for undifferentiated sepsis admitted to the hospitalist services for further management.  Blood cultures returned positive for MRSA bacteremia. ID consulted and pt continued on IV Vancomycin. Noted left lower extremity non-healing ulcer. NM bone scan without clear evidence of OM. LLE arterial U/S showed monophasic flow throughout suggestive of inflow disease. CT T and L spine  with no evidence of discitis/osteomyelitis. 2D TTE negative for valvular vegetation. Cardiology consulted for STARR, performed on 4/22, negative valvular vegetations. Pt is noted to have  hydronephrosis despite Newton decompression. Urology consulted to evaluate persistent Rt hydronephrosis.  She was taken to the OR on 04/25 for cystoscopy and right ureteral stent placement.  Blood cultures persistently positive, 4/16, 4/18, 4/19, 4/21.  Eventually cultures from 4/23 remained negative.   Vascular surgery consulted for PAD, CTA run-off noted severe flow-limiting disease in the left common femoral.  Vascular surgery considering left femoral endarterectomy, though family defering intervention for now.  CM consulted for home IV antibiotics.       Interval Hx:  Patient had a good night and rested well.  Daughters at the bedside.  No new complaints.  She did have an episode of staring yesterday evening.  Neurology rounded last night.  Awaiting recommendations.  She does have confirm placement per case management notes.    Objective/physical exam:  General: In no acute distress, afebrile  Chest: Clear to auscultation bilaterally  Heart: RRR, +S1, S2, no appreciable murmur  Abdomen: Soft, nontender, BS +  MSK: Warm, no lower extremity edema, no clubbing or cyanosis  Neurologic: Alert and oriented x4, Cranial nerve II-XII intact, Strength 5/5 in all 4 extremities    VITAL SIGNS: 24 HRS MIN & MAX LAST   Temp  Min: 97.5 °F (36.4 °C)  Max: 98.1 °F (36.7 °C) 98.1 °F (36.7 °C)   BP  Min: 97/52  Max: 149/80 (!) 149/80     Pulse  Min: 80  Max: 91  80   Resp  Min: 16  Max: 20 16   SpO2  Min: 90 %  Max: 100 % 100 %       Recent Labs   Lab 04/29/23  0420 05/02/23  1545 05/04/23  0446   WBC 11.8* 10.36 9.72   RBC 3.91* 3.74* 4.04*   HGB 10.2* 10.0* 10.5*   HCT 33.4* 31.7* 34.2*   MCV 85.4 84.8 84.7   MCH 26.1* 26.7* 26.0*   MCHC 30.5* 31.5* 30.7*   RDW 15.1 15.2 15.3    378 390   MPV 8.7 9.2 9.5       Recent Labs   Lab 04/28/23  0459 04/29/23  0420 05/02/23  1203 05/02/23  1647 05/03/23  1111 05/03/23  2247 05/04/23  0446     --  127* 128* 128* 127* 130*   K 3.9  --  2.9* 3.4* 4.2  --  4.1   CO2 40*  --  37* 37* 35*  --  35*   BUN 21.6*  --  14.0 20.8* 18.4  --  14.7   CREATININE 0.55   < > 0.64 0.68 0.62  --  0.62   CALCIUM 8.5  --  8.3* 8.3* 8.3*  --  8.5   MG 1.90  --   --   --   --   --  2.00   ALBUMIN  --   --  1.9*  --  2.0*  --  2.1*   ALKPHOS  --   --  84  --   --   --   --    ALT  --   --  11  --    --   --   --    AST  --   --  14  --   --   --   --    BILITOT  --   --  0.4  --   --   --   --     < > = values in this interval not displayed.          Microbiology Results (last 7 days)       Procedure Component Value Units Date/Time    Blood Culture [[id number]]  (Normal) Collected: 04/23/23 0820    Order Status: Completed Specimen: Blood from Arm, Right Updated: 04/28/23 1100     CULTURE, BLOOD ([facility]) No Growth at 5 days    Blood Culture [[id number]]  (Normal) Collected: 04/23/23 0826    Order Status: Completed Specimen: Blood from Arm, Right Updated: 04/28/23 1100     CULTURE, BLOOD ([facility]) No Growth at 5 days             See below for Radiology    Scheduled Med:   carvediloL  6.25 mg Oral BID    collagenase   Topical (Top) Daily    docusate  200 mg Oral BID    enoxaparin  40 mg Subcutaneous Daily    insulin detemir U-100  7 Units Subcutaneous QHS    Lactobacillus acidophilus  1 capsule Oral TID WM    magnesium oxide  400 mg Oral TID    polyethylene glycol  17 g Oral Daily    sodium chloride 0.9%  10 mL Intravenous Q6H    tamsulosin  0.4 mg Oral Daily    trazodone  100 mg Oral QHS    vancomycin (VANCOCIN) IVPB  500 mg Intravenous Q12H        Continuous Infusions:       PRN Meds:  acetaminophen, dextrose 10%, dextrose 10%, docusate sodium, glucagon (human recombinant), glucose, glucose, insulin aspart U-100, melatonin, senna, Flushing PICC Protocol **AND** sodium chloride 0.9% **AND** sodium chloride 0.9%, traMADoL, Pharmacy to dose Vancomycin consult **AND** vancomycin - pharmacy to dose       Assessment/Plan:   Persistent MRSA Sepsis/Bacteremia, now with clearance as of 4/23  Left ankle chronic non healing wound   Encephalopathy, Staring Spells  Hyponatremia   Hypokalemia  Hypomagnesemia   Acute on chronic diastolic heart failure   Mild to moderate Aortic stenosis  Pulmonary HTN  NIDDM II  Normochromic anemia   Chronic low back pain   Constipation associated with colonic fecal impaction    Labs and vital stable.     She remains on IV vancomycin with expected end date of 06/04.    Case management is working on home health with outpatient antibiotics.  This has been set up and we ready when she is cleared for discharge.    Sodium levels continue to improve.  Appreciate Nephrology recommendations.  Holding her Lasix.  She was off IV fluids as of right now.    Neurology was consulted yesterday due to patient having staring episodes.  CT of the head on 05/02 did not show any acute disease.  She is unable to have an MRI due to IVC filter.  Per Neurology note from yesterday could potentially repeat CT today in his stable discharge home.  She was not currently on any antiepileptic medications.    Patient condition:  Stable    Anticipated discharge and Disposition: TBD      All diagnosis and differential diagnosis have been reviewed; assessment and plan has been documented; I have personally reviewed the labs and test results that are presently available; I have reviewed the patients medication list; I have reviewed the consulting providers response and recommendations. I have reviewed or attempted to review medical records based upon their availability    All of the patient's questions have been  addressed and answered. Patient's is agreeable to the above stated plan. I will continue to monitor closely and make adjustments to medical management as needed.  _____________________________________________________________________      Radiology:  CT Head Without Contrast  Narrative: EXAMINATION:  CT HEAD WITHOUT CONTRAST    CLINICAL HISTORY:  Mental status change, unknown cause;    TECHNIQUE:  Low dose axial images were obtained through the head.  Coronal and sagittal reformations were also performed. Contrast was not administered.    Automatic exposure control was utilized to reduce the patient's radiation dose.    DLP= 936    COMPARISON:  04/16/2023    FINDINGS:  No acute intracranial hemorrhage, edema or mass. No acute parenchymal  abnormality.    Diffuse cerebral atrophy with concordant ventricular enlargement.    Scattered hypodensities throughout the deep periventricular white matter.    The osseous structures are normal.    The mastoid air cells are clear.    Debris within the bilateral auditory canals.    The globes and orbital contents are normal bilaterally.    The visualized maxillary, ethmoid and sphenoid sinuses are clear.  Impression: No acute intracranial abnormality identified.  Findings of mild microvascular ischemic disease.    Electronically signed by: Jerzy Sweet  Date:    05/02/2023  Time:    11:34      Sheldon Ely MD   05/04/2023

## 2023-05-04 NOTE — NURSING
[facility] 6th Floor Medical Telemetry  Wound Care    Patient Name:  Melodie Villarreal   MRN:  [id number]  Date: 5/4/2023  Diagnosis: Sepsis    History:     Past Medical History:   Diagnosis Date    Adenocarcinoma of uterus     Bladder cancer     Deep vein thrombosis     Essential (primary) hypertension     Heart murmur     High cholesterol     Hydronephrosis 4/25/2023    Hypertriglyceridemia     Insomnia     Osteopenia     Other pulmonary embolism without acute cor pulmonale     Personal history of colonic polyps     Rheumatoid arthritis, unspecified     Type 2 diabetes mellitus without complications        Social History     Socioeconomic History    Marital status:     Number of children: 2   Occupational History    Occupation: retired   Tobacco Use    Smoking status: Never    Smokeless tobacco: Never   Substance and Sexual Activity    Alcohol use: Never    Drug use: Never    Sexual activity: Not Currently       Precautions:     Allergies as of 04/16/2023 - Reviewed 04/16/2023   Allergen Reaction Noted    Ace inhibitors Other (See Comments) 05/03/2022    Adhesive  10/02/2017    Bactrim [sulfamethoxazole-trimethoprim] Other (See Comments) 01/17/2023    Meperidine Other (See Comments) 05/03/2022    Midazolam Other (See Comments) 05/03/2022    Atorvastatin Nausea Only 05/03/2022    Codeine Other (See Comments) and Anxiety 10/02/2017    Tapentadol Rash 05/03/2022       WOC Assessment Details/Treatment   Patient seen for follow up wound assessment. Patient wake and alert, daughter at bedside. Patient with Potus boots in place. Patient denies pain at site.     05/04/23 1330   WOCN Assessment   WOCN Total Time (mins) 45   Visit Date 05/04/23   Visit Time 1330   Consult Type Follow Up   WOCN Speciality Wound   Wound pressure   Number of Wounds 2   Intervention assessed;changed;applied   Teaching on-going   Pressure Injury Prevention    Heel protection technique Heel boot        Altered Skin Integrity  04/17/23 Left posterior Ankle Full thickness tissue loss. Subcutaneous fat may be visible but bone, tendon or muscle are not exposed   Date First Assessed: 04/17/23   Altered Skin Integrity Present on Admission - Did Patient arrive to the hospital with altered skin?: yes  Side: Left  Orientation: posterior  Location: Ankle  Description of Altered Skin Integrity: Full thickness tissue...   Wound Image    Description of Altered Skin Integrity Full thickness tissue loss with exposed bone, tendon, or muscle. Often includes undermining and tunneling. May extend into muscle and/or supporting structures.   Dressing Appearance Intact   Drainage Amount Moderate   Drainage Characteristics/Odor Clear;Serous   Appearance Eschar;Slough;Muscle;Tendon   Tissue loss description Full thickness   Black (%), Wound Tissue Color 25 %   Red (%), Wound Tissue Color 50 %   Yellow (%), Wound Tissue Color 25 %   Periwound Area Intact   Wound Edges Defined   Wound Length (cm) 7.2 cm   Wound Width (cm) 1 cm   Wound Depth (cm) 0.1 cm   Wound Volume (cm^3) 0.72 cm^3   Wound Surface Area (cm^2) 7.2 cm^2   Care Wound cleanser   Dressing Applied  (Santyl, vashe moistened gauze, kerlix, tape)        Altered Skin Integrity 04/17/23 Left lateral Ankle Full thickness tissue loss. Subcutaneous fat may be visible but bone, tendon or muscle are not exposed   Date First Assessed: 04/17/23   Altered Skin Integrity Present on Admission - Did Patient arrive to the hospital with altered skin?: yes  Side: Left  Orientation: lateral  Location: Ankle  Description of Altered Skin Integrity: Full thickness tissue l...   Wound Image    Description of Altered Skin Integrity Full thickness tissue loss. Base is covered by slough and/or eschar in the wound bed   Dressing Appearance Intact   Drainage Amount Small   Drainage Characteristics/Odor Clear;Serous   Appearance Slough   Yellow (%), Wound Tissue Color 100 %   Periwound Area Intact   Wound Edges Defined   Wound  Length (cm) 1 cm   Wound Width (cm) 1 cm   Wound Depth (cm) 0.2 cm   Wound Volume (cm^3) 0.2 cm^3   Wound Surface Area (cm^2) 1 cm^2   Care Wound cleanser   Dressing Applied  (Santyl, vashe moistened gauze, kerlix, tape)         Recommendations made to primary team to continue present treatment coarse.    05/04/2023

## 2023-05-04 NOTE — PROGRESS NOTES
Renal    HPI: 89-year-old female presents to the ER on 4/17/21 after she became lethargic.  She hypotensive and has chronic open wounds on her left lower extremity better followed closely by Wound Care.  She was admitted and treated for MRSA sepsis, d-CHF, (R) hydro with stent placement, non-healing LLE wounds with bilat iliac occlusive disease and encephalopathy. We were consulted for hyponatremia.  Daughter states patient is normally alert and oriented, active and able to carry out own ADLs.  She states patient     Chief complaint:   OK    Interval History:  Stopped lasix and gave 1 L NS yesterday, no new issues. Daughter-in-law in the room.  States pt doing well    ROS:  all else Neg     carvediloL  6.25 mg Oral BID    collagenase   Topical (Top) Daily    docusate  200 mg Oral BID    enoxaparin  40 mg Subcutaneous Daily    insulin detemir U-100  7 Units Subcutaneous QHS    Lactobacillus acidophilus  1 capsule Oral TID WM    magnesium oxide  400 mg Oral TID    polyethylene glycol  17 g Oral Daily    sodium chloride 0.9%  10 mL Intravenous Q6H    tamsulosin  0.4 mg Oral Daily    trazodone  100 mg Oral QHS    vancomycin (VANCOCIN) IVPB  500 mg Intravenous Q12H       VITAL SIGNS: 24 HR MIN & MAX LAST    Temp  Min: 96.5 °F (35.8 °C)  Max: 98.1 °F (36.7 °C)  97.5 °F (36.4 °C)        BP  Min: 97/52  Max: 178/63  (!) 172/74     Pulse  Min: 76  Max: 91  90     Resp  Min: 16  Max: 20  16    SpO2  Min: 90 %  Max: 100 %  96 %      Wt Readings from Last 3 Encounters:   04/24/23 56.6 kg (124 lb 12.5 oz)   04/10/23 54.4 kg (120 lb)   02/09/23 85.3 kg (188 lb)       Intake/Output Summary (Last 24 hours) at 5/4/2023 1419  Last data filed at 5/4/2023 0500  Gross per 24 hour   Intake 120 ml   Output 400 ml   Net -280 ml     A&O x 4  EOMI  (B) symmetrical  Unlabored  Soft, NT, ND  Edema none    Recent Labs     05/03/23  1111 05/03/23  2247 05/04/23  0446   * 127* 130*   K 4.2  --  4.1   CHLORIDE 83*  --  87*   CO2 35*  --  35*    BUN 18.4  --  14.7   CREATININE 0.62  --  0.62   GLUCOSE 63*  --  72*   CALCIUM 8.3*  --  8.5   MG  --   --  2.00   PHOS 3.3  --  3.4   ALBUMIN 2.0*  --  2.1*      Recent Labs     05/02/23  1545 05/04/23  0446   WBC 10.36 9.72   HGB 10.0* 10.5*   HCT 31.7* 34.2*    390       ASSESSMENT / PLAN    HypoNa, better  Met Alk, stable  D-CHF, grade 2  VHD, mm-AS, mi-MS, mo-MR, mi-TR  BLE PAD with non healing LLE wound  MRSA sepsis on vanc  (R) hydro, s/p stent     Give another 1 L NS @ 75/hr, monitor Resp status (d-CHF)  Stay off diuretics unless she gets SOB/ Pulm edema.  Mag replaced. D/c MagOx TID

## 2023-05-04 NOTE — PT/OT/SLP RE-EVAL
Physical Therapy Re-Evaluation    Patient Name:  Melodie Villarreal   MRN:  [id number]    Recommendations:     Discharge Recommendations: nursing facility, skilled   Discharge Equipment Recommendations: lift device   Barriers to discharge: Impaired mobility and Ongoing medical needs    Assessment:     Melodie Villarreal is a 89 y.o. female admitted with a medical diagnosis of Sepsis, MRSA, L ankle wound, encephalopathy, staring spells, hyponatremia.  She presents with the following impairments/functional limitations: weakness, gait instability, impaired endurance, impaired balance, impaired functional mobility, impaired self care skills, impaired cognition, decreased safety awareness. At baseline, pt ambulates short distances with a rollator walker and lives with her daughter. Pt's daughter also assists her with ADLs. Since being hospitalized, pt has declined significantly in her mobility. She is requiring Max A for bed mobility and transfers. She is unable to ambulate safely at this time. Continue to recommend SNF at d/c.     Rehab Prognosis: Fair; patient would benefit from acute skilled PT services to address these deficits and reach maximum level of function.    Recent Surgery: Procedure(s) (LRB):  CYSTOSCOPY, WITH URETERAL STENT INSERTION (Right) 9 Days Post-Op    Plan:     During this hospitalization, patient to be seen 6 x/week to address the identified rehab impairments via gait training, therapeutic activities, therapeutic exercises, wheelchair management/training and progress toward the following goals:    Plan of Care Expires:  05/26/23    Subjective     Chief Complaint: needing to be changed, PT and RN assisted  Patient/Family Comments/goals: to go home  Pain/Comfort:  Location - Side 1: Left  Location 1: leg  Pain Addressed 1: Reposition    Patients cultural, spiritual, Jainism conflicts given the current situation: no    Objective:     Communicated with nurse prior to session.  Patient found supine with  telemetry, PureWick, pressure relief boots  upon PT entry to room.    General Precautions: Standard, contact, fall  Orthopedic Precautions:N/A   Braces: N/A  Respiratory Status: Room air      Exams:  RLE ROM: Deficits: impaired knee flexion to 90deg  RLE Strength: Deficits: grossly 3/5  LLE ROM: Deficits: impaired knee flexion to 90deg  LLE Strength: Deficits: grossly 3/5  Skin integrity:  dressing to sacrum, wound to L achilles area      Functional Mobility:  Bed Mobility:     Scooting: moderate assistance  Supine to Sit: maximal assistance  Transfers:     Bed to Chair: maximal assistance with  hand-held assist  using  Stand Pivot  Balance: Min A for sitting balance at EOB due to posterior lean, improving from eval.       AM-PAC 6 CLICK MOBILITY  Total Score:10       Treatment & Education:    Patient provided with verbal education regarding PT POC.  Understanding was verbalized.     Patient left up in chair with all lines intact, call button in reach, and nurse notified. Pillows placed under pt's bottom for comfort. Tashia sling also under pt in case pt is too fatigued to transfer back later.    GOALS:   Multidisciplinary Problems       Physical Therapy Goals          Problem: Physical Therapy    Goal Priority Disciplines Outcome Goal Variances Interventions   Physical Therapy Goal     PT, PT/OT Ongoing, Progressing     Description: Goals to be met by: 2023     Patient will increase functional independence with mobility by performin. Supine to sit with Stand-by Assistance  2. Sit to stand transfer with Contact Guard Assistance  3. Gait  x 150 feet with Contact Guard Assistance using Rolling Walker.   4. Pt will ascend/descend 3 steps with HR and Min A.                          History:     Past Medical History:   Diagnosis Date    Adenocarcinoma of uterus     Bladder cancer     Deep vein thrombosis     Essential (primary) hypertension     Heart murmur     High cholesterol     Hydronephrosis 2023     Hypertriglyceridemia     Insomnia     Osteopenia     Other pulmonary embolism without acute cor pulmonale     Personal history of colonic polyps     Rheumatoid arthritis, unspecified     Type 2 diabetes mellitus without complications        Past Surgical History:   Procedure Laterality Date    CHOLECYSTECTOMY      colonscopy  06/20/2012    CYSTOSCOPY W/ URETERAL STENT PLACEMENT Right 4/25/2023    Procedure: CYSTOSCOPY, WITH URETERAL STENT INSERTION;  Surgeon: Anyi Bearden MD;  Location: [facility];  Service: Urology;  Laterality: Right;    ECCE  01/09/2013    estracapsular cataract removal   02/20/2013    insertion of intrpocular lens prosthesis   02/20/2013    phacoemulsifiaction and aspiration of cataract  02/20/2013    phacoemulsificaion and aspiration of cataract      total abdominal hysterectomy and bilateral salpingooophorectomy  1991       Time Tracking:     PT Received On: 05/04/23  PT Start Time: 0948     PT Stop Time: 1019  PT Total Time (min): 31 min     Billable Minutes: Re-eval 31      05/04/2023

## 2023-05-04 NOTE — PROGRESS NOTES
[facility]                       [address] [id number]    PATIENT NAME:       SANAM BELTRAN  YOB: 1931  CSN:                [id number]   MRN:                [id number]  ADMIT DATE:         04/16/2023 17:52:00  PHYSICIAN:          Cleveland Vergara DPM                            PROGRESS NOTE    DATE:  05/04/2023 00:00:00    SUBJECTIVE:  The patient was seen by Wound Care today.  Status remains about the   same.  Discharge is still pending.  She was seen by Nephrology and Medicine   today.    PHYSICAL EXAM:  VITAL SIGNS:  Stable, currently afebrile.    Dressings are intact.  I was able to evaluate photos from earlier today from   Wound Care.  Again, did not pull dressings down.  Some increasing granulation   tissue overlying the Achilles tendon  posteriorly.  Lateral ankle is still   fibrotic.    ASSESSMENT:  Severe peripheral artery disease with chronic lower extremity   wounds, poor healing.    PLAN:  Continue with current care.  We will reassess in a week.        ______________________________  Cleveland Vergara DPM    GAS/AQS  DD:  05/04/2023  Time:  03:32PM  DT:  05/04/2023  Time:  06:11PM  Job #:  [id number]      PROGRESS NOTE

## 2023-05-05 NOTE — PROGRESS NOTES
Renal    HPI: 89-year-old female presents to the ER on 4/17/21 after she became lethargic.  She hypotensive and has chronic open wounds on her left lower extremity better followed closely by Wound Care.  She was admitted and treated for MRSA sepsis, d-CHF, (R) hydro with stent placement, non-healing LLE wounds with bilat iliac occlusive disease and encephalopathy. We were consulted for hyponatremia.  Daughter states patient is normally alert and oriented, active and able to carry out own ADLs.  She states patient     Chief complaint:   None    Interval History:  Another 1 L NS yesterday, no new issues.    ROS:  all else Neg     carvediloL  6.25 mg Oral BID    collagenase   Topical (Top) Daily    docusate  200 mg Oral BID    enoxaparin  40 mg Subcutaneous Daily    insulin detemir U-100  7 Units Subcutaneous QHS    Lactobacillus acidophilus  1 capsule Oral TID WM    polyethylene glycol  17 g Oral Daily    sodium chloride 0.9%  10 mL Intravenous Q6H    tamsulosin  0.4 mg Oral Daily    trazodone  100 mg Oral QHS    vancomycin (VANCOCIN) IVPB  500 mg Intravenous Q12H       VITAL SIGNS: 24 HR MIN & MAX LAST    Temp  Min: 97.3 °F (36.3 °C)  Max: 99.3 °F (37.4 °C)  97.7 °F (36.5 °C)        BP  Min: 128/71  Max: 170/76  (!) 170/76     Pulse  Min: 73  Max: 92  73     Resp  Min: 16  Max: 18  16    SpO2  Min: 92 %  Max: 97 %  95 %      Wt Readings from Last 3 Encounters:   04/24/23 56.6 kg (124 lb 12.5 oz)   04/10/23 54.4 kg (120 lb)   02/09/23 85.3 kg (188 lb)       Intake/Output Summary (Last 24 hours) at 5/5/2023 1329  Last data filed at 5/5/2023 0500  Gross per 24 hour   Intake 1045 ml   Output 1100 ml   Net -55 ml     Resting  A&O x 4  EOMI  (B) symmetrical  Unlabored  Soft, NT, ND  Edema none    Recent Labs     05/03/23  1111 05/03/23  2247 05/04/23  0446 05/05/23  0414   NA  --  127* 130* 132   K  --   --  4.1 3.4*   CHLORIDE  --   --  87* 91*   CO2  --   --  35* 33*   BUN  --   --  14.7 10.7   CREATININE  --   --  0.62  0.62   GLUCOSE  --   --  72* 51*   CALCIUM  --   --  8.5 8.1*   MG   < >  --  2.00 1.80   PHOS  --   --  3.4 3.1   ALBUMIN  --   --  2.1* 1.9*    < > = values in this interval not displayed.      Recent Labs     05/02/23  1545 05/04/23  0446   WBC 10.36 9.72   HGB 10.0* 10.5*   HCT 31.7* 34.2*    390       ASSESSMENT / PLAN    HypoNa. Resolved after a total of 2 L NS.  Met Alk, stable  D-CHF, grade 2  VHD, mm-AS, mi-MS, mo-MR, mi-TR  BLE PAD with non healing LLE wound  MRSA sepsis on vanc  (R) hydro, s/p stent       Kcl 20 x 2 today  MgSO4 3 g x 1  Stay off diuretics unless she gets SOB/ Pulm edema.    Will sign off  No f/u needed

## 2023-05-05 NOTE — PT/OT/SLP PROGRESS
Occupational Therapy   Treatment    Name: Melodie Villarreal  MRN: [id number]  Admitting Diagnosis:  Sepsis  10 Days Post-Op    Recommendations:     Discharge Recommendations: nursing facility, basic  Discharge Equipment Recommendations:  walker, rolling  Barriers to discharge:       Assessment:     Melodie Villarreal is a 89 y.o. female with a medical diagnosis of Sepsis.  Performance deficits affecting function are weakness, impaired endurance, impaired self care skills, impaired functional mobility, impaired balance, impaired cognition, decreased lower extremity function, decreased safety awareness, pain, impaired skin.     Rehab Prognosis:  Good; patient would benefit from acute skilled OT services to address these deficits and reach maximum level of function.       Plan:     Patient to be seen 5 x/week to address the above listed problems via self-care/home management, therapeutic activities, therapeutic exercises  Plan of Care Expires: 05/10/23  Plan of Care Reviewed with: patient, son    Subjective     Pain/Comfort:       Objective:     Communicated with: RN prior to session.  Patient found HOB elevated with   upon OT entry to room.    General Precautions: Standard,      Orthopedic Precautions:   Braces:       Occupational Performance:     Bed Mobility:    Patient completed Rolling/Turning to Left with  maximal assistance  Patient completed Rolling/Turning to Right with maximal assistance       Activities of Daily Living:  Toileting: total assistance in sidelying following urination and bowel movement with Rn called to change sacral protection barrier.      Therapeutic Positioning    OT interventions performed during the course of today's session in an effort to prevent and/or reduce acquired pressure injuries:   Education on Pressure Ulcer Prevention provided      Patient Education:  daughter/s provided with verbal education regarding OT role/goals/POC.  Understanding was verbalized.      Patient left HOB elevated with  all lines intact and call button in reach    GOALS:   Multidisciplinary Problems       Occupational Therapy Goals          Problem: Occupational Therapy    Goal Priority Disciplines Outcome Interventions   Occupational Therapy Goal     OT, PT/OT Ongoing, Progressing    Description: Goals to be met by: 5/10/23     Patient will increase functional independence with ADLs by performing:    UE Dressing with Contact Guard Assistance.  LE Dressing with Moderate Assistance and Assistive Devices as needed.  Grooming while EOB with Contact Guard Assistance.  Sitting at edge of bed x10 minutes with Contact Guard Assistance.                         Time Tracking:     OT Date of Treatment: 05/05/23  OT Start Time: 1339  OT Stop Time: 1415  OT Total Time (min): 36 min    Billable Minutes:Self Care/Home Management 2    OT/STANISLAW: STANISLAW     Number of STANISLAW visits since last OT visit: 5 5/5/2023

## 2023-05-05 NOTE — PROGRESS NOTES
[facility] Hospital Medicine Progress Note        Chief Complaint: Inpatient Follow-up for MRSA bacteremia     HPI:   89-year-old female with a history that includes type 2 diabetes mellitus, prior DVT/IVC filter no longer on anticoagulation and non-healing left ankle wound, presented to the ED on 4/16 after she became lethargic. Patient normally alert and oriented, active and able to carry out own ADLs.  She apparently has chronic open wounds on her left lower extremity  followed closely by Wound Care.  On the day of presentation she became drowsy/lethargic, and EMS activated, she was found to be hypotensive with a blood pressure of 66/36 on arrival, requiring 3 L nasal cannula.  Laboratory work showed leukocytosis of 34,000, mild anemia, lactic acid of 3.5, elevated BNP of 2000 and a troponin of 0.115.  Urinalysis was unremarkable, CT chest abdomen and pelvis was negative for pulmonary embolus, pulmonary infiltrates or any intra-abdominal or pelvic pathology.  CT head was normal.  She was started on broad-spectrum antibiotics for undifferentiated sepsis admitted to the hospitalist services for further management.  Blood cultures returned positive for MRSA bacteremia. ID consulted and pt continued on IV Vancomycin. Noted left lower extremity non-healing ulcer. NM bone scan without clear evidence of OM. LLE arterial U/S showed monophasic flow throughout suggestive of inflow disease. CT T and L spine  with no evidence of discitis/osteomyelitis. 2D TTE negative for valvular vegetation. Cardiology consulted for STARR, performed on 4/22, negative valvular vegetations. Pt is noted to have  hydronephrosis despite Newton decompression. Urology consulted to evaluate persistent Rt hydronephrosis.  She was taken to the OR on 04/25 for cystoscopy and right ureteral stent placement.  Blood cultures persistently positive, 4/16, 4/18, 4/19, 4/21.  Eventually cultures from 4/23 remained negative.   Vascular surgery consulted for PAD, CTA run-off noted severe flow-limiting disease in the left common femoral.  Vascular surgery considering left femoral endarterectomy, though family defering intervention for now.  CM consulted for home IV antibiotics.        Interval Hx:  Patient has not had any further staring episodes per the nurse.  Neurology had recommended repeating CT of the head couple days ago.  Will get it today and see if there is any changes.  Low suspicion for acute stroke or seizure activity.  She overall feeling well.  Discussed labs in that her sodium is back within normal limits.  Blood pressure is mildly elevated but stable.  She was working well with PT and OT.  Daughter is at the bedside.     Objective/physical exam:  General: In no acute distress, afebrile  Chest: Clear to auscultation bilaterally  Heart: RRR, +S1, S2, no appreciable murmur  Abdomen: Soft, nontender, BS +  MSK: Warm, no lower extremity edema, no clubbing or cyanosis  Neurologic: Alert and oriented x4, Cranial nerve II-XII intact, Strength 5/5 in all 4 extremities       VITAL SIGNS: 24 HRS MIN & MAX LAST   Temp  Min: 96.5 °F (35.8 °C)  Max: 99.3 °F (37.4 °C) 97.3 °F (36.3 °C)   BP  Min: 128/71  Max: 178/63 (!) 152/67     Pulse  Min: 76  Max: 92  76   Resp  Min: 16  Max: 18 16   SpO2  Min: 92 %  Max: 97 % 95 %       Recent Labs   Lab 04/29/23  0420 05/02/23  1545 05/04/23  0446   WBC 11.8* 10.36 9.72   RBC 3.91* 3.74* 4.04*   HGB 10.2* 10.0* 10.5*   HCT 33.4* 31.7* 34.2*   MCV 85.4 84.8 84.7   MCH 26.1* 26.7* 26.0*   MCHC 30.5* 31.5* 30.7*   RDW 15.1 15.2 15.3    378 390   MPV 8.7 9.2 9.5       Recent Labs   Lab 05/02/23  1203 05/02/23  1647 05/03/23  1111 05/03/23  2247 05/04/23  0446 05/05/23  0414   *   < > 128* 127* 130* 132   K 2.9*   < > 4.2  --  4.1 3.4*   CO2 37*   < > 35*  --  35* 33*   BUN 14.0   < > 18.4  --  14.7 10.7   CREATININE 0.64   < > 0.62  --  0.62 0.62   CALCIUM 8.3*   < > 8.3*  --  8.5 8.1*   MG   --   --   --   --  2.00 1.80   ALBUMIN 1.9*  --  2.0*  --  2.1* 1.9*   ALKPHOS 84  --   --   --   --   --    ALT 11  --   --   --   --   --    AST 14  --   --   --   --   --    BILITOT 0.4  --   --   --   --   --     < > = values in this interval not displayed.          Microbiology Results (last 7 days)       Procedure Component Value Units Date/Time    Blood Culture [[id number]]  (Normal) Collected: 04/23/23 0820    Order Status: Completed Specimen: Blood from Arm, Right Updated: 04/28/23 1100     CULTURE, BLOOD ([facility]) No Growth at 5 days    Blood Culture [[id number]]  (Normal) Collected: 04/23/23 0826    Order Status: Completed Specimen: Blood from Arm, Right Updated: 04/28/23 1100     CULTURE, BLOOD ([facility]) No Growth at 5 days             See below for Radiology    Scheduled Med:   carvediloL  6.25 mg Oral BID    collagenase   Topical (Top) Daily    docusate  200 mg Oral BID    enoxaparin  40 mg Subcutaneous Daily    insulin detemir U-100  7 Units Subcutaneous QHS    Lactobacillus acidophilus  1 capsule Oral TID WM    polyethylene glycol  17 g Oral Daily    sodium chloride 0.9%  10 mL Intravenous Q6H    tamsulosin  0.4 mg Oral Daily    trazodone  100 mg Oral QHS    vancomycin (VANCOCIN) IVPB  500 mg Intravenous Q12H        Continuous Infusions:       PRN Meds:  acetaminophen, dextrose 10%, dextrose 10%, docusate sodium, glucagon (human recombinant), glucose, glucose, insulin aspart U-100, melatonin, senna, Flushing PICC Protocol **AND** sodium chloride 0.9% **AND** sodium chloride 0.9%, traMADoL, Pharmacy to dose Vancomycin consult **AND** vancomycin - pharmacy to dose       Assessment/Plan:   Persistent MRSA Sepsis/Bacteremia, now with clearance as of 4/23  Left ankle chronic non healing wound   Encephalopathy, Staring Spells  Hyponatremia   Hypokalemia  Hypomagnesemia   Acute on chronic diastolic heart failure   Mild to moderate Aortic stenosis  Pulmonary HTN  NIDDM II  Normochromic anemia   Chronic low back pain    Constipation associated with colonic fecal impaction     Replace potassium this morning with a 1 time oral dose of 40 mEq.    Continue IV vancomycin with expected end date of 06/04.    Case management was looking into home health with outpatient antibiotic infusion but PT and OT notes from yesterday are recommending sniff.  Will need to further look into this.  Appreciate Nephrology recommendations.  We continue to hold her Lasix.  Sodium is within normal limits this morning.  IV fluids have been discontinued.  Podiatry continues to follow.  No new issues or recommendations.    If the CT of the head remained stable can start finalizing discharge plans.     Patient condition:  Stable    Anticipated discharge and Disposition: TBD      All diagnosis and differential diagnosis have been reviewed; assessment and plan has been documented; I have personally reviewed the labs and test results that are presently available; I have reviewed the patients medication list; I have reviewed the consulting providers response and recommendations. I have reviewed or attempted to review medical records based upon their availability    All of the patient's questions have been  addressed and answered. Patient's is agreeable to the above stated plan. I will continue to monitor closely and make adjustments to medical management as needed.  _____________________________________________________________________      Radiology:  CT Head Without Contrast  Narrative: EXAMINATION:  CT HEAD WITHOUT CONTRAST    CLINICAL HISTORY:  Mental status change, unknown cause;    TECHNIQUE:  Low dose axial images were obtained through the head.  Coronal and sagittal reformations were also performed. Contrast was not administered.    Automatic exposure control was utilized to reduce the patient's radiation dose.    DLP= 936    COMPARISON:  04/16/2023    FINDINGS:  No acute intracranial hemorrhage, edema or mass. No acute parenchymal abnormality.    Diffuse  cerebral atrophy with concordant ventricular enlargement.    Scattered hypodensities throughout the deep periventricular white matter.    The osseous structures are normal.    The mastoid air cells are clear.    Debris within the bilateral auditory canals.    The globes and orbital contents are normal bilaterally.    The visualized maxillary, ethmoid and sphenoid sinuses are clear.  Impression: No acute intracranial abnormality identified.  Findings of mild microvascular ischemic disease.    Electronically signed by: Jerzy Sweet  Date:    05/02/2023  Time:    11:34      Sheldon Ely MD   05/05/2023

## 2023-05-05 NOTE — PT/OT/SLP PROGRESS
Physical Therapy      Patient Name:  Melodie Villarreal   MRN:  [id number]    Patient working with RIVERA, will follow up if schedule allows.

## 2023-05-06 NOTE — PROGRESS NOTES
[facility] Hospital Medicine Progress Note        Chief Complaint: Inpatient Follow-up for  MRSA bacteremia    HPI:   89-year-old female with a history that includes type 2 diabetes mellitus, prior DVT/IVC filter no longer on anticoagulation and non-healing left ankle wound, presented to the ED on 4/16 after she became lethargic. Patient normally alert and oriented, active and able to carry out own ADLs.  She apparently has chronic open wounds on her left lower extremity  followed closely by Wound Care.  On the day of presentation she became drowsy/lethargic, and EMS activated, she was found to be hypotensive with a blood pressure of 66/36 on arrival, requiring 3 L nasal cannula.  Laboratory work showed leukocytosis of 34,000, mild anemia, lactic acid of 3.5, elevated BNP of 2000 and a troponin of 0.115.  Urinalysis was unremarkable, CT chest abdomen and pelvis was negative for pulmonary embolus, pulmonary infiltrates or any intra-abdominal or pelvic pathology.  CT head was normal.  She was started on broad-spectrum antibiotics for undifferentiated sepsis admitted to the hospitalist services for further management.  Blood cultures returned positive for MRSA bacteremia. ID consulted and pt continued on IV Vancomycin. Noted left lower extremity non-healing ulcer. NM bone scan without clear evidence of OM. LLE arterial U/S showed monophasic flow throughout suggestive of inflow disease. CT T and L spine  with no evidence of discitis/osteomyelitis. 2D TTE negative for valvular vegetation. Cardiology consulted for STARR, performed on 4/22, negative valvular vegetations. Pt is noted to have  hydronephrosis despite Newton decompression. Urology consulted to evaluate persistent Rt hydronephrosis.  She was taken to the OR on 04/25 for cystoscopy and right ureteral stent placement.  Blood cultures persistently positive, 4/16, 4/18, 4/19, 4/21.  Eventually cultures from 4/23 remained negative.   Vascular surgery consulted for PAD, CTA run-off noted severe flow-limiting disease in the left common femoral.  Vascular surgery considering left femoral endarterectomy, though family defering intervention for now.  CM consulted for home IV antibiotics.  Interval Hx:   Pt is awake , oriented to self and person. Daughter at bedside report mental status is much better. Serum sodium low again. 127 today. BNP is 1936. Await Nephro input regarding diuresis.     Objective/physical exam:  General: In no acute distress, afebrile  Chest: crackles at bases , herb   Heart: RRR, +S1, S2, no appreciable murmur  Abdomen: Soft, nontender, BS +  MSK: Warm, trace to 1+ lower extremity edema, no clubbing or cyanosis  Neurologic: Alert and oriented x4, Cranial nerve II-XII intact, moves all ext       VITAL SIGNS: 24 HRS MIN & MAX LAST   Temp  Min: 97.6 °F (36.4 °C)  Max: 98.3 °F (36.8 °C) 98.3 °F (36.8 °C)   BP  Min: 114/69  Max: 158/73 138/62   Pulse  Min: 73  Max: 92  92   Resp  Min: 18  Max: 20 18   SpO2  Min: 91 %  Max: 95 % 95 %       Recent Labs   Lab 05/02/23  1545 05/04/23  0446 05/06/23  0602   WBC 10.36 9.72 8.95   RBC 3.74* 4.04* 3.61*   HGB 10.0* 10.5* 9.4*   HCT 31.7* 34.2* 30.2*   MCV 84.8 84.7 83.7   MCH 26.7* 26.0* 26.0*   MCHC 31.5* 30.7* 31.1*   RDW 15.2 15.3 15.3    390 323   MPV 9.2 9.5 9.1       Recent Labs   Lab 05/02/23  1203 05/02/23  1647 05/03/23  1111 05/03/23  2247 05/04/23  0446 05/05/23  0414 05/06/23  0602   *   < > 128*   < > 130* 132 127*   K 2.9*   < > 4.2  --  4.1 3.4* 5.3*   CO2 37*   < > 35*  --  35* 33* 28   BUN 14.0   < > 18.4  --  14.7 10.7 12.5   CREATININE 0.64   < > 0.62  --  0.62 0.62 0.67   CALCIUM 8.3*   < > 8.3*  --  8.5 8.1* 8.1*   MG  --   --   --   --  2.00 1.80 2.30   ALBUMIN 1.9*  --  2.0*  --  2.1* 1.9*  --    ALKPHOS 84  --   --   --   --   --   --    ALT 11  --   --   --   --   --   --    AST 14  --   --   --   --   --   --    BILITOT 0.4  --   --   --   --   --   --      < > = values in this interval not displayed.          Microbiology Results (last 7 days)       ** No results found for the last 168 hours. **             See below for Radiology    Scheduled Med:   carvediloL  6.25 mg Oral BID    collagenase   Topical (Top) Daily    docusate  200 mg Oral BID    enoxaparin  40 mg Subcutaneous Daily    insulin detemir U-100  7 Units Subcutaneous QHS    Lactobacillus acidophilus  1 capsule Oral TID WM    polyethylene glycol  17 g Oral Daily    sodium chloride 0.9%  10 mL Intravenous Q6H    tamsulosin  0.4 mg Oral Daily    trazodone  100 mg Oral QHS    vancomycin (VANCOCIN) IVPB  500 mg Intravenous Q12H        Continuous Infusions:       PRN Meds:  acetaminophen, dextrose 10%, dextrose 10%, docusate sodium, glucagon (human recombinant), glucose, glucose, insulin aspart U-100, melatonin, senna, Flushing PICC Protocol **AND** sodium chloride 0.9% **AND** sodium chloride 0.9%, traMADoL, Pharmacy to dose Vancomycin consult **AND** vancomycin - pharmacy to dose       Assessment/Plan:  Persistent MRSA Sepsis/Bacteremia, now with clearance as of 4/23  Left ankle chronic non healing wound   Encephalopathy, Staring Spells  Hyponatremia   Hypokalemia  Hypomagnesemia   Acute on chronic diastolic heart failure   Mild to moderate Aortic stenosis  Pulmonary HTN  NIDDM II  Normochromic anemia   Chronic low back pain   Constipation associated with colonic fecal impaction     Plan-   Serum sodium low again. 127 today. BNP is 1936. Await Nephro input regarding diuresis.   Continue with IV Vanc as planned x 6 weeks end date 6/4  CM working on Home health and feasibility of outpatient IV antibiotics  Repeat CT head today again neg for acute intracranial process.   Mental status improved per daughter at bedside and no further staring spell reported . Speech is at baseline.       VTE prophylaxis: Lovenox     Patient condition:  Fair     Anticipated discharge and Disposition:     TBD    All diagnosis and  differential diagnosis have been reviewed; assessment and plan has been documented; I have personally reviewed the labs and test results that are presently available; I have reviewed the patients medication list; I have reviewed the consulting providers response and recommendations. I have reviewed or attempted to review medical records based upon their availability    All of the patient's questions have been  addressed and answered. Patient's is agreeable to the above stated plan. I will continue to monitor closely and make adjustments to medical management as needed.  _____________________________________________________________________    Nutrition Status:    Radiology:  X-Ray Chest 1 View  Narrative: EXAMINATION:  XR CHEST 1 VIEW    CLINICAL HISTORY:  CHF;    COMPARISON:  27 April 2023    FINDINGS:  Portable frontal view of the chest was obtained. Stable left PICC.  The heart is not significantly enlarged.  There is aortic atherosclerosis.  Similar prominent central pulmonary vasculature.  No new dense consolidation or pneumothorax.  Impression: Little interval change.    Electronically signed by: Pola Giraldo  Date:    05/06/2023  Time:    10:08  CT Head Without Contrast  Narrative: EXAMINATION:  CT HEAD WITHOUT CONTRAST    CLINICAL HISTORY:  Mental status change, unknown cause;    TECHNIQUE:  CT imaging of the head performed from the skull base to the vertex without intravenous contrast.  mGycm. Automatic exposure control, adjustment of mA/kV or iterative reconstruction technique was used to reduce radiation.    COMPARISON:  Yesterday    FINDINGS:  There is no acute cortical infarct, hemorrhage or mass lesion.  There is no new parenchymal attenuation abnormality.  Ventricular size is stable.  There are vascular calcifications.  Impression: No acute intracranial findings or significant interval change compared to yesterday.    Electronically signed by: Pola  Enzo  Date:    05/06/2023  Time:    07:55      Mariela Wesley MD   05/06/2023

## 2023-05-06 NOTE — PROGRESS NOTES
Pharmacokinetic Assessment Follow Up: IV Vancomycin    Vancomycin serum concentration assessment(s):    The trough level was drawn correctly and can be used to guide therapy at this time. The measurement is within the desired definitive target range of 15 to 20 mcg/mL.    Vancomycin Regimen Plan:    Continue regimen to Vancomycin 500 mg IV every 12 hours with next serum trough concentration measured at 11:00 prior to 9th dose on 05/10    Drug levels (last 3 results):  Recent Labs   Lab Result Units 05/06/23  1101   Vancomycin Trough ug/ml 16.5       Pharmacy will continue to follow and monitor vancomycin.    Please contact pharmacy at extension [phone] for questions regarding this assessment.    Thank you for the consult,   Cleopatra Larose       Patient brief summary:  Melodie Villarreal is a 89 y.o. female initiated on antimicrobial therapy with IV Vancomycin for treatment of bacteremia    The patient's current regimen is vancomycin 500mg q12h    Drug Allergies:   Review of patient's allergies indicates:   Allergen Reactions    Ace inhibitors Other (See Comments)    Adhesive      Allergic to tape    Bactrim [sulfamethoxazole-trimethoprim] Other (See Comments)     Confusion, Hypoglycemia    Meperidine Other (See Comments)    Midazolam Other (See Comments)    Atorvastatin Nausea Only    Codeine Other (See Comments) and Anxiety    Tapentadol Rash       Actual Body Weight:   56.6kg    Renal Function:   Estimated Creatinine Clearance: 45.2 mL/min (based on SCr of 0.67 mg/dL).,     Dialysis Method (if applicable):  N/A    CBC (last 72 hours):  Recent Labs   Lab Result Units 05/04/23  0446 05/06/23  0602   WBC x10(3)/mcL 9.72 8.95   Hgb g/dL 10.5* 9.4*   Hct % 34.2* 30.2*   Platelet x10(3)/mcL 390 323   Mono % % 9.3 10.9   Eos % % 5.3 5.0   Basophil % % 0.9 1.0       Metabolic Panel (last 72 hours):  Recent Labs   Lab Result Units 05/03/23  2247 05/04/23  0446 05/05/23  0414 05/06/23  0602   Sodium Level mmol/L 127* 130* 132 127*    Potassium Level mmol/L  --  4.1 3.4* 5.3*   Chloride mmol/L  --  87* 91* 92*   Carbon Dioxide mmol/L  --  35* 33* 28   Glucose Level mg/dL  --  72* 51* 130*   Blood Urea Nitrogen mg/dL  --  14.7 10.7 12.5   Creatinine mg/dL  --  0.62 0.62 0.67   Albumin Level g/dL  --  2.1* 1.9*  --    Magnesium Level mg/dL  --  2.00 1.80 2.30   Phosphorus Level mg/dL  --  3.4 3.1  --        Vancomycin Administrations:  vancomycin given in the last 96 hours                     vancomycin 500 mg in dextrose 5 % 100 mL IVPB (ready to mix) (mg) 500 mg New Bag 05/06/23 0055     500 mg New Bag 05/05/23 1209     500 mg New Bag  0050     500 mg New Bag 05/04/23 1307     500 mg New Bag 05/03/23 2327     500 mg New Bag  1248     500 mg New Bag 05/02/23 2358                    Microbiologic Results:  Microbiology Results (last 7 days)       ** No results found for the last 168 hours. **

## 2023-05-07 NOTE — PROGRESS NOTES
[facility] Hospital Medicine Progress Note        Chief Complaint: Inpatient Follow-up for MRSA bacteremia     HPI:   89-year-old female with a history that includes type 2 diabetes mellitus, prior DVT/IVC filter no longer on anticoagulation and non-healing left ankle wound, presented to the ED on 4/16 after she became lethargic. Patient normally alert and oriented, active and able to carry out own ADLs.  She apparently has chronic open wounds on her left lower extremity  followed closely by Wound Care.  On the day of presentation she became drowsy/lethargic, and EMS activated, she was found to be hypotensive with a blood pressure of 66/36 on arrival, requiring 3 L nasal cannula.  Laboratory work showed leukocytosis of 34,000, mild anemia, lactic acid of 3.5, elevated BNP of 2000 and a troponin of 0.115.  Urinalysis was unremarkable, CT chest abdomen and pelvis was negative for pulmonary embolus, pulmonary infiltrates or any intra-abdominal or pelvic pathology.  CT head was normal.  She was started on broad-spectrum antibiotics for undifferentiated sepsis admitted to the hospitalist services for further management.  Blood cultures returned positive for MRSA bacteremia. ID consulted and pt continued on IV Vancomycin. Noted left lower extremity non-healing ulcer. NM bone scan without clear evidence of OM. LLE arterial U/S showed monophasic flow throughout suggestive of inflow disease. CT T and L spine  with no evidence of discitis/osteomyelitis. 2D TTE negative for valvular vegetation. Cardiology consulted for STARR, performed on 4/22, negative valvular vegetations. Pt is noted to have  hydronephrosis despite Newton decompression. Urology consulted to evaluate persistent Rt hydronephrosis.  She was taken to the OR on 04/25 for cystoscopy and right ureteral stent placement.  Blood cultures persistently positive, 4/16, 4/18, 4/19, 4/21.  Eventually cultures from 4/23 remained negative.   Vascular surgery consulted for PAD, CTA run-off noted severe flow-limiting disease in the left common femoral.  Vascular surgery considering left femoral endarterectomy, though family defering intervention for now.  CM consulted for home IV antibiotics.  Interval Hx:   Afebrile with Tmax 99.3. BP is variable with occasional marginal readings. On RA. Na 126 today. Nephrology recs noted for hyponatremia. Pt remains with extreme generalized weakness.     Objective/physical exam:  General: In no acute distress, afebrile  Chest: clear to auscultation herb  Heart: RRR, +S1, S2, no appreciable murmur  Abdomen: Soft, nontender, BS +  MSK: Warm, trace lower extremity edema, no clubbing or cyanosis  Neurologic: Alert and oriented x3, Cranial nerve II-XII intact, moves all ext    VITAL SIGNS: 24 HRS MIN & MAX LAST   Temp  Min: 98.2 °F (36.8 °C)  Max: 99.3 °F (37.4 °C) 98.2 °F (36.8 °C)   BP  Min: 108/58  Max: 136/56 (!) 108/58   Pulse  Min: 76  Max: 97  88   Resp  Min: 16  Max: 18 18   SpO2  Min: 90 %  Max: 97 % (!) 93 %       Recent Labs   Lab 05/02/23  1545 05/04/23  0446 05/06/23  0602   WBC 10.36 9.72 8.95   RBC 3.74* 4.04* 3.61*   HGB 10.0* 10.5* 9.4*   HCT 31.7* 34.2* 30.2*   MCV 84.8 84.7 83.7   MCH 26.7* 26.0* 26.0*   MCHC 31.5* 30.7* 31.1*   RDW 15.2 15.3 15.3    390 323   MPV 9.2 9.5 9.1       Recent Labs   Lab 05/02/23  1203 05/02/23  1647 05/03/23  1111 05/03/23  2247 05/04/23  0446 05/05/23  0414 05/06/23  0602 05/07/23  0940   *   < > 128*   < > 130* 132 127* 126*   K 2.9*   < > 4.2  --  4.1 3.4* 5.3* 4.2   CO2 37*   < > 35*  --  35* 33* 28 31   BUN 14.0   < > 18.4  --  14.7 10.7 12.5 12.1   CREATININE 0.64   < > 0.62  --  0.62 0.62 0.67 0.71   CALCIUM 8.3*   < > 8.3*  --  8.5 8.1* 8.1* 8.2*   MG  --   --   --   --  2.00 1.80 2.30  --    ALBUMIN 1.9*  --  2.0*  --  2.1* 1.9*  --   --    ALKPHOS 84  --   --   --   --   --   --   --    ALT 11  --   --   --   --   --   --   --    AST 14  --   --   --    --   --   --   --    BILITOT 0.4  --   --   --   --   --   --   --     < > = values in this interval not displayed.          Microbiology Results (last 7 days)       ** No results found for the last 168 hours. **             See below for Radiology    Scheduled Med:   carvediloL  6.25 mg Oral BID    collagenase   Topical (Top) Daily    docusate  200 mg Oral BID    enoxaparin  40 mg Subcutaneous Daily    insulin detemir U-100  7 Units Subcutaneous QHS    Lactobacillus acidophilus  1 capsule Oral TID WM    polyethylene glycol  17 g Oral Daily    sodium chloride 0.9%  10 mL Intravenous Q6H    tamsulosin  0.4 mg Oral Daily    trazodone  100 mg Oral QHS    vancomycin (VANCOCIN) IVPB  500 mg Intravenous Q12H        Continuous Infusions:       PRN Meds:  acetaminophen, dextrose 10%, dextrose 10%, docusate sodium, glucagon (human recombinant), glucose, glucose, insulin aspart U-100, melatonin, senna, Flushing PICC Protocol **AND** sodium chloride 0.9% **AND** sodium chloride 0.9%, traMADoL, Pharmacy to dose Vancomycin consult **AND** vancomycin - pharmacy to dose       Assessment/Plan:  Persistent MRSA Sepsis/Bacteremia, now with clearance as of 4/23  Left ankle chronic non healing wound   Encephalopathy, Staring Spells- resolved   Electrolyte abnormality- Hyponatremia, Hypokalemia, Hypomagnesemia   Acute on chronic diastolic heart failure   Mild to moderate Aortic stenosis  Pulmonary HTN  NIDDM II  Normochromic anemia   Chronic low back pain   Constipation associated with colonic fecal impaction     Plan-   Serum sodium low again. 126 today. BNP is 1936.  Nephro input noted. Suggested Pt  appears clinically euvolemic with suspicion for some SIADH. Samsca 15 mg x 1 dose today and assess response. Pt will eventually need fluid restriction. Diuretic treatment not recommended.     Continue with IV Vanc as planned x 6 weeks end date 6/4.    CM working on Home health and feasibility of outpatient IV antibiotics, however pt is  extremely weak in general and debilitated. PT/OT suggested SNF placement.        VTE prophylaxis: Lovenox    Patient condition:  Fair    Anticipated discharge and Disposition:     SNF placement     All diagnosis and differential diagnosis have been reviewed; assessment and plan has been documented; I have personally reviewed the labs and test results that are presently available; I have reviewed the patients medication list; I have reviewed the consulting providers response and recommendations. I have reviewed or attempted to review medical records based upon their availability    All of the patient's questions have been  addressed and answered. Patient's is agreeable to the above stated plan. I will continue to monitor closely and make adjustments to medical management as needed.  _____________________________________________________________________    Nutrition Status:    Radiology:  X-Ray Chest 1 View  Narrative: EXAMINATION:  XR CHEST 1 VIEW    CLINICAL HISTORY:  CHF;    COMPARISON:  27 April 2023    FINDINGS:  Portable frontal view of the chest was obtained. Stable left PICC.  The heart is not significantly enlarged.  There is aortic atherosclerosis.  Similar prominent central pulmonary vasculature.  No new dense consolidation or pneumothorax.  Impression: Little interval change.    Electronically signed by: Pola Giraldo  Date:    05/06/2023  Time:    10:08  CT Head Without Contrast  Narrative: EXAMINATION:  CT HEAD WITHOUT CONTRAST    CLINICAL HISTORY:  Mental status change, unknown cause;    TECHNIQUE:  CT imaging of the head performed from the skull base to the vertex without intravenous contrast.  mGycm. Automatic exposure control, adjustment of mA/kV or iterative reconstruction technique was used to reduce radiation.    COMPARISON:  Yesterday    FINDINGS:  There is no acute cortical infarct, hemorrhage or mass lesion.  There is no new parenchymal attenuation abnormality.  Ventricular size is stable.   There are vascular calcifications.  Impression: No acute intracranial findings or significant interval change compared to yesterday.    Electronically signed by: Pola Giraldo  Date:    05/06/2023  Time:    07:55      Mariela Wesley MD   05/07/2023

## 2023-05-07 NOTE — PROGRESS NOTES
Chief complaint: weak    Interval History:  Pt lying in bed, family reports she is unable to get up, wobbles when PT sits her up.  She is drinking lots of H2O, no SOB/CP/swelling    Review of Systems   Constitutional: Negative.    HENT: Negative.     Respiratory: Negative.     Cardiovascular: Negative.    Gastrointestinal: Negative.    Genitourinary: Negative.    Musculoskeletal: Negative.    Neurological:  Positive for weakness.   Psychiatric/Behavioral: Negative.      all else Neg     carvediloL  6.25 mg Oral BID    collagenase   Topical (Top) Daily    docusate  200 mg Oral BID    enoxaparin  40 mg Subcutaneous Daily    insulin detemir U-100  7 Units Subcutaneous QHS    Lactobacillus acidophilus  1 capsule Oral TID WM    polyethylene glycol  17 g Oral Daily    sodium chloride 0.9%  10 mL Intravenous Q6H    tamsulosin  0.4 mg Oral Daily    trazodone  100 mg Oral QHS    vancomycin (VANCOCIN) IVPB  500 mg Intravenous Q12H       Objective     VITAL SIGNS: 24 HR MIN & MAX LAST    Temp  Min: 98.3 °F (36.8 °C)  Max: 99.3 °F (37.4 °C)  98.8 °F (37.1 °C)    BP  Min: 116/52  Max: 138/62  (!) 117/53     Pulse  Min: 76  Max: 97  80     Resp  Min: 16  Max: 18  18    SpO2  Min: 90 %  Max: 97 %  96 %      Wt Readings from Last 3 Encounters:   04/24/23 56.6 kg (124 lb 12.5 oz)   04/10/23 54.4 kg (120 lb)   02/09/23 85.3 kg (188 lb)       Intake/Output Summary (Last 24 hours) at 5/7/2023 1416  Last data filed at 5/7/2023 0600  Gross per 24 hour   Intake 1170 ml   Output 950 ml   Net 220 ml       Physical Exam  Constitutional:       General: She is not in acute distress.  Cardiovascular:      Rate and Rhythm: Normal rate.      Heart sounds: Murmur heard.   Pulmonary:      Effort: Pulmonary effort is normal.      Breath sounds: Normal breath sounds.   Abdominal:      General: Bowel sounds are normal.      Palpations: Abdomen is soft.      Tenderness: There is no abdominal tenderness.   Musculoskeletal:         General: No  swelling.      Cervical back: Normal range of motion.   Neurological:      Mental Status: She is alert and oriented to person, place, and time.      Motor: Weakness present.        Recent Labs     05/05/23  0414 05/06/23  0602 05/07/23  0940    127* 126*   K 3.4* 5.3* 4.2   CHLORIDE 91* 92* 88*   CO2 33* 28 31   BUN 10.7 12.5 12.1   CREATININE 0.62 0.67 0.71   GLUCOSE 51* 130* 149*   CALCIUM 8.1* 8.1* 8.2*   MG 1.80 2.30  --    PHOS 3.1  --   --    ALBUMIN 1.9*  --   --       Recent Labs     05/06/23  0602   WBC 8.95   HGB 9.4*   HCT 30.2*            Assessment & Plan     HypoNa. Initially corrected p IV NS though Na back down to 126.  BNP elevated though likely chronically elevated in setting of severe AS. She appears clinically euvolemic breathing normally on RA with clear CXR, no edema, suspect some degree of SIADH, will give samsca 15 mg x 1, assess response.  If Na improved tomorrow will institute fluid restriction 1 L and adjust from there to maintain serum Na>=130.  Check TFT's to thyroid dz  Met Alk, stable  D-CHF, grade 2 - clinically euvolemic, hold diuretics for now, may ultimately need fluid restriction  VHD, mod-severe AS, mi-MS, mo-MR, mi-TR  BLE PAD with non healing LLE wound  MRSA sepsis on vanc  (R) hydro, s/p stent - outpt removal in a few weeks with Dr Gauthier  Anemia - Fe profile in am, H/H stable

## 2023-05-07 NOTE — PLAN OF CARE
Problem: Infection  Goal: Absence of Infection Signs and Symptoms  Outcome: Ongoing, Progressing     Problem: Adult Inpatient Plan of Care  Goal: Plan of Care Review  Outcome: Ongoing, Progressing  Goal: Patient-Specific Goal (Individualized)  Outcome: Ongoing, Progressing  Goal: Absence of Hospital-Acquired Illness or Injury  Outcome: Ongoing, Progressing  Goal: Optimal Comfort and Wellbeing  Outcome: Ongoing, Progressing  Goal: Readiness for Transition of Care  Outcome: Ongoing, Progressing     Problem: Diabetes Comorbidity  Goal: Blood Glucose Level Within Targeted Range  Outcome: Ongoing, Progressing     Problem: Adjustment to Illness (Sepsis/Septic Shock)  Goal: Optimal Coping  Outcome: Ongoing, Progressing     Problem: Bleeding (Sepsis/Septic Shock)  Goal: Absence of Bleeding  Outcome: Ongoing, Progressing     Problem: Glycemic Control Impaired (Sepsis/Septic Shock)  Goal: Blood Glucose Level Within Desired Range  Outcome: Ongoing, Progressing     Problem: Infection Progression (Sepsis/Septic Shock)  Goal: Absence of Infection Signs and Symptoms  Outcome: Ongoing, Progressing     Problem: Nutrition Impaired (Sepsis/Septic Shock)  Goal: Optimal Nutrition Intake  Outcome: Ongoing, Progressing     Problem: Impaired Wound Healing  Goal: Optimal Wound Healing  Outcome: Ongoing, Progressing     Problem: Fall Injury Risk  Goal: Absence of Fall and Fall-Related Injury  Outcome: Ongoing, Progressing

## 2023-05-08 NOTE — PLAN OF CARE
Problem: Infection  Goal: Absence of Infection Signs and Symptoms  Outcome: Ongoing, Progressing     Problem: Adult Inpatient Plan of Care  Goal: Plan of Care Review  Outcome: Ongoing, Progressing  Goal: Patient-Specific Goal (Individualized)  Outcome: Ongoing, Progressing  Goal: Absence of Hospital-Acquired Illness or Injury  Outcome: Ongoing, Progressing  Goal: Optimal Comfort and Wellbeing  Outcome: Ongoing, Progressing  Goal: Readiness for Transition of Care  Outcome: Ongoing, Progressing     Problem: Diabetes Comorbidity  Goal: Blood Glucose Level Within Targeted Range  Outcome: Ongoing, Progressing     Problem: Adjustment to Illness (Sepsis/Septic Shock)  Goal: Optimal Coping  Outcome: Ongoing, Progressing     Problem: Glycemic Control Impaired (Sepsis/Septic Shock)  Goal: Blood Glucose Level Within Desired Range  Outcome: Ongoing, Progressing     Problem: Infection Progression (Sepsis/Septic Shock)  Goal: Absence of Infection Signs and Symptoms  Outcome: Ongoing, Progressing     Problem: Fall Injury Risk  Goal: Absence of Fall and Fall-Related Injury  Outcome: Ongoing, Progressing     Problem: Impaired Wound Healing  Goal: Optimal Wound Healing  Outcome: Ongoing, Progressing     Problem: Skin Injury Risk Increased  Goal: Skin Health and Integrity  Outcome: Ongoing, Progressing

## 2023-05-08 NOTE — PLAN OF CARE
Problem: Occupational Therapy  Goal: Occupational Therapy Goal  Description: Goals to be met by: 5/22/23     Patient will increase functional independence with ADLs by performing:    UE Dressing with Contact Guard Assistance.  LE Dressing with Moderate Assistance and Assistive Devices as needed.  Grooming while EOB with Contact Guard Assistance.  Sitting at edge of bed x10 minutes with Contact Guard Assistance.  Pt will perform sit <> stand with Moderate Assistance. - New goal.    Outcome: Ongoing, Progressing

## 2023-05-08 NOTE — PT/OT/SLP RE-EVAL
Occupational Therapy   Re-evaluation    Name: Melodie Villarreal  MRN: [id number]  Admitting Diagnosis:  Sepsis  Recent Surgery: Procedure(s) (LRB):  CYSTOSCOPY, WITH URETERAL STENT INSERTION (Right) 13 Days Post-Op    Recommendations:     Discharge Recommendations: nursing facility, skilled   Discharge Equipment Recommendations: Tashia lift  Barriers to discharge: medical dx, placement    Assessment:     Melodie Villarreal is a 89 y.o. female with a medical diagnosis of Sepsis. She presents confused and disoriented. Pt also presents with c/o pain in B knees with mvt. Performance deficits affecting function are weakness, impaired endurance, impaired self care skills, impaired functional mobility, impaired balance, impaired cognition, decreased lower extremity function, decreased upper extremity function, decreased safety awareness, pain, impaired skin.      Rehab Prognosis: Fair; patient would benefit from acute skilled OT services to address these deficits and reach maximum level of function.       Plan:     Patient to be seen 3 x/week, 4 x/week, 5 x/week to address the above listed problems via self-care/home management, therapeutic activities, therapeutic exercises, wheelchair management/training  Plan of Care Expires: 05/22/23  Plan of Care Reviewed with: patient, son    Subjective     Chief Complaint: Pt c/o pain in B knees with mvt.  Communicated with: RN prior to session.  Pain/Comfort:  Location - Side 1: Bilateral  Location 1: knee    Objective:     Communicated with: RN prior to session. Patient found HOB elevated with: telemetry, pulse ox (continuous), PRAFO, PureWick upon OT entry to room.    General Precautions: Standard, fall, contact pxns  Respiratory Status: Room air    Occupational Performance:    Bed Mobility:    Patient t/f from lying with HOB slightly elevated to seated EOB with max A x2 and vcs provided, using bed rail.  Patient completed Sit to Supine with max A x2 and vcs provided.    Functional  Mobility/Transfers:  Patient completed Sit <> Stand Transfers (x2 trials) with dep A and vcs provided for proper hand placement and to assume upright standing posture with no assistive device. Pt demonstrated increased difficulty following simple command for proper hand placement, and pt also demonstrated increased difficulty assuming upright standing posture.    Activities of Daily Living:  Lower Body Dressing: Dep A to don/doff B socks at bed level.    Cognitive/Visual Perceptual:  Cognitive/Psychosocial Skills:     -       Oriented to: Pt able to state name and city. However, pt required education on , year, hospital, and situation.   -       Follows Commands/attention:Occasional difficulty following simple commands  -       Safety awareness/insight to disability: impaired     Physical Exam:  Balance:    -       Pt required min-mod A to assume static sitting balance initially, but was able to progress to SBA. Note: Pt required assist to flex B knees while seated EOB.  Upper Extremity Range of Motion:     -       BUEs: WFL with only min deficits noted in AROM of B shoulder flex  Upper Extremity Strength:    -       BUEs: Grossly 4/5 except B shoulder flex=3-/5    Therapeutic Positioning  Risk for acquired pressure injuries is increased due to impaired mobility.    OT interventions performed during the course of today's session in an effort to prevent and/or reduce acquired pressure injuries:  Therapeutic positioning completed     Skin assessment:      Findings: known area of altered skin integrity at LLE (dressing in place)    OT recommendations for therapeutic positioning throughout hospitalization:   Follow [facility] Pressure Injury Prevention Protocol  Geomat recommended for sacral protection while [facility]  [employer]    Patient left HOB elevated with PRAFOs, PureWick, all lines intact, call button in reach, and pt's son present.  Note: Pt left without wedge in order to adhere to turning schedule.    GOALS:    Multidisciplinary Problems       Occupational Therapy Goals          Problem: Occupational Therapy    Goal Priority Disciplines Outcome Interventions   Occupational Therapy Goal     OT, PT/OT Ongoing, Progressing    Description: Goals to be met by: 5/22/23     Patient will increase functional independence with ADLs by performing:    UE Dressing with Contact Guard Assistance.  LE Dressing with Moderate Assistance and Assistive Devices as needed.  Grooming while EOB with Contact Guard Assistance.  Sitting at edge of bed x10 minutes with Contact Guard Assistance.  Pt will perform sit <> stand with Moderate Assistance. - New goal.                       History:     Past Medical History:   Diagnosis Date    Adenocarcinoma of uterus     Bladder cancer     Deep vein thrombosis     Essential (primary) hypertension     Heart murmur     High cholesterol     Hydronephrosis 4/25/2023    Hypertriglyceridemia     Insomnia     Osteopenia     Other pulmonary embolism without acute cor pulmonale     Personal history of colonic polyps     Rheumatoid arthritis, unspecified     Type 2 diabetes mellitus without complications          Past Surgical History:   Procedure Laterality Date    CHOLECYSTECTOMY      colonscopy  06/20/2012    CYSTOSCOPY W/ URETERAL STENT PLACEMENT Right 4/25/2023    Procedure: CYSTOSCOPY, WITH URETERAL STENT INSERTION;  Surgeon: Anyi Bearden MD;  Location: [facility];  Service: Urology;  Laterality: Right;    ECCE  01/09/2013    estracapsular cataract removal   02/20/2013    insertion of intrpocular lens prosthesis   02/20/2013    phacoemulsifiaction and aspiration of cataract  02/20/2013    phacoemulsificaion and aspiration of cataract      total abdominal hysterectomy and bilateral salpingooophorectomy  1991       Time Tracking:     OT Date of Treatment: 5/8/23  OT Start Time: 1539  OT Stop Time: 1603  OT Total Time (min): 24 min    Billable Minutes: Re-eval 24 mins    5/8/2023

## 2023-05-08 NOTE — CONSULTS
Melodie Villarreal 8/12/1931  [id number]  5/8/2023    CONSULTING PHYSICIAN: Mariela Wesley MD    REASON FOR CONSULTATION:  Stent removal    HPI:  The patient is a 89-year-old female with a history of bladder cancer who was initially admitted to the hospital for MRSA sepsis on 04/14/2023.  Urology was consulted due to right hydronephrosis on ultrasound.  CT did not reveal any obstructing urolithiasis.  Renal scan was obtained which revealed a dilated and obstructed right kidney collecting system without response post Lasix.  She was taken to the operating room on 04/25/2023 with Dr. Bearden for cystoscopy with bilateral retrograde pyelograms and right ureteral stent placement.  Intraop there was some mild narrowing at UPJ, no purulent drainage from her right kidney or evidence of urothelial tumor recurrence, both kidneys were draining well.  Kidney/urine was not felt to be the source of her sepsis.  She was planned to follow up with the office outpatient for stent removal.  She has remained in the hospital over the last few weeks.  Plan is for treatment with IV vancomycin for 6 weeks, end date 06/04/2023.    Patient continues with some generalized weakness.  Neurology has evaluated her for some staring spells.  Imaging without any evidence of stroke, suspected encephalopathy.  Her vital signs have been stable.  She is afebrile.  She is making great urine. Lab work on 04/06 reveals WBC 8.9, H&H 9.4/30.2.  Labs from today reveal BUN and creatinine 18.4/0.6.  Blood cultures on 04/23/2023 with no growth at 5 days.  Blood cultures from 04/21/2023 with MRSA.    The patient is resting in bed.  Her son is at the bedside.  She denies any abdominal or flank pain.  She is voiding without issues.  External urine collection system in place.  She denies any dysuria, hematuria.  Working on SNF placement.    Past Medical History:   Diagnosis Date    Adenocarcinoma of uterus     Bladder cancer     Deep vein thrombosis     Essential  (primary) hypertension     Heart murmur     High cholesterol     Hydronephrosis 4/25/2023    Hypertriglyceridemia     Insomnia     Osteopenia     Other pulmonary embolism without acute cor pulmonale     Personal history of colonic polyps     Rheumatoid arthritis, unspecified     Type 2 diabetes mellitus without complications      Past Surgical History:   Procedure Laterality Date    CHOLECYSTECTOMY      colonscopy  06/20/2012    CYSTOSCOPY W/ URETERAL STENT PLACEMENT Right 4/25/2023    Procedure: CYSTOSCOPY, WITH URETERAL STENT INSERTION;  Surgeon: Anyi Bearden MD;  Location: [facility] OR;  Service: Urology;  Laterality: Right;    ECCE  01/09/2013    estracapsular cataract removal   02/20/2013    insertion of intrpocular lens prosthesis   02/20/2013    phacoemulsifiaction and aspiration of cataract  02/20/2013    phacoemulsificaion and aspiration of cataract      total abdominal hysterectomy and bilateral salpingooophorectomy  1991     Family History   Problem Relation Age of Onset    Diabetes Mother     Heart failure Mother     Cirrhosis Father     Cancer Sister     Hypertension Sister     Diabetes Sister     Celiac disease Sister     Coronary artery disease Sister     Lupus Sister     Uterine cancer Sister     Kidney failure Sister     Ovarian cysts Sister     Cancer Brother     Diabetes Brother     Coronary artery disease Brother      Social History     Tobacco Use    Smoking status: Never    Smokeless tobacco: Never   Substance Use Topics    Alcohol use: Never    Drug use: Never     Current Facility-Administered Medications   Medication Dose Route Frequency Provider Last Rate Last Admin    acetaminophen tablet 650 mg  650 mg Oral Q6H PRN Anyi Bearden MD   650 mg at 04/20/23 0825    carvediloL tablet 6.25 mg  6.25 mg Oral BID Anyi Bearden MD   6.25 mg at 05/08/23 0823    collagenase ointment   Topical (Top) Daily Anyi Bearden MD   Given at 05/06/23 1238    dextrose 10%  bolus 125 mL 125 mL  12.5 g Intravenous PRN Anyi Bearden MD   Stopped at 05/05/23 0609    dextrose 10% bolus 250 mL 250 mL  25 g Intravenous PRN Anyi Bearden MD        docusate 50 mg/5 mL liquid 200 mg  200 mg Oral BID Crescencio Villeda MD   200 mg at 05/07/23 0843    docusate sodium capsule 100 mg  100 mg Oral BID PRN Anyi Bearden MD        enoxaparin injection 40 mg  40 mg Subcutaneous Daily Anyi Bearden MD   40 mg at 05/08/23 0513    glucagon (human recombinant) injection 1 mg  1 mg Intramuscular PRN Anyi Bearden MD        glucose chewable tablet 16 g  16 g Oral PRN Anyi Bearden MD        glucose chewable tablet 24 g  24 g Oral PRN Anyi Bearden MD        insulin aspart U-100 injection 0-5 Units  0-5 Units Subcutaneous QID (AC + HS) PRN Anyi Bearden MD   2 Units at 05/04/23 2018    insulin detemir U-100 injection 7 Units  7 Units Subcutaneous QHS Anyi Bearden MD   7 Units at 05/07/23 1951    Lactobacillus acidophilus capsule 1 capsule  1 capsule Oral TID  Anyi Bearden MD   1 capsule at 05/08/23 0823    melatonin tablet 6 mg  6 mg Oral Nightly PRN Anyi Bearden MD   6 mg at 05/05/23 2041    polyethylene glycol packet 17 g  17 g Oral Daily Anyi Bearden MD   17 g at 04/28/23 0904    senna tablet 8.6 mg  8.6 mg Oral Daily PRN Anyi Bearden MD        sodium chloride 0.9% flush 10 mL  10 mL Intravenous Q6H Crescencio Villeda MD   10 mL at 05/08/23 0502    And    sodium chloride 0.9% flush 10 mL  10 mL Intravenous PRN Crescencio Villeda MD        tamsulosin 24 hr capsule 0.4 mg  0.4 mg Oral Daily Anyi Bearden MD   0.4 mg at 05/08/23 0823    traMADoL tablet 50 mg  50 mg Oral Q6H PRN Anyi Bearden MD   50 mg at 05/07/23 2039    traZODone tablet 100 mg  100 mg Oral QHS Crescencio Villeda MD   100 mg at 05/07/23 1951    vancomycin - pharmacy to dose   Intravenous  pharmacy to manage frequency Anyi Bearden MD        vancomycin 500 mg in dextrose 5 % 100 mL IVPB (ready to mix)  500 mg Intravenous Q12H Crescencio Villeda MD   Stopped at 05/08/23 1245     Review of patient's allergies indicates:   Allergen Reactions    Ace inhibitors Other (See Comments)    Adhesive      Allergic to tape    Bactrim [sulfamethoxazole-trimethoprim] Other (See Comments)     Confusion, Hypoglycemia    Meperidine Other (See Comments)    Midazolam Other (See Comments)    Atorvastatin Nausea Only    Codeine Other (See Comments) and Anxiety    Tapentadol Rash     ROS: 12 point review of systems negative other than the HPI    PHYSICAL EXAM:  Vitals:    05/08/23 0744 05/08/23 0800 05/08/23 0823 05/08/23 1258   BP: (!) 168/73  (!) 168/73 (!) 162/79   BP Location: Right arm      Patient Position: Lying      Pulse: 88 76 88 87   Resp: 16   16   Temp: 97.9 °F (36.6 °C)   98.3 °F (36.8 °C)   TempSrc: Oral   Oral   SpO2: 98% (!) 94%  96%   Weight:       Height:           Intake/Output Summary (Last 24 hours) at 5/8/2023 1310  Last data filed at 5/8/2023 0600  Gross per 24 hour   Intake 480 ml   Output 3400 ml   Net -2920 ml     GEN:  NAD  HEENT: NCAT, PERRLA, EOMI, OP clear, nares patent  CV: RRR  RESP: Even and unlabored  ABD: soft, NTND  : external urine collection system in place. Clear yellow urine in cannister  EXT: no C/C/E  NEURO: no focal deficits, MAEW, AAOx4    LABS:  Recent Results (from the past 24 hour(s))   Basic Metabolic Panel    Collection Time: 05/08/23  5:12 AM   Result Value Ref Range    Sodium Level 134 132 - 146 mmol/L    Potassium Level 3.9 3.5 - 5.1 mmol/L    Chloride 92 (L) 98 - 111 mmol/L    Carbon Dioxide 33 (H) 23 - 31 mmol/L    Glucose Level 51 (L) 75 - 121 mg/dL    Blood Urea Nitrogen 18.4 9.8 - 20.1 mg/dL    Creatinine 0.60 0.55 - 1.02 mg/dL    BUN/Creatinine Ratio 31     Calcium Level Total 8.9 8.4 - 10.2 mg/dL    Anion Gap 9.0 mEq/L    eGFR >60 mls/min/1.73/m2   TSH     Collection Time: 05/08/23  5:12 AM   Result Value Ref Range    Thyroid Stimulating Hormone 2.099 0.350 - 4.940 uIU/mL   T4, Free    Collection Time: 05/08/23  5:12 AM   Result Value Ref Range    Thyroxine Free 1.25 0.70 - 1.48 ng/dL   Cortisol    Collection Time: 05/08/23  5:12 AM   Result Value Ref Range    Cortisol Level 18.0 ug/dL       IMAGING:  No recent urology imaging        NM KIDNEY W FLOW AND FUNCTION W PHARMACOLOGICAL     CLINICAL HISTORY:  UPJ obstruction suspected (Ped 0-18y);     TECHNIQUE:  Dynamic renal scintigraphy was performed in the posterior projection following intravenous administration of 6.2 mCi of Mercaptylacetyltriglycine. Clearance phase of the study was stimulated with 28 mg of Lasix intravenously.     COMPARISON:  CT abdomen pelvis April 22, 2023     FINDINGS:  There was delayed perfusion to the right kidney during flow phase of the exam.  There was also delay in achievement of the right kidney cortical plateau and time to peak occurred in 25.48 minutes.  There was subsequent accumulation of radioisotope within dilated right kidney collecting system.  There was no drop of the right kidney excretory curve both prior to and post administration of Lasix and right kidney time half was not achieved.     Left kidney cortical plateau was achieved within 3.48 minutes.  Left kidney transcortical phase is unremarkable.  Left kidney collecting system is without dilatation.  There was adequate drop of the left kidney excretory curve both prior to and post administration of Lasix.     Quantitative analysis of the kidneys was performed and show mild split function discrepancy. The right kidney total function is 45 % and left kidney 55 %. The right renal normalized effective plasma flow is 129 ml/minute and the left kidney 159 ml/minute.     Impression:     1. Dilated and obstructed right kidney collecting system without response post Lasix.     2. Unremarkable left kidney.        Electronically  signed by: Morteza Ballesteros  Date:                                            04/24/2023  Time:                                           14:39    CT ABDOMEN PELVIS WITHOUT CONTRAST     CLINICAL HISTORY:  Sepsis;New right hydronephrosis;     TECHNIQUE:  CT imaging of the abdomen and pelvis without intravenous contrast. Axial, coronal and sagittal reformatted images reviewed. Dose length product is 630 mGycm. Automatic exposure control, adjustment of mA/kV or iterative reconstruction technique used to limit radiation dose.     COMPARISON:  CT 04/16/2023     FINDINGS:  Assessment of the visceral organs and vasculature is limited by the lack of IV contrast.     Liver/biliary: No concerning hepatic findings. Prior cholecystectomy.  Stable biliary tree.     Pancreas: Atrophy of the pancreatic body and tail.  Cystic lesion in the pancreatic head measures up to 24 mm.     Spleen: Normal.     Adrenals: Normal.     Genitourinary: Mild-to-moderate right-sided hydronephrosis.  Mildly hyperdense material within the right renal collecting system.  No significant right ureteral or left-sided collecting system dilatation.  Bladder decompressed with a catheter.     Stomach/bowel: Moderate to large volume stool in the colon with mild stranding adjacent to the rectum.  No small bowel dilatation.     Lymph nodes and peritoneum: No pathologically enlarged lymph node identified with noncontrast technique. No ascites or free air.     Vasculature: Numerous aortic and iliac artery calcifications.  IVC filter.     Abdominal wall: Mild subcutaneous edema along the flanks.     Lung bases: Trace left pleural fluid.     Bones: No acute osseous findings.     Impression:     1. Mild to moderate right hydronephrosis new since 04/16/2023 with no significant right ureteral dilatation.  Hyperdense material within the right renal collecting system is favored excreted IV contrast, although hemorrhage also possible.  2. Moderate to large volume colonic  stool with suspected mild proctitis.  3. 24 mm cystic lesion in the head of the pancreas.  Depending on prior imaging and comorbidities, six-month follow-up abdominal MRI/MRCP can be considered for surveillance.        Electronically signed by: Ed Dee  Date:                                            04/23/2023  Time:                                           08:35        ASSESSMENT:  -MRSA sepsis  -Right hydronephrosis s/p cysto with bilateral RGP and right ureteral stent placement on 4/25/2023 with Dr. Bearden, there was no purulent drainage from her right kidney or evidence of tumor recurrence and her both kidneys were draining well despite reading of renal scan showing obstruction on the right side. Therefore, her urine or kidneys not felt to be source of infection.    PLAN:  The patient is not experiencing any issues with her ureteral stent. No indication for urgent removal of her stent. Recommend continuing culture specific antibiotics and she can f/u 1-2 weeks after d/c for stent removal in the office. Discussed with patient and her son, who are both in agreement with plan.   CM is working on SNF palcement  Please call as needed with any issues.      Jaki Keen, MANSI

## 2023-05-08 NOTE — PROGRESS NOTES
Inpatient Nutrition Assessment    Admit Date: 4/16/2023   Total duration of encounter: 22 days     Nutrition Recommendation/Prescription     Continue current diet as tolerated  Implement food preferences.  Re-add MANDY BID (provides 90 kcal and 2.5 g protein per serving)  Encouraged adequate PO intake  Monitor PO intake, weight, labs    Communication of Recommendations: reviewed with patient and reviewed with family    Nutrition Assessment     Malnutrition Assessment/Nutrition-Focused Physical Exam    Malnutrition in the context of acute illness or injury  Degree of Malnutrition: non-severe (moderate) malnutrition  Energy Intake: does not meet criteria  Interpretation of Weight Loss: unable to obtain  Body Fat:mild depletion  Area of Body Fat Loss: upper arm region - triceps / biceps  Muscle Mass Loss: mild depletion  Area of Muscle Mass Loss: clavicle bone region - pectoralis major, deltoid, trapezius muscles, clavicle and acromion bone region - deltoid muscle, and scapular bone region - trapezius, supraspinus, infraspinus muscles  Fluid Accumulation: unable to obtain  Edema: unable to obtain   Reduced  Strength: unable to obtain  A minimum of two characteristics is recommended for diagnosis of either severe or non-severe malnutrition.    Chart Review    Reason Seen: continuous nutrition monitoring and follow-up    Malnutrition Screening Tool Results   Have you recently lost weight without trying?: No  Have you been eating poorly because of a decreased appetite?: No   MST Score: 0     Diagnosis:  Chronic left ankle/foot ulcerations, with no overt signs of infection  NSTEMI, likely type 2 demand ischemia   Elevated BNP with no reported history of CHF  Essential hypertension   Type 2 diabetes mellitus   Remote DVT/IVC filter placement, off anticoagulation    Relevant Medical History:    Adenocarcinoma of uterus      Bladder cancer      Deep vein thrombosis      Essential (primary) hypertension      Heart murmur       High cholesterol      Hypertriglyceridemia      Insomnia      Osteopenia      Other pulmonary embolism without acute cor pulmonale      Personal history of colonic polyps      Rheumatoid arthritis, unspecified      Type 2 diabetes mellitus without complications        Nutrition-Related Medications: Scheduled Meds:   carvediloL  6.25 mg Oral BID    collagenase   Topical (Top) Daily    docusate  200 mg Oral BID    enoxaparin  40 mg Subcutaneous Daily    insulin detemir U-100  7 Units Subcutaneous QHS    Lactobacillus acidophilus  1 capsule Oral TID WM    polyethylene glycol  17 g Oral Daily    sodium chloride 0.9%  10 mL Intravenous Q6H    tamsulosin  0.4 mg Oral Daily    trazodone  100 mg Oral QHS    vancomycin (VANCOCIN) IVPB  500 mg Intravenous Q12H     Continuous Infusions:        PRN Meds:.acetaminophen, dextrose 10%, dextrose 10%, docusate sodium, glucagon (human recombinant), glucose, glucose, insulin aspart U-100, melatonin, senna, Flushing PICC Protocol **AND** sodium chloride 0.9% **AND** sodium chloride 0.9%, traMADoL, Pharmacy to dose Vancomycin consult **AND** vancomycin - pharmacy to dose    Calorie Containing IV Medications: no significant kcals from medications at this time    Nutrition-Related Labs:  4/18/2023: WBC 15.8, H/H 10.7/33.4, Na 130, Cl 95, Gluc 131  4/23/2023: WBC 11.8, H/H 9.7/29.5, Na 128, Cl 91, Crea 0.53, Ca 8.3, Mg 1.50, Gluc 141  4/28/2023: H/H 9.9/31.7, Cl 85, BUN 21.6, Gluc 120  5/3/2023: Na 128, Cl 83, Ca 8.3, Alb 2.0, Gluc 63  5/8/2023: Cl 92, Gluc 51    Diet/PN Order: Diet diabetic  Oral Supplement Order: none  Tube Feeding Order: none  Appetite/Oral Intake: good/% of meals  Factors Affecting Nutritional Intake: decreased appetite and food preferences  Food/Hoahaoism/Cultural Preferences: none reported  Food Allergies: none reported    Skin Integrity: wound  Wound(s):      Altered Skin Integrity 04/17/23 Left posterior Ankle Full thickness tissue loss. Subcutaneous fat  may be visible but bone, tendon or muscle are not exposed-Tissue loss description: Full thickness       Altered Skin Integrity 04/17/23 Left lateral Ankle Full thickness tissue loss. Subcutaneous fat may be visible but bone, tendon or muscle are not exposed-Tissue loss description: Full thickness 2 full thickness wounds per report (left posterior ankle and left lateral ankle)    Comments    4/19/2023: Pt's daughter reports good appetite/PO intake prior to admit with fair appetite/PO intake currently. Pt agreeable to vanilla Boost Glucose Control. Pt denies N/V/D and chewing/swallowing difficulties. The daughter reports constipation, last BM documented as 4/15/2023. The daughter reports possible wt loss within the past 3-4 months, unable to specify wt loss. Will add MANDY BID to promote wound healing.  Encouraged adequate PO intake. Will monitor.    4/24/2023: Pt busy at time of rounds. Pt has ~50% PO intake documented. No reported N/V. CT of abdomen/pelvis showed rectal  fecal impaction. Last BM documented as 4/23. No tolerance issues reported. Boost Glucose Control TID and MANDY BID ordered. Will monitor.    4/28/2023: The family member reports fair/PO intake. Pt receives Boost Glucose Control and MANDY BID. Obtained and implemented food preferences. Pt's family member denies N/V/D but reports constipation. Last BM documented as 4/23/2023. Pt receiving docusate sodium and miralax. Encouraged adequate PO intake. Will monitor.    5/3/2023: Pt's daughter reports 50-75% PO intake with a good appetite. The daughter denies N/V/D/C. Obtained and implemented food preferences. Boost Glucose Control TID and MANDY BID ordered. Last BM documented as 5/3/2023. Encouraged adequate PO intake. Will monitor.    5/8/2023: Pt's daughter reports good appetite/PO intake for lunch and dinner but reports poor PO intake for breakfast. Obtained and implemented food preferences. The daughter denies N/V/D/C. Last BM documented 5/6/2023. Will  "re-add MANDY BID to help promote wound healing. Encouraged adequate PO intake. Noted that diuretics are being held for now, per MD note, pt may need fluid restriction. Will monitor.    Anthropometrics    Height: 5' 3" (160 cm) Height Method: Stated  Last Weight: 56.6 kg (124 lb 12.5 oz) (23 0508) Weight Method: Bed Scale  BMI (Calculated): 22.1  BMI Classification: normal (BMI 18.5-24.9)     Ideal Body Weight (IBW), Female: 115 lb     % Ideal Body Weight, Female (lb): 95.65 %                             Usual Weight Provided By: unable to obtain usual weight    Wt Readings from Last 5 Encounters:   23 56.6 kg (124 lb 12.5 oz)   04/10/23 54.4 kg (120 lb)   23 85.3 kg (188 lb)   23 55.8 kg (123 lb)   12/15/22 59 kg (130 lb)     Weight Change(s) Since Admission:  Admit Weight: 49.9 kg (110 lb) (23 1751)  2023: 49.9 kg  2023: 56.6 kg  2023: no new wts logged.  5/3/2023: no new wts logged.  2023: no new wts    Estimated Needs    Weight Used For Calorie Calculations: 49.9 kg (110 lb 0.2 oz)  Energy Calorie Requirements (kcal): 2825-8838 (30-35 kcal/kg)  Energy Need Method: Kcal/kg  Weight Used For Protein Calculations: 49.9 kg (110 lb 0.2 oz)  Protein Requirements:  (1.5-2.0 g/kg)  Fluid Requirements (mL): 1497 (1 mL/kcal)  Temp (24hrs), Av.3 °F (36.8 °C), Min:97.9 °F (36.6 °C), Max:98.8 °F (37.1 °C)         Enteral Nutrition    Patient not receiving enteral nutrition at this time.    Parenteral Nutrition    Patient not receiving parenteral nutrition support at this time.    Evaluation of Received Nutrient Intake    Calories: meeting estimated needs   Protein: meeting estimated needs     Patient Education    Not applicable.    Nutrition Diagnosis     PES: Malnutrition related to acute illness as evidenced by mild fat/muscle wasting. (continues)    Interventions/Goals     Intervention(s): general/healthful diet and commercial beverage  Goal: Consume % of " meals/snacks by follow-up. (goal progressing)    Monitoring & Evaluation     Dietitian will monitor food and beverage intake, weight, electrolyte/renal panel, glucose/endocrine profile, and gastrointestinal profile.  Nutrition Risk/Follow-Up: moderate (follow-up in 3-5 days)   Please consult if re-assessment needed sooner.

## 2023-05-08 NOTE — PLAN OF CARE
05/08/23 1103   Discharge Reassessment   Assessment Type Discharge Planning Reassessment   Discharge Plan discussed with: Adult children;Patient   Discharge Plan A Skilled Nursing Facility   Discharge Plan B Skilled Nursing Facility   Post-Acute Status   Post-Acute Authorization Placement     Per Y[facility] at [facility], await response on new med started by nephrology.

## 2023-05-08 NOTE — PT/OT/SLP PROGRESS
Physical Therapy Treatment    Patient Name:  Melodie Villarreal   MRN:  [id number]    Recommendations:     Discharge Recommendations: nursing facility, skilled  Discharge Equipment Recommendations: lift device  Barriers to discharge: Impaired mobility and Ongoing medical needs    Assessment:     Melodie Villarreal is a 89 y.o. female admitted with a medical diagnosis of Sepsis, MRSA bacteremia, L ankle wound.  She presents with the following impairments/functional limitations: weakness, gait instability, impaired endurance, impaired balance, impaired functional mobility, impaired self care skills, impaired cognition, decreased safety awareness. Continued transfer training today.     Rehab Prognosis: Fair; patient would benefit from acute skilled PT services to address these deficits and reach maximum level of function.    Recent Surgery: Procedure(s) (LRB):  CYSTOSCOPY, WITH URETERAL STENT INSERTION (Right) 13 Days Post-Op    Plan:     During this hospitalization, patient to be seen 6 x/week to address the identified rehab impairments via gait training, therapeutic activities, therapeutic exercises, wheelchair management/training and progress toward the following goals:    Plan of Care Expires:  05/26/23    Subjective     Chief Complaint: pt confused  Patient/Family Comments/goals: to get more mobile  Pain/Comfort:  Pain Rating 1: 0/10      Objective:     Communicated with nurse prior to session.  Patient found supine with telemetry, PureWick, pressure relief boots upon PT entry to room.     General Precautions: Standard, contact, fall  Orthopedic Precautions: N/A  Braces: N/A  Respiratory Status: Room air  Skin Integrity: Wound L achilles      Functional Mobility:  Bed Mobility:     Scooting: maximal assistance and of 2 persons  Supine to Sit: maximal assistance and of 2 persons  Sit to Supine: maximal assistance and of 2 persons  Transfers:     Sit to Stand:  maximal assistance and of 2 persons with rolling walker  Balance:  Initially Min A sitting EOB, but progressed to CGA with BUE support on EOB.     Therapeutic Activities/Exercises:  Performed 3 sit<>stand transfers w RW. Retropulsive. L knee limited in flexion.     Education:  Patient and son/s provided with verbal education regarding PT POC.  Understanding was verbalized.     Patient left right sidelying with all lines intact, call button in reach, and CNA present..    GOALS:   Multidisciplinary Problems       Physical Therapy Goals          Problem: Physical Therapy    Goal Priority Disciplines Outcome Goal Variances Interventions   Physical Therapy Goal     PT, PT/OT Ongoing, Progressing     Description: Goals to be met by: 2023     Patient will increase functional independence with mobility by performin. Supine to sit with Stand-by Assistance  2. Sit to stand transfer with Contact Guard Assistance  3. Gait  x 150 feet with Contact Guard Assistance using Rolling Walker.   4. Pt will ascend/descend 3 steps with HR and Min A.                          Time Tracking:     PT Received On: 23  PT Start Time: 1419     PT Stop Time: 1445  PT Total Time (min): 26 min     Billable Minutes: Therapeutic Activity 26    Treatment Type: Treatment  PT/PTA: PT     Number of PTA visits since last PT visit: 1     2023

## 2023-05-08 NOTE — PROGRESS NOTES
Chief complaint: none    Interval History:  Pt responded well to samsca yesterday with 3.7 L Uout, serum Na corrected to 134.  She has no acute c/o, notes feeling better today, participated with PT and OT, fatigued.    Review of Systems   Constitutional:  Positive for fatigue.   HENT: Negative.     Respiratory: Negative.     Cardiovascular: Negative.    Gastrointestinal: Negative.    Genitourinary: Negative.    Musculoskeletal: Negative.    Neurological:  Positive for weakness.   Psychiatric/Behavioral:  Positive for confusion (stable).     all else Neg     carvediloL  6.25 mg Oral BID    collagenase   Topical (Top) Daily    docusate  200 mg Oral BID    enoxaparin  40 mg Subcutaneous Daily    ferric gluconate (FERRLECIT) IVPB  125 mg Intravenous Daily    insulin detemir U-100  7 Units Subcutaneous QHS    Lactobacillus acidophilus  1 capsule Oral TID WM    polyethylene glycol  17 g Oral Daily    sodium chloride 0.9%  10 mL Intravenous Q6H    tamsulosin  0.4 mg Oral Daily    trazodone  100 mg Oral QHS    vancomycin (VANCOCIN) IVPB  500 mg Intravenous Q12H       Objective     VITAL SIGNS: 24 HR MIN & MAX LAST    Temp  Min: 97.9 °F (36.6 °C)  Max: 98.8 °F (37.1 °C)  98.8 °F (37.1 °C)    BP  Min: 130/48  Max: 168/73  137/67     Pulse  Min: 76  Max: 102  99     Resp  Min: 16  Max: 18  18    SpO2  Min: 93 %  Max: 98 %  (!) 94 %      Wt Readings from Last 3 Encounters:   04/24/23 56.6 kg (124 lb 12.5 oz)   04/10/23 54.4 kg (120 lb)   02/09/23 85.3 kg (188 lb)       Intake/Output Summary (Last 24 hours) at 5/8/2023 1631  Last data filed at 5/8/2023 1458  Gross per 24 hour   Intake 1140 ml   Output 4300 ml   Net -3160 ml       Physical Exam  Constitutional:       General: She is not in acute distress.  Cardiovascular:      Rate and Rhythm: Normal rate.   Pulmonary:      Effort: Pulmonary effort is normal. No respiratory distress.   Abdominal:      General: Bowel sounds are normal. There is no distension.      Palpations:  Abdomen is soft.      Tenderness: There is no abdominal tenderness.   Musculoskeletal:         General: No swelling.      Cervical back: Normal range of motion.   Neurological:      General: No focal deficit present.      Mental Status: She is alert. She is disoriented.        Recent Labs     05/06/23  0602 05/07/23  0940 05/08/23  0512   * 126* 134   K 5.3* 4.2 3.9   CHLORIDE 92* 88* 92*   CO2 28 31 33*   BUN 12.5 12.1 18.4   CREATININE 0.67 0.71 0.60   GLUCOSE 130* 149* 51*   CALCIUM 8.1* 8.2* 8.9   MG 2.30  --   --       Recent Labs     05/06/23  0602   WBC 8.95   HGB 9.4*   HCT 30.2*            Assessment & Plan     HypoNa. Initially corrected p IV NS though Na was back down to 126.  BNP elevated though likely chronically elevated in setting of severe AS. 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  Recommend fluid restriction 1.2 L  daily adjusting from there to maintain serum Na>=130.    Met Alk, stable  D-CHF, grade 2 - clinically euvolemic, hold diuretics for now, fluid restriction recommended at this time as above  VHD, mod-severe AS, mi-MS, mo-MR, mi-TR  BLE PAD with non healing LLE wound  MRSA sepsis on vanc  (R) hydro, s/p stent - outpt removal in a few weeks with Dr Gauthier  Anemia - Fe profile indicates some degree of Fe deficiency, ferrlecit 125mg x 3 days ordered    Will sign off, please call for questions / concerns

## 2023-05-08 NOTE — PROGRESS NOTES
[facility] Hospital Medicine Progress Note        Chief Complaint: Inpatient Follow-up for  MRSA bacteremia     HPI:   89-year-old female with a history that includes type 2 diabetes mellitus, prior DVT/IVC filter no longer on anticoagulation and non-healing left ankle wound, presented to the ED on 4/16 after she became lethargic. Patient normally alert and oriented, active and able to carry out own ADLs.  She apparently has chronic open wounds on her left lower extremity  followed closely by Wound Care.  On the day of presentation she became drowsy/lethargic, and EMS activated, she was found to be hypotensive with a blood pressure of 66/36 on arrival, requiring 3 L nasal cannula.  Laboratory work showed leukocytosis of 34,000, mild anemia, lactic acid of 3.5, elevated BNP of 2000 and a troponin of 0.115.  Urinalysis was unremarkable, CT chest abdomen and pelvis was negative for pulmonary embolus, pulmonary infiltrates or any intra-abdominal or pelvic pathology.  CT head was normal.  She was started on broad-spectrum antibiotics for undifferentiated sepsis admitted to the hospitalist services for further management.  Blood cultures returned positive for MRSA bacteremia. ID consulted and pt continued on IV Vancomycin. Noted left lower extremity non-healing ulcer. NM bone scan without clear evidence of OM. LLE arterial U/S showed monophasic flow throughout suggestive of inflow disease. CT T and L spine  with no evidence of discitis/osteomyelitis. 2D TTE negative for valvular vegetation. Cardiology consulted for STARR, performed on 4/22, negative valvular vegetations. Pt is noted to have  hydronephrosis despite Newton decompression. Urology consulted to evaluate persistent Rt hydronephrosis.  She was taken to the OR on 04/25 for cystoscopy and right ureteral stent placement.  Plan to remove stent in 2 weeks after DC from hospital in the office. Blood cultures persistently positive, 4/16,  4/18, 4/19, 4/21.  Eventually cultures from 4/23 remained negative.  Vascular surgery consulted for PAD, CTA run-off noted severe flow-limiting disease in the left common femoral. Vascular surgery considering left femoral endarterectomy, though family defering intervention for now. Hospital course complicated by intermittent hyponatremia initially treated with Furosemide given diastolic heart failure exacerbation. However hyponatremia persisted despite pt being euvolemic with diuresis and Nephrology was consulted to assist. Nephro treated pt with Samsca and subsequent fluid restriction to 1 Liter with improvement of sodium level.   PT/OT suggested SNF placement. CM consulted to assist with DC planning.     Interval Hx:   No new c/o today. Await SNF placement . CM consulted. Sodium improved to 134 today. Remains off of diuretics. Pt and family advised of 1 liter fluid restriction.     Objective/physical exam:  General: In no acute distress, afebrile  Chest: clear to auscultation herb  Heart: RRR, +S1, S2, no appreciable murmur  Abdomen: Soft, nontender, BS +  MSK: Warm, trace lower extremity edema, no clubbing or cyanosis  Neurologic: Alert and oriented x3, Cranial nerve II-XII intact, moves all ext    VITAL SIGNS: 24 HRS MIN & MAX LAST   Temp  Min: 97.9 °F (36.6 °C)  Max: 98.8 °F (37.1 °C) 98.8 °F (37.1 °C)   BP  Min: 130/48  Max: 168/73 137/67   Pulse  Min: 76  Max: 102  99   Resp  Min: 16  Max: 18 18   SpO2  Min: 93 %  Max: 98 % (!) 94 %       Recent Labs   Lab 05/02/23  1545 05/04/23  0446 05/06/23  0602   WBC 10.36 9.72 8.95   RBC 3.74* 4.04* 3.61*   HGB 10.0* 10.5* 9.4*   HCT 31.7* 34.2* 30.2*   MCV 84.8 84.7 83.7   MCH 26.7* 26.0* 26.0*   MCHC 31.5* 30.7* 31.1*   RDW 15.2 15.3 15.3    390 323   MPV 9.2 9.5 9.1       Recent Labs   Lab 05/02/23  1203 05/02/23  1647 05/03/23  1111 05/03/23  2247 05/04/23  0446 05/05/23  0414 05/06/23  0602 05/07/23  0940 05/08/23  0512   *   < > 128*   < > 130* 132  127* 126* 134   K 2.9*   < > 4.2  --  4.1 3.4* 5.3* 4.2 3.9   CO2 37*   < > 35*  --  35* 33* 28 31 33*   BUN 14.0   < > 18.4  --  14.7 10.7 12.5 12.1 18.4   CREATININE 0.64   < > 0.62  --  0.62 0.62 0.67 0.71 0.60   CALCIUM 8.3*   < > 8.3*  --  8.5 8.1* 8.1* 8.2* 8.9   MG  --   --   --   --  2.00 1.80 2.30  --   --    ALBUMIN 1.9*  --  2.0*  --  2.1* 1.9*  --   --   --    ALKPHOS 84  --   --   --   --   --   --   --   --    ALT 11  --   --   --   --   --   --   --   --    AST 14  --   --   --   --   --   --   --   --    BILITOT 0.4  --   --   --   --   --   --   --   --     < > = values in this interval not displayed.          Microbiology Results (last 7 days)       ** No results found for the last 168 hours. **             See below for Radiology    Scheduled Med:   carvediloL  6.25 mg Oral BID    collagenase   Topical (Top) Daily    docusate  200 mg Oral BID    enoxaparin  40 mg Subcutaneous Daily    ferric gluconate (FERRLECIT) IVPB  125 mg Intravenous Daily    insulin detemir U-100  7 Units Subcutaneous QHS    Lactobacillus acidophilus  1 capsule Oral TID WM    polyethylene glycol  17 g Oral Daily    sodium chloride 0.9%  10 mL Intravenous Q6H    tamsulosin  0.4 mg Oral Daily    trazodone  100 mg Oral QHS    vancomycin (VANCOCIN) IVPB  500 mg Intravenous Q12H        Continuous Infusions:       PRN Meds:  acetaminophen, dextrose 10%, dextrose 10%, docusate sodium, glucagon (human recombinant), glucose, glucose, insulin aspart U-100, melatonin, senna, Flushing PICC Protocol **AND** sodium chloride 0.9% **AND** sodium chloride 0.9%, traMADoL, Pharmacy to dose Vancomycin consult **AND** vancomycin - pharmacy to dose       Assessment/Plan:  Persistent MRSA Sepsis/Bacteremia, now with clearance as of 4/23  Left ankle chronic non healing wound   Encephalopathy, Staring Spells- resolved   Electrolyte abnormality- Hyponatremia, Hypokalemia, Hypomagnesemia   Acute on chronic diastolic heart failure - currently  compensated   Mild to moderate Aortic stenosis  Pulmonary HTN  NIDDM II  Normochromic anemia   Chronic low back pain   Constipation associated with colonic fecal impaction     Plan-   S/P  Samsca 15 mg x 1 dose yesterday. Serum sodium improved to 134 today  Remains off of diuretics. Pt and family advised of 1 liter fluid restriction with goal sodium 130 or higher   TSH and Cortisol are normal    Continue with IV Vanc as planned x 6 weeks end date 6/4.     CM consulted to assist with SNF placement.      VTE prophylaxis: Lovenox     Patient condition:  Fair    Anticipated discharge and Disposition:     SNF placement       All diagnosis and differential diagnosis have been reviewed; assessment and plan has been documented; I have personally reviewed the labs and test results that are presently available; I have reviewed the patients medication list; I have reviewed the consulting providers response and recommendations. I have reviewed or attempted to review medical records based upon their availability    All of the patient's questions have been  addressed and answered. Patient's is agreeable to the above stated plan. I will continue to monitor closely and make adjustments to medical management as needed.  _____________________________________________________________________    Nutrition Status:    Radiology:  X-Ray Chest 1 View  Narrative: EXAMINATION:  XR CHEST 1 VIEW    CLINICAL HISTORY:  CHF;    COMPARISON:  27 April 2023    FINDINGS:  Portable frontal view of the chest was obtained. Stable left PICC.  The heart is not significantly enlarged.  There is aortic atherosclerosis.  Similar prominent central pulmonary vasculature.  No new dense consolidation or pneumothorax.  Impression: Little interval change.    Electronically signed by: Pola Giraldo  Date:    05/06/2023  Time:    10:08  CT Head Without Contrast  Narrative: EXAMINATION:  CT HEAD WITHOUT CONTRAST    CLINICAL HISTORY:  Mental status change, unknown  cause;    TECHNIQUE:  CT imaging of the head performed from the skull base to the vertex without intravenous contrast.  mGycm. Automatic exposure control, adjustment of mA/kV or iterative reconstruction technique was used to reduce radiation.    COMPARISON:  Yesterday    FINDINGS:  There is no acute cortical infarct, hemorrhage or mass lesion.  There is no new parenchymal attenuation abnormality.  Ventricular size is stable.  There are vascular calcifications.  Impression: No acute intracranial findings or significant interval change compared to yesterday.    Electronically signed by: Pola Giraldo  Date:    05/06/2023  Time:    07:55      Mariela Wesley MD   05/08/2023

## 2023-05-09 NOTE — PT/OT/SLP PROGRESS
Physical Therapy Treatment    Patient Name:  Melodie Villarreal   MRN:  [id number]    Recommendations:     Discharge Recommendations: nursing facility, skilled  Discharge Equipment Recommendations: lift device  Barriers to discharge: None    Assessment:     Melodie Villarreal is a 89 y.o. female admitted with a medical diagnosis of Sepsis.  She presents with the following impairments/functional limitations: weakness, gait instability, impaired endurance, impaired balance, impaired functional mobility, impaired self care skills, impaired cognition, decreased safety awareness. Continued with bed mobility, transfer training, and BLE exercises in chair.     Rehab Prognosis: Fair; patient would benefit from acute skilled PT services to address these deficits and reach maximum level of function.    Recent Surgery: Procedure(s) (LRB):  CYSTOSCOPY, WITH URETERAL STENT INSERTION (Right) 14 Days Post-Op    Plan:     During this hospitalization, patient to be seen 6 x/week to address the identified rehab impairments via gait training, therapeutic activities, therapeutic exercises, wheelchair management/training and progress toward the following goals:    Plan of Care Expires:  05/26/23    Subjective     Chief Complaint: none  Patient/Family Comments/goals: to get strogner  Pain/Comfort:  Pain Rating 1: 0/10      Objective:     Communicated with nurse prior to session.  Patient found supine with telemetry, PureWick, pressure relief boots upon PT entry to room.     General Precautions: Standard, contact, fall  Orthopedic Precautions: N/A  Braces: N/A  Respiratory Status: Room air  Skin Integrity: Wound L achilles      Functional Mobility:  Bed Mobility:     Rolling Left:  maximal assistance  Rolling Right: maximal assistance  Scooting: moderate assistance  Supine to Sit: maximal assistance  Transfers:     Bed to Chair: maximal assistance with  hand-held assist  using  Squat Pivot  Balance: Min A for static sitting balance with LUE supporting  on bed rail, progressed to SBA.     Therapeutic Activities/Exercises:  Performed seated BLE exercises: LAQ 2x10 with rest breaks as needed.     Education:  Patient and daughter/s provided with verbal education regarding importance of OOB mobility.  Understanding was verbalized.     Patient left up in chair with all lines intact, call button in reach, nurse notified, and manish pad placed under pt ..    GOALS:   Multidisciplinary Problems       Physical Therapy Goals          Problem: Physical Therapy    Goal Priority Disciplines Outcome Goal Variances Interventions   Physical Therapy Goal     PT, PT/OT Ongoing, Progressing     Description: Goals to be met by: 2023     Patient will increase functional independence with mobility by performin. Supine to sit with Stand-by Assistance  2. Sit to stand transfer with Contact Guard Assistance  3. Gait  x 150 feet with Contact Guard Assistance using Rolling Walker.   4. Pt will ascend/descend 3 steps with HR and Min A.                          Time Tracking:     PT Received On: 23  PT Start Time: 1005     PT Stop Time: 1046  PT Total Time (min): 41 min     Billable Minutes: Therapeutic Activity 33 and Therapeutic Exercise 8    Treatment Type: Treatment  PT/PTA: PT     Number of PTA visits since last PT visit: 2     2023

## 2023-05-09 NOTE — CONSULTS
The patient is in good hands of her son at the time of my visit. I provided care and support to them. I offered words of care and comfort to the patient.   I  had a very good and uplifting conversation with them. The patient is tired but in a very good mood. I prayed for her and gave her the anointing of the sick.   The patient and her son were grateful for the visit. I also gave her the  last rites.    05/09/23

## 2023-05-09 NOTE — PLAN OF CARE
Notified Yumiko at [facility] that patient is medically stable for discharge. Yumiko will check with MD for transfer.

## 2023-05-09 NOTE — PT/OT/SLP PROGRESS
Occupational Therapy   Treatment    Name: Melodie Villarreal  MRN: [id number]  Admitting Diagnosis:  Sepsis   Recent Surgery: Procedure(s) (LRB):  CYSTOSCOPY, WITH URETERAL STENT INSERTION (Right) 14 Days Post-Op    Recommendations:     Discharge Recommendations: nursing facility, skilled   Discharge Equipment Recommendations: Tashia lift  Barriers to discharge: placement    Assessment:     Melodie Villarreal is a 89 y.o. female with a medical diagnosis of Sepsis. She presents with c/o pain in L lower leg. Performance deficits affecting function are weakness, impaired endurance, impaired self care skills, impaired functional mobility, impaired balance, impaired cognition, decreased lower extremity function, decreased upper extremity function, decreased safety awareness, pain, impaired skin.     Rehab Prognosis: Fair; patient would benefit from acute skilled OT services to address these deficits and reach maximum level of function.       Plan:     Patient to be seen 3 x/week, 4 x/week, 5 x/week to address the above listed problems via self-care/home management, therapeutic activities, therapeutic exercises, wheelchair management/training  Plan of Care Expires: 05/22/23  Plan of Care Reviewed with: patient, son    Subjective     Pain/Comfort:  Location - Side 1: Left  Location - Orientation 1: lower  Location 1: leg    Objective:     Communicated with: RN prior to session.  Patient found up in chair with PureWick, telemetry, pulse ox (continuous), peripheral IV upon OT entry to room.    General Precautions: Standard, fall, contact    Respiratory Status: Room air     Occupational Performance:     Bed Mobility:    Patient completed Rolling toward R/L with maximal assistance and vcs provided, using side rails.     Functional Mobility/Transfers:  Patient completed Chair > Bed Transfer with total assistance, using Tashia Lift.    Activities of Daily Living:  Feeding: SBA provided with pt seated in chair.  Lower Body Dressing: Pt required  dep A to doff R sock at bed level.  Toileting: Dep A at bed level 2/2 BM accident.    Therapeutic Positioning  OT interventions performed during the course of today's session in an effort to prevent and/or reduce acquired pressure injuries:   Education on Pressure Ulcer Prevention provided (OT instructed pt's son to call RN if/when pt is done with feeding task in order to apply wedge)  Therapeutic positioning completed (PRAFOs donned and pillows positioned underneath BUEs to increase pt's comfort and positioning)    Skin assessment:   Findings:  Altered skin integrity noted on pt's buttocks. RN notified.    Patient left HOB elevated with PRAFOs donned, PureWick, all lines intact, call button in reach, RN notified, and pt's son present.    GOALS:   Multidisciplinary Problems       Occupational Therapy Goals          Problem: Occupational Therapy    Goal Priority Disciplines Outcome Interventions   Occupational Therapy Goal     OT, PT/OT Ongoing, Progressing    Description: Goals to be met by: 5/22/23     Patient will increase functional independence with ADLs by performing:    UE Dressing with Contact Guard Assistance.  LE Dressing with Moderate Assistance and Assistive Devices as needed.  Grooming while EOB with Contact Guard Assistance.  Sitting at edge of bed x10 minutes with Contact Guard Assistance.  Pt will perform sit <> stand with Moderate Assistance. - New goal.                       Time Tracking:     OT Date of Treatment: 5/9/23  OT Start Time: 1323  OT Stop Time: 1404  OT Total Time (min): 41 min    Billable Minutes: Self Care/Home Management 41 mins    OT/STANISLAW: OT     Number of STANISLAW visits since last OT visit: 1    5/9/2023

## 2023-05-09 NOTE — PROGRESS NOTES
[facility] Hospital Medicine Progress Note        Chief Complaint: Inpatient Follow-up for  MRSA bacteremia     HPI:   89-year-old female with a history that includes type 2 diabetes mellitus, prior DVT/IVC filter no longer on anticoagulation and non-healing left ankle wound, presented to the ED on 4/16 after she became lethargic. Patient normally alert and oriented, active and able to carry out own ADLs.  She apparently has chronic open wounds on her left lower extremity  followed closely by Wound Care.  On the day of presentation she became drowsy/lethargic, and EMS activated, she was found to be hypotensive with a blood pressure of 66/36 on arrival, requiring 3 L nasal cannula.  Laboratory work showed leukocytosis of 34,000, mild anemia, lactic acid of 3.5, elevated BNP of 2000 and a troponin of 0.115.  Urinalysis was unremarkable, CT chest abdomen and pelvis was negative for pulmonary embolus, pulmonary infiltrates or any intra-abdominal or pelvic pathology.  CT head was normal.  She was started on broad-spectrum antibiotics for undifferentiated sepsis admitted to the hospitalist services for further management.  Blood cultures returned positive for MRSA bacteremia. ID consulted and pt continued on IV Vancomycin. Noted left lower extremity non-healing ulcer. NM bone scan without clear evidence of OM. LLE arterial U/S showed monophasic flow throughout suggestive of inflow disease. CT T and L spine  with no evidence of discitis/osteomyelitis. 2D TTE negative for valvular vegetation. Cardiology consulted for STARR, performed on 4/22, negative valvular vegetations. Pt is noted to have  hydronephrosis despite Newton decompression. Urology consulted to evaluate persistent Rt hydronephrosis.  She was taken to the OR on 04/25 for cystoscopy and right ureteral stent placement.  Plan to remove stent in 2 weeks after DC from hospital in the office. Blood cultures persistently positive, 4/16,  4/18, 4/19, 4/21.  Eventually cultures from 4/23 remained negative.  Vascular surgery consulted for PAD, CTA run-off noted severe flow-limiting disease in the left common femoral. Vascular surgery considering left femoral endarterectomy, though family defering intervention for now. Hospital course complicated by intermittent hyponatremia initially treated with Furosemide given diastolic heart failure exacerbation. However hyponatremia persisted despite pt being euvolemic with diuresis and Nephrology was consulted to assist. Nephro treated pt with Samsca and subsequent fluid restriction to 1 Liter with improvement of sodium level.   PT/OT suggested SNF placement. CM consulted to assist with DC planning.      Interval Hx:   Afebrile. No acute events overnight . Pt and family has no new concern . Sodium is again normal today. Await SNF placement.     Objective/physical exam:  General: In no acute distress, afebrile  Chest: clear to auscultation herb  Heart: RRR, +S1, S2, no appreciable murmur  Abdomen: Soft, nontender, BS +  MSK: Warm, trace lower extremity edema, no clubbing or cyanosis  Neurologic: Alert and oriented x3, Cranial nerve II-XII intact, moves all ext      VITAL SIGNS: 24 HRS MIN & MAX LAST   Temp  Min: 97.5 °F (36.4 °C)  Max: 98.3 °F (36.8 °C) 97.5 °F (36.4 °C)   BP  Min: 116/58  Max: 159/78 (!) 156/58   Pulse  Min: 74  Max: 92  85   Resp  Min: 18  Max: 18 18   SpO2  Min: 88 %  Max: 97 % (!) 93 %       Recent Labs   Lab 05/04/23  0446 05/06/23  0602 05/09/23  0354   WBC 9.72 8.95 8.36   RBC 4.04* 3.61* 3.61*   HGB 10.5* 9.4* 9.4*   HCT 34.2* 30.2* 30.3*   MCV 84.7 83.7 83.9   MCH 26.0* 26.0* 26.0*   MCHC 30.7* 31.1* 31.0*   RDW 15.3 15.3 15.3    323 333   MPV 9.5 9.1 10.2       Recent Labs   Lab 05/03/23  1111 05/03/23  2247 05/04/23  0446 05/05/23  0414 05/06/23  0602 05/07/23  0940 05/08/23  0512 05/09/23  0354   *   < > 130* 132 127* 126* 134 133   K 4.2  --  4.1 3.4* 5.3* 4.2 3.9 4.1    CO2 35*  --  35* 33* 28 31 33* 30   BUN 18.4  --  14.7 10.7 12.5 12.1 18.4 15.4   CREATININE 0.62  --  0.62 0.62 0.67 0.71 0.60 0.65   CALCIUM 8.3*  --  8.5 8.1* 8.1* 8.2* 8.9 8.2*   MG  --    < > 2.00 1.80 2.30  --   --  1.60   ALBUMIN 2.0*  --  2.1* 1.9*  --   --   --   --     < > = values in this interval not displayed.          Microbiology Results (last 7 days)       ** No results found for the last 168 hours. **             See below for Radiology    Scheduled Med:   carvediloL  6.25 mg Oral BID    collagenase   Topical (Top) Daily    docusate  200 mg Oral BID    enoxaparin  40 mg Subcutaneous Daily    ferric gluconate (FERRLECIT) IVPB  125 mg Intravenous Daily    insulin detemir U-100  7 Units Subcutaneous QHS    Lactobacillus acidophilus  1 capsule Oral TID WM    [START ON 5/10/2023] magnesium oxide  400 mg Oral Daily    polyethylene glycol  17 g Oral Daily    sodium chloride 0.9%  10 mL Intravenous Q6H    tamsulosin  0.4 mg Oral Daily    trazodone  100 mg Oral QHS    vancomycin (VANCOCIN) IVPB  500 mg Intravenous Q12H        Continuous Infusions:       PRN Meds:  acetaminophen, dextrose 10%, dextrose 10%, docusate sodium, glucagon (human recombinant), glucose, glucose, insulin aspart U-100, melatonin, senna, Flushing PICC Protocol **AND** sodium chloride 0.9% **AND** sodium chloride 0.9%, traMADoL, Pharmacy to dose Vancomycin consult **AND** vancomycin - pharmacy to dose       Assessment/Plan:  Persistent MRSA Sepsis/Bacteremia, now with clearance as of 4/23  Left ankle chronic non healing wound   Encephalopathy, Staring Spells- resolved   Electrolyte abnormality- Hyponatremia, Hypokalemia, Hypomagnesemia   Acute on chronic diastolic heart failure - currently compensated   Mild to moderate Aortic stenosis  Pulmonary HTN  NIDDM II  Normochromic anemia   Chronic low back pain   Constipation associated with colonic fecal impaction     Plan-   S/P  Samsca 15 mg x 1 dose on 5/7/23. Serum sodium normalized.    Remains off of diuretics. Pt and family advised of 1 liter fluid restriction with goal sodium 130 or higher   TSH and Cortisol are normal     Continue with IV Vanc as planned x 6 weeks end date 6/4.     CM consulted to assist with SNF placement.        VTE prophylaxis: Lovenox      Patient condition:  Fair     Anticipated discharge and Disposition:     SNF placement        All diagnosis and differential diagnosis have been reviewed; assessment and plan has been documented; I have personally reviewed the labs and test results that are presently available; I have reviewed the patients medication list; I have reviewed the consulting providers response and recommendations. I have reviewed or attempted to review medical records based upon their availability    All of the patient's questions have been  addressed and answered. Patient's is agreeable to the above stated plan. I will continue to monitor closely and make adjustments to medical management as needed.  _____________________________________________________________________    Nutrition Status:    Radiology:  X-Ray Chest 1 View  Narrative: EXAMINATION:  XR CHEST 1 VIEW    CLINICAL HISTORY:  CHF;    COMPARISON:  27 April 2023    FINDINGS:  Portable frontal view of the chest was obtained. Stable left PICC.  The heart is not significantly enlarged.  There is aortic atherosclerosis.  Similar prominent central pulmonary vasculature.  No new dense consolidation or pneumothorax.  Impression: Little interval change.    Electronically signed by: Pola Giraldo  Date:    05/06/2023  Time:    10:08  CT Head Without Contrast  Narrative: EXAMINATION:  CT HEAD WITHOUT CONTRAST    CLINICAL HISTORY:  Mental status change, unknown cause;    TECHNIQUE:  CT imaging of the head performed from the skull base to the vertex without intravenous contrast.  mGycm. Automatic exposure control, adjustment of mA/kV or iterative reconstruction technique was used to reduce  radiation.    COMPARISON:  Yesterday    FINDINGS:  There is no acute cortical infarct, hemorrhage or mass lesion.  There is no new parenchymal attenuation abnormality.  Ventricular size is stable.  There are vascular calcifications.  Impression: No acute intracranial findings or significant interval change compared to yesterday.    Electronically signed by: Pola Giraldo  Date:    05/06/2023  Time:    07:55      Mariela Wesley MD   05/09/2023

## 2023-05-10 NOTE — PT/OT/SLP PROGRESS
Physical Therapy Treatment    Patient Name:  Melodie Villarreal   MRN:  [id number]    Recommendations:     Discharge Recommendations: nursing facility, skilled  Discharge Equipment Recommendations: lift device  Barriers to discharge: Impaired mobility    Assessment:     Melodie Villarreal is a 89 y.o. female admitted with a medical diagnosis of Sepsis.  She presents with the following impairments/functional limitations: weakness, gait instability, impaired endurance, impaired balance, impaired functional mobility, impaired self care skills, impaired cognition, decreased safety awareness.     Rehab Prognosis: Good; patient would benefit from acute skilled PT services to address these deficits and reach maximum level of function.    Recent Surgery: Procedure(s) (LRB):  CYSTOSCOPY, WITH URETERAL STENT INSERTION (Right) 15 Days Post-Op    Plan:     During this hospitalization, patient to be seen 6 x/week to address the identified rehab impairments via gait training, therapeutic activities, therapeutic exercises, wheelchair management/training and progress toward the following goals:    Plan of Care Expires:  05/26/23    Subjective     Chief Complaint: none  Patient/Family Comments/goals: to get stronger  Pain/Comfort:  Location - Side 1: Left  Location 1: ankle  Pain Addressed 1: Reposition      Objective:     Communicated with nurse prior to session.  Patient found supine with telemetry, PureWick, pressure relief boots upon PT entry to room.     General Precautions: Standard, contact, fall  Orthopedic Precautions: N/A  Braces: N/A  Respiratory Status: Room air  Skin Integrity:  redness to sacral area, cream applied. L Ankle wound wrapped      Functional Mobility:  Bed Mobility:     Rolling Left:  maximal assistance  Rolling Right: maximal assistance  Scooting: moderate assistance  Supine to Sit: moderate assistance  Sit to Supine: maximal assistance  Transfers:     Bed to Chair: maximal assistance with  hand-held assist  using   Squat Pivot  Balance: Min-SBA for sitting balance at EOB.     Therapeutic Activities/Exercises:  Returned from [id number] to assist pt back to bed from chair, Max x2 assist for squat pivot back to bed. Max A for rolling B to assist in pavithra clean up. Wedge applied and podus boots applied.     Education:  Patient, son/s, and daughter/s provided with verbal education regarding PT POC.  Understanding was verbalized.     Patient left right sidelying with all lines intact and call button in reach..    GOALS:   Multidisciplinary Problems       Physical Therapy Goals          Problem: Physical Therapy    Goal Priority Disciplines Outcome Goal Variances Interventions   Physical Therapy Goal     PT, PT/OT Ongoing, Progressing     Description: Goals to be met by: 2023     Patient will increase functional independence with mobility by performin. Supine to sit with Stand-by Assistance  2. Sit to stand transfer with Contact Guard Assistance  3. Gait  x 150 feet with Contact Guard Assistance using Rolling Walker.   4. Pt will ascend/descend 3 steps with HR and Min A.                          Time Tracking:     PT Received On: 05/10/23  PT Start Time: 1000     PT Stop Time: 1025  PT Total Time (min): 25 min     PT total time second session: 32min    Billable Minutes: Therapeutic Activity 57    Treatment Type: Treatment  PT/PTA: PT     Number of PTA visits since last PT visit: 3     05/10/2023

## 2023-05-10 NOTE — NURSING
[facility] 6th Floor Medical Telemetry  Wound Care    Patient Name:  Melodie Villarreal   MRN:  [id number]  Date: 5/10/2023  Diagnosis: Sepsis    History:     Past Medical History:   Diagnosis Date    Adenocarcinoma of uterus     Bladder cancer     Deep vein thrombosis     Essential (primary) hypertension     Heart murmur     High cholesterol     Hydronephrosis 4/25/2023    Hypertriglyceridemia     Insomnia     Osteopenia     Other pulmonary embolism without acute cor pulmonale     Personal history of colonic polyps     Rheumatoid arthritis, unspecified     Type 2 diabetes mellitus without complications        Social History     Socioeconomic History    Marital status:     Number of children: 2   Occupational History    Occupation: retired   Tobacco Use    Smoking status: Never    Smokeless tobacco: Never   Substance and Sexual Activity    Alcohol use: Never    Drug use: Never    Sexual activity: Not Currently       Precautions:     Allergies as of 04/16/2023 - Reviewed 04/16/2023   Allergen Reaction Noted    Ace inhibitors Other (See Comments) 05/03/2022    Adhesive  10/02/2017    Bactrim [sulfamethoxazole-trimethoprim] Other (See Comments) 01/17/2023    Meperidine Other (See Comments) 05/03/2022    Midazolam Other (See Comments) 05/03/2022    Atorvastatin Nausea Only 05/03/2022    Codeine Other (See Comments) and Anxiety 10/02/2017    Tapentadol Rash 05/03/2022       WOC Assessment Details/Treatment   Patient seen for follow up wound assessment. Patient up in chair without podus boot on, heels floated. Daughter at bedside. Left heel with DTI, lateral ankle and left achilles area assessed.      05/10/23 1030   WOCN Assessment   WOCN Total Time (mins) 45   Visit Date 05/10/23   Visit Time 1030   Consult Type Follow Up   WOCN Speciality Wound   Wound pressure   Number of Wounds 3   Intervention assessed;changed;applied;orders   Skin Interventions   Pressure Reduction Devices specialty bed utilized    Pressure Injury Prevention    Heel protection technique Heel boot        Altered Skin Integrity 04/17/23 Left posterior Ankle Full thickness tissue loss. Subcutaneous fat may be visible but bone, tendon or muscle are not exposed   Date First Assessed: 04/17/23   Altered Skin Integrity Present on Admission - Did Patient arrive to the hospital with altered skin?: yes  Side: Left  Orientation: posterior  Location: Ankle  Description of Altered Skin Integrity: Full thickness tissue...   Wound Image    Description of Altered Skin Integrity Full thickness tissue loss with exposed bone, tendon, or muscle. Often includes undermining and tunneling. May extend into muscle and/or supporting structures.   Dressing Appearance Intact   Drainage Amount Moderate   Drainage Characteristics/Odor Serosanguineous   Appearance Slough;Eschar;Granulating   Tissue loss description Full thickness   Periwound Area Intact   Wound Edges Defined   Wound Length (cm) 6 cm   Wound Width (cm) 1.4 cm   Wound Depth (cm) 0.2 cm   Wound Volume (cm^3) 1.68 cm^3   Wound Surface Area (cm^2) 8.4 cm^2   Care Wound cleanser   Dressing Applied  (Santyl, vashe moistened gauze, abd, kerlix)        Altered Skin Integrity 04/17/23 Left lateral Ankle Full thickness tissue loss. Subcutaneous fat may be visible but bone, tendon or muscle are not exposed   Date First Assessed: 04/17/23   Altered Skin Integrity Present on Admission - Did Patient arrive to the hospital with altered skin?: yes  Side: Left  Orientation: lateral  Location: Ankle  Description of Altered Skin Integrity: Full thickness tissue l...   Wound Image    Description of Altered Skin Integrity Full thickness tissue loss with exposed bone, tendon, or muscle. Often includes undermining and tunneling. May extend into muscle and/or supporting structures.   Dressing Appearance Intact   Drainage Amount Moderate   Drainage Characteristics/Odor Clear;Serous   Appearance Slough;Granulating   Tissue loss  description Full thickness   Periwound Area Intact   Wound Edges Defined   Wound Length (cm) 0.8 cm   Wound Width (cm) 0.8 cm   Wound Depth (cm) 0.3 cm   Wound Volume (cm^3) 0.192 cm^3   Wound Surface Area (cm^2) 0.64 cm^2   Care Wound cleanser   Dressing Applied  (Santyl, vashe moistened gauze, abd, kerlix)        Altered Skin Integrity 04/24/23 Left Heel Purple or maroon localized area of discolored intact skin or non-intact skin or a blood-filled blister.   Date First Assessed: 04/24/23   Altered Skin Integrity Present on Admission - Did Patient arrive to the hospital with altered skin?: yes  Side: Left  Location: Heel  Description of Altered Skin Integrity: Purple or maroon localized area of discolored ...   Wound Image    Description of Altered Skin Integrity Purple or maroon localized area of discolored intact skin or non-intact skin or a blood-filled blister.   Dressing Appearance Intact   Drainage Amount None   Appearance Purple;Red   Tissue loss description Not applicable   Periwound Area Intact   Wound Edges Defined   Wound Length (cm) 1.8 cm   Wound Width (cm) 1.5 cm   Wound Depth (cm) 0 cm   Wound Volume (cm^3) 0 cm^3   Wound Surface Area (cm^2) 2.7 cm^2   Care Wound cleanser   Dressing Applied  (bordered foam, abd, kerlix, tape)         Recommendations made to primary team to continue present treatment coarse, continue to apply podus boot add bordered foam and abd to heel dressing . Orders placed.     05/10/2023

## 2023-05-10 NOTE — PROGRESS NOTES
Pharmacokinetic Assessment Follow Up: IV Vancomycin    Vancomycin serum concentration assessment(s):    The trough level was drawn correctly and can be used to guide therapy at this time. The measurement is within the desired definitive target range of 15 to 20 mcg/mL.    Vancomycin Regimen Plan:    Continue regimen to Vancomycin 500 mg IV every 12 hours with next serum trough concentration measured at 1200 prior to the dose on 05/12    Drug levels (last 3 results):  Recent Labs   Lab Result Units 05/10/23  1103   Vancomycin Trough ug/ml 19.3       Pharmacy will continue to follow and monitor vancomycin.    Please contact pharmacy at extension [phone] for questions regarding this assessment.    Thank you for the consult,   Roney Delvalle, PharmD       Patient brief summary:  Melodie Villarreal is a 89 y.o. female initiated on antimicrobial therapy with IV Vancomycin for treatment of sepsis    The patient's current regimen is vancomycin 500 mg IV Q12H    Drug Allergies:   Review of patient's allergies indicates:   Allergen Reactions    Ace inhibitors Other (See Comments)    Adhesive      Allergic to tape    Bactrim [sulfamethoxazole-trimethoprim] Other (See Comments)     Confusion, Hypoglycemia    Meperidine Other (See Comments)    Midazolam Other (See Comments)    Atorvastatin Nausea Only    Codeine Other (See Comments) and Anxiety    Tapentadol Rash       Actual Body Weight:   56.6 kg    Renal Function:   Estimated Creatinine Clearance: 42.7 mL/min (based on SCr of 0.71 mg/dL).,     Dialysis Method (if applicable):  N/A    CBC (last 72 hours):  Recent Labs   Lab Result Units 05/09/23  0354   WBC x10(3)/mcL 8.36   Hgb g/dL 9.4*   Hct % 30.3*   Platelet x10(3)/mcL 333   Mono % % 9.8   Eos % % 7.3   Basophil % % 1.0       Metabolic Panel (last 72 hours):  Recent Labs   Lab Result Units 05/08/23  0512 05/09/23  0354 05/10/23  0412   Sodium Level mmol/L 134 133 131*   Potassium Level mmol/L 3.9 4.1 3.8   Chloride mmol/L 92* 94* 94*    Carbon Dioxide mmol/L 33* 30 29   Glucose Level mg/dL 51* 89 188*   Blood Urea Nitrogen mg/dL 18.4 15.4 21.0*   Creatinine mg/dL 0.60 0.65 0.71   Magnesium Level mg/dL  --  1.60  --    Phosphorus Level mg/dL  --  3.7  --        Vancomycin Administrations:  vancomycin given in the last 96 hours                     vancomycin 500 mg in dextrose 5 % 100 mL IVPB (ready to mix) (mg) 500 mg New Bag 05/10/23 0020     500 mg New Bag 05/09/23 1258     500 mg New Bag  0002     500 mg New Bag 05/08/23 1145     500 mg New Bag  0040     500 mg New Bag 05/07/23 1148     500 mg New Bag 05/06/23 2336                    Microbiologic Results:  Microbiology Results (last 7 days)       ** No results found for the last 168 hours. **

## 2023-05-10 NOTE — PROGRESS NOTES
[facility] Hospital Medicine Progress Note        Chief Complaint: Inpatient Follow-up for  MRSA bacteremia     HPI:   89-year-old female with a history that includes type 2 diabetes mellitus, prior DVT/IVC filter no longer on anticoagulation and non-healing left ankle wound, presented to the ED on 4/16 after she became lethargic. Patient normally alert and oriented, active and able to carry out own ADLs.  She apparently has chronic open wounds on her left lower extremity  followed closely by Wound Care.  On the day of presentation she became drowsy/lethargic, and EMS activated, she was found to be hypotensive with a blood pressure of 66/36 on arrival, requiring 3 L nasal cannula.  Laboratory work showed leukocytosis of 34,000, mild anemia, lactic acid of 3.5, elevated BNP of 2000 and a troponin of 0.115.  Urinalysis was unremarkable, CT chest abdomen and pelvis was negative for pulmonary embolus, pulmonary infiltrates or any intra-abdominal or pelvic pathology.  CT head was normal.  She was started on broad-spectrum antibiotics for undifferentiated sepsis admitted to the hospitalist services for further management.  Blood cultures returned positive for MRSA bacteremia. ID consulted and pt continued on IV Vancomycin. Noted left lower extremity non-healing ulcer. NM bone scan without clear evidence of OM. LLE arterial U/S showed monophasic flow throughout suggestive of inflow disease. CT T and L spine  with no evidence of discitis/osteomyelitis. 2D TTE negative for valvular vegetation. Cardiology consulted for STARR, performed on 4/22, negative valvular vegetations. Pt is noted to have  hydronephrosis despite Newton decompression. Urology consulted to evaluate persistent Rt hydronephrosis.  She was taken to the OR on 04/25 for cystoscopy and right ureteral stent placement.  Plan to remove stent in 2 weeks after DC from hospital in the office. Blood cultures persistently positive, 4/16,  4/18, 4/19, 4/21.  Eventually cultures from 4/23 remained negative.  Vascular surgery consulted for PAD, CTA run-off noted severe flow-limiting disease in the left common femoral. Vascular surgery considering left femoral endarterectomy, though family defering intervention for now. Hospital course complicated by intermittent hyponatremia initially treated with Furosemide given diastolic heart failure exacerbation. However hyponatremia persisted despite pt being euvolemic with diuresis and Nephrology was consulted to assist. Nephro treated pt with Samsca and subsequent fluid restriction to 1 Liter with improvement of sodium level.   PT/OT suggested SNF placement. CM consulted to assist with DC planning.          Interval Hx:   Pt and family has no new c/o or concern. Pt accepted to [facility]. Awaiting bed availability. Serum sodium fairly stable. Remains off diuretics. BP fluctuates , however BP goal 150/90 given pt's advanced age.     Objective/physical exam:  General: In no acute distress, afebrile  Chest: clear to auscultation herb  Heart: RRR, +S1, S2, no appreciable murmur  Abdomen: Soft, nontender, BS +  MSK: Warm, no lower extremity edema, no clubbing or cyanosis  Neurologic: Alert and oriented x3, Cranial nerve II-XII intact, moves all ext    VITAL SIGNS: 24 HRS MIN & MAX LAST   Temp  Min: 97.8 °F (36.6 °C)  Max: 98.2 °F (36.8 °C) 97.8 °F (36.6 °C)   BP  Min: 113/63  Max: 154/69 (!) 143/72   Pulse  Min: 85  Max: 103  94   Resp  Min: 16  Max: 18 18   SpO2  Min: 87 %  Max: 95 % (!) 92 %       Recent Labs   Lab 05/04/23  0446 05/06/23  0602 05/09/23  0354   WBC 9.72 8.95 8.36   RBC 4.04* 3.61* 3.61*   HGB 10.5* 9.4* 9.4*   HCT 34.2* 30.2* 30.3*   MCV 84.7 83.7 83.9   MCH 26.0* 26.0* 26.0*   MCHC 30.7* 31.1* 31.0*   RDW 15.3 15.3 15.3    323 333   MPV 9.5 9.1 10.2       Recent Labs   Lab 05/04/23  0446 05/05/23  0414 05/06/23  0602 05/07/23  0940 05/08/23  0512 05/09/23  0354 05/10/23  0412   *  132 127*   < > 134 133 131*   K 4.1 3.4* 5.3*   < > 3.9 4.1 3.8   CO2 35* 33* 28   < > 33* 30 29   BUN 14.7 10.7 12.5   < > 18.4 15.4 21.0*   CREATININE 0.62 0.62 0.67   < > 0.60 0.65 0.71   CALCIUM 8.5 8.1* 8.1*   < > 8.9 8.2* 8.2*   MG 2.00 1.80 2.30  --   --  1.60  --    ALBUMIN 2.1* 1.9*  --   --   --   --   --     < > = values in this interval not displayed.          Microbiology Results (last 7 days)       ** No results found for the last 168 hours. **             See below for Radiology    Scheduled Med:   carvediloL  6.25 mg Oral BID    collagenase   Topical (Top) Daily    docusate  200 mg Oral BID    enoxaparin  40 mg Subcutaneous Daily    insulin detemir U-100  7 Units Subcutaneous QHS    Lactobacillus acidophilus  1 capsule Oral TID WM    magnesium oxide  400 mg Oral Daily    polyethylene glycol  17 g Oral Daily    sodium chloride 0.9%  10 mL Intravenous Q6H    tamsulosin  0.4 mg Oral Daily    trazodone  100 mg Oral QHS    vancomycin (VANCOCIN) IVPB  500 mg Intravenous Q12H        Continuous Infusions:       PRN Meds:  acetaminophen, dextrose 10%, dextrose 10%, docusate sodium, glucagon (human recombinant), glucose, glucose, insulin aspart U-100, melatonin, senna, Flushing PICC Protocol **AND** sodium chloride 0.9% **AND** sodium chloride 0.9%, traMADoL, Pharmacy to dose Vancomycin consult **AND** vancomycin - pharmacy to dose       Assessment/Plan:  Persistent MRSA Sepsis/Bacteremia, now with clearance as of 4/23  Left ankle chronic non healing wound   Encephalopathy, Staring Spells- resolved   Electrolyte abnormality- Hyponatremia, Hypokalemia, Hypomagnesemia   Acute on chronic diastolic heart failure - currently compensated   Mild to moderate Aortic stenosis  Pulmonary HTN  NIDDM II  Normochromic anemia   Chronic low back pain   Constipation associated with colonic fecal impaction     Plan-   S/P  Samsca 15 mg x 1 dose on 5/7/23. Serum sodium normalized.   Remains off of diuretics. Pt and family  advised of 1 liter fluid restriction with goal sodium 130 or higher . Family does not want diuretic treatment.     TSH and Cortisol are normal     Continue with IV Vanc as planned x 6 weeks end date 6/4.     CM consulted to assist with SNF placement. Pt accepted to [facility]. Will be discharged once bed is open.     VTE prophylaxis: Lovenox      Patient condition:  Fair     Anticipated discharge and Disposition:     SNF placement       All diagnosis and differential diagnosis have been reviewed; assessment and plan has been documented; I have personally reviewed the labs and test results that are presently available; I have reviewed the patients medication list; I have reviewed the consulting providers response and recommendations. I have reviewed or attempted to review medical records based upon their availability    All of the patient's questions have been  addressed and answered. Patient's is agreeable to the above stated plan. I will continue to monitor closely and make adjustments to medical management as needed.  _____________________________________________________________________    Nutrition Status:    Radiology:  X-Ray Chest 1 View  Narrative: EXAMINATION:  XR CHEST 1 VIEW    CLINICAL HISTORY:  CHF;    COMPARISON:  27 April 2023    FINDINGS:  Portable frontal view of the chest was obtained. Stable left PICC.  The heart is not significantly enlarged.  There is aortic atherosclerosis.  Similar prominent central pulmonary vasculature.  No new dense consolidation or pneumothorax.  Impression: Little interval change.    Electronically signed by: Pola Giraldo  Date:    05/06/2023  Time:    10:08  CT Head Without Contrast  Narrative: EXAMINATION:  CT HEAD WITHOUT CONTRAST    CLINICAL HISTORY:  Mental status change, unknown cause;    TECHNIQUE:  CT imaging of the head performed from the skull base to the vertex without intravenous contrast.  mGycm. Automatic exposure control, adjustment of mA/kV or  iterative reconstruction technique was used to reduce radiation.    COMPARISON:  Yesterday    FINDINGS:  There is no acute cortical infarct, hemorrhage or mass lesion.  There is no new parenchymal attenuation abnormality.  Ventricular size is stable.  There are vascular calcifications.  Impression: No acute intracranial findings or significant interval change compared to yesterday.    Electronically signed by: Pola Giraldo  Date:    05/06/2023  Time:    07:55      Mariela Wesley MD   05/10/2023

## 2023-05-11 PROBLEM — R78.81 BACTEREMIA: Status: ACTIVE | Noted: 2023-01-01

## 2023-05-11 NOTE — PROGRESS NOTES
Pharmacokinetic Initial Assessment: IV Vancomycin    Assessment/Plan:    Initiate intravenous vancomycin with loading dose of 750 mg once followed by a maintenance dose of vancomycin 750 mg IV every 12 hours  Desired empiric serum trough concentration is 15 to 20 mcg/mL  Draw vancomycin trough level 60 min prior to third dose on 5/12 at approximately 1200  Pharmacy will continue to follow and monitor vancomycin.      Please contact pharmacy at extension [phone] with any questions regarding this assessment.     Thank you for the consult,   Michael Stockton       Patient brief summary:  Melodie Villarreal is a 89 y.o. female initiated on antimicrobial therapy with IV Vancomycin for treatment of suspected sepsis    Drug Allergies:   Review of patient's allergies indicates:   Allergen Reactions    Ace inhibitors Other (See Comments)    Adhesive      Allergic to tape    Bactrim [sulfamethoxazole-trimethoprim] Other (See Comments)     Confusion, Hypoglycemia    Meperidine Other (See Comments)    Midazolam Other (See Comments)    Atorvastatin Nausea Only    Codeine Other (See Comments) and Anxiety    Tapentadol Rash       Actual Body Weight:   56.6 kg    Renal Function:   Estimated Creatinine Clearance: 42.7 mL/min (based on Scr of 0.71 mg/dL),     Dialysis Method (if applicable):  N/A    CBC (last 72 hours):  Recent Labs   Lab Result Units 05/09/23  0354   WBC x10(3)/mcL 8.36   Hgb g/dL 9.4*   Hct % 30.3*   Platelet x10(3)/mcL 333   Mono % % 9.8   Eos % % 7.3   Basophil % % 1.0       Metabolic Panel (last 72 hours):  Recent Labs   Lab Result Units 05/09/23  0354 05/10/23  0412   Sodium Level mmol/L 133 131*   Potassium Level mmol/L 4.1 3.8   Chloride mmol/L 94* 94*   Carbon Dioxide mmol/L 30 29   Glucose Level mg/dL 89 188*   Blood Urea Nitrogen mg/dL 15.4 21.0*   Creatinine mg/dL 0.65 0.71   Magnesium Level mg/dL 1.60  --    Phosphorus Level mg/dL 3.7  --        Drug levels (last 3 results):  Recent Labs   Lab Result Units  05/10/23  1103   Vancomycin Trough ug/ml 19.3       Microbiologic Results:  Microbiology Results (last 7 days)       ** No results found for the last 168 hours. **

## 2023-05-11 NOTE — H&P
[facility] MEDICINE   H&P ADMISSION NOTE      Patient Name: Melodie Villarreal  MRN: [id number]  Patient Class: IP- Swing   Admission Date: 5/11/2023   Admitting Physician: IDALIA Service   Attending Physician: Thairy G Reyes, DO  Primary Care Provider: Wisam Espinosa Jr, MD  Face-to-Face encounter date: 05/11/2023        CHIEF COMPLAINT   No chief complaint on file.    HISTORY OF PRESENTING ILLNESS   89-year-old female with a PMH of NIDDMII, prior DVT/IVC filter no longer on anticoagulation and non-healing left ankle wound, presented to [facility] on 4/16 after she became lethargic. Blood cultures returned positive for MRSA bacteremia. NM bone scan without clear evidence of OM. LLE arterial U/S showed monophasic flow throughout suggestive of inflow disease.CT T and L spine  with no evidence of discitis/  osteomyelitis. 2D TTE negative for valvular vegetation. STARR, performed on 4/22, negative valvular vegetations. She was taken to the OR on 04/25 for cystoscopy and right ureteral stent placement.  Plan to remove stent in 2 weeks after DC from hospital in the office. Blood cultures persistently positive, 4/16, 4/18, 4/19, 4/21. Eventually cultures from 4/23 remained negative. Vascular surgery consulted for PAD, CTA run-off noted severe flow-limiting disease in the left common femoral. Vascular surgery recommended left femoral endarterectomy, though family defering intervention for now. Hospital course complicated by intermittent hyponatremia. Nephro treated pt with Samsca and subsequent fluid restriction to 1 Liter with improvement of sodium level.    At baseline, pt was able to ambulate with a walker. She lives at home with her daughter and was able to complete simple ADLs on her own. She presents to our facility for rehabilitations in the hopes we can return her to baseline however she has had a critically ill course. Her son at bedside requests DNR/DNI    PAST MEDICAL HISTORY     Past Medical History:   Diagnosis Date     Adenocarcinoma of uterus     Bladder cancer     Deep vein thrombosis     Essential (primary) hypertension     Heart murmur     High cholesterol     Hydronephrosis 4/25/2023    Hypertriglyceridemia     Insomnia     Osteopenia     Other pulmonary embolism without acute cor pulmonale     Personal history of colonic polyps     Rheumatoid arthritis, unspecified     Type 2 diabetes mellitus without complications        PAST SURGICAL HISTORY     Past Surgical History:   Procedure Laterality Date    CHOLECYSTECTOMY      colonscopy  06/20/2012    CYSTOSCOPY W/ URETERAL STENT PLACEMENT Right 4/25/2023    Procedure: CYSTOSCOPY, WITH URETERAL STENT INSERTION;  Surgeon: Anyi Bearden MD;  Location: [facility];  Service: Urology;  Laterality: Right;    ECCE  01/09/2013    estracapsular cataract removal   02/20/2013    insertion of intrpocular lens prosthesis   02/20/2013    phacoemulsifiaction and aspiration of cataract  02/20/2013    phacoemulsificaion and aspiration of cataract      total abdominal hysterectomy and bilateral salpingooophorectomy  1991       FAMILY HISTORY   Reviewed and noncontributory to this case    SOCIAL HISTORY     Social History     Socioeconomic History    Marital status:     Number of children: 2   Occupational History    Occupation: retired   Tobacco Use    Smoking status: Never    Smokeless tobacco: Never   Substance and Sexual Activity    Alcohol use: Never    Drug use: Never    Sexual activity: Not Currently       HOME MEDICATIONS     Prior to Admission medications    Medication Sig Start Date End Date Taking? Authorizing Provider   blood sugar diagnostic (EASY TOUCH TEST STRIP) Strp 1 each by Misc.(Non-Drug; Combo Route) route once daily. Use to test blood glucose once daily 11/11/22   Wisam Espinosa Jr., MD   blood-glucose meter Misc Easy Touch use as directed 11/11/22   Wisam Espinosa Jr., MD   calcium-vitamin D3 (OS-SUSAN 500 + D3) 500 mg-5 mcg (200 unit) per tablet Take 1 tablet  by mouth once daily.    Historical Provider   carvediloL (COREG) 6.25 MG tablet Take 1 tablet (6.25 mg total) by mouth 2 (two) times daily. 5/11/23 5/10/24  Crescencio Villeda MD   ferrous gluconate (FERGON) 324 MG tablet Take 324 mg by mouth daily with breakfast.    Historical Provider   insulin aspart U-100 (NOVOLOG) 100 unit/mL injection Inject 0-5 Units into the skin before meals and at bedtime as needed for High Blood Sugar. 5/11/23 5/10/24  Crescencio Villeda MD   insulin detemir U-100 (LEVEMIR) 100 unit/mL injection Inject 7 Units into the skin every evening. 5/11/23 5/10/24  Crescencio Villeda MD   Lactobacillus acidophilus 500 million cell Cap Take 1 capsule by mouth 3 (three) times daily with meals. 5/11/23   Crescencio Villeda MD   lancets Misc Easy Touch Twist 30G Lancets to use with insurance preferred meter Diagnosis Code: E11.9.  Test once daily. 1/17/23   Wisam Espinosa Jr., MD   losartan-hydrochlorothiazide 100-12.5 mg (HYZAAR) 100-12.5 mg Tab Take 1 tablet by mouth once daily. 5/3/22 4/17/23  Wisam Espinosa Jr., MD   magnesium oxide (MAG-OX) 400 mg (241.3 mg magnesium) tablet Take 1 tablet (400 mg total) by mouth once daily. 5/12/23   Crescenico Villeda MD   metFORMIN (GLUCOPHAGE) 1000 MG tablet Take 1 tablet (1,000 mg total) by mouth 2 (two) times daily with meals. 5/3/22   Wisam Espinosa Jr., MD   multivitamin capsule Take 1 capsule by mouth once daily.    Historical Provider   polyethylene glycol (GLYCOLAX) 17 gram PwPk Take 17 g by mouth once daily. 5/12/23   Crescencio Villeda MD   pravastatin (PRAVACHOL) 40 MG tablet Take 1 tablet (40 mg total) by mouth once daily. 11/22/22 4/17/23  Wisam Espinosa Jr., MD   SANTYL ointment APPLY TO LEFT FOOT WOUND DAILY 4/3/23   Historical Provider   tamsulosin (FLOMAX) 0.4 mg Cap Take 1 capsule (0.4 mg total) by mouth once daily. 5/12/23 5/11/24  Crescencio Villeda MD   traMADoL (ULTRAM) 50 mg tablet Take 1 tablet (50 mg total) by mouth every 6 (six) hours as needed for Pain.  3/20/23   Wisam Espinosa Jr., MD   traZODone (DESYREL) 100 MG tablet Take 1 tablet (100 mg total) by mouth nightly as needed for Insomnia. 5/3/22 4/17/23  Wisam Espinosa Jr., MD   vancomycin HCl (VANCOMYCIN 500 MG/100 ML D5W, READY TO MIX,) Inject 100 mLs (500 mg total) into the vein every 12 (twelve) hours. 5/11/23   Crescencio Villeda MD   glimepiride (AMARYL) 4 MG tablet 1 tablet in the AM and 1/2 tablet in the PM  Patient taking differently: Take 2 mg by mouth as needed. 1 tablet in the AM and 1/2 tablet in the PM 12/5/22 5/11/23  Wisam Espinosa Jr., MD   mupirocin (BACTROBAN) 2 % ointment Apply topically 3 (three) times daily. 11/28/22 5/11/23  Wisam Espinosa Jr., MD       ALLERGIES   Ace inhibitors, Adhesive, Bactrim [sulfamethoxazole-trimethoprim], Meperidine, Midazolam, Atorvastatin, Codeine, and Tapentadol          REVIEW OF SYSTEMS   Except as documented above, all other systems reviewed and negative    PHYSICAL EXAM     Vitals:    05/11/23 1515   BP: 120/63   Pulse: 85   Resp: 18   Temp: 97.7 °F (36.5 °C)      General:  In no acute distress, resting comfortably, cachectic   Head and neck:  Atraumatic, normocephalic, moist mucous membranes, supple neck  Chest:  Clear to auscultation bilaterally  Heart:  S1, S2, Grade IV/VI murmur   Abdomen:  Soft, nontender, BS +  MSK:  Warm, no lower extremity edema, no clubbing or cyanosis  Neuro:  Alert, not oriented to person/place/time, moving all extremities with good strength  Integumentary: See media and wound care notes for further details   Psychiatry:  unable to assess    ASSESSMENT AND PLAN   Persistent MRSA Sepsis  Bacteremia  -clearance as of 4/23  -Continue with IV Vanc as planned x 6 weeks end date 6/4    Left ankle chronic non healing wound   Severe peripheral artery disease  -family refused intervention with vascular surgery   -wound care  -Consulted Dr. Garrison for bariatric therapy per family request     SIADH  Hyponatremia  -S/P Samsca 15 mg x 1  dose on 5/7/23  -Remains off of diuretics, family request   -1 liter fluid restriction with goal sodium 130 or higher  -TSH and Cortisol are normal    Encephalopathy  Staring Spells  -resolved   - Abnormal EEG due to findings consistent with a nonspecific bilateral brain dysfunction.    Electrolyte abnormality  -Hypokalemia, Hypomagnesemia: replete as condition requires     Acute on chronic diastolic heart failure   Mild to moderate Aortic stenosis  Pulmonary HTN  -currently compensated   -BNP elevated though likely chronically elevated in setting of severe AS    NIDDM II  -c/w detemir   -A1c 6.3    Normochromic anemia   -received 3 units of ferrlecit     R Hydronephrosis   -s/p stent - outpt removal in a few weeks with Dr Gauthier    Chronic low back pain   Constipation associated with colonic fecal impaction  -stable        DVT prophylaxis:  lovenox  __________________________________________________________________  LABS/MICRO/MEDS/DIAGNOSTICS       LABS  Recent Labs     05/10/23  0412   *   K 3.8   CHLORIDE 94*   CO2 29   BUN 21.0*   CREATININE 0.71   GLUCOSE 188*   CALCIUM 8.2*     Recent Labs     05/09/23  0354   WBC 8.36   RBC 3.61*   HCT 30.3*   MCV 83.9          MICROBIOLOGY  Microbiology Results (last 7 days)       ** No results found for the last 168 hours. **            MEDICATIONS   carvediloL  6.25 mg Oral BID    [START ON 5/12/2023] collagenase   Topical (Top) Daily    docusate  200 mg Oral BID    [START ON 5/12/2023] enoxaparin  40 mg Subcutaneous Daily    insulin detemir U-100  7 Units Subcutaneous QHS    Lactobacillus acidophilus  1 capsule Oral TID WM    [START ON 5/12/2023] magnesium oxide  400 mg Oral Daily    mupirocin   Nasal BID    [START ON 5/12/2023] polyethylene glycol  17 g Oral Daily    [START ON 5/12/2023] tamsulosin  0.4 mg Oral Daily    trazodone  100 mg Oral QHS    [START ON 5/12/2023] vancomycin (VANCOCIN) IVPB  500 mg Intravenous Q12H      INFUSIONS      DIAGNOSTIC  TESTS  No orders to display          Patient information was obtained from patient, patient's family, past medical records and ER records.   All diagnosis and differential diagnosis have been reviewed; assessment and plan has been documented. I have personally reviewed the labs and test results that are presently available; I have reviewed the patients medication list. I have reviewed the consulting providers response and recommendations. I have reviewed or attempted to review medical records based upon their availability.  All of the patient's questions have been addressed and answered. Patient's is agreeable to the above stated plan. I will continue to monitor closely and make adjustments to medical management as needed.  This note was created using [employer] voice recognition software that occasionally misinterpreted phrases or words.  Please contact me if any questions may rise regarding documentation to clarify verbiage.        Thairy G Reyes, DO   05/11/2023   Internal Medicine

## 2023-05-11 NOTE — PT/OT/SLP PROGRESS
Physical Therapy Treatment    Patient Name:  Melodie Villarreal   MRN:  [id number]    Recommendations:     Discharge Recommendations: nursing facility, skilled  Discharge Equipment Recommendations: lift device  Barriers to discharge: Impaired mobility and Ongoing medical needs    Assessment:     Melodie Villarreal is a 89 y.o. female admitted with a medical diagnosis of Sepsis.  She presents with the following impairments/functional limitations: weakness, gait instability, impaired endurance, impaired balance, impaired functional mobility, impaired self care skills, impaired cognition, decreased safety awareness. Performed seated balance training and seated exercises at EOB as tolerated. Pt required CGA-Mod A for sitting balance due to periodic LOB posteriorly. Recommending EMS transport to SNF due to pt's impaired trunk control and safety concerns. Pt also not wanting to flex L knee today during exercises and sitting. Guarding due to pain.     Rehab Prognosis: Fair; patient would benefit from acute skilled PT services to address these deficits and reach maximum level of function.    Recent Surgery: Procedure(s) (LRB):  CYSTOSCOPY, WITH URETERAL STENT INSERTION (Right) 16 Days Post-Op    Plan:     During this hospitalization, patient to be seen 6 x/week to address the identified rehab impairments via gait training, therapeutic activities, therapeutic exercises, wheelchair management/training and progress toward the following goals:    Plan of Care Expires:  05/26/23    Subjective     Chief Complaint: none  Patient/Family Comments/goals: to get stronger  Pain/Comfort:         Objective:     Communicated with nurse prior to session.  Patient found supine with telemetry, PureWick, pressure relief boots upon PT entry to room.     General Precautions: Standard, contact, fall  Orthopedic Precautions: N/A  Braces: N/A  Respiratory Status: Room air  Skin Integrity:  redness to bottom, wound L achilles      Functional Mobility:  Bed  Mobility:     Rolling Left:  maximal assistance  Rolling Right: maximal assistance  Scooting: maximal assistance  Supine to Sit: moderate assistance and of 2 persons  Sit to Supine: moderate assistance and of 2 persons  Balance: CGA-Mod A for sitting balance due to LOB posteriorly.     Therapeutic Activities/Exercises:  Performed LAQ RLE 2x10.  Attempted L knee flexion ROM, but pt with significant guarding and unable to perform.  Performed supine<>sit transfer 2x.    Education:  Patient provided with verbal education regarding turning w wedge.  Understanding was verbalized.     Patient left right sidelying with all lines intact and call button in reach..    GOALS:   Multidisciplinary Problems       Physical Therapy Goals          Problem: Physical Therapy    Goal Priority Disciplines Outcome Goal Variances Interventions   Physical Therapy Goal     PT, PT/OT Ongoing, Progressing     Description: Goals to be met by: 2023     Patient will increase functional independence with mobility by performin. Supine to sit with Stand-by Assistance  2. Sit to stand transfer with Contact Guard Assistance  3. Gait  x 150 feet with Contact Guard Assistance using Rolling Walker.   4. Pt will ascend/descend 3 steps with HR and Min A.                          Time Tracking:     PT Received On: 23  PT Start Time: 956     PT Stop Time: 1031  PT Total Time (min): 35 min     Billable Minutes: Therapeutic Activity 35    Treatment Type: Treatment  PT/PTA: PT     Number of PTA visits since last PT visit: 4     2023

## 2023-05-11 NOTE — PLAN OF CARE
05/11/23 1009   Final Note   Assessment Type Final Discharge Note   Anticipated Discharge Disposition [facility] Resources/Appts/Education Provided Post-Acute resouces added to AVS   Post-Acute Status   Post-Acute Authorization Placement     Transfer to [facility] today. Nurse to call report to Noemi. Per PT, patient will need [facility] for transport.

## 2023-05-11 NOTE — PLAN OF CARE
Problem: Adult Inpatient Plan of Care  Goal: Plan of Care Review  Outcome: Ongoing, Progressing  Flowsheets (Taken 5/11/2023 1621)  Plan of Care Reviewed With:   patient   son  Goal: Absence of Hospital-Acquired Illness or Injury  Outcome: Ongoing, Progressing  Intervention: Identify and Manage Fall Risk  Flowsheets (Taken 5/11/2023 1621)  Safety Promotion/Fall Prevention:   assistive device/personal item within reach   bed alarm set   instructed to call staff for mobility   Fall Risk reviewed with patient/family   nonskid shoes/socks when out of bed  Intervention: Prevent Skin Injury  Flowsheets (Taken 5/11/2023 1621)  Body Position:   heels elevated   position maintained  Skin Protection:   adhesive use limited   tubing/devices free from skin contact   incontinence pads utilized   protective footwear used  Intervention: Prevent and Manage VTE (Venous Thromboembolism) Risk  Flowsheets (Taken 5/11/2023 1621)  Activity Management: Rolling - L1  VTE Prevention/Management:   bleeding risk assessed   fluids promoted  Intervention: Prevent Infection  Flowsheets (Taken 5/11/2023 1621)  Infection Prevention:   cohorting utilized   environmental surveillance performed   equipment surfaces disinfected   hand hygiene promoted   rest/sleep promoted  Goal: Optimal Comfort and Wellbeing  Outcome: Ongoing, Progressing  Intervention: Monitor Pain and Promote Comfort  Flowsheets (Taken 5/11/2023 1621)  Pain Management Interventions:   position adjusted   quiet environment facilitated  Intervention: Provide Person-Centered Care  Flowsheets (Taken 5/11/2023 1621)  Trust Relationship/Rapport:   care explained   emotional support provided   empathic listening provided   thoughts/feelings acknowledged   reassurance provided   questions encouraged   questions answered     Problem: Diabetes Comorbidity  Goal: Blood Glucose Level Within Targeted Range  Outcome: Ongoing, Progressing  Intervention: Monitor and Manage Glycemia  Flowsheets  (Taken 5/11/2023 1621)  Glycemic Management: blood glucose monitored     Problem: Impaired Wound Healing  Goal: Optimal Wound Healing  Outcome: Ongoing, Progressing  Intervention: Promote Wound Healing  Flowsheets (Taken 5/11/2023 1621)  Oral Nutrition Promotion:   calorie-dense foods provided   safe use of adaptive equipment encouraged   rest periods promoted   nutritional therapy counseling provided  Sleep/Rest Enhancement:   noise level reduced   natural light exposure provided  Activity Management: Rolling - L1  Pain Management Interventions:   position adjusted   quiet environment facilitated

## 2023-05-12 NOTE — PT/OT/SLP EVAL
Speech Language Pathology Evaluation  Cognitive/Bedside Swallow    Patient Name:  Melodie Villarreal   MRN:  [id number]  Admitting Diagnosis: Bacteremia    Recommendations:                  General Recommendations:  Cognitive-linguistic therapy  Diet recommendations:  Regular Diet - IDDSI Level 7, Thin liquids - IDDSI Level 0   Aspiration Precautions: HOB to 90 degrees and Standard aspiration precautions Family present 24/7 to provide assistance as needed w/ feeding.  General Precautions: Standard,    Communication strategies:  none    Assessment:     Melodie Villarreal is a 89 y.o. female with an SLP diagnosis of Cognitive-Linguistic Impairment.  She presents with confusion and impaired orientation, STM recall and LTM recall. Per pt's daughter, the pt presents w/ increased confusion w/ initiation of antibiotics. Pt's daughter reports her mother was doing well at home prior to hospital admit and was walking w/ RW. Pt's daughter cooks, cleans, manages finances, and manages medications. Pt would benefit from skilled SLP services at this time to promote a return to PLOF w/ cognitive skills.    History:     Past Medical History:   Diagnosis Date    Adenocarcinoma of uterus     Bladder cancer     Deep vein thrombosis     Essential (primary) hypertension     Heart murmur     High cholesterol     Hydronephrosis 4/25/2023    Hypertriglyceridemia     Insomnia     Osteopenia     Other pulmonary embolism without acute cor pulmonale     Personal history of colonic polyps     Rheumatoid arthritis, unspecified     Type 2 diabetes mellitus without complications        Past Surgical History:   Procedure Laterality Date    CHOLECYSTECTOMY      colonscopy  06/20/2012    CYSTOSCOPY W/ URETERAL STENT PLACEMENT Right 4/25/2023    Procedure: CYSTOSCOPY, WITH URETERAL STENT INSERTION;  Surgeon: Anyi Bearden MD;  Location: [facility];  Service: Urology;  Laterality: Right;    ECCE  01/09/2013    estracapsular cataract removal   02/20/2013     insertion of intrpocular lens prosthesis   02/20/2013    phacoemulsifiaction and aspiration of cataract  02/20/2013    phacoemulsificaion and aspiration of cataract      total abdominal hysterectomy and bilateral salpingooophorectomy  1991       Social History: Patient lives with daughter.    Prior Intubation HX:      Modified Barium Swallow:     Chest X-Rays:     Prior diet: regular/thin.    Occupation/hobbies/homemaking:     Subjective     Pt in bed w/ daughter present. Pt pleasant and alert. Pt's daughter providing PMHx and PLOF due to confusion and memory deficits present.    Patient goals:      Pain/Comfort:       Respiratory Status: Room air    Objective:     Cognitive Status:    Orientation Person  Memory Immediate Recall WFL, Delayedimpaired, and long term recall 0% acc acc  [employer] 75% acc       Receptive Language:   Comprehension:      Questions Complex yes/no 100% acc  Commands  two step basic commands 50% acc      Expressive Language:  Verbal:    WH Qs 100% acc      Motor Speech:  WFL    Voice:   WFL    Visual-Spatial:  Did not assess    Reading:   Did not assess      Written Expression:   Did not assess    Brief Interview for Mental Status (BIMS) administered to which pt scored 4/15.    Oral Musculature Evaluation  Oral Musculature: WNL  Dentition: upper dentures, present and adequate, other (see comments) (natural lower dentition)  Secretion Management: adequate    Bedside Swallow Eval:   Consistencies Assessed:  Thin liquids via single and sequential cup sips  Puree grits  Soft solids chopped breakfast sausage  Solids toast bread      Oral Phase:   WFL    Pharyngeal Phase:   no overt clinical signs/symptoms of pharyngeal dysphagia    Compensatory Strategies  None    Treatment: skilled SLP tx warranted for cognitive linguistic deficits.    Goals:   Multidisciplinary Problems       SLP Goals          Problem: SLP    Goal Priority Disciplines Outcome   SLP Goal     SLP Ongoing,  Progressing   Description: LTG: Pt will improve cognitive linguistic skills to allow for safe discharge home w/ family.    STG:  Pt will orient x4 modA.  Pt will utilize memory strategies to recall new information following a 2 minute filled delay modA.  Pt will answer LTM Qs w/ 90% acc modA.                       Plan:     Patient to be seen:  3/x week  Plan of Care expires:  05/26/23  Plan of Care reviewed with:  patient, daughter   SLP Follow-Up:  Yes       Discharge recommendations:  Discharge Facility/Level of Care Needs: home with home health   Barriers to Discharge:  Level of Skilled Assistance Needed see PT/OT notes    Time Tracking:     SLP Treatment Date:      Speech Start Time:  0850  Speech Stop Time:  0925     Speech Total Time (min):  35 min    Billable Minutes: Eval 25 speech,language,cognition and 10 swallowing       Vanna Boss M.S., CCC-SLP   05/12/2023

## 2023-05-12 NOTE — PLAN OF CARE
Problem: Occupational Therapy  Goal: Occupational Therapy Goal  Description: Goals to be met by: 5/26/23     Patient will increase functional independence with ADLs by performing:    LE Dressing with Moderate Assistance.  Toileting from bedside commode with Moderate Assistance for hygiene and clothing management.   Bathing from  edge of bed with Moderate Assistance.  Toilet transfer to bedside commode with Moderate Assistance.    Outcome: Ongoing, Progressing

## 2023-05-12 NOTE — PROGRESS NOTES
Pharmacokinetic Assessment Follow Up: IV Vancomycin    Vancomycin serum concentration assessment(s):    The trough level was drawn correctly and can be used to guide therapy at this time. The measurement is within the desired definitive target range of 15 to 20 mcg/mL.    Vancomycin Regimen Plan:    Continue regimen to Vancomycin 500 mg IV every 12 hours with next serum trough concentration measured at 60 minutes prior to 1300 dose on 5/16    Drug levels (last 3 results):  Recent Labs   Lab Result Units 05/10/23  1103 05/12/23  1300   Vancomycin Trough ug/ml 19.3 18.8       Pharmacy will continue to follow and monitor vancomycin.    Please contact pharmacy at extension [phone] for questions regarding this assessment.    Thank you for the consult,   Michael Stockton       Patient brief summary:  Melodie Villarreal is a 89 y.o. female initiated on antimicrobial therapy with IV Vancomycin for treatment of bacteremia    The patient's current regimen is vancomycin 500 mg ivpb q12hr    Drug Allergies:   Review of patient's allergies indicates:   Allergen Reactions    Ace inhibitors Other (See Comments)    Adhesive      Allergic to tape    Bactrim [sulfamethoxazole-trimethoprim] Other (See Comments)     Confusion, Hypoglycemia    Meperidine Other (See Comments)    Midazolam Other (See Comments)    Atorvastatin Nausea Only    Codeine Other (See Comments) and Anxiety    Tapentadol Rash       Actual Body Weight:   60.1 kg    Renal Function:   Estimated Creatinine Clearance: 46.6 mL/min (based on SCr of 0.65 mg/dL).,     Dialysis Method (if applicable):  N/A    CBC (last 72 hours):  Recent Labs   Lab Result Units 05/12/23  0304   WBC x10(3)/mcL 8.56   Hgb g/dL 7.6*   Hct % 25.1*   Platelet x10(3)/mcL 271   Mono % % 8.1   Eos % % 10.2   Basophil % % 0.8       Metabolic Panel (last 72 hours):  Recent Labs   Lab Result Units 05/10/23  0412 05/11/23  2216 05/12/23  0304 05/12/23  1300   Sodium Level mmol/L 131*  --  126* 126*   Potassium  Level mmol/L 3.8  --  3.4* 4.3   Chloride mmol/L 94*  --  88* 87*   Carbon Dioxide mmol/L 29  --  34* 34*   Glucose Level mg/dL 188*  --  208* 184*   Glucose, UA mg/dL  --  Negative  --   --    Blood Urea Nitrogen mg/dL 21.0*  --  22.4* 19.4   Creatinine mg/dL 0.71  --  0.69 0.65   Albumin Level g/dL  --   --  1.7*  --    Bilirubin Total mg/dL  --   --  0.3  --    Alkaline Phosphatase unit/L  --   --  66  --    Aspartate Aminotransferase unit/L  --   --  7  --    Alanine Aminotransferase unit/L  --   --  8  --    Magnesium Level mg/dL  --   --  1.70  --        Vancomycin Administrations:  vancomycin given in the last 96 hours                     vancomycin (VANCOCIN) 500 mg in dextrose 5 % in water (D5W) 5 % 100 mL IVPB (MB+) (mg) 500 mg New Bag 05/12/23 0143    vancomycin 500 mg in dextrose 5 % 100 mL IVPB (ready to mix) (mg) 500 mg New Bag 05/11/23 0044     500 mg New Bag 05/10/23 1331     500 mg New Bag  0020     500 mg New Bag 05/09/23 1258     500 mg New Bag  0002                    Microbiologic Results:  Microbiology Results (last 7 days)       Procedure Component Value Units Date/Time    Urine culture [[id number]] Collected: 05/11/23 2216    Order Status: Sent Specimen: Urine Updated: 05/11/23 9452

## 2023-05-12 NOTE — PROGRESS NOTES
[facility] - Medical Surgical Unit  Wound Care    Patient Name:  Melodie Villarreal   MRN:  [id number]  Date: 5/12/2023  Diagnosis: Bacteremia    History:     Past Medical History:   Diagnosis Date    Adenocarcinoma of uterus     Bladder cancer     Deep vein thrombosis     Essential (primary) hypertension     Heart murmur     High cholesterol     Hydronephrosis 4/25/2023    Hypertriglyceridemia     Insomnia     Osteopenia     Other pulmonary embolism without acute cor pulmonale     Personal history of colonic polyps     Rheumatoid arthritis, unspecified     Type 2 diabetes mellitus without complications        Social History     Socioeconomic History    Marital status:     Number of children: 2   Occupational History    Occupation: retired   Tobacco Use    Smoking status: Never    Smokeless tobacco: Never   Substance and Sexual Activity    Alcohol use: Never    Drug use: Never    Sexual activity: Not Currently       Precautions:     Allergies as of 05/11/2023 - Reviewed 05/11/2023   Allergen Reaction Noted    Ace inhibitors Other (See Comments) 05/03/2022    Adhesive  10/02/2017    Bactrim [sulfamethoxazole-trimethoprim] Other (See Comments) 01/17/2023    Meperidine Other (See Comments) 05/03/2022    Midazolam Other (See Comments) 05/03/2022    Atorvastatin Nausea Only 05/03/2022    Codeine Other (See Comments) and Anxiety 10/02/2017    Tapentadol Rash 05/03/2022       [facility] Assessment Details/Treatment     Pt with significant medical history. Pt recently hospitalized at [facility] where she was being seen by vascular, wound care, podiatry, ID, etc. Pt here for IV abx, wound care, and therapy services. During her stay there, cultures were obtained and diagnostics were performed, including but not limited to CTA with runoff. Per vascular on 4/28/23, family elected to forego surgical intervention and understood risk of nonhealing wounds. Podiatry also assessed pt and stated no plan for surgical debridement at this  time. Per staff, family inquiring about hyperbaric therapy d/t pt receiving it in the past. Dr Garrison at bedside during assessment. Discussed findings/plan with daughter at bedside. Per Dr Garrison, pt not a candidate for hyperbaric therapy and recommend continuing with enzymatic debridement with use of Santyl/Mesalt to L posterior lower leg and L lateral ankle wounds. Due to vascular status along with pressure, area of discoloration noted to lateral aspect of lower leg. Recommend applying hydrocolloid to area and changing every 3 days/PRN dislodgement. L heel with small open area. Recommend applying foam dressing to area and changing QOD/PRN. After applying Santyl/mesalt to L lateral ankle and L posterior lower leg, recommend wrapping L foot with gauze, abd pad, kerlix. Heel boots in use. Recommend pressure prevention measures during hospitalization. Orders in place.      05/12/23 1057   WOCN Assessment   WOCN Total Time (mins) 40   Visit Date 05/12/23   Consult Type New   WOCN Speciality Wound   Wound pressure;arterial   Number of Wounds 4   Continence Type Urinary;Fecal   Intervention assessed;chart review;orders   Teaching on-going   Skin Interventions   Device Skin Pressure Protection absorbent pad utilized/changed;positioning supports utilized;pressure points protected   Pressure Reduction Devices foam padding utilized;heel offloading device utilized;positioning supports utilized   Pressure Reduction Techniques frequent weight shift encouraged;weight shift assistance provided   Skin Protection adhesive use limited;incontinence pads utilized;protective footwear used;tubing/devices free from skin contact   Positioning   Positioning/Transfer Devices pillows;in use   Pressure Injury Prevention    Check Moisture Management Pad Done   Sacral Foam Dressing Patient incontinent, barrier cream applied   Heel protection technique Heel boot   Heel preventative measures Apply   Check Medical Devices Done         Altered Skin Integrity 04/17/23 Left posterior Ankle Full thickness tissue loss. Subcutaneous fat may be visible but bone, tendon or muscle are not exposed   Date First Assessed: 04/17/23   Altered Skin Integrity Present on Admission - Did Patient arrive to the hospital with altered skin?: yes  Side: Left  Orientation: posterior  Location: Ankle  Description of Altered Skin Integrity: Full thickness tissue...   Wound Image    Description of Altered Skin Integrity Full thickness tissue loss with exposed bone, tendon, or muscle. Often includes undermining and tunneling. May extend into muscle and/or supporting structures.   Dressing Appearance Intact;Moist drainage   Drainage Amount Scant   Drainage Characteristics/Odor Serous;No odor   Appearance Pink;Red;Tan;White;Black;Not granulating;Slough;Eschar;Tendon   Tissue loss description Full thickness   Periwound Area Dry   Wound Edges Defined   Wound Length (cm) 6.5 cm   Wound Width (cm) 1.5 cm   Wound Depth (cm) 0.2 cm   Wound Volume (cm^3) 1.95 cm^3   Wound Surface Area (cm^2) 9.75 cm^2   Care Cleansed with:;Antimicrobial agent   Dressing Applied;Honey;Sodium chloride impregnated;Gauze;Non-adherent;Rolled gauze   Off Loading Other (see comments)  (podus boot in use)   Dressing Change Due 05/13/23        Altered Skin Integrity 04/17/23 Left lateral Ankle Full thickness tissue loss. Subcutaneous fat may be visible but bone, tendon or muscle are not exposed   Date First Assessed: 04/17/23   Altered Skin Integrity Present on Admission - Did Patient arrive to the hospital with altered skin?: yes  Side: Left  Orientation: lateral  Location: Ankle  Description of Altered Skin Integrity: Full thickness tissue l...   Wound Image    Description of Altered Skin Integrity Full thickness tissue loss with exposed bone, tendon, or muscle. Often includes undermining and tunneling. May extend into muscle and/or supporting structures.   Dressing Appearance Intact;Dried drainage    Drainage Amount Scant   Drainage Characteristics/Odor Serous   Appearance Red;Granulating;White;Tan;Slough;Tendon   Tissue loss description Full thickness   Periwound Area Intact;Redness   Wound Edges Defined   Wound Length (cm) 1 cm   Wound Width (cm) 1 cm   Wound Depth (cm) 0.2 cm   Wound Volume (cm^3) 0.2 cm^3   Wound Surface Area (cm^2) 1 cm^2   Care Cleansed with:;Antimicrobial agent   Dressing Applied;Sodium chloride impregnated;Gauze;Non-adherent;Rolled gauze;Other (comment);Honey   Dressing Change Due 05/11/23        Altered Skin Integrity 04/24/23 Left Heel Purple or maroon localized area of discolored intact skin or non-intact skin or a blood-filled blister.   Date First Assessed: 04/24/23   Altered Skin Integrity Present on Admission - Did Patient arrive to the hospital with altered skin?: yes  Side: Left  Location: Heel  Description of Altered Skin Integrity: Purple or maroon localized area of discolored ...   Wound Image    Description of Altered Skin Integrity Purple or maroon localized area of discolored intact skin or non-intact skin or a blood-filled blister.   Dressing Appearance Intact;Moist drainage   Drainage Amount Small   Drainage Characteristics/Odor Sanguineous;No odor;Bleeding controlled   Appearance Red;Maroon;Purple;Not granulating   Periwound Area Intact;Redness   Wound Edges Defined   Wound Length (cm) 1.6 cm   Wound Width (cm) 1.5 cm   Wound Depth (cm) 0.1 cm   Wound Volume (cm^3) 0.24 cm^3   Wound Surface Area (cm^2) 2.4 cm^2   Care Cleansed with:;Antimicrobial agent   Dressing Applied;Foam;Silicone;Gauze;Absorptive Pad;Rolled gauze   Off Loading   (podus boot)   Dressing Change Due 05/14/23        Altered Skin Integrity 05/12/23 1057 Left lower;lateral Leg Other (comment) Purple or maroon localized area of discolored intact skin or non-intact skin or a blood-filled blister.   Date First Assessed/Time First Assessed: 05/12/23 1057   Altered Skin Integrity Present on Admission - Did  Patient arrive to the hospital with altered skin?: yes  Side: Left  Orientation: lower;lateral  Location: Leg  Primary Wound Type: (c) Other (co...   Wound Image    Description of Altered Skin Integrity Purple or maroon localized area of discolored intact skin or non-intact skin or a blood-filled blister.   Dressing Appearance Dry;Clean   Drainage Amount None   Appearance Red;Purple;Maroon;Intact   Periwound Area Dry   Wound Edges Defined   Wound Length (cm) 9 cm   Wound Width (cm) 4 cm   Wound Surface Area (cm^2) 36 cm^2   Dressing Applied;Hydrocolloid   Dressing Change Due 05/15/23       05/12/2023

## 2023-05-12 NOTE — PROGRESS NOTES
HOSPITAL MEDICINE  PROGRESS NOTE    CHIEF COMPLAINT   Hospital follow up    HOSPITAL COURSE   89-year-old female with a PMH of NIDDMII, prior DVT/IVC filter no longer on anticoagulation and non-healing left ankle wound, presented to [facility] on 4/16 after she became lethargic. Blood cultures returned positive for MRSA bacteremia. NM bone scan without clear evidence of OM. LLE arterial U/S showed monophasic flow throughout suggestive of inflow disease.CT T and L spine  with no evidence of discitis/  osteomyelitis. 2D TTE negative for valvular vegetation. STARR, performed on 4/22, negative valvular vegetations. She was taken to the OR on 04/25 for cystoscopy and right ureteral stent placement.  Plan to remove stent in 2 weeks after DC from hospital in the office. Blood cultures persistently positive, 4/16, 4/18, 4/19, 4/21. Eventually cultures from 4/23 remained negative. Vascular surgery consulted for PAD, CTA run-off noted severe flow-limiting disease in the left common femoral. Vascular surgery recommended left femoral endarterectomy, though family defering intervention for now. Hospital course complicated by intermittent hyponatremia. Nephro treated pt with Samsca and subsequent fluid restriction to 1 Liter with improvement of sodium level. On 05/12 she required 1 unit of PRBCs for low hemoglobin. Na levels low once again but will repeat after transfusion for more accurate assessment.     Today  Alert and interactive, responding appropriately.  OBJECTIVE/PHYSICAL EXAM     VITAL SIGNS (MOST RECENT):  Temp: 97.9 °F (36.6 °C) (05/12/23 1334)  Pulse: 91 (05/12/23 1334)  Resp: 20 (05/12/23 1334)  BP: 100/61 (05/12/23 1334)  SpO2: (!) 93 % (05/12/23 1334) VITAL SIGNS (24 HOUR RANGE):  Temp:  [97.6 °F (36.4 °C)-98.5 °F (36.9 °C)] 97.9 °F (36.6 °C)  Pulse:  [] 91  Resp:  [18-20] 20  SpO2:  [90 %-95 %] 93 %  BP: (100-137)/(57-71) 100/61   GENERAL: In no acute distress, afebrile  HEENT:  PERRLA  CHEST: Clear to  auscultation bilaterally  HEART: S1, S2, Grade IV  ABDOMEN: Soft, nontender, BS +  MSK: Warm, no lower extremity edema, no clubbing or cyanosis  NEUROLOGIC: Alert and oriented x4, moving all extremities with good strength   INTEGUMENTARY:  Warm, dry  PSYCHIATRY: appropriate affect  ASSESSMENT/PLAN   Persistent MRSA Sepsis  Bacteremia  -clearance as of 4/23  -Continue with IV Vanc as planned x 6 weeks end date 6/4     Left ankle chronic non healing wound   Severe peripheral artery disease  -family refused intervention with vascular surgery   -wound care  -Consulted Dr. Garrison for hyperbaric therapy per family request      SIADH  Hyponatremia  -S/P Samsca 15 mg x 1 dose on 5/7/23  -Remains off of diuretics, family request   -1 liter fluid restriction with goal sodium 130 or higher  -Na 126 5/12 however in setting of anemia requiring transfusion, repeat  -TSH and Cortisol are normal     Encephalopathy  Staring Spells  -resolved   - Abnormal EEG due to findings consistent with a nonspecific bilateral brain dysfunction.     Electrolyte abnormality  -Hypokalemia, Hypomagnesemia: replete as condition requires      Acute on chronic diastolic heart failure   Mild to moderate Aortic stenosis  Pulmonary HTN  -currently compensated   -BNP elevated though likely chronically elevated in setting of severe AS     NIDDM II  -c/w detemir   -A1c 6.3     Normochromic anemia   -received 3 units of ferrlecit   -continue with p.o. iron   -required 1 unit of PRBCs on 05/12     R Hydronephrosis   -s/p stent - outpt removal in a few weeks with Dr Gauthier     Chronic low back pain   Constipation associated with colonic fecal impaction  -stable         DVT prophylaxis:  lovenox  Anticipated discharge and disposition:  Home   Critical care time 35 minutes  __________________________________________________________________________    LABS/MICRO/MEDS/DIAGNOSTICS       LABS  Recent Labs     05/12/23  0304 05/12/23  1300   * 126*   K  3.4* 4.3   CHLORIDE 88* 87*   CO2 34* 34*   BUN 22.4* 19.4   CREATININE 0.69 0.65   GLUCOSE 208* 184*   CALCIUM 7.8* 8.1*   ALKPHOS 66  --    AST 7  --    ALT 8  --    ALBUMIN 1.7*  --      Recent Labs     05/12/23  0304   WBC 8.56   RBC 2.95*   HCT 25.1*   MCV 85.1          MICROBIOLOGY  Microbiology Results (last 7 days)       Procedure Component Value Units Date/Time    Urine culture [[id number]] Collected: 05/11/23 2216    Order Status: Sent Specimen: Urine Updated: 05/11/23 2244               MEDICATIONS   carvediloL  6.25 mg Oral BID    collagenase   Topical (Top) Daily    docusate  200 mg Oral BID    enoxaparin  40 mg Subcutaneous Daily    ferrous sulfate  1 tablet Oral Daily    insulin detemir U-100  7 Units Subcutaneous QHS    Lactobacillus acidophilus  1 capsule Oral TID WM    magnesium oxide  400 mg Oral Daily    mupirocin   Nasal BID    polyethylene glycol  17 g Oral Daily    tamsulosin  0.4 mg Oral Daily    trazodone  100 mg Oral QHS    vancomycin (VANCOCIN) IVPB  500 mg Intravenous Q12H         INFUSIONS         DIAGNOSTIC TESTS  No orders to display        EF   Date Value Ref Range Status   04/21/2023 60 % Final   04/17/2023 54 % Final              Case related differential diagnoses have been reviewed; assessment and plan has been documented. I have personally reviewed the labs and test results that are currently available; I have reviewed the patients medication list. I have reviewed the consulting providers recommendations. I have reviewed or attempted to review medical records based upon their availability.  All of the patient's and/or family's questions have been addressed and answered to the best of my ability.  I will continue to monitor closely and make adjustments to medical management as needed.  This document was created using M*Modal Fluency Direct.  Transcription errors may have been made.  Please contact me if any questions may rise regarding documentation to clarify transcription.         Thairy G Reyes, DO   05/12/2023   Internal Medicine

## 2023-05-12 NOTE — PLAN OF CARE
Problem: Adult Inpatient Plan of Care  Goal: Plan of Care Review  Outcome: Ongoing, Progressing  Flowsheets (Taken 5/12/2023 0800)  Plan of Care Reviewed With:   patient   daughter  Goal: Patient-Specific Goal (Individualized)  Outcome: Ongoing, Progressing  Flowsheets (Taken 5/12/2023 0800)  Anxieties, Fears or Concerns: none  Individualized Care Needs: strengthing  Goal: Absence of Hospital-Acquired Illness or Injury  Outcome: Ongoing, Progressing  Intervention: Identify and Manage Fall Risk  Flowsheets (Taken 5/12/2023 0800)  Safety Promotion/Fall Prevention:   assistive device/personal item within reach   bed alarm set   high risk medications identified   family to remain at bedside   nonskid shoes/socks when out of bed   side rails raised x 2  Intervention: Prevent Skin Injury  Flowsheets (Taken 5/12/2023 0800)  Body Position: position maintained  Skin Protection:   adhesive use limited   incontinence pads utilized  Intervention: Prevent and Manage VTE (Venous Thromboembolism) Risk  Flowsheets (Taken 5/12/2023 0800)  Activity Management: Rolling - L1  VTE Prevention/Management:   bleeding risk assessed   bleeding precations maintained  Range of Motion: active ROM (range of motion) encouraged  Intervention: Prevent Infection  Flowsheets (Taken 5/12/2023 0800)  Infection Prevention:   cohorting utilized   hand hygiene promoted   single patient room provided  Goal: Optimal Comfort and Wellbeing  Outcome: Ongoing, Progressing  Intervention: Monitor Pain and Promote Comfort  Flowsheets (Taken 5/12/2023 0800)  Pain Management Interventions: quiet environment facilitated  Goal: Readiness for Transition of Care  Outcome: Ongoing, Progressing     Problem: Diabetes Comorbidity  Goal: Blood Glucose Level Within Targeted Range  Outcome: Ongoing, Progressing  Intervention: Monitor and Manage Glycemia  Flowsheets (Taken 5/12/2023 0800)  Glycemic Management: blood glucose monitored     Problem: Adjustment to Illness  (Sepsis/Septic Shock)  Goal: Optimal Coping  Outcome: Ongoing, Progressing  Intervention: Optimize Psychosocial Adjustment to Illness  Flowsheets (Taken 5/12/2023 0800)  Supportive Measures: active listening utilized     Problem: Bleeding (Sepsis/Septic Shock)  Goal: Absence of Bleeding  Outcome: Ongoing, Progressing  Intervention: Monitor and Manage Bleeding  Flowsheets (Taken 5/12/2023 0800)  Bleeding Precautions: monitored for signs of bleeding     Problem: Glycemic Control Impaired (Sepsis/Septic Shock)  Goal: Blood Glucose Level Within Desired Range  Outcome: Ongoing, Progressing  Intervention: Optimize Glycemic Control  Flowsheets (Taken 5/12/2023 0800)  Glycemic Management: blood glucose monitored     Problem: Infection Progression (Sepsis/Septic Shock)  Goal: Absence of Infection Signs and Symptoms  Outcome: Ongoing, Progressing  Intervention: Initiate Sepsis Management  Flowsheets (Taken 5/12/2023 0800)  Infection Prevention:   cohorting utilized   hand hygiene promoted   single patient room provided  Isolation Precautions: protective  Intervention: Promote Stabilization  Flowsheets (Taken 5/12/2023 0800)  Fever Reduction/Comfort Measures:   lightweight bedding   lightweight clothing  Intervention: Promote Recovery  Flowsheets (Taken 5/12/2023 0800)  Sleep/Rest Enhancement: awakenings minimized  Activity Management: Rolling - L1     Problem: Nutrition Impaired (Sepsis/Septic Shock)  Goal: Optimal Nutrition Intake  Outcome: Ongoing, Progressing     Problem: Infection  Goal: Absence of Infection Signs and Symptoms  Outcome: Ongoing, Progressing  Intervention: Prevent or Manage Infection  Flowsheets (Taken 5/12/2023 0800)  Fever Reduction/Comfort Measures:   lightweight bedding   lightweight clothing  Isolation Precautions: protective     Problem: Impaired Wound Healing  Goal: Optimal Wound Healing  Outcome: Ongoing, Progressing  Intervention: Promote Wound Healing  Flowsheets (Taken 5/12/2023 0800)  Oral  Nutrition Promotion:   calorie-dense foods provided   safe use of adaptive equipment encouraged  Sleep/Rest Enhancement: awakenings minimized  Activity Management: Rolling - L1  Pain Management Interventions: quiet environment facilitated     Problem: Fall Injury Risk  Goal: Absence of Fall and Fall-Related Injury  Outcome: Ongoing, Progressing  Intervention: Identify and Manage Contributors  Flowsheets (Taken 5/12/2023 0800)  Self-Care Promotion: independence encouraged  Medication Review/Management: medications reviewed  Intervention: Promote Injury-Free Environment  Flowsheets (Taken 5/12/2023 0800)  Safety Promotion/Fall Prevention:   assistive device/personal item within reach   bed alarm set   high risk medications identified   family to remain at bedside   nonskid shoes/socks when out of bed   side rails raised x 2     Problem: Skin Injury Risk Increased  Goal: Skin Health and Integrity  Outcome: Ongoing, Progressing  Intervention: Optimize Skin Protection  Flowsheets (Taken 5/12/2023 0800)  Pressure Reduction Techniques: frequent weight shift encouraged  Pressure Reduction Devices:   heel offloading device utilized   positioning supports utilized  Skin Protection:   adhesive use limited   incontinence pads utilized  Head of Bed (HOB) Positioning: HOB at 20-30 degrees  Intervention: Promote and Optimize Oral Intake  Flowsheets (Taken 5/12/2023 0800)  Oral Nutrition Promotion:   calorie-dense foods provided   safe use of adaptive equipment encouraged

## 2023-05-12 NOTE — PLAN OF CARE
Problem: Adult Inpatient Plan of Care  Goal: Plan of Care Review  Outcome: Ongoing, Progressing  Flowsheets (Taken 5/12/2023 0433)  Plan of Care Reviewed With:   patient   daughter  Goal: Patient-Specific Goal (Individualized)  Outcome: Ongoing, Progressing  Flowsheets (Taken 5/12/2023 0433)  Anxieties, Fears or Concerns: none  Goal: Absence of Hospital-Acquired Illness or Injury  Outcome: Ongoing, Progressing  Intervention: Prevent and Manage VTE (Venous Thromboembolism) Risk  Flowsheets (Taken 5/12/2023 0400)  VTE Prevention/Management:   bleeding risk assessed   bleeding precations maintained  Range of Motion: active ROM (range of motion) encouraged  Intervention: Prevent Infection  Flowsheets (Taken 5/12/2023 0400)  Infection Prevention:   cohorting utilized   hand hygiene promoted   equipment surfaces disinfected   rest/sleep promoted  Goal: Optimal Comfort and Wellbeing  Outcome: Ongoing, Progressing  Intervention: Monitor Pain and Promote Comfort  Flowsheets (Taken 5/12/2023 0433)  Pain Management Interventions:   care clustered   position adjusted  Intervention: Provide Person-Centered Care  Flowsheets (Taken 5/12/2023 0433)  Trust Relationship/Rapport:   choices provided   emotional support provided   care explained   questions answered  Goal: Readiness for Transition of Care  Outcome: Ongoing, Progressing     Problem: Diabetes Comorbidity  Goal: Blood Glucose Level Within Targeted Range  Outcome: Ongoing, Progressing  Intervention: Monitor and Manage Glycemia  Flowsheets (Taken 5/12/2023 0433)  Glycemic Management: blood glucose monitored     Problem: Adjustment to Illness (Sepsis/Septic Shock)  Goal: Optimal Coping  Outcome: Ongoing, Progressing     Problem: Bleeding (Sepsis/Septic Shock)  Goal: Absence of Bleeding  Outcome: Ongoing, Progressing     Problem: Glycemic Control Impaired (Sepsis/Septic Shock)  Goal: Blood Glucose Level Within Desired Range  Outcome: Ongoing, Progressing  Intervention:  Optimize Glycemic Control  Flowsheets (Taken 5/12/2023 0433)  Glycemic Management: blood glucose monitored     Problem: Infection Progression (Sepsis/Septic Shock)  Goal: Absence of Infection Signs and Symptoms  Outcome: Ongoing, Progressing  Intervention: Initiate Sepsis Management  Flowsheets (Taken 5/12/2023 0433)  Infection Prevention:   cohorting utilized   hand hygiene promoted   equipment surfaces disinfected   rest/sleep promoted  Infection Management: aseptic technique maintained  Isolation Precautions: protective  Intervention: Promote Recovery  Flowsheets (Taken 5/12/2023 0433)  Sleep/Rest Enhancement:   awakenings minimized   room darkened   regular sleep/rest pattern promoted  Activity Management: Rolling - L1     Problem: Nutrition Impaired (Sepsis/Septic Shock)  Goal: Optimal Nutrition Intake  Outcome: Ongoing, Progressing     Problem: Infection  Goal: Absence of Infection Signs and Symptoms  Outcome: Ongoing, Progressing  Intervention: Prevent or Manage Infection  Flowsheets (Taken 5/12/2023 0433)  Infection Management: aseptic technique maintained  Isolation Precautions: protective     Problem: Impaired Wound Healing  Goal: Optimal Wound Healing  Outcome: Ongoing, Progressing  Intervention: Promote Wound Healing  Flowsheets (Taken 5/12/2023 0433)  Sleep/Rest Enhancement:   awakenings minimized   room darkened   regular sleep/rest pattern promoted  Activity Management: Rolling - L1  Pain Management Interventions:   care clustered   position adjusted     Problem: Fall Injury Risk  Goal: Absence of Fall and Fall-Related Injury  Outcome: Ongoing, Progressing  Intervention: Identify and Manage Contributors  Flowsheets (Taken 5/12/2023 0433)  Self-Care Promotion: independence encouraged  Medication Review/Management: medications reviewed     Problem: Skin Injury Risk Increased  Goal: Skin Health and Integrity  Outcome: Ongoing, Progressing

## 2023-05-12 NOTE — PROGRESS NOTES
Name: Melodie Villarreal    : 1931 (89 y.o.)  MRN: [id number]           Patient Unavailable      Patient unable to be seen at this time secondary to: wound care with patient at this time. Will f/u this PM

## 2023-05-12 NOTE — PLAN OF CARE
Problem: SLP  Goal: SLP Goal  Description: LTG: Pt will improve cognitive linguistic skills to allow for safe discharge home w/ family.    STG:  Pt will orient x4 modA.  Pt will utilize memory strategies to recall new information following a 2 minute filled delay modA.  Pt will answer LTM Qs w/ 90% acc modA.  Outcome: Ongoing, Progressing

## 2023-05-12 NOTE — PT/OT/SLP EVAL
"Occupational Therapy Evaluation      Name: Melodie Villarreal  MRN: [id number]  Admitting Diagnosis: Bacteremia  Recent Surgery: * No surgery found *      Recommendations:     Discharge Recommendations: home with home health, home  Level of Assistance Recommended: 24 hours significant assistance  Discharge Equipment Recommendations: wheelchair  Barriers to discharge:  (Decreased mobility)    Assessment:     Melodie Villarreal is a 89 y.o. female with a medical diagnosis of Bacteremia. She presents with performance deficits affecting function including impaired self care skills, impaired cognition, decreased coordination, decreased lower extremity function, pain, impaired endurance, weakness, impaired functional mobility. Pt noted with nonsensical speech during evaluation. Pt initially demonstrated posterior lean when sitting EOB, but after a minute was able to hold herself up for ~4 minutes with CGA.      Rehab Prognosis: Fair; patient would benefit from acute skilled OT services to address these deficits and reach maximum level of function.    Plan:     Patient to be seen 6 x/week to address the above listed problems via self-care/home management, therapeutic exercises, therapeutic activities  Plan of Care Expires: 05/26/23  Plan of Care Reviewed with: patient, son    Subjective     Chief Complaint: Pt unable to answer question.  Patient Comments/Goals: Pt unable to answer question, but her son was present and stated "ideally for her to be more mobile and be able to get up an go to the bathroom."   Pain/Comfort:  Pain Rating 1: 0/10    Patients cultural, spiritual, Temple conflicts given the current situation: no    Social History:  Living Environment: Patient lives with their daughter in a single story home, walk-in shower.  Prior Level of Function: Prior to admission, patient required assistance with ADLs including dressing, bathing, grooming, and toileting. Pt was able to self-feed with set-up. Pt used a rollator for " functional mobility around the house with someone following behind her .  Roles and Routines: Retired, mother  Equipment Used at Home: rollator, bedside commode, grab bar, other (see comments) (transport chair)  DME owned (not currently used): bedside commode and transfer chair, rollator  Assistance Upon Discharge: family.    Objective:     Communicated with NSG and PT prior to session. Patient found HOB elevated with bed alarm, blood pressure cuff, PureWick, peripheral IV, PRAFO upon OT entry to room.    General Precautions: Standard, fall   Orthopedic Precautions:    Braces:    Respiratory Status: Room air    Occupational Performance    Bed Mobility:  Rolling/Turning to Left with maximal assistance  Supine to sit from left side of bed with maximal assistance of 2 persons  Sit to Supine with maximal assistance of 2 persons on left side of bed    Functional Mobility/Transfers:  Unable to assess d/t unsure of weight-bearing status.  Functional Mobility: N/A    Activities of Daily Living:  Feeding:  supervision    Grooming: maximal assistance    Bathing: total assistance    Upper Body Dressing: maximal assistance    Lower Body Dressing: total assistance    Toileting: total assistance      Cognitive/Visual Perceptual:  Cognitive/Psychosocial Skills:     -       Oriented to: Person     Physical Exam:  Upper Extremity Range of Motion:     -       Right Upper Extremity: WFL  -       Left Upper Extremity: WFL  Upper Extremity Strength:    -       Right Upper Extremity: WFL  -       Left Upper Extremity: WFL   Strength:    -       Right Upper Extremity: WFL  -       Left Upper Extremity: WFL  Gross motor coordination:   ataxia    AMPAC 6 Click ADL:  AMPAC Total Score: 14    Treatment & Education:  Patient educated on role of OT, POC and goals for therapy      Patient not clear to transfer with RN/PCT.    Patient left with bed in chair position with all lines intact, call button in reach, bed alarm on, and family  present.    GOALS:   Multidisciplinary Problems       Occupational Therapy Goals          Problem: Occupational Therapy    Goal Priority Disciplines Outcome Interventions   Occupational Therapy Goal     OT, PT/OT Ongoing, Progressing    Description: Goals to be met by: 5/26/23     Patient will increase functional independence with ADLs by performing:    LE Dressing with Moderate Assistance.  Toileting from bedside commode with Moderate Assistance for hygiene and clothing management.   Bathing from  edge of bed with Moderate Assistance.  Toilet transfer to bedside commode with Moderate Assistance.                         History:     Past Medical History:   Diagnosis Date    Adenocarcinoma of uterus     Bladder cancer     Deep vein thrombosis     Essential (primary) hypertension     Heart murmur     High cholesterol     Hydronephrosis 4/25/2023    Hypertriglyceridemia     Insomnia     Osteopenia     Other pulmonary embolism without acute cor pulmonale     Personal history of colonic polyps     Rheumatoid arthritis, unspecified     Type 2 diabetes mellitus without complications          Past Surgical History:   Procedure Laterality Date    CHOLECYSTECTOMY      colonscopy  06/20/2012    CYSTOSCOPY W/ URETERAL STENT PLACEMENT Right 4/25/2023    Procedure: CYSTOSCOPY, WITH URETERAL STENT INSERTION;  Surgeon: Anyi Bearden MD;  Location: [facility];  Service: Urology;  Laterality: Right;    ECCE  01/09/2013    estracapsular cataract removal   02/20/2013    insertion of intrpocular lens prosthesis   02/20/2013    phacoemulsifiaction and aspiration of cataract  02/20/2013    phacoemulsificaion and aspiration of cataract      total abdominal hysterectomy and bilateral salpingooophorectomy  1991       Time Tracking:     OT Date of Treatment: 05/12/23  OT Start Time: 1415  OT Stop Time: 1440  OT Total Time (min): 25 min    Billable Minutes: Evaluation 25    5/12/2023

## 2023-05-12 NOTE — PT/OT/SLP EVAL
Physical Therapy Swingbed Evaluation      Patient Name:  Melodie Villarreal   MRN:  [id number]    History:     Past Medical History:   Diagnosis Date    Adenocarcinoma of uterus     Bladder cancer     Deep vein thrombosis     Essential (primary) hypertension     Heart murmur     High cholesterol     Hydronephrosis 4/25/2023    Hypertriglyceridemia     Insomnia     Osteopenia     Other pulmonary embolism without acute cor pulmonale     Personal history of colonic polyps     Rheumatoid arthritis, unspecified     Type 2 diabetes mellitus without complications        Past Surgical History:   Procedure Laterality Date    CHOLECYSTECTOMY      colonscopy  06/20/2012    CYSTOSCOPY W/ URETERAL STENT PLACEMENT Right 4/25/2023    Procedure: CYSTOSCOPY, WITH URETERAL STENT INSERTION;  Surgeon: Anyi Bearden MD;  Location: [facility];  Service: Urology;  Laterality: Right;    ECCE  01/09/2013    estracapsular cataract removal   02/20/2013    insertion of intrpocular lens prosthesis   02/20/2013    phacoemulsifiaction and aspiration of cataract  02/20/2013    phacoemulsificaion and aspiration of cataract      total abdominal hysterectomy and bilateral salpingooophorectomy  1991         Subjective     Chief Complaint: Back and L ankle Pain  Patient/Family Comments/goals: To regain some functional strength to be mobile again  Pain/Comfort:  Location - Side 1: Left  Location - Orientation 1: lower  Location 1: ankle  Pain Addressed 1: Pre-medicate for activity, Reposition  Location - Orientation 2: lower  Location 2: back  Pain Addressed 2: Pre-medicate for activity, Reposition      Living Environment:  Lives with: Daughter  Home Environment: Single level home, walk-in shower  Previous level of function: MIN A for bed mobility and transfers and CGA with gait using Rollator; Assistance required for ADLs from daughter; pt able to self-feed with set-up assistance  Equipment used at home: bedside commode, rollator, grab bar (transport  chair).        Objective:     Communicated with nursing prior to session.  Patient found supine with bed alarm, PureWick, PRAFO, peripheral IV  upon PT entry to room.    General Precautions: Standard, fall   Orthopedic Precautions:    Braces:    Respiratory Status: Room air     Vitals Value    Room air      Oxygen (L)     Blood pressure     Heart rate         Exams:  Cognitive Exam:  Patient is oriented to Person  RLE ROM: WFL  RLE Strength: WFL  LLE ROM: WFL  LLE Strength: WFL    Functional Mobility:  Bed Mobility:     Supine to Sit: moderate assistance and of 2 persons  Sit to Supine: maximal assistance and of 2 persons  Balance: Fair to Poor - initially sitting EOB, pt required total A at trunk, but after a minute or so, regained sitting balance to fair for ~5 minutes to perform AROM of UE and LE; pt requesting to lay back down after 5 minutes    Therapeutic Activities and Exercises:       Additional information: Unable to assess sit to stand transfer at this time 2/2 awaiting clearance for WB to LLE; pt has exposed wound to L achilles tendon and heel, but unsure of how extended and involved it is. Reached out to wound care at [facility] for further recommendations and awaiting response    Patient left supine with all lines intact, call button in reach, bed alarm on, Nurse Breanne notified, and Son present.      Assessment:     Melodie Villarreal is a 89 y.o. female admitted with a medical diagnosis of Bacteremia.  She presents with the following impairments/functional limitations:  weakness, gait instability, impaired endurance, impaired balance, decreased lower extremity function, decreased safety awareness, pain, impaired skin, impaired functional mobility.    Rehab Prognosis: Good and Fair; patient would benefit from acute skilled PT services to address these deficits and reach maximum level of function.    Recent Surgery: * No surgery found *        Recommendations:     Discharge Recommendations:  home with home health,  nursing facility, skilled   Discharge Equipment Recommendations: walker, rolling, hospital bed, lift device, wheelchair       Plan:     During this hospitalization, patient to be seen 6 x/week to address the identified rehab impairments via gait training, therapeutic activities, therapeutic exercises, wheelchair management/training and progress toward the following goals:    GOALS:   Multidisciplinary Problems       Physical Therapy Goals          Problem: Physical Therapy    Goal Priority Disciplines Outcome Goal Variances Interventions   Physical Therapy Goal     PT, PT/OT Ongoing, Progressing     Description: Goals to be met by: Discharge     Patient will increase functional independence with mobility by performin. Supine to sit with MInimal Assistance  2. Sit to supine with MInimal Assistance  3. Bed to chair transfer with Minimal Assistance using Rolling Walker  4. Gait  x 15 feet with Minimal Assistance using Rolling Walker.   5. Sitting at edge of bed x10 minutes with Stand-by Assistance                         Time Tracking:       PT Start Time: 1415     PT Stop Time: 1440  PT Total Time (min): 25 min     Billable Minutes: Evaluation MOD complexity      2023

## 2023-05-12 NOTE — NURSING
Nurses Note -- 4 Eyes      5/11/2023  1420PM      Skin assessed during: Admit      [] No Altered Skin Integrity Present    []Prevention Measures Documented      [x] Yes- Altered Skin Integrity Present or Discovered   [x] LDA Added if Not in Epic (Describe Wound)   [x] New Altered Skin Integrity was Present on Admit and Documented in LDA   [x] Wound Image Taken    Wound Care Consulted? Yes    Attending Nurse:  Sarahy Campos RN     Second RN/Staff Member:  Frances Hicks RN

## 2023-05-12 NOTE — PLAN OF CARE
Problem: Physical Therapy  Goal: Physical Therapy Goal  Description: Goals to be met by: Discharge     Patient will increase functional independence with mobility by performin. Supine to sit with MInimal Assistance  2. Sit to supine with MInimal Assistance  3. Bed to chair transfer with Minimal Assistance using Rolling Walker  4. Gait  x 15 feet with Minimal Assistance using Rolling Walker.   5. Sitting at edge of bed x10 minutes with Stand-by Assistance    Outcome: Ongoing, Progressing

## 2023-05-12 NOTE — PLAN OF CARE
[facility] - Medical Surgical Unit  Initial Discharge Assessment       Primary Care Provider: Wisam Espinosa Jr, MD    Admission Diagnosis: sepsis    Admission Date: 5/11/2023  Expected Discharge Date:     Transition of Care Barriers: None    Payor: MEDICARE / Plan: MEDICARE PART A & B / Product Type: Government /     Extended Emergency Contact Information  Primary Emergency Contact: William Villarreal  Mobile Phone: [phone]  Relation: Daughter  Preferred language: English   needed? No  Secondary Emergency Contact: NORMA VILLARREAL  Mobile Phone: [phone]  Relation: Son    Discharge Plan A: Home Health, Home with family         [id number] - [place], [place] - [address]  [address]  [place] [address]  Phone: [phone] Fax: [phone]      Initial Assessment (most recent)       Adult Discharge Assessment - 05/12/23 1431          Discharge Assessment    Assessment Type Discharge Planning Assessment     Confirmed/corrected address, phone number and insurance Yes     Confirmed Demographics Correct on Facesheet     Source of Information patient     If unable to respond/provide information was family/caregiver contacted? Yes     Contact Name/Number William Villarreal      Communicated ERIN with patient/caregiver Date not available/Unable to determine     Reason For Admission Deconditioning     People in Home alone     Facility Arrived From: [facility]     Do you expect to return to your current living situation? Yes     Do you have help at home or someone to help you manage your care at home? Yes     Who are your caregiver(s) and their phone number(s)? William Villarreal      Prior to hospitilization cognitive status: Alert/Oriented     Current cognitive status: Alert/Oriented     Walking or Climbing Stairs ambulation difficulty, requires equipment     Home Accessibility wheelchair accessible     Home Layout Able to live on 1st floor     Equipment Currently Used at Home bedside  commode     Readmission within 30 days? No     Patient currently being followed by outpatient case management? No     Do you currently have service(s) that help you manage your care at home? No     Is the pt/caregiver preference to resume services with current agency Yes     Do you take prescription medications? Yes     Do you have prescription coverage? Yes     Do you have any problems affording any of your prescribed medications? TBD     Is the patient taking medications as prescribed? yes     Who is going to help you get home at discharge? William Villarreal      How do you get to doctors appointments? family or friend will provide     Are you on dialysis? No     Do you take coumadin? No     Discharge Plan A Home Health;Home with family     DME Needed Upon Discharge  wheelchair     Discharge Plan discussed with: Adult children     Name(s) and Number(s) William Villarreal      Transition of Care Barriers None        Physical Activity    On average, how many days per week do you engage in moderate to strenuous exercise (like a brisk walk)? 0 days     On average, how many minutes do you engage in exercise at this level? 0 min        Financial Resource Strain    How hard is it for you to pay for the very basics like food, housing, medical care, and heating? Not hard at all        Housing Stability    In the last 12 months, was there a time when you were not able to pay the mortgage or rent on time? No     In the last 12 months, how many places have you lived? 1     In the last 12 months, was there a time when you did not have a steady place to sleep or slept in a shelter (including now)? No        Transportation Needs    In the past 12 months, has lack of transportation kept you from medical appointments or from getting medications? No     In the past 12 months, has lack of transportation kept you from meetings, work, or from getting things needed for daily living? No        Food Insecurity    Within the past  12 months, you worried that your food would run out before you got the money to buy more. Never true     Within the past 12 months, the food you bought just didn't last and you didn't have money to get more. Never true        Stress    Do you feel stress - tense, restless, nervous, or anxious, or unable to sleep at night because your mind is troubled all the time - these days? Only a little        Social Connections    In a typical week, how many times do you talk on the phone with family, friends, or neighbors? More than three times a week     How often do you get together with friends or relatives? More than three times a week     How often do you attend Moravian or Yazidi services? More than 4 times per year     Do you belong to any clubs or organizations such as Moravian groups, unions, fraternal or athletic groups, or school groups? No     How often do you attend meetings of the clubs or organizations you belong to? 1 to 4 times per year     Are you , , , , never , or living with a partner?         Alcohol Use    Q1: How often do you have a drink containing alcohol? Never     Q2: How many drinks containing alcohol do you have on a typical day when you are drinking? Patient does not drink     Q3: How often do you have six or more drinks on one occasion? Never        OTHER    Name(s) of People in Home William Villarreal

## 2023-05-13 NOTE — PLAN OF CARE
Problem: Adult Inpatient Plan of Care  Goal: Plan of Care Review  Outcome: Ongoing, Progressing  Flowsheets (Taken 5/13/2023 0800)  Plan of Care Reviewed With:   patient   daughter  Goal: Patient-Specific Goal (Individualized)  Outcome: Ongoing, Progressing  Flowsheets (Taken 5/13/2023 0800)  Anxieties, Fears or Concerns: none  Individualized Care Needs: regain strength and wound healing  Goal: Absence of Hospital-Acquired Illness or Injury  Outcome: Ongoing, Progressing  Intervention: Identify and Manage Fall Risk  Flowsheets (Taken 5/13/2023 0800)  Safety Promotion/Fall Prevention:   assistive device/personal item within reach   bed alarm set   family to remain at bedside   high risk medications identified   medications reviewed   nonskid shoes/socks when out of bed   side rails raised x 2   instructed to call staff for mobility  Intervention: Prevent Skin Injury  Flowsheets (Taken 5/13/2023 0800)  Body Position: position maintained  Skin Protection:   adhesive use limited   incontinence pads utilized   protective footwear used  Intervention: Prevent and Manage VTE (Venous Thromboembolism) Risk  Flowsheets (Taken 5/13/2023 0800)  Activity Management: Rolling - L1  VTE Prevention/Management:   bleeding risk assessed   bleeding precations maintained  Range of Motion: active ROM (range of motion) encouraged  Intervention: Prevent Infection  Flowsheets (Taken 5/13/2023 0800)  Infection Prevention:   cohorting utilized   environmental surveillance performed   single patient room provided  Goal: Optimal Comfort and Wellbeing  Outcome: Ongoing, Progressing  Intervention: Monitor Pain and Promote Comfort  Flowsheets (Taken 5/13/2023 0800)  Pain Management Interventions:   quiet environment facilitated   relaxation techniques promoted  Intervention: Provide Person-Centered Care  Flowsheets (Taken 5/13/2023 0800)  Trust Relationship/Rapport: care explained  Goal: Readiness for Transition of Care  Outcome: Ongoing,  Progressing     Problem: Diabetes Comorbidity  Goal: Blood Glucose Level Within Targeted Range  Outcome: Ongoing, Progressing  Intervention: Monitor and Manage Glycemia  Flowsheets (Taken 5/13/2023 0800)  Glycemic Management: blood glucose monitored     Problem: Adjustment to Illness (Sepsis/Septic Shock)  Goal: Optimal Coping  Outcome: Ongoing, Progressing  Intervention: Optimize Psychosocial Adjustment to Illness  Flowsheets (Taken 5/13/2023 0800)  Supportive Measures: active listening utilized     Problem: Bleeding (Sepsis/Septic Shock)  Goal: Absence of Bleeding  Outcome: Ongoing, Progressing  Intervention: Monitor and Manage Bleeding  Flowsheets (Taken 5/13/2023 0800)  Bleeding Precautions: monitored for signs of bleeding     Problem: Glycemic Control Impaired (Sepsis/Septic Shock)  Goal: Blood Glucose Level Within Desired Range  Outcome: Ongoing, Progressing  Intervention: Optimize Glycemic Control  Flowsheets (Taken 5/13/2023 0800)  Glycemic Management: blood glucose monitored     Problem: Infection Progression (Sepsis/Septic Shock)  Goal: Absence of Infection Signs and Symptoms  Outcome: Ongoing, Progressing  Intervention: Initiate Sepsis Management  Flowsheets (Taken 5/13/2023 0800)  Infection Prevention:   cohorting utilized   environmental surveillance performed   single patient room provided  Isolation Precautions: protective  Intervention: Promote Stabilization  Flowsheets (Taken 5/13/2023 0800)  Fever Reduction/Comfort Measures:   lightweight bedding   lightweight clothing  Intervention: Promote Recovery  Flowsheets (Taken 5/13/2023 0800)  Activity Management: Rolling - L1     Problem: Nutrition Impaired (Sepsis/Septic Shock)  Goal: Optimal Nutrition Intake  Outcome: Ongoing, Progressing     Problem: Infection  Goal: Absence of Infection Signs and Symptoms  Outcome: Ongoing, Progressing  Intervention: Prevent or Manage Infection  Flowsheets (Taken 5/13/2023 0800)  Fever Reduction/Comfort Measures:    lightweight bedding   lightweight clothing  Isolation Precautions: protective     Problem: Impaired Wound Healing  Goal: Optimal Wound Healing  Outcome: Ongoing, Progressing  Intervention: Promote Wound Healing  Flowsheets (Taken 5/13/2023 0800)  Oral Nutrition Promotion:   calorie-dense foods provided   safe use of adaptive equipment encouraged  Activity Management: Rolling - L1  Pain Management Interventions:   quiet environment facilitated   relaxation techniques promoted     Problem: Fall Injury Risk  Goal: Absence of Fall and Fall-Related Injury  Outcome: Ongoing, Progressing  Intervention: Identify and Manage Contributors  Flowsheets (Taken 5/13/2023 0800)  Self-Care Promotion:   independence encouraged   BADL personal objects within reach   BADL personal routines maintained   meal set-up provided   safe use of adaptive equipment encouraged  Medication Review/Management:   medications reviewed   high-risk medications identified  Intervention: Promote Injury-Free Environment  Flowsheets (Taken 5/13/2023 0800)  Safety Promotion/Fall Prevention:   assistive device/personal item within reach   bed alarm set   family to remain at bedside   high risk medications identified   medications reviewed   nonskid shoes/socks when out of bed   side rails raised x 2   instructed to call staff for mobility     Problem: Skin Injury Risk Increased  Goal: Skin Health and Integrity  Outcome: Ongoing, Progressing  Intervention: Optimize Skin Protection  Flowsheets (Taken 5/13/2023 0800)  Pressure Reduction Techniques:   frequent weight shift encouraged   pressure points protected   weight shift assistance provided  Pressure Reduction Devices: heel offloading device utilized  Skin Protection:   adhesive use limited   incontinence pads utilized   protective footwear used  Head of Bed (HOB) Positioning: HOB at 30 degrees  Intervention: Promote and Optimize Oral Intake  Flowsheets (Taken 5/13/2023 0800)  Oral Nutrition Promotion:    calorie-dense foods provided   safe use of adaptive equipment encouraged

## 2023-05-13 NOTE — PT/OT/SLP PROGRESS
Physical Therapy Treatment Note           Name: Melodie Villarreal    : 1931 (89 y.o.)  MRN: [id number]           TREATMENT SUMMARY AND RECOMMENDATIONS:    PT Received On: 23  PT Start Time: 1000     PT Stop Time: 1025  PT Total Time (min): 25 min     Subjective Assessment:   No complaints x Lethargic    Awake, alert, cooperative  Uncooperative    Agitated  c/o pain    Appropriate  c/o fatigue   x Confused x Treated at bedside     Emotionally labile  Treated in gym/dept.    Impulsive  Other:    Flat affect       Therapy Precautions:    Cognitive deficits  Spinal precautions    Collar - hard  Sternal precautions    Collar - soft   TLSO   x Fall risk  LSO    Hip precautions - posterior  Knee immobilizer    Hip precautions - anterior  WBAT    Impaired communication  Partial weightbearing    Oxygen  TTWB    PEG tube x NWB - LLE due to wound on heel, awaiting update on WB status from wound care    Visual deficits  Other:    Hearing deficits          Treatment Objectives:     Mobility Training:   Assist level Comments    Bed mobility Max A x2  Supine <> sit    Transfer Mod A Stand pivot transfer from bed to recliner    Gait     Sit to stand transitions     Sitting balance     Standing balance      Wheelchair mobility     Car transfer     Other:          Therapeutic Exercise:   Exercise Sets Reps Comments                               Additional Comments:  Attempted standing transfer with RW to recliner but pt had difficulty with following directions, as well as weakness in BUE and RLE which made RW transfer unsafe today. Mod A with stand pivot transfer with PT while maintaining LLE NWB. Pt was left up in chair for 1 hour while her mattress was changed, at roughly 11:00 PT was called and we transferred her back to bed using the same techniques.     Assessment: Patient tolerated session well.    PT Plan: cont POC  Revisions made to plan of care: No    GOALS:   Multidisciplinary Problems       Physical Therapy  Goals          Problem: Physical Therapy    Goal Priority Disciplines Outcome Goal Variances Interventions   Physical Therapy Goal     PT, PT/OT Ongoing, Progressing     Description: Goals to be met by: Discharge     Patient will increase functional independence with mobility by performin. Supine to sit with MInimal Assistance  2. Sit to supine with MInimal Assistance  3. Bed to chair transfer with Minimal Assistance using Rolling Walker  4. Gait  x 15 feet with Minimal Assistance using Rolling Walker.   5. Sitting at edge of bed x10 minutes with Stand-by Assistance                         Skilled PT Minutes Provided: 25   Communication with Treatment Team:     Equipment recommendations:       At end of treatment, patient remained:   Up in chair     Up in wheelchair in room   x In bed   x With alarm activated   x Bed rails up   x Call bell in reach    x Family/friends present    Restraints secured properly    In bathroom with CNA/RN notified   x Nurse aware    In gym with therapist/tech    Other:

## 2023-05-13 NOTE — PLAN OF CARE
Problem: Adult Inpatient Plan of Care  Goal: Plan of Care Review  Outcome: Ongoing, Progressing  Flowsheets (Taken 5/13/2023 0409)  Plan of Care Reviewed With:   patient   daughter  Goal: Patient-Specific Goal (Individualized)  Outcome: Ongoing, Progressing  Flowsheets (Taken 5/13/2023 0409)  Anxieties, Fears or Concerns: none  Individualized Care Needs: regain strength and wound to be healed  Goal: Absence of Hospital-Acquired Illness or Injury  Outcome: Ongoing, Progressing  Intervention: Prevent and Manage VTE (Venous Thromboembolism) Risk  Flowsheets (Taken 5/13/2023 0409)  Activity Management: Rolling - L1  VTE Prevention/Management:   bleeding precations maintained   bleeding risk assessed  Range of Motion: active ROM (range of motion) encouraged  Intervention: Prevent Infection  Flowsheets (Taken 5/13/2023 0409)  Infection Prevention:   cohorting utilized   rest/sleep promoted   environmental surveillance performed   hand hygiene promoted   equipment surfaces disinfected  Goal: Optimal Comfort and Wellbeing  Outcome: Ongoing, Progressing  Intervention: Monitor Pain and Promote Comfort  Flowsheets (Taken 5/13/2023 0409)  Pain Management Interventions:   care clustered   position adjusted   quiet environment facilitated  Intervention: Provide Person-Centered Care  Flowsheets (Taken 5/13/2023 0409)  Trust Relationship/Rapport:   care explained   questions answered   questions encouraged   emotional support provided  Goal: Readiness for Transition of Care  Outcome: Ongoing, Progressing     Problem: Diabetes Comorbidity  Goal: Blood Glucose Level Within Targeted Range  Outcome: Ongoing, Progressing  Intervention: Monitor and Manage Glycemia  Flowsheets (Taken 5/13/2023 0409)  Glycemic Management: blood glucose monitored     Problem: Adjustment to Illness (Sepsis/Septic Shock)  Goal: Optimal Coping  Outcome: Ongoing, Progressing     Problem: Bleeding (Sepsis/Septic Shock)  Goal: Absence of Bleeding  Outcome:  Ongoing, Progressing  Intervention: Monitor and Manage Bleeding  Flowsheets (Taken 5/13/2023 0409)  Bleeding Precautions: monitored for signs of bleeding     Problem: Glycemic Control Impaired (Sepsis/Septic Shock)  Goal: Blood Glucose Level Within Desired Range  Outcome: Ongoing, Progressing  Intervention: Optimize Glycemic Control  Flowsheets (Taken 5/13/2023 0409)  Glycemic Management: blood glucose monitored     Problem: Infection Progression (Sepsis/Septic Shock)  Goal: Absence of Infection Signs and Symptoms  Outcome: Ongoing, Progressing  Intervention: Initiate Sepsis Management  Flowsheets (Taken 5/13/2023 0409)  Infection Prevention:   cohorting utilized   rest/sleep promoted   environmental surveillance performed   hand hygiene promoted   equipment surfaces disinfected  Infection Management: aseptic technique maintained  Isolation Precautions: protective  Intervention: Promote Recovery  Flowsheets (Taken 5/13/2023 0409)  Sleep/Rest Enhancement:   awakenings minimized   regular sleep/rest pattern promoted  Activity Management: Rolling - L1     Problem: Nutrition Impaired (Sepsis/Septic Shock)  Goal: Optimal Nutrition Intake  Outcome: Ongoing, Progressing     Problem: Infection  Goal: Absence of Infection Signs and Symptoms  Outcome: Ongoing, Progressing  Intervention: Prevent or Manage Infection  Flowsheets (Taken 5/13/2023 0409)  Infection Management: aseptic technique maintained  Isolation Precautions: protective     Problem: Impaired Wound Healing  Goal: Optimal Wound Healing  Outcome: Ongoing, Progressing  Intervention: Promote Wound Healing  Flowsheets (Taken 5/13/2023 0409)  Sleep/Rest Enhancement:   awakenings minimized   regular sleep/rest pattern promoted  Activity Management: Rolling - L1  Pain Management Interventions:   care clustered   position adjusted   quiet environment facilitated     Problem: Fall Injury Risk  Goal: Absence of Fall and Fall-Related Injury  Outcome: Ongoing,  Progressing  Intervention: Identify and Manage Contributors  Flowsheets (Taken 5/13/2023 0409)  Self-Care Promotion: independence encouraged  Medication Review/Management: medications reviewed     Problem: Skin Injury Risk Increased  Goal: Skin Health and Integrity  Outcome: Ongoing, Progressing  Intervention: Optimize Skin Protection  Flowsheets (Taken 5/13/2023 0409)  Pressure Reduction Techniques:   frequent weight shift encouraged   pressure points protected   weight shift assistance provided   heels elevated off bed  Pressure Reduction Devices: heel offloading device utilized  Skin Protection:   adhesive use limited   incontinence pads utilized   transparent dressing maintained   tubing/devices free from skin contact   protective footwear used  Head of Bed (HOB) Positioning: HOB at 20-30 degrees

## 2023-05-13 NOTE — PROGRESS NOTES
HOSPITAL MEDICINE  PROGRESS NOTE    CHIEF COMPLAINT   Hospital follow up    HOSPITAL COURSE   89-year-old female with a PMH of NIDDMII, prior DVT/IVC filter no longer on anticoagulation and non-healing left ankle wound, presented to [facility] on 4/16 after she became lethargic. Blood cultures returned positive for MRSA bacteremia. NM bone scan without clear evidence of OM. LLE arterial U/S showed monophasic flow throughout suggestive of inflow disease.CT T and L spine  with no evidence of discitis/  osteomyelitis. 2D TTE negative for valvular vegetation. STARR, performed on 4/22, negative valvular vegetations. She was taken to the OR on 04/25 for cystoscopy and right ureteral stent placement.  Plan to remove stent in 2 weeks after DC from hospital in the office. Blood cultures persistently positive, 4/16, 4/18, 4/19, 4/21. Eventually cultures from 4/23 remained negative. Vascular surgery consulted for PAD, CTA run-off noted severe flow-limiting disease in the left common femoral. Vascular surgery recommended left femoral endarterectomy, though family defering intervention for now. Hospital course complicated by intermittent hyponatremia. Nephro treated pt with Samsca and subsequent fluid restriction to 1 Liter with improvement of sodium level. On 05/12 she required 1 unit of PRBCs for low hemoglobin.     Today  She is mod Ax2 people. Cognitively at baseline. Complaining of night time pain, will add scheduled robaxin.   OBJECTIVE/PHYSICAL EXAM     VITAL SIGNS (MOST RECENT):  Temp: 97.6 °F (36.4 °C) (05/13/23 1200)  Pulse: 93 (05/13/23 1200)  Resp: 18 (05/13/23 1200)  BP: (!) 157/68 (05/13/23 1200)  SpO2: 95 % (05/13/23 1200) VITAL SIGNS (24 HOUR RANGE):  Temp:  [97.6 °F (36.4 °C)-98.7 °F (37.1 °C)] 97.6 °F (36.4 °C)  Pulse:  [89-96] 93  Resp:  [18-20] 18  SpO2:  [89 %-95 %] 95 %  BP: (100-157)/(57-87) 157/68   GENERAL: In no acute distress, afebrile  HEENT:  PERRLA  CHEST: Clear to auscultation bilaterally  HEART: S1, S2,  Grade IV  ABDOMEN: Soft, nontender, BS +  MSK: Warm, no lower extremity edema, no clubbing or cyanosis  NEUROLOGIC: Alert and oriented x4, moving all extremities with good strength   INTEGUMENTARY:  Warm, dry  PSYCHIATRY: appropriate affect  ASSESSMENT/PLAN   Persistent MRSA Sepsis  Bacteremia  -clearance as of 4/23  -Continue with IV Vanc as planned x 6 weeks end date 6/4     Left ankle chronic non healing wound   Severe peripheral artery disease  -family refused intervention with vascular surgery   -wound care  -Consulted Dr. Garrison for hyperbaric therapy per family request, he evaluated the patient      SIADH  Hyponatremia  -S/P Samsca 15 mg x 1 dose on 5/7/23  -Remains off of diuretics, family request   -1 liter fluid restriction with goal sodium 130 or higher  -TSH and Cortisol are normal     Encephalopathy  Staring Spells  -resolved   - Abnormal EEG due to findings consistent with a nonspecific bilateral brain dysfunction.     Electrolyte abnormality  -Hypokalemia, Hypomagnesemia: replete as condition requires      Acute on chronic diastolic heart failure   Mild to moderate Aortic stenosis  Pulmonary HTN  -currently compensated   -BNP elevated though likely chronically elevated in setting of severe AS     NIDDM II  -c/w detemir   -A1c 6.3     Normochromic anemia   -received 3 units of ferrlecit   -continue with p.o. iron   -required 1 unit of PRBCs on 05/12     R Hydronephrosis   -s/p stent - outpt removal in a few weeks with Dr Gauthier     Chronic low back pain   Constipation associated with colonic fecal impaction  -stable         DVT prophylaxis:  lovenox  Anticipated discharge and disposition:  Home   Critical care time 35 minutes  __________________________________________________________________________    LABS/MICRO/MEDS/DIAGNOSTICS       LABS  Recent Labs     05/12/23  0304 05/12/23  1300 05/13/23  0758   *   < > 128*   K 3.4*   < > 3.7   CHLORIDE 88*   < > 89*   CO2 34*   < > 36*   BUN  22.4*   < > 17.3   CREATININE 0.69   < > 0.66   GLUCOSE 208*   < > 103   CALCIUM 7.8*   < > 8.2*   ALKPHOS 66  --   --    AST 7  --   --    ALT 8  --   --    ALBUMIN 1.7*  --   --     < > = values in this interval not displayed.     Recent Labs     05/13/23  0759   WBC 10.82   RBC 3.52*   HCT 30.0*   MCV 85.2          MICROBIOLOGY  Microbiology Results (last 7 days)       Procedure Component Value Units Date/Time    Urine culture [[id number]]  (Abnormal) Collected: 05/11/23 2216    Order Status: Completed Specimen: Urine Updated: 05/13/23 0652     Urine Culture 10,000 - 25,000 colonies/ml Gram-negative Rods               MEDICATIONS   carvediloL  6.25 mg Oral BID    collagenase   Topical (Top) Daily    docusate  200 mg Oral BID    enoxaparin  40 mg Subcutaneous Daily    ferrous sulfate  1 tablet Oral Daily    insulin detemir U-100  7 Units Subcutaneous QHS    Lactobacillus acidophilus  1 capsule Oral TID WM    magnesium oxide  400 mg Oral Daily    mupirocin   Nasal BID    polyethylene glycol  17 g Oral Daily    tamsulosin  0.4 mg Oral Daily    trazodone  100 mg Oral QHS    vancomycin (VANCOCIN) IVPB  500 mg Intravenous Q12H         INFUSIONS         DIAGNOSTIC TESTS  No orders to display        EF   Date Value Ref Range Status   04/21/2023 60 % Final   04/17/2023 54 % Final              Case related differential diagnoses have been reviewed; assessment and plan has been documented. I have personally reviewed the labs and test results that are currently available; I have reviewed the patients medication list. I have reviewed the consulting providers recommendations. I have reviewed or attempted to review medical records based upon their availability.  All of the patient's and/or family's questions have been addressed and answered to the best of my ability.  I will continue to monitor closely and make adjustments to medical management as needed.  This document was created using M*Modal Fluency Direct.   Transcription errors may have been made.  Please contact me if any questions may rise regarding documentation to clarify transcription.        Thairy G Reyes, DO   05/13/2023   Internal Medicine

## 2023-05-14 NOTE — PLAN OF CARE
Problem: Adult Inpatient Plan of Care  Goal: Patient-Specific Goal (Individualized)  Outcome: Ongoing, Progressing  Flowsheets (Taken 5/13/2023 2310)  Individualized Care Needs: Turn patient every 2 hours to prevent pressure injury during shift [phone]     Problem: Diabetes Comorbidity  Goal: Blood Glucose Level Within Targeted Range  Outcome: Ongoing, Progressing  Intervention: Monitor and Manage Glycemia  Flowsheets (Taken 5/13/2023 2310)  Glycemic Management:   blood glucose monitored   supplemental insulin given     Problem: Glycemic Control Impaired (Sepsis/Septic Shock)  Goal: Blood Glucose Level Within Desired Range  Outcome: Ongoing, Progressing  Intervention: Optimize Glycemic Control  Flowsheets (Taken 5/13/2023 2310)  Glycemic Management:   blood glucose monitored   supplemental insulin given

## 2023-05-14 NOTE — PROGRESS NOTES
HOSPITAL MEDICINE  PROGRESS NOTE    CHIEF COMPLAINT   Hospital follow up    HOSPITAL COURSE   89-year-old female with a PMH of NIDDMII, prior DVT/IVC filter no longer on anticoagulation and non-healing left ankle wound, presented to [facility] on 4/16 after she became lethargic. Blood cultures returned positive for MRSA bacteremia. NM bone scan without clear evidence of OM. LLE arterial U/S showed monophasic flow throughout suggestive of inflow disease.CT T and L spine  with no evidence of discitis/  osteomyelitis. 2D TTE negative for valvular vegetation. STARR, performed on 4/22, negative valvular vegetations. She was taken to the OR on 04/25 for cystoscopy and right ureteral stent placement.  Plan to remove stent in 2 weeks after DC from hospital in the office. Blood cultures persistently positive, 4/16, 4/18, 4/19, 4/21. Eventually cultures from 4/23 remained negative. Vascular surgery consulted for PAD, CTA run-off noted severe flow-limiting disease in the left common femoral. Vascular surgery recommended left femoral endarterectomy, though family defering intervention for now. Hospital course complicated by intermittent hyponatremia. Nephro treated pt with Samsca and subsequent fluid restriction to 1 Liter with improvement of sodium level. On 05/12 she required 1 unit of PRBCs for low hemoglobin.  On 05/14 her urine culture grew Pseudomonas sensitive to ciprofloxacin and she was started on a 3 day course.  Family has been complaining of nighttime pain, patient did not tolerate Robaxin therefore she has a 1 time dose of scheduled tramadol q.h.s. for comfort prior to sleep.    Today  She is mod Ax2 people. Cognitively at baseline.  OBJECTIVE/PHYSICAL EXAM     VITAL SIGNS (MOST RECENT):  Temp: 98.2 °F (36.8 °C) (05/14/23 1100)  Pulse: 93 (05/14/23 1100)  Resp: 20 (05/14/23 0253)  BP: 119/67 (05/14/23 1100)  SpO2: (!) 91 % (05/14/23 1100) VITAL SIGNS (24 HOUR RANGE):  Temp:  [97.6 °F (36.4 °C)-98.2 °F (36.8 °C)] 98.2 °F  (36.8 °C)  Pulse:  [] 93  Resp:  [18-20] 20  SpO2:  [91 %-95 %] 91 %  BP: (114-157)/(65-70) 119/67   GENERAL: In no acute distress, afebrile  HEENT:  PERRLA  CHEST: Clear to auscultation bilaterally  HEART: S1, S2, Grade IV  ABDOMEN: Soft, nontender, BS +  MSK: Warm, no lower extremity edema, no clubbing or cyanosis  NEUROLOGIC: Alert and oriented x4, moving all extremities with good strength   INTEGUMENTARY:  Warm, dry  PSYCHIATRY: appropriate affect  ASSESSMENT/PLAN   Persistent MRSA Sepsis  Bacteremia  -clearance as of 4/23  -Continue with IV Vanc as planned x 6 weeks end date 6/4     Left ankle chronic non healing wound   Severe peripheral artery disease  -family refused intervention with vascular surgery   -wound care  -Consulted Dr. Garrison for hyperbaric therapy per family request, he evaluated the patient      SIADH  Hyponatremia  -S/P Samsca 15 mg x 1 dose on 5/7/23  -Remains off of diuretics, family request   -1 liter fluid restriction with goal sodium 130 or higher  -TSH and Cortisol are normal     Encephalopathy  Staring Spells  -resolved   - Abnormal EEG due to findings consistent with a nonspecific bilateral brain dysfunction.     Electrolyte abnormality  -Hypokalemia, Hypomagnesemia: replete as condition requires      Acute on chronic diastolic heart failure   Mild to moderate Aortic stenosis  Pulmonary HTN  -currently compensated   -BNP elevated though likely chronically elevated in setting of severe AS     NIDDM II  -c/w detemir   -A1c 6.3     Normochromic anemia   -received 3 units of ferrlecit   -continue with p.o. iron   -required 1 unit of PRBCs on 05/12     R Hydronephrosis   -s/p stent - outpt removal in a few weeks with Dr Gauthier     Chronic low back pain   Constipation associated with colonic fecal impaction  -stable         DVT prophylaxis:  lovenox  Anticipated discharge and disposition:  Home  __________________________________________________________________________    LABS/MICRO/MEDS/DIAGNOSTICS       LABS  Recent Labs     05/12/23  0304 05/12/23  1300 05/13/23  0758   *   < > 128*   K 3.4*   < > 3.7   CHLORIDE 88*   < > 89*   CO2 34*   < > 36*   BUN 22.4*   < > 17.3   CREATININE 0.69   < > 0.66   GLUCOSE 208*   < > 103   CALCIUM 7.8*   < > 8.2*   ALKPHOS 66  --   --    AST 7  --   --    ALT 8  --   --    ALBUMIN 1.7*  --   --     < > = values in this interval not displayed.     Recent Labs     05/13/23  0759   WBC 10.82   RBC 3.52*   HCT 30.0*   MCV 85.2          MICROBIOLOGY  Microbiology Results (last 7 days)       Procedure Component Value Units Date/Time    Urine culture [[id number]]  (Abnormal)  (Susceptibility) Collected: 05/11/23 2216    Order Status: Completed Specimen: Urine Updated: 05/14/23 0810     Urine Culture 10,000 - 25,000 colonies/ml Pseudomonas aeruginosa               MEDICATIONS   carvediloL  6.25 mg Oral BID    ciprofloxacin HCl  250 mg Oral Q12H    collagenase   Topical (Top) Daily    enoxaparin  40 mg Subcutaneous Daily    ferrous sulfate  1 tablet Oral Daily    insulin detemir U-100  7 Units Subcutaneous QHS    Lactobacillus acidophilus  1 capsule Oral TID WM    magnesium oxide  400 mg Oral Daily    mupirocin   Nasal BID    polyethylene glycol  17 g Oral Daily    tamsulosin  0.4 mg Oral Daily    traMADoL  50 mg Oral QHS    trazodone  100 mg Oral QHS    vancomycin (VANCOCIN) IVPB  500 mg Intravenous Q12H         INFUSIONS         DIAGNOSTIC TESTS  No orders to display        EF   Date Value Ref Range Status   04/21/2023 60 % Final   04/17/2023 54 % Final              Case related differential diagnoses have been reviewed; assessment and plan has been documented. I have personally reviewed the labs and test results that are currently available; I have reviewed the patients medication list. I have reviewed the consulting providers recommendations. I have reviewed or  attempted to review medical records based upon their availability.  All of the patient's and/or family's questions have been addressed and answered to the best of my ability.  I will continue to monitor closely and make adjustments to medical management as needed.  This document was created using M*Modal Fluency Direct.  Transcription errors may have been made.  Please contact me if any questions may rise regarding documentation to clarify transcription.        Thairy G Reyes, DO   05/14/2023   Internal Medicine

## 2023-05-15 NOTE — PT/OT/SLP PROGRESS
Occupational Therapy  Treatment    Name: Melodie Villarreal    : 1931 (89 y.o.)  MRN: [id number]           TREATMENT SUMMARY AND RECOMMENDATIONS:      OT Date of Treatment: 05/15/23  OT Start Time: 1000  OT Stop Time: 1025  OT Total Time (min): 25 min      Subjective Assessment:   No complaints  Lethargic   x Awake, alert, cooperative  Impulsive    Uncooperative   Flat affect    Agitated x c/o pain    Appropriate  c/o fatigue   x Confused x Treated at bedside     Emotionally labile  Treated in gym/dept.     x Other: wound care reports no WB restrictions on LLE       Therapy Precautions:   x Cognitive deficits  Spinal precautions    Collar - hard  Sternal precautions    Collar - soft   TLSO   x Fall risk  LSO    Hip precautions - posterior  Knee immobilizer    Hip precautions - anterior  WBAT    Impaired communication  Partial weightbearing    Oxygen  TTWB    PEG tube  NWB    Visual deficits      Hearing deficits   Other:        Treatment Objectives:     Mobility Training:    Mobility task Assist level Comments    Bed mobility Max A  Supine>EOB    Transfer Total A  Stand/pivot t/f EOB>BSChair   Sit to stands transitions Mod A x2 X4 reps from EOB and Bschair; pt able to assist more with bed rails anterior and remain standing for 10-15 seconds at a time    Functional mobility     Sitting balance     Standing balance      Other:        ADL Training:    ADL Assist level Comments    Feeding     Grooming/hygiene Min A Pt able to apply toothpaste onto toothbrush and brush bottom teeth with R hand. Required constant cues for sequencing and initiation.    Bathing     Upper body dressing     Lower body dressing     Toileting     Toilet transfer     Adaptive equipment training     Other:       Additional Comments:  Pt is very resistive and pushing against therapists posteriorly. Required physical assistance to flex knees in preparation for standing with Pt resisting against therapist. Co-tx with PTA    Assessment: Patient  tolerated session fair.    OT Plan: Continue POC  Revisions made to plan of care: No    GOALS:   Multidisciplinary Problems       Occupational Therapy Goals          Problem: Occupational Therapy    Goal Priority Disciplines Outcome Interventions   Occupational Therapy Goal     OT, PT/OT Ongoing, Progressing    Description: Goals to be met by: 5/26/23     Patient will increase functional independence with ADLs by performing:    LE Dressing with Moderate Assistance.  Toileting from bedside commode with Moderate Assistance for hygiene and clothing management.   Bathing from  edge of bed with Moderate Assistance.  Toilet transfer to bedside commode with Moderate Assistance.                         Skilled OT Minutes Provided: 25  Communication with Treatment Team:     Equipment recommendations:       At end of treatment, patient remained:  x Up in chair     Up in wheelchair in room    In bed   x With alarm activated    Bed rails up   x Call bell in reach    x Family/friends present    Restraints secured properly    In bathroom with CNA/RN notified    In gym with PT/PTA/tech    Nurse aware    Other:

## 2023-05-15 NOTE — PLAN OF CARE
Problem: Adult Inpatient Plan of Care  Goal: Patient-Specific Goal (Individualized)  Outcome: Ongoing, Progressing  Flowsheets (Taken 5/14/2023 2210)  Individualized Care Needs: Encourage patient to turn every two hours during shift [phone]     Problem: Diabetes Comorbidity  Goal: Blood Glucose Level Within Targeted Range  Outcome: Ongoing, Progressing  Intervention: Monitor and Manage Glycemia  Flowsheets (Taken 5/14/2023 2210)  Glycemic Management:   blood glucose monitored   insulin dose matched to carbohydrate intake     Problem: Glycemic Control Impaired (Sepsis/Septic Shock)  Goal: Blood Glucose Level Within Desired Range  Outcome: Ongoing, Progressing  Intervention: Optimize Glycemic Control  Flowsheets (Taken 5/14/2023 2210)  Glycemic Management:   blood glucose monitored   insulin dose matched to carbohydrate intake

## 2023-05-15 NOTE — PT/OT/SLP PROGRESS
Physical Therapy Treatment Note           Name: Melodie Villarreal    : 1931 (89 y.o.)  MRN: [id number]           TREATMENT SUMMARY AND RECOMMENDATIONS:    PT Received On: 05/15/23  PT Start Time: 1000     PT Stop Time: 1025  PT Total Time (min): 25 min     Subjective Assessment:   No complaints  Lethargic    Awake, alert, cooperative  Uncooperative    Agitated x c/o pain    Appropriate  c/o fatigue    Confused x Treated at bedside     Emotionally labile  Treated in gym/dept.    Impulsive  Other:    Flat affect       Therapy Precautions:   x Cognitive deficits  Spinal precautions    Collar - hard  Sternal precautions    Collar - soft   TLSO    Fall risk  LSO    Hip precautions - posterior  Knee immobilizer    Hip precautions - anterior x WBAT    Impaired communication  Partial weightbearing    Oxygen  TTWB    PEG tube  NWB    Visual deficits  Other:    Hearing deficits          Treatment Objectives:     Mobility Training:   Assist level Comments    Bed mobility maxA Supine-sit   Transfer maxA Bed-recliner   Gait     Sit to stand transitions maxAx2 Sit-stand x 2 using bedrail    Sitting balance CGA-maxA Sitting EOB   Standing balance      Wheelchair mobility     Car transfer     Other:          Therapeutic Exercise:   Exercise Sets Reps Comments                               Additional Comments:  Pt needed max VC and tactile cues to flex B knees during sitting . Able to stand using bedrail for 30 sec each trial    Assessment: Patient tolerated session fair.    PT Plan: continue  Revisions made to plan of care: No    GOALS:   Multidisciplinary Problems       Physical Therapy Goals          Problem: Physical Therapy    Goal Priority Disciplines Outcome Goal Variances Interventions   Physical Therapy Goal     PT, PT/OT Ongoing, Progressing     Description: Goals to be met by: Discharge     Patient will increase functional independence with mobility by performin. Supine to sit with MInimal Assistance  2.  Sit to supine with MInimal Assistance  3. Bed to chair transfer with Minimal Assistance using Rolling Walker  4. Gait  x 15 feet with Minimal Assistance using Rolling Walker.   5. Sitting at edge of bed x10 minutes with Stand-by Assistance                         Skilled PT Minutes Provided: 25   Communication with Treatment Team:     Equipment recommendations:       At end of treatment, patient remained:  x Up in chair     Up in wheelchair in room    In bed   x With alarm activated    Bed rails up   x Call bell in reach     Family/friends present    Restraints secured properly    In bathroom with CNA/RN notified    Nurse aware    In gym with therapist/tech    Other:

## 2023-05-15 NOTE — PT/OT/SLP PROGRESS
Physical Therapy Treatment Note           Name: Melodie Villarreal    : 1931 (89 y.o.)  MRN: [id number]           TREATMENT SUMMARY AND RECOMMENDATIONS:    PT Received On: 05/15/23  PT Start Time: 1300     PT Stop Time: 1315  PT Total Time (min): 15 min     Subjective Assessment:   No complaints  Lethargic    Awake, alert, cooperative  Uncooperative    Agitated  c/o pain    Appropriate  c/o fatigue   x Confused x Treated at bedside     Emotionally labile  Treated in gym/dept.    Impulsive  Other:    Flat affect       Therapy Precautions:    Cognitive deficits  Spinal precautions    Collar - hard  Sternal precautions    Collar - soft   TLSO    Fall risk  LSO    Hip precautions - posterior  Knee immobilizer    Hip precautions - anterior  WBAT    Impaired communication  Partial weightbearing    Oxygen  TTWB    PEG tube  NWB    Visual deficits  Other:    Hearing deficits          Treatment Objectives:     Mobility Training:   Assist level Comments    Bed mobility     Transfer maxA Recliner-bed   Gait     Sit to stand transitions     Sitting balance     Standing balance      Wheelchair mobility     Car transfer     Other:          Therapeutic Exercise:   Exercise Sets Reps Comments                               Additional Comments:  Pt sat in recliner for about 2 hours    Assessment: Patient tolerated session same.    PT Plan: continue  Revisions made to plan of care: No    GOALS:   Multidisciplinary Problems       Physical Therapy Goals          Problem: Physical Therapy    Goal Priority Disciplines Outcome Goal Variances Interventions   Physical Therapy Goal     PT, PT/OT Ongoing, Progressing     Description: Goals to be met by: Discharge     Patient will increase functional independence with mobility by performin. Supine to sit with MInimal Assistance  2. Sit to supine with MInimal Assistance  3. Bed to chair transfer with Minimal Assistance using Rolling Walker  4. Gait  x 15 feet with Minimal  Assistance using Rolling Walker.   5. Sitting at edge of bed x10 minutes with Stand-by Assistance                         Skilled PT Minutes Provided: 15   Communication with Treatment Team:     Equipment recommendations:       At end of treatment, patient remained:   Up in chair     Up in wheelchair in room   x In bed   x With alarm activated   x Bed rails up   x Call bell in reach     Family/friends present    Restraints secured properly    In bathroom with CNA/RN notified    Nurse aware    In gym with therapist/tech    Other:

## 2023-05-15 NOTE — PROGRESS NOTES
Inpatient Nutrition Evaluation    Admit Date: 5/11/2023   Total duration of encounter: 4 days    Nutrition Recommendation/Prescription     Continue diabetic 1200 mL fluid restriction.2. Continue Tanner BID, used Arginaid as substitution.     Nutrition Assessment     Chart Review    Reason Seen: continuous nutrition monitoring and length of stay    Malnutrition Screening Tool Results   Have you recently lost weight without trying?: No  Have you been eating poorly because of a decreased appetite?: No   MST Score: 0     Diagnosis:   Bacteremia 5/11/2023        Non-Hospital Problem List       Noted   Type 2 diabetes mellitus 5/3/2022      Seasonal allergic rhinitis 5/3/2022      Pulmonary embolism 5/3/2022      Polyp of colon 5/3/2022      Osteopenia 5/3/2022      Malignant neoplasm of posterior wall of urinary bladder 10/12/2017      Insomnia 5/3/2022      Hypercholesterolemia 5/3/2022      Deep vein thrombosis (DVT) 5/3/2022      Arthritis 5/3/2022      Primary hypertension 5/3/2022      Acute left-sided low back pain without sciatica 4/10/2023      Sepsis 4/17/2023      Critical limb ischemia of left lower extremity 4/20/2023      Hydronephrosis 4/25/2023      Weakness 5/2/2023      Encephalopathy        Relevant Medical History: Hydronephrosis  Date Unknown  Adenocarcinoma of uterus  Date Unknown  Bladder cancer  Date Unknown  Deep vein thrombosis  Date Unknown  Essential (primary) hypertension  Date Unknown  Heart murmur  Date Unknown  High cholesterol  Date Unknown  Hypertriglyceridemia  Date Unknown  Insomnia  Date Unknown  Osteopenia  Date Unknown  Other pulmonary embolism without acute cor pulmonale  Date Unknown  Personal history of colonic polyps  Date Unknown  Rheumatoid arthritis, unspecified  Date Unknown  Type 2 diabetes mellitus without complications    Nutrition-Related Medications: ciprofloxacin,ferrous sulfate, insulin aspart, insulin detemir, lactobacillus acidophilus, magnesium oxide, polyethylene  "glycol, vancomycin    Nutrition-Related Labs:   Latest Reference Range & Units 05/15/23 03:34   Sodium 132 - 146 mmol/L 128 (L)   Potassium 3.5 - 5.1 mmol/L 3.3 (L)   Chloride 98 - 111 mmol/L 91 (L)   CO2 23 - 31 mmol/L 34 (H)   Anion Gap mEq/L 3.0   BUN 9.8 - 20.1 mg/dL 18.0   Creatinine 0.55 - 1.02 mg/dL 0.67   BUN/CREAT RATIO  27   eGFR mls/min/1.73/m2 >60   Glucose 75 - 121 mg/dL 76   Calcium 8.4 - 10.2 mg/dL 7.9 (L)   (L): Data is abnormally low  (H): Data is abnormally high    Diet Order: Diet diabetic Fluid - 1200mL  Oral Supplement Order: Tanner  Appetite/Oral Intake: good/% of meals  Factors Affecting Nutritional Intake: none identified  Food/Yarsanism/Cultural Preferences:  lactaid milk, waffles, peanut butter, fish, green beans  Food Allergies: none reported    Skin Integrity: wound  Wound(s):      Altered Skin Integrity 04/17/23 Left posterior Ankle Full thickness tissue loss. Subcutaneous fat may be visible but bone, tendon or muscle are not exposed-Tissue loss description: Full thickness       Altered Skin Integrity 04/17/23 Left lateral Ankle Full thickness tissue loss. Subcutaneous fat may be visible but bone, tendon or muscle are not exposed-Tissue loss description: Full thickness     Comments    Pt intake is good. Discussed food preferences with family. Marked menus. Will monitor.     Anthropometrics    Height: 5' 2.99" (160 cm)    Last Weight: 62.7 kg (138 lb 3.7 oz) (05/15/23 0502) Weight Method: Bed Scale  BMI (Calculated): 24.5  BMI Classification: normal (BMI 18.5-24.9)     Ideal Body Weight (IBW), Female: 114.95 lb     % Ideal Body Weight, Female (lb): 115.27 %                             Usual Weight Provided By: unable to obtain usual weight    Wt Readings from Last 5 Encounters:   05/15/23 62.7 kg (138 lb 3.7 oz)   04/24/23 56.6 kg (124 lb 12.5 oz)   04/10/23 54.4 kg (120 lb)   02/09/23 85.3 kg (188 lb)   01/17/23 55.8 kg (123 lb)     Weight Change(s) Since Admission:  Admit Weight: " 60.1 kg (132 lb 7.9 oz) (05/11/23 3115)      Patient Education    Not applicable.    Monitoring & Evaluation     Dietitian will monitor food and beverage intake, weight, weight change, electrolyte/renal panel, glucose/endocrine profile, and gastrointestinal profile.  Nutrition Risk/Follow-Up: low (follow-up in 5-7 days)  Patients assigned 'low nutrition risk' status do not qualify for a full nutritional assessment but will be monitored and re-evaluated in a 5-7 day time period. Please consult if re-evaluation needed sooner.

## 2023-05-15 NOTE — PLAN OF CARE
[facility]  Discharge Reassessment    Primary Care Provider: Wisam Espinosa Jr, MD    Expected Discharge Date:     Reassessment (most recent)       Discharge Reassessment - 05/15/23 1112          Discharge Reassessment    Assessment Type Discharge Planning Reassessment     Did the patient's condition or plan change since previous assessment? No     Discharge Plan discussed with: Adult children     Name(s) and Number(s) William Villarreal daughter     Communicated ERIN with patient/caregiver Date not available/Unable to determine     Discharge Plan A Home Health;Home with family     DME Needed Upon Discharge  hospital bed;wheelchair     Transition of Care Barriers None     Why the patient remains in the hospital Requires continued medical care        Post-Acute Status    Post-Acute Authorization Home Health     Home Health Status Pending medical clearance/testing     Hospital Resources/Appts/Education Provided Provided patient/caregiver with written discharge plan information     Discharge Delays None known at this time

## 2023-05-15 NOTE — PT/OT/SLP PROGRESS
Occupational Therapy  Treatment    Name: Melodie Villarreal    : 1931 (89 y.o.)  MRN: [id number]           TREATMENT SUMMARY AND RECOMMENDATIONS:      OT Date of Treatment: 05/15/23  OT Start Time: 1300  OT Stop Time: 1317  OT Total Time (min): 17 min      Subjective Assessment:   No complaints  Lethargic   x Awake, alert  Impulsive    Uncooperative   Flat affect    Agitated  c/o pain    Appropriate  c/o fatigue   x Confused x Treated at bedside     Emotionally labile  Treated in gym/dept.      Other:        Therapy Precautions:   x Cognitive deficits  Spinal precautions    Collar - hard  Sternal precautions    Collar - soft   TLSO   x Fall risk  LSO    Hip precautions - posterior  Knee immobilizer    Hip precautions - anterior  WBAT    Impaired communication  Partial weightbearing    Oxygen  TTWB    PEG tube  NWB    Visual deficits      Hearing deficits   Other:        Treatment Objectives:     Mobility Training:    Mobility task Assist level Comments    Bed mobility Total A EOB>supine   Transfer Total A  Squat/pivot t/f Bschair>EOB total A x2    Sit to stands transitions     Functional mobility     Sitting balance     Standing balance      Other:        Additional Comments: Per family, pt requesting to return to bed.      Assessment: Patient tolerated session fair.    OT Plan: Continue POC as able   Revisions made to plan of care: No    GOALS:   Multidisciplinary Problems       Occupational Therapy Goals          Problem: Occupational Therapy    Goal Priority Disciplines Outcome Interventions   Occupational Therapy Goal     OT, PT/OT Ongoing, Progressing    Description: Goals to be met by: 23     Patient will increase functional independence with ADLs by performing:    LE Dressing with Moderate Assistance.  Toileting from bedside commode with Moderate Assistance for hygiene and clothing management.   Bathing from  edge of bed with Moderate Assistance.  Toilet transfer to bedside commode with Moderate  Assistance.                         Skilled OT Minutes Provided: 17  Communication with Treatment Team: co-tx with PTA    Equipment recommendations:       At end of treatment, patient remained:   Up in chair     Up in wheelchair in room   x In bed   x With alarm activated   x Bed rails up   x Call bell in reach    x Family/friends present    Restraints secured properly    In bathroom with CNA/RN notified    In gym with PT/PTA/tech    Nurse aware    Other:

## 2023-05-15 NOTE — PROGRESS NOTES
HOSPITAL MEDICINE  PROGRESS NOTE    CHIEF COMPLAINT   Hospital follow up    HOSPITAL COURSE   89-year-old female with a PMH of NIDDMII, prior DVT/IVC filter no longer on anticoagulation and non-healing left ankle wound, presented to [facility] on 4/16 after she became lethargic. Blood cultures returned positive for MRSA bacteremia. NM bone scan without clear evidence of OM. LLE arterial U/S showed monophasic flow throughout suggestive of inflow disease.CT T and L spine  with no evidence of discitis/  osteomyelitis. 2D TTE negative for valvular vegetation. STARR, performed on 4/22, negative valvular vegetations. She was taken to the OR on 04/25 for cystoscopy and right ureteral stent placement.  Plan to remove stent in 2 weeks after DC from hospital in the office. Blood cultures persistently positive, 4/16, 4/18, 4/19, 4/21. Eventually cultures from 4/23 remained negative. Vascular surgery consulted for PAD, CTA run-off noted severe flow-limiting disease in the left common femoral. Vascular surgery recommended left femoral endarterectomy, though family defering intervention for now. Hospital course complicated by intermittent hyponatremia. Nephro treated pt with Samsca and subsequent fluid restriction to 1 Liter with improvement of sodium level. On 05/12 she required 1 unit of PRBCs for low hemoglobin.  On 05/14 her urine culture grew Pseudomonas sensitive to ciprofloxacin and she was started on a 3 day course.  Family has been complaining of nighttime pain, patient did not tolerate Robaxin and had no relief from tramadol. Family states side effect of codeine containing medications is sleep, they would like to use this qhs for pain. Her dose of detemir decreased 5/15 for an episode of hypoglycemia    Today  She is mod Ax2 people. Cognitively at baseline.  OBJECTIVE/PHYSICAL EXAM     VITAL SIGNS (MOST RECENT):  Temp: 98.1 °F (36.7 °C) (05/15/23 1638)  Pulse: 85 (05/15/23 1638)  Resp: 18 (05/15/23 1118)  BP: 130/73 (05/15/23  1638)  SpO2: (!) 94 % (05/15/23 1638) VITAL SIGNS (24 HOUR RANGE):  Temp:  [97.5 °F (36.4 °C)-98.4 °F (36.9 °C)] 98.1 °F (36.7 °C)  Pulse:  [80-87] 85  Resp:  [18-20] 18  SpO2:  [93 %-95 %] 94 %  BP: (128-140)/(64-73) 130/73   GENERAL: In no acute distress, afebrile  HEENT:  PERRLA  CHEST: Clear to auscultation bilaterally  HEART: S1, S2, Grade IV  ABDOMEN: Soft, nontender, BS +  MSK: Warm, no lower extremity edema, no clubbing or cyanosis  NEUROLOGIC: Alert and oriented x4, moving all extremities with good strength   INTEGUMENTARY:  Warm, dry  PSYCHIATRY: appropriate affect  ASSESSMENT/PLAN   Persistent MRSA Sepsis  Bacteremia  -clearance as of 4/23  -Continue with IV Vanc as planned x 6 weeks end date 6/4    Pseudomonas UTI   -a 3 day course of Cipro was started on 05/14/2023     Left ankle chronic non healing wound   Severe peripheral artery disease  -family refused intervention with vascular surgery   -wound care  -pain control, q.h.s. [place] prior to sleep  -Consulted Dr. Garrison for hyperbaric therapy per family request, he evaluated the patient      SIADH  Hyponatremia  -S/P Samsca 15 mg x 1 dose on 5/7/23  -Remains off of diuretics, family request   -1 liter fluid restriction with goal sodium 130 or higher  -TSH and Cortisol are normal     Encephalopathy  Staring Spells  -resolved   -Abnormal EEG due to findings consistent with a nonspecific bilateral brain dysfunction.     Electrolyte abnormality  -Hypokalemia, Hypomagnesemia: replete as condition requires      Acute on chronic diastolic heart failure   Mild to moderate Aortic stenosis  Pulmonary HTN  -currently compensated   -BNP elevated though likely chronically elevated in setting of severe AS     NIDDM II  -c/w detemir   -A1c 6.3     Normochromic anemia   -received 3 units of ferrlecit   -continue with p.o. iron   -required 1 unit of PRBCs on 05/12     R Hydronephrosis   -s/p stent - outpt removal in a few weeks with Dr Disha dial  back pain   Constipation associated with colonic fecal impaction  -stable         DVT prophylaxis:  lovenox  Anticipated discharge and disposition:  Home __________________________________________________________________________    LABS/MICRO/MEDS/DIAGNOSTICS       LABS  Recent Labs     05/15/23  0334   *   K 3.3*   CHLORIDE 91*   CO2 34*   BUN 18.0   CREATININE 0.67   GLUCOSE 76   CALCIUM 7.9*     Recent Labs     05/13/23  0759   WBC 10.82   RBC 3.52*   HCT 30.0*   MCV 85.2          MICROBIOLOGY  Microbiology Results (last 7 days)       Procedure Component Value Units Date/Time    Urine culture [[id number]]  (Abnormal)  (Susceptibility) Collected: 05/11/23 2216    Order Status: Completed Specimen: Urine Updated: 05/14/23 0810     Urine Culture 10,000 - 25,000 colonies/ml Pseudomonas aeruginosa               MEDICATIONS   carvediloL  6.25 mg Oral BID    ciprofloxacin HCl  250 mg Oral Q12H    collagenase   Topical (Top) Daily    enoxaparin  40 mg Subcutaneous Daily    ferrous sulfate  1 tablet Oral Daily    insulin detemir U-100  7 Units Subcutaneous QHS    Lactobacillus acidophilus  1 capsule Oral TID WM    magnesium oxide  400 mg Oral Daily    mupirocin   Nasal BID    polyethylene glycol  17 g Oral Daily    sodium chloride  1 g Oral Daily    tamsulosin  0.4 mg Oral Daily    traMADoL  50 mg Oral QHS    trazodone  100 mg Oral QHS    vancomycin (VANCOCIN) IVPB  500 mg Intravenous Q12H         INFUSIONS         DIAGNOSTIC TESTS  No orders to display        EF   Date Value Ref Range Status   04/21/2023 60 % Final   04/17/2023 54 % Final              Case related differential diagnoses have been reviewed; assessment and plan has been documented. I have personally reviewed the labs and test results that are currently available; I have reviewed the patients medication list. I have reviewed the consulting providers recommendations. I have reviewed or attempted to review medical records based upon their  availability.  All of the patient's and/or family's questions have been addressed and answered to the best of my ability.  I will continue to monitor closely and make adjustments to medical management as needed.  This document was created using M*Modal Fluency Direct.  Transcription errors may have been made.  Please contact me if any questions may rise regarding documentation to clarify transcription.        Thairy G Reyes, DO   05/15/2023   Internal Medicine

## 2023-05-16 NOTE — PT/OT/SLP PROGRESS
Name: Melodie Villarreal    : 1931 (89 y.o.)  MRN: [id number]           Patient Unavailable      Patient unable to be seen at this time secondary to: pt asleep upon arrival- had a bad night and finally sleeping- will resume tomorrow

## 2023-05-16 NOTE — PROGRESS NOTES
Unable to treat pt at this time d/t: pt requesting to rest. Will f/u later in PM if schedule allows.

## 2023-05-16 NOTE — PROGRESS NOTES
Pt's nurse present in room stating the pt had a rough night last night. Pt was unarousable and family member at bedside requested pt rest. Will f/u in PM.

## 2023-05-16 NOTE — PROCEDURES
"Melodie Villarreal is a 89 y.o. female patient.    Temp: 98.2 °F (36.8 °C) (05/15/23 2330)  Pulse: 97 (05/15/23 2330)  Resp: 19 (05/15/23 2330)  BP: 116/69 (05/15/23 2330)  SpO2: 95 % (05/15/23 2330)  Weight: 62.7 kg (138 lb 3.7 oz) (05/15/23 0502)  Height: 5' 2.99" (160 cm) (05/11/23 1515)    PICC  Date/Time: 5/16/2023 2:40 AM  Consent Done: Yes  Time out: Immediately prior to procedure a time out was called to verify the correct patient, procedure, equipment, support staff and site/side marked as required  Indications: vascular access and med administration  Anesthesia: local infiltration  Local anesthetic: lidocaine 1% without epinephrine  Anesthetic Total (mL): 4  Preparation: skin prepped with ChloraPrep  Skin prep agent dried: skin prep agent completely dried prior to procedure  Sterile barriers: all five maximum sterile barriers used - cap, mask, sterile gown, sterile gloves, and large sterile sheet  Hand hygiene: hand hygiene performed prior to central venous catheter insertion  Location details: right basilic  Catheter type: double lumen  Catheter size: 5 Fr  Catheter Length: 31cm    Ultrasound guidance: yes  Vessel Caliber: medium and patent, compressibility normal  Needle advanced into vessel with real time Ultrasound guidance.  Guidewire confirmed in vessel.  Sterile sheath used.  Number of attempts: 1  Post-procedure: blood return through all ports, chlorhexidine patch and sterile dressing applied    Assessment: placement verified by x-ray        Name Romi Gaitan  5/16/2023    "

## 2023-05-16 NOTE — PLAN OF CARE
Problem: Adult Inpatient Plan of Care  Goal: Plan of Care Review  Outcome: Ongoing, Progressing  Flowsheets (Taken 5/16/2023 0740)  Plan of Care Reviewed With:   patient   daughter  Goal: Patient-Specific Goal (Individualized)  Outcome: Ongoing, Progressing  Flowsheets (Taken 5/16/2023 0740)  Anxieties, Fears or Concerns: not at this time  Individualized Care Needs: monitor blood glucose, wound care, repositon every 2 hours  Goal: Absence of Hospital-Acquired Illness or Injury  Outcome: Ongoing, Progressing  Intervention: Prevent and Manage VTE (Venous Thromboembolism) Risk  Flowsheets (Taken 5/15/2023 2000)  VTE Prevention/Management:   bleeding precations maintained   bleeding risk assessed  Intervention: Prevent Infection  Flowsheets (Taken 5/15/2023 2000)  Infection Prevention:   hand hygiene promoted   personal protective equipment utilized   rest/sleep promoted   single patient room provided  Goal: Optimal Comfort and Wellbeing  Outcome: Ongoing, Progressing     Problem: Diabetes Comorbidity  Goal: Blood Glucose Level Within Targeted Range  Outcome: Ongoing, Progressing

## 2023-05-16 NOTE — PLAN OF CARE
Problem: Adult Inpatient Plan of Care  Goal: Plan of Care Review  Outcome: Ongoing, Progressing  Goal: Patient-Specific Goal (Individualized)  Outcome: Ongoing, Progressing  Flowsheets (Taken 5/15/2023 1230)  Individualized Care Needs: Encourage patient to reposition at least every 2hrs  Goal: Absence of Hospital-Acquired Illness or Injury  Outcome: Ongoing, Progressing  Intervention: Prevent Skin Injury  Flowsheets (Taken 5/15/2023 1230)  Skin Protection: adhesive use limited  Intervention: Prevent and Manage VTE (Venous Thromboembolism) Risk  Flowsheets (Taken 5/15/2023 1230)  VTE Prevention/Management:   bleeding risk factor(s) identified, provider notified   bleeding risk assessed  Intervention: Prevent Infection  Flowsheets (Taken 5/15/2023 1230)  Infection Prevention: single patient room provided     Problem: Diabetes Comorbidity  Goal: Blood Glucose Level Within Targeted Range  Outcome: Ongoing, Progressing     Problem: Adjustment to Illness (Sepsis/Septic Shock)  Goal: Optimal Coping  Outcome: Ongoing, Progressing

## 2023-05-16 NOTE — PT/OT/SLP PROGRESS
Physical Therapy Treatment Note           Name: Melodie Villarreal    : 1931 (89 y.o.)  MRN: [id number]           TREATMENT SUMMARY AND RECOMMENDATIONS:    PT Received On: 23  PT Start Time: 1000     PT Stop Time: 1025  PT Total Time (min): 25 min     Subjective Assessment:   No complaints  Lethargic    Awake, alert, cooperative  Uncooperative    Agitated  c/o pain    Appropriate  c/o fatigue   x Confused x Treated at bedside     Emotionally labile  Treated in gym/dept.    Impulsive  Other:    Flat affect       Therapy Precautions:   x Cognitive deficits  Spinal precautions    Collar - hard  Sternal precautions    Collar - soft   TLSO    Fall risk  LSO    Hip precautions - posterior  Knee immobilizer    Hip precautions - anterior  WBAT    Impaired communication  Partial weightbearing    Oxygen  TTWB    PEG tube  NWB    Visual deficits  Other:    Hearing deficits          Treatment Objectives:     Mobility Training:   Assist level Comments    Bed mobility maxA Supine<>sit   Transfer     Gait     Sit to stand transitions     Sitting balance CGA Sitting EOB 15 min    Standing balance      Wheelchair mobility     Car transfer     Other:          Therapeutic Exercise:   Exercise Sets Reps Comments   B LE exer sitting EOB 5 reps x 2  TKE                         Additional Comments:  Pt refused to attempt standing     Assessment: Patient tolerated session fair.    PT Plan: continue  Revisions made to plan of care: No    GOALS:   Multidisciplinary Problems       Physical Therapy Goals          Problem: Physical Therapy    Goal Priority Disciplines Outcome Goal Variances Interventions   Physical Therapy Goal     PT, PT/OT Ongoing, Progressing     Description: Goals to be met by: Discharge     Patient will increase functional independence with mobility by performin. Supine to sit with MInimal Assistance  2. Sit to supine with MInimal Assistance  3. Bed to chair transfer with Minimal Assistance using  Rolling Walker  4. Gait  x 15 feet with Minimal Assistance using Rolling Walker.   5. Sitting at edge of bed x10 minutes with Stand-by Assistance                         Skilled PT Minutes Provided: 25   Communication with Treatment Team:     Equipment recommendations:       At end of treatment, patient remained:   Up in chair     Up in wheelchair in room   x In bed   x With alarm activated   x Bed rails up   x Call bell in reach    x Family/friends present    Restraints secured properly    In bathroom with CNA/RN notified    Nurse aware    In gym with therapist/tech    Other:

## 2023-05-16 NOTE — PROGRESS NOTES
HOSPITAL MEDICINE  PROGRESS NOTE    CHIEF COMPLAINT   Hospital follow up    HOSPITAL COURSE   89-year-old female with a PMH of NIDDMII, prior DVT/IVC filter no longer on anticoagulation and non-healing left ankle wound, presented to [facility] on 4/16 after she became lethargic. Blood cultures returned positive for MRSA bacteremia. NM bone scan without clear evidence of OM. LLE arterial U/S showed monophasic flow throughout suggestive of inflow disease.CT T and L spine  with no evidence of discitis/  osteomyelitis. 2D TTE negative for valvular vegetation. STARR, performed on 4/22, negative valvular vegetations. She was taken to the OR on 04/25 for cystoscopy and right ureteral stent placement.  Plan to remove stent in 2 weeks after DC from hospital in the office. Blood cultures persistently positive, 4/16, 4/18, 4/19, 4/21. Eventually cultures from 4/23 remained negative. Vascular surgery consulted for PAD, CTA run-off noted severe flow-limiting disease in the left common femoral. Vascular surgery recommended left femoral endarterectomy, though family defering intervention for now. Hospital course complicated by intermittent hyponatremia. Nephro treated pt with Samsca and subsequent fluid restriction to 1 Liter with improvement of sodium level. On 05/12 she required 1 unit of PRBCs for low hemoglobin.  On 05/14 her urine culture grew Pseudomonas sensitive to ciprofloxacin and she was started on a 3 day course.  Family has been complaining of nighttime pain, patient did not tolerate Robaxin and had no relief from tramadol. Family states side effect of codeine containing medications is sleep, they would like to use this qhs for pain. Pt is tolerating. Her dose of detemir decreased 5/15 for an episode of hypoglycemia, aspart was added 5/16.    Today  She is mod Ax2 people. Cognitively at baseline.  OBJECTIVE/PHYSICAL EXAM     VITAL SIGNS (MOST RECENT):  Temp: 98.2 °F (36.8 °C) (05/15/23 2330)  Pulse: 97 (05/15/23 2330)  Resp:  20 (05/16/23 0812)  BP: 116/69 (05/15/23 2330)  SpO2: 95 % (05/15/23 2330) VITAL SIGNS (24 HOUR RANGE):  Temp:  [98.1 °F (36.7 °C)-98.2 °F (36.8 °C)] 98.2 °F (36.8 °C)  Pulse:  [] 97  Resp:  [18-20] 20  SpO2:  [93 %-95 %] 95 %  BP: (116-153)/(69-74) 116/69   GENERAL: In no acute distress, afebrile  HEENT:  PERRLA  CHEST: Clear to auscultation bilaterally  HEART: S1, S2, Grade IV  ABDOMEN: Soft, nontender, BS +  MSK: Warm, no lower extremity edema, no clubbing or cyanosis  NEUROLOGIC: Alert and oriented to self, moving all extremities with good strength   INTEGUMENTARY:  Warm, dry  PSYCHIATRY: appropriate affect  ASSESSMENT/PLAN   Persistent MRSA Sepsis  Bacteremia  -clearance as of 4/23  -Continue with IV Vanc as planned x 6 weeks end date 6/4    Pseudomonas UTI   -a 3 day course of Cipro was started on 05/14/2023     Left ankle chronic non healing wound   Severe peripheral artery disease  -family refused intervention with vascular surgery   -wound care  -pain control, q.h.s. [place] prior to sleep  -Consulted Dr. Garrison for hyperbaric therapy per family request, he evaluated the patient      SIADH  Hyponatremia  -S/P Samsca 15 mg x 1 dose on 5/7/23  -Remains off of diuretics, family request   -1 liter fluid restriction with goal sodium 130 or higher  -TSH and Cortisol are normal     Encephalopathy  Staring Spells  -resolved   -Abnormal EEG due to findings consistent with a nonspecific bilateral brain dysfunction.     Electrolyte abnormality  -Hypokalemia, Hypomagnesemia: replete as condition requires      Acute on chronic diastolic heart failure   Mild to moderate Aortic stenosis  Pulmonary HTN  -currently compensated   -BNP elevated though likely chronically elevated in setting of severe AS     NIDDM II  -c/w detemir   -A1c 6.3     Normochromic anemia   -received 3 units of ferrlecit   -continue with p.o. iron   -required 1 unit of PRBCs on 05/12     R Hydronephrosis   -s/p stent - outpt removal in a few  weeks with Dr Gauthier     Chronic low back pain   Constipation associated with colonic fecal impaction  -stable         DVT prophylaxis:  lovenox  Anticipated discharge and disposition:  Home __________________________________________________________________________    LABS/MICRO/MEDS/DIAGNOSTICS       LABS  Recent Labs     05/16/23  0400   *   K 4.2   CHLORIDE 90*   CO2 33*   BUN 18.9   CREATININE 0.65   GLUCOSE 188*   CALCIUM 7.9*     No results for input(s): WBC, RBC, HCT, MCV, PLT in the last 72 hours.    Invalid input(s):       MICROBIOLOGY  Microbiology Results (last 7 days)       Procedure Component Value Units Date/Time    Urine culture [[id number]]  (Abnormal)  (Susceptibility) Collected: 05/11/23 2216    Order Status: Completed Specimen: Urine Updated: 05/14/23 0810     Urine Culture 10,000 - 25,000 colonies/ml Pseudomonas aeruginosa               MEDICATIONS   carvediloL  6.25 mg Oral BID    ciprofloxacin HCl  250 mg Oral Q12H    collagenase   Topical (Top) Daily    enoxaparin  40 mg Subcutaneous Daily    ferrous sulfate  1 tablet Oral Daily    HYDROcodone-acetaminophen  1 tablet Oral QHS    insulin aspart U-100  2 Units Subcutaneous TIDWM    insulin detemir U-100  5 Units Subcutaneous QHS    Lactobacillus acidophilus  1 capsule Oral TID WM    magnesium oxide  400 mg Oral Daily    polyethylene glycol  17 g Oral Daily    sodium chloride  1 g Oral Daily    tamsulosin  0.4 mg Oral Daily    trazodone  100 mg Oral QHS    vancomycin (VANCOCIN) IVPB  500 mg Intravenous Q12H         INFUSIONS         DIAGNOSTIC TESTS  X-Ray Chest 1 View for Line/Tube Placement   Final Result      As above.         Electronically signed by: Jerzy Sweet   Date:    05/16/2023   Time:    07:01           EF   Date Value Ref Range Status   04/21/2023 60 % Final   04/17/2023 54 % Final              Case related differential diagnoses have been reviewed; assessment and plan has been documented. I have personally reviewed  the labs and test results that are currently available; I have reviewed the patients medication list. I have reviewed the consulting providers recommendations. I have reviewed or attempted to review medical records based upon their availability.  All of the patient's and/or family's questions have been addressed and answered to the best of my ability.  I will continue to monitor closely and make adjustments to medical management as needed.  This document was created using [employer] Fluency Direct.  Transcription errors may have been made.  Please contact me if any questions may rise regarding documentation to clarify transcription.        Thairy G Reyes, DO   05/16/2023   Internal Medicine

## 2023-05-16 NOTE — PT/OT/SLP PROGRESS
"Speech Language Pathology Treatment    Patient Name:  Melodie Villarreal   MRN:  [id number]  Admitting Diagnosis: Bacteremia    Recommendations:                 General Recommendations:  Cognitive-linguistic therapy  Diet recommendations:  Regular Diet - IDDSI Level 7, Liquid Diet Level: Thin liquids - IDDSI Level 0   Aspiration Precautions: Alternating bites/sips and HOB to 90 degrees   General Precautions: Standard,    Communication strategies:  provide increased time to answer, go to room if call light pushed, and use contextual mapping and use of binary choices to aid in recall    Assessment:     Melodie Villarreal is a 89 y.o. female with an SLP diagnosis of Cognitive-Linguistic Impairment.   She presents with confusion and impaired orientation, STM recall and LTM recall. Per pt's daughter, the pt presents w/ increased confusion w/ initiation of antibiotics. Pt's daughter reports her mother was doing well at home prior to hospital admit and was walking w/ RW. Pt's daughter cooks, cleans, manages finances, and manages medications. Pt would benefit from skilled SLP services at this time to promote a return to PLOF w/ cognitive skills.    Subjective     Pt sitting upright in bed upon arrival eating PB sandwich and drinking milk. Her daughter, Bibi, was present at beside. Pt pleasant and alert. Pt's daughter providing information due to confusion and memory deficits present. Daughter reported Pt had "rough night" due to PIC line coming out.       Pain/Comfort:  Pt stated she was comfortable.     Respiratory Status: Room air    Objective:     Has the patient been evaluated by SLP for swallowing?   Yes  Keep patient NPO? No  Current Respiratory Status:     SLP engaged Pt in "wh-" questions to indicate orientation and status. Pt highly confused, requiring MAX support to answer questions. Daughter provided sufficient context cues and questions to aid in short and long term recall. Education given to Daughter on strategies to guide " Pt in conversation. Pictures/items present were used to target short term recall. SLP supported Pt while eating/drinking to use both hands. SLP observed Pt eating. Use of bite/sip pattern to aid via instruction from both daughter and SLP.     Goals:   Multidisciplinary Problems       SLP Goals          Problem: SLP    Goal Priority Disciplines Outcome   SLP Goal     SLP Ongoing, Progressing   Description: LTG: Pt will improve cognitive linguistic skills to allow for safe discharge home w/ family.    STG:  Pt will orient x4 modA.  Pt will utilize memory strategies to recall new information following a 2 minute filled delay modA.  Pt will answer LTM Qs w/ 90% acc modA.                       Plan:     Patient to be seen:  3 x/week   Plan of Care expires:  05/26/23  Plan of Care reviewed with:  patient, daughter   SLP Follow-Up:  Yes       Discharge recommendations:  home with home health   Barriers to Discharge:  Support/Caregiver at home warranted to ensure safety.    Time Tracking:     SLP Treatment Date:   5/16/23  Speech Start Time:  8:35  Speech Stop Time:   9:00    Speech Total Time (min):  25    Billable Minutes: Speech Therapy Individual 25    05/16/2023

## 2023-05-16 NOTE — PLAN OF CARE
Problem: Adult Inpatient Plan of Care  Goal: Plan of Care Review  Outcome: Ongoing, Progressing  Flowsheets (Taken 5/16/2023 1748)  Plan of Care Reviewed With:   patient   daughter   son  Goal: Absence of Hospital-Acquired Illness or Injury  Outcome: Ongoing, Progressing  Intervention: Identify and Manage Fall Risk  Flowsheets (Taken 5/16/2023 1748)  Safety Promotion/Fall Prevention:   assistive device/personal item within reach   bed alarm set   commode/urinal/bedpan at bedside   side rails raised x 3   instructed to call staff for mobility   nonskid shoes/socks when out of bed  Intervention: Prevent Skin Injury  Flowsheets (Taken 5/16/2023 1748)  Body Position: position changed independently  Skin Protection:   incontinence pads utilized   protective footwear used  Intervention: Prevent and Manage VTE (Venous Thromboembolism) Risk  Flowsheets (Taken 5/16/2023 1748)  Activity Management: Partial stand - L2  VTE Prevention/Management:   bleeding precations maintained   bleeding risk assessed   fluids promoted  Range of Motion: active ROM (range of motion) encouraged  Intervention: Prevent Infection  Flowsheets (Taken 5/16/2023 1748)  Infection Prevention:   cohorting utilized   personal protective equipment utilized   environmental surveillance performed   rest/sleep promoted   single patient room provided   equipment surfaces disinfected   hand hygiene promoted

## 2023-05-17 NOTE — PT/OT/SLP PROGRESS
Physical Therapy Treatment Note           Name: Melodie Villarreal    : 1931 (89 y.o.)  MRN: [id number]           TREATMENT SUMMARY AND RECOMMENDATIONS:    PT Received On: 23  PT Start Time: 0940     PT Stop Time: 1003  PT Total Time (min): 23 min     Subjective Assessment:   No complaints  Lethargic   x Awake, alert, cooperative  Uncooperative    Agitated  c/o pain    Appropriate  c/o fatigue   x Confused x Treated at bedside     Emotionally labile  Treated in gym/dept.    Impulsive  Other:    Flat affect       Therapy Precautions:    Cognitive deficits  Spinal precautions    Collar - hard  Sternal precautions    Collar - soft   TLSO   x Fall risk  LSO    Hip precautions - posterior  Knee immobilizer    Hip precautions - anterior x WBAT    Impaired communication  Partial weightbearing    Oxygen  TTWB    PEG tube  NWB    Visual deficits  Other:    Hearing deficits          Treatment Objectives:     Mobility Training:   Assist level Comments    Bed mobility MAX A x2 Supine > sit   Transfer MAX A Stand pivot transfer bed > bedside chair   Gait     Sit to stand transitions MAX A x2 X3 sit to stands from EOB surface with RW anterior; pt with increased difficulty placing LE underneath her and with posterior lean; only able to tolerate ~2-5 seconds standing   Sitting balance Fair (-) Pt sitting EOB    Standing balance      Wheelchair mobility     Car transfer     Other:          Therapeutic Exercise:   Exercise Sets Reps Comments                               Additional Comments:      Assessment: Patient tolerated session well. Patient's daughter present today to provide encouragement. Pt only able to tolerate ~2 couples sitting in bedside chair - will check back at 12ish.    PT Plan: continue POC  Revisions made to plan of care: No    GOALS:   Multidisciplinary Problems       Physical Therapy Goals          Problem: Physical Therapy    Goal Priority Disciplines Outcome Goal Variances Interventions    Physical Therapy Goal     PT, PT/OT Ongoing, Progressing     Description: Goals to be met by: Discharge     Patient will increase functional independence with mobility by performin. Supine to sit with MInimal Assistance  2. Sit to supine with MInimal Assistance  3. Bed to chair transfer with Minimal Assistance using Rolling Walker  4. Gait  x 15 feet with Minimal Assistance using Rolling Walker.   5. Sitting at edge of bed x10 minutes with Stand-by Assistance                         Skilled PT Minutes Provided: 23 minutes   Communication with Treatment Team:     Equipment recommendations:       At end of treatment, patient remained:  x Up in chair     Up in wheelchair in room    In bed   x With alarm activated    Bed rails up   x Call bell in reach    x Family/friends present    Restraints secured properly    In bathroom with CNA/RN notified    Nurse aware    In gym with therapist/tech   x Other: manish pad underneath pt

## 2023-05-17 NOTE — PT/OT/SLP PROGRESS
"Speech Language Pathology Treatment    Patient Name:  Melodie Villarreal   MRN:  [id number]  Admitting Diagnosis: Bacteremia    Recommendations:                 General Recommendations:  Cognitive-linguistic therapy  Diet recommendations:  Regular Diet - IDDSI Level 7, Liquid Diet Level: Thin liquids - IDDSI Level 0   Aspiration Precautions: Alternating bites/sips and HOB to 90 degrees   General Precautions: Standard,    Communication strategies:  provide increased time to answer, go to room if call light pushed, and use contextual mapping and use of binary choices to aid in recall    Assessment:     Melodie Villarreal is a 89 y.o. female with an SLP diagnosis of Cognitive-Linguistic Impairment.   She presents with confusion and impaired orientation, STM recall and LTM recall. Per pt's daughter, the pt presents w/ increased confusion w/ initiation of antibiotics. Pt's daughter reports her mother was doing well at home prior to hospital admit and was walking w/ RW. Pt's daughter cooks, cleans, manages finances, and manages medications. Pt would benefit from skilled SLP services at this time to promote a return to PLOF w/ cognitive skills.    Subjective     Pt. in bed inclined position.  Son was present throughout session.  He was able to confirm and/or correct events the patient was reporting.  Pt. was compliant, but began to get tired after approximately 30 minutes and asked that the session end.        Pain/Comfort:  Pt stated she was comfortable.     Respiratory Status: Room air    Objective:     Has the patient been evaluated by SLP for swallowing?   Yes  Keep patient NPO? No  Current Respiratory Status:     Patient oriented to name only. Verbal cues for place, situation, and time were not successful.  She stated she was in some kind of "nursing place."  Answered Wh-questions regarding personal history with approximately 40% accuracy (son able to confirm/correct her responses).  She required verbal cues regarding her son's " name (gave grandson's name), but was able to recall daughter's name without assistance.  Her accuracy increased when asked yes/no questions.     Goals:   Multidisciplinary Problems       SLP Goals          Problem: SLP    Goal Priority Disciplines Outcome   SLP Goal     SLP Ongoing, Progressing   Description: LTG: Pt will improve cognitive linguistic skills to allow for safe discharge home w/ family.    STG:  Pt will orient x4 modA.  Pt will utilize memory strategies to recall new information following a 2 minute filled delay modA.  Pt will answer LTM Qs w/ 90% acc modA.                       Plan:     Patient to be seen:  3 x/week   Plan of Care expires:  05/26/23  Plan of Care reviewed with:  patient, daughter   SLP Follow-Up:  Yes       Discharge recommendations:  home with home health   Barriers to Discharge:  Support/Caregiver at home warranted to ensure safety.    Time Tracking:     SLP Treatment Date:   5/17/23  Speech Start Time:  1605  Speech Stop Time:   1430     Speech Total Time (min):  25    Billable Minutes: Speech Therapy Individual 25    05/17/2023

## 2023-05-17 NOTE — PLAN OF CARE
Problem: Adult Inpatient Plan of Care  Goal: Plan of Care Review  Outcome: Ongoing, Progressing  Flowsheets (Taken 5/17/2023 1837)  Plan of Care Reviewed With:   patient   daughter  Goal: Patient-Specific Goal (Individualized)  Outcome: Ongoing, Progressing  Goal: Absence of Hospital-Acquired Illness or Injury  Outcome: Ongoing, Progressing  Intervention: Identify and Manage Fall Risk  Flowsheets (Taken 5/17/2023 1837)  Safety Promotion/Fall Prevention:   assistive device/personal item within reach   bed alarm set   nonskid shoes/socks when out of bed  Intervention: Prevent Skin Injury  Flowsheets (Taken 5/17/2023 1837)  Body Position:   sitting up in bed   position maintained  Skin Protection:   adhesive use limited   incontinence pads utilized   transparent dressing maintained  Intervention: Prevent and Manage VTE (Venous Thromboembolism) Risk  Flowsheets (Taken 5/17/2023 1837)  Activity Management: Rolling - L1  VTE Prevention/Management:   bleeding risk assessed   bleeding precations maintained  Range of Motion: active ROM (range of motion) encouraged  Goal: Optimal Comfort and Wellbeing  Outcome: Ongoing, Progressing  Goal: Readiness for Transition of Care  Outcome: Ongoing, Progressing     Problem: Diabetes Comorbidity  Goal: Blood Glucose Level Within Targeted Range  Outcome: Ongoing, Progressing     Problem: Adjustment to Illness (Sepsis/Septic Shock)  Goal: Optimal Coping  Outcome: Ongoing, Progressing     Problem: Bleeding (Sepsis/Septic Shock)  Goal: Absence of Bleeding  Outcome: Ongoing, Progressing     Problem: Glycemic Control Impaired (Sepsis/Septic Shock)  Goal: Blood Glucose Level Within Desired Range  Outcome: Ongoing, Progressing     Problem: Infection Progression (Sepsis/Septic Shock)  Goal: Absence of Infection Signs and Symptoms  Outcome: Ongoing, Progressing     Problem: Nutrition Impaired (Sepsis/Septic Shock)  Goal: Optimal Nutrition Intake  Outcome: Ongoing, Progressing     Problem:  Infection  Goal: Absence of Infection Signs and Symptoms  Outcome: Ongoing, Progressing     Problem: Impaired Wound Healing  Goal: Optimal Wound Healing  Outcome: Ongoing, Progressing     Problem: Fall Injury Risk  Goal: Absence of Fall and Fall-Related Injury  Outcome: Ongoing, Progressing     Problem: Skin Injury Risk Increased  Goal: Skin Health and Integrity  Outcome: Ongoing, Progressing

## 2023-05-17 NOTE — PATIENT CARE CONFERENCE
Name: Melodie Villarreal    : 1931 (89 y.o.)  MRN: [id number]            Interdisciplinary Team Conference     Case conference held with patient/family and care team to discuss progress, plan of care, barriers to be addressed for safe return home, equipment recommendations, and discharge planning. Communicated therapy progress with MD, RN, therapy clinicians and case management. All questions/concerns answered.

## 2023-05-17 NOTE — PATIENT CARE CONFERENCE
Name: Meloide Villarreal    : 1931 (89 y.o.)  MRN: [id number]            Interdisciplinary Team Conference     Case conference held with patient/family and care team to discuss progress, plan of care, barriers to be addressed for safe return home, equipment recommendations, and discharge planning. Communicated therapy progress with MD, RN, therapy clinicians and case management. All questions/concerns answered.

## 2023-05-17 NOTE — PLAN OF CARE
Problem: Adult Inpatient Plan of Care  Goal: Plan of Care Review  Outcome: Ongoing, Progressing  Flowsheets (Taken 5/17/2023 0631)  Plan of Care Reviewed With:   patient   daughter  Goal: Patient-Specific Goal (Individualized)  Outcome: Ongoing, Progressing  Flowsheets (Taken 5/17/2023 0631)  Anxieties, Fears or Concerns: Pt's daughter concerned about bruising of pt fingers d/t fingersticks, would like to put pt on Dexcom monitoring  Individualized Care Needs: monitor blood glucose, wound care, antibiotic therapy, reposition every 2 hours  Goal: Absence of Hospital-Acquired Illness or Injury  Outcome: Ongoing, Progressing  Intervention: Prevent and Manage VTE (Venous Thromboembolism) Risk  Flowsheets (Taken 5/16/2023 2000)  VTE Prevention/Management:   bleeding precations maintained   bleeding risk assessed   fluids promoted  Intervention: Prevent Infection  Flowsheets (Taken 5/16/2023 2000)  Infection Prevention:   hand hygiene promoted   personal protective equipment utilized   rest/sleep promoted   single patient room provided  Goal: Optimal Comfort and Wellbeing  Outcome: Ongoing, Progressing     Problem: Diabetes Comorbidity  Goal: Blood Glucose Level Within Targeted Range  Outcome: Ongoing, Progressing

## 2023-05-17 NOTE — PT/OT/SLP PROGRESS
Occupational Therapy  Treatment    Name: Melodie Villarreal    : 1931 (89 y.o.)  MRN: [id number]           TREATMENT SUMMARY AND RECOMMENDATIONS:      OT Date of Treatment: 23  OT Start Time: 930  OT Stop Time:   OT Total Time (min): 30 min      Subjective Assessment:   No complaints  Lethargic   x Awake, alert, cooperative  Impulsive    Uncooperative   Flat affect    Agitated  c/o pain   x Appropriate  c/o fatigue    Confused x Treated at bedside     Emotionally labile  Treated in gym/dept.      Other:        Therapy Precautions:   x Cognitive deficits  Spinal precautions    Collar - hard  Sternal precautions    Collar - soft   TLSO   x Fall risk  LSO    Hip precautions - posterior  Knee immobilizer    Hip precautions - anterior  WBAT    Impaired communication  Partial weightbearing    Oxygen  TTWB    PEG tube  NWB    Visual deficits      Hearing deficits   Other:        Treatment Objectives:     Mobility Training:    Mobility task Assist level Comments    Bed mobility Total A Supine< sidelying< EOB using bed rail; assist to move LE over EOB and lift trunk into upright position    Transfer Total A Squat pivot t/f from EOB<recliner using GB   Sit to stands transitions Max Ax2 EOB<standing x3reps using GB, RW, and blocking of B feet; noted posterior lean and difficulty shifting pelvis anteriorly    Functional mobility     Sitting balance     Standing balance      Other:        ADL Training:    ADL Assist level Comments    Feeding     Grooming/hygiene     Bathing     Upper body dressing     Lower body dressing     Toileting     Toilet transfer     Adaptive equipment training     Other:           Therapeutic Exercise:   Exercise Sets Reps Comments                               Additional Comments:  Co-tx with PTThalia.     Assessment: Patient tolerated session well. Pt seemed more alert today and willing to participate. Pt continues to demonstrate overall deconditioning in strength and endurance. Pt and  daughter educated on importance of getting OOB and sitting up during the day to prevent further deconditioning. Pt at [facility] will require 24-hr care.     OT Plan: Continue with POC  Revisions made to plan of care: No    GOALS:   Multidisciplinary Problems       Occupational Therapy Goals          Problem: Occupational Therapy    Goal Priority Disciplines Outcome Interventions   Occupational Therapy Goal     OT, PT/OT Ongoing, Progressing    Description: Goals to be met by: 5/26/23     Patient will increase functional independence with ADLs by performing:    LE Dressing with Moderate Assistance.  Toileting from bedside commode with Moderate Assistance for hygiene and clothing management.   Bathing from  edge of bed with Moderate Assistance.  Toilet transfer to bedside commode with Moderate Assistance.                         Skilled OT Minutes Provided: 30  Communication with Treatment Team:     Equipment recommendations:       At end of treatment, patient remained:  x Up in chair     Up in wheelchair in room    In bed   x With alarm activated    Bed rails up   x Call bell in reach    x Family/friends present    Restraints secured properly    In bathroom with CNA/RN notified    In gym with PT/PTA/tech    Nurse aware    Other:

## 2023-05-17 NOTE — PLAN OF CARE
Daughter present for staffing. Pt- not walking at this time. Sit to stand max assist x2. Ot- Requiring min assist for grooming. Bathing, toileting total assist for those things. St- working on cognition and memory. Nutritionally-working on appetite. Medically-will let her blood sugars remain slightly high because trying to control with insulin causes her blood sugar to drop low. Will continue with IV abx and wound care. All questions answered at this time.

## 2023-05-17 NOTE — PT/OT/SLP PROGRESS
Physical Therapy Treatment Note           Name: Melodie Villarreal    : 1931 (89 y.o.)  MRN: [id number]           TREATMENT SUMMARY AND RECOMMENDATIONS:    PT Received On: 23  PT Start Time: 1200     PT Stop Time: 1210  PT Total Time (min): 10 min     Subjective Assessment:   No complaints  Lethargic   x Awake, alert, cooperative  Uncooperative    Agitated  c/o pain    Appropriate  c/o fatigue   x Confused x Treated at bedside     Emotionally labile  Treated in gym/dept.    Impulsive  Other:    Flat affect       Therapy Precautions:    Cognitive deficits  Spinal precautions    Collar - hard  Sternal precautions    Collar - soft   TLSO   x Fall risk  LSO    Hip precautions - posterior  Knee immobilizer    Hip precautions - anterior  WBAT    Impaired communication  Partial weightbearing    Oxygen  TTWB    PEG tube  NWB    Visual deficits  Other:    Hearing deficits          Treatment Objectives:     Mobility Training:   Assist level Comments    Bed mobility MAX A x2 Sit > supine   Transfer MAX A x2 Squat pivot bedside chair > bed   Gait     Sit to stand transitions     Sitting balance     Standing balance      Wheelchair mobility     Car transfer     Other:          Therapeutic Exercise:   Exercise Sets Reps Comments                               Additional Comments:  Blood seeping through bandage on L lateral ankle and nursing present to address.    Assessment: Patient tolerated session fair. Pt with increased confusion, requiring a few attempts for transfer from bedside chair > bed with MAX A x2    PT Plan: continue POC  Revisions made to plan of care: No    GOALS:   Multidisciplinary Problems       Physical Therapy Goals          Problem: Physical Therapy    Goal Priority Disciplines Outcome Goal Variances Interventions   Physical Therapy Goal     PT, PT/OT Ongoing, Progressing     Description: Goals to be met by: Discharge     Patient will increase functional independence with mobility by  performin. Supine to sit with MInimal Assistance  2. Sit to supine with MInimal Assistance  3. Bed to chair transfer with Minimal Assistance using Rolling Walker  4. Gait  x 15 feet with Minimal Assistance using Rolling Walker.   5. Sitting at edge of bed x10 minutes with Stand-by Assistance                         Skilled PT Minutes Provided: 10 minutes   Communication with Treatment Team:     Equipment recommendations:       At end of treatment, patient remained:   Up in chair     Up in wheelchair in room   x In bed   x With alarm activated   x Bed rails up   x Call bell in reach    x Family/friends present    Restraints secured properly    In bathroom with CNA/RN notified   x Nurse aware    In gym with therapist/tech    Other:

## 2023-05-17 NOTE — PT/OT/SLP PROGRESS
Occupational Therapy  Treatment    Name: Melodie Villarreal    : 1931 (89 y.o.)  MRN: [id number]           TREATMENT SUMMARY AND RECOMMENDATIONS:      OT Date of Treatment: 23  OT Start Time: 1140  OT Stop Time: 1205  OT Total Time (min): 25 min      Subjective Assessment:   No complaints  Lethargic    Awake, alert, cooperative  Impulsive    Uncooperative   Flat affect    Agitated  c/o pain   x Appropriate  c/o fatigue    Confused x Treated at bedside     Emotionally labile  Treated in gym/dept.      Other:        Therapy Precautions:   x Cognitive deficits  Spinal precautions    Collar - hard  Sternal precautions    Collar - soft   TLSO   x Fall risk  LSO    Hip precautions - posterior  Knee immobilizer    Hip precautions - anterior  WBAT    Impaired communication  Partial weightbearing    Oxygen  TTWB    PEG tube  NWB    Visual deficits      Hearing deficits   Other:        Treatment Objectives:     Mobility Training:    Mobility task Assist level Comments    Bed mobility Total A EOB<supine    Transfer Total A Squat pivot t/f from recliner to EOB using GB   Sit to stands transitions     Functional mobility     Sitting balance     Standing balance      Other:        ADL Training:    ADL Assist level Comments    Feeding     Grooming/hygiene     Bathing     Upper body dressing     Lower body dressing     Toileting     Toilet transfer     Adaptive equipment training     Other:           Therapeutic Exercise:   Exercise Sets Reps Comments                               Additional Comments:      Assessment: Patient tolerated session poorly. Pt appeared fatigued from sitting up in recliner requiring increased assistance to t/f back to bed.    OT Plan: Continue with POC  Revisions made to plan of care: No    GOALS:   Multidisciplinary Problems       Occupational Therapy Goals          Problem: Occupational Therapy    Goal Priority Disciplines Outcome Interventions   Occupational Therapy Goal     OT, PT/OT Ongoing,  Progressing    Description: Goals to be met by: 5/26/23     Patient will increase functional independence with ADLs by performing:    LE Dressing with Moderate Assistance.  Toileting from bedside commode with Moderate Assistance for hygiene and clothing management.   Bathing from  edge of bed with Moderate Assistance.  Toilet transfer to bedside commode with Moderate Assistance.                         Skilled OT Minutes Provided: 25  Communication with Treatment Team:     Equipment recommendations:       At end of treatment, patient remained:   Up in chair     Up in wheelchair in room   x In bed   x With alarm activated   x Bed rails up   x Call bell in reach    x Family/friends present    Restraints secured properly    In bathroom with CNA/RN notified    In gym with PT/PTA/tech   x Nurse aware    Other:

## 2023-05-17 NOTE — PATIENT CARE CONFERENCE
Name: Melodie Villarreal    : 1931 (89 y.o.)  MRN: [id number]            Interdisciplinary Team Conference     Case conference held with patient/family and care team to discuss progress, plan of care, barriers to be addressed for safe return home, equipment recommendations, and discharge planning. Communicated therapy progress with MD, RN, therapy clinicians and case management. All questions/concerns answered.

## 2023-05-17 NOTE — PROGRESS NOTES
HOSPITAL MEDICINE  PROGRESS NOTE    CHIEF COMPLAINT   Hospital follow up    HOSPITAL COURSE   89-year-old female with a PMH of NIDDMII, prior DVT/IVC filter no longer on anticoagulation and non-healing left ankle wound, presented to [facility] on 4/16 after she became lethargic. Blood cultures returned positive for MRSA bacteremia. NM bone scan without clear evidence of OM. LLE arterial U/S showed monophasic flow throughout suggestive of inflow disease.CT T and L spine  with no evidence of discitis/  osteomyelitis. 2D TTE negative for valvular vegetation. STARR, performed on 4/22, negative valvular vegetations. She was taken to the OR on 04/25 for cystoscopy and right ureteral stent placement.  Plan to remove stent in 2 weeks after DC from hospital in the office. Blood cultures persistently positive, 4/16, 4/18, 4/19, 4/21. Eventually cultures from 4/23 remained negative. Vascular surgery consulted for PAD, CTA run-off noted severe flow-limiting disease in the left common femoral. Vascular surgery recommended left femoral endarterectomy, though family defering intervention for now. Hospital course complicated by intermittent hyponatremia. Nephro treated pt with Samsca and subsequent fluid restriction to 1 Liter with improvement of sodium level. On 05/12 she required 1 unit of PRBCs for low hemoglobin.  On 05/14 her urine culture grew Pseudomonas sensitive to ciprofloxacin and she was started on a 3 day course.  Family has been complaining of nighttime pain, patient did not tolerate Robaxin and had no relief from tramadol. Family states side effect of codeine containing medications is sleep, they would like to use this qhs for pain. Pt is tolerating.  Resumed her home dose of metformin and glimepiride on 05/17, patient with hypoglycemic episodes with insulin     Today  She is mod Ax2 people. Cognitively at baseline.  OBJECTIVE/PHYSICAL EXAM     VITAL SIGNS (MOST RECENT):  Temp: 97.4 °F (36.3 °C) (05/17/23 1100)  Pulse: 91  (05/17/23 1100)  Resp: 18 (05/17/23 1100)  BP: (!) 141/82 (05/17/23 1100)  SpO2: 100 % (05/17/23 1101) VITAL SIGNS (24 HOUR RANGE):  Temp:  [97.4 °F (36.3 °C)-98 °F (36.7 °C)] 97.4 °F (36.3 °C)  Pulse:  [] 91  Resp:  [18-19] 18  SpO2:  [92 %-100 %] 100 %  BP: (126-141)/(63-82) 141/82   GENERAL: In no acute distress, afebrile  HEENT:  PERRLA  CHEST: Clear to auscultation bilaterally  HEART: S1, S2, Grade IV  ABDOMEN: Soft, nontender, BS +  MSK: Warm, no lower extremity edema, no clubbing or cyanosis  NEUROLOGIC: Alert and oriented to self, moving all extremities with good strength   INTEGUMENTARY:  Warm, dry  PSYCHIATRY: appropriate affect  ASSESSMENT/PLAN   Persistent MRSA Sepsis  Bacteremia  -clearance as of 4/23  -Continue with IV Vanc as planned x 6 weeks end date 6/4    Pseudomonas UTI   -a 3 day course of Cipro was started on 05/14/2023     Left ankle chronic non healing wound   Severe peripheral artery disease  -family refused intervention with vascular surgery   -wound care  -pain control, q.h.s. P[place]all prior to sleep  -Consulted Dr. Garrison for hyperbaric therapy per family request, he evaluated the patient      SIADH  Hyponatremia  -S/P Samsca 15 mg x 1 dose on 5/7/23  -Remains off of diuretics, family request   -1 liter fluid restriction with goal sodium 130 or higher  -TSH and Cortisol are normal     Encephalopathy  Staring Spells  -resolved   -Abnormal EEG due to findings consistent with a nonspecific bilateral brain dysfunction.     Electrolyte abnormality  -Hypokalemia, Hypomagnesemia: replete as condition requires      Acute on chronic diastolic heart failure   Mild to moderate Aortic stenosis  Pulmonary HTN  -currently compensated   -BNP elevated though likely chronically elevated in setting of severe AS     NIDDM II  -c/w detemir   -A1c 6.3     Normochromic anemia   -received 3 units of ferrlecit   -continue with p.o. iron   -required 1 unit of PRBCs on 05/12     R Hydronephrosis   -s/p  stent - outpt removal in a few weeks with Dr Gauthier     Chronic low back pain   Constipation associated with colonic fecal impaction  -stable         DVT prophylaxis:  lovenox  Anticipated discharge and disposition:  Home __________________________________________________________________________    LABS/MICRO/MEDS/DIAGNOSTICS       LABS  Recent Labs     05/16/23  0400   *   K 4.2   CHLORIDE 90*   CO2 33*   BUN 18.9   CREATININE 0.65   GLUCOSE 188*   CALCIUM 7.9*     No results for input(s): WBC, RBC, HCT, MCV, PLT in the last 72 hours.    Invalid input(s):       MICROBIOLOGY  Microbiology Results (last 7 days)       Procedure Component Value Units Date/Time    Urine culture [[id number]]  (Abnormal)  (Susceptibility) Collected: 05/11/23 2216    Order Status: Completed Specimen: Urine Updated: 05/14/23 0810     Urine Culture 10,000 - 25,000 colonies/ml Pseudomonas aeruginosa               MEDICATIONS   carvediloL  6.25 mg Oral BID    collagenase   Topical (Top) Daily    enoxaparin  40 mg Subcutaneous Daily    ferrous sulfate  1 tablet Oral Daily    glimepiride  2 mg Oral Daily with breakfast    HYDROcodone-acetaminophen  1 tablet Oral QHS    Lactobacillus acidophilus  1 capsule Oral TID WM    magnesium oxide  400 mg Oral Daily    metFORMIN  1,000 mg Oral BID WM    polyethylene glycol  17 g Oral Daily    sodium chloride  1 g Oral Daily    tamsulosin  0.4 mg Oral Daily    trazodone  100 mg Oral QHS    vancomycin (VANCOCIN) IVPB  500 mg Intravenous Q12H         INFUSIONS         DIAGNOSTIC TESTS  X-Ray Chest 1 View for Line/Tube Placement   Final Result      As above.         Electronically signed by: Jerzy Sweet   Date:    05/16/2023   Time:    07:01           EF   Date Value Ref Range Status   04/21/2023 60 % Final   04/17/2023 54 % Final              Case related differential diagnoses have been reviewed; assessment and plan has been documented. I have personally reviewed the labs and test results that  are currently available; I have reviewed the patients medication list. I have reviewed the consulting providers recommendations. I have reviewed or attempted to review medical records based upon their availability.  All of the patient's and/or family's questions have been addressed and answered to the best of my ability.  I will continue to monitor closely and make adjustments to medical management as needed.  This document was created using M*Modal Fluency Direct.  Transcription errors may have been made.  Please contact me if any questions may rise regarding documentation to clarify transcription.        Thairy G Reyes, DO   05/17/2023   Internal Medicine

## 2023-05-18 NOTE — PROGRESS NOTES
Pharmacokinetic Assessment Follow Up: IV Vancomycin    Vancomycin serum concentration assessment(s):    The trough level was drawn correctly and can be used to guide therapy at this time. The measurement is within the desired definitive target range of 10 to 20 mcg/mL.    Vancomycin Regimen Plan:    Continue regimen to Vancomycin 500 mg IV every 12 hours with next serum trough concentration measured at 30 minutes prior to 0430 dose on 5/22    Drug levels (last 3 results):  Recent Labs   Lab Result Units 05/18/23  0403   Vancomycin Trough ug/ml 18.2       Pharmacy will continue to follow and monitor vancomycin.    Please contact pharmacy at extension [phone] for questions regarding this assessment.    Thank you for the consult,   Michael Stockton       Patient brief summary:  Melodie Villarreal is a 89 y.o. female initiated on antimicrobial therapy with IV Vancomycin for treatment of bacteremia    The patient's current regimen is vancomycin 500 mg q12hr    Drug Allergies:   Review of patient's allergies indicates:   Allergen Reactions    Ace inhibitors Other (See Comments)    Adhesive      Allergic to tape    Bactrim [sulfamethoxazole-trimethoprim] Other (See Comments)     Confusion, Hypoglycemia    Meperidine Other (See Comments)    Midazolam Other (See Comments)    Atorvastatin Nausea Only    Codeine Other (See Comments) and Anxiety    Tapentadol Rash       Actual Body Weight:   52.3 kg    Renal Function:   Estimated Creatinine Clearance: 43.8 mL/min (based on SCr of 0.69 mg/dL).,     Dialysis Method (if applicable):  N/A    CBC (last 72 hours):  Recent Labs   Lab Result Units 05/18/23  0403   WBC x10(3)/mcL 9.84   Hgb g/dL 9.1*   Hct % 30.7*   Platelet x10(3)/mcL 261   Mono % % 7.6   Eos % % 7.5   Basophil % % 0.8       Metabolic Panel (last 72 hours):  Recent Labs   Lab Result Units 05/16/23  0400 05/18/23  0403   Sodium Level mmol/L 131* 134   Potassium Level mmol/L 4.2 3.7   Chloride mmol/L 90* 94*   Carbon Dioxide mmol/L  33* 33*   Glucose Level mg/dL 188* 167*   Blood Urea Nitrogen mg/dL 18.9 20.6*   Creatinine mg/dL 0.65 0.69   Magnesium Level mg/dL 1.90 1.90       Vancomycin Administrations:  vancomycin given in the last 96 hours                     vancomycin (VANCOCIN) 500 mg in dextrose 5 % in water (D5W) 5 % 100 mL IVPB (MB+) (mg) 500 mg New Bag 05/18/23 0430     500 mg New Bag 05/17/23 1635     500 mg New Bag  0357     500 mg New Bag 05/16/23 1611     500 mg New Bag  0436     500 mg New Bag 05/15/23 1041                    Microbiologic Results:  Microbiology Results (last 7 days)       Procedure Component Value Units Date/Time    Urine culture [[id number]]  (Abnormal)  (Susceptibility) Collected: 05/11/23 2216    Order Status: Completed Specimen: Urine Updated: 05/14/23 0810     Urine Culture 10,000 - 25,000 colonies/ml Pseudomonas aeruginosa

## 2023-05-18 NOTE — PROGRESS NOTES
Hospital Medicine  Progress Note    Patient Name: Melodie Villarreal  MRN: [id number]  Status: IP- Swing   Admission Date: 5/11/2023  Length of Stay: 7  Date of Service: 05/18/2023       CC: hospital follow-up for        SUBJECTIVE   89-year-old female with a PMH of NIDDMII, prior DVT/IVC filter no longer on anticoagulation and non-healing left ankle wound, presented to [facility] on 4/16 after she became lethargic. Blood cultures returned positive for MRSA bacteremia. NM bone scan without clear evidence of OM. LLE arterial U/S showed monophasic flow throughout suggestive of inflow disease.CT T and L spine  with no evidence of discitis/  osteomyelitis. 2D TTE negative for valvular vegetation. STARR, performed on 4/22, negative valvular vegetations. She was taken to the OR on 04/25 for cystoscopy and right ureteral stent placement.  Plan to remove stent in 2 weeks after DC from hospital in the office. Blood cultures persistently positive, 4/16, 4/18, 4/19, 4/21. Eventually cultures from 4/23 remained negative. Vascular surgery consulted for PAD, CTA run-off noted severe flow-limiting disease in the left common femoral. Vascular surgery recommended left femoral endarterectomy, though family defering intervention for now. Hospital course complicated by intermittent hyponatremia. Nephro treated pt with Samsca and subsequent fluid restriction to 1 Liter with improvement of sodium level. On 05/12 she required 1 unit of PRBCs for low hemoglobin.  On 05/14 her urine culture grew Pseudomonas sensitive to ciprofloxacin and she was started on a 3 day course.  Family has been complaining of nighttime pain, patient did not tolerate Robaxin and had no relief from tramadol. Family states side effect of codeine containing medications is sleep, they would like to use this qhs for pain. Pt is tolerating.  Resumed her home dose of metformin and glimepiride on 05/17, patient with hypoglycemic episodes with insulin      Today  She is mod Ax2 people.  Cognitively at baseline.    05/18/2023  No new c/o    Today: Patient seen and examined at bedside, and chart reviewed.       MEDICATIONS   Scheduled   carvediloL  6.25 mg Oral BID    collagenase   Topical (Top) Daily    enoxaparin  40 mg Subcutaneous Daily    ferrous sulfate  1 tablet Oral Daily    glimepiride  2 mg Oral Daily with breakfast    HYDROcodone-acetaminophen  1 tablet Oral QHS    Lactobacillus acidophilus  1 capsule Oral TID WM    magnesium oxide  400 mg Oral Daily    metFORMIN  1,000 mg Oral BID WM    polyethylene glycol  17 g Oral Daily    sodium chloride  1 g Oral Daily    tamsulosin  0.4 mg Oral Daily    trazodone  100 mg Oral QHS    vancomycin (VANCOCIN) IVPB  500 mg Intravenous Q12H     Continuous Infusions        PHYSICAL EXAM   VITALS: T 97.4 °F (36.3 °C)   BP (!) 145/75   P 87   RR 16   O2 95 %      GENERAL: In no acute distress, afebrile  HEENT:  PERRLA  CHEST: Clear to auscultation bilaterally  HEART: S1, S2, Grade IV  ABDOMEN: Soft, nontender, BS +  MSK: Warm, no lower extremity edema, no clubbing or cyanosis  NEUROLOGIC: Alert and oriented to self, moving all extremities with good strength   INTEGUMENTARY:  Warm, dry  PSYCHIATRY: appropriate affect    LABS   CBC  Recent Labs     05/18/23  0403   WBC 9.84   RBC 3.55*   HGB 9.1*   HCT 30.7*   MCV 86.5   MCH 25.6*   MCHC 29.6*   RDW 16.0        CHEM  Recent Labs     05/16/23  0400 05/18/23  0403   * 134   K 4.2 3.7   CHLORIDE 90* 94*   CO2 33* 33*   BUN 18.9 20.6*   CREATININE 0.65 0.69   GLUCOSE 188* 167*   CALCIUM 7.9* 7.8*   MG 1.90 1.90         MICROBIOLOGY     Microbiology Results (last 7 days)       Procedure Component Value Units Date/Time    Urine culture [[id number]]  (Abnormal)  (Susceptibility) Collected: 05/11/23 2216    Order Status: Completed Specimen: Urine Updated: 05/14/23 0810     Urine Culture 10,000 - 25,000 colonies/ml Pseudomonas aeruginosa              DIAGNOSTICS   X-Ray Chest 1 View for Line/Tube  Placement  Narrative: EXAMINATION:  XR CHEST 1 VIEW FOR LINE/TUBE PLACEMENT    CLINICAL HISTORY:  PICC line;    TECHNIQUE:  Single frontal portable view of the chest was performed.    COMPARISON:  05/06/2023    FINDINGS:  Left-sided PICC projects over the distal SVC.  Prominent interstitial markings with no focal opacification.  Impression: As above.    Electronically signed by: Jerzy Sweet  Date:    05/16/2023  Time:    07:01        ASSESSMENT/PLAN:   Persistent MRSA Sepsis  Bacteremia  -clearance as of 4/23  -Continue with IV Vanc as planned x 6 weeks end date 6/4     Pseudomonas UTI   -a 3 day course of Cipro was started on 05/14/2023     Left ankle chronic non healing wound   Severe peripheral artery disease  -family refused intervention with vascular surgery   -wound care  -pain control, q.h.s. [place] prior to sleep  -Consulted Dr. Garrison for hyperbaric therapy per family request, he evaluated the patient      SIADH  Hyponatremia  -S/P Samsca 15 mg x 1 dose on 5/7/23  -Remains off of diuretics, family request   -1 liter fluid restriction with goal sodium 130 or higher  -TSH and Cortisol are normal     Encephalopathy  Staring Spells  -resolved   -Abnormal EEG due to findings consistent with a nonspecific bilateral brain dysfunction.     Electrolyte abnormality  -Hypokalemia, Hypomagnesemia: replete as condition requires      Acute on chronic diastolic heart failure   Mild to moderate Aortic stenosis  Pulmonary HTN  -currently compensated   -BNP elevated though likely chronically elevated in setting of severe AS     NIDDM II  -c/w detemir   -A1c 6.3     Normochromic anemia   -received 3 units of ferrlecit   -continue with p.o. iron   -required 1 unit of PRBCs on 05/12     R Hydronephrosis   -s/p stent - outpt removal in a few weeks with Dr Gauthier     Chronic low back pain   Constipation associated with colonic fecal impaction  -stable         DVT prophylaxis:  lovenox  Anticipated discharge and  disposition:  Home           Abdullahi Isaacs MD  [facility]

## 2023-05-18 NOTE — PLAN OF CARE
Problem: Occupational Therapy  Goal: Occupational Therapy Goal  Description: Goals to be met by: 5/26/23     Patient will increase functional independence with ADLs by performing:    Toileting from bedside commode with Moderate Assistance for hygiene and clothing management.   Bathing from  edge of bed with Moderate Assistance.  Toilet transfer to bedside commode with Moderate Assistance.   Hygiene/grooming from edge of bed with minimal assistance.     Outcome: Ongoing, Progressing

## 2023-05-18 NOTE — PROGRESS NOTES
Name: Melodie Villarreal    : 1931 (89 y.o.)  MRN: [id number]           Patient Unavailable      Patient unable to be seen at this time secondary to: per OT and RN patient had a rough night and is unable to arouse at this time.

## 2023-05-18 NOTE — PLAN OF CARE
Problem: Adult Inpatient Plan of Care  Goal: Plan of Care Review  Outcome: Ongoing, Progressing  Flowsheets (Taken 5/18/2023 0546)  Plan of Care Reviewed With:   patient   daughter  Goal: Patient-Specific Goal (Individualized)  Outcome: Ongoing, Progressing  Flowsheets (Taken 5/18/2023 0546)  Anxieties, Fears or Concerns: Daughter concerned about pt not sleeping  Individualized Care Needs: Monitor blood glucose, wound care, antibiotic therapy, reposition every 2 hours, sleep regimen  Goal: Absence of Hospital-Acquired Illness or Injury  Outcome: Ongoing, Progressing  Intervention: Prevent and Manage VTE (Venous Thromboembolism) Risk  Flowsheets (Taken 5/17/2023 2200)  VTE Prevention/Management:   bleeding precations maintained   bleeding risk assessed   fluids promoted  Range of Motion: active ROM (range of motion) encouraged  Intervention: Prevent Infection  Flowsheets (Taken 5/18/2023 0546)  Infection Prevention:   hand hygiene promoted   rest/sleep promoted   personal protective equipment utilized   single patient room provided  Goal: Optimal Comfort and Wellbeing  Outcome: Ongoing, Progressing  Intervention: Monitor Pain and Promote Comfort  Flowsheets (Taken 5/17/2023 2200)  Pain Management Interventions:   care clustered   diversional activity provided   pain management plan reviewed with patient/caregiver   pillow support provided   position adjusted   quiet environment facilitated   relaxation techniques promoted  Intervention: Provide Person-Centered Care  Flowsheets (Taken 5/17/2023 2200)  Trust Relationship/Rapport:   care explained   choices provided   emotional support provided   empathic listening provided   thoughts/feelings acknowledged   reassurance provided   questions encouraged   questions answered     Problem: Diabetes Comorbidity  Goal: Blood Glucose Level Within Targeted Range  Outcome: Ongoing, Progressing  Intervention: Monitor and Manage Glycemia  Flowsheets (Taken 5/18/2023  0387)  Glycemic Management: blood glucose monitored

## 2023-05-18 NOTE — PLAN OF CARE
[facility]  Discharge Reassessment    Primary Care Provider: Wisam Espinosa Jr, MD    Expected Discharge Date:     Reassessment (most recent)       Discharge Reassessment - 05/18/23 1504          Discharge Reassessment    Assessment Type Discharge Planning Reassessment     Did the patient's condition or plan change since previous assessment? No     Discharge Plan discussed with: Adult children     Name(s) and Number(s) William daughter      Communicated ERIN with patient/caregiver Date not available/Unable to determine     Discharge Plan A Home with family;Home     DME Needed Upon Discharge  hospital bed;lift device     Transition of Care Barriers None        Post-Acute Status    Post-Acute Authorization Home Health     Hospital Resources/Appts/Education Provided Provided patient/caregiver with written discharge plan information     Discharge Delays None known at this time

## 2023-05-18 NOTE — PROGRESS NOTES
Upon arrival to room pt alseep in bed with daughter at bedside. Daughter stated her mother was up all night fidgeting. Pt's nurse, Leidy, is aware and has spoken to MD about it. MD is going to increase pt's pain medicine to see if that helps. Will f/u this PM.

## 2023-05-19 NOTE — PLAN OF CARE
Problem: Adult Inpatient Plan of Care  Goal: Plan of Care Review  Outcome: Ongoing, Progressing  Flowsheets (Taken 5/18/2023 1000)  Plan of Care Reviewed With:   patient   daughter   family  Goal: Patient-Specific Goal (Individualized)  Outcome: Ongoing, Progressing  Flowsheets (Taken 5/18/2023 1000)  Anxieties, Fears or Concerns: Daughter concerned about pt not sleeping  Individualized Care Needs: Monitor blood glucose, wound care, antibiotic therapy, repositioning every 2 hours, sleep regimen  Goal: Absence of Hospital-Acquired Illness or Injury  Outcome: Ongoing, Progressing  Intervention: Identify and Manage Fall Risk  Flowsheets (Taken 5/18/2023 1000)  Safety Promotion/Fall Prevention:   assistive device/personal item within reach   bed alarm set   family to remain at bedside   nonskid shoes/socks when out of bed  Intervention: Prevent Skin Injury  Flowsheets (Taken 5/18/2023 1000)  Body Position: position maintained  Skin Protection:   adhesive use limited   incontinence pads utilized  Intervention: Prevent and Manage VTE (Venous Thromboembolism) Risk  Flowsheets (Taken 5/18/2023 1000)  Activity Management: Rolling - L1  VTE Prevention/Management:   bleeding precations maintained   bleeding risk assessed   fluids promoted  Range of Motion: active ROM (range of motion) encouraged  Intervention: Prevent Infection  Flowsheets (Taken 5/18/2023 1000)  Infection Prevention:   hand hygiene promoted   rest/sleep promoted   single patient room provided  Goal: Optimal Comfort and Wellbeing  Outcome: Ongoing, Progressing  Goal: Readiness for Transition of Care  Outcome: Ongoing, Progressing     Problem: Diabetes Comorbidity  Goal: Blood Glucose Level Within Targeted Range  Outcome: Ongoing, Progressing     Problem: Adjustment to Illness (Sepsis/Septic Shock)  Goal: Optimal Coping  Outcome: Ongoing, Progressing     Problem: Bleeding (Sepsis/Septic Shock)  Goal: Absence of Bleeding  Outcome: Ongoing, Progressing      Problem: Glycemic Control Impaired (Sepsis/Septic Shock)  Goal: Blood Glucose Level Within Desired Range  Outcome: Ongoing, Progressing     Problem: Infection Progression (Sepsis/Septic Shock)  Goal: Absence of Infection Signs and Symptoms  Outcome: Ongoing, Progressing     Problem: Nutrition Impaired (Sepsis/Septic Shock)  Goal: Optimal Nutrition Intake  Outcome: Ongoing, Progressing     Problem: Infection  Goal: Absence of Infection Signs and Symptoms  Outcome: Ongoing, Progressing     Problem: Impaired Wound Healing  Goal: Optimal Wound Healing  Outcome: Ongoing, Progressing     Problem: Fall Injury Risk  Goal: Absence of Fall and Fall-Related Injury  Outcome: Ongoing, Progressing     Problem: Skin Injury Risk Increased  Goal: Skin Health and Integrity  Outcome: Ongoing, Progressing

## 2023-05-19 NOTE — PT/OT/SLP PROGRESS
Physical Therapy Treatment Note           Name: Melodie Villarreal    : 1931 (89 y.o.)  MRN: [id number]           TREATMENT SUMMARY AND RECOMMENDATIONS:    PT Received On: 23  PT Start Time: 1425     PT Stop Time: 1455  PT Total Time (min): 30 min     Subjective Assessment:   No complaints  Lethargic   x Awake, alert, cooperative  Uncooperative    Agitated  c/o pain    Appropriate  c/o fatigue    Confused x Treated at bedside     Emotionally labile  Treated in gym/dept.    Impulsive  Other:    Flat affect       Therapy Precautions:    Cognitive deficits  Spinal precautions    Collar - hard  Sternal precautions    Collar - soft   TLSO   x Fall risk  LSO    Hip precautions - posterior  Knee immobilizer    Hip precautions - anterior  WBAT    Impaired communication  Partial weightbearing    Oxygen  TTWB    PEG tube  NWB    Visual deficits  Other:    Hearing deficits          Treatment Objectives:     Mobility Training:   Assist level Comments    Bed mobility MAX A x2 Supine <> sit   Transfer     Gait     Sit to stand transitions     Sitting balance Fair  Patient sat EOB ~14 minutes with initial assist at trunk upon initial sit, but then after, required SBA; she was able to sit without UE to cut up her fish and eat a little bit; after 14 minutes, pt requesting to return to supine    Standing balance      Wheelchair mobility     Car transfer     Other:          Therapeutic Exercise:   Exercise Sets Reps Comments                               Additional Comments:  Co-tx with OT Yanely    Assessment: Patient tolerated session well.    PT Plan: continue POC  Revisions made to plan of care: No    GOALS:   Multidisciplinary Problems       Physical Therapy Goals          Problem: Physical Therapy    Goal Priority Disciplines Outcome Goal Variances Interventions   Physical Therapy Goal     PT, PT/OT Ongoing, Progressing     Description: Goals to be met by: Discharge     Patient will increase functional independence  with mobility by performin. Supine to sit with MInimal Assistance  2. Sit to supine with MInimal Assistance  3. Bed to chair transfer with Minimal Assistance using Rolling Walker  4. Gait  x 15 feet with Minimal Assistance using Rolling Walker.   5. Sitting at edge of bed x10 minutes with Stand-by Assistance                         Skilled PT Minutes Provided: 30 minutes   Communication with Treatment Team:     Equipment recommendations:       At end of treatment, patient remained:   Up in chair     Up in wheelchair in room   x In bed   x With alarm activated   x Bed rails up   x Call bell in reach    x Family/friends present    Restraints secured properly    In bathroom with CNA/RN notified    Nurse aware    In gym with therapist/tech    Other:

## 2023-05-19 NOTE — PLAN OF CARE
Problem: Adult Inpatient Plan of Care  Goal: Plan of Care Review  Outcome: Ongoing, Progressing  Flowsheets (Taken 5/19/2023 1603)  Plan of Care Reviewed With:   patient   daughter  Goal: Patient-Specific Goal (Individualized)  Outcome: Ongoing, Progressing  Flowsheets (Taken 5/19/2023 1603)  Anxieties, Fears or Concerns: Daughter concerned with patient not sleeping  Individualized Care Needs: ABx, PT,OT, Wound care  Goal: Absence of Hospital-Acquired Illness or Injury  Outcome: Ongoing, Progressing  Intervention: Identify and Manage Fall Risk  Flowsheets (Taken 5/19/2023 1603)  Safety Promotion/Fall Prevention:   assistive device/personal item within reach   bed alarm set   diversional activities provided   lighting adjusted   medications reviewed   muscle strengthening facilitated   nonskid shoes/socks when out of bed   instructed to call staff for mobility  Intervention: Prevent Skin Injury  Flowsheets (Taken 5/19/2023 1603)  Body Position: position changed independently  Skin Protection:   adhesive use limited   incontinence pads utilized   silicone foam dressing in place   skin sealant/moisture barrier applied   skin-to-device areas padded   skin-to-skin areas padded   transparent dressing maintained  Intervention: Prevent and Manage VTE (Venous Thromboembolism) Risk  Flowsheets (Taken 5/19/2023 1603)  Activity Management: Rolling - L1  VTE Prevention/Management:   bleeding risk assessed   bleeding precations maintained   ROM (active) performed  Range of Motion: ROM (range of motion) performed  Intervention: Prevent Infection  Flowsheets (Taken 5/19/2023 1603)  Infection Prevention:   cohorting utilized   equipment surfaces disinfected   hand hygiene promoted   single patient room provided  Goal: Optimal Comfort and Wellbeing  Outcome: Ongoing, Progressing  Intervention: Monitor Pain and Promote Comfort  Flowsheets (Taken 5/19/2023 1603)  Pain Management Interventions:   diversional activity provided   pillow  support provided   position adjusted   relaxation techniques promoted  Intervention: Provide Person-Centered Care  Flowsheets (Taken 5/19/2023 1603)  Trust Relationship/Rapport:   care explained   choices provided   emotional support provided   questions answered   thoughts/feelings acknowledged     Problem: Diabetes Comorbidity  Goal: Blood Glucose Level Within Targeted Range  Outcome: Ongoing, Progressing  Intervention: Monitor and Manage Glycemia  Flowsheets (Taken 5/19/2023 1603)  Glycemic Management: blood glucose monitored     Problem: Adjustment to Illness (Sepsis/Septic Shock)  Goal: Optimal Coping  Outcome: Ongoing, Progressing  Intervention: Optimize Psychosocial Adjustment to Illness  Flowsheets (Taken 5/19/2023 1603)  Supportive Measures:   active listening utilized   positive reinforcement provided   verbalization of feelings encouraged  Family/Support System Care: support provided     Problem: Infection  Goal: Absence of Infection Signs and Symptoms  Outcome: Ongoing, Progressing  Intervention: Prevent or Manage Infection  Flowsheets (Taken 5/19/2023 1603)  Fever Reduction/Comfort Measures:   lightweight bedding   lightweight clothing  Infection Management: aseptic technique maintained  Isolation Precautions:   protective   precautions maintained     Problem: Impaired Wound Healing  Goal: Optimal Wound Healing  Outcome: Ongoing, Progressing  Intervention: Promote Wound Healing  Flowsheets (Taken 5/19/2023 1603)  Oral Nutrition Promotion:   calorie-dense foods provided   calorie-dense liquids provided   physical activity promoted   safe use of adaptive equipment encouraged  Sleep/Rest Enhancement:   awakenings minimized   consistent schedule promoted   family presence promoted   noise level reduced  Activity Management: Rolling - L1  Pain Management Interventions:   diversional activity provided   pillow support provided   position adjusted   relaxation techniques promoted     Problem: Fall Injury  Risk  Goal: Absence of Fall and Fall-Related Injury  Outcome: Ongoing, Progressing  Intervention: Identify and Manage Contributors  Flowsheets (Taken 5/19/2023 1603)  Self-Care Promotion:   BADL personal objects within reach   BADL personal routines maintained   meal set-up provided   safe use of adaptive equipment encouraged  Medication Review/Management: medications reviewed  Intervention: Promote Injury-Free Environment  Flowsheets (Taken 5/19/2023 1603)  Safety Promotion/Fall Prevention:   assistive device/personal item within reach   bed alarm set   diversional activities provided   lighting adjusted   medications reviewed   muscle strengthening facilitated   nonskid shoes/socks when out of bed   instructed to call staff for mobility     Problem: Skin Injury Risk Increased  Goal: Skin Health and Integrity  Outcome: Ongoing, Progressing  Intervention: Optimize Skin Protection  Flowsheets (Taken 5/19/2023 1603)  Pressure Reduction Techniques:   frequent weight shift encouraged   heels elevated off bed   weight shift assistance provided  Pressure Reduction Devices: positioning supports utilized  Skin Protection:   adhesive use limited   incontinence pads utilized   silicone foam dressing in place   skin sealant/moisture barrier applied   skin-to-device areas padded   skin-to-skin areas padded   transparent dressing maintained  Head of Bed (HOB) Positioning: HOB at 30 degrees  Intervention: Promote and Optimize Oral Intake  Flowsheets (Taken 5/19/2023 1603)  Oral Nutrition Promotion:   calorie-dense foods provided   calorie-dense liquids provided   physical activity promoted   safe use of adaptive equipment encouraged     Problem: Mobility Impairment  Goal: Optimal Mobility  Outcome: Ongoing, Progressing  Intervention: Optimize Mobility  Flowsheets (Taken 5/19/2023 1603)  Assistive Device Utilized: gait belt  Activity Management: Rolling - L1  Positioning/Transfer Devices: pillows

## 2023-05-19 NOTE — PT/OT/SLP PROGRESS
Speech Language Pathology Treatment    Patient Name:  Melodie Villarreal   MRN:  [id number]  Admitting Diagnosis: Bacteremia    Recommendations:                 General Recommendations:  Cognitive-linguistic therapy  Diet recommendations:  Regular Diet - IDDSI Level 7, Liquid Diet Level: Thin liquids - IDDSI Level 0   Aspiration Precautions: Alternating bites/sips and HOB to 90 degrees   General Precautions: Standard,    Communication strategies:  provide increased time to answer, go to room if call light pushed, and use contextual mapping and use of binary choices to aid in recall    Assessment:     Melodie Villarreal is a 89 y.o. female with an SLP diagnosis of Cognitive-Linguistic Impairment.   She presents with confusion and impaired orientation, STM recall and LTM recall. Per pt's daughter, the pt presents w/ increased confusion w/ initiation of antibiotics. Pt's daughter reports her mother was doing well at home prior to hospital admit and was walking w/ RW. Pt's daughter cooks, cleans, manages finances, and manages medications. Pt would benefit from skilled SLP services at this time to promote a return to PLOF w/ cognitive skills.    Subjective     Pt in bed upon SLP arrival. Pt pleasant and agreeable to tx. Pt's daughter at bedside.      Pain/Comfort:  Pt stated she was comfortable.     Respiratory Status: Room air    Objective:     Has the patient been evaluated by SLP for swallowing?   Yes  Keep patient NPO? No  Current Respiratory Status:     Pt recalled breakfast items consumed Leonor.  Pt w/ intermittent confusion across session and w/ nonsensical responses. Pt occasionally stating curse words, which pt's daughter reports the pt never cursed at home prior to hospital admit and initiation of antibiotics.   Pt completed card sorting activity in Fo2. Frequent verbal and visual cueing to complete task targeting sustained attention, recall and problem solving.    Overall good session.  Cont SLP POC.    Goals:    Multidisciplinary Problems       SLP Goals          Problem: SLP    Goal Priority Disciplines Outcome   SLP Goal     SLP Ongoing, Progressing   Description: LTG: Pt will improve cognitive linguistic skills to allow for safe discharge home w/ family.    STG:  Pt will orient x4 modA.  Pt will utilize memory strategies to recall new information following a 2 minute filled delay modA.  Pt will answer LTM Qs w/ 90% acc modA.                       Plan:     Patient to be seen:  3 x/week   Plan of Care expires:  05/26/23  Plan of Care reviewed with:  patient, daughter   SLP Follow-Up:  Yes       Discharge recommendations:  home with home health   Barriers to Discharge:  Support/Caregiver at home warranted to ensure safety.    Time Tracking:     SLP Treatment Date:   5/19/23  Speech Start Time:  10:00  Speech Stop Time: 10:30     Speech Total Time (min):  30 min    Billable Minutes: Speech Therapy Individual 30    Vanna Boss M.S., CCC-SLP   05/19/2023

## 2023-05-19 NOTE — PROGRESS NOTES
Follow up wound assessment performed.  Daughter at the bedside for assessment.   L heel remains stable with thin dry scab to the surface, L posterior ankle and lateral ankle ulcerations have increasing granulation tissue noted.  L lateral lower leg site previously closed with ischemic changes is now evolving, small area of moist eschar noted within discolored area.   Recommend continuing with enzymatic debridement to posterior ankle/lateral ankle and lateral lower leg.   Scar to medial ankle area remains unchanged and hyperpigmented.  No skin breakdown present to coccyx/sacrum or perineal area.    Fecal and urinary incontinence noted.  Recommend aggressive moisture management and use of barrier cream BID and prn for protection.  Pressure prevention measures remain in place with turn q 2 hrs, heel elevation, moisture management.   Discussed pressure prevention measures, plans to conservatively debride with enzymatic debriding agent due to severe PAD diagnosis (without intervention).  Surgical debridement not recommended at this time.  Daughter verbalized understanding.         05/19/23 1108   WOCN Assessment   WOCN Total Time (mins) 40   Visit Date 05/19/23   Visit Time 1108   Consult Type Follow Up   WOCN Speciality Wound   Wound arterial;pressure   Number of Wounds 4   Continence Type Urinary;Fecal   Intervention assessed;orders;chart review   Teaching on-going   Skin Interventions   Device Skin Pressure Protection absorbent pad utilized/changed;adhesive use limited;positioning supports utilized;pressure points protected   Pressure Reduction Devices heel offloading device utilized;positioning supports utilized;pressure-redistributing mattress utilized   Pressure Reduction Techniques heels elevated off bed;positioned off wounds;weight shift assistance provided   Skin Protection adhesive use limited;incontinence pads utilized;silicone foam dressing in place   Positioning   Positioning/Transfer Devices pillows    Pressure Injury Prevention    Check Moisture Management Pad Done   Sacral Foam Dressing Patient incontinent, barrier cream applied   Heel protection technique Heel boot   Heel preventative measures Replace   Check Medical Devices Done        Altered Skin Integrity 04/17/23 Left posterior Ankle Full thickness tissue loss. Subcutaneous fat may be visible but bone, tendon or muscle are not exposed   Date First Assessed: 04/17/23   Altered Skin Integrity Present on Admission - Did Patient arrive to the hospital with altered skin?: yes  Side: Left  Orientation: posterior  Location: Ankle  Description of Altered Skin Integrity: Full thickness tissue...   Wound Image    Description of Altered Skin Integrity Full thickness tissue loss with exposed bone, tendon, or muscle. Often includes undermining and tunneling. May extend into muscle and/or supporting structures.   Dressing Appearance Intact;Moist drainage   Drainage Amount Small   Drainage Characteristics/Odor Brown;Creamy;No odor;Bleeding controlled   Appearance Red;Granulating;Moist;Eschar;Slough;Yellow;Tendon   Tissue loss description Full thickness   Black (%), Wound Tissue Color 15 %   Red (%), Wound Tissue Color 60 %   Yellow (%), Wound Tissue Color 25 %   Periwound Area Intact   Wound Edges Defined   Wound Length (cm) 7 cm   Wound Width (cm) 1.5 cm   Wound Depth (cm) 0.1 cm   Wound Volume (cm^3) 1.05 cm^3   Wound Surface Area (cm^2) 10.5 cm^2   Care Cleansed with:;Antimicrobial agent   Dressing Applied;Sodium chloride impregnated;Gauze;Non-adherent;Rolled gauze;Other (comment)  (santyl)   Off Loading   (heel protector)   Dressing Change Due 05/20/23        Altered Skin Integrity 04/17/23 Left lateral Ankle Full thickness tissue loss. Subcutaneous fat may be visible but bone, tendon or muscle are not exposed   Date First Assessed: 04/17/23   Altered Skin Integrity Present on Admission - Did Patient arrive to the hospital with altered skin?: yes  Side: Left   Orientation: lateral  Location: Ankle  Description of Altered Skin Integrity: Full thickness tissue l...   Wound Image    Dressing Appearance Intact;Moist drainage   Drainage Amount Small   Drainage Characteristics/Odor Serous   Appearance Pink;Red;Yellow;Granulating;Slough   Tissue loss description Full thickness   Red (%), Wound Tissue Color 50 %   Yellow (%), Wound Tissue Color 50 %   Periwound Area Intact;Redness   Wound Edges Defined   Wound Length (cm) 1.1 cm   Wound Width (cm) 1 cm   Wound Depth (cm) 0.2 cm   Wound Volume (cm^3) 0.22 cm^3   Wound Surface Area (cm^2) 1.1 cm^2   Care Cleansed with:;Antimicrobial agent   Dressing Applied;Sodium chloride impregnated;Gauze;Non-adherent;Rolled gauze;Other (comment)  (santyl)   Dressing Change Due 05/20/23        Altered Skin Integrity 04/24/23 Left Heel Purple or maroon localized area of discolored intact skin or non-intact skin or a blood-filled blister.   Date First Assessed: 04/24/23   Altered Skin Integrity Present on Admission - Did Patient arrive to the hospital with altered skin?: yes  Side: Left  Location: Heel  Description of Altered Skin Integrity: Purple or maroon localized area of discolored ...   Description of Altered Skin Integrity Purple or maroon localized area of discolored intact skin or non-intact skin or a blood-filled blister.   Dressing Appearance Intact;Dry;Clean   Drainage Amount None   Appearance Red;Maroon;Dry;Other (see comments)  (thin scab to surface/dry)   Periwound Area Intact   Wound Edges Defined   Wound Length (cm) 1.2 cm   Wound Width (cm) 2 cm   Wound Surface Area (cm^2) 2.4 cm^2   Care Cleansed with:;Antimicrobial agent   Dressing Applied;Foam;Silicone   Dressing Change Due 05/22/23        Altered Skin Integrity 04/17/23 1730 Coccyx Ulceration Intact skin with non-blanchable redness of localized area   Date First Assessed/Time First Assessed: 04/17/23 1730   Altered Skin Integrity Present on Admission - Did Patient arrive to  the hospital with altered skin?: yes  Location: Coccyx  Is this injury device related?: No  Primary Wound Type: Ulceration  De...   Wound Image    Drainage Amount None   Appearance Closed/resurfaced   Tissue loss description Not applicable   Periwound Area Intact   Wound Length (cm) 0 cm   Wound Width (cm) 0 cm   Wound Depth (cm) 0 cm   Wound Volume (cm^3) 0 cm^3   Wound Surface Area (cm^2) 0 cm^2   Care Cleansed with:;Soap and water;Skin Barrier   Periwound Care Moisture barrier applied        Altered Skin Integrity 05/12/23 1057 Left lower;lateral Leg Other (comment) Purple or maroon localized area of discolored intact skin or non-intact skin or a blood-filled blister.   Date First Assessed/Time First Assessed: 05/12/23 1057   Altered Skin Integrity Present on Admission - Did Patient arrive to the hospital with altered skin?: yes  Side: Left  Orientation: lower;lateral  Location: Leg  Primary Wound Type: (c) Other (co...   Wound Image    Description of Altered Skin Integrity Full thickness tissue loss. Base is covered by slough and/or eschar in the wound bed   Dressing Appearance Intact;Moist drainage   Drainage Amount Small   Drainage Characteristics/Odor Brown;Creamy;No odor   Appearance Maroon;Red;Purple;Gray;Tan;Moist;Eschar;Not granulating   Black (%), Wound Tissue Color 35 %   Periwound Area Intact   Wound Edges Defined   Wound Length (cm) 7 cm   Wound Width (cm) 2.5 cm   Wound Depth (cm) 0.1 cm   Wound Volume (cm^3) 1.75 cm^3   Wound Surface Area (cm^2) 17.5 cm^2   Care Cleansed with:;Antimicrobial agent   Dressing Applied;Sodium chloride impregnated;Gauze;Absorptive Pad;Rolled gauze;Other (comment)  (Santyl)   Off Loading Other (see comments)  (float)   Dressing Change Due 05/20/23

## 2023-05-19 NOTE — PROGRESS NOTES
HOSPITAL MEDICINE  PROGRESS NOTE    CHIEF COMPLAINT   Hospital follow up    HOSPITAL COURSE   89-year-old female with a PMH of NIDDMII, prior DVT/IVC filter no longer on anticoagulation and non-healing left ankle wound, presented to [facility] on 4/16 after she became lethargic. Blood cultures returned positive for MRSA bacteremia. NM bone scan without clear evidence of OM. LLE arterial U/S showed monophasic flow throughout suggestive of inflow disease.CT T and L spine  with no evidence of discitis/  osteomyelitis. 2D TTE negative for valvular vegetation. STARR, performed on 4/22, negative valvular vegetations. She was taken to the OR on 04/25 for cystoscopy and right ureteral stent placement.  Plan to remove stent in 2 weeks after DC from hospital in the office. Blood cultures persistently positive, 4/16, 4/18, 4/19, 4/21. Eventually cultures from 4/23 remained negative. Vascular surgery consulted for PAD, CTA run-off noted severe flow-limiting disease in the left common femoral. Vascular surgery recommended left femoral endarterectomy, though family defering intervention for now. Hospital course complicated by intermittent hyponatremia. Nephro treated pt with Samsca and subsequent fluid restriction to 1 Liter with improvement of sodium level. On 05/12 she required 1 unit of PRBCs for low hemoglobin.  On 05/14 her urine culture grew Pseudomonas sensitive to ciprofloxacin and she was started on a 3 day course.  Family has been complaining of nighttime pain, patient did not tolerate Robaxin and had no relief from tramadol. Family states side effect of codeine containing medications is sleep, they would like to use this qhs for pain. Pt is tolerating.  Resumed her home dose of metformin and glimepiride on 05/17, patient with hypoglycemic episodes with insulin, now well controlled.    Today  Family at bedside reporting she is not sleeping at night, requesting increasing dose of Norco secondary to pain complaints at  night.  OBJECTIVE/PHYSICAL EXAM     VITAL SIGNS (MOST RECENT):  Temp: 97.7 °F (36.5 °C) (05/19/23 1142)  Pulse: 95 (05/19/23 1142)  Resp: 18 (05/19/23 1037)  BP: (!) 111/59 (05/19/23 1142)  SpO2: (!) 94 % (05/19/23 1142) VITAL SIGNS (24 HOUR RANGE):  Temp:  [97 °F (36.1 °C)-97.9 °F (36.6 °C)] 97.7 °F (36.5 °C)  Pulse:  [] 95  Resp:  [17-18] 18  SpO2:  [91 %-99 %] 94 %  BP: (111-146)/(59-71) 111/59   GENERAL: In no acute distress, afebrile  HEENT:  PERRLA  CHEST: Clear to auscultation bilaterally  HEART: S1, S2, Grade IV  ABDOMEN: Soft, nontender, BS +  MSK: Warm, no lower extremity edema, no clubbing or cyanosis  NEUROLOGIC: Alert and oriented to self, moving all extremities with good strength   INTEGUMENTARY:  Warm, dry  PSYCHIATRY: appropriate affect  ASSESSMENT/PLAN   Persistent MRSA Sepsis  Bacteremia  -clearance as of 4/23  -Continue with IV Vanc as planned x 6 weeks end date 6/4    Pseudomonas UTI   -a 3 day course of Cipro was started on 05/14/2023     Left ankle chronic non healing wound   Severe peripheral artery disease  -family refused intervention with vascular surgery   -wound care  -pain control, q.h.s. [place] prior to sleep  -Consulted Dr. Garrison for hyperbaric therapy per family request, he evaluated the patient      SIADH  Hyponatremia  -S/P Samsca 15 mg x 1 dose on 5/7/23  -Remains off of diuretics, family request   -1 liter fluid restriction with goal sodium 130 or higher  -TSH and Cortisol are normal     Encephalopathy  Staring Spells  -resolved   -Abnormal EEG due to findings consistent with a nonspecific bilateral brain dysfunction.     Electrolyte abnormality  -Hypokalemia, Hypomagnesemia: replete as condition requires      Acute on chronic diastolic heart failure   Mild to moderate Aortic stenosis  Pulmonary HTN  -currently compensated   -BNP elevated though likely chronically elevated in setting of severe AS     NIDDM II  -c/w glimepiride, metformin   -well controlled  -A1c 6.3      Normochromic anemia   -received 3 units of ferrlecit   -continue with p.o. iron   -required 1 unit of PRBCs on 05/12     R Hydronephrosis   -s/p stent - outpt removal in a few weeks with Dr Gauthier     Chronic low back pain   Constipation associated with colonic fecal impaction  -stable         DVT prophylaxis:  lovenox  Anticipated discharge and disposition:  Home __________________________________________________________________________    LABS/MICRO/MEDS/DIAGNOSTICS       LABS  Recent Labs     05/19/23  0630   *   K 3.5   CHLORIDE 94*   CO2 28   BUN 18.4   CREATININE 0.68   GLUCOSE 125*   CALCIUM 8.3*     Recent Labs     05/19/23  0630   WBC 10.83   RBC 3.44*   HCT 30.4*   MCV 88.4            MICROBIOLOGY  Microbiology Results (last 7 days)       Procedure Component Value Units Date/Time    Urine culture [[id number]]  (Abnormal)  (Susceptibility) Collected: 05/11/23 2216    Order Status: Completed Specimen: Urine Updated: 05/14/23 0810     Urine Culture 10,000 - 25,000 colonies/ml Pseudomonas aeruginosa               MEDICATIONS   carvediloL  6.25 mg Oral BID    collagenase   Topical (Top) Daily    enoxaparin  40 mg Subcutaneous Daily    ferrous sulfate  1 tablet Oral Daily    glimepiride  2 mg Oral Daily with breakfast    HYDROcodone-acetaminophen  2 tablet Oral QHS    Lactobacillus acidophilus  1 capsule Oral TID WM    magnesium oxide  400 mg Oral Daily    metFORMIN  1,000 mg Oral BID WM    polyethylene glycol  17 g Oral Daily    sodium chloride  1 g Oral Daily    tamsulosin  0.4 mg Oral Daily    trazodone  100 mg Oral QHS    vancomycin (VANCOCIN) IVPB  500 mg Intravenous Q12H         INFUSIONS         DIAGNOSTIC TESTS  X-Ray Chest 1 View for Line/Tube Placement   Final Result      As above.         Electronically signed by: Jerzy Sweet   Date:    05/16/2023   Time:    07:01           EF   Date Value Ref Range Status   04/21/2023 60 % Final   04/17/2023 54 % Final              Case related  differential diagnoses have been reviewed; assessment and plan has been documented. I have personally reviewed the labs and test results that are currently available; I have reviewed the patients medication list. I have reviewed the consulting providers recommendations. I have reviewed or attempted to review medical records based upon their availability.  All of the patient's and/or family's questions have been addressed and answered to the best of my ability.  I will continue to monitor closely and make adjustments to medical management as needed.  This document was created using M*Modal Fluency Direct.  Transcription errors may have been made.  Please contact me if any questions may rise regarding documentation to clarify transcription.        Thairy G Reyes,    05/19/2023   Internal Medicine

## 2023-05-19 NOTE — PLAN OF CARE
Problem: Adult Inpatient Plan of Care  Goal: Plan of Care Review  Outcome: Ongoing, Progressing  Flowsheets (Taken 5/18/2023 2000)  Plan of Care Reviewed With:   patient   daughter  Goal: Patient-Specific Goal (Individualized)  Outcome: Ongoing, Progressing  Flowsheets (Taken 5/18/2023 2000)  Individualized Care Needs: antibiotic therapy, physical therapy  Goal: Absence of Hospital-Acquired Illness or Injury  Outcome: Ongoing, Progressing  Intervention: Identify and Manage Fall Risk  Flowsheets (Taken 5/18/2023 2000)  Safety Promotion/Fall Prevention:   assistive device/personal item within reach   bed alarm set   Fall Risk reviewed with patient/family   family to remain at bedside   gait belt with ambulation   muscle strengthening facilitated   nonskid shoes/socks when out of bed   side rails raised x 3   supervised activity   Supervised toileting - stay within arms reach   instructed to call staff for mobility  Intervention: Prevent Skin Injury  Flowsheets (Taken 5/18/2023 2000)  Body Position: weight shifting  Skin Protection:   hydrocolloids used   incontinence pads utilized   skin sealant/moisture barrier applied  Intervention: Prevent and Manage VTE (Venous Thromboembolism) Risk  Flowsheets (Taken 5/18/2023 2000)  Activity Management: Sitting at edge of bed - L2  VTE Prevention/Management: bleeding precations maintained  Range of Motion: ROM (range of motion) performed  Intervention: Prevent Infection  Flowsheets (Taken 5/18/2023 2000)  Infection Prevention:   cohorting utilized   environmental surveillance performed   hand hygiene promoted   rest/sleep promoted   single patient room provided  Goal: Optimal Comfort and Wellbeing  Outcome: Ongoing, Progressing  Intervention: Monitor Pain and Promote Comfort  Flowsheets (Taken 5/18/2023 2000)  Pain Management Interventions:   medication offered   pain management plan reviewed with patient/caregiver   quiet environment facilitated     Problem: Diabetes  Comorbidity  Goal: Blood Glucose Level Within Targeted Range  Outcome: Ongoing, Progressing  Intervention: Monitor and Manage Glycemia  Flowsheets (Taken 5/18/2023 2000)  Glycemic Management:   blood glucose monitored   supplemental insulin given     Problem: Infection  Goal: Absence of Infection Signs and Symptoms  Outcome: Ongoing, Progressing  Intervention: Prevent or Manage Infection  Flowsheets (Taken 5/18/2023 2000)  Fever Reduction/Comfort Measures:   lightweight bedding   lightweight clothing  Infection Management: aseptic technique maintained     Problem: Impaired Wound Healing  Goal: Optimal Wound Healing  Outcome: Ongoing, Progressing  Intervention: Promote Wound Healing  Flowsheets (Taken 5/18/2023 2000)  Oral Nutrition Promotion:   calorie-dense foods provided   calorie-dense liquids provided   physical activity promoted   safe use of adaptive equipment encouraged  Sleep/Rest Enhancement:   awakenings minimized   consistent schedule promoted   family presence promoted   noise level reduced   regular sleep/rest pattern promoted   room darkened  Activity Management: Sitting at edge of bed - L2  Pain Management Interventions:   medication offered   pain management plan reviewed with patient/caregiver   quiet environment facilitated     Problem: Fall Injury Risk  Goal: Absence of Fall and Fall-Related Injury  Outcome: Ongoing, Progressing  Intervention: Identify and Manage Contributors  Flowsheets (Taken 5/18/2023 2000)  Self-Care Promotion:   independence encouraged   BADL personal objects within reach   meal set-up provided   BADL personal routines maintained   safe use of adaptive equipment encouraged  Intervention: Promote Injury-Free Environment  Flowsheets (Taken 5/18/2023 2000)  Safety Promotion/Fall Prevention:   assistive device/personal item within reach   bed alarm set   Fall Risk reviewed with patient/family   family to remain at bedside   gait belt with ambulation   muscle strengthening  facilitated   nonskid shoes/socks when out of bed   side rails raised x 3   supervised activity   Supervised toileting - stay within arms reach   instructed to call staff for mobility     Problem: Skin Injury Risk Increased  Goal: Skin Health and Integrity  Outcome: Ongoing, Progressing  Intervention: Optimize Skin Protection  Flowsheets (Taken 5/18/2023 2000)  Pressure Reduction Techniques:   frequent weight shift encouraged   heels elevated off bed   positioned off wounds   pressure points protected   weight shift assistance provided  Pressure Reduction Devices: heel offloading device utilized  Skin Protection:   hydrocolloids used   incontinence pads utilized   skin sealant/moisture barrier applied  Head of Bed (HOB) Positioning: HOB at 20-30 degrees  Intervention: Promote and Optimize Oral Intake  Flowsheets (Taken 5/18/2023 2000)  Oral Nutrition Promotion:   calorie-dense foods provided   calorie-dense liquids provided   physical activity promoted   safe use of adaptive equipment encouraged     Problem: Mobility Impairment  Goal: Optimal Mobility  Outcome: Ongoing, Progressing  Intervention: Optimize Mobility  Flowsheets (Taken 5/18/2023 2000)  Assistive Device Utilized: gait belt  Activity Management: Sitting at edge of bed - L2  Positioning/Transfer Devices:   pillows   in use

## 2023-05-19 NOTE — PROGRESS NOTES
Name: Melodie Villarreal    : 1931 (89 y.o.)  MRN: [id number]           Patient Unavailable      Patient unable to be seen at this time secondary to: pt asleep upon entry following wound care. Patient's daughter at bedside requesting to let patient rest and f/u this PM

## 2023-05-19 NOTE — PT/OT/SLP PROGRESS
Occupational Therapy  Treatment    Name: Melodie Villarreal    : 1931 (89 y.o.)  MRN: [id number]           TREATMENT SUMMARY AND RECOMMENDATIONS:      OT Date of Treatment: 23  OT Start Time:   OT Stop Time:   OT Total Time (min): 30 min      Subjective Assessment:   No complaints  Lethargic   x Awake, alert, cooperative  Impulsive    Uncooperative   Flat affect    Agitated  c/o pain   x Appropriate  c/o fatigue    Confused x Treated at bedside     Emotionally labile  Treated in gym/dept.      Other:        Therapy Precautions:   x Cognitive deficits  Spinal precautions    Collar - hard  Sternal precautions    Collar - soft   TLSO   x Fall risk  LSO    Hip precautions - posterior  Knee immobilizer    Hip precautions - anterior  WBAT    Impaired communication  Partial weightbearing    Oxygen  TTWB    PEG tube  NWB    Visual deficits      Hearing deficits   Other:        Treatment Objectives:     Mobility Training:    Mobility task Assist level Comments    Bed mobility Total A Supine<>sitting EOB use of bed rail    Transfer     Sit to stands transitions     Functional mobility     Sitting balance Mod A- SBA Sitting EOB x14 minutes initially requiring mod A d/t posterior lean but eventually only required SBA   Standing balance      Other:        ADL Training:    ADL Assist level Comments    Feeding SPV Cutting up food using a fork and knife and bringing food to mouth    Grooming/hygiene     Bathing     Upper body dressing     Lower body dressing     Toileting     Toilet transfer     Adaptive equipment training     Other:           Therapeutic Exercise:   Exercise Sets Reps Comments                               Additional Comments:      Assessment: Patient tolerated session well. Pt demonstrated increased sitting tolerance as compared to previous sessions.     OT Plan: Continue with POC  Revisions made to plan of care: No    GOALS:   Multidisciplinary Problems       Occupational Therapy Goals           Problem: Occupational Therapy    Goal Priority Disciplines Outcome Interventions   Occupational Therapy Goal     OT, PT/OT Ongoing, Progressing    Description: Goals to be met by: 5/26/23     Patient will increase functional independence with ADLs by performing:    Toileting from bedside commode with Moderate Assistance for hygiene and clothing management.   Bathing from  edge of bed with Moderate Assistance.  Toilet transfer to bedside commode with Moderate Assistance.   Hygiene/grooming from edge of bed with minimal assistance.                          Skilled OT Minutes Provided: 30  Communication with Treatment Team:     Equipment recommendations:       At end of treatment, patient remained:   Up in chair     Up in wheelchair in room   x In bed   x With alarm activated   x Bed rails up   x Call bell in reach    x Family/friends present    Restraints secured properly    In bathroom with CNA/RN notified    In gym with PT/PTA/tech    Nurse aware    Other:

## 2023-05-20 PROBLEM — I50.32 CHRONIC DIASTOLIC CHF (CONGESTIVE HEART FAILURE): Status: ACTIVE | Noted: 2023-01-01

## 2023-05-20 PROBLEM — Z75.8 DISCHARGE PLANNING ISSUES: Status: ACTIVE | Noted: 2023-01-01

## 2023-05-20 PROBLEM — E78.5 HYPERLIPIDEMIA: Status: ACTIVE | Noted: 2022-05-03

## 2023-05-20 PROBLEM — K59.09 CHRONIC CONSTIPATION: Status: ACTIVE | Noted: 2023-01-01

## 2023-05-20 PROBLEM — I35.0 SEVERE AORTIC STENOSIS: Status: ACTIVE | Noted: 2023-01-01

## 2023-05-20 PROBLEM — D50.9 IRON DEFICIENCY ANEMIA: Status: ACTIVE | Noted: 2023-01-01

## 2023-05-20 PROBLEM — E87.6 HYPOKALEMIA: Status: ACTIVE | Noted: 2023-01-01

## 2023-05-20 PROBLEM — E87.1 HYPONATREMIA: Status: ACTIVE | Noted: 2023-01-01

## 2023-05-20 PROBLEM — E83.42 HYPOMAGNESEMIA: Status: ACTIVE | Noted: 2023-01-01

## 2023-05-20 PROBLEM — G92.8 TOXIC METABOLIC ENCEPHALOPATHY: Status: ACTIVE | Noted: 2023-01-01

## 2023-05-20 PROBLEM — B95.62 MRSA BACTEREMIA: Status: ACTIVE | Noted: 2023-01-01

## 2023-05-20 PROBLEM — N39.0 URINARY TRACT INFECTION: Status: ACTIVE | Noted: 2023-01-01

## 2023-05-20 PROBLEM — R53.81 DEBILITY: Status: ACTIVE | Noted: 2023-01-01

## 2023-05-20 NOTE — PT/OT/SLP PROGRESS
Physical Therapy Treatment Note           Name: Melodie Villarreal    : 1931 (89 y.o.)  MRN: [id number]           TREATMENT SUMMARY AND RECOMMENDATIONS:    PT Received On: 23  PT Start Time: 1135     PT Stop Time: 1220  PT Total Time (min): 45 min     Subjective Assessment:   No complaints  Lethargic   x Awake, alert, cooperative  Uncooperative    Agitated  c/o pain    Appropriate  c/o fatigue   x Confused x Treated at bedside     Emotionally labile  Treated in gym/dept.    Impulsive  Other:    Flat affect       Therapy Precautions:    Cognitive deficits  Spinal precautions    Collar - hard  Sternal precautions    Collar - soft   TLSO   x Fall risk  LSO    Hip precautions - posterior  Knee immobilizer    Hip precautions - anterior x WBAT    Impaired communication  Partial weightbearing    Oxygen  TTWB    PEG tube  NWB    Visual deficits  Other:    Hearing deficits          Treatment Objectives:     Mobility Training:   Assist level Comments    Bed mobility MOD A x2 Supine > sit  MAX A: rolling L/R in bedside chair to change soiled brief following BM   Transfer MAX A Stand pivot transfer bed > bedside chair   Gait     Sit to stand transitions MOD A x2 X3 sit to stands from bedside chair using bed rail anterior    Sitting balance     Standing balance      Wheelchair mobility     Car transfer     Other:          Therapeutic Exercise:   Exercise Sets Reps Comments                               Additional Comments:  Co-tx with OT Yanely    Assessment: Patient tolerated session well.    PT Plan: continue POC  Revisions made to plan of care: No    GOALS:   Multidisciplinary Problems       Physical Therapy Goals          Problem: Physical Therapy    Goal Priority Disciplines Outcome Goal Variances Interventions   Physical Therapy Goal     PT, PT/OT Ongoing, Progressing     Description: Goals to be met by: Discharge     Patient will increase functional independence with mobility by performin. Supine to  sit with MInimal Assistance  2. Sit to supine with MInimal Assistance  3. Bed to chair transfer with Minimal Assistance using Rolling Walker  4. Gait  x 15 feet with Minimal Assistance using Rolling Walker.   5. Sitting at edge of bed x10 minutes with Stand-by Assistance                         Skilled PT Minutes Provided: 45 minutes   Communication with Treatment Team:     Equipment recommendations:       At end of treatment, patient remained:  x Up in chair     Up in wheelchair in room    In bed   x With alarm activated    Bed rails up   x Call bell in reach    x Family/friends present    Restraints secured properly    In bathroom with CNA/RN notified    Nurse aware    In gym with therapist/tech   x Other:CNA notified of assist level for transfer

## 2023-05-20 NOTE — SUBJECTIVE & OBJECTIVE
Interval History: She remains on 6 weeks of IV vancomycin for MRSA sepsis with bacteremia. She received trazodone 100 mg PO QHS and norco 10/325 mg PO QHS last night and she is more lethargic this morning. She participated in therapy yesterday and she required maximum assistance x 2 with bed mobility.      Review of Systems   All other systems reviewed and are negative.  Objective:     Vital Signs (Most Recent):  Temp: 97.6 °F (36.4 °C) (23 07)  Pulse: 92 (23)  Resp: 16 (23)  BP: 130/75 (23)  SpO2: 95 % (23) Vital Signs (24h Range):  Temp:  [97.6 °F (36.4 °C)-98.2 °F (36.8 °C)] 97.6 °F (36.4 °C)  Pulse:  [68-97] 92  Resp:  [16-18] 16  SpO2:  [92 %-99 %] 95 %  BP: (105-146)/(55-75) 130/75     Weight: 53.6 kg (118 lb 2.7 oz)  Body mass index is 20.94 kg/m².    Intake/Output Summary (Last 24 hours) at 2023 0853  Last data filed at 2023 0600  Gross per 24 hour   Intake 600 ml   Output 1300 ml   Net -700 ml         Physical Exam      General - Elderly  female in no acute distress.  HEENT - NCAT. No scleral icterus. Oropharynx clear. Mucous membranes moist.  Neck - No lymphadenopathy, thyromegaly, or JVD.  CVS - Regular rate and rhythm. No murmurs, rubs, or gallops.  Resp - Lungs are clear to auscultation bilaterally. No rales, wheeze, or rhonchi.   GI - Soft, nontender, nondistended, normoactive bowel sounds present.   Extremities - No clubbing, cyanosis, or edema. Right upper extremity PICC line.  Neuro - CN II through XII are grossly intact. No focal neurological deficits. Alert and oriented to name and .  Psych - Flat affect.  Skin -  Left heel remains stable with thin dry scab to the surface. Left posterior ankle and lateral ankle ulcerations have increasing granulation tissue noted.  Left lateral lower leg site previously closed with ischemic changes is now evolving and there is a small area of moist eschar noted within discolored area.       Significant Labs: All pertinent labs within the past 24 hours have been reviewed.  CBC:   Recent Labs   Lab 05/19/23  0630   WBC 10.83   HGB 9.0*   HCT 30.4*        CMP:   Recent Labs   Lab 05/19/23  0630   *   K 3.5   CO2 28   BUN 18.4   CREATININE 0.68   CALCIUM 8.3*       Significant Imaging: I have reviewed all pertinent imaging results/findings within the past 24 hours.    CXR 5/16/2023: Left-sided PICC projects over the distal SVC.  Prominent interstitial markings with no focal opacification.    CXR 5/6/2023: The heart is not significantly enlarged.  There is aortic atherosclerosis.  Similar prominent central pulmonary vasculature.  No new dense consolidation or pneumothorax.    CT head 5/5/2023:   1. No acute intracranial abnormality.  2. Chronic microvascular ischemic changes.    EEG 5/2/2023: No evidence of seizure activity.    CT head 5/2/2023: No acute intracranial abnormality.    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    Renal ultrasound 4/21/2023: Mild-to-moderate hydronephrosis on the right.    CT abdomen/pelvis 4/21/2023: Mild to moderate right hydronephrosis with no significant right ureteral dilatation.      STARR 4/21/2023: No valvular vegetations and moderate to severe aortic stenosis.     MRI thoracic/lumbar spine 4/20/2023: No evidence to suggest discitis/osteomyelitis or drainable fluid collection and new right-sided hydronephrosis.    Arterial Doppler left lower extremity 4/20/2023: Monophasic flow throughout suggestive of inflow disease    NM bone scan 4/19/2023: Left lower extremity cellulitis without focal intense concentration more typically seen with an abscess or osteomyelitis.     TTE 4/17/2023:  Low-normal systolic function  with an EF of 54%, grade II left ventricular diastolic dysfunction, mild-to-moderate aortic valve stenosis, and no evidence of vegetation.    CT head 4/16/2023: No acute intracranial abnormality.      CXR 4/16/2023: No acute cardiopulmonary process.    CT chest/abdomen/pelvis 4/16/2023: No evidence of PE and no acute intraabdominal or pelvic solid organ or bowel pathology identified.      X-ray left foot 4/16/2023: Minimally displaced 5th proximal phalanx fracture and possible minimally displaced 4th proximal phalanx fracture.    X-ray left tibia/fibula 4/16/2023: No acute osseous process.

## 2023-05-20 NOTE — PROGRESS NOTES
[facility] Medicine  Telehospitalist Progress Note    Patient Name: Melodie Villarreal  MRN: [id number]  Patient Class: IP- Swing   Admission Date: 5/11/2023  Length of Stay: 9 days  Attending Physician: Naun Pope MD  Primary Care Provider: Wisam Espinosa Jr, MD      Subjective:     Principal Problem:MRSA bacteremia      HPI:  Ms. Villarreal is a 89-year-old  female with history of hypertension, hyperlipidemia, type II diabetes mellitus, DVT/PE status post IVC filter placement, and bladder cancer who originally presented to [facility] ER on 4/16/2023 with fatigue, generalized weakness, dysuria, dark urine, and lethargy and she was hypotensive with a blood pressure of 66/33 on EMS arrival.  Her initial lab work was remarkable for a WBC count of 34.3, lactic acid of 3.8, troponin of 0.115, and BNP of 2571.8 and CT of the head showed no acute intracranial abnormality.  CXR revealed no acute cardiopulmonary process and CT of the chest, abdomen, pelvis demonstrated no evidence of PE and no acute intraabdominal or pelvic solid organ or bowel pathology.  X-ray of the left foot showed minimally displaced 5th proximal phalanx fracture and possible minimally displaced 4th proximal phalanx fracture and x-ray of the left tibia/fibula revealed no acute osseous process.  She was started on broad-spectrum antibiotics with IV vancomycin and zosyn for sepsis and her blood cultures from admission returned positive for MRSA.  ID was consulted and TTE from 4/17/2023 demonstrated no evidence of vegetation. She was scheduled for NM bone scan on 4/19/2023 which was positive for a left lower extremity cellulitis without focal intense concentration. Arterial Doppler of the left lower extremity from 4/20/2023 showed monophasic flow throughout suggestive of inflow disease and MRI of the thoracic and lumbar spine revealed no evidence to suggest discitis/osteomyelitis or drainable fluid collection and  new right-sided hydronephrosis.  Repeat blood cultures remained positive and cardiology was consulted for STARR on 4/21/2023 which demonstrated no valvular vegetations and moderate to severe aortic stenosis.  Her chronic left ankle wound was thought to be the source of her persistent MRSA bacteremia and 6 weeks of IV vancomycin was recommended by ID.  Renal ultrasound from 4/21/2023 confirmed mild-to-moderate hydronephrosis on the right and CT of the abdomen and pelvis showed mild to moderate right hydronephrosis with no significant right ureteral dilatation.  Urology was consulted and she was taken to the OR on 4/25/2023 for cystoscopy with bilateral retrograde pyelograms right ureteral stent placement due to right hydronephrosis with ureteral obstruction.  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  She was evaluated by neurology on 5/02/2023 due to mental status changes thought to be toxic metabolic encephalopathy and CT of the head demonstrated no acute intracranial abnormality.  EEG from 5/2/2023 showed no evidence of seizure activity and repeat CT of the head was unremarkable.  Nephrology was consulted on 5/03/2023 due to worsening hyponatremia and she was treated with samsca and a 1 L fluid restriction with improvement in her sodium levels.  She had no other complications throughout her hospital course and she was discharged to [facility] for PT/OT, completion of IV antibiotics, and management of her medical comorbidities.      Overview/Hospital Course:  She was admitted to [facility] on 5/11/2023 for rehab s/p  hospitalization for persistent MRSA bacteremia with sepsis due to nonhealing left ankle wound, right hydronephrosis with ureteral obstruction s/p right ureteral stent placement, critical limb ischemia of the left lower extremity, toxic metabolic encephalopathy, and hyponatremia.  She was found to have a UTI on urinalysis from 5/11/2023 and her urine culture grew 10,000-25,000 cfu/ml of Pseudomonas aeruginosa.  She was started on a 3 day course of ciprofloxacin 250 mg by mouth twice daily which she completed on 5/16/2023.  Her hemoglobin and hematocrit dropped to 7.6 and 25.1 on 5/12/2023 and she was transfused with 1 unit packed RBC.  She was started on sodium chloride 1 g by mouth daily on 5/15/2023 due to persistent hyponatremia.  Detemir was discontinued on 5/17/2023 due to intermittent hypoglycemia and she was started on metformin 1000 mg by mouth twice daily and glimepiride 2 mg by mouth in the morning.      Interval History: She remains on 6 weeks of IV vancomycin for MRSA sepsis with bacteremia. She received trazodone 100 mg PO QHS and norco 10/325 mg PO QHS last night and she is more lethargic this morning. She participated in therapy yesterday and she required maximum assistance x 2 with bed mobility.      Review of Systems   All other systems reviewed and are negative.  Objective:     Vital Signs (Most Recent):  Temp: 97.6 °F (36.4 °C) (05/20/23 0743)  Pulse: 92 (05/20/23 0743)  Resp: 16 (05/19/23 2132)  BP: 130/75 (05/20/23 0743)  SpO2: 95 % (05/20/23 0743) Vital Signs (24h Range):  Temp:  [97.6 °F (36.4 °C)-98.2 °F (36.8 °C)] 97.6 °F (36.4 °C)  Pulse:  [68-97] 92  Resp:  [16-18] 16  SpO2:  [92 %-99 %] 95 %  BP: (105-146)/(55-75) 130/75     Weight: 53.6 kg (118 lb 2.7 oz)  Body mass index is 20.94 kg/m².    Intake/Output Summary (Last 24 hours) at 5/20/2023 0849  Last data filed at 5/20/2023 0600  Gross per 24 hour   Intake 600 ml   Output 1300 ml   Net -700 ml         Physical Exam      General - Elderly   female in no acute distress.  HEENT - NCAT. No scleral icterus. Oropharynx clear. Mucous membranes moist.  Neck - No lymphadenopathy, thyromegaly, or JVD.  CVS - Regular rate and rhythm. No murmurs, rubs, or gallops.  Resp - Lungs are clear to auscultation bilaterally. No rales, wheeze, or rhonchi.   GI - Soft, nontender, nondistended, normoactive bowel sounds present.   Extremities - No clubbing, cyanosis, or edema. Right upper extremity PICC line.  Neuro - CN II through XII are grossly intact. No focal neurological deficits. Alert and oriented to name and .  Psych - Flat affect.  Skin -  Left heel remains stable with thin dry scab to the surface. Left posterior ankle and lateral ankle ulcerations have increasing granulation tissue noted.  Left lateral lower leg site previously closed with ischemic changes is now evolving and there is a small area of moist eschar noted within discolored area.      Significant Labs: All pertinent labs within the past 24 hours have been reviewed.  CBC:   Recent Labs   Lab 23  0630   WBC 10.83   HGB 9.0*   HCT 30.4*        CMP:   Recent Labs   Lab 23  0630   *   K 3.5   CO2 28   BUN 18.4   CREATININE 0.68   CALCIUM 8.3*       Significant Imaging: I have reviewed all pertinent imaging results/findings within the past 24 hours.    CXR 2023: Left-sided PICC projects over the distal SVC.  Prominent interstitial markings with no focal opacification.    CXR 2023: The heart is not significantly enlarged.  There is aortic atherosclerosis.  Similar prominent central pulmonary vasculature.  No new dense consolidation or pneumothorax.    CT head 2023:   1. No acute intracranial abnormality.  2. Chronic microvascular ischemic changes.    EEG 2023: No evidence of seizure activity.    CT head 2023: No acute intracranial abnormality.    CTA abdomen/pelvis with bilateral runoff 2023: Significant stenosis of the left common iliac artery,  severe narrowing versus occlusion at the left common femoral artery, occlusion at the distal superficial femoral artery, and stenosis at the origin of the superior mesenteric artery.    Renal ultrasound 4/21/2023: Mild-to-moderate hydronephrosis on the right.    CT abdomen/pelvis 4/21/2023: Mild to moderate right hydronephrosis with no significant right ureteral dilatation.      STARR 4/21/2023: No valvular vegetations and moderate to severe aortic stenosis.     MRI thoracic/lumbar spine 4/20/2023: No evidence to suggest discitis/osteomyelitis or drainable fluid collection and new right-sided hydronephrosis.    Arterial Doppler left lower extremity 4/20/2023: Monophasic flow throughout suggestive of inflow disease    NM bone scan 4/19/2023: Left lower extremity cellulitis without focal intense concentration more typically seen with an abscess or osteomyelitis.     TTE 4/17/2023:  Low-normal systolic function with an EF of 54%, grade II left ventricular diastolic dysfunction, mild-to-moderate aortic valve stenosis, and no evidence of vegetation.    CT head 4/16/2023: No acute intracranial abnormality.      CXR 4/16/2023: No acute cardiopulmonary process.    CT chest/abdomen/pelvis 4/16/2023: No evidence of PE and no acute intraabdominal or pelvic solid organ or bowel pathology identified.      X-ray left foot 4/16/2023: Minimally displaced 5th proximal phalanx fracture and possible minimally displaced 4th proximal phalanx fracture.    X-ray left tibia/fibula 4/16/2023: No acute osseous process.        Assessment/Plan:      * MRSA bacteremia  Continue 6 week course of IV vancomycin until 6/4/2023.  Repeat blood culture from 4/23/2023 were negative.  TTE from 4/17/2023 and STARR from 4/23/2023 showed no evidence of vegetation and NM bone scan on 4/19/2023 was positive for a left lower extremity cellulitis without focal intense concentration more typically seen with an abscess or osteomyelitis.     Sepsis  Resolved.   Continue 6 week course of IV vancomycin until 6/4/2023 as per above.    Right hydronephrosis  Continue tamsulosin.  She is s/p cystoscopy with bilateral retrograde pyelograms right ureteral stent placement on 4/25/2023 due to right hydronephrosis with ureteral obstruction. Renal ultrasound from 4/21/2023 revealed mild-to-moderate hydronephrosis on the right and CT of the abdomen and pelvis showed mild to moderate right hydronephrosis with no significant right ureteral dilatation.  She will need to follow-up with urology as an outpatient 1-2 weeks following discharge for right ureteral stent removal.    Toxic metabolic encephalopathy  Discontinue scheduled nighttime norco.  Consider titrating down trazodone.  CT of the head from 5/02/2023 and 5/06/2023 showed no acute intracranial abnormality and EEG from 5/02/2023 revealed no evidence of seizure activity.    Hyponatremia  Continue sodium chloride tablets and 1200 ml fluid restriction. HCTZ has been discontinued. She is s/p treatment with samsca on 5/7/2023.    Critical limb ischemia of left lower extremity  Continue statin. She is not currently on any blood thinners. 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      Urinary tract infection  Resolved.  She completed a 3 day course of ciprofloxacin 250 mg by mouth twice daily on 5/16/2023.  Urine culture from 5/11/2023 grew 10,000-25,000 cfu/ml of Pseudomonas aeruginosa.    Debility  Continue PT/OT.    Hypomagnesemia  Improved.  Continue magnesium oxide.    Hypokalemia  Improved.    Hypertension  Continue carvedilol. She is no longer on losartan-HCTZ.    Type II  diabetes mellitus  Continue metformin and glimepiride.  Detemir was discontinued on 05/17/2023 due to hypoglycemia.  Her hemoglobin A1c from 3/07/2023 was 6.3.      Iron deficiency anemia  Continue ferrous sulfate.  She is s/p blood transfusion with 1 unit packed RBC on 5/12/2023 and she was treated with 3 days of IV ferrlecit.    Chronic diastolic CHF (congestive heart failure)  Compensated. She is not currently on any diuretics.  TTE from 4/17/2023 showed low-normal systolic function with an EF of 54% and grade II left ventricular diastolic dysfunction.     Insomnia  Continue trazodone which may need to be titrated down.    Hyperlipidemia  Continue pravastatin.    Severe aortic stenosis  No acute issues.  She can follow-up with cardiology as an outpatient as she may be a candidate for TAVR.    Chronic constipation  Continue polyethylene glycol.    Disposition  Anticipate discharge home with home health following completion of IV antibiotics.      VTE Risk Mitigation (From admission, onward)         Ordered     enoxaparin injection 40 mg  Daily         05/11/23 1414     Place sequential compression device  Until discontinued         05/11/23 1414                Discharge Planning   ERIN:      Code Status: DNR   Is the patient medically ready for discharge?:     Reason for patient still in hospital (select all that apply): Treatment, PT / OT recommendations and Pending disposition  Discharge Plan A: Home with family, Home   Discharge Delays: None known at this time      This service was provided via telemedicine.  Type of Software: Audio/Visual.  Originating Site: [facility].  Distant Site: [place], [place].  Her exam was performed with the assistance of Sarahy Campos RN.      Naun Pope MD  Department of Hospital Medicine   [facility]

## 2023-05-20 NOTE — ASSESSMENT & PLAN NOTE
Continue metformin and glimepiride.  Detemir was discontinued on 05/17/2023 due to hypoglycemia.  Her hemoglobin A1c from 3/07/2023 was 6.3.

## 2023-05-20 NOTE — ASSESSMENT & PLAN NOTE
Continue sodium chloride tablets and 1200 ml fluid restriction. HCTZ has been discontinued. She is s/p treatment with samsca on 5/7/2023.

## 2023-05-20 NOTE — PT/OT/SLP PROGRESS
Occupational Therapy  Treatment    Name: Melodie Villarreal    : 1931 (89 y.o.)  MRN: [id number]           TREATMENT SUMMARY AND RECOMMENDATIONS:      OT Date of Treatment: 23  OT Start Time: 1135  OT Stop Time: 1220  OT Total Time (min): 45 min      Subjective Assessment:   No complaints  Lethargic   X Awake, alert, cooperative  Impulsive    Uncooperative   Flat affect    Agitated  c/o pain    Appropriate  c/o fatigue   X Confused X Treated at bedside     Emotionally labile  Treated in gym/dept.      Other:        Therapy Precautions:   X Cognitive deficits  Spinal precautions    Collar - hard  Sternal precautions    Collar - soft   TLSO   X Fall risk  LSO    Hip precautions - posterior  Knee immobilizer    Hip precautions - anterior  WBAT    Impaired communication  Partial weightbearing    Oxygen  TTWB    PEG tube  NWB    Visual deficits      Hearing deficits   Other:        Treatment Objectives:     Mobility Training:    Mobility task Assist level Comments    Bed mobility Max Ax2 Supine<sitting EOB   Transfer Max Ax2 Squat pivot t/f from EOB<recliner using GB   Sit to stands transitions Max Ax2 X2reps from recliner to standing using bed rail and GB   Functional mobility     Sitting balance     Standing balance      Other:        ADL Training:    ADL Assist level Comments    Feeding     Grooming/hygiene     Bathing     Upper body dressing     Lower body dressing     Toileting Total A +BM in diaper; assist for hygiene and clothing management    Toilet transfer     Adaptive equipment training     Other:           Therapeutic Exercise:   Exercise Sets Reps Comments                               Additional Comments:      Assessment: Patient tolerated session well.    OT Plan: Continue with POC  Revisions made to plan of care: No    GOALS:   Multidisciplinary Problems       Occupational Therapy Goals          Problem: Occupational Therapy    Goal Priority Disciplines Outcome Interventions   Occupational Therapy  Goal     OT, PT/OT Ongoing, Progressing    Description: Goals to be met by: 5/26/23     Patient will increase functional independence with ADLs by performing:    Toileting from bedside commode with Moderate Assistance for hygiene and clothing management.   Bathing from  edge of bed with Moderate Assistance.  Toilet transfer to bedside commode with Moderate Assistance.   Hygiene/grooming from edge of bed with minimal assistance.                          Skilled OT Minutes Provided: 45  Communication with Treatment Team:     Equipment recommendations:       At end of treatment, patient remained:  x Up in chair     Up in wheelchair in room    In bed   x With alarm activated    Bed rails up   x Call bell in reach    x Family/friends present    Restraints secured properly    In bathroom with CNA/RN notified    In gym with PT/PTA/tech    Nurse aware    Other:

## 2023-05-20 NOTE — ASSESSMENT & PLAN NOTE
Compensated. She is not currently on any diuretics.  TTE from 4/17/2023 showed low-normal systolic function with an EF of 54% and grade II left ventricular diastolic dysfunction.

## 2023-05-20 NOTE — ASSESSMENT & PLAN NOTE
Continue statin. She is not currently on any blood thinners. 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

## 2023-05-20 NOTE — ASSESSMENT & PLAN NOTE
Discontinue scheduled nighttime norco.  Consider titrating down trazodone.  CT of the head from 5/02/2023 and 5/06/2023 showed no acute intracranial abnormality and EEG from 5/02/2023 revealed no evidence of seizure activity.

## 2023-05-20 NOTE — HOSPITAL COURSE
She was admitted to [facility] swing bed on 5/11/2023 for rehab s/p hospitalization for persistent MRSA bacteremia with sepsis due to nonhealing left ankle wound, right hydronephrosis with ureteral obstruction s/p right ureteral stent placement, critical limb ischemia of the left lower extremity, toxic metabolic encephalopathy, and hyponatremia.  She was found to have a UTI on urinalysis from 5/11/2023 and her urine culture grew 10,000-25,000 cfu/ml of Pseudomonas aeruginosa.  She was started on a 3 day course of ciprofloxacin 250 mg by mouth twice daily which she completed on 5/16/2023.  Her hemoglobin and hematocrit dropped to 7.6 and 25.1 on 5/12/2023 and she was transfused with 1 unit packed RBC.  She was started on sodium chloride 1 g by mouth daily on 5/15/2023 due to persistent hyponatremia.  Detemir was discontinued on 5/17/2023 due to intermittent hypoglycemia and she was started on metformin 1000 mg by mouth twice daily and glimepiride 2 mg by mouth in the morning. Her scheduled nighttime norco was discontinued on 5/20/2023 due to lethargy. She had a blood sugar of 50 on the afternoon of 5/21/2023 and 57 on the morning of 5/22/2023 and her metformin and glimepiride were held. Her metformin was decreased from 1000 mg to 500 mg PO BID and her glimepiride was discontinued on 5/24/2023 due to persistent hypoglycemia. She complained of left shoulder on 5/25/2023 and she was started on diclofenac 1% gel 4 gm topical to the left shoulder BID prn. Her trazodone was increased from 100 mg to 150 mg PO QHS on 5/28/2023 due to worsening insomnia. She had chronic hyponatremia with a sodium as low as 123 on 6/4/2023 and she will remain on a 1200 ml fluid restriction following discharge. She completed a 6 week course of antibiotics on 6/4/2023 and she was discharged home with home health, a hospital bed, wheelchair, and a manish lift in stable condition. She has a follow-up appointment with Dr. Darwin Bueno in  vascular surgery clinic on 6/14/2023 at 2:30 pm and with Dr. Martin Ruvalcaba in ID clinic on 8/10/2023 at 9:40 am.

## 2023-05-20 NOTE — PLAN OF CARE
Problem: Adult Inpatient Plan of Care  Goal: Patient-Specific Goal (Individualized)  Outcome: Ongoing, Progressing  Flowsheets (Taken 5/19/2023 2100)  Anxieties, Fears or Concerns: Son concerned that pt has not been sleeping well at night  Individualized Care Needs: Wound care, Antibiotic therapy, PT  Goal: Absence of Hospital-Acquired Illness or Injury  Outcome: Ongoing, Progressing  Intervention: Identify and Manage Fall Risk  Flowsheets (Taken 5/19/2023 2145)  Safety Promotion/Fall Prevention:   assistive device/personal item within reach   side rails raised x 3  Intervention: Prevent Skin Injury  Flowsheets (Taken 5/19/2023 2145)  Skin Protection:   adhesive use limited   incontinence pads utilized   skin sealant/moisture barrier applied   tubing/devices free from skin contact  Intervention: Prevent and Manage VTE (Venous Thromboembolism) Risk  Flowsheets (Taken 5/19/2023 2145)  Activity Management: Rolling - L1  VTE Prevention/Management: bleeding risk assessed  Range of Motion: active ROM (range of motion) encouraged  Intervention: Prevent Infection  Flowsheets (Taken 5/20/2023 0054)  Infection Prevention:   cohorting utilized   equipment surfaces disinfected   single patient room provided  Goal: Optimal Comfort and Wellbeing  Outcome: Ongoing, Progressing  Intervention: Monitor Pain and Promote Comfort  Flowsheets (Taken 5/19/2023 2145)  Pain Management Interventions:   quiet environment facilitated   pillow support provided   position adjusted     Problem: Diabetes Comorbidity  Goal: Blood Glucose Level Within Targeted Range  Outcome: Ongoing, Progressing  Intervention: Monitor and Manage Glycemia  Flowsheets (Taken 5/19/2023 2145)  Glycemic Management: blood glucose monitored

## 2023-05-20 NOTE — ASSESSMENT & PLAN NOTE
Continue 6 week course of IV vancomycin until 6/4/2023.  Repeat blood culture from 4/23/2023 were negative.  TTE from 4/17/2023 and STARR from 4/23/2023 showed no evidence of vegetation and NM bone scan on 4/19/2023 was positive for a left lower extremity cellulitis without focal intense concentration more typically seen with an abscess or osteomyelitis.

## 2023-05-20 NOTE — ASSESSMENT & PLAN NOTE
Continue ferrous sulfate.  She is s/p blood transfusion with 1 unit packed RBC on 5/12/2023 and she was treated with 3 days of IV ferrlecit.

## 2023-05-20 NOTE — HPI
Ms. Villarreal is a 89-year-old  female with history of hypertension, hyperlipidemia, type II diabetes mellitus, DVT/PE status post IVC filter placement, and bladder cancer who originally presented to [facility] ER on 4/16/2023 with fatigue, generalized weakness, dysuria, dark urine, and lethargy and she was hypotensive with a blood pressure of 66/33 on EMS arrival.  Her initial lab work was remarkable for a WBC count of 34.3, lactic acid of 3.8, troponin of 0.115, and BNP of 2571.8 and CT of the head showed no acute intracranial abnormality.  CXR revealed no acute cardiopulmonary process and CT of the chest, abdomen, pelvis demonstrated no evidence of PE and no acute intraabdominal or pelvic solid organ or bowel pathology.  X-ray of the left foot showed minimally displaced 5th proximal phalanx fracture and possible minimally displaced 4th proximal phalanx fracture and x-ray of the left tibia/fibula revealed no acute osseous process.  She was started on broad-spectrum antibiotics with IV vancomycin and zosyn for sepsis and her blood cultures from admission returned positive for MRSA.  ID was consulted and TTE from 4/17/2023 demonstrated no evidence of vegetation. She was scheduled for NM bone scan on 4/19/2023 which was positive for a left lower extremity cellulitis without focal intense concentration. Arterial Doppler of the left lower extremity from 4/20/2023 showed monophasic flow throughout suggestive of inflow disease and MRI of the thoracic and lumbar spine revealed no evidence to suggest discitis/osteomyelitis or drainable fluid collection and new right-sided hydronephrosis.  Repeat blood cultures remained positive and cardiology was consulted for STARR on 4/21/2023 which demonstrated no valvular vegetations and moderate to severe aortic stenosis.  Her chronic left ankle wound was thought to be the source of her persistent MRSA bacteremia and 6 weeks of IV vancomycin was recommended by ID.  Renal ultrasound from  4/21/2023 confirmed mild-to-moderate hydronephrosis on the right and CT of the abdomen and pelvis showed mild to moderate right hydronephrosis with no significant right ureteral dilatation.  Urology was consulted and she was taken to the OR on 4/25/2023 for cystoscopy with bilateral retrograde pyelograms right ureteral stent placement due to right hydronephrosis with ureteral obstruction.  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  She was evaluated by neurology on 5/02/2023 due to mental status changes thought to be toxic metabolic encephalopathy and CT of the head demonstrated no acute intracranial abnormality.  EEG from 5/2/2023 showed no evidence of seizure activity and repeat CT of the head was unremarkable.  Nephrology was consulted on 5/03/2023 due to worsening hyponatremia and she was treated with samsca and a 1 L fluid restriction with improvement in her sodium levels.  She had no other complications throughout her hospital course and she was discharged to [facility] swing bed for PT/OT, completion of IV antibiotics, and management of her medical comorbidities.

## 2023-05-20 NOTE — ASSESSMENT & PLAN NOTE
Resolved.  She completed a 3 day course of ciprofloxacin 250 mg by mouth twice daily on 5/16/2023.  Urine culture from 5/11/2023 grew 10,000-25,000 cfu/ml of Pseudomonas aeruginosa.

## 2023-05-20 NOTE — ASSESSMENT & PLAN NOTE
No acute issues.  She can follow-up with cardiology as an outpatient as she may be a candidate for TAVR.

## 2023-05-21 NOTE — PLAN OF CARE
[facility] - Medical Surgical Unit  Discharge Reassessment    Primary Care Provider: Wisam Espinosa Jr, MD    Expected Discharge Date:     Reassessment (most recent)       Discharge Reassessment - 05/21/23 1715          Discharge Reassessment    Assessment Type Discharge Planning Reassessment     Did the patient's condition or plan change since previous assessment? No     Discharge Plan discussed with: Adult children     Name(s) and Number(s) bina Villarreal daughter      Communicated ERIN with patient/caregiver Date not available/Unable to determine     Discharge Plan A Home with family;Home Health     DME Needed Upon Discharge  bedside commode;walker, standard     Transition of Care Barriers None     Why the patient remains in the hospital Requires continued medical care        Post-Acute Status    Post-Acute Authorization Home Health     Home Health Status Pending medical clearance/testing     Hospital Resources/Appts/Education Provided Provided patient/caregiver with written discharge plan information     Discharge Delays None known at this time

## 2023-05-21 NOTE — PLAN OF CARE
Problem: Adult Inpatient Plan of Care  Goal: Patient-Specific Goal (Individualized)  Outcome: Ongoing, Progressing  Flowsheets (Taken 5/20/2023 2100)  Anxieties, Fears or Concerns: Daughter concerned about pts pain level and sleep  Individualized Care Needs: Antibiotic therapy, Wound care     Problem: Diabetes Comorbidity  Goal: Blood Glucose Level Within Targeted Range  Outcome: Ongoing, Progressing  Intervention: Monitor and Manage Glycemia  Flowsheets (Taken 5/20/2023 2100)  Glycemic Management: blood glucose monitored     Problem: Impaired Wound Healing  Goal: Optimal Wound Healing  Outcome: Ongoing, Progressing  Intervention: Promote Wound Healing  Flowsheets (Taken 5/20/2023 2100)  Oral Nutrition Promotion:   calorie-dense foods provided   safe use of adaptive equipment encouraged  Sleep/Rest Enhancement:   awakenings minimized   noise level reduced   room darkened   regular sleep/rest pattern promoted  Activity Management: Rolling - L1  Pain Management Interventions:   position adjusted   pain management plan reviewed with patient/caregiver   quiet environment facilitated   care clustered

## 2023-05-21 NOTE — PLAN OF CARE
Problem: Adult Inpatient Plan of Care  Goal: Plan of Care Review  Outcome: Ongoing, Progressing  Flowsheets (Taken 5/21/2023 1317)  Plan of Care Reviewed With:   patient   daughter   son  Goal: Patient-Specific Goal (Individualized)  Outcome: Ongoing, Progressing  Flowsheets (Taken 5/21/2023 1317)  Anxieties, Fears or Concerns: Daughter has concerns about pain level  Individualized Care Needs: Antribiotic therpay and wound care  Goal: Absence of Hospital-Acquired Illness or Injury  Outcome: Ongoing, Progressing  Intervention: Identify and Manage Fall Risk  Flowsheets (Taken 5/21/2023 1317)  Safety Promotion/Fall Prevention:   assistive device/personal item within reach   bed alarm set   lighting adjusted   medications reviewed   side rails raised x 3  Intervention: Prevent Skin Injury  Flowsheets (Taken 5/21/2023 1317)  Body Position:   sitting up in bed   supine   weight shifting   log-rolled  Skin Protection:   adhesive use limited   incontinence pads utilized   tubing/devices free from skin contact   transparent dressing maintained  Intervention: Prevent and Manage VTE (Venous Thromboembolism) Risk  Flowsheets (Taken 5/21/2023 1317)  Activity Management: Rolling - L1  VTE Prevention/Management:   fluids promoted   bleeding precations maintained  Range of Motion: active ROM (range of motion) encouraged  Intervention: Prevent Infection  Flowsheets (Taken 5/21/2023 1317)  Infection Prevention:   cohorting utilized   environmental surveillance performed   equipment surfaces disinfected   hand hygiene promoted   single patient room provided   rest/sleep promoted  Goal: Optimal Comfort and Wellbeing  Outcome: Ongoing, Progressing  Intervention: Monitor Pain and Promote Comfort  Flowsheets (Taken 5/21/2023 1317)  Pain Management Interventions:   care clustered   medication offered   pillow support provided   position adjusted  Intervention: Provide Person-Centered Care  Flowsheets (Taken 5/21/2023 1317)  Trust  Relationship/Rapport:   care explained   choices provided   questions answered   questions encouraged  Goal: Readiness for Transition of Care  Outcome: Ongoing, Progressing     Problem: Diabetes Comorbidity  Goal: Blood Glucose Level Within Targeted Range  Outcome: Ongoing, Progressing  Intervention: Monitor and Manage Glycemia  Flowsheets (Taken 5/21/2023 1317)  Glycemic Management:   blood glucose monitored   oral hydration promoted     Problem: Adjustment to Illness (Sepsis/Septic Shock)  Goal: Optimal Coping  Outcome: Ongoing, Progressing     Problem: Bleeding (Sepsis/Septic Shock)  Goal: Absence of Bleeding  Outcome: Ongoing, Progressing     Problem: Glycemic Control Impaired (Sepsis/Septic Shock)  Goal: Blood Glucose Level Within Desired Range  Outcome: Ongoing, Progressing  Intervention: Optimize Glycemic Control  Flowsheets (Taken 5/21/2023 1317)  Glycemic Management:   blood glucose monitored   oral hydration promoted     Problem: Infection Progression (Sepsis/Septic Shock)  Goal: Absence of Infection Signs and Symptoms  Outcome: Ongoing, Progressing  Intervention: Initiate Sepsis Management  Flowsheets (Taken 5/21/2023 1317)  Infection Prevention:   cohorting utilized   environmental surveillance performed   equipment surfaces disinfected   hand hygiene promoted   single patient room provided   rest/sleep promoted  Infection Management: aseptic technique maintained  Isolation Precautions:   protective   precautions maintained  Intervention: Promote Stabilization  Flowsheets (Taken 5/21/2023 1317)  Fluid/Electrolyte Management: fluids provided  Intervention: Promote Recovery  Flowsheets (Taken 5/21/2023 1317)  Activity Management: Rolling - L1     Problem: Nutrition Impaired (Sepsis/Septic Shock)  Goal: Optimal Nutrition Intake  Outcome: Ongoing, Progressing     Problem: Infection  Goal: Absence of Infection Signs and Symptoms  Outcome: Ongoing, Progressing  Intervention: Prevent or Manage  Infection  Flowsheets (Taken 5/21/2023 1317)  Infection Management: aseptic technique maintained  Isolation Precautions:   protective   precautions maintained     Problem: Impaired Wound Healing  Goal: Optimal Wound Healing  Outcome: Ongoing, Progressing  Intervention: Promote Wound Healing  Flowsheets (Taken 5/21/2023 1317)  Oral Nutrition Promotion:   calorie-dense foods provided   calorie-dense liquids provided   rest periods promoted  Activity Management: Rolling - L1  Pain Management Interventions:   care clustered   medication offered   pillow support provided   position adjusted     Problem: Fall Injury Risk  Goal: Absence of Fall and Fall-Related Injury  Outcome: Ongoing, Progressing  Intervention: Identify and Manage Contributors  Flowsheets (Taken 5/21/2023 1317)  Self-Care Promotion:   independence encouraged   BADL personal objects within reach   meal set-up provided  Medication Review/Management: medications reviewed  Intervention: Promote Injury-Free Environment  Flowsheets (Taken 5/21/2023 1317)  Safety Promotion/Fall Prevention:   assistive device/personal item within reach   bed alarm set   lighting adjusted   medications reviewed   side rails raised x 3     Problem: Skin Injury Risk Increased  Goal: Skin Health and Integrity  Outcome: Ongoing, Progressing  Intervention: Optimize Skin Protection  Flowsheets (Taken 5/21/2023 1317)  Pressure Reduction Techniques:   frequent weight shift encouraged   weight shift assistance provided   positioned off wounds   heels elevated off bed  Skin Protection:   adhesive use limited   incontinence pads utilized   tubing/devices free from skin contact   transparent dressing maintained  Head of Bed (HOB) Positioning: HOB at 20-30 degrees  Intervention: Promote and Optimize Oral Intake  Flowsheets (Taken 5/21/2023 1317)  Oral Nutrition Promotion:   calorie-dense foods provided   calorie-dense liquids provided   rest periods promoted     Problem: Mobility  Impairment  Goal: Optimal Mobility  Outcome: Ongoing, Progressing  Intervention: Optimize Mobility  Flowsheets (Taken 5/21/2023 1317)  Activity Management: Rolling - L1  Positioning/Transfer Devices:   in use   pillows

## 2023-05-21 NOTE — SUBJECTIVE & OBJECTIVE
Interval History: She is much more alert and oriented this morning after her scheduled nighttime norco was discontinued. Her daughter is present at the bedside and she states that her mother did not sleep at all last night despite receiving trazodone 100 mg PO QHS. She participated in PT and OT yesterday and she required moderate to maximum assistance x 2 with bed mobility and sit to stand transitions and maximum assistance with transfers.      Review of Systems   All other systems reviewed and are negative.  Objective:     Vital Signs (Most Recent):  Temp: 97.8 °F (36.6 °C) (23 07)  Pulse: 90 (23 07)  Resp: 17 (23 0343)  BP: 126/73 (23)  SpO2: (!) 94 % (23) Vital Signs (24h Range):  Temp:  [97.5 °F (36.4 °C)-98.6 °F (37 °C)] 97.8 °F (36.6 °C)  Pulse:  [] 90  Resp:  [16-18] 17  SpO2:  [93 %-98 %] 94 %  BP: (103-147)/(62-76) 126/73     Weight: 53.3 kg (117 lb 8.1 oz)  Body mass index is 20.82 kg/m².    Intake/Output Summary (Last 24 hours) at 2023 0843  Last data filed at 2023 0555  Gross per 24 hour   Intake 960 ml   Output 750 ml   Net 210 ml      Physical Exam  General - Elderly  female in no acute distress.  HEENT - NCAT. No scleral icterus. Oropharynx clear. Mucous membranes moist.  Neck - No lymphadenopathy, thyromegaly, or JVD.  CVS - Regular rate and rhythm. No murmurs, rubs, or gallops.  Resp - Lungs are clear to auscultation bilaterally. No rales, wheeze, or rhonchi.   GI - Soft, nontender, nondistended, normoactive bowel sounds present.   Extremities - No clubbing, cyanosis, or edema. Right upper extremity PICC line.  Neuro - CN II through XII are grossly intact. No focal neurological deficits. Alert and oriented to name and  only.  Psych - Flat affect.  Skin -  Left heel remains stable with thin dry scab to the surface. Left posterior ankle and lateral ankle ulcerations have increasing granulation tissue noted.  Left lateral lower leg  site previously closed with ischemic changes is now evolving and there is a small area of moist eschar noted within discolored area.       Significant Labs: All pertinent labs within the past 24 hours have been reviewed.    5/19/2023:  CBC: WBC count 10.83, hemoglobin 9, hematocrit 30.4, platelet count 274.  BMP: Sodium 130, potassium 3.5, chloride 94, CO2 28, BUN 18.4, creatinine 0.68, glucose 125, calcium 8.3.    5/18/2023:  CBC: WBC count 9.84, hemoglobin 9, hematocrit 30.7,  platelet count 261.  BMP: Sodium 134, potassium 3.7, chloride 94, CO2 33, BUN 20.6, creatinine 0.69, glucose 167, calcium 7.8, magnesium 1.9.    5/16/2023:  BMP: Sodium 131, potassium 4.2, chloride 4.2, chloride 90, CO2 33, BUN 18.9, creatinine 0.65, glucose 188, calcium 7.9, magnesium 1.9.    5/15/2023:  BMP: Sodium 128, potassium 3.3, chloride 91, CO2 34, BUN 18, creatinine 0.67, glucose 76, calcium 7.9.    5/13/2023:  CBC: WBC count 10.82, hemoglobin 9.1, hematocrit 30, platelet count 248.  BMP: Sodium 128, potassium 3.7, chloride 89, CO2 36, BUN 17.3, creatinine 0.66, glucose 103, calcium 8.2.    Significant Imaging: I have reviewed all pertinent imaging results/findings within the past 24 hours.     CXR 5/16/2023: Left-sided PICC projects over the distal SVC.  Prominent interstitial markings with no focal opacification.     CXR 5/6/2023: The heart is not significantly enlarged.  There is aortic atherosclerosis.  Similar prominent central pulmonary vasculature.  No new dense consolidation or pneumothorax.     CT head 5/5/2023:   1. No acute intracranial abnormality.  2. Chronic microvascular ischemic changes.     EEG 5/2/2023: No evidence of seizure activity.     CT head 5/2/2023: No acute intracranial abnormality.     CTA abdomen/pelvis with bilateral runoff 4/26/2023: Significant stenosis of the left common iliac artery, severe narrowing versus occlusion at the left common femoral artery, occlusion at the distal superficial femoral  artery, and stenosis at the origin of the superior mesenteric artery.     Renal ultrasound 4/21/2023: Mild-to-moderate hydronephrosis on the right.     CT abdomen/pelvis 4/21/2023: Mild to moderate right hydronephrosis with no significant right ureteral dilatation.       STARR 4/21/2023: No valvular vegetations and moderate to severe aortic stenosis.      MRI thoracic/lumbar spine 4/20/2023: No evidence to suggest discitis/osteomyelitis or drainable fluid collection and new right-sided hydronephrosis.     Arterial Doppler left lower extremity 4/20/2023: Monophasic flow throughout suggestive of inflow disease     NM bone scan 4/19/2023: Left lower extremity cellulitis without focal intense concentration more typically seen with an abscess or osteomyelitis.      TTE 4/17/2023:  Low-normal systolic function with an EF of 54%, grade II left ventricular diastolic dysfunction, mild-to-moderate aortic valve stenosis, and no evidence of vegetation.     CT head 4/16/2023: No acute intracranial abnormality.       CXR 4/16/2023: No acute cardiopulmonary process.     CT chest/abdomen/pelvis 4/16/2023: No evidence of PE and no acute intraabdominal or pelvic solid organ or bowel pathology identified.       X-ray left foot 4/16/2023: Minimally displaced 5th proximal phalanx fracture and possible minimally displaced 4th proximal phalanx fracture.     X-ray left tibia/fibula 4/16/2023: No acute osseous process.

## 2023-05-21 NOTE — PROGRESS NOTES
[facility] Medicine  Telehospitalist Progress Note    Patient Name: Melodie Villarreal  MRN: [id number]  Patient Class: IP- Swing   Admission Date: 5/11/2023  Length of Stay: 10 days  Attending Physician: Naun Pope MD  Primary Care Provider: Wisam Espinosa Jr, MD      Subjective:     Principal Problem:MRSA bacteremia      HPI:  Ms. Villarreal is a 89-year-old  female with history of hypertension, hyperlipidemia, type II diabetes mellitus, DVT/PE status post IVC filter placement, and bladder cancer who originally presented to [facility] ER on 4/16/2023 with fatigue, generalized weakness, dysuria, dark urine, and lethargy and she was hypotensive with a blood pressure of 66/33 on EMS arrival.  Her initial lab work was remarkable for a WBC count of 34.3, lactic acid of 3.8, troponin of 0.115, and BNP of 2571.8 and CT of the head showed no acute intracranial abnormality.  CXR revealed no acute cardiopulmonary process and CT of the chest, abdomen, pelvis demonstrated no evidence of PE and no acute intraabdominal or pelvic solid organ or bowel pathology.  X-ray of the left foot showed minimally displaced 5th proximal phalanx fracture and possible minimally displaced 4th proximal phalanx fracture and x-ray of the left tibia/fibula revealed no acute osseous process.  She was started on broad-spectrum antibiotics with IV vancomycin and zosyn for sepsis and her blood cultures from admission returned positive for MRSA.  ID was consulted and TTE from 4/17/2023 demonstrated no evidence of vegetation. She was scheduled for NM bone scan on 4/19/2023 which was positive for a left lower extremity cellulitis without focal intense concentration. Arterial Doppler of the left lower extremity from 4/20/2023 showed monophasic flow throughout suggestive of inflow disease and MRI of the thoracic and lumbar spine revealed no evidence to suggest discitis/osteomyelitis or drainable fluid collection and  new right-sided hydronephrosis.  Repeat blood cultures remained positive and cardiology was consulted for STARR on 4/21/2023 which demonstrated no valvular vegetations and moderate to severe aortic stenosis.  Her chronic left ankle wound was thought to be the source of her persistent MRSA bacteremia and 6 weeks of IV vancomycin was recommended by ID.  Renal ultrasound from 4/21/2023 confirmed mild-to-moderate hydronephrosis on the right and CT of the abdomen and pelvis showed mild to moderate right hydronephrosis with no significant right ureteral dilatation.  Urology was consulted and she was taken to the OR on 4/25/2023 for cystoscopy with bilateral retrograde pyelograms right ureteral stent placement due to right hydronephrosis with ureteral obstruction.  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  She was evaluated by neurology on 5/02/2023 due to mental status changes thought to be toxic metabolic encephalopathy and CT of the head demonstrated no acute intracranial abnormality.  EEG from 5/2/2023 showed no evidence of seizure activity and repeat CT of the head was unremarkable.  Nephrology was consulted on 5/03/2023 due to worsening hyponatremia and she was treated with samsca and a 1 L fluid restriction with improvement in her sodium levels.  She had no other complications throughout her hospital course and she was discharged to [facility] for PT/OT, completion of IV antibiotics, and management of her medical comorbidities.      Overview/Hospital Course:  She was admitted to [facility] on 5/11/2023 for rehab s/p  hospitalization for persistent MRSA bacteremia with sepsis due to nonhealing left ankle wound, right hydronephrosis with ureteral obstruction s/p right ureteral stent placement, critical limb ischemia of the left lower extremity, toxic metabolic encephalopathy, and hyponatremia.  She was found to have a UTI on urinalysis from 5/11/2023 and her urine culture grew 10,000-25,000 cfu/ml of Pseudomonas aeruginosa.  She was started on a 3 day course of ciprofloxacin 250 mg by mouth twice daily which she completed on 5/16/2023.  Her hemoglobin and hematocrit dropped to 7.6 and 25.1 on 5/12/2023 and she was transfused with 1 unit packed RBC.  She was started on sodium chloride 1 g by mouth daily on 5/15/2023 due to persistent hyponatremia.  Detemir was discontinued on 5/17/2023 due to intermittent hypoglycemia and she was started on metformin 1000 mg by mouth twice daily and glimepiride 2 mg by mouth in the morning. Her scheduled nighttime norco was discontinued on 5/20/2023 due to lethargy.      Interval History: She is much more alert and oriented this morning after her scheduled nighttime norco was discontinued. Her daughter is present at the bedside and she states that her mother did not sleep at all last night despite receiving trazodone 100 mg PO QHS. She participated in PT and OT yesterday and she required moderate to maximum assistance x 2 with bed mobility and sit to stand transitions and maximum assistance with transfers.      Review of Systems   All other systems reviewed and are negative.  Objective:     Vital Signs (Most Recent):  Temp: 97.8 °F (36.6 °C) (05/21/23 0738)  Pulse: 90 (05/21/23 0738)  Resp: 17 (05/21/23 0343)  BP: 126/73 (05/21/23 0738)  SpO2: (!) 94 % (05/21/23 0738) Vital Signs (24h Range):  Temp:  [97.5 °F (36.4 °C)-98.6 °F (37 °C)] 97.8 °F (36.6 °C)  Pulse:  [] 90  Resp:  [16-18] 17  SpO2:  [93 %-98 %] 94 %  BP: (103-147)/(62-76) 126/73     Weight: 53.3 kg (117 lb 8.1 oz)  Body mass index  is 20.82 kg/m².    Intake/Output Summary (Last 24 hours) at 2023 0843  Last data filed at 2023 0555  Gross per 24 hour   Intake 960 ml   Output 750 ml   Net 210 ml      Physical Exam  General - Elderly  female in no acute distress.  HEENT - NCAT. No scleral icterus. Oropharynx clear. Mucous membranes moist.  Neck - No lymphadenopathy, thyromegaly, or JVD.  CVS - Regular rate and rhythm. No murmurs, rubs, or gallops.  Resp - Lungs are clear to auscultation bilaterally. No rales, wheeze, or rhonchi.   GI - Soft, nontender, nondistended, normoactive bowel sounds present.   Extremities - No clubbing, cyanosis, or edema. Right upper extremity PICC line.  Neuro - CN II through XII are grossly intact. No focal neurological deficits. Alert and oriented to name and  only.  Psych - Flat affect.  Skin -  Left heel remains stable with thin dry scab to the surface. Left posterior ankle and lateral ankle ulcerations have increasing granulation tissue noted.  Left lateral lower leg site previously closed with ischemic changes is now evolving and there is a small area of moist eschar noted within discolored area.       Significant Labs: All pertinent labs within the past 24 hours have been reviewed.    2023:  CBC: WBC count 10.83, hemoglobin 9, hematocrit 30.4, platelet count 274.  BMP: Sodium 130, potassium 3.5, chloride 94, CO2 28, BUN 18.4, creatinine 0.68, glucose 125, calcium 8.3.    2023:  CBC: WBC count 9.84, hemoglobin 9, hematocrit 30.7,  platelet count 261.  BMP: Sodium 134, potassium 3.7, chloride 94, CO2 33, BUN 20.6, creatinine 0.69, glucose 167, calcium 7.8, magnesium 1.9.    2023:  BMP: Sodium 131, potassium 4.2, chloride 4.2, chloride 90, CO2 33, BUN 18.9, creatinine 0.65, glucose 188, calcium 7.9, magnesium 1.9.    5/15/2023:  BMP: Sodium 128, potassium 3.3, chloride 91, CO2 34, BUN 18, creatinine 0.67, glucose 76, calcium 7.9.    2023:  CBC: WBC count 10.82, hemoglobin  9.1, hematocrit 30, platelet count 248.  BMP: Sodium 128, potassium 3.7, chloride 89, CO2 36, BUN 17.3, creatinine 0.66, glucose 103, calcium 8.2.    Significant Imaging: I have reviewed all pertinent imaging results/findings within the past 24 hours.     CXR 5/16/2023: Left-sided PICC projects over the distal SVC.  Prominent interstitial markings with no focal opacification.     CXR 5/6/2023: The heart is not significantly enlarged.  There is aortic atherosclerosis.  Similar prominent central pulmonary vasculature.  No new dense consolidation or pneumothorax.     CT head 5/5/2023:   1. No acute intracranial abnormality.  2. Chronic microvascular ischemic changes.     EEG 5/2/2023: No evidence of seizure activity.     CT head 5/2/2023: No acute intracranial abnormality.     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     Renal ultrasound 4/21/2023: Mild-to-moderate hydronephrosis on the right.     CT abdomen/pelvis 4/21/2023: Mild to moderate right hydronephrosis with no significant right ureteral dilatation.       STARR 4/21/2023: No valvular vegetations and moderate to severe aortic stenosis.      MRI thoracic/lumbar spine 4/20/2023: No evidence to suggest discitis/osteomyelitis or drainable fluid collection and new right-sided hydronephrosis.     Arterial Doppler left lower extremity 4/20/2023: Monophasic flow throughout suggestive of inflow disease     NM bone scan 4/19/2023: Left lower extremity cellulitis without focal intense concentration more typically seen with an abscess or osteomyelitis.      TTE 4/17/2023:  Low-normal systolic function with an EF of 54%, grade II left ventricular diastolic dysfunction, mild-to-moderate aortic valve stenosis, and no evidence of  vegetation.     CT head 4/16/2023: No acute intracranial abnormality.       CXR 4/16/2023: No acute cardiopulmonary process.     CT chest/abdomen/pelvis 4/16/2023: No evidence of PE and no acute intraabdominal or pelvic solid organ or bowel pathology identified.       X-ray left foot 4/16/2023: Minimally displaced 5th proximal phalanx fracture and possible minimally displaced 4th proximal phalanx fracture.     X-ray left tibia/fibula 4/16/2023: No acute osseous process.          Assessment/Plan:      * MRSA bacteremia  Continue 6 week course of IV vancomycin until 6/4/2023.  Repeat blood culture from 4/23/2023 were negative.  TTE from 4/17/2023 and STARR from 4/23/2023 showed no evidence of vegetation and NM bone scan on 4/19/2023 was positive for a left lower extremity cellulitis without focal intense concentration more typically seen with an abscess or osteomyelitis.     Sepsis  Resolved.  Continue 6 week course of IV vancomycin until 6/4/2023 as per above.    Right hydronephrosis  Continue tamsulosin.  She is s/p cystoscopy with bilateral retrograde pyelograms right ureteral stent placement on 4/25/2023 due to right hydronephrosis with ureteral obstruction. Renal ultrasound from 4/21/2023 revealed mild-to-moderate hydronephrosis on the right and CT of the abdomen and pelvis showed mild to moderate right hydronephrosis with no significant right ureteral dilatation.  She will need to follow-up with urology as an outpatient 1-2 weeks following discharge for right ureteral stent removal.    Toxic metabolic encephalopathy  Discontinue scheduled nighttime norco.  Consider titrating down trazodone.  CT of the head from 5/02/2023 and 5/06/2023 showed no acute intracranial abnormality and EEG from 5/02/2023 revealed no evidence of seizure activity.    Hyponatremia  Continue sodium chloride tablets and 1200 ml fluid restriction. HCTZ has been discontinued. She is s/p treatment with samsca on 5/7/2023.    Critical limb  ischemia of left lower extremity  Continue statin. She is not currently on any blood thinners. 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      Urinary tract infection  Resolved.  She completed a 3 day course of ciprofloxacin 250 mg by mouth twice daily on 5/16/2023.  Urine culture from 5/11/2023 grew 10,000-25,000 cfu/ml of Pseudomonas aeruginosa.    Debility  Continue PT/OT.    Hypomagnesemia  Improved.  Continue magnesium oxide.    Hypokalemia  Improved.    Hypertension  Continue carvedilol. She is no longer on losartan-HCTZ.    Type II diabetes mellitus  Continue metformin and glimepiride.  Detemir was discontinued on 05/17/2023 due to hypoglycemia.  Her hemoglobin A1c from 3/07/2023 was 6.3.      Iron deficiency anemia  Continue ferrous sulfate.  She is s/p blood transfusion with 1 unit packed RBC on 5/12/2023 and she was treated with 3 days of IV ferrlecit.    Chronic diastolic CHF (congestive heart failure)  Compensated. She is not currently on any diuretics.  TTE from 4/17/2023 showed low-normal systolic function with an EF of 54% and grade II left ventricular diastolic dysfunction.     Insomnia  Continue trazodone.    Hyperlipidemia  Continue pravastatin.    Severe aortic stenosis  No acute issues.  She can follow-up with cardiology as an outpatient as she may be a candidate for TAVR.    Chronic constipation  Continue polyethylene glycol.    Disposition  Anticipate discharge home with home health following completion of IV antibiotics.      VTE Risk Mitigation (From admission, onward)         Ordered     enoxaparin injection 40 mg  Daily          05/11/23 1414     Place sequential compression device  Until discontinued         05/11/23 1414                Discharge Planning   ERIN:      Code Status: DNR   Is the patient medically ready for discharge?:     Reason for patient still in hospital (select all that apply): Treatment, PT / OT recommendations and Pending disposition  Discharge Plan A: Home with family, Home   Discharge Delays: None known at this time      This service was provided via telemedicine.  Type of Software: Audio/Visual.  Originating Site: [facility].  Distant Site: [place], [place].  Her exam was performed with the assistance of Frances Hicks RN.      Naun Pope MD  Department of Hospital Medicine   [facility]

## 2023-05-22 NOTE — PROGRESS NOTES
[facility] Medicine  Telehospitalist Progress Note    Patient Name: Melodie Villarreal  MRN: [id number]  Patient Class: IP- Swing   Admission Date: 5/11/2023  Length of Stay: 11 days  Attending Physician: Naun Pope MD  Primary Care Provider: Wisam Espinosa Jr, MD      Subjective:     Principal Problem:MRSA bacteremia      HPI:  Ms. Villarreal is a 89-year-old  female with history of hypertension, hyperlipidemia, type II diabetes mellitus, DVT/PE status post IVC filter placement, and bladder cancer who originally presented to [facility] ER on 4/16/2023 with fatigue, generalized weakness, dysuria, dark urine, and lethargy and she was hypotensive with a blood pressure of 66/33 on EMS arrival.  Her initial lab work was remarkable for a WBC count of 34.3, lactic acid of 3.8, troponin of 0.115, and BNP of 2571.8 and CT of the head showed no acute intracranial abnormality.  CXR revealed no acute cardiopulmonary process and CT of the chest, abdomen, pelvis demonstrated no evidence of PE and no acute intraabdominal or pelvic solid organ or bowel pathology.  X-ray of the left foot showed minimally displaced 5th proximal phalanx fracture and possible minimally displaced 4th proximal phalanx fracture and x-ray of the left tibia/fibula revealed no acute osseous process.  She was started on broad-spectrum antibiotics with IV vancomycin and zosyn for sepsis and her blood cultures from admission returned positive for MRSA.  ID was consulted and TTE from 4/17/2023 demonstrated no evidence of vegetation. She was scheduled for NM bone scan on 4/19/2023 which was positive for a left lower extremity cellulitis without focal intense concentration. Arterial Doppler of the left lower extremity from 4/20/2023 showed monophasic flow throughout suggestive of inflow disease and MRI of the thoracic and lumbar spine revealed no evidence to suggest discitis/osteomyelitis or drainable fluid collection and  new right-sided hydronephrosis.  Repeat blood cultures remained positive and cardiology was consulted for STARR on 4/21/2023 which demonstrated no valvular vegetations and moderate to severe aortic stenosis.  Her chronic left ankle wound was thought to be the source of her persistent MRSA bacteremia and 6 weeks of IV vancomycin was recommended by ID.  Renal ultrasound from 4/21/2023 confirmed mild-to-moderate hydronephrosis on the right and CT of the abdomen and pelvis showed mild to moderate right hydronephrosis with no significant right ureteral dilatation.  Urology was consulted and she was taken to the OR on 4/25/2023 for cystoscopy with bilateral retrograde pyelograms right ureteral stent placement due to right hydronephrosis with ureteral obstruction.  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  She was evaluated by neurology on 5/02/2023 due to mental status changes thought to be toxic metabolic encephalopathy and CT of the head demonstrated no acute intracranial abnormality.  EEG from 5/2/2023 showed no evidence of seizure activity and repeat CT of the head was unremarkable.  Nephrology was consulted on 5/03/2023 due to worsening hyponatremia and she was treated with samsca and a 1 L fluid restriction with improvement in her sodium levels.  She had no other complications throughout her hospital course and she was discharged to [facility] for PT/OT, completion of IV antibiotics, and management of her medical comorbidities.      Overview/Hospital Course:  She was admitted to [facility] on 5/11/2023 for rehab s/p  hospitalization for persistent MRSA bacteremia with sepsis due to nonhealing left ankle wound, right hydronephrosis with ureteral obstruction s/p right ureteral stent placement, critical limb ischemia of the left lower extremity, toxic metabolic encephalopathy, and hyponatremia.  She was found to have a UTI on urinalysis from 5/11/2023 and her urine culture grew 10,000-25,000 cfu/ml of Pseudomonas aeruginosa.  She was started on a 3 day course of ciprofloxacin 250 mg by mouth twice daily which she completed on 5/16/2023.  Her hemoglobin and hematocrit dropped to 7.6 and 25.1 on 5/12/2023 and she was transfused with 1 unit packed RBC.  She was started on sodium chloride 1 g by mouth daily on 5/15/2023 due to persistent hyponatremia.  Detemir was discontinued on 5/17/2023 due to intermittent hypoglycemia and she was started on metformin 1000 mg by mouth twice daily and glimepiride 2 mg by mouth in the morning. Her scheduled nighttime norco was discontinued on 5/20/2023 due to lethargy. She had a blood sugar of 50 on the afternoon of 5/21/2023 and 57 on the morning of 5/22/2023 and her metformin and glimepiride were held.      Interval History: She had a blood sugar of 50 at 17:02 yesterday and 57 at 4:07 this morning and her metformin and glimepiride have been held. Her daughter is present at the bedside and she states that she slept all day yesterday. She has a good appetite and she ate 100% of her breakfast today.      Review of Systems   All other systems reviewed and are negative.  Objective:     Vital Signs (Most Recent):  Temp: 97.9 °F (36.6 °C) (05/22/23 0700)  Pulse: 97 (05/22/23 0700)  Resp: 18 (05/21/23 2200)  BP: 133/66 (05/22/23 0700)  SpO2: 97 % (05/22/23 0700) Vital Signs (24h Range):  Temp:  [97.8 °F (36.6 °C)-98.4 °F (36.9 °C)] 97.9 °F (36.6 °C)  Pulse:  [92-97] 97  Resp:  [17-19] 18  SpO2:  [95 %-97 %] 97 %  BP: (116-133)/(61-73) 133/66     Weight: 53.5 kg (117 lb 15.1 oz)  Body mass index is 20.9  kg/m².    Intake/Output Summary (Last 24 hours) at 2023 0815  Last data filed at 2023 0638  Gross per 24 hour   Intake 840 ml   Output 950 ml   Net -110 ml      Physical Exam  General - Elderly  female in no acute distress.  HEENT - NCAT. No scleral icterus. Oropharynx clear. Mucous membranes moist.  Neck - No lymphadenopathy, thyromegaly, or JVD.  CVS - Regular rate and rhythm. No murmurs, rubs, or gallops.  Resp - Lungs are clear to auscultation bilaterally. No rales, wheeze, or rhonchi.   GI - Soft, nontender, nondistended, normoactive bowel sounds present.   Extremities - No clubbing, cyanosis, or edema. Right upper extremity PICC line.  Neuro - CN II through XII are grossly intact. No focal neurological deficits. Alert and oriented to name and  only.  Psych - Flat affect.  Skin -  Left heel remains stable with thin dry scab to the surface. Left posterior ankle and lateral ankle ulcerations have increasing granulation tissue noted.  Left lateral lower leg site previously closed with ischemic changes is now evolving and there is a small area of moist eschar noted within discolored area.       Significant Labs: All pertinent labs within the past 24 hours have been reviewed.     2023:  CBC: WBC count 8.86, hemoglobin 8.5, hematocrit 28.5, platelet count 258.  CMP: Sodium 131, potassium 3.4, chloride 94, CO2 27, BUN 17.3, creatinine 0.67, glucose 54, calcium 7.7, magnesium 1.6, alkaline phosphatase 94, total protein 5.6, albumin 1.8, total bilirubin 0.5, AST 10, ALT 10.    2023:  CBC: WBC count 10.83, hemoglobin 9, hematocrit 30.4, platelet count 274.  BMP: Sodium 130, potassium 3.5, chloride 94, CO2 28, BUN 18.4, creatinine 0.68, glucose 125, calcium 8.3.     2023:  CBC: WBC count 9.84, hemoglobin 9, hematocrit 30.7,  platelet count 261.  BMP: Sodium 134, potassium 3.7, chloride 94, CO2 33, BUN 20.6, creatinine 0.69, glucose 167, calcium 7.8, magnesium 1.9.     2023:  BMP:  Sodium 131, potassium 4.2, chloride 4.2, chloride 90, CO2 33, BUN 18.9, creatinine 0.65, glucose 188, calcium 7.9, magnesium 1.9.     5/15/2023:  BMP: Sodium 128, potassium 3.3, chloride 91, CO2 34, BUN 18, creatinine 0.67, glucose 76, calcium 7.9.     5/13/2023:  CBC: WBC count 10.82, hemoglobin 9.1, hematocrit 30, platelet count 248.  BMP: Sodium 128, potassium 3.7, chloride 89, CO2 36, BUN 17.3, creatinine 0.66, glucose 103, calcium 8.2.     Significant Imaging: I have reviewed all pertinent imaging results/findings within the past 24 hours.     CXR 5/16/2023: Left-sided PICC projects over the distal SVC.  Prominent interstitial markings with no focal opacification.     CXR 5/6/2023: The heart is not significantly enlarged.  There is aortic atherosclerosis.  Similar prominent central pulmonary vasculature.  No new dense consolidation or pneumothorax.     CT head 5/5/2023:   1. No acute intracranial abnormality.  2. Chronic microvascular ischemic changes.     EEG 5/2/2023: No evidence of seizure activity.     CT head 5/2/2023: No acute intracranial abnormality.     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     Renal ultrasound 4/21/2023: Mild-to-moderate hydronephrosis on the right.     CT abdomen/pelvis 4/21/2023: Mild to moderate right hydronephrosis with no significant right ureteral dilatation.       STARR 4/21/2023: No valvular vegetations and moderate to severe aortic stenosis.      MRI thoracic/lumbar spine 4/20/2023: No evidence to suggest discitis/osteomyelitis or drainable fluid collection and new right-sided hydronephrosis.     Arterial Doppler left lower extremity 4/20/2023: Monophasic flow throughout suggestive of inflow disease     NM bone scan 4/19/2023: Left  lower extremity cellulitis without focal intense concentration more typically seen with an abscess or osteomyelitis.      TTE 4/17/2023:  Low-normal systolic function with an EF of 54%, grade II left ventricular diastolic dysfunction, mild-to-moderate aortic valve stenosis, and no evidence of vegetation.     CT head 4/16/2023: No acute intracranial abnormality.       CXR 4/16/2023: No acute cardiopulmonary process.     CT chest/abdomen/pelvis 4/16/2023: No evidence of PE and no acute intraabdominal or pelvic solid organ or bowel pathology identified.       X-ray left foot 4/16/2023: Minimally displaced 5th proximal phalanx fracture and possible minimally displaced 4th proximal phalanx fracture.     X-ray left tibia/fibula 4/16/2023: No acute osseous process.        Assessment/Plan:      * MRSA bacteremia  Continue 6 week course of IV vancomycin until 6/4/2023.  Repeat blood culture from 4/23/2023 were negative.  TTE from 4/17/2023 and STARR from 4/23/2023 showed no evidence of vegetation and NM bone scan on 4/19/2023 was positive for a left lower extremity cellulitis without focal intense concentration more typically seen with an abscess or osteomyelitis.     Sepsis  Resolved.  Continue 6 week course of IV vancomycin until 6/4/2023 as per above.    Right hydronephrosis  Continue tamsulosin.  She is s/p cystoscopy with bilateral retrograde pyelograms right ureteral stent placement on 4/25/2023 due to right hydronephrosis with ureteral obstruction. Renal ultrasound from 4/21/2023 revealed mild-to-moderate hydronephrosis on the right and CT of the abdomen and pelvis showed mild to moderate right hydronephrosis with no significant right ureteral dilatation.  She will need to follow-up with urology as an outpatient 1-2 weeks following discharge for right ureteral stent removal.    Toxic metabolic encephalopathy  Improved. Her scheduled nighttime norco was discontinued on 5/20/2023.  Consider titrating down trazodone.  CT  of the head from 5/02/2023 and 5/06/2023 showed no acute intracranial abnormality and EEG from 5/02/2023 revealed no evidence of seizure activity.    Hyponatremia  Continue sodium chloride tablets and 1200 ml fluid restriction. HCTZ has been discontinued. She is s/p treatment with samsca on 5/7/2023.    Critical limb ischemia of left lower extremity  Continue statin. She is not currently on any blood thinners. 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      Urinary tract infection  Resolved.  She completed a 3 day course of ciprofloxacin 250 mg by mouth twice daily on 5/16/2023.  Urine culture from 5/11/2023 grew 10,000-25,000 cfu/ml of Pseudomonas aeruginosa.    Debility  Continue PT/OT.    Hypomagnesemia  Improved.  Continue magnesium oxide.    Hypokalemia  Improved.    Hypertension  Continue carvedilol. She is no longer on losartan-HCTZ.    Type II diabetes mellitus  Continue metformin and glimepiride.  Detemir was discontinued on 05/17/2023 due to hypoglycemia.  Her hemoglobin A1c from 3/07/2023 was 6.3.      Iron deficiency anemia  Continue ferrous sulfate.  She is s/p blood transfusion with 1 unit packed RBC on 5/12/2023 and she was treated with 3 days of IV ferrlecit.    Chronic diastolic CHF (congestive heart failure)  Compensated. She is not currently on any diuretics.  TTE from 4/17/2023 showed low-normal systolic function with an EF of 54% and grade II left ventricular diastolic dysfunction.     Insomnia  Continue trazodone.    Hyperlipidemia  Continue pravastatin.    Severe aortic stenosis  No acute issues.  She can follow-up with cardiology as an  outpatient as she may be a candidate for TAVR.    Chronic constipation  Continue polyethylene glycol.    Disposition  Anticipate discharge home with home health following completion of IV antibiotics.      VTE Risk Mitigation (From admission, onward)         Ordered     enoxaparin injection 40 mg  Daily         05/11/23 1414     Place sequential compression device  Until discontinued         05/11/23 1414                Discharge Planning   ERIN:      Code Status: DNR   Is the patient medically ready for discharge?:     Reason for patient still in hospital (select all that apply): PT / OT recommendations and Pending disposition  Discharge Plan A: Home with family, Home Health   Discharge Delays: None known at this time      This service was provided via telemedicine.  Type of Software: Audio/Visual.  Originating Site: [facility].  Distant Site: [facility].  Her exam was performed with the assistance of Kylah Vieira RN.      Naun Pope MD  Department of Hospital Medicine   [facility]

## 2023-05-22 NOTE — PROGRESS NOTES
Inpatient Nutrition Evaluation    Admit Date: 5/11/2023   Total duration of encounter: 11 days    Nutrition Recommendation/Prescription     Continue diabetic diet, 1200 mL fluid restriction, lactaid milk. 2. Continue Tanner BID, substitute for in-house nutrition supplement, Arginaid.    Nutrition Assessment     Chart Review    Reason Seen: continuous nutrition monitoring, length of stay, and follow-up    Malnutrition Screening Tool Results   Have you recently lost weight without trying?: No  Have you been eating poorly because of a decreased appetite?: No   MST Score: 0     Diagnosis:  MRSA bacteremia 5/11/2023      Type II diabetes mellitus 5/3/2022      Insomnia 5/3/2022      Hyperlipidemia 5/3/2022      Hypertension 5/3/2022      Sepsis 4/17/2023      Critical limb ischemia of left lower extremity 4/20/2023      Right hydronephrosis 4/25/2023      Debility 5/2/2023      Toxic metabolic encephalopathy 5/3/2023      Iron deficiency anemia 5/20/2023      Hyponatremia 5/20/2023      Hypomagnesemia 5/20/2023      Hypokalemia 5/20/2023      Chronic diastolic CHF (congestive heart failure) 5/20/2023      Chronic constipation 5/20/2023      Severe aortic stenosis 5/20/2023      Disposition 5/20/2023      Urinary tract infection        Relevant Medical History:     14 items  4/25/2023    Hydronephrosis  Date Unknown    Adenocarcinoma of uterus  Date Unknown    Bladder cancer  Date Unknown    Deep vein thrombosis  Date Unknown    Essential (primary) hypertension  Date Unknown    Heart murmur  Date Unknown    High cholesterol  Date Unknown    Hypertriglyceridemia  Date Unknown    Insomnia  Date Unknown    Osteopenia  Date Unknown    Other pulmonary embolism without acute cor pulmonale  Date Unknown    Personal history of colonic polyps  Date Unknown    Rheumatoid arthritis, unspecified  Date Unknown    Type 2 diabetes mellitus without complications      Nutrition-Related Medications: ferrous sulfate, glimepride, insulin  "aspart, lactobacillus acidophilus, magnesium oxide, metformin, pravastatin, polyethylene glycol, vancomycin    Nutrition-Related Labs:   Latest Reference Range & Units 05/22/23 04:00   Sodium 132 - 146 mmol/L 131 (L)   Potassium 3.5 - 5.1 mmol/L 3.4 (L)   Chloride 98 - 111 mmol/L 94 (L)   CO2 23 - 31 mmol/L 27   BUN 9.8 - 20.1 mg/dL 17.3   Creatinine 0.55 - 1.02 mg/dL 0.67   eGFR mls/min/1.73/m2 >60   Glucose 75 - 121 mg/dL 54 (L)   Calcium 8.4 - 10.2 mg/dL 7.7 (L)   Magnesium 1.60 - 2.60 mg/dL 1.60   Alkaline Phosphatase 40 - 150 unit/L 94   PROTEIN TOTAL 5.8 - 7.6 gm/dL 5.6 (L)   Albumin 3.4 - 4.8 g/dL 1.8 (L)   Albumin/Globulin Ratio 1.1 - 2.0 ratio 0.5 (L)   BILIRUBIN TOTAL <=1.5 mg/dL 0.2   AST 5 - 34 unit/L 10   ALT 0 - 55 unit/L 10   Globulin, Total 2.4 - 3.5 gm/dL 3.8 (H)   (L): Data is abnormally low  (H): Data is abnormally high    Diet Order: Diet diabetic Fluid - 1200mL  Oral Supplement Order: Tanner  Appetite/Oral Intake: fair, 25-75% of meals.   Factors Affecting Nutritional Intake: none identified  Food/Judaism/Cultural Preferences:  waffles, lactaid milk, green beans, fish, sweet potatoes.  Food Allergies: none reported    Skin Integrity: wound  Wound(s):      Altered Skin Integrity 04/17/23 Left posterior Ankle Full thickness tissue loss. Subcutaneous fat may be visible but bone, tendon or muscle are not exposed-Tissue loss description: Full thickness       Altered Skin Integrity 04/17/23 Left lateral Ankle Full thickness tissue loss. Subcutaneous fat may be visible but bone, tendon or muscle are not exposed-Tissue loss description: Full thickness       Altered Skin Integrity 04/17/23 1730 Coccyx Ulceration Intact skin with non-blanchable redness of localized area-Tissue loss description: Not applicable     Comments    Pt family reports intake is good. Discussed food preferences. Marked menus.     Anthropometrics    Height: 5' 2.99" (160 cm)    Last Weight: 53.5 kg (117 lb 15.1 oz) (05/22/23 5500) " Weight Method: Bed Scale  BMI (Calculated): 20.9  BMI Classification: normal (BMI 18.5-24.9)     Ideal Body Weight (IBW), Female: 114.95 lb     % Ideal Body Weight, Female (lb): 115.27 %                             Usual Weight Provided By: unable to obtain usual weight    Wt Readings from Last 5 Encounters:   05/22/23 53.5 kg (117 lb 15.1 oz)   04/24/23 56.6 kg (124 lb 12.5 oz)   04/10/23 54.4 kg (120 lb)   02/09/23 85.3 kg (188 lb)   01/17/23 55.8 kg (123 lb)     Weight Change(s) Since Admission:  Admit Weight: 60.1 kg (132 lb 7.9 oz) (05/11/23 1515)  Wt stable from 4/10/23. Current wt: 53.5 kg. 4/10/23: 54.4 kg    Patient Education    Not applicable.    Monitoring & Evaluation     Dietitian will monitor food and beverage intake, weight, weight change, electrolyte/renal panel, glucose/endocrine profile, and gastrointestinal profile.  Nutrition Risk/Follow-Up: low (follow-up in 5-7 days)  Patients assigned 'low nutrition risk' status do not qualify for a full nutritional assessment but will be monitored and re-evaluated in a 5-7 day time period. Please consult if re-evaluation needed sooner.

## 2023-05-22 NOTE — PT/OT/SLP PROGRESS
Physical Therapy Treatment Note           Name: Melodie Villarreal    : 1931 (89 y.o.)  MRN: [id number]           TREATMENT SUMMARY AND RECOMMENDATIONS:    PT Received On: 23  PT Start Time: 930     PT Stop Time: 955  PT Total Time (min): 25 min     Subjective Assessment:   No complaints  Lethargic    Awake, alert, cooperative  Uncooperative    Agitated  c/o pain    Appropriate  c/o fatigue   x Confused  Treated at bedside     Emotionally labile x Treated in gym/dept.    Impulsive  Other:    Flat affect       Therapy Precautions:   x Cognitive deficits  Spinal precautions    Collar - hard  Sternal precautions    Collar - soft   TLSO    Fall risk  LSO    Hip precautions - posterior  Knee immobilizer    Hip precautions - anterior  WBAT    Impaired communication  Partial weightbearing    Oxygen  TTWB    PEG tube  NWB    Visual deficits  Other:    Hearing deficits          Treatment Objectives:     Mobility Training:   Assist level Comments    Bed mobility maxA Supine-sit   Transfer Total A Bed- gerichair   Gait     Sit to stand transitions maxAx2 Sit-stand x 3 with hallway handrail   Sitting balance     Standing balance      Wheelchair mobility     Car transfer     Other:          Therapeutic Exercise:   Exercise Sets Reps Comments   B LE exer sitting - AAROM  10 reps  Knee lifts, TKE, abd/add                         Additional Comments:  Pt not able to stand erect due to foot pain     Assessment: Patient tolerated session fair.    PT Plan: continue  Revisions made to plan of care: No    GOALS:   Multidisciplinary Problems       Physical Therapy Goals          Problem: Physical Therapy    Goal Priority Disciplines Outcome Goal Variances Interventions   Physical Therapy Goal     PT, PT/OT Ongoing, Progressing     Description: Goals to be met by: Discharge     Patient will increase functional independence with mobility by performin. Supine to sit with MInimal Assistance  2. Sit to supine with  MInimal Assistance  3. Bed to chair transfer with Minimal Assistance using Rolling Walker  4. Gait  x 15 feet with Minimal Assistance using Rolling Walker.   5. Sitting at edge of bed x10 minutes with Stand-by Assistance                         Skilled PT Minutes Provided: 25   Communication with Treatment Team:     Equipment recommendations:       At end of treatment, patient remained:   Up in chair     Up in wheelchair in room    In bed    With alarm activated    Bed rails up    Call bell in reach     Family/friends present    Restraints secured properly    In bathroom with CNA/RN notified    Nurse aware   x In gym with therapist/tech    Other:

## 2023-05-22 NOTE — ASSESSMENT & PLAN NOTE
Improved. Her scheduled nighttime norco was discontinued on 5/20/2023.  Consider titrating down trazodone.  CT of the head from 5/02/2023 and 5/06/2023 showed no acute intracranial abnormality and EEG from 5/02/2023 revealed no evidence of seizure activity.

## 2023-05-22 NOTE — PLAN OF CARE
Problem: Adult Inpatient Plan of Care  Goal: Plan of Care Review  Outcome: Ongoing, Progressing  Goal: Patient-Specific Goal (Individualized)  Outcome: Ongoing, Progressing  Goal: Absence of Hospital-Acquired Illness or Injury  Outcome: Ongoing, Progressing  Goal: Optimal Comfort and Wellbeing  Outcome: Ongoing, Progressing  Goal: Readiness for Transition of Care  Outcome: Ongoing, Progressing     Problem: Diabetes Comorbidity  Goal: Blood Glucose Level Within Targeted Range  Outcome: Ongoing, Progressing     Problem: Adjustment to Illness (Sepsis/Septic Shock)  Goal: Optimal Coping  Outcome: Ongoing, Progressing     Problem: Bleeding (Sepsis/Septic Shock)  Goal: Absence of Bleeding  Outcome: Ongoing, Progressing     Problem: Glycemic Control Impaired (Sepsis/Septic Shock)  Goal: Blood Glucose Level Within Desired Range  Outcome: Ongoing, Progressing  Intervention: Optimize Glycemic Control  Flowsheets (Taken 5/22/2023 8538)  Glycemic Management: blood glucose monitored     Problem: Infection Progression (Sepsis/Septic Shock)  Goal: Absence of Infection Signs and Symptoms  Outcome: Ongoing, Progressing     Problem: Nutrition Impaired (Sepsis/Septic Shock)  Goal: Optimal Nutrition Intake  Outcome: Ongoing, Progressing     Problem: Infection  Goal: Absence of Infection Signs and Symptoms  Outcome: Ongoing, Progressing     Problem: Impaired Wound Healing  Goal: Optimal Wound Healing  Outcome: Ongoing, Progressing  Intervention: Promote Wound Healing  Flowsheets (Taken 5/22/2023 7964)  Oral Nutrition Promotion:   calorie-dense foods provided   calorie-dense liquids provided   rest periods promoted   safe use of adaptive equipment encouraged  Activity Management: Rolling - L1  Pain Management Interventions:   medication offered   care clustered   position adjusted     Problem: Fall Injury Risk  Goal: Absence of Fall and Fall-Related Injury  Outcome: Ongoing, Progressing     Problem: Skin Injury Risk Increased  Goal:  Skin Health and Integrity  Outcome: Ongoing, Progressing  Intervention: Optimize Skin Protection  Flowsheets (Taken 5/22/2023 0437)  Pressure Reduction Techniques:   frequent weight shift encouraged   heels elevated off bed   weight shift assistance provided  Pressure Reduction Devices: positioning supports utilized  Skin Protection:   adhesive use limited   incontinence pads utilized   tubing/devices free from skin contact  Head of Bed (HOB) Positioning: HOB at 30 degrees  Intervention: Promote and Optimize Oral Intake  Flowsheets (Taken 5/22/2023 0437)  Oral Nutrition Promotion:   calorie-dense foods provided   calorie-dense liquids provided   rest periods promoted   safe use of adaptive equipment encouraged     Problem: Mobility Impairment  Goal: Optimal Mobility  Outcome: Ongoing, Progressing

## 2023-05-22 NOTE — PLAN OF CARE
Problem: Adult Inpatient Plan of Care  Goal: Plan of Care Review  Outcome: Ongoing, Progressing  Goal: Patient-Specific Goal (Individualized)  Outcome: Ongoing, Progressing  Goal: Absence of Hospital-Acquired Illness or Injury  Outcome: Ongoing, Progressing  Goal: Optimal Comfort and Wellbeing  Outcome: Ongoing, Progressing  Goal: Readiness for Transition of Care  Outcome: Ongoing, Progressing     Problem: Diabetes Comorbidity  Goal: Blood Glucose Level Within Targeted Range  Outcome: Ongoing, Progressing     Problem: Adjustment to Illness (Sepsis/Septic Shock)  Goal: Optimal Coping  Outcome: Ongoing, Progressing  Intervention: Optimize Psychosocial Adjustment to Illness  Flowsheets (Taken 5/22/2023 5886)  Supportive Measures:   active listening utilized   decision-making supported   positive reinforcement provided     Problem: Bleeding (Sepsis/Septic Shock)  Goal: Absence of Bleeding  Outcome: Ongoing, Progressing     Problem: Glycemic Control Impaired (Sepsis/Septic Shock)  Goal: Blood Glucose Level Within Desired Range  Outcome: Ongoing, Progressing  Intervention: Optimize Glycemic Control  Flowsheets (Taken 5/22/2023 7454)  Glycemic Management: blood glucose monitored     Problem: Infection Progression (Sepsis/Septic Shock)  Goal: Absence of Infection Signs and Symptoms  Outcome: Ongoing, Progressing  Intervention: Initiate Sepsis Management  Flowsheets (Taken 5/22/2023 1451)  Infection Prevention:   cohorting utilized   environmental surveillance performed   equipment surfaces disinfected   hand hygiene promoted   personal protective equipment utilized   rest/sleep promoted   single patient room provided  Infection Management: aseptic technique maintained  Isolation Precautions: precautions maintained  Intervention: Promote Stabilization  Flowsheets (Taken 5/22/2023 8574)  Fluid/Electrolyte Management: fluids provided     Problem: Nutrition Impaired (Sepsis/Septic Shock)  Goal: Optimal Nutrition  Intake  Outcome: Ongoing, Progressing     Problem: Infection  Goal: Absence of Infection Signs and Symptoms  Outcome: Ongoing, Progressing  Intervention: Prevent or Manage Infection  Flowsheets (Taken 5/22/2023 0439)  Infection Management: aseptic technique maintained  Isolation Precautions: precautions maintained     Problem: Impaired Wound Healing  Goal: Optimal Wound Healing  Outcome: Ongoing, Progressing  Intervention: Promote Wound Healing  Flowsheets (Taken 5/22/2023 0437)  Oral Nutrition Promotion:   calorie-dense foods provided   calorie-dense liquids provided   rest periods promoted   safe use of adaptive equipment encouraged  Activity Management: Rolling - L1  Pain Management Interventions:   medication offered   care clustered   position adjusted     Problem: Fall Injury Risk  Goal: Absence of Fall and Fall-Related Injury  Outcome: Ongoing, Progressing     Problem: Skin Injury Risk Increased  Goal: Skin Health and Integrity  Outcome: Ongoing, Progressing  Intervention: Optimize Skin Protection  5/22/2023 0439 by Shanna Cuadra RN  Flowsheets (Taken 5/22/2023 0439)  Pressure Reduction Techniques:   frequent weight shift encouraged   heels elevated off bed   weight shift assistance provided  Pressure Reduction Devices: positioning supports utilized  Skin Protection:   adhesive use limited   incontinence pads utilized   tubing/devices free from skin contact  Head of Bed (HOB) Positioning: HOB at 30-45 degrees  5/22/2023 0437 by Shanna Cuadra RN  Flowsheets (Taken 5/22/2023 0437)  Pressure Reduction Techniques:   frequent weight shift encouraged   heels elevated off bed   weight shift assistance provided  Pressure Reduction Devices: positioning supports utilized  Skin Protection:   adhesive use limited   incontinence pads utilized   tubing/devices free from skin contact  Head of Bed (HOB) Positioning: HOB at 30 degrees  Intervention: Promote and Optimize Oral Intake  5/22/2023 0439 by Shanna Cuadra  RN  Flowsheets (Taken 5/22/2023 0439)  Oral Nutrition Promotion:   calorie-dense foods provided   calorie-dense liquids provided   safe use of adaptive equipment encouraged  5/22/2023 0437 by Shanna Cuadra RN  Flowsheets (Taken 5/22/2023 0437)  Oral Nutrition Promotion:   calorie-dense foods provided   calorie-dense liquids provided   rest periods promoted   safe use of adaptive equipment encouraged     Problem: Mobility Impairment  Goal: Optimal Mobility  Outcome: Ongoing, Progressing

## 2023-05-22 NOTE — PT/OT/SLP PROGRESS
Speech Language Pathology Treatment    Patient Name:  Melodie Villarreal   MRN:  [id number]  Admitting Diagnosis: MRSA bacteremia    Recommendations:                 General Recommendations:  Cognitive-linguistic therapy  Diet recommendations:  Regular Diet - IDDSI Level 7, Liquid Diet Level: Thin liquids - IDDSI Level 0   Aspiration Precautions: Alternating bites/sips and HOB to 90 degrees   General Precautions: Standard,    Communication strategies:  provide increased time to answer, go to room if call light pushed, and use contextual mapping and use of binary choices to aid in recall    Assessment:     Melodie Villarreal is a 89 y.o. female with an SLP diagnosis of Cognitive-Linguistic Impairment.   She presents with confusion and impaired orientation, STM recall and LTM recall. Per pt's daughter, the pt presents w/ increased confusion w/ initiation of antibiotics. Pt's daughter reports her mother was doing well at home prior to hospital admit and was walking w/ RW. Pt's daughter cooks, cleans, manages finances, and manages medications. Pt would benefit from skilled SLP services at this time to promote a return to PLOF w/ cognitive skills.    Subjective     Pt in gerichair upon SLP arrival. Pt's daughter and niece present. Pt pleasant and agreeable to tx, though falling asleep, therefore session ended early.      Pain/Comfort:  Pt stated she was comfortable.     Respiratory Status: Room air    Objective:     Has the patient been evaluated by SLP for swallowing?   Yes  Keep patient NPO? No  Current Respiratory Status:     Pt recalled breakfast items consumed maxA.  Pt recalled therapy details from this AM given maxA.  Pt I'ly recalled niece present and a couple accurate details related to her.    Overall good session.  Cont SLP POC.    Goals:   Multidisciplinary Problems       SLP Goals          Problem: SLP    Goal Priority Disciplines Outcome   SLP Goal     SLP Ongoing, Progressing   Description: LTG: Pt will improve  cognitive linguistic skills to allow for safe discharge home w/ family.    STG:  Pt will orient x4 modA.  Pt will utilize memory strategies to recall new information following a 2 minute filled delay modA.  Pt will answer LTM Qs w/ 90% acc modA.                       Plan:     Patient to be seen:  3 x/week   Plan of Care expires:  05/26/23  Plan of Care reviewed with:  patient, daughter   SLP Follow-Up:  Yes       Discharge recommendations:  home with home health   Barriers to Discharge:  Support/Caregiver at home warranted to ensure safety.    Time Tracking:     SLP Treatment Date:   5/22/23  Speech Start Time:  10:40  Speech Stop Time: 10:55     Speech Total Time (min):  15 min    Billable Minutes: Speech Therapy Individual 15    Vanna Boss M.S., CCC-SLP   05/22/2023

## 2023-05-22 NOTE — PT/OT/SLP PROGRESS
Occupational Therapy  Treatment    Name: Melodie Villarreal    : 1931 (89 y.o.)  MRN: [id number]           TREATMENT SUMMARY AND RECOMMENDATIONS:      OT Date of Treatment: 23  OT Start Time: 1000  OT Stop Time: 1015  OT Total Time (min): 15 min      Subjective Assessment:   No complaints  Lethargic    Awake, alert, cooperative  Impulsive    Uncooperative   Flat affect    Agitated x c/o pain   x Appropriate x c/o fatigue    Confused  Treated at bedside     Emotionally labile x Treated in gym/dept.      Other:        Therapy Precautions:   x Cognitive deficits  Spinal precautions    Collar - hard  Sternal precautions    Collar - soft   TLSO   x Fall risk  LSO    Hip precautions - posterior  Knee immobilizer    Hip precautions - anterior  WBAT    Impaired communication  Partial weightbearing    Oxygen  TTWB    PEG tube  NWB    Visual deficits      Hearing deficits   Other:        Treatment Objectives:     Mobility Training:    Mobility task Assist level Comments    Bed mobility     Transfer     Sit to stands transitions     Functional mobility     Sitting balance     Standing balance      Other:        ADL Training:    ADL Assist level Comments    Feeding     Grooming/hygiene     Bathing     Upper body dressing     Lower body dressing     Toileting     Toilet transfer     Adaptive equipment training     Other:           Therapeutic Exercise:   Exercise Sets Reps Comments   BUE strengthening exercise 1 10 1# free weight completing bicep curls                         Additional Comments:      Assessment: Patient tolerated session fair.    OT Plan: Continue with POC  Revisions made to plan of care: No    GOALS:   Multidisciplinary Problems       Occupational Therapy Goals          Problem: Occupational Therapy    Goal Priority Disciplines Outcome Interventions   Occupational Therapy Goal     OT, PT/OT Ongoing, Progressing    Description: Goals to be met by: 23     Patient will increase functional independence  with ADLs by performing:    Toileting from bedside commode with Moderate Assistance for hygiene and clothing management.   Bathing from  edge of bed with Moderate Assistance.  Toilet transfer to bedside commode with Moderate Assistance.   Hygiene/grooming from edge of bed with minimal assistance.                          Skilled OT Minutes Provided: 15  Communication with Treatment Team:     Equipment recommendations:       At end of treatment, patient remained:  x Up in chair     Up in wheelchair in room    In bed   x With alarm activated    Bed rails up    Call bell in reach    x Family/friends present    Restraints secured properly    In bathroom with CNA/RN notified    In gym with PT/PTA/tech    Nurse aware    Other:

## 2023-05-22 NOTE — PROGRESS NOTES
Pharmacokinetic Assessment Follow Up: IV Vancomycin    Vancomycin serum concentration assessment(s):    The trough level was drawn correctly and can be used to guide therapy at this time. The measurement is above the desired definitive target range of 15 to 20 mcg/mL.    Vancomycin Regimen Plan:    Change regimen to Vancomycin 500 mg IV every 18 hours with next serum trough concentration measured at 0900 prior to 3rd dose on 5/24    Scheduled Administration Times    2200, 1600, 1000    Drug levels (last 3 results):  Recent Labs   Lab Result Units 05/22/23  0400   Vancomycin Trough ug/ml 24.1*       Vancomycin Administrations:  vancomycin given in the last 96 hours                     vancomycin (VANCOCIN) 500 mg in dextrose 5 % in water (D5W) 5 % 100 mL IVPB (MB+) (mg) 500 mg New Bag 05/22/23 0417     500 mg New Bag 05/21/23 1709     500 mg New Bag  0455     500 mg New Bag 05/20/23 1719     500 mg New Bag  0407     500 mg New Bag 05/19/23 1707     500 mg New Bag  0418     500 mg New Bag 05/18/23 1622                    Pharmacy will continue to follow and monitor vancomycin.    Please contact pharmacy at extension [phone] for questions regarding this assessment.    Thank you for the consult,   Aquilino Samayoa       Patient brief summary:  Melodie Villarreal is a 89 y.o. female initiated on antimicrobial therapy with IV Vancomycin for treatment of bacteremia    The patient's current regimen is 500mg Q18h    Drug Allergies:   Review of patient's allergies indicates:   Allergen Reactions    Ace inhibitors Other (See Comments)    Adhesive      Allergic to tape    Bactrim [sulfamethoxazole-trimethoprim] Other (See Comments)     Confusion, Hypoglycemia    Meperidine Other (See Comments)    Midazolam Other (See Comments)    Atorvastatin Nausea Only    Codeine Other (See Comments) and Anxiety    Tapentadol Rash       Actual Body Weight:   53.5 kg    Renal Function:   Estimated Creatinine Clearance: 45.2 mL/min (based on SCr of  0.67 mg/dL).,     Dialysis Method (if applicable):  N/A    CBC (last 72 hours):  Recent Labs   Lab Result Units 05/22/23  0400   WBC x10(3)/mcL 8.86   Hgb g/dL 8.5*   Hct % 28.5*   Platelet x10(3)/mcL 258   Mono % % 8.8   Eos % % 6.1   Basophil % % 0.8       Metabolic Panel (last 72 hours):  Recent Labs   Lab Result Units 05/22/23  0400   Sodium Level mmol/L 131*   Potassium Level mmol/L 3.4*   Chloride mmol/L 94*   Carbon Dioxide mmol/L 27   Glucose Level mg/dL 54*   Blood Urea Nitrogen mg/dL 17.3   Creatinine mg/dL 0.67   Albumin Level g/dL 1.8*   Bilirubin Total mg/dL 0.2   Alkaline Phosphatase unit/L 94   Aspartate Aminotransferase unit/L 10   Alanine Aminotransferase unit/L 10   Magnesium Level mg/dL 1.60       Microbiologic Results:  Microbiology Results (last 7 days)       ** No results found for the last 168 hours. **

## 2023-05-22 NOTE — PT/OT/SLP PROGRESS
Physical Therapy Treatment Note           Name: Melodie Villarreal    : 1931 (89 y.o.)  MRN: [id number]           TREATMENT SUMMARY AND RECOMMENDATIONS:    PT Received On: 23  PT Start Time: 1300     PT Stop Time: 1325  PT Total Time (min): 25 min     Subjective Assessment:   No complaints  Lethargic    Awake, alert, cooperative  Uncooperative    Agitated  c/o pain    Appropriate  c/o fatigue   x Confused x Treated at bedside     Emotionally labile  Treated in gym/dept.    Impulsive  Other:    Flat affect       Therapy Precautions:    Cognitive deficits  Spinal precautions    Collar - hard  Sternal precautions    Collar - soft   TLSO    Fall risk  LSO    Hip precautions - posterior  Knee immobilizer    Hip precautions - anterior  WBAT   x Impaired communication  Partial weightbearing    Oxygen  TTWB    PEG tube  NWB    Visual deficits  Other:    Hearing deficits          Treatment Objectives:     Mobility Training:   Assist level Comments    Bed mobility maxA Sit-supine   Transfer maxA Recliner-bed   Gait     Sit to stand transitions     Sitting balance     Standing balance      Wheelchair mobility     Car transfer     Other:          Therapeutic Exercise:   Exercise Sets Reps Comments   B LE exer supine 10 reps   Abd/add, heel slides, DF/PF                         Additional Comments:  Same over all status     Assessment: Patient tolerated session fair.    PT Plan: continue  Revisions made to plan of care: No    GOALS:   Multidisciplinary Problems       Physical Therapy Goals          Problem: Physical Therapy    Goal Priority Disciplines Outcome Goal Variances Interventions   Physical Therapy Goal     PT, PT/OT Ongoing, Progressing     Description: Goals to be met by: Discharge     Patient will increase functional independence with mobility by performin. Supine to sit with MInimal Assistance  2. Sit to supine with MInimal Assistance  3. Bed to chair transfer with Minimal Assistance using  Rolling Walker  4. Gait  x 15 feet with Minimal Assistance using Rolling Walker.   5. Sitting at edge of bed x10 minutes with Stand-by Assistance                         Skilled PT Minutes Provided: 25   Communication with Treatment Team:     Equipment recommendations:       At end of treatment, patient remained:   Up in chair     Up in wheelchair in room   x In bed   x With alarm activated   x Bed rails up   x Call bell in reach     Family/friends present    Restraints secured properly    In bathroom with CNA/RN notified    Nurse aware    In gym with therapist/tech    Other:

## 2023-05-22 NOTE — SUBJECTIVE & OBJECTIVE
Interval History: She had a blood sugar of 50 at 17:02 yesterday and 57 at 4:07 this morning and her metformin and glimepiride have been held. Her daughter is present at the bedside and she states that she slept all day yesterday. She has a good appetite and she ate 100% of her breakfast today.      Review of Systems   All other systems reviewed and are negative.  Objective:     Vital Signs (Most Recent):  Temp: 97.9 °F (36.6 °C) (23 0700)  Pulse: 97 (23 0700)  Resp: 18 (23 2200)  BP: 133/66 (23 0700)  SpO2: 97 % (23 0700) Vital Signs (24h Range):  Temp:  [97.8 °F (36.6 °C)-98.4 °F (36.9 °C)] 97.9 °F (36.6 °C)  Pulse:  [92-97] 97  Resp:  [17-19] 18  SpO2:  [95 %-97 %] 97 %  BP: (116-133)/(61-73) 133/66     Weight: 53.5 kg (117 lb 15.1 oz)  Body mass index is 20.9 kg/m².    Intake/Output Summary (Last 24 hours) at 2023 0815  Last data filed at 2023 0638  Gross per 24 hour   Intake 840 ml   Output 950 ml   Net -110 ml      Physical Exam  General - Elderly  female in no acute distress.  HEENT - NCAT. No scleral icterus. Oropharynx clear. Mucous membranes moist.  Neck - No lymphadenopathy, thyromegaly, or JVD.  CVS - Regular rate and rhythm. No murmurs, rubs, or gallops.  Resp - Lungs are clear to auscultation bilaterally. No rales, wheeze, or rhonchi.   GI - Soft, nontender, nondistended, normoactive bowel sounds present.   Extremities - No clubbing, cyanosis, or edema. Right upper extremity PICC line.  Neuro - CN II through XII are grossly intact. No focal neurological deficits. Alert and oriented to name and  only.  Psych - Flat affect.  Skin -  Left heel remains stable with thin dry scab to the surface. Left posterior ankle and lateral ankle ulcerations have increasing granulation tissue noted.  Left lateral lower leg site previously closed with ischemic changes is now evolving and there is a small area of moist eschar noted within discolored area.        Significant Labs: All pertinent labs within the past 24 hours have been reviewed.     5/22/2023:  CBC: WBC count 8.86, hemoglobin 8.5, hematocrit 28.5, platelet count 258.  CMP: Sodium 131, potassium 3.4, chloride 94, CO2 27, BUN 17.3, creatinine 0.67, glucose 54, calcium 7.7, magnesium 1.6, alkaline phosphatase 94, total protein 5.6, albumin 1.8, total bilirubin 0.5, AST 10, ALT 10.    5/19/2023:  CBC: WBC count 10.83, hemoglobin 9, hematocrit 30.4, platelet count 274.  BMP: Sodium 130, potassium 3.5, chloride 94, CO2 28, BUN 18.4, creatinine 0.68, glucose 125, calcium 8.3.     5/18/2023:  CBC: WBC count 9.84, hemoglobin 9, hematocrit 30.7,  platelet count 261.  BMP: Sodium 134, potassium 3.7, chloride 94, CO2 33, BUN 20.6, creatinine 0.69, glucose 167, calcium 7.8, magnesium 1.9.     5/16/2023:  BMP: Sodium 131, potassium 4.2, chloride 4.2, chloride 90, CO2 33, BUN 18.9, creatinine 0.65, glucose 188, calcium 7.9, magnesium 1.9.     5/15/2023:  BMP: Sodium 128, potassium 3.3, chloride 91, CO2 34, BUN 18, creatinine 0.67, glucose 76, calcium 7.9.     5/13/2023:  CBC: WBC count 10.82, hemoglobin 9.1, hematocrit 30, platelet count 248.  BMP: Sodium 128, potassium 3.7, chloride 89, CO2 36, BUN 17.3, creatinine 0.66, glucose 103, calcium 8.2.     Significant Imaging: I have reviewed all pertinent imaging results/findings within the past 24 hours.     CXR 5/16/2023: Left-sided PICC projects over the distal SVC.  Prominent interstitial markings with no focal opacification.     CXR 5/6/2023: The heart is not significantly enlarged.  There is aortic atherosclerosis.  Similar prominent central pulmonary vasculature.  No new dense consolidation or pneumothorax.     CT head 5/5/2023:   1. No acute intracranial abnormality.  2. Chronic microvascular ischemic changes.     EEG 5/2/2023: No evidence of seizure activity.     CT head 5/2/2023: No acute intracranial abnormality.     CTA abdomen/pelvis with bilateral runoff  4/26/2023: Significant stenosis of the left common iliac artery, severe narrowing versus occlusion at the left common femoral artery, occlusion at the distal superficial femoral artery, and stenosis at the origin of the superior mesenteric artery.     Renal ultrasound 4/21/2023: Mild-to-moderate hydronephrosis on the right.     CT abdomen/pelvis 4/21/2023: Mild to moderate right hydronephrosis with no significant right ureteral dilatation.       STARR 4/21/2023: No valvular vegetations and moderate to severe aortic stenosis.      MRI thoracic/lumbar spine 4/20/2023: No evidence to suggest discitis/osteomyelitis or drainable fluid collection and new right-sided hydronephrosis.     Arterial Doppler left lower extremity 4/20/2023: Monophasic flow throughout suggestive of inflow disease     NM bone scan 4/19/2023: Left lower extremity cellulitis without focal intense concentration more typically seen with an abscess or osteomyelitis.      TTE 4/17/2023:  Low-normal systolic function with an EF of 54%, grade II left ventricular diastolic dysfunction, mild-to-moderate aortic valve stenosis, and no evidence of vegetation.     CT head 4/16/2023: No acute intracranial abnormality.       CXR 4/16/2023: No acute cardiopulmonary process.     CT chest/abdomen/pelvis 4/16/2023: No evidence of PE and no acute intraabdominal or pelvic solid organ or bowel pathology identified.       X-ray left foot 4/16/2023: Minimally displaced 5th proximal phalanx fracture and possible minimally displaced 4th proximal phalanx fracture.     X-ray left tibia/fibula 4/16/2023: No acute osseous process.

## 2023-05-23 NOTE — PLAN OF CARE
Problem: Adult Inpatient Plan of Care  Goal: Plan of Care Review  Outcome: Ongoing, Progressing  Flowsheets (Taken 5/23/2023 0230)  Plan of Care Reviewed With:   patient   daughter  Goal: Patient-Specific Goal (Individualized)  Outcome: Ongoing, Progressing  Flowsheets (Taken 5/23/2023 0230)  Anxieties, Fears or Concerns: Pain level medication  Individualized Care Needs: Wound care therapy for better outcome  Goal: Absence of Hospital-Acquired Illness or Injury  Outcome: Ongoing, Progressing  Intervention: Prevent and Manage VTE (Venous Thromboembolism) Risk  Flowsheets (Taken 5/23/2023 0200)  VTE Prevention/Management:   bleeding precations maintained   bleeding risk assessed   remove, assess skin, and reapply sequential compression device  Range of Motion: active ROM (range of motion) encouraged  Intervention: Prevent Infection  Flowsheets (Taken 5/23/2023 0230)  Infection Prevention:   cohorting utilized   hand hygiene promoted   rest/sleep promoted   equipment surfaces disinfected  Goal: Optimal Comfort and Wellbeing  Outcome: Ongoing, Progressing  Intervention: Monitor Pain and Promote Comfort  Flowsheets (Taken 5/23/2023 0200)  Pain Management Interventions:   care clustered   medication offered   massage provided   position adjusted   quiet environment facilitated  Intervention: Provide Person-Centered Care  Flowsheets (Taken 5/23/2023 0230)  Trust Relationship/Rapport:   care explained   choices provided   emotional support provided   questions answered   questions encouraged  Goal: Readiness for Transition of Care  Outcome: Ongoing, Progressing     Problem: Diabetes Comorbidity  Goal: Blood Glucose Level Within Targeted Range  Outcome: Ongoing, Progressing  Intervention: Monitor and Manage Glycemia  Flowsheets (Taken 5/23/2023 0230)  Glycemic Management: blood glucose monitored     Problem: Adjustment to Illness (Sepsis/Septic Shock)  Goal: Optimal Coping  Outcome: Ongoing, Progressing  Intervention:  Optimize Psychosocial Adjustment to Illness  Flowsheets (Taken 5/23/2023 0230)  Supportive Measures:   active listening utilized   positive reinforcement provided  Family/Support System Care: support provided     Problem: Bleeding (Sepsis/Septic Shock)  Goal: Absence of Bleeding  Outcome: Ongoing, Progressing  Intervention: Monitor and Manage Bleeding  Flowsheets (Taken 5/23/2023 0230)  Bleeding Precautions: monitored for signs of bleeding     Problem: Glycemic Control Impaired (Sepsis/Septic Shock)  Goal: Blood Glucose Level Within Desired Range  Outcome: Ongoing, Progressing  Intervention: Optimize Glycemic Control  Flowsheets (Taken 5/23/2023 0230)  Glycemic Management: blood glucose monitored     Problem: Infection Progression (Sepsis/Septic Shock)  Goal: Absence of Infection Signs and Symptoms  Outcome: Ongoing, Progressing  Intervention: Initiate Sepsis Management  Flowsheets (Taken 5/23/2023 0230)  Infection Prevention:   cohorting utilized   hand hygiene promoted   rest/sleep promoted   equipment surfaces disinfected  Infection Management: aseptic technique maintained  Isolation Precautions:   protective   precautions maintained  Intervention: Promote Stabilization  Flowsheets (Taken 5/23/2023 0230)  Fever Reduction/Comfort Measures:   lightweight clothing   lightweight bedding  Fluid/Electrolyte Management: fluids provided  Intervention: Promote Recovery  Flowsheets (Taken 5/23/2023 0230)  Sleep/Rest Enhancement:   awakenings minimized   regular sleep/rest pattern promoted   massage given  Activity Management: Rolling - L1     Problem: Nutrition Impaired (Sepsis/Septic Shock)  Goal: Optimal Nutrition Intake  Outcome: Ongoing, Progressing     Problem: Infection  Goal: Absence of Infection Signs and Symptoms  Outcome: Ongoing, Progressing  Intervention: Prevent or Manage Infection  Flowsheets (Taken 5/23/2023 0230)  Fever Reduction/Comfort Measures:   lightweight clothing   lightweight bedding  Infection  Management: aseptic technique maintained  Isolation Precautions:   protective   precautions maintained     Problem: Impaired Wound Healing  Goal: Optimal Wound Healing  Outcome: Ongoing, Progressing  Intervention: Promote Wound Healing  Flowsheets (Taken 5/23/2023 0230)  Sleep/Rest Enhancement:   awakenings minimized   regular sleep/rest pattern promoted   massage given  Activity Management: Rolling - L1  Pain Management Interventions:   care clustered   quiet environment facilitated   medication offered     Problem: Fall Injury Risk  Goal: Absence of Fall and Fall-Related Injury  Outcome: Ongoing, Progressing  Intervention: Identify and Manage Contributors  Flowsheets (Taken 5/23/2023 0230)  Self-Care Promotion:   independence encouraged   BADL personal objects within reach  Medication Review/Management: medications reviewed     Problem: Skin Injury Risk Increased  Goal: Skin Health and Integrity  Outcome: Ongoing, Progressing  Intervention: Optimize Skin Protection  Flowsheets (Taken 5/23/2023 0230)  Pressure Reduction Techniques:   frequent weight shift encouraged   heels elevated off bed   pressure points protected   weight shift assistance provided  Pressure Reduction Devices: positioning supports utilized  Skin Protection:   adhesive use limited   transparent dressing maintained   skin-to-device areas padded  Head of Bed (HOB) Positioning: HOB at 20-30 degrees     Problem: Mobility Impairment  Goal: Optimal Mobility  Outcome: Ongoing, Progressing  Intervention: Optimize Mobility  Flowsheets (Taken 5/23/2023 0230)  Assistive Device Utilized: lift device  Activity Management: Rolling - L1  Positioning/Transfer Devices:   pillows   in use

## 2023-05-23 NOTE — PT/OT/SLP PROGRESS
Occupational Therapy  Treatment    Name: Melodie Villarreal    : 1931 (89 y.o.)  MRN: [id number]           TREATMENT SUMMARY AND RECOMMENDATIONS:      OT Date of Treatment: 23  OT Start Time: 1005  OT Stop Time: 1035  OT Total Time (min): 30 min      Subjective Assessment:   No complaints  Lethargic   x Awake, alert, cooperative  Impulsive    Uncooperative   Flat affect    Agitated  c/o pain    Appropriate  c/o fatigue   x Confused x Treated at bedside     Emotionally labile x Treated in gym/dept.      Other:        Therapy Precautions:   x Cognitive deficits  Spinal precautions    Collar - hard  Sternal precautions    Collar - soft   TLSO   x Fall risk  LSO    Hip precautions - posterior  Knee immobilizer    Hip precautions - anterior  WBAT    Impaired communication  Partial weightbearing    Oxygen  TTWB    PEG tube  NWB    Visual deficits      Hearing deficits   Other:        Treatment Objectives:     Mobility Training:    Mobility task Assist level Comments    Bed mobility Max Ax2 Supine<sidelying<EOB; assist to move LE over EOB and lift trunk into upright position    Transfer Total A Squat pivot t/f from EOB to recliner    Sit to stands transitions     Functional mobility     Sitting balance SBA Dynamic sitting balance focusing on shifting weight anteriorly to increase independence with UE dressing and functional mobility.   Standing balance      Other:        ADL Training:    ADL Assist level Comments    Feeding     Grooming/hygiene     Bathing     Upper body dressing     Lower body dressing     Toileting     Toilet transfer     Adaptive equipment training     Other:           Therapeutic Exercise:   Exercise Sets Reps Comments                               Additional Comments:      Assessment: Patient tolerated session well. Pt seemed more alert and engaged during therapy session today. Pt continues to demonstrate fair sitting balance and require a lot of assistance for functional transfers d/t overall  debility.     OT Plan: Continue with POC  Revisions made to plan of care: No    GOALS:   Multidisciplinary Problems       Occupational Therapy Goals          Problem: Occupational Therapy    Goal Priority Disciplines Outcome Interventions   Occupational Therapy Goal     OT, PT/OT Ongoing, Progressing    Description: Goals to be met by: 5/26/23     Patient will increase functional independence with ADLs by performing:    Toileting from bedside commode with Moderate Assistance for hygiene and clothing management.   Bathing from  edge of bed with Moderate Assistance.  Toilet transfer to bedside commode with Moderate Assistance.   Hygiene/grooming from edge of bed with minimal assistance.                          Skilled OT Minutes Provided: 30  Communication with Treatment Team:     Equipment recommendations:       At end of treatment, patient remained:   Up in chair     Up in wheelchair in room    In bed    With alarm activated    Bed rails up    Call bell in reach     Family/friends present    Restraints secured properly    In bathroom with CNA/RN notified   x In gym with PT/PTA/tech    Nurse aware    Other:

## 2023-05-23 NOTE — PT/OT/SLP PROGRESS
Physical Therapy Treatment Note           Name: Melodie Villarreal    : 1931 (89 y.o.)  MRN: [id number]           TREATMENT SUMMARY AND RECOMMENDATIONS:    PT Received On: 23  PT Start Time: 1030     PT Stop Time: 1055  PT Total Time (min): 25 min     Subjective Assessment:   No complaints  Lethargic    Awake, alert, cooperative  Uncooperative    Agitated x c/o pain    Appropriate  c/o fatigue    Confused  Treated at bedside     Emotionally labile x Treated in gym/dept.    Impulsive  Other:    Flat affect       Therapy Precautions:   x Cognitive deficits  Spinal precautions    Collar - hard  Sternal precautions    Collar - soft   TLSO   x Fall risk  LSO    Hip precautions - posterior  Knee immobilizer    Hip precautions - anterior  WBAT    Impaired communication  Partial weightbearing    Oxygen  TTWB    PEG tube  NWB    Visual deficits  Other:    Hearing deficits          Treatment Objectives:     Mobility Training:   Assist level Comments    Bed mobility     Transfer     Gait     Sit to stand transitions maxAx 2 Sit-stand x 3 using hallway handrail 10 sec each time   Sitting balance     Standing balance      Wheelchair mobility     Car transfer     Other:          Therapeutic Exercise:   Exercise Sets Reps Comments   B LE exer long and short sitting 10 reps  In all planes to promote muscle strength and ROM                          Additional Comments:  Talked to daughter about manish training with her and brother- will discuss in staffing tomorrow    Assessment: Patient tolerated session same .    PT Plan: continue  Revisions made to plan of care: No    GOALS:   Multidisciplinary Problems       Physical Therapy Goals          Problem: Physical Therapy    Goal Priority Disciplines Outcome Goal Variances Interventions   Physical Therapy Goal     PT, PT/OT Ongoing, Progressing     Description: Goals to be met by: Discharge     Patient will increase functional independence with mobility by  performin. Supine to sit with MInimal Assistance  2. Sit to supine with MInimal Assistance  3. Bed to chair transfer with Minimal Assistance using Rolling Walker  4. Gait  x 15 feet with Minimal Assistance using Rolling Walker.   5. Sitting at edge of bed x10 minutes with Stand-by Assistance                         Skilled PT Minutes Provided: 25   Communication with Treatment Team:     Equipment recommendations:       At end of treatment, patient remained:  x Up in chair     Up in wheelchair in room    In bed   x With alarm activated    Bed rails up   x Call bell in reach     Family/friends present    Restraints secured properly    In bathroom with CNA/RN notified    Nurse aware    In gym with therapist/tech    Other:

## 2023-05-23 NOTE — PT/OT/SLP PROGRESS
Occupational Therapy  Treatment    Name: Melodie Villarreal    : 1931 (89 y.o.)  MRN: [id number]           TREATMENT SUMMARY AND RECOMMENDATIONS:      OT Date of Treatment: 23  OT Start Time:   OT Stop Time:   OT Total Time (min): 30 min      Subjective Assessment:   No complaints  Lethargic    Awake, alert, cooperative  Impulsive    Uncooperative   Flat affect    Agitated x c/o pain in neck    x Appropriate  c/o fatigue   x Confused x Treated at bedside     Emotionally labile x Treated in gym/dept.      Other:        Therapy Precautions:   x Cognitive deficits  Spinal precautions    Collar - hard  Sternal precautions    Collar - soft   TLSO   x Fall risk  LSO    Hip precautions - posterior  Knee immobilizer    Hip precautions - anterior  WBAT    Impaired communication  Partial weightbearing    Oxygen  TTWB    PEG tube  NWB    Visual deficits      Hearing deficits   Other:        Treatment Objectives:     Mobility Training:    Mobility task Assist level Comments    Bed mobility     Transfer     Sit to stands transitions     Functional mobility     Sitting balance SBA Dynamic sitting balance addressing anterior weight-shifting and core strengthening to increase independence with performing bathing and hygiene/grooming tasks sitting EOB.    Standing balance      Other:        ADL Training:    ADL Assist level Comments    Feeding     Grooming/hygiene Set-up  Washing her face while sitting up in recliner    Bathing     Upper body dressing     Lower body dressing     Toileting     Toilet transfer     Adaptive equipment training     Other:           Therapeutic Exercise:   Exercise Sets Reps Comments   BUE AROM  1 10 Elbow flx/ext.    Manual massage of neck    Decrease pain and stiffness                   Additional Comments:  Discussed manish training with pt's son, who stated his sister has used one before on 2 separate occasions. She can train the other family members on how to use it safety if needed.      Assessment: Patient tolerated session fair-well. Pt demonstrated improved core strength as evident of her ability to reach forward with BUE without assistance. Pt continues to demonstrate confusion during session. Pt will need 24-hr care when DC.     OT Plan: Continue with POC  Revisions made to plan of care: No    GOALS:   Multidisciplinary Problems       Occupational Therapy Goals          Problem: Occupational Therapy    Goal Priority Disciplines Outcome Interventions   Occupational Therapy Goal     OT, PT/OT Ongoing, Progressing    Description: Goals to be met by: 5/26/23     Patient will increase functional independence with ADLs by performing:    Toileting from bedside commode with Moderate Assistance for hygiene and clothing management.   Bathing from  edge of bed with Moderate Assistance.  Toilet transfer to bedside commode with Moderate Assistance.   Hygiene/grooming from edge of bed with minimal assistance.                          Skilled OT Minutes Provided: 30  Communication with Treatment Team:     Equipment recommendations:       At end of treatment, patient remained:  x Up in chair     Up in wheelchair in room    In bed   x With alarm activated    Bed rails up   x Call bell in reach    x Family/friends present    Restraints secured properly    In bathroom with CNA/RN notified    In gym with PT/PTA/tech    Nurse aware    Other:

## 2023-05-23 NOTE — SUBJECTIVE & OBJECTIVE
Interval History: She participated in PT and OT yesterday and she required total assistance with transfers from the bed to the [facility] and maximum assistance with bed mobility and sit to stand transitions. She was seen by the dietician yesterday who recommended continuing a diabetic diet, 1200 ml fluid restriction, and gurdeep BID. Her metformin and glimepiride were held yesterday due to hypoglycemia and her blood sugars have ranged from 106-194 overnight. She complains of low back pain and her daughter requests that her norco be resumed.      Review of Systems   All other systems reviewed and are negative.  Objective:     Vital Signs (Most Recent):  Temp: 98.4 °F (36.9 °C) (23 035)  Pulse: 88 (23 035)  Resp: 18 (23)  BP: 113/65 (23)  SpO2: 96 % (23) Vital Signs (24h Range):  Temp:  [98 °F (36.7 °C)-98.4 °F (36.9 °C)] 98.4 °F (36.9 °C)  Pulse:  [] 88  Resp:  [18] 18  SpO2:  [92 %-98 %] 96 %  BP: (109-136)/(64-73) 113/65     Weight: 54.6 kg (120 lb 5.9 oz)  Body mass index is 21.33 kg/m².    Intake/Output Summary (Last 24 hours) at 2023 0734  Last data filed at 2023 0608  Gross per 24 hour   Intake 1540 ml   Output 1051 ml   Net 489 ml      Physical Exam  General - Elderly  female in no acute distress.  HEENT - NCAT. No scleral icterus. Oropharynx clear. Mucous membranes moist.  Neck - No lymphadenopathy, thyromegaly, or JVD.  CVS - Regular rate and rhythm. No murmurs, rubs, or gallops.  Resp - Lungs are clear to auscultation bilaterally. No rales, wheeze, or rhonchi.   GI - Soft, nontender, nondistended, normoactive bowel sounds present.   Extremities - No clubbing, cyanosis, or edema. Right upper extremity PICC line.  Neuro - CN II through XII are grossly intact. No focal neurological deficits. Alert and oriented to name and  only.  Psych - Flat affect.  Skin -  Left heel remains stable with thin dry scab to the surface. Left posterior ankle  and lateral ankle ulcerations have increasing granulation tissue noted.  Left lateral lower leg site previously closed with ischemic changes is now evolving and there is a small area of moist eschar noted within discolored area.       Significant Labs: All pertinent labs within the past 24 hours have been reviewed.     5/22/2023:  CBC: WBC count 8.86, hemoglobin 8.5, hematocrit 28.5, platelet count 258.  CMP: Sodium 131, potassium 3.4, chloride 94, CO2 27, BUN 17.3, creatinine 0.67, glucose 54, calcium 7.7, magnesium 1.6, alkaline phosphatase 94, total protein 5.6, albumin 1.8, total bilirubin 0.5, AST 10, ALT 10.     5/19/2023:  CBC: WBC count 10.83, hemoglobin 9, hematocrit 30.4, platelet count 274.  BMP: Sodium 130, potassium 3.5, chloride 94, CO2 28, BUN 18.4, creatinine 0.68, glucose 125, calcium 8.3.     5/18/2023:  CBC: WBC count 9.84, hemoglobin 9, hematocrit 30.7,  platelet count 261.  BMP: Sodium 134, potassium 3.7, chloride 94, CO2 33, BUN 20.6, creatinine 0.69, glucose 167, calcium 7.8, magnesium 1.9.     5/16/2023:  BMP: Sodium 131, potassium 4.2, chloride 4.2, chloride 90, CO2 33, BUN 18.9, creatinine 0.65, glucose 188, calcium 7.9, magnesium 1.9.     5/15/2023:  BMP: Sodium 128, potassium 3.3, chloride 91, CO2 34, BUN 18, creatinine 0.67, glucose 76, calcium 7.9.     5/13/2023:  CBC: WBC count 10.82, hemoglobin 9.1, hematocrit 30, platelet count 248.  BMP: Sodium 128, potassium 3.7, chloride 89, CO2 36, BUN 17.3, creatinine 0.66, glucose 103, calcium 8.2.     Significant Imaging: I have reviewed all pertinent imaging results/findings within the past 24 hours.     CXR 5/16/2023: Left-sided PICC projects over the distal SVC.  Prominent interstitial markings with no focal opacification.     CXR 5/6/2023: The heart is not significantly enlarged.  There is aortic atherosclerosis.  Similar prominent central pulmonary vasculature.  No new dense consolidation or pneumothorax.     CT head 5/5/2023:   1. No  acute intracranial abnormality.  2. Chronic microvascular ischemic changes.     EEG 5/2/2023: No evidence of seizure activity.     CT head 5/2/2023: No acute intracranial abnormality.     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     Renal ultrasound 4/21/2023: Mild-to-moderate hydronephrosis on the right.     CT abdomen/pelvis 4/21/2023: Mild to moderate right hydronephrosis with no significant right ureteral dilatation.       STARR 4/21/2023: No valvular vegetations and moderate to severe aortic stenosis.      MRI thoracic/lumbar spine 4/20/2023: No evidence to suggest discitis/osteomyelitis or drainable fluid collection and new right-sided hydronephrosis.     Arterial Doppler left lower extremity 4/20/2023: Monophasic flow throughout suggestive of inflow disease     NM bone scan 4/19/2023: Left lower extremity cellulitis without focal intense concentration more typically seen with an abscess or osteomyelitis.      TTE 4/17/2023:  Low-normal systolic function with an EF of 54%, grade II left ventricular diastolic dysfunction, mild-to-moderate aortic valve stenosis, and no evidence of vegetation.     CT head 4/16/2023: No acute intracranial abnormality.       CXR 4/16/2023: No acute cardiopulmonary process.     CT chest/abdomen/pelvis 4/16/2023: No evidence of PE and no acute intraabdominal or pelvic solid organ or bowel pathology identified.       X-ray left foot 4/16/2023: Minimally displaced 5th proximal phalanx fracture and possible minimally displaced 4th proximal phalanx fracture.     X-ray left tibia/fibula 4/16/2023: No acute osseous process.

## 2023-05-23 NOTE — PT/OT/SLP PROGRESS
Physical Therapy Treatment Note           Name: Melodie Villarreal    : 1931 (89 y.o.)  MRN: [id number]           TREATMENT SUMMARY AND RECOMMENDATIONS:    PT Received On: 23  PT Start Time: 1345     PT Stop Time: 1415  PT Total Time (min): 30 min     Subjective Assessment:   No complaints  Lethargic    Awake, alert, cooperative  Uncooperative    Agitated x c/o pain    Appropriate  c/o fatigue    Confused  Treated at bedside     Emotionally labile x Treated in gym/dept.    Impulsive  Other:    Flat affect       Therapy Precautions:   x Cognitive deficits  Spinal precautions    Collar - hard  Sternal precautions    Collar - soft   TLSO   x Fall risk  LSO    Hip precautions - posterior  Knee immobilizer    Hip precautions - anterior  WBAT    Impaired communication  Partial weightbearing    Oxygen  TTWB    PEG tube  NWB    Visual deficits  Other:    Hearing deficits          Treatment Objectives:     Mobility Training:   Assist level Comments    Bed mobility     Transfer     Gait     Sit to stand transitions MAX A x 2 Attempted x 2, but pt reported being in pain and unable to complete.    Sitting balance     Standing balance      Wheelchair mobility     Car transfer     Other:          Therapeutic Exercise:   Exercise Sets Reps Comments   B LE PROM and AAROM in long and short sitting 10 reps  In all planes to promote muscle strength and ROM    Trunk work in sitting   Unsupported sitting with functional reaching for core stability and better transfer initiation.                    Additional Comments:  Talked to son about transfer training where he reported the daughter she will be living with has used a manish in the past. He could transfer if needed but manish will be used more.     Assessment: Patient tolerated session same.    PT Plan: continue  Revisions made to plan of care: No    GOALS:   Multidisciplinary Problems       Physical Therapy Goals          Problem: Physical Therapy    Goal Priority  Disciplines Outcome Goal Variances Interventions   Physical Therapy Goal     PT, PT/OT Ongoing, Progressing     Description: Goals to be met by: Discharge     Patient will increase functional independence with mobility by performin. Supine to sit with MInimal Assistance  2. Sit to supine with MInimal Assistance  3. Bed to chair transfer with Minimal Assistance using Rolling Walker  4. Gait  x 15 feet with Minimal Assistance using Rolling Walker.   5. Sitting at edge of bed x10 minutes with Stand-by Assistance                         Skilled PT Minutes Provided: 25   Communication with Treatment Team:     Equipment recommendations:       At end of treatment, patient remained:  x Up in chair     Up in wheelchair in room    In bed    With alarm activated    Bed rails up    Call bell in reach     Family/friends present    Restraints secured properly    In bathroom with CNA/RN notified    Nurse aware   x In gym with therapist/tech    Other:

## 2023-05-23 NOTE — PROGRESS NOTES
[facility] Medicine  Telehospitalist Progress Note    Patient Name: Melodie Villarreal  MRN: [id number]  Patient Class: IP- Swing   Admission Date: 5/11/2023  Length of Stay: 12 days  Attending Physician: Naun Pope MD  Primary Care Provider: Wisam Espinosa Jr, MD      Subjective:     Principal Problem:MRSA bacteremia      HPI:  Ms. Villarreal is a 89-year-old  female with history of hypertension, hyperlipidemia, type II diabetes mellitus, DVT/PE status post IVC filter placement, and bladder cancer who originally presented to [facility] ER on 4/16/2023 with fatigue, generalized weakness, dysuria, dark urine, and lethargy and she was hypotensive with a blood pressure of 66/33 on EMS arrival.  Her initial lab work was remarkable for a WBC count of 34.3, lactic acid of 3.8, troponin of 0.115, and BNP of 2571.8 and CT of the head showed no acute intracranial abnormality.  CXR revealed no acute cardiopulmonary process and CT of the chest, abdomen, pelvis demonstrated no evidence of PE and no acute intraabdominal or pelvic solid organ or bowel pathology.  X-ray of the left foot showed minimally displaced 5th proximal phalanx fracture and possible minimally displaced 4th proximal phalanx fracture and x-ray of the left tibia/fibula revealed no acute osseous process.  She was started on broad-spectrum antibiotics with IV vancomycin and zosyn for sepsis and her blood cultures from admission returned positive for MRSA.  ID was consulted and TTE from 4/17/2023 demonstrated no evidence of vegetation. She was scheduled for NM bone scan on 4/19/2023 which was positive for a left lower extremity cellulitis without focal intense concentration. Arterial Doppler of the left lower extremity from 4/20/2023 showed monophasic flow throughout suggestive of inflow disease and MRI of the thoracic and lumbar spine revealed no evidence to suggest discitis/osteomyelitis or drainable fluid collection and  new right-sided hydronephrosis.  Repeat blood cultures remained positive and cardiology was consulted for STARR on 4/21/2023 which demonstrated no valvular vegetations and moderate to severe aortic stenosis.  Her chronic left ankle wound was thought to be the source of her persistent MRSA bacteremia and 6 weeks of IV vancomycin was recommended by ID.  Renal ultrasound from 4/21/2023 confirmed mild-to-moderate hydronephrosis on the right and CT of the abdomen and pelvis showed mild to moderate right hydronephrosis with no significant right ureteral dilatation.  Urology was consulted and she was taken to the OR on 4/25/2023 for cystoscopy with bilateral retrograde pyelograms right ureteral stent placement due to right hydronephrosis with ureteral obstruction.  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  She was evaluated by neurology on 5/02/2023 due to mental status changes thought to be toxic metabolic encephalopathy and CT of the head demonstrated no acute intracranial abnormality.  EEG from 5/2/2023 showed no evidence of seizure activity and repeat CT of the head was unremarkable.  Nephrology was consulted on 5/03/2023 due to worsening hyponatremia and she was treated with samsca and a 1 L fluid restriction with improvement in her sodium levels.  She had no other complications throughout her hospital course and she was discharged to [facility] for PT/OT, completion of IV antibiotics, and management of her medical comorbidities.      Overview/Hospital Course:  She was admitted to [facility] on 5/11/2023 for rehab s/p  hospitalization for persistent MRSA bacteremia with sepsis due to nonhealing left ankle wound, right hydronephrosis with ureteral obstruction s/p right ureteral stent placement, critical limb ischemia of the left lower extremity, toxic metabolic encephalopathy, and hyponatremia.  She was found to have a UTI on urinalysis from 5/11/2023 and her urine culture grew 10,000-25,000 cfu/ml of Pseudomonas aeruginosa.  She was started on a 3 day course of ciprofloxacin 250 mg by mouth twice daily which she completed on 5/16/2023.  Her hemoglobin and hematocrit dropped to 7.6 and 25.1 on 5/12/2023 and she was transfused with 1 unit packed RBC.  She was started on sodium chloride 1 g by mouth daily on 5/15/2023 due to persistent hyponatremia.  Detemir was discontinued on 5/17/2023 due to intermittent hypoglycemia and she was started on metformin 1000 mg by mouth twice daily and glimepiride 2 mg by mouth in the morning. Her scheduled nighttime norco was discontinued on 5/20/2023 due to lethargy. She had a blood sugar of 50 on the afternoon of 5/21/2023 and 57 on the morning of 5/22/2023 and her metformin and glimepiride were held.      Interval History: She participated in PT and OT yesterday and she required total assistance with transfers from the bed to the [facility] and maximum assistance with bed mobility and sit to stand transitions. She was seen by the dietician yesterday who recommended continuing a diabetic diet, 1200 ml fluid restriction, and gurdeep BID. Her metformin and glimepiride were held yesterday due to hypoglycemia and her blood sugars have ranged from 106-194 overnight. She complains of low back pain and her daughter requests that her norco be resumed.      Review of Systems   All other systems reviewed and are negative.  Objective:     Vital Signs (Most Recent):  Temp: 98.4 °F (36.9 °C) (05/23/23 0354)  Pulse: 88 (05/23/23 0354)  Resp: 18 (05/23/23 0354)  BP: 113/65 (05/23/23 0354)  SpO2: 96 % (05/23/23 0354)  Vital Signs (24h Range):  Temp:  [98 °F (36.7 °C)-98.4 °F (36.9 °C)] 98.4 °F (36.9 °C)  Pulse:  [] 88  Resp:  [18] 18  SpO2:  [92 %-98 %] 96 %  BP: (109-136)/(64-73) 113/65     Weight: 54.6 kg (120 lb 5.9 oz)  Body mass index is 21.33 kg/m².    Intake/Output Summary (Last 24 hours) at 2023 0734  Last data filed at 2023 0608  Gross per 24 hour   Intake 1540 ml   Output 1051 ml   Net 489 ml      Physical Exam  General - Elderly  female in no acute distress.  HEENT - NCAT. No scleral icterus. Oropharynx clear. Mucous membranes moist.  Neck - No lymphadenopathy, thyromegaly, or JVD.  CVS - Regular rate and rhythm. No murmurs, rubs, or gallops.  Resp - Lungs are clear to auscultation bilaterally. No rales, wheeze, or rhonchi.   GI - Soft, nontender, nondistended, normoactive bowel sounds present.   Extremities - No clubbing, cyanosis, or edema. Right upper extremity PICC line.  Neuro - CN II through XII are grossly intact. No focal neurological deficits. Alert and oriented to name and  only.  Psych - Flat affect.  Skin -  Left heel remains stable with thin dry scab to the surface. Left posterior ankle and lateral ankle ulcerations have increasing granulation tissue noted.  Left lateral lower leg site previously closed with ischemic changes is now evolving and there is a small area of moist eschar noted within discolored area.       Significant Labs: All pertinent labs within the past 24 hours have been reviewed.     2023:  CBC: WBC count 8.86, hemoglobin 8.5, hematocrit 28.5, platelet count 258.  CMP: Sodium 131, potassium 3.4, chloride 94, CO2 27, BUN 17.3, creatinine 0.67, glucose 54, calcium 7.7, magnesium 1.6, alkaline phosphatase 94, total protein 5.6, albumin 1.8, total bilirubin 0.5, AST 10, ALT 10.     2023:  CBC: WBC count 10.83, hemoglobin 9, hematocrit 30.4, platelet count 274.  BMP: Sodium 130, potassium 3.5, chloride 94, CO2 28, BUN 18.4, creatinine 0.68, glucose 125,  calcium 8.3.     5/18/2023:  CBC: WBC count 9.84, hemoglobin 9, hematocrit 30.7,  platelet count 261.  BMP: Sodium 134, potassium 3.7, chloride 94, CO2 33, BUN 20.6, creatinine 0.69, glucose 167, calcium 7.8, magnesium 1.9.     5/16/2023:  BMP: Sodium 131, potassium 4.2, chloride 4.2, chloride 90, CO2 33, BUN 18.9, creatinine 0.65, glucose 188, calcium 7.9, magnesium 1.9.     5/15/2023:  BMP: Sodium 128, potassium 3.3, chloride 91, CO2 34, BUN 18, creatinine 0.67, glucose 76, calcium 7.9.     5/13/2023:  CBC: WBC count 10.82, hemoglobin 9.1, hematocrit 30, platelet count 248.  BMP: Sodium 128, potassium 3.7, chloride 89, CO2 36, BUN 17.3, creatinine 0.66, glucose 103, calcium 8.2.     Significant Imaging: I have reviewed all pertinent imaging results/findings within the past 24 hours.     CXR 5/16/2023: Left-sided PICC projects over the distal SVC.  Prominent interstitial markings with no focal opacification.     CXR 5/6/2023: The heart is not significantly enlarged.  There is aortic atherosclerosis.  Similar prominent central pulmonary vasculature.  No new dense consolidation or pneumothorax.     CT head 5/5/2023:   1. No acute intracranial abnormality.  2. Chronic microvascular ischemic changes.     EEG 5/2/2023: No evidence of seizure activity.     CT head 5/2/2023: No acute intracranial abnormality.     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     Renal ultrasound 4/21/2023: Mild-to-moderate hydronephrosis on the right.     CT abdomen/pelvis 4/21/2023: Mild to moderate right hydronephrosis with no significant right ureteral dilatation.       STARR 4/21/2023: No valvular vegetations and moderate to severe aortic stenosis.      MRI thoracic/lumbar spine 4/20/2023: No  evidence to suggest discitis/osteomyelitis or drainable fluid collection and new right-sided hydronephrosis.     Arterial Doppler left lower extremity 4/20/2023: Monophasic flow throughout suggestive of inflow disease     NM bone scan 4/19/2023: Left lower extremity cellulitis without focal intense concentration more typically seen with an abscess or osteomyelitis.      TTE 4/17/2023:  Low-normal systolic function with an EF of 54%, grade II left ventricular diastolic dysfunction, mild-to-moderate aortic valve stenosis, and no evidence of vegetation.     CT head 4/16/2023: No acute intracranial abnormality.       CXR 4/16/2023: No acute cardiopulmonary process.     CT chest/abdomen/pelvis 4/16/2023: No evidence of PE and no acute intraabdominal or pelvic solid organ or bowel pathology identified.       X-ray left foot 4/16/2023: Minimally displaced 5th proximal phalanx fracture and possible minimally displaced 4th proximal phalanx fracture.     X-ray left tibia/fibula 4/16/2023: No acute osseous process.       Assessment/Plan:      * MRSA bacteremia  Continue 6 week course of IV vancomycin until 6/4/2023.  Repeat blood culture from 4/23/2023 were negative.  TTE from 4/17/2023 and STARR from 4/23/2023 showed no evidence of vegetation and NM bone scan on 4/19/2023 was positive for a left lower extremity cellulitis without focal intense concentration more typically seen with an abscess or osteomyelitis.     Sepsis  Resolved.  Continue 6 week course of IV vancomycin until 6/4/2023 as per above.    Right hydronephrosis  Continue tamsulosin.  She is s/p cystoscopy with bilateral retrograde pyelograms right ureteral stent placement on 4/25/2023 due to right hydronephrosis with ureteral obstruction. Renal ultrasound from 4/21/2023 revealed mild-to-moderate hydronephrosis on the right and CT of the abdomen and pelvis showed mild to moderate right hydronephrosis with no significant right ureteral dilatation.  She will need to  follow-up with urology as an outpatient 1-2 weeks following discharge for right ureteral stent removal.    Toxic metabolic encephalopathy  Improved. Her scheduled nighttime norco was discontinued on 5/20/2023.  Consider titrating down trazodone.  CT of the head from 5/02/2023 and 5/06/2023 showed no acute intracranial abnormality and EEG from 5/02/2023 revealed no evidence of seizure activity.    Hyponatremia  Continue sodium chloride tablets and 1200 ml fluid restriction. HCTZ has been discontinued. She is s/p treatment with samsca on 5/7/2023.    Critical limb ischemia of left lower extremity  Continue statin and percocet as needed. She is not currently on any blood thinners. 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      Urinary tract infection  Resolved.  She completed a 3 day course of ciprofloxacin 250 mg by mouth twice daily on 5/16/2023.  Urine culture from 5/11/2023 grew 10,000-25,000 cfu/ml of Pseudomonas aeruginosa.    Debility  Continue PT/OT.    Hypomagnesemia  Improved.  Continue magnesium oxide.    Hypokalemia  Improved.    Hypertension  Continue carvedilol. She is no longer on losartan-HCTZ.    Type II diabetes mellitus  Continue metformin and glimepiride.  Detemir was discontinued on 05/17/2023 due to hypoglycemia.  Her hemoglobin A1c from 3/07/2023 was 6.3.      Iron deficiency anemia  Continue ferrous sulfate.  She is s/p blood transfusion with 1 unit packed RBC on 5/12/2023 and she was treated with 3 days of IV ferrlecit.    Chronic diastolic CHF (congestive heart failure)  Compensated. She is not currently on any diuretics.  TTE from  4/17/2023 showed low-normal systolic function with an EF of 54% and grade II left ventricular diastolic dysfunction.     Insomnia  Continue trazodone.    Hyperlipidemia  Continue pravastatin.    Severe aortic stenosis  No acute issues.  She can follow-up with cardiology as an outpatient as she may be a candidate for TAVR.    Chronic constipation  Continue polyethylene glycol.    Disposition  Anticipate discharge home with home health following completion of IV antibiotics.      VTE Risk Mitigation (From admission, onward)         Ordered     enoxaparin injection 40 mg  Daily         05/11/23 1414     Place sequential compression device  Until discontinued         05/11/23 1414                Discharge Planning   ERIN:      Code Status: DNR   Is the patient medically ready for discharge?:     Reason for patient still in hospital (select all that apply): Treatment, PT / OT recommendations and Pending disposition  Discharge Plan A: Home with family, Home Health   Discharge Delays: None known at this time      This service was provided via telemedicine.  Type of Software: Audio/Visual.  Originating Site: [facility].  Distant Site: [place], [place].  Her exam was performed with the assistance of Anne Romo LPN.      Naun Pope MD  Department of Hospital Medicine   [facility]

## 2023-05-23 NOTE — PLAN OF CARE
Problem: Adult Inpatient Plan of Care  Goal: Plan of Care Review  Outcome: Ongoing, Progressing  Flowsheets (Taken 5/23/2023 1643)  Plan of Care Reviewed With:   patient   daughter   son  Goal: Absence of Hospital-Acquired Illness or Injury  Outcome: Ongoing, Progressing  Intervention: Identify and Manage Fall Risk  Flowsheets (Taken 5/23/2023 1643)  Safety Promotion/Fall Prevention:   assistive device/personal item within reach   instructed to call staff for mobility   nonskid shoes/socks when out of bed   side rails raised x 3   lighting adjusted   in recliner, wheels locked   medications reviewed   chair alarm set  Intervention: Prevent Skin Injury  Flowsheets (Taken 5/23/2023 1643)  Skin Protection:   incontinence pads utilized   protective footwear used  Intervention: Prevent and Manage VTE (Venous Thromboembolism) Risk  Flowsheets (Taken 5/23/2023 1643)  Activity Management: Up in chair - L3  VTE Prevention/Management:   bleeding precations maintained   bleeding risk assessed   ambulation promoted   fluids promoted  Intervention: Prevent Infection  Flowsheets (Taken 5/23/2023 1643)  Infection Prevention:   cohorting utilized   personal protective equipment utilized   rest/sleep promoted   equipment surfaces disinfected   environmental surveillance performed   single patient room provided   hand hygiene promoted     Problem: Diabetes Comorbidity  Goal: Blood Glucose Level Within Targeted Range  Outcome: Ongoing, Progressing  Intervention: Monitor and Manage Glycemia  Flowsheets (Taken 5/23/2023 1643)  Glycemic Management: blood glucose monitored

## 2023-05-23 NOTE — PLAN OF CARE
Problem: Occupational Therapy  Goal: Occupational Therapy Goal  Description: Goals to be met by: Discharge    Patient will increase functional independence with ADLs by performing:    Toileting from bedside commode with Max Assistance for hygiene and clothing management. -Total A  Bathing from edge of bed with Moderate Assistance.  Toilet transfer to bedside commode with Max Assistance. - Total A  Hygiene/grooming from edge of bed with minimal assistance.     Outcome: Ongoing, Not Progressing

## 2023-05-23 NOTE — ASSESSMENT & PLAN NOTE
Continue statin and percocet as needed. She is not currently on any blood thinners. 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

## 2023-05-24 NOTE — ASSESSMENT & PLAN NOTE
Anticipate discharge home with [facility], a manish lift, and a hospital bed on 6/5/2023 following completion of IV antibiotics on 6/4/2023.

## 2023-05-24 NOTE — PROGRESS NOTES
Name: Melodie Villarreal    : 1931 (89 y.o.)  MRN: [id number]            Interdisciplinary Team Conference     Case conference held with patient/family and care team to discuss progress, plan of care, barriers to be addressed for safe return home, equipment recommendations, and discharge planning. Communicated therapy progress with MD, RN, therapy clinicians and case management. All questions/concerns answered.

## 2023-05-24 NOTE — PLAN OF CARE
Problem: Adult Inpatient Plan of Care  Goal: Plan of Care Review  Outcome: Ongoing, Progressing  Flowsheets (Taken 5/24/2023 0330)  Plan of Care Reviewed With:   patient   daughter  Goal: Patient-Specific Goal (Individualized)  Outcome: Ongoing, Progressing  Flowsheets (Taken 5/24/2023 0330)  Anxieties, Fears or Concerns: daughter concerned pt being in pain  Individualized Care Needs: wound care, therapy,monitor blood glucose, monitor pain level  Goal: Absence of Hospital-Acquired Illness or Injury  Outcome: Ongoing, Progressing  Intervention: Prevent Infection  Flowsheets (Taken 5/23/2023 2000)  Infection Prevention:   hand hygiene promoted   personal protective equipment utilized   rest/sleep promoted   single patient room provided  Goal: Optimal Comfort and Wellbeing  Outcome: Ongoing, Progressing     Problem: Diabetes Comorbidity  Goal: Blood Glucose Level Within Targeted Range  Outcome: Ongoing, Progressing

## 2023-05-24 NOTE — PLAN OF CARE
Problem: Adult Inpatient Plan of Care  Goal: Plan of Care Review  Outcome: Ongoing, Progressing  Flowsheets (Taken 5/24/2023 1518)  Plan of Care Reviewed With:   patient   son   daughter  Goal: Absence of Hospital-Acquired Illness or Injury  Outcome: Ongoing, Progressing  Intervention: Identify and Manage Fall Risk  Flowsheets (Taken 5/24/2023 1518)  Safety Promotion/Fall Prevention:   assistive device/personal item within reach   bed alarm set   instructed to call staff for mobility   nonskid shoes/socks when out of bed  Intervention: Prevent Skin Injury  Flowsheets (Taken 5/24/2023 1518)  Body Position:   position changed independently   position maintained  Skin Protection:   protective footwear used   incontinence pads utilized  Intervention: Prevent and Manage VTE (Venous Thromboembolism) Risk  Flowsheets (Taken 5/24/2023 1518)  Activity Management: Up in chair - L3  VTE Prevention/Management:   bleeding precations maintained   bleeding risk assessed   fluids promoted  Range of Motion: active ROM (range of motion) encouraged  Intervention: Prevent Infection  Flowsheets (Taken 5/24/2023 1518)  Infection Prevention:   cohorting utilized   environmental surveillance performed   personal protective equipment utilized   single patient room provided   rest/sleep promoted   equipment surfaces disinfected   hand hygiene promoted     Problem: Diabetes Comorbidity  Goal: Blood Glucose Level Within Targeted Range  Outcome: Ongoing, Progressing  Intervention: Monitor and Manage Glycemia  Flowsheets (Taken 5/24/2023 1518)  Glycemic Management: blood glucose monitored

## 2023-05-24 NOTE — SUBJECTIVE & OBJECTIVE
Interval History: She is being followed by ST 3 times weekly for cognition and short term memory deficits. She participated in PT and OT yesterday and she required maximum assistance x 2 with sit to stand transitions and standby assistance with sitting balance. She remains on a 6 weeks course of IV vancomycin until 2023 for a MRSA bacteremia and she will be discharged home with [facility], a manish lift, and a hospital bed following completion of IV antibiotics.      Review of Systems   All other systems reviewed and are negative.  Objective:     Vital Signs (Most Recent):  Temp: 98 °F (36.7 °C) (23)  Pulse: 88 (23)  Resp: 17 (23)  BP: 112/71 (23)  SpO2: 95 % (23) Vital Signs (24h Range):  Temp:  [97.8 °F (36.6 °C)-98.3 °F (36.8 °C)] 98 °F (36.7 °C)  Pulse:  [88-97] 88  Resp:  [17-18] 17  SpO2:  [93 %-98 %] 95 %  BP: (104-124)/(55-72) 112/71     Weight: 55 kg (121 lb 4.1 oz)  Body mass index is 21.48 kg/m².    Intake/Output Summary (Last 24 hours) at 2023 0730  Last data filed at 2023 0606  Gross per 24 hour   Intake 840 ml   Output 350 ml   Net 490 ml         Physical Exam  General - Elderly  female in no acute distress.  HEENT - NCAT. No scleral icterus. Oropharynx clear. Mucous membranes moist.  Neck - No lymphadenopathy, thyromegaly, or JVD.  CVS - Regular rate and rhythm. No murmurs, rubs, or gallops.  Resp - Lungs are clear to auscultation bilaterally. No rales, wheeze, or rhonchi.   GI - Soft, nontender, nondistended, normoactive bowel sounds present.   Extremities - No clubbing, cyanosis, or edema. Right upper extremity PICC line.  Neuro - CN II through XII are grossly intact. No focal neurological deficits. Alert and oriented to name and  only.  Psych - Flat affect.  Skin -  Left heel remains stable with thin dry scab to the surface. Left posterior ankle and lateral ankle ulcerations have increasing  granulation tissue noted.  Left lateral lower leg site previously closed with ischemic changes is now evolving and there is a small area of moist eschar noted within discolored area.       Significant Labs: All pertinent labs within the past 24 hours have been reviewed.     5/22/2023:  CBC: WBC count 8.86, hemoglobin 8.5, hematocrit 28.5, platelet count 258.  CMP: Sodium 131, potassium 3.4, chloride 94, CO2 27, BUN 17.3, creatinine 0.67, glucose 54, calcium 7.7, magnesium 1.6, alkaline phosphatase 94, total protein 5.6, albumin 1.8, total bilirubin 0.5, AST 10, ALT 10.     5/19/2023:  CBC: WBC count 10.83, hemoglobin 9, hematocrit 30.4, platelet count 274.  BMP: Sodium 130, potassium 3.5, chloride 94, CO2 28, BUN 18.4, creatinine 0.68, glucose 125, calcium 8.3.     5/18/2023:  CBC: WBC count 9.84, hemoglobin 9, hematocrit 30.7,  platelet count 261.  BMP: Sodium 134, potassium 3.7, chloride 94, CO2 33, BUN 20.6, creatinine 0.69, glucose 167, calcium 7.8, magnesium 1.9.     5/16/2023:  BMP: Sodium 131, potassium 4.2, chloride 4.2, chloride 90, CO2 33, BUN 18.9, creatinine 0.65, glucose 188, calcium 7.9, magnesium 1.9.     5/15/2023:  BMP: Sodium 128, potassium 3.3, chloride 91, CO2 34, BUN 18, creatinine 0.67, glucose 76, calcium 7.9.     5/13/2023:  CBC: WBC count 10.82, hemoglobin 9.1, hematocrit 30, platelet count 248.  BMP: Sodium 128, potassium 3.7, chloride 89, CO2 36, BUN 17.3, creatinine 0.66, glucose 103, calcium 8.2.     Significant Imaging: I have reviewed all pertinent imaging results/findings within the past 24 hours.     CXR 5/16/2023: Left-sided PICC projects over the distal SVC.  Prominent interstitial markings with no focal opacification.     CXR 5/6/2023: The heart is not significantly enlarged.  There is aortic atherosclerosis.  Similar prominent central pulmonary vasculature.  No new dense consolidation or pneumothorax.     CT head 5/5/2023:   1. No acute intracranial abnormality.  2. Chronic  microvascular ischemic changes.     EEG 5/2/2023: No evidence of seizure activity.     CT head 5/2/2023: No acute intracranial abnormality.     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     Renal ultrasound 4/21/2023: Mild-to-moderate hydronephrosis on the right.     CT abdomen/pelvis 4/21/2023: Mild to moderate right hydronephrosis with no significant right ureteral dilatation.       STARR 4/21/2023: No valvular vegetations and moderate to severe aortic stenosis.      MRI thoracic/lumbar spine 4/20/2023: No evidence to suggest discitis/osteomyelitis or drainable fluid collection and new right-sided hydronephrosis.     Arterial Doppler left lower extremity 4/20/2023: Monophasic flow throughout suggestive of inflow disease     NM bone scan 4/19/2023: Left lower extremity cellulitis without focal intense concentration more typically seen with an abscess or osteomyelitis.      TTE 4/17/2023:  Low-normal systolic function with an EF of 54%, grade II left ventricular diastolic dysfunction, mild-to-moderate aortic valve stenosis, and no evidence of vegetation.     CT head 4/16/2023: No acute intracranial abnormality.       CXR 4/16/2023: No acute cardiopulmonary process.     CT chest/abdomen/pelvis 4/16/2023: No evidence of PE and no acute intraabdominal or pelvic solid organ or bowel pathology identified.       X-ray left foot 4/16/2023: Minimally displaced 5th proximal phalanx fracture and possible minimally displaced 4th proximal phalanx fracture.     X-ray left tibia/fibula 4/16/2023: No acute osseous process.

## 2023-05-24 NOTE — ASSESSMENT & PLAN NOTE
Decrease metformin from 1000 mg to 500 mg PO BID and discontinue glimepiride due to hypoglycemia.  Detemir was discontinued on 5/17/2023 due to hypoglycemia.  Her hemoglobin A1c from 3/07/2023 was 6.3.

## 2023-05-24 NOTE — ASSESSMENT & PLAN NOTE
Continue metformin which was decreased on 5/24/2023. Detemir was discontinued on 5/17/2023 and glimepiride was stopped on 5/24/2023 due to hypoglycemia.  Her hemoglobin A1c from 3/07/2023 was 6.3.

## 2023-05-24 NOTE — PLAN OF CARE
[facility]  Discharge Reassessment    Primary Care Provider: Wisam Espinosa Jr, MD    Expected Discharge Date:     Reassessment (most recent)       Discharge Reassessment - 05/24/23 0733          Discharge Reassessment    Assessment Type Discharge Planning Reassessment     Did the patient's condition or plan change since previous assessment? No     Discharge Plan discussed with: Adult children     Name(s) and Number(s) YOUSIF BELTRAN DAUGHTER      Communicated ERIN with patient/caregiver Date not available/Unable to determine     Discharge Plan A Home Health;Home with family     DME Needed Upon Discharge  hospital bed;lift device;wheelchair     Transition of Care Barriers None     Why the patient remains in the hospital Requires continued medical care        Post-Acute Status    Post-Acute Authorization Home Health     Home Health Status Pending medical clearance/testing     Hospital Resources/Appts/Education Provided Provided patient/caregiver with written discharge plan information     Discharge Delays None known at this time

## 2023-05-24 NOTE — PROGRESS NOTES
Upon arrival to room, pt lying in bed attached to IV. Daughter present and requested that her mom finish her IV before therapy. Will f/u in PM.

## 2023-05-24 NOTE — PLAN OF CARE
Daughter present. Pt- not able to walk. Practicing pivot transfers at this time. Will do family training for manish lift. OT- Eating 1:1 . Grooming Min to mod assist. Bathing max to total assist. Toileting max to total assist  ST- Working on cognition. Nutritionally- working on appetite. Slightly improved. Medically- On 6 week course of abx until 06/04/2023. Responding to antibiotics. Sodium levels stable at this time on fluid restrictions and sodium tablet. Pain being managed at this time. All questions answered at this time.

## 2023-05-24 NOTE — PROGRESS NOTES
[facility]  Telehospitalist Progress Note    Patient Name: Melodie Villarreal  MRN: [id number]  Patient Class: IP- Swing   Admission Date: 5/11/2023  Length of Stay: 13 days  Attending Physician: Naun Pope MD  Primary Care Provider: Wisam Espinosa Jr, MD      Subjective:     Principal Problem:MRSA bacteremia      HPI:  Ms. Villarreal is a 89-year-old  female with history of hypertension, hyperlipidemia, type II diabetes mellitus, DVT/PE status post IVC filter placement, and bladder cancer who originally presented to [facility] on 4/16/2023 with fatigue, generalized weakness, dysuria, dark urine, and lethargy and she was hypotensive with a blood pressure of 66/33 on EMS arrival.  Her initial lab work was remarkable for a WBC count of 34.3, lactic acid of 3.8, troponin of 0.115, and BNP of 2571.8 and CT of the head showed no acute intracranial abnormality.  CXR revealed no acute cardiopulmonary process and CT of the chest, abdomen, pelvis demonstrated no evidence of PE and no acute intraabdominal or pelvic solid organ or bowel pathology.  X-ray of the left foot showed minimally displaced 5th proximal phalanx fracture and possible minimally displaced 4th proximal phalanx fracture and x-ray of the left tibia/fibula revealed no acute osseous process.  She was started on broad-spectrum antibiotics with IV vancomycin and zosyn for sepsis and her blood cultures from admission returned positive for MRSA.  ID was consulted and TTE from 4/17/2023 demonstrated no evidence of vegetation. She was scheduled for NM bone scan on 4/19/2023 which was positive for a left lower extremity cellulitis without focal intense concentration. Arterial Doppler of the left lower extremity from 4/20/2023 showed monophasic flow throughout suggestive of inflow disease and MRI of the thoracic and lumbar spine revealed no evidence to suggest discitis/osteomyelitis or drainable fluid collection and  new right-sided hydronephrosis.  Repeat blood cultures remained positive and cardiology was consulted for STARR on 4/21/2023 which demonstrated no valvular vegetations and moderate to severe aortic stenosis.  Her chronic left ankle wound was thought to be the source of her persistent MRSA bacteremia and 6 weeks of IV vancomycin was recommended by ID.  Renal ultrasound from 4/21/2023 confirmed mild-to-moderate hydronephrosis on the right and CT of the abdomen and pelvis showed mild to moderate right hydronephrosis with no significant right ureteral dilatation.  Urology was consulted and she was taken to the OR on 4/25/2023 for cystoscopy with bilateral retrograde pyelograms right ureteral stent placement due to right hydronephrosis with ureteral obstruction.  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  She was evaluated by neurology on 5/02/2023 due to mental status changes thought to be toxic metabolic encephalopathy and CT of the head demonstrated no acute intracranial abnormality.  EEG from 5/2/2023 showed no evidence of seizure activity and repeat CT of the head was unremarkable.  Nephrology was consulted on 5/03/2023 due to worsening hyponatremia and she was treated with samsca and a 1 L fluid restriction with improvement in her sodium levels.  She had no other complications throughout her hospital course and she was discharged to [facility] for PT/OT, completion of IV antibiotics, and management of her medical comorbidities.      Overview/Hospital Course:  She was admitted to [facility] on 5/11/2023 for rehab s/p  hospitalization for persistent MRSA bacteremia with sepsis due to nonhealing left ankle wound, right hydronephrosis with ureteral obstruction s/p right ureteral stent placement, critical limb ischemia of the left lower extremity, toxic metabolic encephalopathy, and hyponatremia.  She was found to have a UTI on urinalysis from 5/11/2023 and her urine culture grew 10,000-25,000 cfu/ml of Pseudomonas aeruginosa.  She was started on a 3 day course of ciprofloxacin 250 mg by mouth twice daily which she completed on 5/16/2023.  Her hemoglobin and hematocrit dropped to 7.6 and 25.1 on 5/12/2023 and she was transfused with 1 unit packed RBC.  She was started on sodium chloride 1 g by mouth daily on 5/15/2023 due to persistent hyponatremia.  Detemir was discontinued on 5/17/2023 due to intermittent hypoglycemia and she was started on metformin 1000 mg by mouth twice daily and glimepiride 2 mg by mouth in the morning. Her scheduled nighttime norco was discontinued on 5/20/2023 due to lethargy. She had a blood sugar of 50 on the afternoon of 5/21/2023 and 57 on the morning of 5/22/2023 and her metformin and glimepiride were held.      Interval History: She is being followed by ST 3 times weekly for cognition and short term memory deficits. She participated in PT and OT yesterday and she required maximum assistance x 2 with sit to stand transitions and standby assistance with sitting balance. She remains on a 6 weeks course of IV vancomycin until 6/4/2023 for a MRSA bacteremia and she will be discharged home with [facility], a manish lift, and a hospital bed following completion of IV antibiotics. She had a blood sugar of 99 this morning at 5:15 am and her metformin and glimepiride wer held.      Review of Systems   All other systems reviewed and are negative.  Objective:     Vital Signs (Most Recent):  Temp: 98 °F (36.7 °C) (05/24/23 0343)  Pulse: 88 (05/24/23 0343)  Resp: 17 (05/24/23 0343)  BP: 112/71  (23 0343)  SpO2: 95 % (23 0343) Vital Signs (24h Range):  Temp:  [97.8 °F (36.6 °C)-98.3 °F (36.8 °C)] 98 °F (36.7 °C)  Pulse:  [88-97] 88  Resp:  [17-18] 17  SpO2:  [93 %-98 %] 95 %  BP: (104-124)/(55-72) 112/71     Weight: 55 kg (121 lb 4.1 oz)  Body mass index is 21.48 kg/m².    Intake/Output Summary (Last 24 hours) at 2023 0730  Last data filed at 2023 0606  Gross per 24 hour   Intake 840 ml   Output 350 ml   Net 490 ml         Physical Exam  General - Elderly  female in no acute distress.  HEENT - NCAT. No scleral icterus. Oropharynx clear. Mucous membranes moist.  Neck - No lymphadenopathy, thyromegaly, or JVD.  CVS - Regular rate and rhythm. No murmurs, rubs, or gallops.  Resp - Lungs are clear to auscultation bilaterally. No rales, wheeze, or rhonchi.   GI - Soft, nontender, nondistended, normoactive bowel sounds present.   Extremities - No clubbing, cyanosis, or edema. Right upper extremity PICC line.  Neuro - CN II through XII are grossly intact. No focal neurological deficits. Alert and oriented to name and  only.  Psych - Flat affect.  Skin -  Left heel remains stable with thin dry scab to the surface. Left posterior ankle and lateral ankle ulcerations have increasing granulation tissue noted.  Left lateral lower leg site previously closed with ischemic changes is now evolving and there is a small area of moist eschar noted within discolored area.       Significant Labs: All pertinent labs within the past 24 hours have been reviewed.     2023:  CBC: WBC count 8.86, hemoglobin 8.5, hematocrit 28.5, platelet count 258.  CMP: Sodium 131, potassium 3.4, chloride 94, CO2 27, BUN 17.3, creatinine 0.67, glucose 54, calcium 7.7, magnesium 1.6, alkaline phosphatase 94, total protein 5.6, albumin 1.8, total bilirubin 0.5, AST 10, ALT 10.     2023:  CBC: WBC count 10.83, hemoglobin 9, hematocrit 30.4, platelet count 274.  BMP: Sodium 130, potassium 3.5, chloride 94, CO2  28, BUN 18.4, creatinine 0.68, glucose 125, calcium 8.3.     5/18/2023:  CBC: WBC count 9.84, hemoglobin 9, hematocrit 30.7,  platelet count 261.  BMP: Sodium 134, potassium 3.7, chloride 94, CO2 33, BUN 20.6, creatinine 0.69, glucose 167, calcium 7.8, magnesium 1.9.     5/16/2023:  BMP: Sodium 131, potassium 4.2, chloride 4.2, chloride 90, CO2 33, BUN 18.9, creatinine 0.65, glucose 188, calcium 7.9, magnesium 1.9.     5/15/2023:  BMP: Sodium 128, potassium 3.3, chloride 91, CO2 34, BUN 18, creatinine 0.67, glucose 76, calcium 7.9.     5/13/2023:  CBC: WBC count 10.82, hemoglobin 9.1, hematocrit 30, platelet count 248.  BMP: Sodium 128, potassium 3.7, chloride 89, CO2 36, BUN 17.3, creatinine 0.66, glucose 103, calcium 8.2.     Significant Imaging: I have reviewed all pertinent imaging results/findings within the past 24 hours.     CXR 5/16/2023: Left-sided PICC projects over the distal SVC.  Prominent interstitial markings with no focal opacification.     CXR 5/6/2023: The heart is not significantly enlarged.  There is aortic atherosclerosis.  Similar prominent central pulmonary vasculature.  No new dense consolidation or pneumothorax.     CT head 5/5/2023:   1. No acute intracranial abnormality.  2. Chronic microvascular ischemic changes.     EEG 5/2/2023: No evidence of seizure activity.     CT head 5/2/2023: No acute intracranial abnormality.     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     Renal ultrasound 4/21/2023: Mild-to-moderate hydronephrosis on the right.     CT abdomen/pelvis 4/21/2023: Mild to moderate right hydronephrosis with no significant right ureteral dilatation.       STARR 4/21/2023: No valvular vegetations and moderate to severe aortic stenosis.       MRI thoracic/lumbar spine 4/20/2023: No evidence to suggest discitis/osteomyelitis or drainable fluid collection and new right-sided hydronephrosis.     Arterial Doppler left lower extremity 4/20/2023: Monophasic flow throughout suggestive of inflow disease     NM bone scan 4/19/2023: Left lower extremity cellulitis without focal intense concentration more typically seen with an abscess or osteomyelitis.      TTE 4/17/2023:  Low-normal systolic function with an EF of 54%, grade II left ventricular diastolic dysfunction, mild-to-moderate aortic valve stenosis, and no evidence of vegetation.     CT head 4/16/2023: No acute intracranial abnormality.       CXR 4/16/2023: No acute cardiopulmonary process.     CT chest/abdomen/pelvis 4/16/2023: No evidence of PE and no acute intraabdominal or pelvic solid organ or bowel pathology identified.       X-ray left foot 4/16/2023: Minimally displaced 5th proximal phalanx fracture and possible minimally displaced 4th proximal phalanx fracture.     X-ray left tibia/fibula 4/16/2023: No acute osseous process.       Assessment/Plan:      * MRSA bacteremia  Continue 6 week course of IV vancomycin until 6/4/2023.  Repeat blood culture from 4/23/2023 were negative.  TTE from 4/17/2023 and STARR from 4/23/2023 showed no evidence of vegetation and NM bone scan on 4/19/2023 was positive for a left lower extremity cellulitis without focal intense concentration more typically seen with an abscess or osteomyelitis.     Sepsis  Resolved.  Continue 6 week course of IV vancomycin until 6/4/2023 as per above.    Right hydronephrosis  Continue tamsulosin.  She is s/p cystoscopy with bilateral retrograde pyelograms right ureteral stent placement on 4/25/2023 due to right hydronephrosis with ureteral obstruction. Renal ultrasound from 4/21/2023 revealed mild-to-moderate hydronephrosis on the right and CT of the abdomen and pelvis showed mild to moderate right hydronephrosis with no significant  right ureteral dilatation.  She will need to follow-up with urology as an outpatient 1-2 weeks following discharge for right ureteral stent removal.    Toxic metabolic encephalopathy  Improved. Her scheduled nighttime norco was discontinued on 5/20/2023.  Consider titrating down trazodone.  CT of the head from 5/02/2023 and 5/06/2023 showed no acute intracranial abnormality and EEG from 5/02/2023 revealed no evidence of seizure activity.    Hyponatremia  Continue sodium chloride tablets and 1200 ml fluid restriction. HCTZ has been discontinued. She is s/p treatment with samsca on 5/7/2023.    Critical limb ischemia of left lower extremity  Continue statin and percocet as needed. She is not currently on any blood thinners. 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      Urinary tract infection  Resolved.  She completed a 3 day course of ciprofloxacin 250 mg by mouth twice daily on 5/16/2023.  Urine culture from 5/11/2023 grew 10,000-25,000 cfu/ml of Pseudomonas aeruginosa.    Debility  Continue PT/OT.    Hypomagnesemia  Improved.  Continue magnesium oxide.    Hypokalemia  Improved.    Hypertension  Continue carvedilol. She is no longer on losartan-HCTZ.    Type II diabetes mellitus  Decrease metformin from 1000 mg to 500 mg PO BID and discontinue glimepiride due to hypoglycemia.  Detemir was discontinued on 5/17/2023 due to hypoglycemia.  Her hemoglobin A1c from 3/07/2023 was 6.3.      Iron deficiency anemia  Continue ferrous sulfate.  She is s/p blood transfusion with 1 unit packed RBC on 5/12/2023 and she was treated with 3 days of IV ferrlecit.    Chronic  diastolic CHF (congestive heart failure)  Compensated. She is not currently on any diuretics.  TTE from 4/17/2023 showed low-normal systolic function with an EF of 54% and grade II left ventricular diastolic dysfunction.     Insomnia  Continue trazodone.    Hyperlipidemia  Continue pravastatin.    Severe aortic stenosis  No acute issues.  She can follow-up with cardiology as an outpatient as she may be a candidate for TAVR.    Chronic constipation  Continue polyethylene glycol.    Disposition  Anticipate discharge home with [facility], a manish lift, and a hospital bed on 6/5/2023 following completion of IV antibiotics on 6/4/2023.        VTE Risk Mitigation (From admission, onward)           Ordered     enoxaparin injection 40 mg  Daily         05/11/23 1414     Place sequential compression device  Until discontinued         05/11/23 1414                    Discharge Planning   ERIN:      Code Status: DNR   Is the patient medically ready for discharge?:     Reason for patient still in hospital (select all that apply): Treatment, PT / OT recommendations and Pending disposition  Discharge Plan A: Home Health, Home with family   Discharge Delays: None known at this time      This service was provided via telemedicine.  Type of Software: Audio/Visual.  Originating Site: [facility].  Distant Site: [facility].  Her exam was performed with the assistance of Anne Romo LPN.      Naun Pope MD  Department of Hospital Medicine   [facility]

## 2023-05-24 NOTE — PATIENT CARE CONFERENCE
Name: Melodie Villarreal    : 1931 (89 y.o.)  MRN: [id number]            Interdisciplinary Team Conference     Case conference held with patient/family and care team to discuss progress, plan of care, barriers to be addressed for safe return home, equipment recommendations, and discharge planning. Communicated therapy progress with MD, RN, therapy clinicians and case management. All questions/concerns answered.

## 2023-05-24 NOTE — PT/OT/SLP PROGRESS
Speech Language Pathology Treatment    Patient Name:  Melodie Villarreal   MRN:  [id number]  Admitting Diagnosis: MRSA bacteremia    Recommendations:                 General Recommendations:  Cognitive-linguistic therapy  Diet recommendations:  Regular Diet - IDDSI Level 7, Liquid Diet Level: Thin liquids - IDDSI Level 0   Aspiration Precautions: Alternating bites/sips and HOB to 90 degrees   General Precautions: Standard,    Communication strategies:  provide increased time to answer, go to room if call light pushed, and use contextual mapping and use of binary choices to aid in recall    Assessment:     Melodie Villarreal is a 89 y.o. female with an SLP diagnosis of Cognitive-Linguistic Impairment.   She presents with confusion and impaired orientation, STM recall and LTM recall. Per pt's daughter, the pt presents w/ increased confusion w/ initiation of antibiotics. Pt's daughter reports her mother was doing well at home prior to hospital admit and was walking w/ RW. Pt's daughter cooks, cleans, manages finances, and manages medications. Pt would benefit from skilled SLP services at this time to promote a return to PLOF w/ cognitive skills.    Subjective     Pt in bed w/ eyes closed upon SLP arrival, though awoke easily to verbal stim. Pt's daughter present throughout.      Pain/Comfort:  Pt stated she was comfortable.     Respiratory Status: Room air    Objective:     Has the patient been evaluated by SLP for swallowing?   Yes  Keep patient NPO? No  Current Respiratory Status:     Exclusion Fo4 completed w/ 25% acc I'ly, increasing to 75% acc maxA.  Pt recalled family dog's name maxA.    Overall good session.  Cont SLP POC.    Goals:   Multidisciplinary Problems       SLP Goals          Problem: SLP    Goal Priority Disciplines Outcome   SLP Goal     SLP Ongoing, Progressing   Description: LTG: Pt will improve cognitive linguistic skills to allow for safe discharge home w/ family.    STG:  Pt will orient x4 modA.  Pt will  utilize memory strategies to recall new information following a 2 minute filled delay modA.  Pt will answer LTM Qs w/ 90% acc modA.                       Plan:     Patient to be seen:  3 x/week   Plan of Care expires:  5/31/23  Plan of Care reviewed with:  patient, daughter   SLP Follow-Up:  Yes       Discharge recommendations:  home with home health   Barriers to Discharge:  Support/Caregiver at home warranted to ensure safety.    Time Tracking:     SLP Treatment Date:  5/24/23  Speech Start Time: 9:30  Speech Stop Time: 9:55     Speech Total Time (min): 25 min    Billable Minutes: Speech Therapy Individual 25    Vanna Boss M.S., CCC-SLP   05/24/2023

## 2023-05-24 NOTE — PT/OT/SLP PROGRESS
Physical Therapy Treatment Note           Name: Melodie Villarreal    : 1931 (89 y.o.)  MRN: [id number]           TREATMENT SUMMARY AND RECOMMENDATIONS:    PT Received On: 23  PT Start Time: 1130     PT Stop Time: 1155  PT Total Time (min): 25 min     Subjective Assessment:   No complaints  Lethargic   x Awake, alert, cooperative  Uncooperative    Agitated  c/o pain    Appropriate  c/o fatigue    Confused x Treated at bedside     Emotionally labile  Treated in gym/dept.    Impulsive x Other:Daughter present at bedside    Flat affect       Therapy Precautions:    Cognitive deficits  Spinal precautions    Collar - hard  Sternal precautions    Collar - soft   TLSO   x Fall risk  LSO    Hip precautions - posterior  Knee immobilizer    Hip precautions - anterior  WBAT    Impaired communication  Partial weightbearing    Oxygen  TTWB    PEG tube  NWB    Visual deficits  Other:    Hearing deficits          Treatment Objectives:     Mobility Training:   Assist level Comments    Bed mobility MAX A  Rolling L/R to place tashia pad   Transfer TOTAL A Tashia training with daughter this AM bed > bedside chair   Gait     Sit to stand transitions     Sitting balance     Standing balance      Wheelchair mobility     Car transfer     Other:          Therapeutic Exercise:   Exercise Sets Reps Comments                               Additional Comments:      Assessment: Patient tolerated session well. Patient's daughter at bedside this AM for tashia training, as patient will most likely need to be transferred via tashia to ensure safe transfers and decrease caregiver burden at home. Demonstrated transfer and daughter hands on during session. She reports she has used a tashia lift before and would like to continue training. She would also like her brother to get tashia training too. Patient tolerated transfer well.    PT Plan: continue POC  Revisions made to plan of care: No    GOALS:   Multidisciplinary Problems       Physical  Therapy Goals          Problem: Physical Therapy    Goal Priority Disciplines Outcome Goal Variances Interventions   Physical Therapy Goal     PT, PT/OT Ongoing, Progressing     Description: Goals to be met by: Discharge     Patient will increase functional independence with mobility by performin. Supine to sit with MInimal Assistance  2. Sit to supine with MInimal Assistance  3. Bed to chair transfer with Minimal Assistance using Rolling Walker  4. Gait  x 15 feet with Minimal Assistance using Rolling Walker.   5. Sitting at edge of bed x10 minutes with Stand-by Assistance                         Skilled PT Minutes Provided: 25 minutes   Communication with Treatment Team:     Equipment recommendations:       At end of treatment, patient remained:  x Up in chair     Up in wheelchair in room    In bed   x With alarm activated    Bed rails up   x Call bell in reach    x Family/friends present    Restraints secured properly    In bathroom with CNA/RN notified    Nurse aware    In gym with therapist/tech    Other:

## 2023-05-24 NOTE — PT/OT/SLP PROGRESS
Name: Melodie Villarreal    : 1931 (89 y.o.)  MRN: [id number]            Interdisciplinary Team Conference     Case conference held with patient/family and care team to discuss progress, plan of care, barriers to be addressed for safe return home, equipment recommendations, and discharge planning. Communicated therapy progress with MD, RN, therapy clinicians and case management. All questions/concerns answered.

## 2023-05-25 NOTE — PT/OT/SLP PROGRESS
Physical Therapy Treatment Note           Name: Melodie Villarreal    : 1931 (89 y.o.)  MRN: [id number]           TREATMENT SUMMARY AND RECOMMENDATIONS:    PT Received On: 23  PT Start Time: 1005     PT Stop Time: 1045  PT Total Time (min): 40 min     Subjective Assessment:   No complaints  Lethargic   x Awake, alert, cooperative  Uncooperative    Agitated x c/o pain    Appropriate  c/o fatigue    Confused x Treated at bedside     Emotionally labile  Treated in gym/dept.    Impulsive  Other:    Flat affect       Therapy Precautions:    Cognitive deficits  Spinal precautions    Collar - hard  Sternal precautions    Collar - soft   TLSO   x Fall risk  LSO    Hip precautions - posterior  Knee immobilizer    Hip precautions - anterior  WBAT    Impaired communication  Partial weightbearing    Oxygen  TTWB    PEG tube  NWB    Visual deficits  Other:    Hearing deficits          Treatment Objectives:     Mobility Training:   Assist level Comments    Bed mobility MAX A Supine <> sit   Transfer TOTAL A Attempd 2x trials squat pivot transfer, however pt resisting with significant posterior pushing today - not safe for transfer at this time; therefore used the manish again with daughter present   Gait     Sit to stand transitions     Sitting balance     Standing balance  Poor Sitting EOB ~3 minutes with posterior lean - unable to correct sitting balance d/t pain    Wheelchair mobility     Car transfer     Other:          Therapeutic Exercise:   Exercise Sets Reps Comments                               Additional Comments:      Assessment: Patient tolerated session poor. Patient c/o L shoulder pain and R ankle pain today, limiting her participation. Notable increased drainage and bleeding coming through patient's bandages on R lateral ankle. Notified Nurse Sarahy about concerns    PT Plan: continue POC  Revisions made to plan of care: No    GOALS:   Multidisciplinary Problems       Physical Therapy Goals           Problem: Physical Therapy    Goal Priority Disciplines Outcome Goal Variances Interventions   Physical Therapy Goal     PT, PT/OT Ongoing, Not Progressing     Description: Goals to be met by: Discharge     Patient will increase functional independence with mobility by performin. Supine to sit with MInimal Assistance  2. Sit to supine with MInimal Assistance  3. Bed to chair transfer with Minimal Assistance using Rolling Walker  4. Gait  x 15 feet with Minimal Assistance using Rolling Walker.   5. Sitting at edge of bed x10 minutes with Stand-by Assistance                         Skilled PT Minutes Provided: 40 minutes   Communication with Treatment Team:     Equipment recommendations:       At end of treatment, patient remained:  x Up in chair     Up in wheelchair in room    In bed    With alarm activated    Bed rails up   x Call bell in reach    x Family/friends present    Restraints secured properly    In bathroom with CNA/RN notified   x Nurse aware    In gym with therapist/tech    Other:

## 2023-05-25 NOTE — PLAN OF CARE
Problem: Physical Therapy  Goal: Physical Therapy Goal  Description: Goals to be met by: Discharge     Patient will increase functional independence with mobility by performin. Supine to sit with MInimal Assistance  2. Sit to supine with MInimal Assistance  3. Bed to chair transfer with Minimal Assistance using Rolling Walker  4. Gait  x 15 feet with Minimal Assistance using Rolling Walker.   5. Sitting at edge of bed x10 minutes with Stand-by Assistance    2023 1543 by Thalia Vieira PT  Outcome: Ongoing, Not Progressing

## 2023-05-25 NOTE — NURSING
Nurses Note -- 4 Eyes      5/25/2023   6:39 PM      Skin assessed during: Daily Assessment      [] No Altered Skin Integrity Present    []Prevention Measures Documented      [x] Yes- Altered Skin Integrity Present or Discovered   [] LDA Added if Not in Epic (Describe Wound)   [x] New Altered Skin Integrity was Present on Admit and Documented in LDA   [x] Wound Image Taken    Wound Care Consulted? No    Attending Nurse:  Zulema Pereira LPN     Second RN/Staff Member:  Sean Barnett RN

## 2023-05-25 NOTE — PLAN OF CARE
Problem: Adult Inpatient Plan of Care  Goal: Plan of Care Review  Outcome: Ongoing, Progressing  Flowsheets (Taken 5/25/2023 0452)  Plan of Care Reviewed With:   patient   daughter  Goal: Patient-Specific Goal (Individualized)  Outcome: Ongoing, Progressing  Flowsheets (Taken 5/25/2023 0452)  Anxieties, Fears or Concerns: not at this time  Individualized Care Needs: wound care, therapy, monitor blood glucose, monitor pain leval  Goal: Absence of Hospital-Acquired Illness or Injury  Outcome: Ongoing, Progressing  Intervention: Prevent and Manage VTE (Venous Thromboembolism) Risk  Flowsheets (Taken 5/24/2023 2000)  VTE Prevention/Management:   bleeding precations maintained   bleeding risk assessed   fluids promoted  Intervention: Prevent Infection  Flowsheets (Taken 5/24/2023 2000)  Infection Prevention:   hand hygiene promoted   personal protective equipment utilized   rest/sleep promoted   single patient room provided  Goal: Optimal Comfort and Wellbeing  Outcome: Ongoing, Progressing     Problem: Diabetes Comorbidity  Goal: Blood Glucose Level Within Targeted Range  Outcome: Ongoing, Progressing

## 2023-05-25 NOTE — SUBJECTIVE & OBJECTIVE
Interval History: She participated in PT and OT yesterday and she required maximum assistance with bed mobility and total assistance with transfers. She was seen by  yesterday who recommended a regular diet with thin liquids. Her glimepiride was discontinued yesterday due to hypoglycemia and her metformin was decreased from 1000 mg to 500 mg PO BID. Her blood sugars have ranged from 136-197 overnight.      Review of Systems   All other systems reviewed and are negative.  Objective:     Vital Signs (Most Recent):  Temp: 97.7 °F (36.5 °C) (23 07)  Pulse: 82 (23 07)  Resp: 17 (23 0353)  BP: 120/75 (23)  SpO2: 97 % (23) Vital Signs (24h Range):  Temp:  [97.5 °F (36.4 °C)-98.5 °F (36.9 °C)] 97.7 °F (36.5 °C)  Pulse:  [82-97] 82  Resp:  [17-18] 17  SpO2:  [94 %-100 %] 97 %  BP: (102-131)/(58-75) 120/75     Weight: 56.2 kg (123 lb 14.4 oz)  Body mass index is 21.95 kg/m².    Intake/Output Summary (Last 24 hours) at 2023 0832  Last data filed at 2023 0645  Gross per 24 hour   Intake 840 ml   Output 1000 ml   Net -160 ml      Physical Exam  General - Elderly  female in no acute distress.  HEENT - NCAT. No scleral icterus. Oropharynx clear. Mucous membranes moist.  Neck - No lymphadenopathy, thyromegaly, or JVD.  CVS - Regular rate and rhythm. No murmurs, rubs, or gallops.  Resp - Lungs are clear to auscultation bilaterally. No rales, wheeze, or rhonchi.   GI - Soft, nontender, nondistended, normoactive bowel sounds present.   Extremities - No clubbing, cyanosis, or edema. Right upper extremity PICC line.  Neuro - CN II through XII are grossly intact. No focal neurological deficits. Alert and oriented to name and  only.  Psych - Flat affect.  Skin -  Left heel remains stable with thin dry scab to the surface. Left posterior ankle and lateral ankle ulcerations have increasing granulation tissue noted.  Left lateral lower leg site previously closed with  ischemic changes is now evolving and there is a small area of moist eschar noted within discolored area.       Significant Labs: All pertinent labs within the past 24 hours have been reviewed.     5/22/2023:  CBC: WBC count 8.86, hemoglobin 8.5, hematocrit 28.5, platelet count 258.  CMP: Sodium 131, potassium 3.4, chloride 94, CO2 27, BUN 17.3, creatinine 0.67, glucose 54, calcium 7.7, magnesium 1.6, alkaline phosphatase 94, total protein 5.6, albumin 1.8, total bilirubin 0.5, AST 10, ALT 10.     5/19/2023:  CBC: WBC count 10.83, hemoglobin 9, hematocrit 30.4, platelet count 274.  BMP: Sodium 130, potassium 3.5, chloride 94, CO2 28, BUN 18.4, creatinine 0.68, glucose 125, calcium 8.3.     5/18/2023:  CBC: WBC count 9.84, hemoglobin 9, hematocrit 30.7,  platelet count 261.  BMP: Sodium 134, potassium 3.7, chloride 94, CO2 33, BUN 20.6, creatinine 0.69, glucose 167, calcium 7.8, magnesium 1.9.     5/16/2023:  BMP: Sodium 131, potassium 4.2, chloride 4.2, chloride 90, CO2 33, BUN 18.9, creatinine 0.65, glucose 188, calcium 7.9, magnesium 1.9.     5/15/2023:  BMP: Sodium 128, potassium 3.3, chloride 91, CO2 34, BUN 18, creatinine 0.67, glucose 76, calcium 7.9.     5/13/2023:  CBC: WBC count 10.82, hemoglobin 9.1, hematocrit 30, platelet count 248.  BMP: Sodium 128, potassium 3.7, chloride 89, CO2 36, BUN 17.3, creatinine 0.66, glucose 103, calcium 8.2.     Significant Imaging: I have reviewed all pertinent imaging results/findings within the past 24 hours.     CXR 5/16/2023: Left-sided PICC projects over the distal SVC.  Prominent interstitial markings with no focal opacification.     CXR 5/6/2023: The heart is not significantly enlarged.  There is aortic atherosclerosis.  Similar prominent central pulmonary vasculature.  No new dense consolidation or pneumothorax.     CT head 5/5/2023:   1. No acute intracranial abnormality.  2. Chronic microvascular ischemic changes.     EEG 5/2/2023: No evidence of seizure  activity.     CT head 5/2/2023: No acute intracranial abnormality.     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     Renal ultrasound 4/21/2023: Mild-to-moderate hydronephrosis on the right.     CT abdomen/pelvis 4/21/2023: Mild to moderate right hydronephrosis with no significant right ureteral dilatation.       STARR 4/21/2023: No valvular vegetations and moderate to severe aortic stenosis.      MRI thoracic/lumbar spine 4/20/2023: No evidence to suggest discitis/osteomyelitis or drainable fluid collection and new right-sided hydronephrosis.     Arterial Doppler left lower extremity 4/20/2023: Monophasic flow throughout suggestive of inflow disease     NM bone scan 4/19/2023: Left lower extremity cellulitis without focal intense concentration more typically seen with an abscess or osteomyelitis.      TTE 4/17/2023:  Low-normal systolic function with an EF of 54%, grade II left ventricular diastolic dysfunction, mild-to-moderate aortic valve stenosis, and no evidence of vegetation.     CT head 4/16/2023: No acute intracranial abnormality.       CXR 4/16/2023: No acute cardiopulmonary process.     CT chest/abdomen/pelvis 4/16/2023: No evidence of PE and no acute intraabdominal or pelvic solid organ or bowel pathology identified.       X-ray left foot 4/16/2023: Minimally displaced 5th proximal phalanx fracture and possible minimally displaced 4th proximal phalanx fracture.     X-ray left tibia/fibula 4/16/2023: No acute osseous process.

## 2023-05-25 NOTE — PT/OT/SLP PROGRESS
Occupational Therapy  Treatment    Name: Melodie Villarreal    : 1931 (89 y.o.)  MRN: [id number]           TREATMENT SUMMARY AND RECOMMENDATIONS:      OT Date of Treatment: 23  OT Start Time:   OT Stop Time: 1415  OT Total Time (min): 20 min      Subjective Assessment:   No complaints  Lethargic   x Awake, alert, cooperative  Impulsive    Uncooperative   Flat affect    Agitated  c/o pain    Appropriate  c/o fatigue   x Confused x Treated at bedside     Emotionally labile  Treated in gym/dept.      Other:        Therapy Precautions:   x Cognitive deficits  Spinal precautions    Collar - hard  Sternal precautions    Collar - soft   TLSO   x Fall risk  LSO    Hip precautions - posterior  Knee immobilizer    Hip precautions - anterior  WBAT    Impaired communication  Partial weightbearing    Oxygen  TTWB    PEG tube  NWB    Visual deficits      Hearing deficits   Other:        Treatment Objectives:     Mobility Training:    Mobility task Assist level Comments    Bed mobility     Transfer Total A Tashia transfer from recliner to bed    Sit to stands transitions     Functional mobility     Sitting balance     Standing balance      Other:        ADL Training:    ADL Assist level Comments    Feeding     Grooming/hygiene     Bathing     Upper body dressing     Lower body dressing     Toileting     Toilet transfer     Adaptive equipment training     Other:           Therapeutic Exercise:   Exercise Sets Reps Comments                               Additional Comments:      Assessment: Patient tolerated session well. Family training with pt's son performed focusing on how to use the tashia. Pt's son verbalized and demonstrated understanding. All questions and concerns answered.     OT Plan: Continue with POC  Revisions made to plan of care: No    GOALS:   Multidisciplinary Problems       Occupational Therapy Goals          Problem: Occupational Therapy    Goal Priority Disciplines Outcome Interventions   Occupational  Therapy Goal     OT, PT/OT Ongoing, Not Progressing    Description: Goals to be met by: Discharge    Patient will increase functional independence with ADLs by performing:    Toileting from bedside commode with Max Assistance for hygiene and clothing management. -Total A  Bathing from edge of bed with Moderate Assistance.  Toilet transfer to bedside commode with Max Assistance. - Total A  Hygiene/grooming from edge of bed with minimal assistance.                          Skilled OT Minutes Provided: 20  Communication with Treatment Team:     Equipment recommendations:       At end of treatment, patient remained:   Up in chair     Up in wheelchair in room   x In bed   x With alarm activated   x Bed rails up   x Call bell in reach    x Family/friends present    Restraints secured properly    In bathroom with CNA/RN notified    In gym with PT/PTA/tech    Nurse aware    Other:

## 2023-05-25 NOTE — PROGRESS NOTES
[facility] Medicine  Telehospitalist Progress Note    Patient Name: Melodie Villarreal  MRN: [id number]  Patient Class: IP- Swing   Admission Date: 5/11/2023  Length of Stay: 14 days  Attending Physician: Naun Pope MD  Primary Care Provider: Wisam Espinosa Jr, MD      Subjective:     Principal Problem:MRSA bacteremia      HPI:  Ms. Villarreal is a 89-year-old  female with history of hypertension, hyperlipidemia, type II diabetes mellitus, DVT/PE status post IVC filter placement, and bladder cancer who originally presented to [facility] ER on 4/16/2023 with fatigue, generalized weakness, dysuria, dark urine, and lethargy and she was hypotensive with a blood pressure of 66/33 on EMS arrival.  Her initial lab work was remarkable for a WBC count of 34.3, lactic acid of 3.8, troponin of 0.115, and BNP of 2571.8 and CT of the head showed no acute intracranial abnormality.  CXR revealed no acute cardiopulmonary process and CT of the chest, abdomen, pelvis demonstrated no evidence of PE and no acute intraabdominal or pelvic solid organ or bowel pathology.  X-ray of the left foot showed minimally displaced 5th proximal phalanx fracture and possible minimally displaced 4th proximal phalanx fracture and x-ray of the left tibia/fibula revealed no acute osseous process.  She was started on broad-spectrum antibiotics with IV vancomycin and zosyn for sepsis and her blood cultures from admission returned positive for MRSA.  ID was consulted and TTE from 4/17/2023 demonstrated no evidence of vegetation. She was scheduled for NM bone scan on 4/19/2023 which was positive for a left lower extremity cellulitis without focal intense concentration. Arterial Doppler of the left lower extremity from 4/20/2023 showed monophasic flow throughout suggestive of inflow disease and MRI of the thoracic and lumbar spine revealed no evidence to suggest discitis/osteomyelitis or drainable fluid collection and  new right-sided hydronephrosis.  Repeat blood cultures remained positive and cardiology was consulted for STARR on 4/21/2023 which demonstrated no valvular vegetations and moderate to severe aortic stenosis.  Her chronic left ankle wound was thought to be the source of her persistent MRSA bacteremia and 6 weeks of IV vancomycin was recommended by ID.  Renal ultrasound from 4/21/2023 confirmed mild-to-moderate hydronephrosis on the right and CT of the abdomen and pelvis showed mild to moderate right hydronephrosis with no significant right ureteral dilatation.  Urology was consulted and she was taken to the OR on 4/25/2023 for cystoscopy with bilateral retrograde pyelograms right ureteral stent placement due to right hydronephrosis with ureteral obstruction.  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  She was evaluated by neurology on 5/02/2023 due to mental status changes thought to be toxic metabolic encephalopathy and CT of the head demonstrated no acute intracranial abnormality.  EEG from 5/2/2023 showed no evidence of seizure activity and repeat CT of the head was unremarkable.  Nephrology was consulted on 5/03/2023 due to worsening hyponatremia and she was treated with samsca and a 1 L fluid restriction with improvement in her sodium levels.  She had no other complications throughout her hospital course and she was discharged to [facility] for PT/OT, completion of IV antibiotics, and management of her medical comorbidities.      Overview/Hospital Course:  She was admitted to [facility] on 5/11/2023 for rehab s/p  hospitalization for persistent MRSA bacteremia with sepsis due to nonhealing left ankle wound, right hydronephrosis with ureteral obstruction s/p right ureteral stent placement, critical limb ischemia of the left lower extremity, toxic metabolic encephalopathy, and hyponatremia.  She was found to have a UTI on urinalysis from 5/11/2023 and her urine culture grew 10,000-25,000 cfu/ml of Pseudomonas aeruginosa.  She was started on a 3 day course of ciprofloxacin 250 mg by mouth twice daily which she completed on 5/16/2023.  Her hemoglobin and hematocrit dropped to 7.6 and 25.1 on 5/12/2023 and she was transfused with 1 unit packed RBC.  She was started on sodium chloride 1 g by mouth daily on 5/15/2023 due to persistent hyponatremia.  Detemir was discontinued on 5/17/2023 due to intermittent hypoglycemia and she was started on metformin 1000 mg by mouth twice daily and glimepiride 2 mg by mouth in the morning. Her scheduled nighttime norco was discontinued on 5/20/2023 due to lethargy. She had a blood sugar of 50 on the afternoon of 5/21/2023 and 57 on the morning of 5/22/2023 and her metformin and glimepiride were held. Her metformin was decreased from 1000 mg to 500 mg PO BID and her glimepiride was discontinued on 5/24/2023 due to persistent hypoglycemia.      Interval History: She participated in PT and OT yesterday and she required maximum assistance with bed mobility and total assistance with transfers. She was seen by ST yesterday who recommended a regular diet with thin liquids. Her glimepiride was discontinued yesterday due to hypoglycemia and her metformin was decreased from 1000 mg to 500 mg PO BID. Her blood sugars have ranged from 136-197 overnight.      Review of Systems   All other systems reviewed and are negative.  Objective:     Vital Signs (Most Recent):  Temp: 97.7 °F (36.5 °C) (05/25/23 0718)  Pulse: 82 (05/25/23 0718)  Resp: 17 (05/25/23 0353)  BP: 120/75 (05/25/23 0718)  SpO2: 97 % (05/25/23  0718) Vital Signs (24h Range):  Temp:  [97.5 °F (36.4 °C)-98.5 °F (36.9 °C)] 97.7 °F (36.5 °C)  Pulse:  [82-97] 82  Resp:  [17-18] 17  SpO2:  [94 %-100 %] 97 %  BP: (102-131)/(58-75) 120/75     Weight: 56.2 kg (123 lb 14.4 oz)  Body mass index is 21.95 kg/m².    Intake/Output Summary (Last 24 hours) at 2023 0832  Last data filed at 2023 0645  Gross per 24 hour   Intake 840 ml   Output 1000 ml   Net -160 ml      Physical Exam  General - Elderly  female in no acute distress.  HEENT - NCAT. No scleral icterus. Oropharynx clear. Mucous membranes moist.  Neck - No lymphadenopathy, thyromegaly, or JVD.  CVS - Regular rate and rhythm. No murmurs, rubs, or gallops.  Resp - Lungs are clear to auscultation bilaterally. No rales, wheeze, or rhonchi.   GI - Soft, nontender, nondistended, normoactive bowel sounds present.   Extremities - No clubbing, cyanosis, or edema. Right upper extremity PICC line.  Neuro - CN II through XII are grossly intact. No focal neurological deficits. Alert and oriented to name and  only.  Psych - Flat affect.  Skin -  Left heel remains stable with thin dry scab to the surface. Left posterior ankle and lateral ankle ulcerations have increasing granulation tissue noted.  Left lateral lower leg site previously closed with ischemic changes is now evolving and there is a small area of moist eschar noted within discolored area.       Significant Labs: All pertinent labs within the past 24 hours have been reviewed.     2023:  CBC: WBC count 8.86, hemoglobin 8.5, hematocrit 28.5, platelet count 258.  CMP: Sodium 131, potassium 3.4, chloride 94, CO2 27, BUN 17.3, creatinine 0.67, glucose 54, calcium 7.7, magnesium 1.6, alkaline phosphatase 94, total protein 5.6, albumin 1.8, total bilirubin 0.5, AST 10, ALT 10.     2023:  CBC: WBC count 10.83, hemoglobin 9, hematocrit 30.4, platelet count 274.  BMP: Sodium 130, potassium 3.5, chloride 94, CO2 28, BUN 18.4, creatinine 0.68,  glucose 125, calcium 8.3.     5/18/2023:  CBC: WBC count 9.84, hemoglobin 9, hematocrit 30.7,  platelet count 261.  BMP: Sodium 134, potassium 3.7, chloride 94, CO2 33, BUN 20.6, creatinine 0.69, glucose 167, calcium 7.8, magnesium 1.9.     5/16/2023:  BMP: Sodium 131, potassium 4.2, chloride 4.2, chloride 90, CO2 33, BUN 18.9, creatinine 0.65, glucose 188, calcium 7.9, magnesium 1.9.     5/15/2023:  BMP: Sodium 128, potassium 3.3, chloride 91, CO2 34, BUN 18, creatinine 0.67, glucose 76, calcium 7.9.     5/13/2023:  CBC: WBC count 10.82, hemoglobin 9.1, hematocrit 30, platelet count 248.  BMP: Sodium 128, potassium 3.7, chloride 89, CO2 36, BUN 17.3, creatinine 0.66, glucose 103, calcium 8.2.     Significant Imaging: I have reviewed all pertinent imaging results/findings within the past 24 hours.     CXR 5/16/2023: Left-sided PICC projects over the distal SVC.  Prominent interstitial markings with no focal opacification.     CXR 5/6/2023: The heart is not significantly enlarged.  There is aortic atherosclerosis.  Similar prominent central pulmonary vasculature.  No new dense consolidation or pneumothorax.     CT head 5/5/2023:   1. No acute intracranial abnormality.  2. Chronic microvascular ischemic changes.     EEG 5/2/2023: No evidence of seizure activity.     CT head 5/2/2023: No acute intracranial abnormality.     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     Renal ultrasound 4/21/2023: Mild-to-moderate hydronephrosis on the right.     CT abdomen/pelvis 4/21/2023: Mild to moderate right hydronephrosis with no significant right ureteral dilatation.       STARR 4/21/2023: No valvular vegetations and moderate to severe aortic stenosis.      MRI thoracic/lumbar spine  4/20/2023: No evidence to suggest discitis/osteomyelitis or drainable fluid collection and new right-sided hydronephrosis.     Arterial Doppler left lower extremity 4/20/2023: Monophasic flow throughout suggestive of inflow disease     NM bone scan 4/19/2023: Left lower extremity cellulitis without focal intense concentration more typically seen with an abscess or osteomyelitis.      TTE 4/17/2023:  Low-normal systolic function with an EF of 54%, grade II left ventricular diastolic dysfunction, mild-to-moderate aortic valve stenosis, and no evidence of vegetation.     CT head 4/16/2023: No acute intracranial abnormality.       CXR 4/16/2023: No acute cardiopulmonary process.     CT chest/abdomen/pelvis 4/16/2023: No evidence of PE and no acute intraabdominal or pelvic solid organ or bowel pathology identified.       X-ray left foot 4/16/2023: Minimally displaced 5th proximal phalanx fracture and possible minimally displaced 4th proximal phalanx fracture.     X-ray left tibia/fibula 4/16/2023: No acute osseous process.       Assessment/Plan:      * MRSA bacteremia  Continue 6 week course of IV vancomycin until 6/4/2023.  Repeat blood culture from 4/23/2023 were negative.  TTE from 4/17/2023 and STARR from 4/23/2023 showed no evidence of vegetation and NM bone scan on 4/19/2023 was positive for a left lower extremity cellulitis without focal intense concentration more typically seen with an abscess or osteomyelitis.     Sepsis  Resolved.  Continue 6 week course of IV vancomycin until 6/4/2023 as per above.    Right hydronephrosis  Continue tamsulosin.  She is s/p cystoscopy with bilateral retrograde pyelograms right ureteral stent placement on 4/25/2023 due to right hydronephrosis with ureteral obstruction. Renal ultrasound from 4/21/2023 revealed mild-to-moderate hydronephrosis on the right and CT of the abdomen and pelvis showed mild to moderate right hydronephrosis with no significant right ureteral dilatation.  She  will need to follow-up with urology as an outpatient 1-2 weeks following discharge for right ureteral stent removal.    Toxic metabolic encephalopathy  Improved. Her scheduled nighttime norco was discontinued on 5/20/2023.  Consider titrating down trazodone.  CT of the head from 5/02/2023 and 5/06/2023 showed no acute intracranial abnormality and EEG from 5/02/2023 revealed no evidence of seizure activity.    Hyponatremia  Continue sodium chloride tablets and 1200 ml fluid restriction. HCTZ has been discontinued. She is s/p treatment with samsca on 5/7/2023.    Critical limb ischemia of left lower extremity  Continue statin and percocet as needed. She is not currently on any blood thinners. 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      Urinary tract infection  Resolved.  She completed a 3 day course of ciprofloxacin 250 mg by mouth twice daily on 5/16/2023.  Urine culture from 5/11/2023 grew 10,000-25,000 cfu/ml of Pseudomonas aeruginosa.    Debility  Continue PT/OT.    Hypomagnesemia  Improved.  Continue magnesium oxide.    Hypokalemia  Improved.    Hypertension  Continue carvedilol. She is no longer on losartan-HCTZ.    Type II diabetes mellitus  Continue metformin which was decreased on 5/24/2023. Detemir was discontinued on 5/17/2023 and glimepiride was stopped on 5/24/2023 due to hypoglycemia.  Her hemoglobin A1c from 3/07/2023 was 6.3.      Iron deficiency anemia  Continue ferrous sulfate.  She is s/p blood transfusion with 1 unit packed RBC on 5/12/2023 and she was treated with 3 days of IV ferrlecit.    Chronic diastolic CHF (congestive heart  failure)  Compensated. She is not currently on any diuretics.  TTE from 4/17/2023 showed low-normal systolic function with an EF of 54% and grade II left ventricular diastolic dysfunction.     Insomnia  Continue trazodone.    Hyperlipidemia  Continue pravastatin.    Severe aortic stenosis  No acute issues.  She can follow-up with cardiology as an outpatient as she may be a candidate for TAVR.    Chronic constipation  Continue polyethylene glycol.    Disposition  Anticipate discharge home with [facility], a manish lift, and a hospital bed on 6/5/2023 following completion of IV antibiotics on 6/4/2023.        VTE Risk Mitigation (From admission, onward)         Ordered     enoxaparin injection 40 mg  Daily         05/11/23 1414     Place sequential compression device  Until discontinued         05/11/23 1414                Discharge Planning   ERIN:      Code Status: DNR   Is the patient medically ready for discharge?:     Reason for patient still in hospital (select all that apply): Treatment, PT / OT recommendations and Pending disposition  Discharge Plan A: Home Health, Home with family   Discharge Delays: None known at this time      This service was provided via telemedicine.  Type of Software: Audio/Visual.  Originating Site: [facility].  Distant Site: [place], [place].  Her exam was performed with the assistance of Sarahy Campos RN.      Naun Pope MD  Department of Hospital Medicine   [facility]

## 2023-05-25 NOTE — PLAN OF CARE
Problem: Adult Inpatient Plan of Care  Goal: Plan of Care Review  Outcome: Ongoing, Progressing  Flowsheets (Taken 5/25/2023 1617)  Plan of Care Reviewed With:   patient   daughter  Goal: Patient-Specific Goal (Individualized)  Outcome: Ongoing, Progressing  Flowsheets (Taken 5/25/2023 1617)  Anxieties, Fears or Concerns: none  Individualized Care Needs: wound care, sit up in chair for strengtheing  Goal: Absence of Hospital-Acquired Illness or Injury  Outcome: Ongoing, Progressing  Intervention: Identify and Manage Fall Risk  Flowsheets (Taken 5/25/2023 1617)  Safety Promotion/Fall Prevention:   assistive device/personal item within reach   chair alarm set   bed alarm set   instructed to call staff for mobility   lighting adjusted   nonskid shoes/socks when out of bed  Intervention: Prevent Skin Injury  Flowsheets (Taken 5/25/2023 1617)  Body Position:   turned   30 degrees   neutral body alignment   sitting up in bed  Skin Protection:   adhesive use limited   tubing/devices free from skin contact  Intervention: Prevent and Manage VTE (Venous Thromboembolism) Risk  Flowsheets (Taken 5/25/2023 1617)  Activity Management: Up in chair - L3  VTE Prevention/Management:   bleeding risk assessed   fluids promoted  Range of Motion: active ROM (range of motion) encouraged  Intervention: Prevent Infection  Flowsheets (Taken 5/25/2023 1617)  Infection Prevention:   cohorting utilized   environmental surveillance performed   equipment surfaces disinfected   rest/sleep promoted   personal protective equipment utilized   hand hygiene promoted  Goal: Optimal Comfort and Wellbeing  Outcome: Ongoing, Progressing  Intervention: Monitor Pain and Promote Comfort  Flowsheets (Taken 5/25/2023 1617)  Pain Management Interventions:   diversional activity provided   relaxation techniques promoted   care clustered   medication offered   quiet environment facilitated  Intervention: Provide Person-Centered Care  Flowsheets (Taken 5/25/2023  1617)  Trust Relationship/Rapport:   care explained   choices provided   emotional support provided   empathic listening provided   thoughts/feelings acknowledged   reassurance provided   questions encouraged     Problem: Diabetes Comorbidity  Goal: Blood Glucose Level Within Targeted Range  Outcome: Ongoing, Progressing  Intervention: Monitor and Manage Glycemia  Flowsheets (Taken 5/25/2023 1617)  Glycemic Management: blood glucose monitored     Problem: Adjustment to Illness (Sepsis/Septic Shock)  Goal: Optimal Coping  Outcome: Ongoing, Progressing     Problem: Bleeding (Sepsis/Septic Shock)  Goal: Absence of Bleeding  Outcome: Ongoing, Progressing     Problem: Glycemic Control Impaired (Sepsis/Septic Shock)  Goal: Blood Glucose Level Within Desired Range  Outcome: Ongoing, Progressing     Problem: Infection Progression (Sepsis/Septic Shock)  Goal: Absence of Infection Signs and Symptoms  Outcome: Ongoing, Progressing     Problem: Nutrition Impaired (Sepsis/Septic Shock)  Goal: Optimal Nutrition Intake  Outcome: Ongoing, Progressing     Problem: Infection  Goal: Absence of Infection Signs and Symptoms  Outcome: Ongoing, Progressing     Problem: Impaired Wound Healing  Goal: Optimal Wound Healing  Outcome: Ongoing, Progressing  Intervention: Promote Wound Healing  Flowsheets (Taken 5/25/2023 1617)  Oral Nutrition Promotion:   calorie-dense foods provided   physical activity promoted   rest periods promoted  Sleep/Rest Enhancement:   awakenings minimized   regular sleep/rest pattern promoted   room darkened  Activity Management: Up in chair - L3  Pain Management Interventions:   diversional activity provided   relaxation techniques promoted   care clustered   medication offered   quiet environment facilitated     Problem: Fall Injury Risk  Goal: Absence of Fall and Fall-Related Injury  Outcome: Ongoing, Progressing     Problem: Mobility Impairment  Goal: Optimal Mobility  Outcome: Ongoing, Progressing  Intervention:  Optimize Mobility  Flowsheets (Taken 5/25/2023 1617)  Assistive Device Utilized: lift device  Activity Management: Up in chair - L3  Positioning/Transfer Devices:   wedge   pillows

## 2023-05-25 NOTE — PROGRESS NOTES
Inpatient Nutrition Evaluation    Admit Date: 5/11/2023   Total duration of encounter: 14 days    Nutrition Recommendation/Prescription     Continue diabetic diet 1200 mL fluid restriction 2. Continue gurdeep 1 pk BID -used arginaid as substitution (in-house)    Nutrition Assessment     Chart Review    Reason Seen: continuous nutrition monitoring, length of stay, and follow-up    Malnutrition Screening Tool Results   Have you recently lost weight without trying?: No  Have you been eating poorly because of a decreased appetite?: No   MST Score: 0     Diagnosis:  MRSA bacteremia 5/11/2023      Type II diabetes mellitus 5/3/2022      Insomnia 5/3/2022      Hyperlipidemia 5/3/2022      Hypertension 5/3/2022      Sepsis 4/17/2023      Critical limb ischemia of left lower extremity 4/20/2023      Right hydronephrosis 4/25/2023      Debility 5/2/2023      Toxic metabolic encephalopathy 5/3/2023      Iron deficiency anemia 5/20/2023      Hyponatremia 5/20/2023      Hypomagnesemia 5/20/2023      Hypokalemia 5/20/2023      Chronic diastolic CHF (congestive heart failure) 5/20/2023      Chronic constipation 5/20/2023      Severe aortic stenosis 5/20/2023      Disposition 5/20/2023      Urinary tract infection        Relevant Medical History: Hydronephrosis  Date Unknown  Adenocarcinoma of uterus  Date Unknown  Bladder cancer  Date Unknown  Deep vein thrombosis  Date Unknown  Essential (primary) hypertension  Date Unknown  Heart murmur  Date Unknown  High cholesterol  Date Unknown  Hypertriglyceridemia  Date Unknown  Insomnia  Date Unknown  Osteopenia  Date Unknown  Other pulmonary embolism without acute cor pulmonale  Date Unknown  Personal history of colonic polyps  Date Unknown  Rheumatoid arthritis, unspecified  Date Unknown  Type 2 diabetes mellitus without complications    Nutrition-Related Medications: ferrous sulfate, glimepride, insulin aspart, lactobacillus acidophilus, magnesium oxide, metformin, pravastatin,  "polyethylene glycol, vancomycin       Nutrition-Related Labs:      Diet Order: Diet diabetic Fluid - 1200mL  Oral Supplement Order: Tanner  Appetite/Oral Intake: good/% of meals  Factors Affecting Nutritional Intake: none identified  Food/Tenriism/Cultural Preferences: none reported  Food Allergies: none reported    Skin Integrity: bruised (ecchymotic)  Wound(s):      Altered Skin Integrity 04/17/23 Left posterior Ankle Full thickness tissue loss. Subcutaneous fat may be visible but bone, tendon or muscle are not exposed-Tissue loss description: Full thickness       Altered Skin Integrity 04/17/23 Left lateral Ankle Full thickness tissue loss. Subcutaneous fat may be visible but bone, tendon or muscle are not exposed-Tissue loss description: Full thickness       Altered Skin Integrity 04/17/23 1730 Coccyx Ulceration Intact skin with non-blanchable redness of localized area-Tissue loss description: Not applicable     Comments    Pt and family member present during round. Intake is good. Discussed food preferences. Marked menus.     Anthropometrics    Height: 5' 2.99" (160 cm)    Last Weight: 56.2 kg (123 lb 14.4 oz) (05/25/23 0626) Weight Method: Bed Scale  BMI (Calculated): 22  BMI Classification: normal (BMI 18.5-24.9)     Ideal Body Weight (IBW), Female: 114.95 lb     % Ideal Body Weight, Female (lb): 115.27 %                             Usual Weight Provided By: unable to obtain usual weight    Wt Readings from Last 5 Encounters:   05/25/23 56.2 kg (123 lb 14.4 oz)   04/24/23 56.6 kg (124 lb 12.5 oz)   04/10/23 54.4 kg (120 lb)   02/09/23 85.3 kg (188 lb)   01/17/23 55.8 kg (123 lb)     Weight Change(s) Since Admission:  Admit Weight: 60.1 kg (132 lb 7.9 oz) (05/11/23 1515)  Wt stable from 4/24-5/25/23. See above    Patient Education    Not applicable.    Monitoring & Evaluation     Dietitian will monitor food and beverage intake, weight, weight change, electrolyte/renal panel, glucose/endocrine profile, " and gastrointestinal profile.  Nutrition Risk/Follow-Up: low (follow-up in 5-7 days)  Patients assigned 'low nutrition risk' status do not qualify for a full nutritional assessment but will be monitored and re-evaluated in a 5-7 day time period. Please consult if re-evaluation needed sooner.

## 2023-05-26 NOTE — PROGRESS NOTES
Re-assessment performed to all wound care sites. Per staff and family, pt with complaints of pain and bruising to back. Upon assessment, ecchymosis noted to L lateral chest wall. No open areas noted. Foam dressing applied to site and to blanchable erythema to upper, mid back for protection and additional cushion. Recommend changing once weekly or as needed for dislodgement. Blanchable erythema noted to dorsal aspect of L 2nd toe. L heel with 100% eschar. Hyperpigmentation to L medial lower leg/ankle remains unchanged. All other sites remain stable at this time with current wound care regimen. Recommend continuing to apply Santyl/mesalt and cover dressings to L lateral ankle, L posterior and lateral lower leg, and L heel. Heel boots reapplied. Denuded areas present to sacrum with blanchable erythema to periwound. Skin prep applied to areas and allowed to dry. Recommend continuing to apply foam dressing to area and changing q 3days or as needed for soilage/dislodgement. Daughter at bedside for assessment and educated on importance of nutrition in wound healing. Continue with strict pressure prevention measures during hospitalization and frequent incontinence care/moisture management. Orders in place.        05/26/23 1050   WOCN Assessment   WOCN Total Time (mins) 40   Visit Date 05/26/23   Visit Time 1010   Consult Type Follow Up   WOCN Speciality Wound   Wound pressure;arterial   Continence Type Urinary;Fecal   Intervention assessed;chart review;orders   Teaching on-going   Skin Interventions   Device Skin Pressure Protection absorbent pad utilized/changed;pressure points protected   Pressure Reduction Devices heel offloading device utilized;positioning supports utilized;specialty bed utilized   Pressure Reduction Techniques heels elevated off bed;positioned off wounds;pressure points protected;weight shift assistance provided   Skin Protection incontinence pads utilized;silicone foam dressing in place   Positioning    Body Position sitting up in bed   Head of Bed (HOB) Positioning HOB at 30-45 degrees   Positioning/Transfer Devices pillows;in use;wedge   Pressure Injury Prevention    Check Moisture Management Pad Done   Sacral Foam Dressing Replace   Heel protection technique Heel boot        Altered Skin Integrity 04/17/23 Left posterior Ankle Full thickness tissue loss. Subcutaneous fat may be visible but bone, tendon or muscle are not exposed   Date First Assessed: 04/17/23   Altered Skin Integrity Present on Admission - Did Patient arrive to the hospital with altered skin?: yes  Side: Left  Orientation: posterior  Location: Ankle  Description of Altered Skin Integrity: Full thickness tissue...   Wound Image    Dressing Appearance Intact;Moist drainage   Drainage Amount Scant   Drainage Characteristics/Odor Serous   Appearance Pink;Black;Tan;Slough;Eschar;Not granulating;Other (see comments)  (Stringy)   Tissue loss description Full thickness   Black (%), Wound Tissue Color 30 %   Red (%), Wound Tissue Color 60 %   Yellow (%), Wound Tissue Color 100 %   Periwound Area Intact   Wound Edges Defined   Wound Length (cm) 7 cm   Wound Width (cm) 1.5 cm   Wound Depth (cm) 0.1 cm   Wound Volume (cm^3) 1.05 cm^3   Wound Surface Area (cm^2) 10.5 cm^2   Care Cleansed with:;Wound cleanser   Dressing Applied;Sodium chloride impregnated;Gauze;Absorptive Pad;Rolled gauze  (Santyl)   Off Loading Off loading shoe  (Heel boot)        Altered Skin Integrity 04/17/23 Left lateral Ankle Full thickness tissue loss. Subcutaneous fat may be visible but bone, tendon or muscle are not exposed   Date First Assessed: 04/17/23   Altered Skin Integrity Present on Admission - Did Patient arrive to the hospital with altered skin?: yes  Side: Left  Orientation: lateral  Location: Ankle  Description of Altered Skin Integrity: Full thickness tissue l...   Wound Image    Dressing Appearance Intact;Moist drainage   Drainage Amount Scant   Drainage  Characteristics/Odor Serous   Appearance Pink;Tan;Slough;Not granulating   Tissue loss description Full thickness   Periwound Area Intact   Wound Edges Defined   Wound Length (cm) 1.1 cm   Wound Width (cm) 0.9 cm   Wound Depth (cm) 0.2 cm   Wound Volume (cm^3) 0.198 cm^3   Wound Surface Area (cm^2) 0.99 cm^2   Care Cleansed with:;Wound cleanser   Dressing Applied;Sodium chloride impregnated;Gauze;Absorptive Pad;Rolled gauze  (Santyl)   Off Loading Off loading shoe  (Heel boot)        Altered Skin Integrity 04/24/23 Left Heel Purple or maroon localized area of discolored intact skin or non-intact skin or a blood-filled blister.   Date First Assessed: 04/24/23   Altered Skin Integrity Present on Admission - Did Patient arrive to the hospital with altered skin?: yes  Side: Left  Location: Heel  Description of Altered Skin Integrity: Purple or maroon localized area of discolored ...   Wound Image    Dressing Appearance Dry;Intact   Drainage Amount None   Appearance Black;Eschar   Black (%), Wound Tissue Color 100 %   Periwound Area Intact   Wound Edges Defined   Wound Length (cm) 1.9 cm   Wound Width (cm) 2 cm   Wound Surface Area (cm^2) 3.8 cm^2   Care Cleansed with:;Sterile normal saline   Dressing Applied;Sodium chloride impregnated;Gauze;Absorptive Pad;Rolled gauze  (Santyl)   Off Loading Off loading shoe  (Heel boot)        Altered Skin Integrity 04/17/23 1730 Coccyx Ulceration Intact skin with non-blanchable redness of localized area   Date First Assessed/Time First Assessed: 04/17/23 1730   Altered Skin Integrity Present on Admission - Did Patient arrive to the hospital with altered skin?: yes  Location: Coccyx  Is this injury device related?: No  Primary Wound Type: Ulceration  De...   Wound Image    Dressing Appearance Intact   Drainage Amount Scant   Drainage Characteristics/Odor Serosanguineous   Appearance Red;Moist;Other (see comments)  (Denuded; Blanchable erythema)   Tissue loss description Not applicable    Periwound Area Denuded   Care Cleansed with:;Sterile normal saline   Dressing Applied;Foam   Periwound Care Skin barrier film applied        Altered Skin Integrity 05/12/23 1057 Left lower;lateral Leg Other (comment) Purple or maroon localized area of discolored intact skin or non-intact skin or a blood-filled blister.   Date First Assessed/Time First Assessed: 05/12/23 1057   Altered Skin Integrity Present on Admission - Did Patient arrive to the hospital with altered skin?: yes  Side: Left  Orientation: lower;lateral  Location: Leg  Primary Wound Type: (c) Other (co...   Dressing Appearance Intact;Moist drainage   Drainage Amount None   Appearance Black;Eschar   Black (%), Wound Tissue Color 100 %   Periwound Area Other (see comments)  (Discoloration gfrom suspected arterial changes)   Wound Edges Defined   Wound Length (cm) 7 cm   Wound Width (cm) 1.5 cm   Wound Depth (cm) 0.1 cm   Wound Volume (cm^3) 1.05 cm^3   Wound Surface Area (cm^2) 10.5 cm^2   Care Cleansed with:;Wound cleanser   Dressing Applied;Sodium chloride impregnated;Gauze;Absorptive Pad;Rolled gauze;Other (comment)  (Santyl)   Off Loading Off loading shoe  (Heel boot)

## 2023-05-26 NOTE — PLAN OF CARE
Problem: Adult Inpatient Plan of Care  Goal: Plan of Care Review  Outcome: Ongoing, Progressing  Flowsheets (Taken 5/26/2023 1257)  Plan of Care Reviewed With:   patient   daughter     Problem: Impaired Wound Healing  Goal: Optimal Wound Healing  Outcome: Ongoing, Progressing  Intervention: Promote Wound Healing  Flowsheets (Taken 5/26/2023 1257)  Oral Nutrition Promotion:   calorie-dense foods provided   physical activity promoted  Sleep/Rest Enhancement:   regular sleep/rest pattern promoted   noise level reduced  Activity Management: (pt requires max assist)   Up in chair - L3   Arm raise - L1  Pain Management Interventions:   pillow support provided   position adjusted   quiet environment facilitated     Problem: Skin Injury Risk Increased  Goal: Skin Health and Integrity  Outcome: Ongoing, Progressing  Intervention: Optimize Skin Protection  Flowsheets (Taken 5/26/2023 1257)  Pressure Reduction Techniques:   frequent weight shift encouraged   weight shift assistance provided  Pressure Reduction Devices:   foam padding utilized   positioning supports utilized   pressure-redistributing mattress utilized  Skin Protection:   adhesive use limited   tubing/devices free from skin contact   incontinence pads utilized   silicone foam dressing in place  Head of Bed (HOB) Positioning: HOB at 30-45 degrees

## 2023-05-26 NOTE — PT/OT/SLP PROGRESS
Physical Therapy Treatment Note           Name: Melodie Villarreal    : 1931 (89 y.o.)  MRN: [id number]           TREATMENT SUMMARY AND RECOMMENDATIONS:    PT Received On: 23  PT Start Time: 1030     PT Stop Time: 1050  PT Total Time (min): 20 min     Subjective Assessment:   No complaints  Lethargic   x Awake, alert, cooperative  Uncooperative    Agitated x c/o pain    Appropriate  c/o fatigue    Confused x Treated at bedside     Emotionally labile  Treated in gym/dept.    Impulsive  Other:    Flat affect       Therapy Precautions:    Cognitive deficits  Spinal precautions    Collar - hard  Sternal precautions    Collar - soft   TLSO   x Fall risk  LSO    Hip precautions - posterior  Knee immobilizer    Hip precautions - anterior  WBAT    Impaired communication  Partial weightbearing    Oxygen  TTWB    PEG tube  NWB    Visual deficits  Other:    Hearing deficits          Treatment Objectives:     Mobility Training:   Assist level Comments    Bed mobility MIN A Rolling L/R to place tashia sling   Transfer TOTAL A Tashia transfer bed > bedside chair    Gait     Sit to stand transitions     Sitting balance     Standing balance      Wheelchair mobility     Car transfer     Other:          Therapeutic Exercise:   Exercise Sets Reps Comments                               Additional Comments:  Daughter requesting to change meal times to mimic her home schedule so she can eat better. Notified Dietician Lexi.    Assessment: Patient tolerated session well. Patient and patient's daughter at bedside reporting increased L side/rib pain today with formation of new bruise. Tashia transfer used vs stand pivot due to patient's increased pain level today and requesting to get in chair. Patient tolerated tashia transfer well.    PT Plan: continue POC  Revisions made to plan of care: No    GOALS:   Multidisciplinary Problems       Physical Therapy Goals          Problem: Physical Therapy    Goal Priority Disciplines  Outcome Goal Variances Interventions   Physical Therapy Goal     PT, PT/OT Ongoing, Not Progressing     Description: Goals to be met by: Discharge     Patient will increase functional independence with mobility by performin. Supine to sit with MInimal Assistance  2. Sit to supine with MInimal Assistance  3. Bed to chair transfer with Minimal Assistance using Rolling Walker  4. Gait  x 15 feet with Minimal Assistance using Rolling Walker.   5. Sitting at edge of bed x10 minutes with Stand-by Assistance                         Skilled PT Minutes Provided: 20 minutes   Communication with Treatment Team:     Equipment recommendations:       At end of treatment, patient remained:  x Up in chair     Up in wheelchair in room    In bed   x With alarm activated    Bed rails up   x Call bell in reach    x Family/friends present    Restraints secured properly    In bathroom with CNA/RN notified    Nurse aware    In gym with therapist/tech    Other:

## 2023-05-26 NOTE — PROGRESS NOTES
Pharmacokinetic Assessment Follow Up: IV Vancomycin    Vancomycin serum concentration assessment(s):    The trough level was drawn correctly and can be used to guide therapy at this time. The measurement is above the desired definitive target range of 15 to 20 mcg/mL.    Vancomycin Regimen Plan:    Change regimen to Vancomycin 500 mg IV every 24 hours with next serum trough concentration measured at 60 prior to 0600 dose on 05/27    Drug levels (last 3 results):  Recent Labs   Lab Result Units 05/25/23  2056   Vancomycin Trough ug/ml 23.0*       Pharmacy will continue to follow and monitor vancomycin.    Please contact pharmacy at extension [phone] for questions regarding this assessment.    Thank you for the consult,   Lenny Neville       Patient brief summary:  Melodie Villarreal is a 89 y.o. female initiated on antimicrobial therapy with IV Vancomycin for treatment of skin & soft tissue infection    The patient's current regimen is 500mg q24h    Drug Allergies:   Review of patient's allergies indicates:   Allergen Reactions    Ace inhibitors Other (See Comments)    Adhesive      Allergic to tape    Bactrim [sulfamethoxazole-trimethoprim] Other (See Comments)     Confusion, Hypoglycemia    Meperidine Other (See Comments)    Midazolam Other (See Comments)    Atorvastatin Nausea Only    Codeine Other (See Comments) and Anxiety    Tapentadol Rash       Actual Body Weight:   55 kg    Renal Function:   Estimated Creatinine Clearance: 45.2 mL/min (based on SCr of 0.67 mg/dL).,     Dialysis Method (if applicable):  N/A    CBC (last 72 hours):  No results for input(s): WHITE BLOOD CELL COUNT, HEMOGLOBIN, HEMATOCRIT, PLATELETS, GRAN%, LYMPH%, MONO%, EOSINOPHIL%, BASOPHIL%, DIFFERENTIAL METHOD in the last 72 hours.    Metabolic Panel (last 72 hours):  No results for input(s): SODIUM, POTASSIUM, CHLORIDE, CO2, GLUCOSE, BUN BLD, CREATININE, ALBUMIN, BILIRUBIN TOTAL, ALK PHOS, AST, ALT, MAGNESIUM, PHOSPHORUS in the last 72  hours.    Vancomycin Administrations:  vancomycin given in the last 96 hours                     vancomycin (VANCOCIN) 500 mg in dextrose 5 % in water (D5W) 5 % 100 mL IVPB (MB+) (mg) 500 mg New Bag 05/26/23 0628    vancomycin (VANCOCIN) 500 mg in dextrose 5 % in water (D5W) 5 % 100 mL IVPB (MB+) (mg) 500 mg New Bag 05/25/23 0400     500 mg New Bag 05/24/23 0922     500 mg New Bag 05/23/23 1620     500 mg New Bag 05/22/23 2228                    Microbiologic Results:  Microbiology Results (last 7 days)       ** No results found for the last 168 hours. **

## 2023-05-26 NOTE — PLAN OF CARE
Freedom of choice sign for [facility] and [facility] for DME orders fax for DME discharge schedule for 6-4

## 2023-05-26 NOTE — SUBJECTIVE & OBJECTIVE
Interval History: She has poor standing balance and she required maximum assistance with bed mobility and total assistance with transfers. She complains of left shoulder pain and she has bruising over the left ribcage. She was started on diclofenac gel yesterday which has helped.      Review of Systems   All other systems reviewed and are negative.  Objective:     Vital Signs (Most Recent):  Temp: 96.9 °F (36.1 °C) (23 0408)  Pulse: 84 (23 0716)  Resp: 18 (23 0408)  BP: 134/78 (23 0716)  SpO2: 97 % (23 07) Vital Signs (24h Range):  Temp:  [96.9 °F (36.1 °C)-98 °F (36.7 °C)] 96.9 °F (36.1 °C)  Pulse:  [] 84  Resp:  [14-19] 18  SpO2:  [92 %-97 %] 97 %  BP: (106-148)/(51-78) 134/78     Weight: 55 kg (121 lb 4.1 oz)  Body mass index is 21.48 kg/m².    Intake/Output Summary (Last 24 hours) at 2023 0842  Last data filed at 2023 0623  Gross per 24 hour   Intake 360 ml   Output 850 ml   Net -490 ml         Physical Exam  General - Elderly  female in no acute distress.  HEENT - NCAT. No scleral icterus. Oropharynx clear. Mucous membranes moist.  Neck - No lymphadenopathy, thyromegaly, or JVD.  CVS - Regular rate and rhythm. No murmurs, rubs, or gallops.  Resp - Lungs are clear to auscultation bilaterally. No rales, wheeze, or rhonchi.   GI - Soft, nontender, nondistended, normoactive bowel sounds present.   Extremities - No clubbing, cyanosis, or edema. Right upper extremity PICC line.  Neuro - CN II through XII are grossly intact. No focal neurological deficits. Alert and oriented to name and  only.  Psych - Flat affect.  Skin -  Left heel remains stable with thin dry scab to the surface. Left posterior ankle and lateral ankle ulcerations have increasing granulation tissue noted.  Left lateral lower leg site previously closed with ischemic changes is now evolving and there is a small area of moist eschar noted within discolored area.  Bruising over the left  ribcage.     Significant Labs: All pertinent labs within the past 24 hours have been reviewed.     5/22/2023:  CBC: WBC count 8.86, hemoglobin 8.5, hematocrit 28.5, platelet count 258.  CMP: Sodium 131, potassium 3.4, chloride 94, CO2 27, BUN 17.3, creatinine 0.67, glucose 54, calcium 7.7, magnesium 1.6, alkaline phosphatase 94, total protein 5.6, albumin 1.8, total bilirubin 0.5, AST 10, ALT 10.     5/19/2023:  CBC: WBC count 10.83, hemoglobin 9, hematocrit 30.4, platelet count 274.  BMP: Sodium 130, potassium 3.5, chloride 94, CO2 28, BUN 18.4, creatinine 0.68, glucose 125, calcium 8.3.     5/18/2023:  CBC: WBC count 9.84, hemoglobin 9, hematocrit 30.7,  platelet count 261.  BMP: Sodium 134, potassium 3.7, chloride 94, CO2 33, BUN 20.6, creatinine 0.69, glucose 167, calcium 7.8, magnesium 1.9.     5/16/2023:  BMP: Sodium 131, potassium 4.2, chloride 4.2, chloride 90, CO2 33, BUN 18.9, creatinine 0.65, glucose 188, calcium 7.9, magnesium 1.9.     5/15/2023:  BMP: Sodium 128, potassium 3.3, chloride 91, CO2 34, BUN 18, creatinine 0.67, glucose 76, calcium 7.9.     5/13/2023:  CBC: WBC count 10.82, hemoglobin 9.1, hematocrit 30, platelet count 248.  BMP: Sodium 128, potassium 3.7, chloride 89, CO2 36, BUN 17.3, creatinine 0.66, glucose 103, calcium 8.2.     Significant Imaging: I have reviewed all pertinent imaging results/findings within the past 24 hours.     CXR 5/16/2023: Left-sided PICC projects over the distal SVC.  Prominent interstitial markings with no focal opacification.     CXR 5/6/2023: The heart is not significantly enlarged.  There is aortic atherosclerosis.  Similar prominent central pulmonary vasculature.  No new dense consolidation or pneumothorax.     CT head 5/5/2023:   1. No acute intracranial abnormality.  2. Chronic microvascular ischemic changes.     EEG 5/2/2023: No evidence of seizure activity.     CT head 5/2/2023: No acute intracranial abnormality.     CTA abdomen/pelvis with bilateral  runoff 4/26/2023: Significant stenosis of the left common iliac artery, severe narrowing versus occlusion at the left common femoral artery, occlusion at the distal superficial femoral artery, and stenosis at the origin of the superior mesenteric artery.     Renal ultrasound 4/21/2023: Mild-to-moderate hydronephrosis on the right.     CT abdomen/pelvis 4/21/2023: Mild to moderate right hydronephrosis with no significant right ureteral dilatation.       STARR 4/21/2023: No valvular vegetations and moderate to severe aortic stenosis.      MRI thoracic/lumbar spine 4/20/2023: No evidence to suggest discitis/osteomyelitis or drainable fluid collection and new right-sided hydronephrosis.     Arterial Doppler left lower extremity 4/20/2023: Monophasic flow throughout suggestive of inflow disease     NM bone scan 4/19/2023: Left lower extremity cellulitis without focal intense concentration more typically seen with an abscess or osteomyelitis.      TTE 4/17/2023:  Low-normal systolic function with an EF of 54%, grade II left ventricular diastolic dysfunction, mild-to-moderate aortic valve stenosis, and no evidence of vegetation.     CT head 4/16/2023: No acute intracranial abnormality.       CXR 4/16/2023: No acute cardiopulmonary process.     CT chest/abdomen/pelvis 4/16/2023: No evidence of PE and no acute intraabdominal or pelvic solid organ or bowel pathology identified.       X-ray left foot 4/16/2023: Minimally displaced 5th proximal phalanx fracture and possible minimally displaced 4th proximal phalanx fracture.     X-ray left tibia/fibula 4/16/2023: No acute osseous process.

## 2023-05-26 NOTE — PT/OT/SLP PROGRESS
Speech Language Pathology Treatment    Patient Name:  Melodie Villarreal   MRN:  [id number]  Admitting Diagnosis: MRSA bacteremia    Recommendations:                 General Recommendations:  Cognitive-linguistic therapy  Diet recommendations:  Regular, Liquid Diet Level: Thin   Aspiration Precautions: HOB to 90 degrees   General Precautions: Standard, other (see comments)  Communication strategies:  provide increased time to answer and go to room if call light pushed    Assessment:     Melodie Villarreal is a 89 y.o. female with an SLP diagnosis of Cognitive-Linguistic Impairment.  She presents with decreased memory, problem solving, safety awareness.    Subjective       Patient goals: Return home     Pain/Comfort:  Pain Rating 1: 1/10    Respiratory Status: Room air    Objective:     Has the patient been evaluated by SLP for swallowing?   No  Keep patient NPO? No   Current Respiratory Status:        Oriented x 2; STM 60% with verbal cues.        Goals:   Multidisciplinary Problems       SLP Goals          Problem: SLP    Goal Priority Disciplines Outcome   SLP Goal     SLP Ongoing, Progressing   Description: LTG: Pt will improve cognitive linguistic skills to allow for safe discharge home w/ family.    STG:  Pt will orient x4 modA.  Pt will utilize memory strategies to recall new information following a 2 minute filled delay modA.  Pt will answer LTM Qs w/ 90% acc modA.                       Plan:     Patient to be seen:  3 x/week   Plan of Care expires:  05/26/23  Plan of Care reviewed with:  patient, daughter   SLP Follow-Up:  Yes       Discharge recommendations:  home with home health   Barriers to Discharge:  Level of Skilled Assistance Needed   and Safety Awareness      Time Tracking:     SLP Treatment Date:   05/26/23  Speech Start Time:  0930  Speech Stop Time:  1000     Speech Total Time (min):  30 min    Billable Minutes: Speech Therapy Individual      05/26/2023

## 2023-05-26 NOTE — PROGRESS NOTES
Upon arrival to room pt up in chair asleep. Pt's son present and stated she hasn't received her lunch yet. Will f/u later this PM if schedule allows.

## 2023-05-26 NOTE — NURSING
Pt has a Vancomycin Trough level of 23.0ug/mL that was drawn at 2056. Pharmacist (Dung) called at 2224 for further instruction on next dose of vancomycin due at 2200. Verified pt information, dose to be held per pharmacist d/t current Vancomycin Trough level

## 2023-05-26 NOTE — PT/OT/SLP PROGRESS
Name: Melodie Villarreal    : 1931 (89 y.o.)  MRN: [id number]      Patient is currently performing max A-Total A. Patient is unable to ambulate at this time.    DME Recommendations:    Wheelchair:  Patient would benefit from a 18 inch manual wheelchair with elevating leg rests and removable arm rests d/t patient having a mobility limitation that significantly impairs his/her ability to participate in one or more mobility related activities of daily living. The patient's mobility limitation can not be sufficiently resolved by the use of a cane or walker. The use of a manual wheelchair will significantly improve the patient's ability to participate in ADLs/mobility tasks and the patient will use it on a regular basis in the home environment. The patient has a caregiver who is available, willing and able to provide assistance with the wheelchair.     Tashia lift: Patient is currently requiring max-total A with transfers and all ADL tasks. Patient is requiring transfers of 2 people or use of Tashia lift for transfers/mobility. Patient would benefit from the use of tashia lift within his/her home environment so the patient's family/caregiver can transfer safely and independently from one surface to another. A tashia lift would decrease the risk of falls and subsequent injuries to the both the patient and his/her family/caregiver.     Hospital bed: Patient is currently requiring max-total A with all bed mobility tasks including rolling R<>L and supine<>sit transitions secondary to debility. Patient is mainly bed bound except for tashia transfers to bedside chair. A hospital bed would allow for frequent repositioning, elevation of head to decrease risk of aspiration, and bed rails to decrease fall risk. Patient would benefit from a hospital bed within the home environment to reduce the burden on family/caregivers and to ensure safe bed mobility and functionality for both the patient and family/caregivers.

## 2023-05-26 NOTE — PROGRESS NOTES
[facility] Medicine  Telehospitalist Progress Note    Patient Name: Melodie Villarreal  MRN: [id number]  Patient Class: IP- Swing   Admission Date: 5/11/2023  Length of Stay: 15 days  Attending Physician: Naun Pope MD  Primary Care Provider: Wisam Espinosa Jr, MD      Subjective:     Principal Problem:MRSA bacteremia      HPI:  Ms. Villarreal is a 89-year-old  female with history of hypertension, hyperlipidemia, type II diabetes mellitus, DVT/PE status post IVC filter placement, and bladder cancer who originally presented to [facility] ER on 4/16/2023 with fatigue, generalized weakness, dysuria, dark urine, and lethargy and she was hypotensive with a blood pressure of 66/33 on EMS arrival.  Her initial lab work was remarkable for a WBC count of 34.3, lactic acid of 3.8, troponin of 0.115, and BNP of 2571.8 and CT of the head showed no acute intracranial abnormality.  CXR revealed no acute cardiopulmonary process and CT of the chest, abdomen, pelvis demonstrated no evidence of PE and no acute intraabdominal or pelvic solid organ or bowel pathology.  X-ray of the left foot showed minimally displaced 5th proximal phalanx fracture and possible minimally displaced 4th proximal phalanx fracture and x-ray of the left tibia/fibula revealed no acute osseous process.  She was started on broad-spectrum antibiotics with IV vancomycin and zosyn for sepsis and her blood cultures from admission returned positive for MRSA.  ID was consulted and TTE from 4/17/2023 demonstrated no evidence of vegetation. She was scheduled for NM bone scan on 4/19/2023 which was positive for a left lower extremity cellulitis without focal intense concentration. Arterial Doppler of the left lower extremity from 4/20/2023 showed monophasic flow throughout suggestive of inflow disease and MRI of the thoracic and lumbar spine revealed no evidence to suggest discitis/osteomyelitis or drainable fluid collection and  new right-sided hydronephrosis.  Repeat blood cultures remained positive and cardiology was consulted for STARR on 4/21/2023 which demonstrated no valvular vegetations and moderate to severe aortic stenosis.  Her chronic left ankle wound was thought to be the source of her persistent MRSA bacteremia and 6 weeks of IV vancomycin was recommended by ID.  Renal ultrasound from 4/21/2023 confirmed mild-to-moderate hydronephrosis on the right and CT of the abdomen and pelvis showed mild to moderate right hydronephrosis with no significant right ureteral dilatation.  Urology was consulted and she was taken to the OR on 4/25/2023 for cystoscopy with bilateral retrograde pyelograms right ureteral stent placement due to right hydronephrosis with ureteral obstruction.  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  She was evaluated by neurology on 5/02/2023 due to mental status changes thought to be toxic metabolic encephalopathy and CT of the head demonstrated no acute intracranial abnormality.  EEG from 5/2/2023 showed no evidence of seizure activity and repeat CT of the head was unremarkable.  Nephrology was consulted on 5/03/2023 due to worsening hyponatremia and she was treated with samsca and a 1 L fluid restriction with improvement in her sodium levels.  She had no other complications throughout her hospital course and she was discharged to [facility] for PT/OT, completion of IV antibiotics, and management of her medical comorbidities.      Overview/Hospital Course:  She was admitted to [facility] on 5/11/2023 for rehab s/p  hospitalization for persistent MRSA bacteremia with sepsis due to nonhealing left ankle wound, right hydronephrosis with ureteral obstruction s/p right ureteral stent placement, critical limb ischemia of the left lower extremity, toxic metabolic encephalopathy, and hyponatremia.  She was found to have a UTI on urinalysis from 5/11/2023 and her urine culture grew 10,000-25,000 cfu/ml of Pseudomonas aeruginosa.  She was started on a 3 day course of ciprofloxacin 250 mg by mouth twice daily which she completed on 5/16/2023.  Her hemoglobin and hematocrit dropped to 7.6 and 25.1 on 5/12/2023 and she was transfused with 1 unit packed RBC.  She was started on sodium chloride 1 g by mouth daily on 5/15/2023 due to persistent hyponatremia.  Detemir was discontinued on 5/17/2023 due to intermittent hypoglycemia and she was started on metformin 1000 mg by mouth twice daily and glimepiride 2 mg by mouth in the morning. Her scheduled nighttime norco was discontinued on 5/20/2023 due to lethargy. She had a blood sugar of 50 on the afternoon of 5/21/2023 and 57 on the morning of 5/22/2023 and her metformin and glimepiride were held. Her metformin was decreased from 1000 mg to 500 mg PO BID and her glimepiride was discontinued on 5/24/2023 due to persistent hypoglycemia. She complained of left shoulder on 5/25/2023 and she was started on diclofenac 1% gel 4 gm topical to the left shoulder BID prn.       Interval History: She has poor standing balance and she required maximum assistance with bed mobility and total assistance with transfers. She complains of left shoulder pain and she has bruising over the left ribcage. She was started on diclofenac gel yesterday which has helped.      Review of Systems   All other systems reviewed and are negative.  Objective:     Vital Signs (Most Recent):  Temp: 96.9 °F (36.1 °C) (05/26/23 0408)  Pulse: 84 (05/26/23 0716)  Resp: 18 (05/26/23 0408)  BP: 134/78 (05/26/23 0716)  SpO2: 97 % (05/26/23  0716) Vital Signs (24h Range):  Temp:  [96.9 °F (36.1 °C)-98 °F (36.7 °C)] 96.9 °F (36.1 °C)  Pulse:  [] 84  Resp:  [14-19] 18  SpO2:  [92 %-97 %] 97 %  BP: (106-148)/(51-78) 134/78     Weight: 55 kg (121 lb 4.1 oz)  Body mass index is 21.48 kg/m².    Intake/Output Summary (Last 24 hours) at 2023 0842  Last data filed at 2023 0623  Gross per 24 hour   Intake 360 ml   Output 850 ml   Net -490 ml         Physical Exam  General - Elderly  female in no acute distress.  HEENT - NCAT. No scleral icterus. Oropharynx clear. Mucous membranes moist.  Neck - No lymphadenopathy, thyromegaly, or JVD.  CVS - Regular rate and rhythm. No murmurs, rubs, or gallops.  Resp - Lungs are clear to auscultation bilaterally. No rales, wheeze, or rhonchi.   GI - Soft, nontender, nondistended, normoactive bowel sounds present.   Extremities - No clubbing, cyanosis, or edema. Right upper extremity PICC line.  Neuro - CN II through XII are grossly intact. No focal neurological deficits. Alert and oriented to name and  only.  Psych - Flat affect.  Skin -  Left heel remains stable with thin dry scab to the surface. Left posterior ankle and lateral ankle ulcerations have increasing granulation tissue noted.  Left lateral lower leg site previously closed with ischemic changes is now evolving and there is a small area of moist eschar noted within discolored area.  Bruising over the left ribcage.     Significant Labs: All pertinent labs within the past 24 hours have been reviewed.     2023:  CBC: WBC count 8.86, hemoglobin 8.5, hematocrit 28.5, platelet count 258.  CMP: Sodium 131, potassium 3.4, chloride 94, CO2 27, BUN 17.3, creatinine 0.67, glucose 54, calcium 7.7, magnesium 1.6, alkaline phosphatase 94, total protein 5.6, albumin 1.8, total bilirubin 0.5, AST 10, ALT 10.     2023:  CBC: WBC count 10.83, hemoglobin 9, hematocrit 30.4, platelet count 274.  BMP: Sodium 130, potassium 3.5, chloride 94, CO2 28,  BUN 18.4, creatinine 0.68, glucose 125, calcium 8.3.     5/18/2023:  CBC: WBC count 9.84, hemoglobin 9, hematocrit 30.7,  platelet count 261.  BMP: Sodium 134, potassium 3.7, chloride 94, CO2 33, BUN 20.6, creatinine 0.69, glucose 167, calcium 7.8, magnesium 1.9.     5/16/2023:  BMP: Sodium 131, potassium 4.2, chloride 4.2, chloride 90, CO2 33, BUN 18.9, creatinine 0.65, glucose 188, calcium 7.9, magnesium 1.9.     5/15/2023:  BMP: Sodium 128, potassium 3.3, chloride 91, CO2 34, BUN 18, creatinine 0.67, glucose 76, calcium 7.9.     5/13/2023:  CBC: WBC count 10.82, hemoglobin 9.1, hematocrit 30, platelet count 248.  BMP: Sodium 128, potassium 3.7, chloride 89, CO2 36, BUN 17.3, creatinine 0.66, glucose 103, calcium 8.2.     Significant Imaging: I have reviewed all pertinent imaging results/findings within the past 24 hours.     CXR 5/16/2023: Left-sided PICC projects over the distal SVC.  Prominent interstitial markings with no focal opacification.     CXR 5/6/2023: The heart is not significantly enlarged.  There is aortic atherosclerosis.  Similar prominent central pulmonary vasculature.  No new dense consolidation or pneumothorax.     CT head 5/5/2023:   1. No acute intracranial abnormality.  2. Chronic microvascular ischemic changes.     EEG 5/2/2023: No evidence of seizure activity.     CT head 5/2/2023: No acute intracranial abnormality.     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     Renal ultrasound 4/21/2023: Mild-to-moderate hydronephrosis on the right.     CT abdomen/pelvis 4/21/2023: Mild to moderate right hydronephrosis with no significant right ureteral dilatation.       STARR 4/21/2023: No valvular vegetations and moderate to severe aortic stenosis.      MRI  thoracic/lumbar spine 4/20/2023: No evidence to suggest discitis/osteomyelitis or drainable fluid collection and new right-sided hydronephrosis.     Arterial Doppler left lower extremity 4/20/2023: Monophasic flow throughout suggestive of inflow disease     NM bone scan 4/19/2023: Left lower extremity cellulitis without focal intense concentration more typically seen with an abscess or osteomyelitis.      TTE 4/17/2023:  Low-normal systolic function with an EF of 54%, grade II left ventricular diastolic dysfunction, mild-to-moderate aortic valve stenosis, and no evidence of vegetation.     CT head 4/16/2023: No acute intracranial abnormality.       CXR 4/16/2023: No acute cardiopulmonary process.     CT chest/abdomen/pelvis 4/16/2023: No evidence of PE and no acute intraabdominal or pelvic solid organ or bowel pathology identified.       X-ray left foot 4/16/2023: Minimally displaced 5th proximal phalanx fracture and possible minimally displaced 4th proximal phalanx fracture.     X-ray left tibia/fibula 4/16/2023: No acute osseous process.       Assessment/Plan:      * MRSA bacteremia  Continue 6 week course of IV vancomycin until 6/4/2023.  Repeat blood culture from 4/23/2023 were negative.  TTE from 4/17/2023 and STARR from 4/23/2023 showed no evidence of vegetation and NM bone scan on 4/19/2023 was positive for a left lower extremity cellulitis without focal intense concentration more typically seen with an abscess or osteomyelitis.     Sepsis  Resolved.  Continue 6 week course of IV vancomycin until 6/4/2023 as per above.    Right hydronephrosis  Continue tamsulosin.  She is s/p cystoscopy with bilateral retrograde pyelograms right ureteral stent placement on 4/25/2023 due to right hydronephrosis with ureteral obstruction. Renal ultrasound from 4/21/2023 revealed mild-to-moderate hydronephrosis on the right and CT of the abdomen and pelvis showed mild to moderate right hydronephrosis with no significant right  ureteral dilatation.  She will need to follow-up with urology as an outpatient 1-2 weeks following discharge for right ureteral stent removal.    Toxic metabolic encephalopathy  Improved. Her scheduled nighttime norco was discontinued on 5/20/2023.  Consider titrating down trazodone.  CT of the head from 5/02/2023 and 5/06/2023 showed no acute intracranial abnormality and EEG from 5/02/2023 revealed no evidence of seizure activity.    Hyponatremia  Continue sodium chloride tablets and 1200 ml fluid restriction. HCTZ has been discontinued. She is s/p treatment with samsca on 5/7/2023.    Critical limb ischemia of left lower extremity  Continue statin and percocet as needed. She is not currently on any blood thinners. 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      Urinary tract infection  Resolved.  She completed a 3 day course of ciprofloxacin 250 mg by mouth twice daily on 5/16/2023.  Urine culture from 5/11/2023 grew 10,000-25,000 cfu/ml of Pseudomonas aeruginosa.    Debility  Continue PT/OT.    Hypomagnesemia  Improved.  Continue magnesium oxide.    Hypokalemia  Improved.    Hypertension  Continue carvedilol. She is no longer on losartan-HCTZ.    Type II diabetes mellitus  Continue metformin which was decreased on 5/24/2023. Detemir was discontinued on 5/17/2023 and glimepiride was stopped on 5/24/2023 due to hypoglycemia.  Her hemoglobin A1c from 3/07/2023 was 6.3.      Iron deficiency anemia  Continue ferrous sulfate.  She is s/p blood transfusion with 1 unit packed RBC on 5/12/2023 and she was treated with 3 days of IV ferrlecit.    Chronic diastolic CHF  (congestive heart failure)  Compensated. She is not currently on any diuretics.  TTE from 4/17/2023 showed low-normal systolic function with an EF of 54% and grade II left ventricular diastolic dysfunction.     Insomnia  Continue trazodone.    Hyperlipidemia  Continue pravastatin.    Severe aortic stenosis  No acute issues.  She can follow-up with cardiology as an outpatient as she may be a candidate for TAVR.    Chronic constipation  Continue polyethylene glycol.    Disposition  Anticipate discharge home with [facility], a manish lift, and a hospital bed on 6/5/2023 following completion of IV antibiotics on 6/4/2023.        VTE Risk Mitigation (From admission, onward)         Ordered     enoxaparin injection 40 mg  Daily         05/11/23 1414     Place sequential compression device  Until discontinued         05/11/23 1414                Discharge Planning   ERIN:      Code Status: DNR   Is the patient medically ready for discharge?:     Reason for patient still in hospital (select all that apply): Treatment, PT / OT recommendations and Pending disposition  Discharge Plan A: Home Health, Home with family   Discharge Delays: None known at this time      This service was provided via telemedicine.  Type of Software: Audio/Visual.  Originating Site: [facility].  Distant Site: [place], [place].  Her exam was performed with the assistance of Sarahy Campos RN.      Naun Pope MD  Department of Hospital Medicine   [facility]

## 2023-05-27 NOTE — PT/OT/SLP PROGRESS
Physical Therapy Treatment Note           Name: Melodie Villarreal    : 1931 (89 y.o.)  MRN: [id number]           TREATMENT SUMMARY AND RECOMMENDATIONS:    PT Received On: 23  PT Start Time: 1430     PT Stop Time: 1445  PT Total Time (min): 15 min     Subjective Assessment:   No complaints  Lethargic   x Awake, alert, cooperative  Uncooperative    Agitated x c/o pain    Appropriate  c/o fatigue   x Confused x Treated at bedside     Emotionally labile  Treated in gym/dept.    Impulsive  Other:    Flat affect       Therapy Precautions:    Cognitive deficits  Spinal precautions    Collar - hard  Sternal precautions    Collar - soft   TLSO   x Fall risk  LSO    Hip precautions - posterior  Knee immobilizer    Hip precautions - anterior  WBAT    Impaired communication  Partial weightbearing    Oxygen  TTWB    PEG tube  NWB    Visual deficits  Other:    Hearing deficits          Treatment Objectives:     Mobility Training:   Assist level Comments    Bed mobility Min A  Rolling R/L to remove tashia sling   Transfer Total A Tashia t/f bedside chair > bed   Gait     Sit to stand transitions     Sitting balance     Standing balance      Wheelchair mobility     Car transfer     Other:          Therapeutic Exercise:   Exercise Sets Reps Comments                               Additional Comments:      Assessment: Patient tolerated session well.    PT Plan: cont POC  Revisions made to plan of care: No    GOALS:   Multidisciplinary Problems       Physical Therapy Goals          Problem: Physical Therapy    Goal Priority Disciplines Outcome Goal Variances Interventions   Physical Therapy Goal     PT, PT/OT Ongoing, Not Progressing     Description: Goals to be met by: Discharge     Patient will increase functional independence with mobility by performin. Supine to sit with MInimal Assistance  2. Sit to supine with MInimal Assistance  3. Bed to chair transfer with Minimal Assistance using Rolling Walker  4. Gait   x 15 feet with Minimal Assistance using Rolling Walker.   5. Sitting at edge of bed x10 minutes with Stand-by Assistance                         Skilled PT Minutes Provided: 15   Communication with Treatment Team:     Equipment recommendations:       At end of treatment, patient remained:   Up in chair     Up in wheelchair in room   x In bed   x With alarm activated   x Bed rails up   x Call bell in reach    x Family/friends present    Restraints secured properly    In bathroom with CNA/RN notified   x Nurse aware    In gym with therapist/tech    Other:

## 2023-05-27 NOTE — PROGRESS NOTES
Pharmacokinetic Assessment Follow Up: IV Vancomycin    Vancomycin serum concentration assessment(s):    The trough level was drawn correctly and can be used to guide therapy at this time. The measurement is above the desired definitive target range of 15 to 20 mcg/mL.    Vancomycin Regimen Plan:    Continue regimen to Vancomycin 500 mg IV every 24 hours with next serum trough concentration measured at 0800 prior to 3rd dose on 05/30  Level is slightly supratherapeutic, but was taken @1.5 hours early. Will wait a few hours being next dose, then continue at current regimen    Drug levels (last 3 results):  Recent Labs   Lab Result Units 05/25/23 2056 05/27/23  0427   Vancomycin Trough ug/ml 23.0* 20.8*       Vancomycin Administrations:  vancomycin given in the last 96 hours                     vancomycin (VANCOCIN) 500 mg in dextrose 5 % in water (D5W) 5 % 100 mL IVPB (MB+) (mg) 500 mg New Bag 05/27/23 0530     500 mg New Bag 05/26/23 0628    vancomycin (VANCOCIN) 500 mg in dextrose 5 % in water (D5W) 5 % 100 mL IVPB (MB+) (mg) 500 mg New Bag 05/25/23 0400     500 mg New Bag 05/24/23 0922     500 mg New Bag 05/23/23 1620                    Pharmacy will continue to follow and monitor vancomycin.    Please contact pharmacy at extension [phone] for questions regarding this assessment.    Thank you for the consult,   Darwin Urbina       Patient brief summary:  Melodie Villarreal is a 89 y.o. female initiated on antimicrobial therapy with IV Vancomycin for treatment of  sepsis secondary to MRSA bacteremia    The patient's current regimen is 500 mg every 24 hours    Drug Allergies:   Review of patient's allergies indicates:   Allergen Reactions    Ace inhibitors Other (See Comments)    Adhesive      Allergic to tape    Bactrim [sulfamethoxazole-trimethoprim] Other (See Comments)     Confusion, Hypoglycemia    Meperidine Other (See Comments)    Midazolam Other (See Comments)    Atorvastatin Nausea Only    Codeine Other (See  Comments) and Anxiety    Tapentadol Rash       Actual Body Weight:   56.5 kg    Renal Function:   Estimated Creatinine Clearance: 45.2 mL/min (based on SCr of 0.67 mg/dL).,     Dialysis Method (if applicable):  N/A    CBC (last 72 hours):  No results for input(s): WHITE BLOOD CELL COUNT, HEMOGLOBIN, HEMATOCRIT, PLATELETS, GRAN%, LYMPH%, MONO%, EOSINOPHIL%, BASOPHIL%, DIFFERENTIAL METHOD in the last 72 hours.    Metabolic Panel (last 72 hours):  No results for input(s): SODIUM, POTASSIUM, CHLORIDE, CO2, GLUCOSE, BUN BLD, CREATININE, ALBUMIN, BILIRUBIN TOTAL, ALK PHOS, AST, ALT, MAGNESIUM, PHOSPHORUS in the last 72 hours.    Microbiologic Results:  Microbiology Results (last 7 days)       ** No results found for the last 168 hours. **

## 2023-05-27 NOTE — PLAN OF CARE
Problem: Adult Inpatient Plan of Care  Goal: Plan of Care Review  Outcome: Ongoing, Progressing  Flowsheets (Taken 5/27/2023 1209)  Plan of Care Reviewed With:   patient   daughter  Goal: Patient-Specific Goal (Individualized)  Outcome: Ongoing, Progressing  Flowsheets (Taken 5/27/2023 1209)  Anxieties, Fears or Concerns: NOne  Individualized Care Needs: patient will be pain free by 5/28/2023  Goal: Absence of Hospital-Acquired Illness or Injury  Outcome: Ongoing, Progressing  Intervention: Identify and Manage Fall Risk  Flowsheets (Taken 5/27/2023 1209)  Safety Promotion/Fall Prevention:   assistive device/personal item within reach   Fall Risk reviewed with patient/family   medications reviewed   side rails raised x 3   instructed to call staff for mobility  Intervention: Prevent Skin Injury  Flowsheets (Taken 5/27/2023 1209)  Skin Protection:   adhesive use limited   incontinence pads utilized   transparent dressing maintained   tubing/devices free from skin contact  Intervention: Prevent and Manage VTE (Venous Thromboembolism) Risk  Flowsheets (Taken 5/27/2023 1209)  Activity Management:   Rolling - L1   Up in chair - L3  VTE Prevention/Management:   fluids promoted   bleeding precations maintained  Range of Motion: active ROM (range of motion) encouraged  Intervention: Prevent Infection  Flowsheets (Taken 5/27/2023 1209)  Infection Prevention:   cohorting utilized   environmental surveillance performed   equipment surfaces disinfected   hand hygiene promoted   rest/sleep promoted   single patient room provided  Goal: Optimal Comfort and Wellbeing  Outcome: Ongoing, Progressing  Intervention: Monitor Pain and Promote Comfort  Flowsheets (Taken 5/27/2023 1209)  Pain Management Interventions:   care clustered   medication offered   pillow support provided  Intervention: Provide Person-Centered Care  Flowsheets (Taken 5/27/2023 1209)  Trust Relationship/Rapport:   care explained   questions encouraged  Goal:  Readiness for Transition of Care  Outcome: Ongoing, Progressing     Problem: Diabetes Comorbidity  Goal: Blood Glucose Level Within Targeted Range  Outcome: Ongoing, Progressing  Intervention: Monitor and Manage Glycemia  Flowsheets (Taken 5/27/2023 1209)  Glycemic Management:   blood glucose monitored   oral hydration promoted     Problem: Adjustment to Illness (Sepsis/Septic Shock)  Goal: Optimal Coping  Outcome: Ongoing, Progressing     Problem: Bleeding (Sepsis/Septic Shock)  Goal: Absence of Bleeding  Outcome: Ongoing, Progressing  Intervention: Monitor and Manage Bleeding  Flowsheets (Taken 5/27/2023 1209)  Bleeding Precautions: monitored for signs of bleeding     Problem: Glycemic Control Impaired (Sepsis/Septic Shock)  Goal: Blood Glucose Level Within Desired Range  Outcome: Ongoing, Progressing  Intervention: Optimize Glycemic Control  Flowsheets (Taken 5/27/2023 1209)  Glycemic Management:   blood glucose monitored   oral hydration promoted     Problem: Infection Progression (Sepsis/Septic Shock)  Goal: Absence of Infection Signs and Symptoms  Outcome: Ongoing, Progressing  Intervention: Initiate Sepsis Management  Flowsheets (Taken 5/27/2023 1209)  Infection Prevention:   cohorting utilized   environmental surveillance performed   equipment surfaces disinfected   hand hygiene promoted   rest/sleep promoted   single patient room provided  Infection Management: aseptic technique maintained  Intervention: Promote Recovery  Flowsheets (Taken 5/27/2023 1209)  Activity Management:   Rolling - L1   Up in chair - L3     Problem: Nutrition Impaired (Sepsis/Septic Shock)  Goal: Optimal Nutrition Intake  Outcome: Ongoing, Progressing     Problem: Infection  Goal: Absence of Infection Signs and Symptoms  Outcome: Ongoing, Progressing  Intervention: Prevent or Manage Infection  Flowsheets (Taken 5/27/2023 1209)  Infection Management: aseptic technique maintained     Problem: Impaired Wound Healing  Goal: Optimal Wound  Healing  Outcome: Ongoing, Progressing  Intervention: Promote Wound Healing  Flowsheets (Taken 5/27/2023 1209)  Oral Nutrition Promotion:   calorie-dense foods provided   calorie-dense liquids provided   safe use of adaptive equipment encouraged  Activity Management:   Rolling - L1   Up in chair - L3  Pain Management Interventions:   care clustered   medication offered   pillow support provided     Problem: Fall Injury Risk  Goal: Absence of Fall and Fall-Related Injury  Outcome: Ongoing, Progressing  Intervention: Identify and Manage Contributors  Flowsheets (Taken 5/27/2023 1209)  Self-Care Promotion:   independence encouraged   BADL personal objects within reach   meal set-up provided   safe use of adaptive equipment encouraged  Medication Review/Management: medications reviewed  Intervention: Promote Injury-Free Environment  Flowsheets (Taken 5/27/2023 1209)  Safety Promotion/Fall Prevention:   assistive device/personal item within reach   Fall Risk reviewed with patient/family   medications reviewed   side rails raised x 3   instructed to call staff for mobility     Problem: Skin Injury Risk Increased  Goal: Skin Health and Integrity  Outcome: Ongoing, Progressing  Intervention: Optimize Skin Protection  Flowsheets (Taken 5/27/2023 1209)  Pressure Reduction Techniques:   heels elevated off bed   pressure points protected  Pressure Reduction Devices:   positioning supports utilized   foam padding utilized   heel offloading device utilized  Skin Protection:   adhesive use limited   incontinence pads utilized   transparent dressing maintained   tubing/devices free from skin contact  Head of Bed (HOB) Positioning:   HOB at 20-30 degrees   HOB at 60 degrees  Intervention: Promote and Optimize Oral Intake  Flowsheets (Taken 5/27/2023 1209)  Oral Nutrition Promotion:   calorie-dense foods provided   calorie-dense liquids provided   safe use of adaptive equipment encouraged     Problem: Mobility Impairment  Goal:  Optimal Mobility  Outcome: Ongoing, Progressing  Intervention: Optimize Mobility  Flowsheets (Taken 5/27/2023 1209)  Activity Management:   Rolling - L1   Up in chair - L3  Positioning/Transfer Devices:   in use   wedge   pillows

## 2023-05-27 NOTE — PLAN OF CARE
Problem: Adult Inpatient Plan of Care  Goal: Plan of Care Review  Outcome: Ongoing, Progressing  Flowsheets (Taken 5/26/2023 2114)  Plan of Care Reviewed With: patient  Goal: Patient-Specific Goal (Individualized)  Outcome: Ongoing, Progressing  Flowsheets (Taken 5/26/2023 2114)  Individualized Care Needs: pt will be pain free by 5/27/23 at 0700     Problem: Diabetes Comorbidity  Goal: Blood Glucose Level Within Targeted Range  Outcome: Ongoing, Progressing  Intervention: Monitor and Manage Glycemia  Flowsheets (Taken 5/26/2023 2114)  Glycemic Management: blood glucose monitored     Problem: Infection  Goal: Absence of Infection Signs and Symptoms  Outcome: Ongoing, Progressing  Intervention: Prevent or Manage Infection  Flowsheets (Taken 5/26/2023 2114)  Infection Management: aseptic technique maintained     Problem: Impaired Wound Healing  Goal: Optimal Wound Healing  Outcome: Ongoing, Progressing  Intervention: Promote Wound Healing  Flowsheets (Taken 5/26/2023 2114)  Sleep/Rest Enhancement: awakenings minimized     Problem: Fall Injury Risk  Goal: Absence of Fall and Fall-Related Injury  Outcome: Ongoing, Progressing  Intervention: Identify and Manage Contributors  Flowsheets (Taken 5/26/2023 2114)  Self-Care Promotion: independence encouraged  Medication Review/Management: medications reviewed

## 2023-05-27 NOTE — SUBJECTIVE & OBJECTIVE
Interval History: She complains of left ribcage pain after being transfers and she has been taking norco as needed. She participated in therapy yesterday and she required minimal assistance with bed mobility and total assistance with transfers. She was seen by SLP yesterday for cognitive-linguistic therapy.      Review of Systems   All other systems reviewed and are negative.  Objective:     Vital Signs (Most Recent):  Temp: 97.7 °F (36.5 °C) (23 0700)  Pulse: 96 (23 0700)  Resp: 18 (23 0345)  BP: (!) 141/74 (23 0700)  SpO2: 97 % (23 0700) Vital Signs (24h Range):  Temp:  [97.7 °F (36.5 °C)-98.4 °F (36.9 °C)] 97.7 °F (36.5 °C)  Pulse:  [85-98] 96  Resp:  [14-18] 18  SpO2:  [93 %-97 %] 97 %  BP: (116-145)/(54-80) 141/74     Weight: 56.5 kg (124 lb 9 oz)  Body mass index is 22.07 kg/m².    Intake/Output Summary (Last 24 hours) at 2023 0824  Last data filed at 2023 0353  Gross per 24 hour   Intake --   Output 950 ml   Net -950 ml      Physical Exam  General - Elderly  female in no acute distress.  HEENT - NCAT. No scleral icterus. Oropharynx clear. Mucous membranes moist.  Neck - No lymphadenopathy, thyromegaly, or JVD.  CVS - Regular rate and rhythm. No murmurs, rubs, or gallops.  Resp - Lungs are clear to auscultation bilaterally. No rales, wheeze, or rhonchi.   GI - Soft, nontender, nondistended, normoactive bowel sounds present.   Extremities - No clubbing, cyanosis, or edema. Right upper extremity PICC line.  Neuro - CN II through XII are grossly intact. No focal neurological deficits. Alert and oriented to name and  only.  Psych - Flat affect.  Skin -  Ecchymosis to left lateral chest pain. Blanchable erythema noted to dorsal aspect of the left 2nd toe. Left heel with 100% eschar. Hyperpigmentation to left medial lower leg/ankle. Denuded areas present to sacrum and blanchable erythema to periwound.     Significant Labs: All pertinent labs within the past 24  hours have been reviewed.     5/27/2023:  Vancomycin trough 20.8.    5/25/2023:  Vancomycin trough 23.0.    5/22/2023:  CBC: WBC count 8.86, hemoglobin 8.5, hematocrit 28.5, platelet count 258.  CMP: Sodium 131, potassium 3.4, chloride 94, CO2 27, BUN 17.3, creatinine 0.67, glucose 54, calcium 7.7, magnesium 1.6, alkaline phosphatase 94, total protein 5.6, albumin 1.8, total bilirubin 0.5, AST 10, ALT 10.  Vancomycin trough 24.1.     5/19/2023:  CBC: WBC count 10.83, hemoglobin 9, hematocrit 30.4, platelet count 274.  BMP: Sodium 130, potassium 3.5, chloride 94, CO2 28, BUN 18.4, creatinine 0.68, glucose 125, calcium 8.3.     5/18/2023:  CBC: WBC count 9.84, hemoglobin 9, hematocrit 30.7,  platelet count 261.  BMP: Sodium 134, potassium 3.7, chloride 94, CO2 33, BUN 20.6, creatinine 0.69, glucose 167, calcium 7.8, magnesium 1.9.     5/16/2023:  BMP: Sodium 131, potassium 4.2, chloride 4.2, chloride 90, CO2 33, BUN 18.9, creatinine 0.65, glucose 188, calcium 7.9, magnesium 1.9.     5/15/2023:  BMP: Sodium 128, potassium 3.3, chloride 91, CO2 34, BUN 18, creatinine 0.67, glucose 76, calcium 7.9.     5/13/2023:  CBC: WBC count 10.82, hemoglobin 9.1, hematocrit 30, platelet count 248.  BMP: Sodium 128, potassium 3.7, chloride 89, CO2 36, BUN 17.3, creatinine 0.66, glucose 103, calcium 8.2.     Significant Imaging: I have reviewed all pertinent imaging results/findings within the past 24 hours.     CXR 5/16/2023: Left-sided PICC projects over the distal SVC.  Prominent interstitial markings with no focal opacification.     CXR 5/6/2023: The heart is not significantly enlarged.  There is aortic atherosclerosis.  Similar prominent central pulmonary vasculature.  No new dense consolidation or pneumothorax.     CT head 5/5/2023:   1. No acute intracranial abnormality.  2. Chronic microvascular ischemic changes.     EEG 5/2/2023: No evidence of seizure activity.     CT head 5/2/2023: No acute intracranial abnormality.     CTA  abdomen/pelvis with bilateral runoff 4/26/2023: Significant stenosis of the left common iliac artery, severe narrowing versus occlusion at the left common femoral artery, occlusion at the distal superficial femoral artery, and stenosis at the origin of the superior mesenteric artery.     Renal ultrasound 4/21/2023: Mild-to-moderate hydronephrosis on the right.     CT abdomen/pelvis 4/21/2023: Mild to moderate right hydronephrosis with no significant right ureteral dilatation.       STARR 4/21/2023: No valvular vegetations and moderate to severe aortic stenosis.      MRI thoracic/lumbar spine 4/20/2023: No evidence to suggest discitis/osteomyelitis or drainable fluid collection and new right-sided hydronephrosis.     Arterial Doppler left lower extremity 4/20/2023: Monophasic flow throughout suggestive of inflow disease     NM bone scan 4/19/2023: Left lower extremity cellulitis without focal intense concentration more typically seen with an abscess or osteomyelitis.      TTE 4/17/2023:  Low-normal systolic function with an EF of 54%, grade II left ventricular diastolic dysfunction, mild-to-moderate aortic valve stenosis, and no evidence of vegetation.     CT head 4/16/2023: No acute intracranial abnormality.       CXR 4/16/2023: No acute cardiopulmonary process.     CT chest/abdomen/pelvis 4/16/2023: No evidence of PE and no acute intraabdominal or pelvic solid organ or bowel pathology identified.       X-ray left foot 4/16/2023: Minimally displaced 5th proximal phalanx fracture and possible minimally displaced 4th proximal phalanx fracture.     X-ray left tibia/fibula 4/16/2023: No acute osseous process.

## 2023-05-27 NOTE — PROGRESS NOTES
[facility] Medicine  Telehospitalist Progress Note    Patient Name: Melodie Villarreal  MRN: [id number]  Patient Class: IP- Swing   Admission Date: 5/11/2023  Length of Stay: 16 days  Attending Physician: Naun Pope MD  Primary Care Provider: Wisam Espinosa Jr, MD      Subjective:     Principal Problem:MRSA bacteremia      HPI:  Ms. Villarreal is a 89-year-old  female with history of hypertension, hyperlipidemia, type II diabetes mellitus, DVT/PE status post IVC filter placement, and bladder cancer who originally presented to [facility] ER on 4/16/2023 with fatigue, generalized weakness, dysuria, dark urine, and lethargy and she was hypotensive with a blood pressure of 66/33 on EMS arrival.  Her initial lab work was remarkable for a WBC count of 34.3, lactic acid of 3.8, troponin of 0.115, and BNP of 2571.8 and CT of the head showed no acute intracranial abnormality.  CXR revealed no acute cardiopulmonary process and CT of the chest, abdomen, pelvis demonstrated no evidence of PE and no acute intraabdominal or pelvic solid organ or bowel pathology.  X-ray of the left foot showed minimally displaced 5th proximal phalanx fracture and possible minimally displaced 4th proximal phalanx fracture and x-ray of the left tibia/fibula revealed no acute osseous process.  She was started on broad-spectrum antibiotics with IV vancomycin and zosyn for sepsis and her blood cultures from admission returned positive for MRSA.  ID was consulted and TTE from 4/17/2023 demonstrated no evidence of vegetation. She was scheduled for NM bone scan on 4/19/2023 which was positive for a left lower extremity cellulitis without focal intense concentration. Arterial Doppler of the left lower extremity from 4/20/2023 showed monophasic flow throughout suggestive of inflow disease and MRI of the thoracic and lumbar spine revealed no evidence to suggest discitis/osteomyelitis or drainable fluid collection and  new right-sided hydronephrosis.  Repeat blood cultures remained positive and cardiology was consulted for STARR on 4/21/2023 which demonstrated no valvular vegetations and moderate to severe aortic stenosis.  Her chronic left ankle wound was thought to be the source of her persistent MRSA bacteremia and 6 weeks of IV vancomycin was recommended by ID.  Renal ultrasound from 4/21/2023 confirmed mild-to-moderate hydronephrosis on the right and CT of the abdomen and pelvis showed mild to moderate right hydronephrosis with no significant right ureteral dilatation.  Urology was consulted and she was taken to the OR on 4/25/2023 for cystoscopy with bilateral retrograde pyelograms right ureteral stent placement due to right hydronephrosis with ureteral obstruction.  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  She was evaluated by neurology on 5/02/2023 due to mental status changes thought to be toxic metabolic encephalopathy and CT of the head demonstrated no acute intracranial abnormality.  EEG from 5/2/2023 showed no evidence of seizure activity and repeat CT of the head was unremarkable.  Nephrology was consulted on 5/03/2023 due to worsening hyponatremia and she was treated with samsca and a 1 L fluid restriction with improvement in her sodium levels.  She had no other complications throughout her hospital course and she was discharged to [facility] for PT/OT, completion of IV antibiotics, and management of her medical comorbidities.      Overview/Hospital Course:  She was admitted to [facility] on 5/11/2023 for rehab s/p  hospitalization for persistent MRSA bacteremia with sepsis due to nonhealing left ankle wound, right hydronephrosis with ureteral obstruction s/p right ureteral stent placement, critical limb ischemia of the left lower extremity, toxic metabolic encephalopathy, and hyponatremia.  She was found to have a UTI on urinalysis from 5/11/2023 and her urine culture grew 10,000-25,000 cfu/ml of Pseudomonas aeruginosa.  She was started on a 3 day course of ciprofloxacin 250 mg by mouth twice daily which she completed on 5/16/2023.  Her hemoglobin and hematocrit dropped to 7.6 and 25.1 on 5/12/2023 and she was transfused with 1 unit packed RBC.  She was started on sodium chloride 1 g by mouth daily on 5/15/2023 due to persistent hyponatremia.  Detemir was discontinued on 5/17/2023 due to intermittent hypoglycemia and she was started on metformin 1000 mg by mouth twice daily and glimepiride 2 mg by mouth in the morning. Her scheduled nighttime norco was discontinued on 5/20/2023 due to lethargy. She had a blood sugar of 50 on the afternoon of 5/21/2023 and 57 on the morning of 5/22/2023 and her metformin and glimepiride were held. Her metformin was decreased from 1000 mg to 500 mg PO BID and her glimepiride was discontinued on 5/24/2023 due to persistent hypoglycemia. She complained of left shoulder on 5/25/2023 and she was started on diclofenac 1% gel 4 gm topical to the left shoulder BID prn.       Interval History: She complains of left ribcage pain after being transfers and she has been taking norco as needed. She participated in therapy yesterday and she required minimal assistance with bed mobility and total assistance with transfers. She was seen by SLP yesterday for cognitive-linguistic therapy.      Review of Systems   All other systems reviewed and are negative.  Objective:     Vital Signs (Most Recent):  Temp: 97.7 °F (36.5 °C) (05/27/23 0700)  Pulse: 96 (05/27/23 0700)  Resp: 18 (05/27/23 0345)  BP: (!) 141/74  (23 0700)  SpO2: 97 % (23 0700) Vital Signs (24h Range):  Temp:  [97.7 °F (36.5 °C)-98.4 °F (36.9 °C)] 97.7 °F (36.5 °C)  Pulse:  [85-98] 96  Resp:  [14-18] 18  SpO2:  [93 %-97 %] 97 %  BP: (116-145)/(54-80) 141/74     Weight: 56.5 kg (124 lb 9 oz)  Body mass index is 22.07 kg/m².    Intake/Output Summary (Last 24 hours) at 2023 0824  Last data filed at 2023 0353  Gross per 24 hour   Intake --   Output 950 ml   Net -950 ml      Physical Exam  General - Elderly  female in no acute distress.  HEENT - NCAT. No scleral icterus. Oropharynx clear. Mucous membranes moist.  Neck - No lymphadenopathy, thyromegaly, or JVD.  CVS - Regular rate and rhythm. No murmurs, rubs, or gallops.  Resp - Lungs are clear to auscultation bilaterally. No rales, wheeze, or rhonchi.   GI - Soft, nontender, nondistended, normoactive bowel sounds present.   Extremities - No clubbing, cyanosis, or edema. Right upper extremity PICC line.  Neuro - CN II through XII are grossly intact. No focal neurological deficits. Alert and oriented to name and  only.  Psych - Flat affect.  Skin -  Ecchymosis to left lateral chest pain. Blanchable erythema noted to dorsal aspect of the left 2nd toe. Left heel with 100% eschar. Hyperpigmentation to left medial lower leg/ankle. Denuded areas present to sacrum and blanchable erythema to periwound.     Significant Labs: All pertinent labs within the past 24 hours have been reviewed.     2023:  Vancomycin trough 20.8.    2023:  Vancomycin trough 23.0.    2023:  CBC: WBC count 8.86, hemoglobin 8.5, hematocrit 28.5, platelet count 258.  CMP: Sodium 131, potassium 3.4, chloride 94, CO2 27, BUN 17.3, creatinine 0.67, glucose 54, calcium 7.7, magnesium 1.6, alkaline phosphatase 94, total protein 5.6, albumin 1.8, total bilirubin 0.5, AST 10, ALT 10.  Vancomycin trough 24.1.     2023:  CBC: WBC count 10.83, hemoglobin 9, hematocrit 30.4, platelet count 274.  BMP: Sodium  130, potassium 3.5, chloride 94, CO2 28, BUN 18.4, creatinine 0.68, glucose 125, calcium 8.3.     5/18/2023:  CBC: WBC count 9.84, hemoglobin 9, hematocrit 30.7,  platelet count 261.  BMP: Sodium 134, potassium 3.7, chloride 94, CO2 33, BUN 20.6, creatinine 0.69, glucose 167, calcium 7.8, magnesium 1.9.     5/16/2023:  BMP: Sodium 131, potassium 4.2, chloride 4.2, chloride 90, CO2 33, BUN 18.9, creatinine 0.65, glucose 188, calcium 7.9, magnesium 1.9.     5/15/2023:  BMP: Sodium 128, potassium 3.3, chloride 91, CO2 34, BUN 18, creatinine 0.67, glucose 76, calcium 7.9.     5/13/2023:  CBC: WBC count 10.82, hemoglobin 9.1, hematocrit 30, platelet count 248.  BMP: Sodium 128, potassium 3.7, chloride 89, CO2 36, BUN 17.3, creatinine 0.66, glucose 103, calcium 8.2.     Significant Imaging: I have reviewed all pertinent imaging results/findings within the past 24 hours.     CXR 5/16/2023: Left-sided PICC projects over the distal SVC.  Prominent interstitial markings with no focal opacification.     CXR 5/6/2023: The heart is not significantly enlarged.  There is aortic atherosclerosis.  Similar prominent central pulmonary vasculature.  No new dense consolidation or pneumothorax.     CT head 5/5/2023:   1. No acute intracranial abnormality.  2. Chronic microvascular ischemic changes.     EEG 5/2/2023: No evidence of seizure activity.     CT head 5/2/2023: No acute intracranial abnormality.     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     Renal ultrasound 4/21/2023: Mild-to-moderate hydronephrosis on the right.     CT abdomen/pelvis 4/21/2023: Mild to moderate right hydronephrosis with no significant right ureteral dilatation.       STARR 4/21/2023: No valvular vegetations and  moderate to severe aortic stenosis.      MRI thoracic/lumbar spine 4/20/2023: No evidence to suggest discitis/osteomyelitis or drainable fluid collection and new right-sided hydronephrosis.     Arterial Doppler left lower extremity 4/20/2023: Monophasic flow throughout suggestive of inflow disease     NM bone scan 4/19/2023: Left lower extremity cellulitis without focal intense concentration more typically seen with an abscess or osteomyelitis.      TTE 4/17/2023:  Low-normal systolic function with an EF of 54%, grade II left ventricular diastolic dysfunction, mild-to-moderate aortic valve stenosis, and no evidence of vegetation.     CT head 4/16/2023: No acute intracranial abnormality.       CXR 4/16/2023: No acute cardiopulmonary process.     CT chest/abdomen/pelvis 4/16/2023: No evidence of PE and no acute intraabdominal or pelvic solid organ or bowel pathology identified.       X-ray left foot 4/16/2023: Minimally displaced 5th proximal phalanx fracture and possible minimally displaced 4th proximal phalanx fracture.     X-ray left tibia/fibula 4/16/2023: No acute osseous process.       Assessment/Plan:      * MRSA bacteremia  Continue 6 week course of IV vancomycin until 6/4/2023.  Repeat blood culture from 4/23/2023 were negative.  TTE from 4/17/2023 and STARR from 4/23/2023 showed no evidence of vegetation and NM bone scan on 4/19/2023 was positive for a left lower extremity cellulitis without focal intense concentration more typically seen with an abscess or osteomyelitis.     Sepsis  Resolved.  Continue 6 week course of IV vancomycin until 6/4/2023 as per above.    Right hydronephrosis  Continue tamsulosin.  She is s/p cystoscopy with bilateral retrograde pyelograms right ureteral stent placement on 4/25/2023 due to right hydronephrosis with ureteral obstruction. Renal ultrasound from 4/21/2023 revealed mild-to-moderate hydronephrosis on the right and CT of the abdomen and pelvis showed mild to moderate right  hydronephrosis with no significant right ureteral dilatation.  She will need to follow-up with urology as an outpatient 1-2 weeks following discharge for right ureteral stent removal.    Toxic metabolic encephalopathy  Improved. Her scheduled nighttime norco was discontinued on 5/20/2023.  Consider titrating down trazodone.  CT of the head from 5/02/2023 and 5/06/2023 showed no acute intracranial abnormality and EEG from 5/02/2023 revealed no evidence of seizure activity.    Hyponatremia  Continue sodium chloride tablets and 1200 ml fluid restriction. HCTZ has been discontinued. She is s/p treatment with samsca on 5/7/2023.    Critical limb ischemia of left lower extremity  Continue statin and percocet as needed. She is not currently on any blood thinners. 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      Urinary tract infection  Resolved.  She completed a 3 day course of ciprofloxacin 250 mg by mouth twice daily on 5/16/2023.  Urine culture from 5/11/2023 grew 10,000-25,000 cfu/ml of Pseudomonas aeruginosa.    Debility  Continue PT/OT.    Hypomagnesemia  Improved.  Continue magnesium oxide.    Hypokalemia  Improved.    Hypertension  Continue carvedilol. She is no longer on losartan-HCTZ.    Type II diabetes mellitus  Continue metformin which was decreased on 5/24/2023. Detemir was discontinued on 5/17/2023 and glimepiride was stopped on 5/24/2023 due to hypoglycemia.  Her hemoglobin A1c from 3/07/2023 was 6.3.      Iron deficiency anemia  Continue ferrous sulfate.  She is s/p blood transfusion with 1 unit packed RBC on 5/12/2023 and she was treated with 3 days  of IV ferrlecit.    Chronic diastolic CHF (congestive heart failure)  Compensated. She is not currently on any diuretics.  TTE from 4/17/2023 showed low-normal systolic function with an EF of 54% and grade II left ventricular diastolic dysfunction.     Insomnia  Continue trazodone.    Hyperlipidemia  Continue pravastatin.    Severe aortic stenosis  No acute issues.  She can follow-up with cardiology as an outpatient as she may be a candidate for TAVR.    Chronic constipation  Continue polyethylene glycol.    Disposition  Anticipate discharge home with [facility], a manish lift, and a hospital bed on 6/5/2023 following completion of IV antibiotics on 6/4/2023.        VTE Risk Mitigation (From admission, onward)         Ordered     enoxaparin injection 40 mg  Daily         05/11/23 1414     Place sequential compression device  Until discontinued         05/11/23 1414                Discharge Planning   ERIN:      Code Status: DNR   Is the patient medically ready for discharge?:     Reason for patient still in hospital (select all that apply): Treatment, PT / OT recommendations and Pending disposition  Discharge Plan A: Home Health, Home with family   Discharge Delays: None known at this time      This service was provided via telemedicine.  Type of Software: Audio/Visual.  Originating Site: [facility].  Distant Site: [place], [place].  Her exam was performed with the assistance of Frances Hicks RN.      Naun Pope MD  Department of Hospital Medicine   [facility]

## 2023-05-27 NOTE — PLAN OF CARE
[facility]  Discharge Reassessment    Primary Care Provider: Wisam Espinosa Jr, MD    Expected Discharge Date:     Reassessment (most recent)       Discharge Reassessment - 05/27/23 1739          Discharge Reassessment    Assessment Type Discharge Planning Reassessment     Did the patient's condition or plan change since previous assessment? No     Discharge Plan discussed with: Adult children     Name(s) and Number(s) William daughter      Communicated ERIN with patient/caregiver Date not available/Unable to determine     Discharge Plan A Home with family;Home Health     DME Needed Upon Discharge  wheelchair;lift device;hospital bed     Transition of Care Barriers None     Why the patient remains in the hospital Requires continued medical care        Post-Acute Status    Post-Acute Authorization Home Health     Home Health Status Pending medical clearance/testing     Hospital Resources/Appts/Education Provided Provided patient/caregiver with written discharge plan information     Discharge Delays None known at this time

## 2023-05-27 NOTE — PT/OT/SLP PROGRESS
Physical Therapy Treatment Note           Name: Melodie Villarreal    : 1931 (89 y.o.)  MRN: [id number]           TREATMENT SUMMARY AND RECOMMENDATIONS:    PT Received On: 23  PT Start Time: 930     PT Stop Time: 955  PT Total Time (min): 25 min     Subjective Assessment:   No complaints  Lethargic   x Awake, alert, cooperative  Uncooperative    Agitated x c/o pain    Appropriate  c/o fatigue    Confused x Treated at bedside     Emotionally labile  Treated in gym/dept.    Impulsive  Other:    Flat affect       Therapy Precautions:    Cognitive deficits  Spinal precautions    Collar - hard  Sternal precautions    Collar - soft   TLSO   x Fall risk  LSO    Hip precautions - posterior  Knee immobilizer    Hip precautions - anterior  WBAT    Impaired communication  Partial weightbearing    Oxygen  TTWB    PEG tube  NWB    Visual deficits  Other:    Hearing deficits          Treatment Objectives:     Mobility Training:   Assist level Comments    Bed mobility Mod A  Rolling L/R to place tashia sling   Transfer Total A Tashia t/f bed > bedside chair    Gait     Sit to stand transitions     Sitting balance     Standing balance      Wheelchair mobility     Car transfer     Other:          Therapeutic Exercise:   Exercise Sets Reps Comments                               Additional Comments:  Cont to report L side pain, thus utilizing tashia to safely transfer to chair     Assessment: Patient tolerated session well.    PT Plan: cont POC  Revisions made to plan of care: No    GOALS:   Multidisciplinary Problems       Physical Therapy Goals          Problem: Physical Therapy    Goal Priority Disciplines Outcome Goal Variances Interventions   Physical Therapy Goal     PT, PT/OT Ongoing, Not Progressing     Description: Goals to be met by: Discharge     Patient will increase functional independence with mobility by performin. Supine to sit with MInimal Assistance  2. Sit to supine with MInimal Assistance  3.  Bed to chair transfer with Minimal Assistance using Rolling Walker  4. Gait  x 15 feet with Minimal Assistance using Rolling Walker.   5. Sitting at edge of bed x10 minutes with Stand-by Assistance                         Skilled PT Minutes Provided: 25   Communication with Treatment Team:     Equipment recommendations:       At end of treatment, patient remained:  x Up in chair     Up in wheelchair in room    In bed   x With alarm activated    Bed rails up   x Call bell in reach    x Family/friends present    Restraints secured properly    In bathroom with CNA/RN notified   x Nurse aware    In gym with therapist/tech    Other:

## 2023-05-28 PROBLEM — E43 SEVERE PROTEIN-CALORIE MALNUTRITION: Status: ACTIVE | Noted: 2023-01-01

## 2023-05-28 NOTE — SUBJECTIVE & OBJECTIVE
Interval History: She reports constipation and decreased appetite but she had 2 small bowel movements yesterday per nursing staff. She stayed up all night per her son despite receiving trazodone 100 mg PO QHS and she was talking out of her head. She participated in PT yesterday and she required total assistance with transfers and minimal to moderate assistance with bed mobility.      Review of Systems   All other systems reviewed and are negative.  Objective:     Vital Signs (Most Recent):  Temp: 98.3 °F (36.8 °C) (23)  Pulse: 97 (23)  Resp: 18 (23)  BP: (!) 121/56 (23)  SpO2: 95 % (23) Vital Signs (24h Range):  Temp:  [97.3 °F (36.3 °C)-98.3 °F (36.8 °C)] 98.3 °F (36.8 °C)  Pulse:  [86-97] 97  Resp:  [18] 18  SpO2:  [94 %-97 %] 95 %  BP: (102-141)/(56-74) 121/56     Weight: 55.5 kg (122 lb 5.7 oz)  Body mass index is 21.68 kg/m².    Intake/Output Summary (Last 24 hours) at 2023 0826  Last data filed at 2023 0557  Gross per 24 hour   Intake 600 ml   Output 1000 ml   Net -400 ml      Physical Exam  General - Elderly  female in no acute distress.  HEENT - NCAT. No scleral icterus. Oropharynx clear. Mucous membranes moist.  Neck - No lymphadenopathy, thyromegaly, or JVD.  CVS - Regular rate and rhythm. No murmurs, rubs, or gallops.  Resp - Lungs are clear to auscultation bilaterally. No rales, wheeze, or rhonchi.   GI - Soft, nontender, nondistended, normoactive bowel sounds present.   Extremities - No clubbing, cyanosis, or edema. Right upper extremity PICC line.  Neuro - CN II through XII are grossly intact. No focal neurological deficits. Alert and oriented to name and  only.  Psych - Flat affect.  Skin -  Ecchymosis to left lateral chest pain. Blanchable erythema noted to dorsal aspect of the left 2nd toe. Left heel with 100% eschar. Hyperpigmentation to left medial lower leg/ankle. Denuded areas present to sacrum and blanchable erythema  to periwound.     Significant Labs: All pertinent labs within the past 24 hours have been reviewed.     5/28/2023:  CBC: WBC count 9.87, hemoglobin 8.1, hematocrit 26.6, platelet count 338.  CMP: Sodium 128, potassium 3.6, chloride 94, CO2 27, BUN 31.9, creatinine 0.87, glucose 128, calcium 7.6, magnesium 1.8, alkaline phosphatase 81, total protein 5.7, albumin 1.7, total bilirubin 0.3, AST 9, ALT 6.  Prealbumin 7.8.    5/27/2023:  Vancomycin trough 20.8.     5/25/2023:  Vancomycin trough 23.0.     5/22/2023:  CBC: WBC count 8.86, hemoglobin 8.5, hematocrit 28.5, platelet count 258.  CMP: Sodium 131, potassium 3.4, chloride 94, CO2 27, BUN 17.3, creatinine 0.67, glucose 54, calcium 7.7, magnesium 1.6, alkaline phosphatase 94, total protein 5.6, albumin 1.8, total bilirubin 0.5, AST 10, ALT 10.  Vancomycin trough 24.1.     5/19/2023:  CBC: WBC count 10.83, hemoglobin 9, hematocrit 30.4, platelet count 274.  BMP: Sodium 130, potassium 3.5, chloride 94, CO2 28, BUN 18.4, creatinine 0.68, glucose 125, calcium 8.3.     5/18/2023:  CBC: WBC count 9.84, hemoglobin 9, hematocrit 30.7,  platelet count 261.  BMP: Sodium 134, potassium 3.7, chloride 94, CO2 33, BUN 20.6, creatinine 0.69, glucose 167, calcium 7.8, magnesium 1.9.     5/16/2023:  BMP: Sodium 131, potassium 4.2, chloride 4.2, chloride 90, CO2 33, BUN 18.9, creatinine 0.65, glucose 188, calcium 7.9, magnesium 1.9.     5/15/2023:  BMP: Sodium 128, potassium 3.3, chloride 91, CO2 34, BUN 18, creatinine 0.67, glucose 76, calcium 7.9.     5/13/2023:  CBC: WBC count 10.82, hemoglobin 9.1, hematocrit 30, platelet count 248.  BMP: Sodium 128, potassium 3.7, chloride 89, CO2 36, BUN 17.3, creatinine 0.66, glucose 103, calcium 8.2.     Significant Imaging: I have reviewed all pertinent imaging results/findings within the past 24 hours.     CXR 5/16/2023: Left-sided PICC projects over the distal SVC.  Prominent interstitial markings with no focal opacification.     CXR  5/6/2023: The heart is not significantly enlarged.  There is aortic atherosclerosis.  Similar prominent central pulmonary vasculature.  No new dense consolidation or pneumothorax.     CT head 5/5/2023:   1. No acute intracranial abnormality.  2. Chronic microvascular ischemic changes.     EEG 5/2/2023: No evidence of seizure activity.     CT head 5/2/2023: No acute intracranial abnormality.     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     Renal ultrasound 4/21/2023: Mild-to-moderate hydronephrosis on the right.     CT abdomen/pelvis 4/21/2023: Mild to moderate right hydronephrosis with no significant right ureteral dilatation.       STARR 4/21/2023: No valvular vegetations and moderate to severe aortic stenosis.      MRI thoracic/lumbar spine 4/20/2023: No evidence to suggest discitis/osteomyelitis or drainable fluid collection and new right-sided hydronephrosis.     Arterial Doppler left lower extremity 4/20/2023: Monophasic flow throughout suggestive of inflow disease     NM bone scan 4/19/2023: Left lower extremity cellulitis without focal intense concentration more typically seen with an abscess or osteomyelitis.      TTE 4/17/2023:  Low-normal systolic function with an EF of 54%, grade II left ventricular diastolic dysfunction, mild-to-moderate aortic valve stenosis, and no evidence of vegetation.     CT head 4/16/2023: No acute intracranial abnormality.       CXR 4/16/2023: No acute cardiopulmonary process.     CT chest/abdomen/pelvis 4/16/2023: No evidence of PE and no acute intraabdominal or pelvic solid organ or bowel pathology identified.       X-ray left foot 4/16/2023: Minimally displaced 5th proximal phalanx fracture and possible minimally displaced 4th proximal  phalanx fracture.     X-ray left tibia/fibula 4/16/2023: No acute osseous process.

## 2023-05-28 NOTE — PROGRESS NOTES
[facility] Medicine  Telehospitalist Progress Note    Patient Name: Melodie Villarreal  MRN: [id number]  Patient Class: IP- Swing   Admission Date: 5/11/2023  Length of Stay: 17 days  Attending Physician: Naun Pope MD  Primary Care Provider: Wisam Espinosa Jr, MD      Subjective:     Principal Problem:MRSA bacteremia      HPI:  Ms. Villarreal is a 89-year-old  female with history of hypertension, hyperlipidemia, type II diabetes mellitus, DVT/PE status post IVC filter placement, and bladder cancer who originally presented to [facility] ER on 4/16/2023 with fatigue, generalized weakness, dysuria, dark urine, and lethargy and she was hypotensive with a blood pressure of 66/33 on EMS arrival.  Her initial lab work was remarkable for a WBC count of 34.3, lactic acid of 3.8, troponin of 0.115, and BNP of 2571.8 and CT of the head showed no acute intracranial abnormality.  CXR revealed no acute cardiopulmonary process and CT of the chest, abdomen, pelvis demonstrated no evidence of PE and no acute intraabdominal or pelvic solid organ or bowel pathology.  X-ray of the left foot showed minimally displaced 5th proximal phalanx fracture and possible minimally displaced 4th proximal phalanx fracture and x-ray of the left tibia/fibula revealed no acute osseous process.  She was started on broad-spectrum antibiotics with IV vancomycin and zosyn for sepsis and her blood cultures from admission returned positive for MRSA.  ID was consulted and TTE from 4/17/2023 demonstrated no evidence of vegetation. She was scheduled for NM bone scan on 4/19/2023 which was positive for a left lower extremity cellulitis without focal intense concentration. Arterial Doppler of the left lower extremity from 4/20/2023 showed monophasic flow throughout suggestive of inflow disease and MRI of the thoracic and lumbar spine revealed no evidence to suggest discitis/osteomyelitis or drainable fluid collection and  new right-sided hydronephrosis.  Repeat blood cultures remained positive and cardiology was consulted for STARR on 4/21/2023 which demonstrated no valvular vegetations and moderate to severe aortic stenosis.  Her chronic left ankle wound was thought to be the source of her persistent MRSA bacteremia and 6 weeks of IV vancomycin was recommended by ID.  Renal ultrasound from 4/21/2023 confirmed mild-to-moderate hydronephrosis on the right and CT of the abdomen and pelvis showed mild to moderate right hydronephrosis with no significant right ureteral dilatation.  Urology was consulted and she was taken to the OR on 4/25/2023 for cystoscopy with bilateral retrograde pyelograms right ureteral stent placement due to right hydronephrosis with ureteral obstruction.  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  She was evaluated by neurology on 5/02/2023 due to mental status changes thought to be toxic metabolic encephalopathy and CT of the head demonstrated no acute intracranial abnormality.  EEG from 5/2/2023 showed no evidence of seizure activity and repeat CT of the head was unremarkable.  Nephrology was consulted on 5/03/2023 due to worsening hyponatremia and she was treated with samsca and a 1 L fluid restriction with improvement in her sodium levels.  She had no other complications throughout her hospital course and she was discharged to [facility] for PT/OT, completion of IV antibiotics, and management of her medical comorbidities.      Overview/Hospital Course:  She was admitted to [facility] on 5/11/2023 for rehab s/p  hospitalization for persistent MRSA bacteremia with sepsis due to nonhealing left ankle wound, right hydronephrosis with ureteral obstruction s/p right ureteral stent placement, critical limb ischemia of the left lower extremity, toxic metabolic encephalopathy, and hyponatremia.  She was found to have a UTI on urinalysis from 5/11/2023 and her urine culture grew 10,000-25,000 cfu/ml of Pseudomonas aeruginosa.  She was started on a 3 day course of ciprofloxacin 250 mg by mouth twice daily which she completed on 5/16/2023.  Her hemoglobin and hematocrit dropped to 7.6 and 25.1 on 5/12/2023 and she was transfused with 1 unit packed RBC.  She was started on sodium chloride 1 g by mouth daily on 5/15/2023 due to persistent hyponatremia.  Detemir was discontinued on 5/17/2023 due to intermittent hypoglycemia and she was started on metformin 1000 mg by mouth twice daily and glimepiride 2 mg by mouth in the morning. Her scheduled nighttime norco was discontinued on 5/20/2023 due to lethargy. She had a blood sugar of 50 on the afternoon of 5/21/2023 and 57 on the morning of 5/22/2023 and her metformin and glimepiride were held. Her metformin was decreased from 1000 mg to 500 mg PO BID and her glimepiride was discontinued on 5/24/2023 due to persistent hypoglycemia. She complained of left shoulder on 5/25/2023 and she was started on diclofenac 1% gel 4 gm topical to the left shoulder BID prn.       Interval History: She reports constipation and decreased appetite but she had 2 small bowel movements yesterday per nursing staff. She stayed up all night per her son despite receiving trazodone 100 mg PO QHS and she was talking out of her head. She participated in PT yesterday and she required total assistance with transfers and minimal to moderate assistance with bed mobility.      Review of Systems   All other systems reviewed and are negative.  Objective:     Vital Signs (Most Recent):  Temp: 98.3 °F (36.8 °C) (05/28/23  0731)  Pulse: 97 (23 07)  Resp: 18 (23 05)  BP: (!) 121/56 (23)  SpO2: 95 % (23) Vital Signs (24h Range):  Temp:  [97.3 °F (36.3 °C)-98.3 °F (36.8 °C)] 98.3 °F (36.8 °C)  Pulse:  [86-97] 97  Resp:  [18] 18  SpO2:  [94 %-97 %] 95 %  BP: (102-141)/(56-74) 121/56     Weight: 55.5 kg (122 lb 5.7 oz)  Body mass index is 21.68 kg/m².    Intake/Output Summary (Last 24 hours) at 2023 0826  Last data filed at 2023 0557  Gross per 24 hour   Intake 600 ml   Output 1000 ml   Net -400 ml      Physical Exam  General - Elderly  female in no acute distress.  HEENT - NCAT. No scleral icterus. Oropharynx clear. Mucous membranes moist.  Neck - No lymphadenopathy, thyromegaly, or JVD.  CVS - Regular rate and rhythm. No murmurs, rubs, or gallops.  Resp - Lungs are clear to auscultation bilaterally. No rales, wheeze, or rhonchi.   GI - Soft, nontender, nondistended, normoactive bowel sounds present.   Extremities - No clubbing, cyanosis, or edema. Right upper extremity PICC line.  Neuro - CN II through XII are grossly intact. No focal neurological deficits. Alert and oriented to name and  only.  Psych - Flat affect.  Skin -  Ecchymosis to left lateral chest pain. Blanchable erythema noted to dorsal aspect of the left 2nd toe. Left heel with 100% eschar. Hyperpigmentation to left medial lower leg/ankle. Denuded areas present to sacrum and blanchable erythema to periwound.     Significant Labs: All pertinent labs within the past 24 hours have been reviewed.     2023:  CBC: WBC count 9.87, hemoglobin 8.1, hematocrit 26.6, platelet count 338.  CMP: Sodium 128, potassium 3.6, chloride 94, CO2 27, BUN 31.9, creatinine 0.87, glucose 128, calcium 7.6, magnesium 1.8, alkaline phosphatase 81, total protein 5.7, albumin 1.7, total bilirubin 0.3, AST 9, ALT 6.  Prealbumin 7.8.    2023:  Vancomycin trough 20.8.     2023:  Vancomycin trough 23.0.     2023:  CBC: WBC count  8.86, hemoglobin 8.5, hematocrit 28.5, platelet count 258.  CMP: Sodium 131, potassium 3.4, chloride 94, CO2 27, BUN 17.3, creatinine 0.67, glucose 54, calcium 7.7, magnesium 1.6, alkaline phosphatase 94, total protein 5.6, albumin 1.8, total bilirubin 0.5, AST 10, ALT 10.  Vancomycin trough 24.1.     5/19/2023:  CBC: WBC count 10.83, hemoglobin 9, hematocrit 30.4, platelet count 274.  BMP: Sodium 130, potassium 3.5, chloride 94, CO2 28, BUN 18.4, creatinine 0.68, glucose 125, calcium 8.3.     5/18/2023:  CBC: WBC count 9.84, hemoglobin 9, hematocrit 30.7,  platelet count 261.  BMP: Sodium 134, potassium 3.7, chloride 94, CO2 33, BUN 20.6, creatinine 0.69, glucose 167, calcium 7.8, magnesium 1.9.     5/16/2023:  BMP: Sodium 131, potassium 4.2, chloride 4.2, chloride 90, CO2 33, BUN 18.9, creatinine 0.65, glucose 188, calcium 7.9, magnesium 1.9.     5/15/2023:  BMP: Sodium 128, potassium 3.3, chloride 91, CO2 34, BUN 18, creatinine 0.67, glucose 76, calcium 7.9.     5/13/2023:  CBC: WBC count 10.82, hemoglobin 9.1, hematocrit 30, platelet count 248.  BMP: Sodium 128, potassium 3.7, chloride 89, CO2 36, BUN 17.3, creatinine 0.66, glucose 103, calcium 8.2.     Significant Imaging: I have reviewed all pertinent imaging results/findings within the past 24 hours.     CXR 5/16/2023: Left-sided PICC projects over the distal SVC.  Prominent interstitial markings with no focal opacification.     CXR 5/6/2023: The heart is not significantly enlarged.  There is aortic atherosclerosis.  Similar prominent central pulmonary vasculature.  No new dense consolidation or pneumothorax.     CT head 5/5/2023:   1. No acute intracranial abnormality.  2. Chronic microvascular ischemic changes.     EEG 5/2/2023: No evidence of seizure activity.     CT head 5/2/2023: No acute intracranial abnormality.     CTA abdomen/pelvis with bilateral runoff 4/26/2023: Significant stenosis of the left common iliac artery, severe narrowing versus  occlusion at the left common femoral artery, occlusion at the distal superficial femoral artery, and stenosis at the origin of the superior mesenteric artery.     Renal ultrasound 4/21/2023: Mild-to-moderate hydronephrosis on the right.     CT abdomen/pelvis 4/21/2023: Mild to moderate right hydronephrosis with no significant right ureteral dilatation.       STARR 4/21/2023: No valvular vegetations and moderate to severe aortic stenosis.      MRI thoracic/lumbar spine 4/20/2023: No evidence to suggest discitis/osteomyelitis or drainable fluid collection and new right-sided hydronephrosis.     Arterial Doppler left lower extremity 4/20/2023: Monophasic flow throughout suggestive of inflow disease     NM bone scan 4/19/2023: Left lower extremity cellulitis without focal intense concentration more typically seen with an abscess or osteomyelitis.      TTE 4/17/2023:  Low-normal systolic function with an EF of 54%, grade II left ventricular diastolic dysfunction, mild-to-moderate aortic valve stenosis, and no evidence of vegetation.     CT head 4/16/2023: No acute intracranial abnormality.       CXR 4/16/2023: No acute cardiopulmonary process.     CT chest/abdomen/pelvis 4/16/2023: No evidence of PE and no acute intraabdominal or pelvic solid organ or bowel pathology identified.       X-ray left foot 4/16/2023: Minimally displaced 5th proximal phalanx fracture and possible minimally displaced 4th proximal phalanx fracture.     X-ray left tibia/fibula 4/16/2023: No acute osseous process.       Assessment/Plan:      * MRSA bacteremia  Continue 6 week course of IV vancomycin until 6/4/2023.  Repeat blood culture from 4/23/2023 were negative.  TTE from 4/17/2023 and STARR from 4/23/2023 showed no evidence of vegetation and NM bone scan on 4/19/2023 was positive for a left lower extremity cellulitis without focal intense concentration more typically seen with an abscess or osteomyelitis.     Sepsis  Resolved.  Continue 6 week  course of IV vancomycin until 6/4/2023 as per above.    Right hydronephrosis  Continue tamsulosin.  She is s/p cystoscopy with bilateral retrograde pyelograms right ureteral stent placement on 4/25/2023 due to right hydronephrosis with ureteral obstruction. Renal ultrasound from 4/21/2023 revealed mild-to-moderate hydronephrosis on the right and CT of the abdomen and pelvis showed mild to moderate right hydronephrosis with no significant right ureteral dilatation.  She will need to follow-up with urology as an outpatient 1-2 weeks following discharge for right ureteral stent removal.    Toxic metabolic encephalopathy  Improved. Her scheduled nighttime norco was discontinued on 5/20/2023.  Consider titrating down trazodone.  CT of the head from 5/02/2023 and 5/06/2023 showed no acute intracranial abnormality and EEG from 5/02/2023 revealed no evidence of seizure activity.    Hyponatremia  Continue sodium chloride tablets and 1200 ml fluid restriction. HCTZ has been discontinued. She is s/p treatment with samsca on 5/7/2023.    Severe protein-calorie malnutrition  Continue dietary nutritional supplement per the dietician recommendations. Her prealbumin from 5/28/2023 was 7.8.     Critical limb ischemia of left lower extremity  Continue statin and percocet as needed. She is not currently on any blood thinners. 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      Urinary tract infection  Resolved.  She completed a 3 day course of ciprofloxacin 250 mg by mouth twice daily on 5/16/2023.  Urine culture from 5/11/2023 grew 10,000-25,000 cfu/ml of Pseudomonas  aeruginosa.    Debility  Continue PT/OT.    Hypomagnesemia  Improved.  Continue magnesium oxide.    Hypokalemia  Improved.    Hypertension  Continue carvedilol. She is no longer on losartan-HCTZ.    Type II diabetes mellitus  Continue metformin which was decreased on 5/24/2023. Detemir was discontinued on 5/17/2023 and glimepiride was stopped on 5/24/2023 due to hypoglycemia.  Her hemoglobin A1c from 3/07/2023 was 6.3.      Iron deficiency anemia  Continue ferrous sulfate.  She is s/p blood transfusion with 1 unit packed RBC on 5/12/2023 and she was treated with 3 days of IV ferrlecit.    Chronic diastolic CHF (congestive heart failure)  Compensated. She is not currently on any diuretics.  TTE from 4/17/2023 showed low-normal systolic function with an EF of 54% and grade II left ventricular diastolic dysfunction.     Insomnia  Increase trazodone from 100 mg to 150 mg PO QHS.    Hyperlipidemia  Continue pravastatin.    Severe aortic stenosis  No acute issues.  She can follow-up with cardiology as an outpatient as she may be a candidate for TAVR.    Chronic constipation  Continue polyethylene glycol.    Disposition  Anticipate discharge home with [facility], a manish lift, and a hospital bed on 6/5/2023 following completion of IV antibiotics on 6/4/2023.        VTE Risk Mitigation (From admission, onward)         Ordered     enoxaparin injection 40 mg  Daily         05/11/23 1414     Place sequential compression device  Until discontinued         05/11/23 1414                Discharge Planning   ERIN:      Code Status: DNR   Is the patient medically ready for discharge?:     Reason for patient still in hospital (select all that apply): Treatment, PT / OT recommendations and Pending disposition  Discharge Plan A: Home with family, Home Health   Discharge Delays: None known at this time       This service was provided via telemedicine.  Type of Software: Audio/Visual.  Originating Site: [place]  [facility].  Distant Site: [place], [place].  Her exam was performed with the assistance of Frances Hicks RN.      Naun Pope MD  Department of [facility] Medicine   [facility]

## 2023-05-28 NOTE — ASSESSMENT & PLAN NOTE
Continue dietary nutritional supplement per the dietician recommendations. Her prealbumin from 5/28/2023 was 7.8.

## 2023-05-28 NOTE — PLAN OF CARE
Problem: Adult Inpatient Plan of Care  Goal: Plan of Care Review  Outcome: Ongoing, Progressing  Flowsheets (Taken 5/27/2023 1940)  Plan of Care Reviewed With: patient  Goal: Patient-Specific Goal (Individualized)  Outcome: Ongoing, Progressing  Flowsheets (Taken 5/27/2023 1940)  Individualized Care Needs: pt will be pain free by 5/28/23 at 0700     Problem: Diabetes Comorbidity  Goal: Blood Glucose Level Within Targeted Range  Outcome: Ongoing, Progressing  Intervention: Monitor and Manage Glycemia  Flowsheets (Taken 5/27/2023 1940)  Glycemic Management: blood glucose monitored     Problem: Infection  Goal: Absence of Infection Signs and Symptoms  Outcome: Ongoing, Progressing  Intervention: Prevent or Manage Infection  Flowsheets (Taken 5/27/2023 1940)  Infection Management: aseptic technique maintained     Problem: Impaired Wound Healing  Goal: Optimal Wound Healing  Outcome: Ongoing, Progressing  Intervention: Promote Wound Healing  Flowsheets (Taken 5/27/2023 1940)  Sleep/Rest Enhancement: awakenings minimized     Problem: Fall Injury Risk  Goal: Absence of Fall and Fall-Related Injury  Outcome: Ongoing, Progressing  Intervention: Identify and Manage Contributors  Flowsheets (Taken 5/27/2023 1940)  Self-Care Promotion: independence encouraged  Medication Review/Management: medications reviewed

## 2023-05-29 NOTE — SUBJECTIVE & OBJECTIVE
Interval History: Her trazodone was increased from 100 mg to 150 mg PO QHS yesterday and she slept well last night. She remains on miralax for chronic constipation and she had a bowel movement yesterday.      Review of Systems   All other systems reviewed and are negative.  Objective:     Vital Signs (Most Recent):  Temp: 97.8 °F (36.6 °C) (23)  Pulse: 72 (23)  Resp: 18 (23)  BP: 130/63 (23)  SpO2: 95 % (23) Vital Signs (24h Range):  Temp:  [97.6 °F (36.4 °C)-98.5 °F (36.9 °C)] 97.8 °F (36.6 °C)  Pulse:  [72-90] 72  Resp:  [18] 18  SpO2:  [94 %-98 %] 95 %  BP: (121-135)/(56-73) 130/63     Weight: 56.3 kg (124 lb 1.9 oz)  Body mass index is 21.99 kg/m².    Intake/Output Summary (Last 24 hours) at 2023 0826  Last data filed at 2023 1700  Gross per 24 hour   Intake 240 ml   Output --   Net 240 ml      Physical Exam  General - Elderly  female in no acute distress.  HEENT - NCAT. No scleral icterus. Oropharynx clear. Mucous membranes moist.  Neck - No lymphadenopathy, thyromegaly, or JVD.  CVS - Regular rate and rhythm. No murmurs, rubs, or gallops.  Resp - Lungs are clear to auscultation bilaterally. No rales, wheeze, or rhonchi.   GI - Soft, nontender, nondistended, normoactive bowel sounds present.   Extremities - No clubbing, cyanosis, or edema. Right upper extremity PICC line.  Neuro - CN II through XII are grossly intact. No focal neurological deficits. Alert and oriented to name and  only.  Psych - Flat affect.  Skin -  Ecchymosis to left lateral chest pain. Blanchable erythema noted to dorsal aspect of the left 2nd toe. Left heel with 100% eschar. Hyperpigmentation to left medial lower leg/ankle. Denuded areas present to sacrum and blanchable erythema to periwound.     Significant Labs: All pertinent labs within the past 24 hours have been reviewed.     2023:  CBC: WBC count 9.87, hemoglobin 8.1, hematocrit 26.6, platelet count  338.  CMP: Sodium 128, potassium 3.6, chloride 94, CO2 27, BUN 31.9, creatinine 0.87, glucose 128, calcium 7.6, magnesium 1.8, alkaline phosphatase 81, total protein 5.7, albumin 1.7, total bilirubin 0.3, AST 9, ALT 6.  Prealbumin 7.8.     5/27/2023:  Vancomycin trough 20.8.     5/25/2023:  Vancomycin trough 23.0.     5/22/2023:  CBC: WBC count 8.86, hemoglobin 8.5, hematocrit 28.5, platelet count 258.  CMP: Sodium 131, potassium 3.4, chloride 94, CO2 27, BUN 17.3, creatinine 0.67, glucose 54, calcium 7.7, magnesium 1.6, alkaline phosphatase 94, total protein 5.6, albumin 1.8, total bilirubin 0.5, AST 10, ALT 10.  Vancomycin trough 24.1.     5/19/2023:  CBC: WBC count 10.83, hemoglobin 9, hematocrit 30.4, platelet count 274.  BMP: Sodium 130, potassium 3.5, chloride 94, CO2 28, BUN 18.4, creatinine 0.68, glucose 125, calcium 8.3.     5/18/2023:  CBC: WBC count 9.84, hemoglobin 9, hematocrit 30.7,  platelet count 261.  BMP: Sodium 134, potassium 3.7, chloride 94, CO2 33, BUN 20.6, creatinine 0.69, glucose 167, calcium 7.8, magnesium 1.9.     5/16/2023:  BMP: Sodium 131, potassium 4.2, chloride 4.2, chloride 90, CO2 33, BUN 18.9, creatinine 0.65, glucose 188, calcium 7.9, magnesium 1.9.     5/15/2023:  BMP: Sodium 128, potassium 3.3, chloride 91, CO2 34, BUN 18, creatinine 0.67, glucose 76, calcium 7.9.     5/13/2023:  CBC: WBC count 10.82, hemoglobin 9.1, hematocrit 30, platelet count 248.  BMP: Sodium 128, potassium 3.7, chloride 89, CO2 36, BUN 17.3, creatinine 0.66, glucose 103, calcium 8.2.     Significant Imaging: I have reviewed all pertinent imaging results/findings within the past 24 hours.     CXR 5/16/2023: Left-sided PICC projects over the distal SVC.  Prominent interstitial markings with no focal opacification.     CXR 5/6/2023: The heart is not significantly enlarged.  There is aortic atherosclerosis.  Similar prominent central pulmonary vasculature.  No new dense consolidation or pneumothorax.     CT  head 5/5/2023:   1. No acute intracranial abnormality.  2. Chronic microvascular ischemic changes.     EEG 5/2/2023: No evidence of seizure activity.     CT head 5/2/2023: No acute intracranial abnormality.     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     Renal ultrasound 4/21/2023: Mild-to-moderate hydronephrosis on the right.     CT abdomen/pelvis 4/21/2023: Mild to moderate right hydronephrosis with no significant right ureteral dilatation.       STARR 4/21/2023: No valvular vegetations and moderate to severe aortic stenosis.      MRI thoracic/lumbar spine 4/20/2023: No evidence to suggest discitis/osteomyelitis or drainable fluid collection and new right-sided hydronephrosis.     Arterial Doppler left lower extremity 4/20/2023: Monophasic flow throughout suggestive of inflow disease     NM bone scan 4/19/2023: Left lower extremity cellulitis without focal intense concentration more typically seen with an abscess or osteomyelitis.      TTE 4/17/2023:  Low-normal systolic function with an EF of 54%, grade II left ventricular diastolic dysfunction, mild-to-moderate aortic valve stenosis, and no evidence of vegetation.     CT head 4/16/2023: No acute intracranial abnormality.       CXR 4/16/2023: No acute cardiopulmonary process.     CT chest/abdomen/pelvis 4/16/2023: No evidence of PE and no acute intraabdominal or pelvic solid organ or bowel pathology identified.       X-ray left foot 4/16/2023: Minimally displaced 5th proximal phalanx fracture and possible minimally displaced 4th proximal phalanx fracture.     X-ray left tibia/fibula 4/16/2023: No acute osseous process.

## 2023-05-29 NOTE — PLAN OF CARE
Problem: Adult Inpatient Plan of Care  Goal: Plan of Care Review  Outcome: Ongoing, Progressing  Flowsheets (Taken 5/28/2023 1946)  Plan of Care Reviewed With: patient  Goal: Patient-Specific Goal (Individualized)  Outcome: Ongoing, Progressing  Flowsheets (Taken 5/28/2023 1946)  Individualized Care Needs: pt will be pain free by 5/29/23 at 0700     Problem: Diabetes Comorbidity  Goal: Blood Glucose Level Within Targeted Range  Outcome: Ongoing, Progressing  Intervention: Monitor and Manage Glycemia  Flowsheets (Taken 5/28/2023 1946)  Glycemic Management: blood glucose monitored     Problem: Infection  Goal: Absence of Infection Signs and Symptoms  Outcome: Ongoing, Progressing  Intervention: Prevent or Manage Infection  Flowsheets (Taken 5/28/2023 1946)  Infection Management: aseptic technique maintained     Problem: Impaired Wound Healing  Goal: Optimal Wound Healing  Outcome: Ongoing, Progressing  Intervention: Promote Wound Healing  Flowsheets (Taken 5/28/2023 1946)  Sleep/Rest Enhancement: awakenings minimized     Problem: Fall Injury Risk  Goal: Absence of Fall and Fall-Related Injury  Outcome: Ongoing, Progressing  Intervention: Identify and Manage Contributors  Flowsheets (Taken 5/28/2023 1946)  Self-Care Promotion: independence encouraged  Medication Review/Management: medications reviewed

## 2023-05-29 NOTE — PT/OT/SLP PROGRESS
Speech Language Pathology Treatment    Patient Name:  Melodie Villarreal   MRN:  [id number]  Admitting Diagnosis: MRSA bacteremia    Recommendations:                 General Recommendations:  Cognitive-linguistic therapy  Diet recommendations:  Regular, Liquid Diet Level: Thin   Aspiration Precautions: Alternating bites/sips and HOB to 90 degrees   General Precautions: Standard, other (see comments)  Communication strategies:  provide increased time to answer, go to room if call light pushed, and use contextual mapping and use of binary choices to aid in recall    Assessment:     Melodie Villarreal is a 89 y.o. female with an SLP diagnosis of Cognitive-Linguistic Impairment.   She presents with confusion and impaired orientation, STM recall and LTM recall. Per pt's daughter, the pt presents w/ increased confusion w/ initiation of antibiotics. Pt's daughter reports her mother was doing well at home prior to hospital admit and was walking w/ RW. Pt's daughter cooks, cleans, manages finances, and manages medications. Pt would benefit from skilled SLP services at this time to promote a return to PLOF w/ cognitive skills.    Subjective     Pt sitting in chair throughout session; somewhat lethargic, participated without complaint; son present throughout session; session terminated after 30 minutes as she was very tired    Pain/Comfort:  Pt stated she was comfortable.     Respiratory Status: Room air    Objective:     Has the patient been evaluated by SLP for swallowing?   Yes  Keep patient NPO? No  Current Respiratory Status:     Pt oriented to name independently, but required verbal cues for day of week; recalled holiday ([employer] Day) with minimal verbal cue; not oriented to place or situation; cues for place did not increase her accuracy;  General Orientation exercise - 88% with moderate verbal cues; Answered Long Term Memory questions - 88% with minimal cues; Unable to answer STM questions regarding lunch (2 hours prior to  session); Answered General Temporal questions - 71% with minimal cues    Cont SLP POC.    Goals:   Multidisciplinary Problems       SLP Goals          Problem: SLP    Goal Priority Disciplines Outcome   SLP Goal     SLP Ongoing, Progressing   Description: LTG: Pt will improve cognitive linguistic skills to allow for safe discharge home w/ family.    STG:  Pt will orient x4 modA.  Pt will utilize memory strategies to recall new information following a 2 minute filled delay modA.  Pt will answer LTM Qs w/ 90% acc modA.                       Plan:     Patient to be seen:  3 x/week   Plan of Care expires:  5/31/23  Plan of Care reviewed with:  patient, daughter   SLP Follow-Up:  Yes       Discharge recommendations:  home with home health   Barriers to Discharge:  Support/Caregiver at home warranted to ensure safety.    Time Tracking:     SLP Treatment Date:  5/29/23  Speech Start Time: 1400   Speech Stop Time: 1430      Speech Total Time (min): 30 min    Billable Minutes: Speech Therapy Individual 30    Zoya Laguna CCC-SLP   05/29/2023

## 2023-05-29 NOTE — PROGRESS NOTES
[facility] Medicine  Telehospitalist Progress Note    Patient Name: Melodie Villarreal  MRN: [id number]  Patient Class: IP- Swing   Admission Date: 5/11/2023  Length of Stay: 18 days  Attending Physician: Naun Pope MD  Primary Care Provider: Wisam Espinosa Jr, MD      Subjective:     Principal Problem:MRSA bacteremia      HPI:  Ms. Villarreal is a 89-year-old  female with history of hypertension, hyperlipidemia, type II diabetes mellitus, DVT/PE status post IVC filter placement, and bladder cancer who originally presented to [facility] ER on 4/16/2023 with fatigue, generalized weakness, dysuria, dark urine, and lethargy and she was hypotensive with a blood pressure of 66/33 on EMS arrival.  Her initial lab work was remarkable for a WBC count of 34.3, lactic acid of 3.8, troponin of 0.115, and BNP of 2571.8 and CT of the head showed no acute intracranial abnormality.  CXR revealed no acute cardiopulmonary process and CT of the chest, abdomen, pelvis demonstrated no evidence of PE and no acute intraabdominal or pelvic solid organ or bowel pathology.  X-ray of the left foot showed minimally displaced 5th proximal phalanx fracture and possible minimally displaced 4th proximal phalanx fracture and x-ray of the left tibia/fibula revealed no acute osseous process.  She was started on broad-spectrum antibiotics with IV vancomycin and zosyn for sepsis and her blood cultures from admission returned positive for MRSA.  ID was consulted and TTE from 4/17/2023 demonstrated no evidence of vegetation. She was scheduled for NM bone scan on 4/19/2023 which was positive for a left lower extremity cellulitis without focal intense concentration. Arterial Doppler of the left lower extremity from 4/20/2023 showed monophasic flow throughout suggestive of inflow disease and MRI of the thoracic and lumbar spine revealed no evidence to suggest discitis/osteomyelitis or drainable fluid collection and  new right-sided hydronephrosis.  Repeat blood cultures remained positive and cardiology was consulted for STARR on 4/21/2023 which demonstrated no valvular vegetations and moderate to severe aortic stenosis.  Her chronic left ankle wound was thought to be the source of her persistent MRSA bacteremia and 6 weeks of IV vancomycin was recommended by ID.  Renal ultrasound from 4/21/2023 confirmed mild-to-moderate hydronephrosis on the right and CT of the abdomen and pelvis showed mild to moderate right hydronephrosis with no significant right ureteral dilatation.  Urology was consulted and she was taken to the OR on 4/25/2023 for cystoscopy with bilateral retrograde pyelograms right ureteral stent placement due to right hydronephrosis with ureteral obstruction.  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  She was evaluated by neurology on 5/02/2023 due to mental status changes thought to be toxic metabolic encephalopathy and CT of the head demonstrated no acute intracranial abnormality.  EEG from 5/2/2023 showed no evidence of seizure activity and repeat CT of the head was unremarkable.  Nephrology was consulted on 5/03/2023 due to worsening hyponatremia and she was treated with samsca and a 1 L fluid restriction with improvement in her sodium levels.  She had no other complications throughout her hospital course and she was discharged to [facility] for PT/OT, completion of IV antibiotics, and management of her medical comorbidities.      Overview/Hospital Course:  She was admitted to [facility] on 5/11/2023 for rehab s/p  hospitalization for persistent MRSA bacteremia with sepsis due to nonhealing left ankle wound, right hydronephrosis with ureteral obstruction s/p right ureteral stent placement, critical limb ischemia of the left lower extremity, toxic metabolic encephalopathy, and hyponatremia.  She was found to have a UTI on urinalysis from 5/11/2023 and her urine culture grew 10,000-25,000 cfu/ml of Pseudomonas aeruginosa.  She was started on a 3 day course of ciprofloxacin 250 mg by mouth twice daily which she completed on 5/16/2023.  Her hemoglobin and hematocrit dropped to 7.6 and 25.1 on 5/12/2023 and she was transfused with 1 unit packed RBC.  She was started on sodium chloride 1 g by mouth daily on 5/15/2023 due to persistent hyponatremia.  Detemir was discontinued on 5/17/2023 due to intermittent hypoglycemia and she was started on metformin 1000 mg by mouth twice daily and glimepiride 2 mg by mouth in the morning. Her scheduled nighttime norco was discontinued on 5/20/2023 due to lethargy. She had a blood sugar of 50 on the afternoon of 5/21/2023 and 57 on the morning of 5/22/2023 and her metformin and glimepiride were held. Her metformin was decreased from 1000 mg to 500 mg PO BID and her glimepiride was discontinued on 5/24/2023 due to persistent hypoglycemia. She complained of left shoulder on 5/25/2023 and she was started on diclofenac 1% gel 4 gm topical to the left shoulder BID prn. Her trazodone was increased from 100 mg to 150 mg PO QHS on 5/28/2023 due to worsening insomnia.      Interval History: Her trazodone was increased from 100 mg to 150 mg PO QHS yesterday and she slept well last night. She remains on miralax for chronic constipation and she had a bowel movement yesterday.      Review of Systems   All other systems reviewed and are negative.  Objective:     Vital Signs (Most Recent):  Temp: 97.8 °F (36.6 °C) (05/29/23 0734)  Pulse: 72 (05/29/23 0734)  Resp: 18 (05/28/23 2005)  BP: 130/63 (05/29/23  0734)  SpO2: 95 % (23 0734) Vital Signs (24h Range):  Temp:  [97.6 °F (36.4 °C)-98.5 °F (36.9 °C)] 97.8 °F (36.6 °C)  Pulse:  [72-90] 72  Resp:  [18] 18  SpO2:  [94 %-98 %] 95 %  BP: (121-135)/(56-73) 130/63     Weight: 56.3 kg (124 lb 1.9 oz)  Body mass index is 21.99 kg/m².    Intake/Output Summary (Last 24 hours) at 2023 0826  Last data filed at 2023 1700  Gross per 24 hour   Intake 240 ml   Output --   Net 240 ml      Physical Exam  General - Elderly  female in no acute distress.  HEENT - NCAT. No scleral icterus. Oropharynx clear. Mucous membranes moist.  Neck - No lymphadenopathy, thyromegaly, or JVD.  CVS - Regular rate and rhythm. No murmurs, rubs, or gallops.  Resp - Lungs are clear to auscultation bilaterally. No rales, wheeze, or rhonchi.   GI - Soft, nontender, nondistended, normoactive bowel sounds present.   Extremities - No clubbing, cyanosis, or edema. Right upper extremity PICC line.  Neuro - CN II through XII are grossly intact. No focal neurological deficits. Alert and oriented to name and  only.  Psych - Flat affect.  Skin -  Ecchymosis to left lateral chest pain. Blanchable erythema noted to dorsal aspect of the left 2nd toe. Left heel with 100% eschar. Hyperpigmentation to left medial lower leg/ankle. Denuded areas present to sacrum and blanchable erythema to periwound.     Significant Labs: All pertinent labs within the past 24 hours have been reviewed.     2023:  CBC: WBC count 9.87, hemoglobin 8.1, hematocrit 26.6, platelet count 338.  CMP: Sodium 128, potassium 3.6, chloride 94, CO2 27, BUN 31.9, creatinine 0.87, glucose 128, calcium 7.6, magnesium 1.8, alkaline phosphatase 81, total protein 5.7, albumin 1.7, total bilirubin 0.3, AST 9, ALT 6.  Prealbumin 7.8.     2023:  Vancomycin trough 20.8.     2023:  Vancomycin trough 23.0.     2023:  CBC: WBC count 8.86, hemoglobin 8.5, hematocrit 28.5, platelet count 258.  CMP: Sodium 131, potassium  3.4, chloride 94, CO2 27, BUN 17.3, creatinine 0.67, glucose 54, calcium 7.7, magnesium 1.6, alkaline phosphatase 94, total protein 5.6, albumin 1.8, total bilirubin 0.5, AST 10, ALT 10.  Vancomycin trough 24.1.     5/19/2023:  CBC: WBC count 10.83, hemoglobin 9, hematocrit 30.4, platelet count 274.  BMP: Sodium 130, potassium 3.5, chloride 94, CO2 28, BUN 18.4, creatinine 0.68, glucose 125, calcium 8.3.     5/18/2023:  CBC: WBC count 9.84, hemoglobin 9, hematocrit 30.7,  platelet count 261.  BMP: Sodium 134, potassium 3.7, chloride 94, CO2 33, BUN 20.6, creatinine 0.69, glucose 167, calcium 7.8, magnesium 1.9.     5/16/2023:  BMP: Sodium 131, potassium 4.2, chloride 4.2, chloride 90, CO2 33, BUN 18.9, creatinine 0.65, glucose 188, calcium 7.9, magnesium 1.9.     5/15/2023:  BMP: Sodium 128, potassium 3.3, chloride 91, CO2 34, BUN 18, creatinine 0.67, glucose 76, calcium 7.9.     5/13/2023:  CBC: WBC count 10.82, hemoglobin 9.1, hematocrit 30, platelet count 248.  BMP: Sodium 128, potassium 3.7, chloride 89, CO2 36, BUN 17.3, creatinine 0.66, glucose 103, calcium 8.2.     Significant Imaging: I have reviewed all pertinent imaging results/findings within the past 24 hours.     CXR 5/16/2023: Left-sided PICC projects over the distal SVC.  Prominent interstitial markings with no focal opacification.     CXR 5/6/2023: The heart is not significantly enlarged.  There is aortic atherosclerosis.  Similar prominent central pulmonary vasculature.  No new dense consolidation or pneumothorax.     CT head 5/5/2023:   1. No acute intracranial abnormality.  2. Chronic microvascular ischemic changes.     EEG 5/2/2023: No evidence of seizure activity.     CT head 5/2/2023: No acute intracranial abnormality.     CTA abdomen/pelvis with bilateral runoff 4/26/2023: Significant stenosis of the left common iliac artery, severe narrowing versus occlusion at the left common femoral artery, occlusion at the distal superficial femoral  artery, and stenosis at the origin of the superior mesenteric artery.     Renal ultrasound 4/21/2023: Mild-to-moderate hydronephrosis on the right.     CT abdomen/pelvis 4/21/2023: Mild to moderate right hydronephrosis with no significant right ureteral dilatation.       STARR 4/21/2023: No valvular vegetations and moderate to severe aortic stenosis.      MRI thoracic/lumbar spine 4/20/2023: No evidence to suggest discitis/osteomyelitis or drainable fluid collection and new right-sided hydronephrosis.     Arterial Doppler left lower extremity 4/20/2023: Monophasic flow throughout suggestive of inflow disease     NM bone scan 4/19/2023: Left lower extremity cellulitis without focal intense concentration more typically seen with an abscess or osteomyelitis.      TTE 4/17/2023:  Low-normal systolic function with an EF of 54%, grade II left ventricular diastolic dysfunction, mild-to-moderate aortic valve stenosis, and no evidence of vegetation.     CT head 4/16/2023: No acute intracranial abnormality.       CXR 4/16/2023: No acute cardiopulmonary process.     CT chest/abdomen/pelvis 4/16/2023: No evidence of PE and no acute intraabdominal or pelvic solid organ or bowel pathology identified.       X-ray left foot 4/16/2023: Minimally displaced 5th proximal phalanx fracture and possible minimally displaced 4th proximal phalanx fracture.     X-ray left tibia/fibula 4/16/2023: No acute osseous process.       Assessment/Plan:      * MRSA bacteremia  Continue 6 week course of IV vancomycin until 6/4/2023.  Repeat blood culture from 4/23/2023 were negative.  TTE from 4/17/2023 and STARR from 4/23/2023 showed no evidence of vegetation and NM bone scan on 4/19/2023 was positive for a left lower extremity cellulitis without focal intense concentration more typically seen with an abscess or osteomyelitis.     Sepsis  Resolved.  Continue 6 week course of IV vancomycin until 6/4/2023 as per above.    Right hydronephrosis  Continue  tamsulosin.  She is s/p cystoscopy with bilateral retrograde pyelograms right ureteral stent placement on 4/25/2023 due to right hydronephrosis with ureteral obstruction. Renal ultrasound from 4/21/2023 revealed mild-to-moderate hydronephrosis on the right and CT of the abdomen and pelvis showed mild to moderate right hydronephrosis with no significant right ureteral dilatation.  She will need to follow-up with urology as an outpatient 1-2 weeks following discharge for right ureteral stent removal.    Toxic metabolic encephalopathy  Improved. Her scheduled nighttime norco was discontinued on 5/20/2023.  Consider titrating down trazodone.  CT of the head from 5/02/2023 and 5/06/2023 showed no acute intracranial abnormality and EEG from 5/02/2023 revealed no evidence of seizure activity.    Hyponatremia  Continue sodium chloride tablets and 1200 ml fluid restriction. HCTZ has been discontinued. She is s/p treatment with samsca on 5/7/2023.    Severe protein-calorie malnutrition  Continue dietary nutritional supplement per the dietician recommendations. Her prealbumin from 5/28/2023 was 7.8.     Critical limb ischemia of left lower extremity  Continue statin and percocet as needed. She is not currently on any blood thinners. 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      Urinary tract infection  Resolved.  She completed a 3 day course of ciprofloxacin 250 mg by mouth twice daily on 5/16/2023.  Urine culture from 5/11/2023 grew 10,000-25,000 cfu/ml of Pseudomonas aeruginosa.    Debility  Continue PT/OT.    Hypomagnesemia  Improved.  Continue magnesium  oxide.    Hypokalemia  Improved.    Hypertension  Continue carvedilol. She is no longer on losartan-HCTZ.    Type II diabetes mellitus  Continue metformin which was decreased on 5/24/2023. Detemir was discontinued on 5/17/2023 and glimepiride was stopped on 5/24/2023 due to hypoglycemia.  Her hemoglobin A1c from 3/07/2023 was 6.3.      Iron deficiency anemia  Continue ferrous sulfate.  She is s/p blood transfusion with 1 unit packed RBC on 5/12/2023 and she was treated with 3 days of IV ferrlecit.    Chronic diastolic CHF (congestive heart failure)  Compensated. She is not currently on any diuretics.  TTE from 4/17/2023 showed low-normal systolic function with an EF of 54% and grade II left ventricular diastolic dysfunction.     Insomnia  Continue trazodone which was increased on 5/28/2023.    Hyperlipidemia  Continue pravastatin.    Severe aortic stenosis  No acute issues.  She can follow-up with cardiology as an outpatient as she may be a candidate for TAVR.    Chronic constipation  Continue polyethylene glycol.    Disposition  Anticipate discharge home with [facility], a manish lift, and a hospital bed on 6/5/2023 following completion of IV antibiotics on 6/4/2023.        VTE Risk Mitigation (From admission, onward)           Ordered     enoxaparin injection 40 mg  Daily         05/11/23 1414     Place sequential compression device  Until discontinued         05/11/23 1414                    Discharge Planning   ERIN:      Code Status: DNR   Is the patient medically ready for discharge?:     Reason for patient still in hospital (select all that apply): Treatment, PT / OT recommendations and Pending disposition  Discharge Plan A: Home with family, Home Health   Discharge Delays: None known at this time      This service was provided via telemedicine.  Type of Software: Audio/Visual.  Originating Site: [facility].  Distant Site: [place], [place].  Her exam was performed with the assistance  of Frances Hicks RN.      Naun Pope MD  Department of Hospital Medicine   [facility]

## 2023-05-29 NOTE — PT/OT/SLP PROGRESS
Physical Therapy Treatment Note           Name: Melodie Villarreal    : 1931 (89 y.o.)  MRN: [id number]           TREATMENT SUMMARY AND RECOMMENDATIONS:    PT Received On: 23  PT Start Time: 1445     PT Stop Time: 1500  PT Total Time (min): 15 min     Subjective Assessment:   No complaints  Lethargic    Awake, alert, cooperative  Uncooperative    Agitated  c/o pain    Appropriate  c/o fatigue   x Confused x Treated at bedside     Emotionally labile  Treated in gym/dept.    Impulsive  Other:    Flat affect       Therapy Precautions:    Cognitive deficits  Spinal precautions    Collar - hard  Sternal precautions    Collar - soft   TLSO    Fall risk  LSO    Hip precautions - posterior  Knee immobilizer    Hip precautions - anterior  WBAT    Impaired communication  Partial weightbearing    Oxygen  TTWB    PEG tube  NWB    Visual deficits  Other:    Hearing deficits          Treatment Objectives:     Mobility Training:   Assist level Comments    Bed mobility Total A Sit- supine   Transfer totalA Recliner-bed   Gait     Sit to stand transitions     Sitting balance     Standing balance      Wheelchair mobility     Car transfer     Other:          Therapeutic Exercise:   Exercise Sets Reps Comments                               Additional Comments:  Same over all status     Assessment: Patient tolerated session same.    PT Plan: continue  Revisions made to plan of care: No    GOALS:   Multidisciplinary Problems       Physical Therapy Goals          Problem: Physical Therapy    Goal Priority Disciplines Outcome Goal Variances Interventions   Physical Therapy Goal     PT, PT/OT Ongoing, Not Progressing     Description: Goals to be met by: Discharge     Patient will increase functional independence with mobility by performin. Supine to sit with MInimal Assistance  2. Sit to supine with MInimal Assistance  3. Bed to chair transfer with Minimal Assistance using Rolling Walker  4. Gait  x 15 feet with  Minimal Assistance using Rolling Walker.   5. Sitting at edge of bed x10 minutes with Stand-by Assistance                         Skilled PT Minutes Provided: 15   Communication with Treatment Team:     Equipment recommendations:       At end of treatment, patient remained:   Up in chair     Up in wheelchair in room   x In bed   x With alarm activated   x Bed rails up   x Call bell in reach     Family/friends present    Restraints secured properly    In bathroom with CNA/RN notified    Nurse aware    In gym with therapist/tech    Other:

## 2023-05-29 NOTE — PT/OT/SLP PROGRESS
Occupational Therapy  Treatment    Name: Melodie Villarreal    : 1931 (89 y.o.)  MRN: [id number]           TREATMENT SUMMARY AND RECOMMENDATIONS:      OT Date of Treatment: 23  OT Start Time: 1300  OT Stop Time: 1325  OT Total Time (min): 25 min      Subjective Assessment:   No complaints  Lethargic   x Awake, alert, cooperative  Impulsive    Uncooperative   Flat affect    Agitated  c/o pain    Appropriate  c/o fatigue   x Confused x Treated at bedside     Emotionally labile  Treated in gym/dept.      Other:        Therapy Precautions:   x Cognitive deficits  Spinal precautions    Collar - hard  Sternal precautions    Collar - soft   TLSO   x Fall risk  LSO    Hip precautions - posterior  Knee immobilizer    Hip precautions - anterior  WBAT    Impaired communication  Partial weightbearing    Oxygen  TTWB    PEG tube  NWB    Visual deficits      Hearing deficits   Other:        Treatment Objectives:     Mobility Training:    Mobility task Assist level Comments    Bed mobility Mod Ax2 Supine<rolling L<supine<rolling R<supine    Transfer Total A Tashia t/f from bed to recliner    Sit to stands transitions     Functional mobility     Sitting balance Fair Noted L lateral lean when sitting in recliner; placed pillow to position upright    Standing balance      Other:        ADL Training:    ADL Assist level Comments    Feeding     Grooming/hygiene     Bathing     Upper body dressing     Lower body dressing     Toileting     Toilet transfer     Adaptive equipment training     Other:           Therapeutic Exercise:   Exercise Sets Reps Comments                               Additional Comments:      Assessment: Patient tolerated session fair.    OT Plan: Continue with POC  Revisions made to plan of care: No    GOALS:   Multidisciplinary Problems       Occupational Therapy Goals          Problem: Occupational Therapy    Goal Priority Disciplines Outcome Interventions   Occupational Therapy Goal     OT, PT/OT Ongoing,  Not Progressing    Description: Goals to be met by: Discharge    Patient will increase functional independence with ADLs by performing:    Toileting from bedside commode with Max Assistance for hygiene and clothing management. -Total A  Bathing from edge of bed with Moderate Assistance.  Toilet transfer to bedside commode with Max Assistance. - Total A  Hygiene/grooming from edge of bed with minimal assistance.                          Skilled OT Minutes Provided: 25  Communication with Treatment Team:     Equipment recommendations:       At end of treatment, patient remained:  x Up in chair     Up in wheelchair in room    In bed   x With alarm activated    Bed rails up   x Call bell in reach    x Family/friends present    Restraints secured properly    In bathroom with CNA/RN notified    In gym with PT/PTA/tech    Nurse aware    Other:

## 2023-05-29 NOTE — PROGRESS NOTES
"Upon arrival to room pt in bed asleep. Pt's daughter at bedside stating "The MD upped her sleeping medication because she wasn't sleeping, so I don't think she'll be awake this morning." Will f/u in PM.   "

## 2023-05-30 NOTE — PT/OT/SLP PROGRESS
"Occupational Therapy  Treatment    Name: Melodie Villarreal    : 1931 (89 y.o.)  MRN: [id number]           TREATMENT SUMMARY AND RECOMMENDATIONS:      OT Date of Treatment: 23  OT Start Time: 1315  OT Stop Time: 1345  OT Total Time (min): 30 min      Subjective Assessment:  x No complaints  Lethargic    Awake, alert, cooperative  Impulsive    Uncooperative   Flat affect    Agitated  c/o pain    Appropriate  c/o fatigue   x Confused x Treated at bedside     Emotionally labile  Treated in gym/dept.      Other:        Therapy Precautions:   x Cognitive deficits  Spinal precautions    Collar - hard  Sternal precautions    Collar - soft   TLSO   x Fall risk  LSO    Hip precautions - posterior  Knee immobilizer    Hip precautions - anterior  WBAT    Impaired communication  Partial weightbearing    Oxygen  TTWB    PEG tube  NWB    Visual deficits      Hearing deficits   Other:        Treatment Objectives:     Mobility Training:    Mobility task Assist level Comments    Bed mobility     Transfer     Sit to stands transitions     Functional mobility     Sitting balance     Standing balance      Other:        ADL Training:    ADL Assist level Comments    Feeding SVP Self-feeding requiring max verbal and visual cues for initiating, sequencing, and completion of using fork to take bites and take sips from her cup    Grooming/hygiene     Bathing     Upper body dressing     Lower body dressing     Toileting     Toilet transfer     Adaptive equipment training     Other:           Therapeutic Exercise:   Exercise Sets Reps Comments                               Additional Comments:      Assessment: Patient tolerated session fair. Pt appeared more confused this PM as evident of having difficulty with self-feeding. Pt's son, Tao, at bedside and stated, " It's like she has forgotten how to eat." Nursing notified.     OT Plan: Continue with POC  Revisions made to plan of care: No    GOALS:   Multidisciplinary Problems       " Occupational Therapy Goals          Problem: Occupational Therapy    Goal Priority Disciplines Outcome Interventions   Occupational Therapy Goal     OT, PT/OT Ongoing, Not Progressing    Description: Goals to be met by: Discharge    Patient will increase functional independence with ADLs by performing:    Toileting from bedside commode with Max Assistance for hygiene and clothing management. -Total A  Bathing from edge of bed with Moderate Assistance.  Toilet transfer to bedside commode with Max Assistance. - Total A  Hygiene/grooming from edge of bed with minimal assistance.                          Skilled OT Minutes Provided: 30  Communication with Treatment Team:     Equipment recommendations:       At end of treatment, patient remained:  x Up in chair     Up in wheelchair in room    In bed   x With alarm activated    Bed rails up   x Call bell in reach    x Family/friends present    Restraints secured properly    In bathroom with CNA/RN notified    In gym with PT/PTA/tech    Nurse aware    Other:

## 2023-05-30 NOTE — PROGRESS NOTES
[facility] Medicine  Telehospitalist Progress Note    Patient Name: Melodie Villarreal  MRN: [id number]  Patient Class: IP- Swing   Admission Date: 5/11/2023  Length of Stay: 19 days  Attending Physician: Naun Pope MD  Primary Care Provider: Wisam Espinosa Jr, MD      Subjective:     Principal Problem:MRSA bacteremia      HPI:  Ms. Villarreal is a 89-year-old  female with history of hypertension, hyperlipidemia, type II diabetes mellitus, DVT/PE status post IVC filter placement, and bladder cancer who originally presented to [facility] ER on 4/16/2023 with fatigue, generalized weakness, dysuria, dark urine, and lethargy and she was hypotensive with a blood pressure of 66/33 on EMS arrival.  Her initial lab work was remarkable for a WBC count of 34.3, lactic acid of 3.8, troponin of 0.115, and BNP of 2571.8 and CT of the head showed no acute intracranial abnormality.  CXR revealed no acute cardiopulmonary process and CT of the chest, abdomen, pelvis demonstrated no evidence of PE and no acute intraabdominal or pelvic solid organ or bowel pathology.  X-ray of the left foot showed minimally displaced 5th proximal phalanx fracture and possible minimally displaced 4th proximal phalanx fracture and x-ray of the left tibia/fibula revealed no acute osseous process.  She was started on broad-spectrum antibiotics with IV vancomycin and zosyn for sepsis and her blood cultures from admission returned positive for MRSA.  ID was consulted and TTE from 4/17/2023 demonstrated no evidence of vegetation. She was scheduled for NM bone scan on 4/19/2023 which was positive for a left lower extremity cellulitis without focal intense concentration. Arterial Doppler of the left lower extremity from 4/20/2023 showed monophasic flow throughout suggestive of inflow disease and MRI of the thoracic and lumbar spine revealed no evidence to suggest discitis/osteomyelitis or drainable fluid collection and  new right-sided hydronephrosis.  Repeat blood cultures remained positive and cardiology was consulted for STARR on 4/21/2023 which demonstrated no valvular vegetations and moderate to severe aortic stenosis.  Her chronic left ankle wound was thought to be the source of her persistent MRSA bacteremia and 6 weeks of IV vancomycin was recommended by ID.  Renal ultrasound from 4/21/2023 confirmed mild-to-moderate hydronephrosis on the right and CT of the abdomen and pelvis showed mild to moderate right hydronephrosis with no significant right ureteral dilatation.  Urology was consulted and she was taken to the OR on 4/25/2023 for cystoscopy with bilateral retrograde pyelograms right ureteral stent placement due to right hydronephrosis with ureteral obstruction.  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  She was evaluated by neurology on 5/02/2023 due to mental status changes thought to be toxic metabolic encephalopathy and CT of the head demonstrated no acute intracranial abnormality.  EEG from 5/2/2023 showed no evidence of seizure activity and repeat CT of the head was unremarkable.  Nephrology was consulted on 5/03/2023 due to worsening hyponatremia and she was treated with samsca and a 1 L fluid restriction with improvement in her sodium levels.  She had no other complications throughout her hospital course and she was discharged to [facility] for PT/OT, completion of IV antibiotics, and management of her medical comorbidities.      Overview/Hospital Course:  She was admitted to [facility] on 5/11/2023 for rehab s/p  hospitalization for persistent MRSA bacteremia with sepsis due to nonhealing left ankle wound, right hydronephrosis with ureteral obstruction s/p right ureteral stent placement, critical limb ischemia of the left lower extremity, toxic metabolic encephalopathy, and hyponatremia.  She was found to have a UTI on urinalysis from 5/11/2023 and her urine culture grew 10,000-25,000 cfu/ml of Pseudomonas aeruginosa.  She was started on a 3 day course of ciprofloxacin 250 mg by mouth twice daily which she completed on 5/16/2023.  Her hemoglobin and hematocrit dropped to 7.6 and 25.1 on 5/12/2023 and she was transfused with 1 unit packed RBC.  She was started on sodium chloride 1 g by mouth daily on 5/15/2023 due to persistent hyponatremia.  Detemir was discontinued on 5/17/2023 due to intermittent hypoglycemia and she was started on metformin 1000 mg by mouth twice daily and glimepiride 2 mg by mouth in the morning. Her scheduled nighttime norco was discontinued on 5/20/2023 due to lethargy. She had a blood sugar of 50 on the afternoon of 5/21/2023 and 57 on the morning of 5/22/2023 and her metformin and glimepiride were held. Her metformin was decreased from 1000 mg to 500 mg PO BID and her glimepiride was discontinued on 5/24/2023 due to persistent hypoglycemia. She complained of left shoulder on 5/25/2023 and she was started on diclofenac 1% gel 4 gm topical to the left shoulder BID prn. Her trazodone was increased from 100 mg to 150 mg PO QHS on 5/28/2023 due to worsening insomnia.      Interval History: She slept well again last night after her trazodone was increased 2 nights ago. She participated in PT and OT yesterday and she required total assistance with bed mobility and transfers. She was seen by SLP yesterday due to cognitive-linguistic impairment.      Review of Systems   All other systems reviewed and are negative.  Objective:     Vital Signs (Most Recent):  Temp: 97.7 °F (36.5 °C) (05/30/23 0716)  Pulse: 89  (23 0716)  Resp: 18 (23 0514)  BP: 136/73 (23 0716)  SpO2: 95 % (23 0716) Vital Signs (24h Range):  Temp:  [97.5 °F (36.4 °C)-98 °F (36.7 °C)] 97.7 °F (36.5 °C)  Pulse:  [80-93] 89  Resp:  [16-18] 18  SpO2:  [95 %-97 %] 95 %  BP: (109-137)/(54-73) 136/73     Weight: 56.3 kg (124 lb 1.9 oz)  Body mass index is 21.99 kg/m².    Intake/Output Summary (Last 24 hours) at 2023 0866  Last data filed at 2023 0647  Gross per 24 hour   Intake 600 ml   Output 1100 ml   Net -500 ml         Physical Exam  General - Elderly  female in no acute distress.  HEENT - NCAT. No scleral icterus. Oropharynx clear. Mucous membranes moist.  Neck - No lymphadenopathy, thyromegaly, or JVD.  CVS - Regular rate and rhythm. No murmurs, rubs, or gallops.  Resp - Lungs are clear to auscultation bilaterally. No rales, wheeze, or rhonchi.   GI - Soft, nontender, nondistended, normoactive bowel sounds present.   Extremities - No clubbing, cyanosis, or edema. Right upper extremity PICC line.  Neuro - CN II through XII are grossly intact. No focal neurological deficits. Alert and oriented to name and  only.  Psych - Flat affect.  Skin -  Ecchymosis to left lateral chest pain. Blanchable erythema noted to dorsal aspect of the left 2nd toe. Left heel with 100% eschar. Hyperpigmentation to left medial lower leg/ankle. Denuded areas present to sacrum and blanchable erythema to periwound.     Significant Labs: All pertinent labs within the past 24 hours have been reviewed.     2023:  CBC: WBC count 9.87, hemoglobin 8.1, hematocrit 26.6, platelet count 338.  CMP: Sodium 128, potassium 3.6, chloride 94, CO2 27, BUN 31.9, creatinine 0.87, glucose 128, calcium 7.6, magnesium 1.8, alkaline phosphatase 81, total protein 5.7, albumin 1.7, total bilirubin 0.3, AST 9, ALT 6.  Prealbumin 7.8.     2023:  Vancomycin trough 20.8.     2023:  Vancomycin trough 23.0.     2023:  CBC: WBC count 8.86, hemoglobin  8.5, hematocrit 28.5, platelet count 258.  CMP: Sodium 131, potassium 3.4, chloride 94, CO2 27, BUN 17.3, creatinine 0.67, glucose 54, calcium 7.7, magnesium 1.6, alkaline phosphatase 94, total protein 5.6, albumin 1.8, total bilirubin 0.5, AST 10, ALT 10.  Vancomycin trough 24.1.     5/19/2023:  CBC: WBC count 10.83, hemoglobin 9, hematocrit 30.4, platelet count 274.  BMP: Sodium 130, potassium 3.5, chloride 94, CO2 28, BUN 18.4, creatinine 0.68, glucose 125, calcium 8.3.     5/18/2023:  CBC: WBC count 9.84, hemoglobin 9, hematocrit 30.7,  platelet count 261.  BMP: Sodium 134, potassium 3.7, chloride 94, CO2 33, BUN 20.6, creatinine 0.69, glucose 167, calcium 7.8, magnesium 1.9.     5/16/2023:  BMP: Sodium 131, potassium 4.2, chloride 4.2, chloride 90, CO2 33, BUN 18.9, creatinine 0.65, glucose 188, calcium 7.9, magnesium 1.9.     5/15/2023:  BMP: Sodium 128, potassium 3.3, chloride 91, CO2 34, BUN 18, creatinine 0.67, glucose 76, calcium 7.9.     5/13/2023:  CBC: WBC count 10.82, hemoglobin 9.1, hematocrit 30, platelet count 248.  BMP: Sodium 128, potassium 3.7, chloride 89, CO2 36, BUN 17.3, creatinine 0.66, glucose 103, calcium 8.2.     Significant Imaging: I have reviewed all pertinent imaging results/findings within the past 24 hours.     CXR 5/16/2023: Left-sided PICC projects over the distal SVC.  Prominent interstitial markings with no focal opacification.     CXR 5/6/2023: The heart is not significantly enlarged.  There is aortic atherosclerosis.  Similar prominent central pulmonary vasculature.  No new dense consolidation or pneumothorax.     CT head 5/5/2023:   1. No acute intracranial abnormality.  2. Chronic microvascular ischemic changes.     EEG 5/2/2023: No evidence of seizure activity.     CT head 5/2/2023: No acute intracranial abnormality.     CTA abdomen/pelvis with bilateral runoff 4/26/2023: Significant stenosis of the left common iliac artery, severe narrowing versus occlusion at the left  common femoral artery, occlusion at the distal superficial femoral artery, and stenosis at the origin of the superior mesenteric artery.     Renal ultrasound 4/21/2023: Mild-to-moderate hydronephrosis on the right.     CT abdomen/pelvis 4/21/2023: Mild to moderate right hydronephrosis with no significant right ureteral dilatation.       STARR 4/21/2023: No valvular vegetations and moderate to severe aortic stenosis.      MRI thoracic/lumbar spine 4/20/2023: No evidence to suggest discitis/osteomyelitis or drainable fluid collection and new right-sided hydronephrosis.     Arterial Doppler left lower extremity 4/20/2023: Monophasic flow throughout suggestive of inflow disease     NM bone scan 4/19/2023: Left lower extremity cellulitis without focal intense concentration more typically seen with an abscess or osteomyelitis.      TTE 4/17/2023:  Low-normal systolic function with an EF of 54%, grade II left ventricular diastolic dysfunction, mild-to-moderate aortic valve stenosis, and no evidence of vegetation.     CT head 4/16/2023: No acute intracranial abnormality.       CXR 4/16/2023: No acute cardiopulmonary process.     CT chest/abdomen/pelvis 4/16/2023: No evidence of PE and no acute intraabdominal or pelvic solid organ or bowel pathology identified.       X-ray left foot 4/16/2023: Minimally displaced 5th proximal phalanx fracture and possible minimally displaced 4th proximal phalanx fracture.     X-ray left tibia/fibula 4/16/2023: No acute osseous process.       Assessment/Plan:      * MRSA bacteremia  Continue 6 week course of IV vancomycin until 6/4/2023.  Repeat blood culture from 4/23/2023 were negative.  TTE from 4/17/2023 and STARR from 4/23/2023 showed no evidence of vegetation and NM bone scan on 4/19/2023 was positive for a left lower extremity cellulitis without focal intense concentration more typically seen with an abscess or osteomyelitis.     Sepsis  Resolved.  Continue 6 week course of IV vancomycin  until 6/4/2023 as per above.    Right hydronephrosis  Continue tamsulosin.  She is s/p cystoscopy with bilateral retrograde pyelograms right ureteral stent placement on 4/25/2023 due to right hydronephrosis with ureteral obstruction. Renal ultrasound from 4/21/2023 revealed mild-to-moderate hydronephrosis on the right and CT of the abdomen and pelvis showed mild to moderate right hydronephrosis with no significant right ureteral dilatation.  She will need to follow-up with urology as an outpatient 1-2 weeks following discharge for right ureteral stent removal.    Toxic metabolic encephalopathy  Improved. Her scheduled nighttime norco was discontinued on 5/20/2023.  Consider titrating down trazodone.  CT of the head from 5/02/2023 and 5/06/2023 showed no acute intracranial abnormality and EEG from 5/02/2023 revealed no evidence of seizure activity.    Hyponatremia  Continue sodium chloride tablets and 1200 ml fluid restriction. HCTZ has been discontinued. She is s/p treatment with samsca on 5/7/2023.    Severe protein-calorie malnutrition  Continue dietary nutritional supplement per the dietician recommendations. Her prealbumin from 5/28/2023 was 7.8.     Critical limb ischemia of left lower extremity  Continue statin and percocet as needed. She is not currently on any blood thinners. 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      Urinary tract infection  Resolved.  She completed a 3 day course of ciprofloxacin 250 mg by mouth twice daily on 5/16/2023.  Urine culture from 5/11/2023 grew 10,000-25,000 cfu/ml of Pseudomonas aeruginosa.    Debility  Continue  PT/OT.    Hypomagnesemia  Improved.  Continue magnesium oxide.    Hypokalemia  Improved.    Hypertension  Continue carvedilol. She is no longer on losartan-HCTZ.    Type II diabetes mellitus  Continue metformin which was decreased on 5/24/2023. Detemir was discontinued on 5/17/2023 and glimepiride was stopped on 5/24/2023 due to hypoglycemia.  Her hemoglobin A1c from 3/07/2023 was 6.3.      Iron deficiency anemia  Continue ferrous sulfate.  She is s/p blood transfusion with 1 unit packed RBC on 5/12/2023 and she was treated with 3 days of IV ferrlecit.    Chronic diastolic CHF (congestive heart failure)  Compensated. She is not currently on any diuretics.  TTE from 4/17/2023 showed low-normal systolic function with an EF of 54% and grade II left ventricular diastolic dysfunction.     Insomnia  Continue trazodone which was increased on 5/28/2023.    Hyperlipidemia  Continue pravastatin.    Severe aortic stenosis  No acute issues.  She can follow-up with cardiology as an outpatient as she may be a candidate for TAVR.    Chronic constipation  Continue polyethylene glycol.    Disposition  Anticipate discharge home with [facility], a manish lift, and a hospital bed on 6/5/2023 following completion of IV antibiotics on 6/4/2023.        VTE Risk Mitigation (From admission, onward)         Ordered     enoxaparin injection 40 mg  Daily         05/11/23 1414     Place sequential compression device  Until discontinued         05/11/23 1414                Discharge Planning   ERIN:      Code Status: DNR   Is the patient medically ready for discharge?:     Reason for patient still in hospital (select all that apply): Treatment, PT / OT recommendations and Pending disposition  Discharge Plan A: Home with family, Home Health   Discharge Delays: None known at this time      This service was provided via telemedicine.  Type of Software: Audio/Visual.  Originating Site: [facility].  Distant Site: [place].  Her exam was performed with the assistance of Frances Hicks RN.      Naun Pope MD  Department of [facility] Medicine   [facility]

## 2023-05-30 NOTE — PROGRESS NOTES
Pharmacokinetic Assessment Follow Up: IV Vancomycin    Vancomycin serum concentration assessment(s):    The trough level was drawn correctly and can be used to guide therapy at this time. The measurement is within the desired definitive target range of 15 to 20 mcg/mL.    Vancomycin Regimen Plan:    Continue regimen to Vancomycin 500 mg IV every 24 hours with next serum trough concentration measured at 60 minutes prior to 4th dose on 6/2    Drug levels (last 3 results):  Recent Labs   Lab Result Units 05/30/23  0821   Vancomycin Trough ug/ml 18.8       Pharmacy will continue to follow and monitor vancomycin.    Please contact pharmacy at extension [phone] for questions regarding this assessment.    Thank you for the consult,   Michael Stockton       Patient brief summary:  Melodie Villarreal is a 89 y.o. female initiated on antimicrobial therapy with IV Vancomycin for treatment of bacteremia    The patient's current regimen is vancomycin 500 mg q24hr    Drug Allergies:   Review of patient's allergies indicates:   Allergen Reactions    Ace inhibitors Other (See Comments)    Adhesive      Allergic to tape    Bactrim [sulfamethoxazole-trimethoprim] Other (See Comments)     Confusion, Hypoglycemia    Meperidine Other (See Comments)    Midazolam Other (See Comments)    Atorvastatin Nausea Only    Codeine Other (See Comments) and Anxiety    Tapentadol Rash       Actual Body Weight:   56.3 kg    Renal Function:   Estimated Creatinine Clearance: 34.8 mL/min (based on SCr of 0.87 mg/dL).,     Dialysis Method (if applicable):  N/A    CBC (last 72 hours):  Recent Labs   Lab Result Units 05/28/23  0432   WBC x10(3)/mcL 9.87   Hgb g/dL 8.1*   Hct % 26.6*   Platelet x10(3)/mcL 338   Mono % % 8.4   Eos % % 5.7   Basophil % % 0.3       Metabolic Panel (last 72 hours):  Recent Labs   Lab Result Units 05/28/23  0432   Sodium Level mmol/L 128*   Potassium Level mmol/L 3.6   Chloride mmol/L 94*   Carbon Dioxide mmol/L 27   Glucose Level mg/dL 128*    Blood Urea Nitrogen mg/dL 31.9*   Creatinine mg/dL 0.87   Albumin Level g/dL 1.7*   Bilirubin Total mg/dL 0.3   Alkaline Phosphatase unit/L 81   Aspartate Aminotransferase unit/L 9   Alanine Aminotransferase unit/L 6   Magnesium Level mg/dL 1.80       Vancomycin Administrations:  vancomycin given in the last 96 hours                     vancomycin (VANCOCIN) 500 mg in dextrose 5 % in water (D5W) 5 % 100 mL IVPB (MB+) (mg) 500 mg New Bag 05/29/23 0912     500 mg New Bag 05/28/23 0929     500 mg New Bag 05/27/23 0530                    Microbiologic Results:  Microbiology Results (last 7 days)       ** No results found for the last 168 hours. **

## 2023-05-30 NOTE — PT/OT/SLP PROGRESS
Occupational Therapy  Treatment    Name: Melodie Villarreal    : 1931 (89 y.o.)  MRN: [id number]           TREATMENT SUMMARY AND RECOMMENDATIONS:      OT Date of Treatment: 23  OT Start Time: 1002  OT Stop Time: 102  OT Total Time (min): 18 min      Subjective Assessment:   No complaints  Lethargic   x Awake, alert, cooperative  Impulsive    Uncooperative   Flat affect    Agitated  c/o pain    Appropriate x c/o fatigue   x Confused x Treated at bedside     Emotionally labile  Treated in gym/dept.      Other:        Therapy Precautions:   x Cognitive deficits  Spinal precautions    Collar - hard  Sternal precautions    Collar - soft   TLSO   x Fall risk  LSO    Hip precautions - posterior  Knee immobilizer    Hip precautions - anterior  WBAT    Impaired communication  Partial weightbearing    Oxygen  TTWB    PEG tube  NWB    Visual deficits      Hearing deficits   Other:        Treatment Objectives:     Mobility Training:    Mobility task Assist level Comments    Bed mobility     Transfer     Sit to stands transitions     Functional mobility     Sitting balance     Standing balance      Other:        ADL Training:    ADL Assist level Comments    Feeding     Grooming/hygiene SPV Performed oral care, washing her face, and combing her hair while lying in sitting position in bed   Bathing     Upper body dressing     Lower body dressing     Toileting     Toilet transfer     Adaptive equipment training     Other:           Therapeutic Exercise:   Exercise Sets Reps Comments                               Additional Comments:      Assessment: Patient tolerated session fair. Pt did not want to t/f to the recliner at this time d/t fatigue. Pt agreed to perform hygiene/grooming care in bed. Pt continues to require verbal cues for initiation and completion of tasks. Pt will require 24-hour care.     OT Plan: Continue with POC  Revisions made to plan of care: No    GOALS:   Multidisciplinary Problems       Occupational  Therapy Goals          Problem: Occupational Therapy    Goal Priority Disciplines Outcome Interventions   Occupational Therapy Goal     OT, PT/OT Ongoing, Not Progressing    Description: Goals to be met by: Discharge    Patient will increase functional independence with ADLs by performing:    Toileting from bedside commode with Max Assistance for hygiene and clothing management. -Total A  Bathing from edge of bed with Moderate Assistance.  Toilet transfer to bedside commode with Max Assistance. - Total A  Hygiene/grooming from edge of bed with minimal assistance.                          Skilled OT Minutes Provided: 17  Communication with Treatment Team:     Equipment recommendations:       At end of treatment, patient remained:   Up in chair     Up in wheelchair in room   x In bed   x With alarm activated   x Bed rails up   x Call bell in reach    x Family/friends present    Restraints secured properly    In bathroom with CNA/RN notified    In gym with PT/PTA/tech    Nurse aware    Other:

## 2023-05-30 NOTE — PLAN OF CARE
[facility] - Medical Surgical Unit  Discharge Reassessment    Primary Care Provider: Wisam Espinosa Jr, MD    Expected Discharge Date:     Reassessment (most recent)       Discharge Reassessment - 05/30/23 1423          Discharge Reassessment    Assessment Type Discharge Planning Reassessment     Did the patient's condition or plan change since previous assessment? No     Discharge Plan discussed with: Patient;Adult children     Name(s) and Number(s) William Villarreal [phone]     Discharge Plan A Home;Home Health     DME Needed Upon Discharge  hospital bed;lift device     Transition of Care Barriers None     Why the patient remains in the hospital Requires continued medical care        Post-Acute Status    Post-Acute Authorization Home Health     Discharge Delays None known at this time

## 2023-05-30 NOTE — SUBJECTIVE & OBJECTIVE
Interval History: She slept well again last night after her trazodone was increased 2 nights ago. She participated in PT and OT yesterday and she required total assistance with bed mobility and transfers. She was seen by SLP yesterday due to cognitive-linguistic impairment.      Review of Systems   All other systems reviewed and are negative.  Objective:     Vital Signs (Most Recent):  Temp: 97.7 °F (36.5 °C) (23 0716)  Pulse: 89 (23 0716)  Resp: 18 (23 0514)  BP: 136/73 (23)  SpO2: 95 % (23) Vital Signs (24h Range):  Temp:  [97.5 °F (36.4 °C)-98 °F (36.7 °C)] 97.7 °F (36.5 °C)  Pulse:  [80-93] 89  Resp:  [16-18] 18  SpO2:  [95 %-97 %] 95 %  BP: (109-137)/(54-73) 136/73     Weight: 56.3 kg (124 lb 1.9 oz)  Body mass index is 21.99 kg/m².    Intake/Output Summary (Last 24 hours) at 2023 0838  Last data filed at 2023 0647  Gross per 24 hour   Intake 600 ml   Output 1100 ml   Net -500 ml         Physical Exam  General - Elderly  female in no acute distress.  HEENT - NCAT. No scleral icterus. Oropharynx clear. Mucous membranes moist.  Neck - No lymphadenopathy, thyromegaly, or JVD.  CVS - Regular rate and rhythm. No murmurs, rubs, or gallops.  Resp - Lungs are clear to auscultation bilaterally. No rales, wheeze, or rhonchi.   GI - Soft, nontender, nondistended, normoactive bowel sounds present.   Extremities - No clubbing, cyanosis, or edema. Right upper extremity PICC line.  Neuro - CN II through XII are grossly intact. No focal neurological deficits. Alert and oriented to name and  only.  Psych - Flat affect.  Skin -  Ecchymosis to left lateral chest pain. Blanchable erythema noted to dorsal aspect of the left 2nd toe. Left heel with 100% eschar. Hyperpigmentation to left medial lower leg/ankle. Denuded areas present to sacrum and blanchable erythema to periwound.     Significant Labs: All pertinent labs within the past 24 hours have been reviewed.      5/28/2023:  CBC: WBC count 9.87, hemoglobin 8.1, hematocrit 26.6, platelet count 338.  CMP: Sodium 128, potassium 3.6, chloride 94, CO2 27, BUN 31.9, creatinine 0.87, glucose 128, calcium 7.6, magnesium 1.8, alkaline phosphatase 81, total protein 5.7, albumin 1.7, total bilirubin 0.3, AST 9, ALT 6.  Prealbumin 7.8.     5/27/2023:  Vancomycin trough 20.8.     5/25/2023:  Vancomycin trough 23.0.     5/22/2023:  CBC: WBC count 8.86, hemoglobin 8.5, hematocrit 28.5, platelet count 258.  CMP: Sodium 131, potassium 3.4, chloride 94, CO2 27, BUN 17.3, creatinine 0.67, glucose 54, calcium 7.7, magnesium 1.6, alkaline phosphatase 94, total protein 5.6, albumin 1.8, total bilirubin 0.5, AST 10, ALT 10.  Vancomycin trough 24.1.     5/19/2023:  CBC: WBC count 10.83, hemoglobin 9, hematocrit 30.4, platelet count 274.  BMP: Sodium 130, potassium 3.5, chloride 94, CO2 28, BUN 18.4, creatinine 0.68, glucose 125, calcium 8.3.     5/18/2023:  CBC: WBC count 9.84, hemoglobin 9, hematocrit 30.7,  platelet count 261.  BMP: Sodium 134, potassium 3.7, chloride 94, CO2 33, BUN 20.6, creatinine 0.69, glucose 167, calcium 7.8, magnesium 1.9.     5/16/2023:  BMP: Sodium 131, potassium 4.2, chloride 4.2, chloride 90, CO2 33, BUN 18.9, creatinine 0.65, glucose 188, calcium 7.9, magnesium 1.9.     5/15/2023:  BMP: Sodium 128, potassium 3.3, chloride 91, CO2 34, BUN 18, creatinine 0.67, glucose 76, calcium 7.9.     5/13/2023:  CBC: WBC count 10.82, hemoglobin 9.1, hematocrit 30, platelet count 248.  BMP: Sodium 128, potassium 3.7, chloride 89, CO2 36, BUN 17.3, creatinine 0.66, glucose 103, calcium 8.2.     Significant Imaging: I have reviewed all pertinent imaging results/findings within the past 24 hours.     CXR 5/16/2023: Left-sided PICC projects over the distal SVC.  Prominent interstitial markings with no focal opacification.     CXR 5/6/2023: The heart is not significantly enlarged.  There is aortic atherosclerosis.  Similar prominent  central pulmonary vasculature.  No new dense consolidation or pneumothorax.     CT head 5/5/2023:   1. No acute intracranial abnormality.  2. Chronic microvascular ischemic changes.     EEG 5/2/2023: No evidence of seizure activity.     CT head 5/2/2023: No acute intracranial abnormality.     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     Renal ultrasound 4/21/2023: Mild-to-moderate hydronephrosis on the right.     CT abdomen/pelvis 4/21/2023: Mild to moderate right hydronephrosis with no significant right ureteral dilatation.       STARR 4/21/2023: No valvular vegetations and moderate to severe aortic stenosis.      MRI thoracic/lumbar spine 4/20/2023: No evidence to suggest discitis/osteomyelitis or drainable fluid collection and new right-sided hydronephrosis.     Arterial Doppler left lower extremity 4/20/2023: Monophasic flow throughout suggestive of inflow disease     NM bone scan 4/19/2023: Left lower extremity cellulitis without focal intense concentration more typically seen with an abscess or osteomyelitis.      TTE 4/17/2023:  Low-normal systolic function with an EF of 54%, grade II left ventricular diastolic dysfunction, mild-to-moderate aortic valve stenosis, and no evidence of vegetation.     CT head 4/16/2023: No acute intracranial abnormality.       CXR 4/16/2023: No acute cardiopulmonary process.     CT chest/abdomen/pelvis 4/16/2023: No evidence of PE and no acute intraabdominal or pelvic solid organ or bowel pathology identified.       X-ray left foot 4/16/2023: Minimally displaced 5th proximal phalanx fracture and possible minimally displaced 4th proximal phalanx fracture.     X-ray left tibia/fibula 4/16/2023: No acute osseous process.

## 2023-05-30 NOTE — PLAN OF CARE
Problem: Adult Inpatient Plan of Care  Goal: Plan of Care Review  Outcome: Ongoing, Progressing  Flowsheets (Taken 5/29/2023 2232)  Plan of Care Reviewed With:   patient   daughter  Goal: Patient-Specific Goal (Individualized)  Outcome: Ongoing, Progressing  Flowsheets (Taken 5/29/2023 2232)  Anxieties, Fears or Concerns: none  Individualized Care Needs: pain control maintained     Problem: Diabetes Comorbidity  Goal: Blood Glucose Level Within Targeted Range  Outcome: Ongoing, Progressing  Intervention: Monitor and Manage Glycemia  Flowsheets (Taken 5/29/2023 2232)  Glycemic Management: blood glucose monitored

## 2023-05-30 NOTE — PT/OT/SLP PROGRESS
Speech Language Pathology Treatment    Patient Name:  Melodie Villarreal   MRN:  [id number]  Admitting Diagnosis: MRSA bacteremia    Recommendations:                 General Recommendations:  Cognitive-linguistic therapy  Diet recommendations:  Regular, Liquid Diet Level: Thin   Aspiration Precautions: Alternating bites/sips and HOB to 90 degrees   General Precautions: Standard, other (see comments)  Communication strategies:  provide increased time to answer, go to room if call light pushed, and use contextual mapping and use of binary choices to aid in recall    Assessment:     Melodie Villarreal is a 89 y.o. female with an SLP diagnosis of Cognitive-Linguistic Impairment.   She presents with confusion and impaired orientation, STM recall and LTM recall. Per pt's daughter, the pt presents w/ increased confusion w/ initiation of antibiotics. Pt's daughter reports her mother was doing well at home prior to hospital admit and was walking w/ RW. Pt's daughter cooks, cleans, manages finances, and manages medications. Pt would benefit from skilled SLP services at this time to promote a return to PLOF w/ cognitive skills.    Subjective     Pt awake and sitting upright in bed; compliant with therapy activities, but became tired toward the end of the session; daughter present throughout session and provided/confirmed information    Pain/Comfort:  Pt stated she was comfortable.     Respiratory Status: Room air    Objective:     Has the patient been evaluated by SLP for swallowing?   Yes  Keep patient NPO? No  Current Respiratory Status:     Pt oriented to name and place independently; unable to state why she was in the hospital; minimal verbal cues needed for day and month; unable to state year with verbal cues; answered general orientation questions with 100% accuracy with minimal verbal cues (given first part of the word); pt required maximum assistance to copy a pattern using blocks, when asked if the block pattern matched the picture  she stated that it did, it actually did not match      Cont SLP POC.    Goals:   Multidisciplinary Problems       SLP Goals          Problem: SLP    Goal Priority Disciplines Outcome   SLP Goal     SLP Ongoing, Progressing   Description: LTG: Pt will improve cognitive linguistic skills to allow for safe discharge home w/ family.    STG:  Pt will orient x4 modA.  Pt will utilize memory strategies to recall new information following a 2 minute filled delay modA.  Pt will answer LTM Qs w/ 90% acc modA.                       Plan:     Patient to be seen:  3 x/week   Plan of Care expires:  6/28/23  Plan of Care reviewed with:  patient, daughter   SLP Follow-Up:  Yes       Discharge recommendations:  home with home health   Barriers to Discharge:  Support/Caregiver at home warranted to ensure safety.    Time Tracking:     SLP Treatment Date:  5/30/23  Speech Start Time:  1025  Speech Stop Time:   1055  Speech Total Time (min): 30 min    Billable Minutes: Speech Therapy Individual 30    Zoya Laguna CCC-SLP   05/30/2023

## 2023-05-30 NOTE — PLAN OF CARE
Weekly Staffing Report      Date Admitted: 5/11/2023 :   Staffing Date: 5/30/2023     Patient Active Problem List   Diagnosis    Type II diabetes mellitus    Seasonal allergic rhinitis    Pulmonary embolism    Polyp of colon    Osteopenia    Malignant neoplasm of posterior wall of urinary bladder    Insomnia    Hyperlipidemia    Deep vein thrombosis (DVT)    Arthritis    Hypertension    Acute left-sided low back pain without sciatica    Sepsis    Critical limb ischemia of left lower extremity    Right hydronephrosis    Debility    Toxic metabolic encephalopathy    MRSA bacteremia    Iron deficiency anemia    Hyponatremia    Hypomagnesemia    Hypokalemia    Chronic diastolic CHF (congestive heart failure)    Chronic constipation    Severe aortic stenosis    Disposition    Urinary tract infection    Severe protein-calorie malnutrition          Team Members Present:       Nursing Present     Yes [x]    No []    Physical Therapy Present    Yes [x]    No []    Occupational Therapy Present     Yes [x]    No []    Speech Therapy Present    Yes []    No []    NA []    Dietary Present    Yes [x]    No []        Physician Present   [] Crescencio Villeda    [] Thairy Reyes    [] ERASTO Carreno    [x] AKOSUA Pope     [] ISIDRO Pierre      Family Present    [x] Adult Children daughter present     [] Spouse    [] POA    [] Friend/ Caregiver    [] Other       Interdisciplinary Meeting Notes:  PT- Max assist for transfers. Tashia training with family who are comfortable with this. OT- total to max assist for all activities. Family able to provide care. Slightly more confused yesterday but seems better today. ST- working on memory and cognition tasks. Nutritionally- appetite fluctuates. Working on intake. Medically- pharmacy monitoring vancomycin levels. They were elevated so changed the frequency to every 24hrs. Discussed swelling but much better. Has to do with nutritional intake. On 6 weeks for IV vancomycin. Complete on June 4th. Will discharge  home with home health on June 5th. All questions answered at this time.                 Please see Documented PT/OT/ST, Dietary, Nursing, and Physician notes for detailed treatment information.

## 2023-05-30 NOTE — PROGRESS NOTES
Inpatient Nutrition Evaluation    Admit Date: 5/11/2023   Total duration of encounter: 19 days    Nutrition Recommendation/Prescription     Continue regular diet, 1200 mL fluid restriction, lactaid milk. 2. Continue gurdeep BID, used in-house substitution arginaid. 3. Add boost breeze once day.     Nutrition Assessment     Chart Review    Reason Seen: continuous nutrition monitoring, length of stay, and follow-up    Malnutrition Screening Tool Results   Have you recently lost weight without trying?: No  Have you been eating poorly because of a decreased appetite?: No   MST Score: 0     Diagnosis:   MRSA bacteremia 5/11/2023      Type II diabetes mellitus 5/3/2022      Insomnia 5/3/2022      Hyperlipidemia 5/3/2022      Hypertension 5/3/2022      Sepsis 4/17/2023      Critical limb ischemia of left lower extremity 4/20/2023      Right hydronephrosis 4/25/2023      Debility 5/2/2023      Toxic metabolic encephalopathy 5/3/2023      Iron deficiency anemia 5/20/2023      Hyponatremia 5/20/2023      Hypomagnesemia 5/20/2023      Hypokalemia 5/20/2023      Chronic diastolic CHF (congestive heart failure) 5/20/2023      Chronic constipation 5/20/2023      Severe aortic stenosis 5/20/2023      Disposition 5/20/2023      Urinary tract infection 5/20/2023      Severe protein-calorie malnutrition        Relevant Medical History:   Hydronephrosis  Date Unknown  Adenocarcinoma of uterus  Date Unknown  Bladder cancer  Date Unknown  Deep vein thrombosis  Date Unknown  Essential (primary) hypertension  Date Unknown  Heart murmur  Date Unknown  High cholesterol  Date Unknown  Hypertriglyceridemia  Date Unknown  Insomnia  Date Unknown  Osteopenia  Date Unknown  Other pulmonary embolism without acute cor pulmonale  Date Unknown  Personal history of colonic polyps  Date Unknown  Rheumatoid arthritis, unspecified  Date Unknown  Type 2 diabetes mellitus without complications  Nutrition-Related Medications: ferrous sulfate, insulin aspart,  lactobacillus acidophilus, magnesium oxide, metformin, pravastatin, polyethylene glycol, senna, vancomycin    Nutrition-Related Labs:   Latest Reference Range & Units 05/28/23 04:32   Sodium 132 - 146 mmol/L 128 (L)   Potassium 3.5 - 5.1 mmol/L 3.6   Chloride 98 - 111 mmol/L 94 (L)   CO2 23 - 31 mmol/L 27   BUN 9.8 - 20.1 mg/dL 31.9 (H)   Creatinine 0.55 - 1.02 mg/dL 0.87   eGFR mls/min/1.73/m2 >60   Glucose 75 - 121 mg/dL 128 (H)   Calcium 8.4 - 10.2 mg/dL 7.6 (L)   Magnesium 1.60 - 2.60 mg/dL 1.80   Alkaline Phosphatase 40 - 150 unit/L 81   PROTEIN TOTAL 5.8 - 7.6 gm/dL 5.7 (L)   Albumin 3.4 - 4.8 g/dL 1.7 (L)   Albumin/Globulin Ratio 1.1 - 2.0 ratio 0.4 (L)   Prealbumin 14.0 - 37.0 mg/dL 7.8 (L)   BILIRUBIN TOTAL <=1.5 mg/dL 0.3   AST 5 - 34 unit/L 9   ALT 0 - 55 unit/L 6   Globulin, Total 2.4 - 3.5 gm/dL 4.0 (H)   (L): Data is abnormally low  (H): Data is abnormally high    Diet Order: Diet diabetic Fluid - 1200mL  Oral Supplement Order: Boost Breeze and arginaid  Appetite/Oral Intake: fair/50-75% of meals  Factors Affecting Nutritional Intake: decreased appetite  Food/Adventism/Cultural Preferences:  waffles  Food Allergies: none reported    Skin Integrity: wound  Wound(s):      Altered Skin Integrity 04/17/23 Left posterior Ankle Full thickness tissue loss. Subcutaneous fat may be visible but bone, tendon or muscle are not exposed-Tissue loss description: Full thickness       Altered Skin Integrity 04/17/23 Left lateral Ankle Full thickness tissue loss. Subcutaneous fat may be visible but bone, tendon or muscle are not exposed-Tissue loss description: Full thickness       Altered Skin Integrity 04/17/23 1730 Coccyx Ulceration Intact skin with non-blanchable redness of localized area-Tissue loss description: Not applicable     Comments    Pt intake fair. Discussed food preferences with daughter. Preabl 7.0. Educated daughter on importance of good nutrition for wound healing. Encourage consuming more protein  "intake. Trial boost breezed. Educated daughter that is is appropriate for lactose intolerant. Marked menus. Increasing protein. Pt's daughter stated pt drank boost breeze with no issues. Continue to monitor intake and wound healing.     Anthropometrics    Height: 5' 2.99" (160 cm)    Last Weight: 56.3 kg (124 lb 1.9 oz) (05/29/23 0521) Weight Method: Bed Scale  BMI (Calculated): 22  BMI Classification: normal (BMI 18.5-24.9)     Ideal Body Weight (IBW), Female: 114.95 lb     % Ideal Body Weight, Female (lb): 115.27 %                             Usual Weight Provided By: unable to obtain usual weight    Wt Readings from Last 5 Encounters:   05/29/23 56.3 kg (124 lb 1.9 oz)   04/24/23 56.6 kg (124 lb 12.5 oz)   04/10/23 54.4 kg (120 lb)   02/09/23 85.3 kg (188 lb)   01/17/23 55.8 kg (123 lb)     Weight Change(s) Since Admission:  Admit Weight: 60.1 kg (132 lb 7.9 oz) (05/11/23 1515)  Wt stable from last week. Wt on 5/25/23: 56.2 kg. Current wt: 56.3 kg. Improved.     Patient Education    Not applicable.    Monitoring & Evaluation     Dietitian will monitor food and beverage intake, weight, weight change, electrolyte/renal panel, glucose/endocrine profile, and gastrointestinal profile.  Nutrition Risk/Follow-Up: low (follow-up in 5-7 days)  Patients assigned 'low nutrition risk' status do not qualify for a full nutritional assessment but will be monitored and re-evaluated in a 5-7 day time period. Please consult if re-evaluation needed sooner.   "

## 2023-05-30 NOTE — PT/OT/SLP PROGRESS
Physical Therapy Treatment Note           Name: Melodie Villarreal    : 1931 (89 y.o.)  MRN: [id number]           TREATMENT SUMMARY AND RECOMMENDATIONS:    PT Received On: 23  PT Start Time: 1100     PT Stop Time: 1115  PT Total Time (min): 15 min     Subjective Assessment:   No complaints  Lethargic    Awake, alert, cooperative  Uncooperative    Agitated  c/o pain    Appropriate  c/o fatigue   x Confused x Treated at bedside     Emotionally labile  Treated in gym/dept.    Impulsive  Other:    Flat affect       Therapy Precautions:    Cognitive deficits  Spinal precautions    Collar - hard  Sternal precautions    Collar - soft   TLSO   x Fall risk  LSO    Hip precautions - posterior  Knee immobilizer    Hip precautions - anterior  WBAT    Impaired communication  Partial weightbearing    Oxygen  TTWB    PEG tube  NWB    Visual deficits  Other:    Hearing deficits          Treatment Objectives:     Mobility Training:   Assist level Comments    Bed mobility maxA Supine-sit   Transfer totalA Bed-recliner   Gait     Sit to stand transitions     Sitting balance     Standing balance      Wheelchair mobility     Car transfer     Other:          Therapeutic Exercise:   Exercise Sets Reps Comments                               Additional Comments:  Pt initially refused to get OOB but agreed eventually    Assessment: Patient tolerated session fair.    PT Plan: continue  Revisions made to plan of care: No    GOALS:   Multidisciplinary Problems       Physical Therapy Goals          Problem: Physical Therapy    Goal Priority Disciplines Outcome Goal Variances Interventions   Physical Therapy Goal     PT, PT/OT Ongoing, Not Progressing     Description: Goals to be met by: Discharge     Patient will increase functional independence with mobility by performin. Supine to sit with MInimal Assistance  2. Sit to supine with MInimal Assistance  3. Bed to chair transfer with Minimal Assistance using Rolling  Walker  4. Gait  x 15 feet with Minimal Assistance using Rolling Walker.   5. Sitting at edge of bed x10 minutes with Stand-by Assistance                         Skilled PT Minutes Provided: 15   Communication with Treatment Team:     Equipment recommendations:       At end of treatment, patient remained:  x Up in chair     Up in wheelchair in room    In bed   x With alarm activated    Bed rails up   x Call bell in reach     Family/friends present    Restraints secured properly    In bathroom with CNA/RN notified    Nurse aware    In gym with therapist/tech    Other:

## 2023-05-31 NOTE — PT/OT/SLP PROGRESS
Name: Melodie Villarreal    : 1931 (89 y.o.)  MRN: [id number]            Interdisciplinary Team Conference     Case conference held with patient/family and care team to discuss progress, plan of care, barriers to be addressed for safe return home, equipment recommendations, and discharge planning. Communicated therapy progress with MD, RN, therapy clinicians and case management. All questions/concerns answered.

## 2023-05-31 NOTE — PT/OT/SLP PROGRESS
Name: Melodie Villarreal    : 1931 (89 y.o.)  MRN: [id number]            Interdisciplinary Team Conference     Case conference held with patient/family and care team to discuss progress, plan of care, barriers to be addressed for safe return home, equipment recommendations, and discharge planning. Communicated therapy progress with MD, RN, therapy clinicians and case management. All questions/concerns answered.

## 2023-05-31 NOTE — PLAN OF CARE
Problem: Adult Inpatient Plan of Care  Goal: Plan of Care Review  Outcome: Ongoing, Progressing  Flowsheets (Taken 5/31/2023 1550)  Plan of Care Reviewed With:   patient   son  Goal: Absence of Hospital-Acquired Illness or Injury  Outcome: Ongoing, Progressing  Intervention: Identify and Manage Fall Risk  Flowsheets (Taken 5/31/2023 1550)  Safety Promotion/Fall Prevention:   assistive device/personal item within reach   bed alarm set   nonskid shoes/socks when out of bed   instructed to call staff for mobility   high risk medications identified   family to remain at bedside   lighting adjusted   medications reviewed  Intervention: Prevent Skin Injury  Flowsheets (Taken 5/31/2023 1550)  Body Position: turned  Skin Protection:   incontinence pads utilized   protective footwear used  Intervention: Prevent and Manage VTE (Venous Thromboembolism) Risk  Flowsheets (Taken 5/31/2023 1550)  Activity Management: Rolling - L1  VTE Prevention/Management:   bleeding precations maintained   bleeding risk assessed   fluids promoted  Range of Motion: active ROM (range of motion) encouraged  Intervention: Prevent Infection  Flowsheets (Taken 5/31/2023 1550)  Infection Prevention:   cohorting utilized   personal protective equipment utilized   rest/sleep promoted   single patient room provided   hand hygiene promoted   environmental surveillance performed   equipment surfaces disinfected     Problem: Diabetes Comorbidity  Goal: Blood Glucose Level Within Targeted Range  Outcome: Ongoing, Progressing  Intervention: Monitor and Manage Glycemia  Flowsheets (Taken 5/31/2023 1550)  Glycemic Management: blood glucose monitored     Problem: Impaired Wound Healing  Goal: Optimal Wound Healing  Outcome: Ongoing, Progressing  Intervention: Promote Wound Healing  Flowsheets (Taken 5/31/2023 1550)  Oral Nutrition Promotion:   calorie-dense foods provided   calorie-dense liquids provided  Activity Management: Rolling - L1  Pain Management  Interventions: pain management plan reviewed with patient/caregiver

## 2023-05-31 NOTE — PLAN OF CARE
Problem: Adult Inpatient Plan of Care  Goal: Plan of Care Review  Outcome: Ongoing, Progressing  Flowsheets (Taken 5/31/2023 0151)  Plan of Care Reviewed With: patient  Goal: Patient-Specific Goal (Individualized)  Outcome: Ongoing, Progressing  Flowsheets (Taken 5/31/2023 0151)  Anxieties, Fears or Concerns: none  Individualized Care Needs: manage pain until end of shift 7a  Goal: Absence of Hospital-Acquired Illness or Injury  Outcome: Ongoing, Progressing  Intervention: Identify and Manage Fall Risk  Flowsheets (Taken 5/31/2023 0151)  Safety Promotion/Fall Prevention:   assistive device/personal item within reach   bed alarm set   Fall Risk reviewed with patient/family   side rails raised x 3  Intervention: Prevent Skin Injury  Flowsheets (Taken 5/31/2023 0151)  Body Position: weight shifting  Skin Protection: adhesive use limited  Intervention: Prevent and Manage VTE (Venous Thromboembolism) Risk  Flowsheets (Taken 5/31/2023 0151)  Activity Management: Rolling - L1  VTE Prevention/Management: fluids promoted  Range of Motion: active ROM (range of motion) encouraged  Intervention: Prevent Infection  Flowsheets (Taken 5/31/2023 0151)  Infection Prevention:   environmental surveillance performed   personal protective equipment utilized   rest/sleep promoted   single patient room provided   equipment surfaces disinfected   hand hygiene promoted

## 2023-05-31 NOTE — PROGRESS NOTES
[facility] Medicine  Telehospitalist Progress Note    Patient Name: Melodie Villarreal  MRN: [id number]  Patient Class: IP- Swing   Admission Date: 5/11/2023  Length of Stay: 20 days  Attending Physician: Naun Pope MD  Primary Care Provider: Wisam Espinosa Jr, MD      Subjective:     Principal Problem:MRSA bacteremia      HPI:  Ms. Villarreal is a 89-year-old  female with history of hypertension, hyperlipidemia, type II diabetes mellitus, DVT/PE status post IVC filter placement, and bladder cancer who originally presented to [facility] ER on 4/16/2023 with fatigue, generalized weakness, dysuria, dark urine, and lethargy and she was hypotensive with a blood pressure of 66/33 on EMS arrival.  Her initial lab work was remarkable for a WBC count of 34.3, lactic acid of 3.8, troponin of 0.115, and BNP of 2571.8 and CT of the head showed no acute intracranial abnormality.  CXR revealed no acute cardiopulmonary process and CT of the chest, abdomen, pelvis demonstrated no evidence of PE and no acute intraabdominal or pelvic solid organ or bowel pathology.  X-ray of the left foot showed minimally displaced 5th proximal phalanx fracture and possible minimally displaced 4th proximal phalanx fracture and x-ray of the left tibia/fibula revealed no acute osseous process.  She was started on broad-spectrum antibiotics with IV vancomycin and zosyn for sepsis and her blood cultures from admission returned positive for MRSA.  ID was consulted and TTE from 4/17/2023 demonstrated no evidence of vegetation. She was scheduled for NM bone scan on 4/19/2023 which was positive for a left lower extremity cellulitis without focal intense concentration. Arterial Doppler of the left lower extremity from 4/20/2023 showed monophasic flow throughout suggestive of inflow disease and MRI of the thoracic and lumbar spine revealed no evidence to suggest discitis/osteomyelitis or drainable fluid collection and  new right-sided hydronephrosis.  Repeat blood cultures remained positive and cardiology was consulted for STARR on 4/21/2023 which demonstrated no valvular vegetations and moderate to severe aortic stenosis.  Her chronic left ankle wound was thought to be the source of her persistent MRSA bacteremia and 6 weeks of IV vancomycin was recommended by ID.  Renal ultrasound from 4/21/2023 confirmed mild-to-moderate hydronephrosis on the right and CT of the abdomen and pelvis showed mild to moderate right hydronephrosis with no significant right ureteral dilatation.  Urology was consulted and she was taken to the OR on 4/25/2023 for cystoscopy with bilateral retrograde pyelograms right ureteral stent placement due to right hydronephrosis with ureteral obstruction.  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  She was evaluated by neurology on 5/02/2023 due to mental status changes thought to be toxic metabolic encephalopathy and CT of the head demonstrated no acute intracranial abnormality.  EEG from 5/2/2023 showed no evidence of seizure activity and repeat CT of the head was unremarkable.  Nephrology was consulted on 5/03/2023 due to worsening hyponatremia and she was treated with samsca and a 1 L fluid restriction with improvement in her sodium levels.  She had no other complications throughout her hospital course and she was discharged to [facility] for PT/OT, completion of IV antibiotics, and management of her medical comorbidities.      Overview/Hospital Course:  She was admitted to [facility] on 5/11/2023 for rehab s/p  hospitalization for persistent MRSA bacteremia with sepsis due to nonhealing left ankle wound, right hydronephrosis with ureteral obstruction s/p right ureteral stent placement, critical limb ischemia of the left lower extremity, toxic metabolic encephalopathy, and hyponatremia.  She was found to have a UTI on urinalysis from 5/11/2023 and her urine culture grew 10,000-25,000 cfu/ml of Pseudomonas aeruginosa.  She was started on a 3 day course of ciprofloxacin 250 mg by mouth twice daily which she completed on 5/16/2023.  Her hemoglobin and hematocrit dropped to 7.6 and 25.1 on 5/12/2023 and she was transfused with 1 unit packed RBC.  She was started on sodium chloride 1 g by mouth daily on 5/15/2023 due to persistent hyponatremia.  Detemir was discontinued on 5/17/2023 due to intermittent hypoglycemia and she was started on metformin 1000 mg by mouth twice daily and glimepiride 2 mg by mouth in the morning. Her scheduled nighttime norco was discontinued on 5/20/2023 due to lethargy. She had a blood sugar of 50 on the afternoon of 5/21/2023 and 57 on the morning of 5/22/2023 and her metformin and glimepiride were held. Her metformin was decreased from 1000 mg to 500 mg PO BID and her glimepiride was discontinued on 5/24/2023 due to persistent hypoglycemia. She complained of left shoulder on 5/25/2023 and she was started on diclofenac 1% gel 4 gm topical to the left shoulder BID prn. Her trazodone was increased from 100 mg to 150 mg PO QHS on 5/28/2023 due to worsening insomnia.      Interval History: She developed left hand and thigh swelling yesterday which has improved. Her blood glucose levels have ranged from 165-216 overnight. She participated in PT and OT yesterday and she required maximum assistance with bed mobility and total assistance with transfers. She was seen by ST yesterday for cognitive-linguistic impairment and she has been more confused and disoriented since yesterday.      Review of Systems   All  other systems reviewed and are negative.  Objective:     Vital Signs (Most Recent):  Temp: 97.5 °F (36.4 °C) (23 07)  Pulse: 82 (23 07)  Resp: 18 (23 0509)  BP: (!) 105/56 (23)  SpO2: 95 % (23 07) Vital Signs (24h Range):  Temp:  [97.5 °F (36.4 °C)-98.7 °F (37.1 °C)] 97.5 °F (36.4 °C)  Pulse:  [] 82  Resp:  [18] 18  SpO2:  [94 %-98 %] 95 %  BP: (105-166)/(56-79) 105/56     Weight: 57.3 kg (126 lb 6.4 oz)  Body mass index is 22.4 kg/m².    Intake/Output Summary (Last 24 hours) at 2023 0733  Last data filed at 2023 0532  Gross per 24 hour   Intake 360 ml   Output 500 ml   Net -140 ml      Physical Exam  General - Elderly  female in no acute distress.  HEENT - NCAT. No scleral icterus. Oropharynx clear. Mucous membranes moist.  Neck - No lymphadenopathy, thyromegaly, or JVD.  CVS - Regular rate and rhythm. No murmurs, rubs, or gallops.  Resp - Lungs are clear to auscultation bilaterally. No rales, wheeze, or rhonchi.   GI - Soft, nontender, nondistended, normoactive bowel sounds present.   Extremities - No clubbing, cyanosis, or edema. Right upper extremity PICC line.  Neuro - CN II through XII are grossly intact. No focal neurological deficits. Alert and oriented to name and  only.  Psych - Flat affect.  Skin -  Ecchymosis to left lateral chest pain. Blanchable erythema noted to dorsal aspect of the left 2nd toe. Left heel with 100% eschar. Hyperpigmentation to left medial lower leg/ankle. Denuded areas present to sacrum and blanchable erythema to periwound.     Significant Labs: All pertinent labs within the past 24 hours have been reviewed.     2023:  CBC: WBC count 8.44, hemoglobin 8.3, hematocrit 27.6, platelet count 322.  BMP: Sodium 128, potassium 3.9, chloride 94, CO2 25, BUN 32.9, creatinine 1.06, glucose 206, calcium 7.4.  UA: 11-20 WBCs, 6-10 RBCs, small leukocyte esterase, trace bacteria, moderate occult blood, 30 protein, negative  nitrite.  Urine culture: No growth.    5/28/2023:  CBC: WBC count 9.87, hemoglobin 8.1, hematocrit 26.6, platelet count 338.  CMP: Sodium 128, potassium 3.6, chloride 94, CO2 27, BUN 31.9, creatinine 0.87, glucose 128, calcium 7.6, magnesium 1.8, alkaline phosphatase 81, total protein 5.7, albumin 1.7, total bilirubin 0.3, AST 9, ALT 6.  Prealbumin 7.8.     5/27/2023:  Vancomycin trough 20.8.     5/25/2023:  Vancomycin trough 23.0.     5/22/2023:  CBC: WBC count 8.86, hemoglobin 8.5, hematocrit 28.5, platelet count 258.  CMP: Sodium 131, potassium 3.4, chloride 94, CO2 27, BUN 17.3, creatinine 0.67, glucose 54, calcium 7.7, magnesium 1.6, alkaline phosphatase 94, total protein 5.6, albumin 1.8, total bilirubin 0.5, AST 10, ALT 10.  Vancomycin trough 24.1.     5/19/2023:  CBC: WBC count 10.83, hemoglobin 9, hematocrit 30.4, platelet count 274.  BMP: Sodium 130, potassium 3.5, chloride 94, CO2 28, BUN 18.4, creatinine 0.68, glucose 125, calcium 8.3.     5/18/2023:  CBC: WBC count 9.84, hemoglobin 9, hematocrit 30.7,  platelet count 261.  BMP: Sodium 134, potassium 3.7, chloride 94, CO2 33, BUN 20.6, creatinine 0.69, glucose 167, calcium 7.8, magnesium 1.9.     5/16/2023:  BMP: Sodium 131, potassium 4.2, chloride 4.2, chloride 90, CO2 33, BUN 18.9, creatinine 0.65, glucose 188, calcium 7.9, magnesium 1.9.     5/15/2023:  BMP: Sodium 128, potassium 3.3, chloride 91, CO2 34, BUN 18, creatinine 0.67, glucose 76, calcium 7.9.     5/13/2023:  CBC: WBC count 10.82, hemoglobin 9.1, hematocrit 30, platelet count 248.  BMP: Sodium 128, potassium 3.7, chloride 89, CO2 36, BUN 17.3, creatinine 0.66, glucose 103, calcium 8.2.     Significant Imaging: I have reviewed all pertinent imaging results/findings within the past 24 hours.     CXR 5/16/2023: Left-sided PICC projects over the distal SVC.  Prominent interstitial markings with no focal opacification.     CXR 5/6/2023: The heart is not significantly enlarged.  There is aortic  atherosclerosis.  Similar prominent central pulmonary vasculature.  No new dense consolidation or pneumothorax.     CT head 5/5/2023:   1. No acute intracranial abnormality.  2. Chronic microvascular ischemic changes.     EEG 5/2/2023: No evidence of seizure activity.     CT head 5/2/2023: No acute intracranial abnormality.     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     Renal ultrasound 4/21/2023: Mild-to-moderate hydronephrosis on the right.     CT abdomen/pelvis 4/21/2023: Mild to moderate right hydronephrosis with no significant right ureteral dilatation.       STARR 4/21/2023: No valvular vegetations and moderate to severe aortic stenosis.      MRI thoracic/lumbar spine 4/20/2023: No evidence to suggest discitis/osteomyelitis or drainable fluid collection and new right-sided hydronephrosis.     Arterial Doppler left lower extremity 4/20/2023: Monophasic flow throughout suggestive of inflow disease     NM bone scan 4/19/2023: Left lower extremity cellulitis without focal intense concentration more typically seen with an abscess or osteomyelitis.      TTE 4/17/2023:  Low-normal systolic function with an EF of 54%, grade II left ventricular diastolic dysfunction, mild-to-moderate aortic valve stenosis, and no evidence of vegetation.     CT head 4/16/2023: No acute intracranial abnormality.       CXR 4/16/2023: No acute cardiopulmonary process.     CT chest/abdomen/pelvis 4/16/2023: No evidence of PE and no acute intraabdominal or pelvic solid organ or bowel pathology identified.       X-ray left foot 4/16/2023: Minimally displaced 5th proximal phalanx fracture and possible minimally displaced 4th proximal phalanx fracture.     X-ray left tibia/fibula 4/16/2023: No acute  osseous process.       Assessment/Plan:      * MRSA bacteremia  Continue 6 week course of IV vancomycin until 6/4/2023.  Repeat blood culture from 4/23/2023 were negative.  TTE from 4/17/2023 and STARR from 4/23/2023 showed no evidence of vegetation and NM bone scan on 4/19/2023 was positive for a left lower extremity cellulitis without focal intense concentration more typically seen with an abscess or osteomyelitis.     Sepsis  Resolved.  Continue 6 week course of IV vancomycin until 6/4/2023 as per above.    Right hydronephrosis  Continue tamsulosin.  She is s/p cystoscopy with bilateral retrograde pyelograms right ureteral stent placement on 4/25/2023 due to right hydronephrosis with ureteral obstruction. Renal ultrasound from 4/21/2023 revealed mild-to-moderate hydronephrosis on the right and CT of the abdomen and pelvis showed mild to moderate right hydronephrosis with no significant right ureteral dilatation.  She will need to follow-up with urology as an outpatient 1-2 weeks following discharge for right ureteral stent removal.    Toxic metabolic encephalopathy  Improved. Her scheduled nighttime norco was discontinued on 5/20/2023.  Consider titrating down trazodone.  CT of the head from 5/02/2023 and 5/06/2023 showed no acute intracranial abnormality and EEG from 5/02/2023 revealed no evidence of seizure activity.    Hyponatremia  Continue sodium chloride tablets and 1200 ml fluid restriction. HCTZ has been discontinued. She is s/p treatment with samsca on 5/7/2023.    Severe protein-calorie malnutrition  Continue dietary nutritional supplement per the dietician recommendations. Her prealbumin from 5/28/2023 was 7.8.     Critical limb ischemia of left lower extremity  Continue statin and percocet as needed. She is not currently on any blood thinners. CTA of the abdomen and pelvis with bilateral runoff from 4/26/2023 revealed significant stenosis of the left common iliac artery, severe narrowing versus  occlusion at the left common femoral artery, occlusion at the distal superficial femoral artery, and stenosis at the origin of the superior mesenteric artery and left femoral endarterectomy was recommended by vascular surgery with the family declined.      Urinary tract infection  Resolved.  She completed a 3 day course of ciprofloxacin 250 mg by mouth twice daily on 5/16/2023.  Urine culture from 5/11/2023 grew 10,000-25,000 cfu/ml of Pseudomonas aeruginosa.    Debility  Continue PT/OT.    Hypomagnesemia  Improved.  Continue magnesium oxide.    Hypokalemia  Improved.    Hypertension  Continue carvedilol. She is no longer on losartan-HCTZ.    Type II diabetes mellitus  Continue metformin which was decreased on 5/24/2023. Detemir was discontinued on 5/17/2023 and glimepiride was stopped on 5/24/2023 due to hypoglycemia.  Her hemoglobin A1c from 3/07/2023 was 6.3.      Iron deficiency anemia  Continue ferrous sulfate.  She is s/p blood transfusion with 1 unit packed RBC on 5/12/2023 and she was treated with 3 days of IV ferrlecit.    Chronic diastolic CHF (congestive heart failure)  Compensated. She is not currently on any diuretics.  TTE from 4/17/2023 showed low-normal systolic function with an EF of 54% and grade II left ventricular diastolic dysfunction.     Insomnia  Continue trazodone which was increased on 5/28/2023.    Hyperlipidemia  Continue pravastatin.    Severe aortic stenosis  No acute issues.  She can follow-up with cardiology as an outpatient as she may be a candidate for TAVR.    Chronic constipation  Continue polyethylene glycol.    Disposition  Anticipate discharge home with [facility], a manish lift, and a hospital bed on 6/5/2023 following completion of IV antibiotics on 6/4/2023. Her family has completed manish lift training.        VTE Risk Mitigation (From admission, onward)           Ordered     enoxaparin injection 40 mg  Daily         05/11/23 1414     Place sequential  compression device  Until discontinued         05/11/23 1414                    Discharge Planning   ERIN:      Code Status: DNR   Is the patient medically ready for discharge?:     Reason for patient still in hospital (select all that apply): Treatment, PT / OT recommendations and Pending disposition  Discharge Plan A: Home, Home Health   Discharge Delays: None known at this time      This service was provided via telemedicine.  Type of Software: Audio/Visual.  Originating Site: [facility].  Distant Site: [facility].  Her exam was performed with the assistance of  Anne Romo LPN.      Naun Pope MD  Department of Hospital Medicine   [facility]

## 2023-05-31 NOTE — PROGRESS NOTES
Name: Melodie Villarreal    : 1931 (89 y.o.)  MRN: [id number]           Patient Unavailable      Patient unable to be seen at this time secondary to: patient's daughter at bedside reporting that patient did not sleep well. Will let patient rest a little longer and will come back before lunch.

## 2023-05-31 NOTE — PROGRESS NOTES
Name: Melodie Villarreal    : 1931 (89 y.o.)  MRN: [id number]           Patient Unavailable      Patient unable to be seen at this time secondary to: Pt requesting to remain in bed for the day. Son reports had a bad night. Will continue POC tomorrow as able.

## 2023-05-31 NOTE — SUBJECTIVE & OBJECTIVE
Interval History: She developed left hand and thigh swelling yesterday which has improved. Her blood glucose levels have ranged from 165-216 overnight. She participated in PT and OT yesterday and she required maximum assistance with bed mobility and total assistance with transfers. She was seen by  yesterday for cognitive-linguistic impairment and she has been more confused and disoriented yesterday.      Review of Systems   All other systems reviewed and are negative.  Objective:     Vital Signs (Most Recent):  Temp: 97.5 °F (36.4 °C) (23)  Pulse: 82 (23)  Resp: 18 (23 0509)  BP: (!) 105/56 (23)  SpO2: 95 % (23) Vital Signs (24h Range):  Temp:  [97.5 °F (36.4 °C)-98.7 °F (37.1 °C)] 97.5 °F (36.4 °C)  Pulse:  [] 82  Resp:  [18] 18  SpO2:  [94 %-98 %] 95 %  BP: (105-166)/(56-79) 105/56     Weight: 57.3 kg (126 lb 6.4 oz)  Body mass index is 22.4 kg/m².    Intake/Output Summary (Last 24 hours) at 2023 0733  Last data filed at 2023 0532  Gross per 24 hour   Intake 360 ml   Output 500 ml   Net -140 ml      Physical Exam  General - Elderly  female in no acute distress.  HEENT - NCAT. No scleral icterus. Oropharynx clear. Mucous membranes moist.  Neck - No lymphadenopathy, thyromegaly, or JVD.  CVS - Regular rate and rhythm. No murmurs, rubs, or gallops.  Resp - Lungs are clear to auscultation bilaterally. No rales, wheeze, or rhonchi.   GI - Soft, nontender, nondistended, normoactive bowel sounds present.   Extremities - No clubbing, cyanosis, or edema. Right upper extremity PICC line.  Neuro - CN II through XII are grossly intact. No focal neurological deficits. Alert and oriented to name and  only.  Psych - Flat affect.  Skin -  Ecchymosis to left lateral chest pain. Blanchable erythema noted to dorsal aspect of the left 2nd toe. Left heel with 100% eschar. Hyperpigmentation to left medial lower leg/ankle. Denuded areas present to sacrum  and blanchable erythema to periwound.     Significant Labs: All pertinent labs within the past 24 hours have been reviewed.     5/28/2023:  CBC: WBC count 9.87, hemoglobin 8.1, hematocrit 26.6, platelet count 338.  CMP: Sodium 128, potassium 3.6, chloride 94, CO2 27, BUN 31.9, creatinine 0.87, glucose 128, calcium 7.6, magnesium 1.8, alkaline phosphatase 81, total protein 5.7, albumin 1.7, total bilirubin 0.3, AST 9, ALT 6.  Prealbumin 7.8.     5/27/2023:  Vancomycin trough 20.8.     5/25/2023:  Vancomycin trough 23.0.     5/22/2023:  CBC: WBC count 8.86, hemoglobin 8.5, hematocrit 28.5, platelet count 258.  CMP: Sodium 131, potassium 3.4, chloride 94, CO2 27, BUN 17.3, creatinine 0.67, glucose 54, calcium 7.7, magnesium 1.6, alkaline phosphatase 94, total protein 5.6, albumin 1.8, total bilirubin 0.5, AST 10, ALT 10.  Vancomycin trough 24.1.     5/19/2023:  CBC: WBC count 10.83, hemoglobin 9, hematocrit 30.4, platelet count 274.  BMP: Sodium 130, potassium 3.5, chloride 94, CO2 28, BUN 18.4, creatinine 0.68, glucose 125, calcium 8.3.     5/18/2023:  CBC: WBC count 9.84, hemoglobin 9, hematocrit 30.7,  platelet count 261.  BMP: Sodium 134, potassium 3.7, chloride 94, CO2 33, BUN 20.6, creatinine 0.69, glucose 167, calcium 7.8, magnesium 1.9.     5/16/2023:  BMP: Sodium 131, potassium 4.2, chloride 4.2, chloride 90, CO2 33, BUN 18.9, creatinine 0.65, glucose 188, calcium 7.9, magnesium 1.9.     5/15/2023:  BMP: Sodium 128, potassium 3.3, chloride 91, CO2 34, BUN 18, creatinine 0.67, glucose 76, calcium 7.9.     5/13/2023:  CBC: WBC count 10.82, hemoglobin 9.1, hematocrit 30, platelet count 248.  BMP: Sodium 128, potassium 3.7, chloride 89, CO2 36, BUN 17.3, creatinine 0.66, glucose 103, calcium 8.2.     Significant Imaging: I have reviewed all pertinent imaging results/findings within the past 24 hours.     CXR 5/16/2023: Left-sided PICC projects over the distal SVC.  Prominent interstitial markings with no focal  opacification.     CXR 5/6/2023: The heart is not significantly enlarged.  There is aortic atherosclerosis.  Similar prominent central pulmonary vasculature.  No new dense consolidation or pneumothorax.     CT head 5/5/2023:   1. No acute intracranial abnormality.  2. Chronic microvascular ischemic changes.     EEG 5/2/2023: No evidence of seizure activity.     CT head 5/2/2023: No acute intracranial abnormality.     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     Renal ultrasound 4/21/2023: Mild-to-moderate hydronephrosis on the right.     CT abdomen/pelvis 4/21/2023: Mild to moderate right hydronephrosis with no significant right ureteral dilatation.       STARR 4/21/2023: No valvular vegetations and moderate to severe aortic stenosis.      MRI thoracic/lumbar spine 4/20/2023: No evidence to suggest discitis/osteomyelitis or drainable fluid collection and new right-sided hydronephrosis.     Arterial Doppler left lower extremity 4/20/2023: Monophasic flow throughout suggestive of inflow disease     NM bone scan 4/19/2023: Left lower extremity cellulitis without focal intense concentration more typically seen with an abscess or osteomyelitis.      TTE 4/17/2023:  Low-normal systolic function with an EF of 54%, grade II left ventricular diastolic dysfunction, mild-to-moderate aortic valve stenosis, and no evidence of vegetation.     CT head 4/16/2023: No acute intracranial abnormality.       CXR 4/16/2023: No acute cardiopulmonary process.     CT chest/abdomen/pelvis 4/16/2023: No evidence of PE and no acute intraabdominal or pelvic solid organ or bowel pathology identified.       X-ray left foot 4/16/2023: Minimally displaced 5th proximal phalanx fracture and possible minimally  displaced 4th proximal phalanx fracture.     X-ray left tibia/fibula 4/16/2023: No acute osseous process.

## 2023-05-31 NOTE — PATIENT CARE CONFERENCE
Name: Melodie Villarreal    : 1931 (89 y.o.)  MRN: [id number]            Interdisciplinary Team Conference     Case conference held with patient/family and care team to discuss progress, plan of care, barriers to be addressed for safe return home, equipment recommendations, and discharge planning. Communicated therapy progress with MD, RN, therapy clinicians and case management. All questions/concerns answered.

## 2023-05-31 NOTE — ASSESSMENT & PLAN NOTE
Anticipate discharge home with [facility], a manish lift, and a hospital bed on 6/5/2023 following completion of IV antibiotics on 6/4/2023. Her family has completed manish lift training.

## 2023-06-01 NOTE — PROGRESS NOTES
[facility] Medicine  Telehospitalist Progress Note    Patient Name: Melodie Villarreal  MRN: [id number]  Patient Class: IP- Swing   Admission Date: 5/11/2023  Length of Stay: 21 days  Attending Physician: Naun Pope MD  Primary Care Provider: Wisam Espinosa Jr, MD      Subjective:     Principal Problem:MRSA bacteremia      HPI:  Ms. Villarreal is a 89-year-old  female with history of hypertension, hyperlipidemia, type II diabetes mellitus, DVT/PE status post IVC filter placement, and bladder cancer who originally presented to [facility] on 4/16/2023 with fatigue, generalized weakness, dysuria, dark urine, and lethargy and she was hypotensive with a blood pressure of 66/33 on EMS arrival.  Her initial lab work was remarkable for a WBC count of 34.3, lactic acid of 3.8, troponin of 0.115, and BNP of 2571.8 and CT of the head showed no acute intracranial abnormality.  CXR revealed no acute cardiopulmonary process and CT of the chest, abdomen, pelvis demonstrated no evidence of PE and no acute intraabdominal or pelvic solid organ or bowel pathology.  X-ray of the left foot showed minimally displaced 5th proximal phalanx fracture and possible minimally displaced 4th proximal phalanx fracture and x-ray of the left tibia/fibula revealed no acute osseous process.  She was started on broad-spectrum antibiotics with IV vancomycin and zosyn for sepsis and her blood cultures from admission returned positive for MRSA.  ID was consulted and TTE from 4/17/2023 demonstrated no evidence of vegetation. She was scheduled for NM bone scan on 4/19/2023 which was positive for a left lower extremity cellulitis without focal intense concentration. Arterial Doppler of the left lower extremity from 4/20/2023 showed monophasic flow throughout suggestive of inflow disease and MRI of the thoracic and lumbar spine revealed no evidence to suggest discitis/osteomyelitis or drainable fluid collection and  new right-sided hydronephrosis.  Repeat blood cultures remained positive and cardiology was consulted for STARR on 4/21/2023 which demonstrated no valvular vegetations and moderate to severe aortic stenosis.  Her chronic left ankle wound was thought to be the source of her persistent MRSA bacteremia and 6 weeks of IV vancomycin was recommended by ID.  Renal ultrasound from 4/21/2023 confirmed mild-to-moderate hydronephrosis on the right and CT of the abdomen and pelvis showed mild to moderate right hydronephrosis with no significant right ureteral dilatation.  Urology was consulted and she was taken to the OR on 4/25/2023 for cystoscopy with bilateral retrograde pyelograms right ureteral stent placement due to right hydronephrosis with ureteral obstruction.  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  She was evaluated by neurology on 5/02/2023 due to mental status changes thought to be toxic metabolic encephalopathy and CT of the head demonstrated no acute intracranial abnormality.  EEG from 5/2/2023 showed no evidence of seizure activity and repeat CT of the head was unremarkable.  Nephrology was consulted on 5/03/2023 due to worsening hyponatremia and she was treated with samsca and a 1 L fluid restriction with improvement in her sodium levels.  She had no other complications throughout her hospital course and she was discharged to [facility] for PT/OT, completion of IV antibiotics, and management of her medical comorbidities.      Overview/Hospital Course:  She was admitted to [facility] on 5/11/2023 for rehab s/p  hospitalization for persistent MRSA bacteremia with sepsis due to nonhealing left ankle wound, right hydronephrosis with ureteral obstruction s/p right ureteral stent placement, critical limb ischemia of the left lower extremity, toxic metabolic encephalopathy, and hyponatremia.  She was found to have a UTI on urinalysis from 5/11/2023 and her urine culture grew 10,000-25,000 cfu/ml of Pseudomonas aeruginosa.  She was started on a 3 day course of ciprofloxacin 250 mg by mouth twice daily which she completed on 5/16/2023.  Her hemoglobin and hematocrit dropped to 7.6 and 25.1 on 5/12/2023 and she was transfused with 1 unit packed RBC.  She was started on sodium chloride 1 g by mouth daily on 5/15/2023 due to persistent hyponatremia.  Detemir was discontinued on 5/17/2023 due to intermittent hypoglycemia and she was started on metformin 1000 mg by mouth twice daily and glimepiride 2 mg by mouth in the morning. Her scheduled nighttime norco was discontinued on 5/20/2023 due to lethargy. She had a blood sugar of 50 on the afternoon of 5/21/2023 and 57 on the morning of 5/22/2023 and her metformin and glimepiride were held. Her metformin was decreased from 1000 mg to 500 mg PO BID and her glimepiride was discontinued on 5/24/2023 due to persistent hypoglycemia. She complained of left shoulder on 5/25/2023 and she was started on diclofenac 1% gel 4 gm topical to the left shoulder BID prn. Her trazodone was increased from 100 mg to 150 mg PO QHS on 5/28/2023 due to worsening insomnia.      Interval History: She is still confused and she did not participate in PT or OT yesterday.      Review of Systems   All other systems reviewed and are negative.  Objective:     Vital Signs (Most Recent):  Temp: 97.6 °F (36.4 °C) (06/01/23 0659)  Pulse: 84 (06/01/23 0659)  Resp: 18 (05/31/23 1947)  BP: 132/66 (06/01/23 0659)  SpO2: 97 % (06/01/23 0659) Vital Signs (24h Range):  Temp:  [97.6 °F (36.4 °C)-98.5 °F (36.9 °C)] 97.6 °F (36.4  °C)  Pulse:  [80-96] 84  Resp:  [18-20] 18  SpO2:  [96 %-98 %] 97 %  BP: (110-132)/(60-71) 132/66     Weight: 59.6 kg (131 lb 6.3 oz)  Body mass index is 23.28 kg/m².    Intake/Output Summary (Last 24 hours) at 2023 0849  Last data filed at 2023 0623  Gross per 24 hour   Intake 480 ml   Output 1050 ml   Net -570 ml         Physical Exam  General - Elderly  female in no acute distress.  HEENT - NCAT. No scleral icterus. Oropharynx clear. Mucous membranes moist.  Neck - No lymphadenopathy, thyromegaly, or JVD.  CVS - Regular rate and rhythm. No murmurs, rubs, or gallops.  Resp - Lungs are clear to auscultation bilaterally. No rales, wheeze, or rhonchi.   GI - Soft, nontender, nondistended, normoactive bowel sounds present.   Extremities - No clubbing, cyanosis, or edema. Right upper extremity PICC line.  Neuro - CN II through XII are grossly intact. No focal neurological deficits. Alert and oriented to name and  only.  Psych - Flat affect.  Skin -  Ecchymosis to left lateral chest pain. Blanchable erythema noted to dorsal aspect of the left 2nd toe. Left heel with 100% eschar. Hyperpigmentation to left medial lower leg/ankle. Denuded areas present to sacrum and blanchable erythema to periwound.     Significant Labs: All pertinent labs within the past 24 hours have been reviewed.     2023:  CBC: WBC count 8.44, hemoglobin 8.3, hematocrit 27.6, platelet count 322.  BMP: Sodium 128, potassium 3.9, chloride 94, CO2 25, BUN 32.9, creatinine 1.06, glucose 206, calcium 7.4.  UA: 11-20 WBCs, 6-10 RBCs, small leukocyte esterase, trace bacteria, moderate occult blood, 30 protein, negative nitrite.  Urine culture: No growth.     2023:  CBC: WBC count 9.87, hemoglobin 8.1, hematocrit 26.6, platelet count 338.  CMP: Sodium 128, potassium 3.6, chloride 94, CO2 27, BUN 31.9, creatinine 0.87, glucose 128, calcium 7.6, magnesium 1.8, alkaline phosphatase 81, total protein 5.7, albumin 1.7, total bilirubin  0.3, AST 9, ALT 6.  Prealbumin 7.8.     5/27/2023:  Vancomycin trough 20.8.     5/25/2023:  Vancomycin trough 23.0.     5/22/2023:  CBC: WBC count 8.86, hemoglobin 8.5, hematocrit 28.5, platelet count 258.  CMP: Sodium 131, potassium 3.4, chloride 94, CO2 27, BUN 17.3, creatinine 0.67, glucose 54, calcium 7.7, magnesium 1.6, alkaline phosphatase 94, total protein 5.6, albumin 1.8, total bilirubin 0.5, AST 10, ALT 10.  Vancomycin trough 24.1.     5/19/2023:  CBC: WBC count 10.83, hemoglobin 9, hematocrit 30.4, platelet count 274.  BMP: Sodium 130, potassium 3.5, chloride 94, CO2 28, BUN 18.4, creatinine 0.68, glucose 125, calcium 8.3.     5/18/2023:  CBC: WBC count 9.84, hemoglobin 9, hematocrit 30.7,  platelet count 261.  BMP: Sodium 134, potassium 3.7, chloride 94, CO2 33, BUN 20.6, creatinine 0.69, glucose 167, calcium 7.8, magnesium 1.9.     5/16/2023:  BMP: Sodium 131, potassium 4.2, chloride 4.2, chloride 90, CO2 33, BUN 18.9, creatinine 0.65, glucose 188, calcium 7.9, magnesium 1.9.     5/15/2023:  BMP: Sodium 128, potassium 3.3, chloride 91, CO2 34, BUN 18, creatinine 0.67, glucose 76, calcium 7.9.     5/13/2023:  CBC: WBC count 10.82, hemoglobin 9.1, hematocrit 30, platelet count 248.  BMP: Sodium 128, potassium 3.7, chloride 89, CO2 36, BUN 17.3, creatinine 0.66, glucose 103, calcium 8.2.     Significant Imaging: I have reviewed all pertinent imaging results/findings within the past 24 hours.     CXR 5/16/2023: Left-sided PICC projects over the distal SVC.  Prominent interstitial markings with no focal opacification.     CXR 5/6/2023: The heart is not significantly enlarged.  There is aortic atherosclerosis.  Similar prominent central pulmonary vasculature.  No new dense consolidation or pneumothorax.     CT head 5/5/2023:   1. No acute intracranial abnormality.  2. Chronic microvascular ischemic changes.     EEG 5/2/2023: No evidence of seizure activity.     CT head 5/2/2023: No acute intracranial  abnormality.     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     Renal ultrasound 4/21/2023: Mild-to-moderate hydronephrosis on the right.     CT abdomen/pelvis 4/21/2023: Mild to moderate right hydronephrosis with no significant right ureteral dilatation.       STARR 4/21/2023: No valvular vegetations and moderate to severe aortic stenosis.      MRI thoracic/lumbar spine 4/20/2023: No evidence to suggest discitis/osteomyelitis or drainable fluid collection and new right-sided hydronephrosis.     Arterial Doppler left lower extremity 4/20/2023: Monophasic flow throughout suggestive of inflow disease     NM bone scan 4/19/2023: Left lower extremity cellulitis without focal intense concentration more typically seen with an abscess or osteomyelitis.      TTE 4/17/2023:  Low-normal systolic function with an EF of 54%, grade II left ventricular diastolic dysfunction, mild-to-moderate aortic valve stenosis, and no evidence of vegetation.     CT head 4/16/2023: No acute intracranial abnormality.       CXR 4/16/2023: No acute cardiopulmonary process.     CT chest/abdomen/pelvis 4/16/2023: No evidence of PE and no acute intraabdominal or pelvic solid organ or bowel pathology identified.       X-ray left foot 4/16/2023: Minimally displaced 5th proximal phalanx fracture and possible minimally displaced 4th proximal phalanx fracture.     X-ray left tibia/fibula 4/16/2023: No acute osseous process.       Assessment/Plan:      * MRSA bacteremia  Continue 6 week course of IV vancomycin until 6/4/2023.  Repeat blood culture from 4/23/2023 were negative.  TTE from 4/17/2023 and STARR from 4/23/2023 showed no evidence of vegetation and NM bone scan on 4/19/2023 was positive for a left lower  extremity cellulitis without focal intense concentration more typically seen with an abscess or osteomyelitis.     Sepsis  Resolved.  Continue 6 week course of IV vancomycin until 6/4/2023 as per above.    Right hydronephrosis  Continue tamsulosin.  She is s/p cystoscopy with bilateral retrograde pyelograms right ureteral stent placement on 4/25/2023 due to right hydronephrosis with ureteral obstruction. Renal ultrasound from 4/21/2023 revealed mild-to-moderate hydronephrosis on the right and CT of the abdomen and pelvis showed mild to moderate right hydronephrosis with no significant right ureteral dilatation.  She will need to follow-up with urology as an outpatient 1-2 weeks following discharge for right ureteral stent removal.    Toxic metabolic encephalopathy  Improved. Her scheduled nighttime norco was discontinued on 5/20/2023.  Consider titrating down trazodone.  CT of the head from 5/02/2023 and 5/06/2023 showed no acute intracranial abnormality and EEG from 5/02/2023 revealed no evidence of seizure activity.    Hyponatremia  Continue sodium chloride tablets and 1200 ml fluid restriction. HCTZ has been discontinued. She is s/p treatment with samsca on 5/7/2023.    Severe protein-calorie malnutrition  Continue dietary nutritional supplement per the dietician recommendations. Her prealbumin from 5/28/2023 was 7.8.     Critical limb ischemia of left lower extremity  Continue statin and percocet as needed. She is not currently on any blood thinners. 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      Urinary tract infection  Resolved.  She completed a 3 day  course of ciprofloxacin 250 mg by mouth twice daily on 5/16/2023.  Urine culture from 5/11/2023 grew 10,000-25,000 cfu/ml of Pseudomonas aeruginosa.    Debility  Continue PT/OT.    Hypomagnesemia  Improved.  Continue magnesium oxide.    Hypokalemia  Improved.    Hypertension  Continue carvedilol. She is no longer on losartan-HCTZ.    Type II diabetes mellitus  Continue metformin which was decreased on 5/24/2023. Detemir was discontinued on 5/17/2023 and glimepiride was stopped on 5/24/2023 due to hypoglycemia.  Her hemoglobin A1c from 3/07/2023 was 6.3.      Iron deficiency anemia  Continue ferrous sulfate.  She is s/p blood transfusion with 1 unit packed RBC on 5/12/2023 and she was treated with 3 days of IV ferrlecit.    Chronic diastolic CHF (congestive heart failure)  Compensated. She is not currently on any diuretics.  TTE from 4/17/2023 showed low-normal systolic function with an EF of 54% and grade II left ventricular diastolic dysfunction.     Insomnia  Continue trazodone which was increased on 5/28/2023.    Hyperlipidemia  Continue pravastatin.    Severe aortic stenosis  No acute issues.  She can follow-up with cardiology as an outpatient as she may be a candidate for TAVR.    Chronic constipation  Continue polyethylene glycol.    Disposition  Anticipate discharge home with [facility], a manish lift, and a hospital bed on 6/5/2023 following completion of IV antibiotics on 6/4/2023. Her family has completed manish lift training.        VTE Risk Mitigation (From admission, onward)         Ordered     enoxaparin injection 40 mg  Daily         05/11/23 1414     Place sequential compression device  Until discontinued         05/11/23 1414                Discharge Planning   ERIN:      Code Status: DNR   Is the patient medically ready for discharge?:     Reason for patient still in hospital (select all that apply): PT / OT recommendations and Pending disposition  Discharge Plan A: Home, Home  Health   Discharge Delays: None known at this time      This service was provided via telemedicine.  Type of Software: Audio/Visual.  Originating Site: [facility].  Distant Site: [facility].  Her exam was performed with the assistance of Belkys Momin RN.      Naun Pope MD  Department of Hospital Medicine   [facility]

## 2023-06-01 NOTE — PROGRESS NOTES
Unable to treat pt at this time d/t pt feeling tired and daughter stating that she had a bad night last night. Will f/u in PM.

## 2023-06-01 NOTE — NURSING
Nurses Note -- 4 Eyes      5/31/2023   9:08 PM      Skin assessed during: Daily Assessment      [x] No Altered Skin Integrity Present    []Prevention Measures Documented      [] Yes- Altered Skin Integrity Present or Discovered   [] LDA Added if Not in Epic (Describe Wound)   [] New Altered Skin Integrity was Present on Admit and Documented in LDA   [] Wound Image Taken    Wound Care Consulted? No    Attending Nurse:  Vanna Solis RN     Second RN/Staff Member:  Heather Shipley LPN

## 2023-06-01 NOTE — PLAN OF CARE
Problem: Adult Inpatient Plan of Care  Goal: Patient-Specific Goal (Individualized)  Outcome: Ongoing, Progressing  Flowsheets (Taken 5/31/2023 2107)  Individualized Care Needs: Manage pain and blood sugars during shift     Problem: Diabetes Comorbidity  Goal: Blood Glucose Level Within Targeted Range  Outcome: Ongoing, Progressing  Intervention: Monitor and Manage Glycemia  Flowsheets (Taken 5/31/2023 2107)  Glycemic Management: blood glucose monitored     Problem: Adjustment to Illness (Sepsis/Septic Shock)  Goal: Optimal Coping  Outcome: Ongoing, Progressing  Intervention: Optimize Psychosocial Adjustment to Illness  Flowsheets (Taken 5/31/2023 2107)  Supportive Measures:   active listening utilized   decision-making supported   positive reinforcement provided   relaxation techniques promoted  Family/Support System Care:   self-care encouraged   involvement promoted   support provided

## 2023-06-01 NOTE — SUBJECTIVE & OBJECTIVE
Interval History: She is still confused and she did not participate in PT or OT yesterday.      Review of Systems   All other systems reviewed and are negative.  Objective:     Vital Signs (Most Recent):  Temp: 97.6 °F (36.4 °C) (23)  Pulse: 84 (23)  Resp: 18 (23)  BP: 132/66 (23)  SpO2: 97 % (23) Vital Signs (24h Range):  Temp:  [97.6 °F (36.4 °C)-98.5 °F (36.9 °C)] 97.6 °F (36.4 °C)  Pulse:  [80-96] 84  Resp:  [18-20] 18  SpO2:  [96 %-98 %] 97 %  BP: (110-132)/(60-71) 132/66     Weight: 59.6 kg (131 lb 6.3 oz)  Body mass index is 23.28 kg/m².    Intake/Output Summary (Last 24 hours) at 2023 0849  Last data filed at 2023 0623  Gross per 24 hour   Intake 480 ml   Output 1050 ml   Net -570 ml         Physical Exam  General - Elderly  female in no acute distress.  HEENT - NCAT. No scleral icterus. Oropharynx clear. Mucous membranes moist.  Neck - No lymphadenopathy, thyromegaly, or JVD.  CVS - Regular rate and rhythm. No murmurs, rubs, or gallops.  Resp - Lungs are clear to auscultation bilaterally. No rales, wheeze, or rhonchi.   GI - Soft, nontender, nondistended, normoactive bowel sounds present.   Extremities - No clubbing, cyanosis, or edema. Right upper extremity PICC line.  Neuro - CN II through XII are grossly intact. No focal neurological deficits. Alert and oriented to name and  only.  Psych - Flat affect.  Skin -  Ecchymosis to left lateral chest pain. Blanchable erythema noted to dorsal aspect of the left 2nd toe. Left heel with 100% eschar. Hyperpigmentation to left medial lower leg/ankle. Denuded areas present to sacrum and blanchable erythema to periwound.     Significant Labs: All pertinent labs within the past 24 hours have been reviewed.     2023:  CBC: WBC count 8.44, hemoglobin 8.3, hematocrit 27.6, platelet count 322.  BMP: Sodium 128, potassium 3.9, chloride 94, CO2 25, BUN 32.9, creatinine 1.06, glucose 206, calcium  7.4.  UA: 11-20 WBCs, 6-10 RBCs, small leukocyte esterase, trace bacteria, moderate occult blood, 30 protein, negative nitrite.  Urine culture: No growth.     5/28/2023:  CBC: WBC count 9.87, hemoglobin 8.1, hematocrit 26.6, platelet count 338.  CMP: Sodium 128, potassium 3.6, chloride 94, CO2 27, BUN 31.9, creatinine 0.87, glucose 128, calcium 7.6, magnesium 1.8, alkaline phosphatase 81, total protein 5.7, albumin 1.7, total bilirubin 0.3, AST 9, ALT 6.  Prealbumin 7.8.     5/27/2023:  Vancomycin trough 20.8.     5/25/2023:  Vancomycin trough 23.0.     5/22/2023:  CBC: WBC count 8.86, hemoglobin 8.5, hematocrit 28.5, platelet count 258.  CMP: Sodium 131, potassium 3.4, chloride 94, CO2 27, BUN 17.3, creatinine 0.67, glucose 54, calcium 7.7, magnesium 1.6, alkaline phosphatase 94, total protein 5.6, albumin 1.8, total bilirubin 0.5, AST 10, ALT 10.  Vancomycin trough 24.1.     5/19/2023:  CBC: WBC count 10.83, hemoglobin 9, hematocrit 30.4, platelet count 274.  BMP: Sodium 130, potassium 3.5, chloride 94, CO2 28, BUN 18.4, creatinine 0.68, glucose 125, calcium 8.3.     5/18/2023:  CBC: WBC count 9.84, hemoglobin 9, hematocrit 30.7,  platelet count 261.  BMP: Sodium 134, potassium 3.7, chloride 94, CO2 33, BUN 20.6, creatinine 0.69, glucose 167, calcium 7.8, magnesium 1.9.     5/16/2023:  BMP: Sodium 131, potassium 4.2, chloride 4.2, chloride 90, CO2 33, BUN 18.9, creatinine 0.65, glucose 188, calcium 7.9, magnesium 1.9.     5/15/2023:  BMP: Sodium 128, potassium 3.3, chloride 91, CO2 34, BUN 18, creatinine 0.67, glucose 76, calcium 7.9.     5/13/2023:  CBC: WBC count 10.82, hemoglobin 9.1, hematocrit 30, platelet count 248.  BMP: Sodium 128, potassium 3.7, chloride 89, CO2 36, BUN 17.3, creatinine 0.66, glucose 103, calcium 8.2.     Significant Imaging: I have reviewed all pertinent imaging results/findings within the past 24 hours.     CXR 5/16/2023: Left-sided PICC projects over the distal SVC.  Prominent  interstitial markings with no focal opacification.     CXR 5/6/2023: The heart is not significantly enlarged.  There is aortic atherosclerosis.  Similar prominent central pulmonary vasculature.  No new dense consolidation or pneumothorax.     CT head 5/5/2023:   1. No acute intracranial abnormality.  2. Chronic microvascular ischemic changes.     EEG 5/2/2023: No evidence of seizure activity.     CT head 5/2/2023: No acute intracranial abnormality.     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     Renal ultrasound 4/21/2023: Mild-to-moderate hydronephrosis on the right.     CT abdomen/pelvis 4/21/2023: Mild to moderate right hydronephrosis with no significant right ureteral dilatation.       STARR 4/21/2023: No valvular vegetations and moderate to severe aortic stenosis.      MRI thoracic/lumbar spine 4/20/2023: No evidence to suggest discitis/osteomyelitis or drainable fluid collection and new right-sided hydronephrosis.     Arterial Doppler left lower extremity 4/20/2023: Monophasic flow throughout suggestive of inflow disease     NM bone scan 4/19/2023: Left lower extremity cellulitis without focal intense concentration more typically seen with an abscess or osteomyelitis.      TTE 4/17/2023:  Low-normal systolic function with an EF of 54%, grade II left ventricular diastolic dysfunction, mild-to-moderate aortic valve stenosis, and no evidence of vegetation.     CT head 4/16/2023: No acute intracranial abnormality.       CXR 4/16/2023: No acute cardiopulmonary process.     CT chest/abdomen/pelvis 4/16/2023: No evidence of PE and no acute intraabdominal or pelvic solid organ or bowel pathology identified.       X-ray left foot 4/16/2023: Minimally displaced 5th proximal  phalanx fracture and possible minimally displaced 4th proximal phalanx fracture.     X-ray left tibia/fibula 4/16/2023: No acute osseous process.

## 2023-06-01 NOTE — PT/OT/SLP PROGRESS
"Name: Melodie Villarreal    : 1931 (89 y.o.)  MRN: [id number]           Patient Unavailable      Patient unable to be seen at this time secondary to: attempted to see pt but requested to stop per patient- daughter reports "she had a bad night, I think she is giving up"          "

## 2023-06-02 NOTE — PT/OT/SLP PROGRESS
Name: Melodie Villarreal    : 1931 (89 y.o.)  MRN: [id number]           Patient Unavailable      Patient unable to be seen at this time secondary to: attempted to see pt x 3 this AM... will see this PM  1) with ST  2) with wound care  3) with OT

## 2023-06-02 NOTE — PROGRESS NOTES
[facility] Medicine  Telehospitalist Progress Note    Patient Name: Melodie Villarreal  MRN: [id number]  Patient Class: IP- Swing   Admission Date: 5/11/2023  Length of Stay: 22 days  Attending Physician: Naun Pope MD  Primary Care Provider: Wisam Espinosa Jr, MD      Subjective:     Principal Problem:MRSA bacteremia      HPI:  Ms. Villarreal is a 89-year-old  female with history of hypertension, hyperlipidemia, type II diabetes mellitus, DVT/PE status post IVC filter placement, and bladder cancer who originally presented to [facility] ER on 4/16/2023 with fatigue, generalized weakness, dysuria, dark urine, and lethargy and she was hypotensive with a blood pressure of 66/33 on EMS arrival.  Her initial lab work was remarkable for a WBC count of 34.3, lactic acid of 3.8, troponin of 0.115, and BNP of 2571.8 and CT of the head showed no acute intracranial abnormality.  CXR revealed no acute cardiopulmonary process and CT of the chest, abdomen, pelvis demonstrated no evidence of PE and no acute intraabdominal or pelvic solid organ or bowel pathology.  X-ray of the left foot showed minimally displaced 5th proximal phalanx fracture and possible minimally displaced 4th proximal phalanx fracture and x-ray of the left tibia/fibula revealed no acute osseous process.  She was started on broad-spectrum antibiotics with IV vancomycin and zosyn for sepsis and her blood cultures from admission returned positive for MRSA.  ID was consulted and TTE from 4/17/2023 demonstrated no evidence of vegetation. She was scheduled for NM bone scan on 4/19/2023 which was positive for a left lower extremity cellulitis without focal intense concentration. Arterial Doppler of the left lower extremity from 4/20/2023 showed monophasic flow throughout suggestive of inflow disease and MRI of the thoracic and lumbar spine revealed no evidence to suggest discitis/osteomyelitis or drainable fluid collection and  new right-sided hydronephrosis.  Repeat blood cultures remained positive and cardiology was consulted for STARR on 4/21/2023 which demonstrated no valvular vegetations and moderate to severe aortic stenosis.  Her chronic left ankle wound was thought to be the source of her persistent MRSA bacteremia and 6 weeks of IV vancomycin was recommended by ID.  Renal ultrasound from 4/21/2023 confirmed mild-to-moderate hydronephrosis on the right and CT of the abdomen and pelvis showed mild to moderate right hydronephrosis with no significant right ureteral dilatation.  Urology was consulted and she was taken to the OR on 4/25/2023 for cystoscopy with bilateral retrograde pyelograms right ureteral stent placement due to right hydronephrosis with ureteral obstruction.  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  She was evaluated by neurology on 5/02/2023 due to mental status changes thought to be toxic metabolic encephalopathy and CT of the head demonstrated no acute intracranial abnormality.  EEG from 5/2/2023 showed no evidence of seizure activity and repeat CT of the head was unremarkable.  Nephrology was consulted on 5/03/2023 due to worsening hyponatremia and she was treated with samsca and a 1 L fluid restriction with improvement in her sodium levels.  She had no other complications throughout her hospital course and she was discharged to [facility] for PT/OT, completion of IV antibiotics, and management of her medical comorbidities.      Overview/Hospital Course:  She was admitted to [facility] on 5/11/2023 for rehab s/p  hospitalization for persistent MRSA bacteremia with sepsis due to nonhealing left ankle wound, right hydronephrosis with ureteral obstruction s/p right ureteral stent placement, critical limb ischemia of the left lower extremity, toxic metabolic encephalopathy, and hyponatremia.  She was found to have a UTI on urinalysis from 5/11/2023 and her urine culture grew 10,000-25,000 cfu/ml of Pseudomonas aeruginosa.  She was started on a 3 day course of ciprofloxacin 250 mg by mouth twice daily which she completed on 5/16/2023.  Her hemoglobin and hematocrit dropped to 7.6 and 25.1 on 5/12/2023 and she was transfused with 1 unit packed RBC.  She was started on sodium chloride 1 g by mouth daily on 5/15/2023 due to persistent hyponatremia.  Detemir was discontinued on 5/17/2023 due to intermittent hypoglycemia and she was started on metformin 1000 mg by mouth twice daily and glimepiride 2 mg by mouth in the morning. Her scheduled nighttime norco was discontinued on 5/20/2023 due to lethargy. She had a blood sugar of 50 on the afternoon of 5/21/2023 and 57 on the morning of 5/22/2023 and her metformin and glimepiride were held. Her metformin was decreased from 1000 mg to 500 mg PO BID and her glimepiride was discontinued on 5/24/2023 due to persistent hypoglycemia. She complained of left shoulder on 5/25/2023 and she was started on diclofenac 1% gel 4 gm topical to the left shoulder BID prn. Her trazodone was increased from 100 mg to 150 mg PO QHS on 5/28/2023 due to worsening insomnia.      Interval History: She did not participate in PT or OT yesterday but she is willing to work with them today. She has gained 4 lbs in the last 24 hours but she denies any bilateral lower extremity edema or shortness of breath. Her daughter contacted her urologist this morning regarding right ureteral stent removal.      Review of Systems   All other systems reviewed and are negative.  Objective:     Vital Signs (Most Recent):  Temp: 97.8  °F (36.6 °C) (23 0750)  Pulse: 88 (23 0750)  Resp: 17 (23 0418)  BP: 130/69 (23 0750)  SpO2: 95 % (23 0750) Vital Signs (24h Range):  Temp:  [97.2 °F (36.2 °C)-98.8 °F (37.1 °C)] 97.8 °F (36.6 °C)  Pulse:  [82-97] 88  Resp:  [17-18] 17  SpO2:  [94 %-97 %] 95 %  BP: (101-130)/(54-74) 130/69     Weight: 59.6 kg (131 lb 6.3 oz)  Body mass index is 23.28 kg/m².    Intake/Output Summary (Last 24 hours) at 2023 0807  Last data filed at 2023 0729  Gross per 24 hour   Intake 580 ml   Output 1250 ml   Net -670 ml      Physical Exam  General - Elderly  female in no acute distress.  HEENT - NCAT. No scleral icterus. Oropharynx clear. Mucous membranes moist.  Neck - No lymphadenopathy, thyromegaly, or JVD.  CVS - Regular rate and rhythm. No murmurs, rubs, or gallops.  Resp - Lungs are clear to auscultation bilaterally. No rales, wheeze, or rhonchi.   GI - Soft, nontender, nondistended, normoactive bowel sounds present.   Extremities - No clubbing, cyanosis, or edema. Right upper extremity PICC line.  Neuro - CN II through XII are grossly intact. No focal neurological deficits. Alert and oriented to name and  only.  Psych - Flat affect.  Skin -  Ecchymosis to left lateral chest pain. Blanchable erythema noted to dorsal aspect of the left 2nd toe. Left heel with 100% eschar. Hyperpigmentation to left medial lower leg/ankle. Denuded areas present to sacrum and blanchable erythema to periwound.     Significant Labs: All pertinent labs within the past 24 hours have been reviewed.     2023:  CBC: WBC count 8.44, hemoglobin 8.3, hematocrit 27.6, platelet count 322.  BMP: Sodium 128, potassium 3.9, chloride 94, CO2 25, BUN 32.9, creatinine 1.06, glucose 206, calcium 7.4.  UA: 11-20 WBCs, 6-10 RBCs, small leukocyte esterase, trace bacteria, moderate occult blood, 30 protein, negative nitrite.  Urine culture: No growth.     2023:  CBC: WBC count 9.87, hemoglobin 8.1, hematocrit  26.6, platelet count 338.  CMP: Sodium 128, potassium 3.6, chloride 94, CO2 27, BUN 31.9, creatinine 0.87, glucose 128, calcium 7.6, magnesium 1.8, alkaline phosphatase 81, total protein 5.7, albumin 1.7, total bilirubin 0.3, AST 9, ALT 6.  Prealbumin 7.8.     5/27/2023:  Vancomycin trough 20.8.     5/25/2023:  Vancomycin trough 23.0.     5/22/2023:  CBC: WBC count 8.86, hemoglobin 8.5, hematocrit 28.5, platelet count 258.  CMP: Sodium 131, potassium 3.4, chloride 94, CO2 27, BUN 17.3, creatinine 0.67, glucose 54, calcium 7.7, magnesium 1.6, alkaline phosphatase 94, total protein 5.6, albumin 1.8, total bilirubin 0.5, AST 10, ALT 10.  Vancomycin trough 24.1.     5/19/2023:  CBC: WBC count 10.83, hemoglobin 9, hematocrit 30.4, platelet count 274.  BMP: Sodium 130, potassium 3.5, chloride 94, CO2 28, BUN 18.4, creatinine 0.68, glucose 125, calcium 8.3.     5/18/2023:  CBC: WBC count 9.84, hemoglobin 9, hematocrit 30.7,  platelet count 261.  BMP: Sodium 134, potassium 3.7, chloride 94, CO2 33, BUN 20.6, creatinine 0.69, glucose 167, calcium 7.8, magnesium 1.9.     5/16/2023:  BMP: Sodium 131, potassium 4.2, chloride 4.2, chloride 90, CO2 33, BUN 18.9, creatinine 0.65, glucose 188, calcium 7.9, magnesium 1.9.     5/15/2023:  BMP: Sodium 128, potassium 3.3, chloride 91, CO2 34, BUN 18, creatinine 0.67, glucose 76, calcium 7.9.     5/13/2023:  CBC: WBC count 10.82, hemoglobin 9.1, hematocrit 30, platelet count 248.  BMP: Sodium 128, potassium 3.7, chloride 89, CO2 36, BUN 17.3, creatinine 0.66, glucose 103, calcium 8.2.     Significant Imaging: I have reviewed all pertinent imaging results/findings within the past 24 hours.     CXR 5/16/2023: Left-sided PICC projects over the distal SVC.  Prominent interstitial markings with no focal opacification.     CXR 5/6/2023: The heart is not significantly enlarged.  There is aortic atherosclerosis.  Similar prominent central pulmonary vasculature.  No new dense consolidation or  pneumothorax.     CT head 5/5/2023:   1. No acute intracranial abnormality.  2. Chronic microvascular ischemic changes.     EEG 5/2/2023: No evidence of seizure activity.     CT head 5/2/2023: No acute intracranial abnormality.     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     Renal ultrasound 4/21/2023: Mild-to-moderate hydronephrosis on the right.     CT abdomen/pelvis 4/21/2023: Mild to moderate right hydronephrosis with no significant right ureteral dilatation.       STARR 4/21/2023: No valvular vegetations and moderate to severe aortic stenosis.      MRI thoracic/lumbar spine 4/20/2023: No evidence to suggest discitis/osteomyelitis or drainable fluid collection and new right-sided hydronephrosis.     Arterial Doppler left lower extremity 4/20/2023: Monophasic flow throughout suggestive of inflow disease     NM bone scan 4/19/2023: Left lower extremity cellulitis without focal intense concentration more typically seen with an abscess or osteomyelitis.      TTE 4/17/2023:  Low-normal systolic function with an EF of 54%, grade II left ventricular diastolic dysfunction, mild-to-moderate aortic valve stenosis, and no evidence of vegetation.     CT head 4/16/2023: No acute intracranial abnormality.       CXR 4/16/2023: No acute cardiopulmonary process.     CT chest/abdomen/pelvis 4/16/2023: No evidence of PE and no acute intraabdominal or pelvic solid organ or bowel pathology identified.       X-ray left foot 4/16/2023: Minimally displaced 5th proximal phalanx fracture and possible minimally displaced 4th proximal phalanx fracture.     X-ray left tibia/fibula 4/16/2023: No acute osseous process.       Assessment/Plan:      * MRSA bacteremia  Continue 6 week course of IV  vancomycin until 6/4/2023.  Repeat blood culture from 4/23/2023 were negative.  TTE from 4/17/2023 and STARR from 4/23/2023 showed no evidence of vegetation and NM bone scan on 4/19/2023 was positive for a left lower extremity cellulitis without focal intense concentration more typically seen with an abscess or osteomyelitis.     Sepsis  Resolved.  Continue 6 week course of IV vancomycin until 6/4/2023 as per above.    Right hydronephrosis  Continue tamsulosin.She will need to follow-up with urology as an outpatient 1-2 weeks following discharge for right ureteral stent removal. She is s/p cystoscopy with bilateral retrograde pyelograms right ureteral stent placement on 4/25/2023 due to right hydronephrosis with ureteral obstruction. Renal ultrasound from 4/21/2023 revealed mild-to-moderate hydronephrosis on the right and CT of the abdomen and pelvis showed mild to moderate right hydronephrosis with no significant right ureteral dilatation.      Toxic metabolic encephalopathy  Improved. Her scheduled nighttime norco was discontinued on 5/20/2023.  Consider titrating down trazodone.  CT of the head from 5/02/2023 and 5/06/2023 showed no acute intracranial abnormality and EEG from 5/02/2023 revealed no evidence of seizure activity.    Hyponatremia  Continue sodium chloride tablets and 1200 ml fluid restriction. HCTZ has been discontinued. She is s/p treatment with samsca on 5/7/2023.    Severe protein-calorie malnutrition  Continue dietary nutritional supplement per the dietician recommendations. Her prealbumin from 5/28/2023 was 7.8.     Critical limb ischemia of left lower extremity  Continue statin and percocet as needed. She is not currently on any blood thinners. CTA of the abdomen and pelvis with bilateral runoff from 4/26/2023 revealed significant stenosis of the left common iliac artery, severe narrowing versus occlusion at the left common femoral artery, occlusion at the distal superficial femoral artery, and  stenosis at the origin of the superior mesenteric artery and left femoral endarterectomy was recommended by vascular surgery with the family declined.      Urinary tract infection  Resolved.  She completed a 3 day course of ciprofloxacin 250 mg by mouth twice daily on 5/16/2023.  Urine culture from 5/11/2023 grew 10,000-25,000 cfu/ml of Pseudomonas aeruginosa.    Debility  Continue PT/OT.    Hypomagnesemia  Improved.  Continue magnesium oxide.    Hypokalemia  Improved.    Hypertension  Continue carvedilol. She is no longer on losartan-HCTZ.    Type II diabetes mellitus  Continue metformin which was decreased on 5/24/2023. Detemir was discontinued on 5/17/2023 and glimepiride was stopped on 5/24/2023 due to hypoglycemia.  Her hemoglobin A1c from 3/07/2023 was 6.3.      Iron deficiency anemia  Continue ferrous sulfate.  She is s/p blood transfusion with 1 unit packed RBC on 5/12/2023 and she was treated with 3 days of IV ferrlecit.    Chronic diastolic CHF (congestive heart failure)  Compensated. She is not currently on any diuretics.  TTE from 4/17/2023 showed low-normal systolic function with an EF of 54% and grade II left ventricular diastolic dysfunction.     Insomnia  Continue trazodone which was increased on 5/28/2023.    Hyperlipidemia  Continue pravastatin.    Severe aortic stenosis  No acute issues.  She can follow-up with cardiology as an outpatient as she may be a candidate for TAVR.    Chronic constipation  Continue polyethylene glycol.    Disposition  Anticipate discharge home with [facility], a manish lift, and a hospital bed on 6/5/2023 following completion of IV antibiotics on 6/4/2023. Her family has completed manish lift training.      VTE Risk Mitigation (From admission, onward)         Ordered     enoxaparin injection 40 mg  Daily         05/11/23 1414     Place sequential compression device  Until discontinued         05/11/23 1414                Discharge Planning   ERIN:       Code Status: DNR   Is the patient medically ready for discharge?:     Reason for patient still in hospital (select all that apply): Treatment, PT / OT recommendations and Pending disposition  Discharge Plan A: Home, Home Health   Discharge Delays: None known at this time      This service was provided via telemedicine.  Type of Software: Audio/Visual.  Originating Site: [facility].  Distant Site: [place], [place].  Her exam was performed with the assistance of Belkys Momin RN.      Naun Pope MD  Department of Hospital Medicine   [facility]

## 2023-06-02 NOTE — SUBJECTIVE & OBJECTIVE
Interval History: She did not participate in PT or OT yesterday but she is willing to work with them today. She has gained 4 lbs in the last 24 hours but she denies any bilateral lower extremity edema or shortness of breath. Her daughter contacted her urologist this morning regarding right ureteral stent removal.      Review of Systems   All other systems reviewed and are negative.  Objective:     Vital Signs (Most Recent):  Temp: 97.8 °F (36.6 °C) (23 0750)  Pulse: 88 (23 0750)  Resp: 17 (23 0418)  BP: 130/69 (23 0750)  SpO2: 95 % (23 0750) Vital Signs (24h Range):  Temp:  [97.2 °F (36.2 °C)-98.8 °F (37.1 °C)] 97.8 °F (36.6 °C)  Pulse:  [82-97] 88  Resp:  [17-18] 17  SpO2:  [94 %-97 %] 95 %  BP: (101-130)/(54-74) 130/69     Weight: 59.6 kg (131 lb 6.3 oz)  Body mass index is 23.28 kg/m².    Intake/Output Summary (Last 24 hours) at 2023 0807  Last data filed at 2023 0729  Gross per 24 hour   Intake 580 ml   Output 1250 ml   Net -670 ml      Physical Exam  General - Elderly  female in no acute distress.  HEENT - NCAT. No scleral icterus. Oropharynx clear. Mucous membranes moist.  Neck - No lymphadenopathy, thyromegaly, or JVD.  CVS - Regular rate and rhythm. No murmurs, rubs, or gallops.  Resp - Lungs are clear to auscultation bilaterally. No rales, wheeze, or rhonchi.   GI - Soft, nontender, nondistended, normoactive bowel sounds present.   Extremities - No clubbing, cyanosis, or edema. Right upper extremity PICC line.  Neuro - CN II through XII are grossly intact. No focal neurological deficits. Alert and oriented to name and  only.  Psych - Flat affect.  Skin -  Ecchymosis to left lateral chest pain. Blanchable erythema noted to dorsal aspect of the left 2nd toe. Left heel with 100% eschar. Hyperpigmentation to left medial lower leg/ankle. Denuded areas present to sacrum and blanchable erythema to periwound.     Significant Labs: All pertinent labs within the past  24 hours have been reviewed.     5/31/2023:  CBC: WBC count 8.44, hemoglobin 8.3, hematocrit 27.6, platelet count 322.  BMP: Sodium 128, potassium 3.9, chloride 94, CO2 25, BUN 32.9, creatinine 1.06, glucose 206, calcium 7.4.  UA: 11-20 WBCs, 6-10 RBCs, small leukocyte esterase, trace bacteria, moderate occult blood, 30 protein, negative nitrite.  Urine culture: No growth.     5/28/2023:  CBC: WBC count 9.87, hemoglobin 8.1, hematocrit 26.6, platelet count 338.  CMP: Sodium 128, potassium 3.6, chloride 94, CO2 27, BUN 31.9, creatinine 0.87, glucose 128, calcium 7.6, magnesium 1.8, alkaline phosphatase 81, total protein 5.7, albumin 1.7, total bilirubin 0.3, AST 9, ALT 6.  Prealbumin 7.8.     5/27/2023:  Vancomycin trough 20.8.     5/25/2023:  Vancomycin trough 23.0.     5/22/2023:  CBC: WBC count 8.86, hemoglobin 8.5, hematocrit 28.5, platelet count 258.  CMP: Sodium 131, potassium 3.4, chloride 94, CO2 27, BUN 17.3, creatinine 0.67, glucose 54, calcium 7.7, magnesium 1.6, alkaline phosphatase 94, total protein 5.6, albumin 1.8, total bilirubin 0.5, AST 10, ALT 10.  Vancomycin trough 24.1.     5/19/2023:  CBC: WBC count 10.83, hemoglobin 9, hematocrit 30.4, platelet count 274.  BMP: Sodium 130, potassium 3.5, chloride 94, CO2 28, BUN 18.4, creatinine 0.68, glucose 125, calcium 8.3.     5/18/2023:  CBC: WBC count 9.84, hemoglobin 9, hematocrit 30.7,  platelet count 261.  BMP: Sodium 134, potassium 3.7, chloride 94, CO2 33, BUN 20.6, creatinine 0.69, glucose 167, calcium 7.8, magnesium 1.9.     5/16/2023:  BMP: Sodium 131, potassium 4.2, chloride 4.2, chloride 90, CO2 33, BUN 18.9, creatinine 0.65, glucose 188, calcium 7.9, magnesium 1.9.     5/15/2023:  BMP: Sodium 128, potassium 3.3, chloride 91, CO2 34, BUN 18, creatinine 0.67, glucose 76, calcium 7.9.     5/13/2023:  CBC: WBC count 10.82, hemoglobin 9.1, hematocrit 30, platelet count 248.  BMP: Sodium 128, potassium 3.7, chloride 89, CO2 36, BUN 17.3, creatinine  0.66, glucose 103, calcium 8.2.     Significant Imaging: I have reviewed all pertinent imaging results/findings within the past 24 hours.     CXR 5/16/2023: Left-sided PICC projects over the distal SVC.  Prominent interstitial markings with no focal opacification.     CXR 5/6/2023: The heart is not significantly enlarged.  There is aortic atherosclerosis.  Similar prominent central pulmonary vasculature.  No new dense consolidation or pneumothorax.     CT head 5/5/2023:   1. No acute intracranial abnormality.  2. Chronic microvascular ischemic changes.     EEG 5/2/2023: No evidence of seizure activity.     CT head 5/2/2023: No acute intracranial abnormality.     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     Renal ultrasound 4/21/2023: Mild-to-moderate hydronephrosis on the right.     CT abdomen/pelvis 4/21/2023: Mild to moderate right hydronephrosis with no significant right ureteral dilatation.       STARR 4/21/2023: No valvular vegetations and moderate to severe aortic stenosis.      MRI thoracic/lumbar spine 4/20/2023: No evidence to suggest discitis/osteomyelitis or drainable fluid collection and new right-sided hydronephrosis.     Arterial Doppler left lower extremity 4/20/2023: Monophasic flow throughout suggestive of inflow disease     NM bone scan 4/19/2023: Left lower extremity cellulitis without focal intense concentration more typically seen with an abscess or osteomyelitis.      TTE 4/17/2023:  Low-normal systolic function with an EF of 54%, grade II left ventricular diastolic dysfunction, mild-to-moderate aortic valve stenosis, and no evidence of vegetation.     CT head 4/16/2023: No acute intracranial abnormality.       CXR 4/16/2023: No acute cardiopulmonary  process.     CT chest/abdomen/pelvis 4/16/2023: No evidence of PE and no acute intraabdominal or pelvic solid organ or bowel pathology identified.       X-ray left foot 4/16/2023: Minimally displaced 5th proximal phalanx fracture and possible minimally displaced 4th proximal phalanx fracture.     X-ray left tibia/fibula 4/16/2023: No acute osseous process.

## 2023-06-02 NOTE — PT/OT/SLP PROGRESS
Physical Therapy Treatment Note           Name: Melodie Villarreal    : 1931 (89 y.o.)  MRN: [id number]           TREATMENT SUMMARY AND RECOMMENDATIONS:    PT Received On: 23  PT Start Time: 1500     PT Stop Time: 1510  PT Total Time (min): 10 min     Subjective Assessment:   No complaints  Lethargic    Awake, alert, cooperative  Uncooperative    Agitated  c/o pain    Appropriate  c/o fatigue   x Confused x Treated at bedside     Emotionally labile  Treated in gym/dept.    Impulsive  Other:    Flat affect       Therapy Precautions:    Cognitive deficits  Spinal precautions    Collar - hard  Sternal precautions    Collar - soft   TLSO    Fall risk  LSO    Hip precautions - posterior  Knee immobilizer    Hip precautions - anterior  WBAT    Impaired communication  Partial weightbearing    Oxygen  TTWB    PEG tube  NWB    Visual deficits  Other:    Hearing deficits          Treatment Objectives:     Mobility Training:   Assist level Comments    Bed mobility totalA Sit-supine   Transfer totalA Recliner-bed   Gait     Sit to stand transitions     Sitting balance     Standing balance      Wheelchair mobility     Car transfer     Other:          Therapeutic Exercise:   Exercise Sets Reps Comments                               Additional Comments:  Same over all status    Assessment: Patient tolerated session same.    PT Plan: continue  Revisions made to plan of care: No    GOALS:   Multidisciplinary Problems       Physical Therapy Goals          Problem: Physical Therapy    Goal Priority Disciplines Outcome Goal Variances Interventions   Physical Therapy Goal     PT, PT/OT Ongoing, Not Progressing     Description: Goals to be met by: Discharge     Patient will increase functional independence with mobility by performin. Supine to sit with MInimal Assistance  2. Sit to supine with MInimal Assistance  3. Bed to chair transfer with Minimal Assistance using Rolling Walker  4. Gait  x 15 feet with Minimal  Assistance using Rolling Walker.   5. Sitting at edge of bed x10 minutes with Stand-by Assistance                         Skilled PT Minutes Provided: 10   Communication with Treatment Team:     Equipment recommendations:       At end of treatment, patient remained:   Up in chair     Up in wheelchair in room   x In bed   x With alarm activated   x Bed rails up   x Call bell in reach     Family/friends present    Restraints secured properly    In bathroom with CNA/RN notified    Nurse aware    In gym with therapist/tech    Other:

## 2023-06-02 NOTE — PLAN OF CARE
Problem: Adult Inpatient Plan of Care  Goal: Plan of Care Review  Outcome: Ongoing, Progressing  Flowsheets (Taken 6/2/2023 1453)  Plan of Care Reviewed With:   patient   daughter  Goal: Patient-Specific Goal (Individualized)  Outcome: Ongoing, Progressing  Flowsheets (Taken 6/2/2023 1453)  Anxieties, Fears or Concerns: daughter anxious about how patient's wound is healing  Individualized Care Needs: wound care here, changed drsg on left leg/measurements taken/spoke to family about importance of drinking the Tanenr and boost supplements to get enough protein for wound healing  Goal: Absence of Hospital-Acquired Illness or Injury  Outcome: Ongoing, Progressing  Intervention: Identify and Manage Fall Risk  Flowsheets (Taken 6/2/2023 1453)  Safety Promotion/Fall Prevention:   assistive device/personal item within reach   medications reviewed   nonskid shoes/socks when out of bed   in recliner, wheels locked   instructed to call staff for mobility   chair alarm set  Intervention: Prevent Skin Injury  Flowsheets (Taken 6/2/2023 1453)  Body Position:   turned   legs elevated  Skin Protection:   adhesive use limited   incontinence pads utilized   skin sealant/moisture barrier applied   tubing/devices free from skin contact  Intervention: Prevent and Manage VTE (Venous Thromboembolism) Risk  Flowsheets (Taken 6/2/2023 1453)  Activity Management:   Rolling - L1   Up in chair - L3  VTE Prevention/Management:   bleeding precations maintained   bleeding risk assessed  Range of Motion: active ROM (range of motion) encouraged  Intervention: Prevent Infection  Flowsheets (Taken 6/2/2023 1453)  Infection Prevention:   cohorting utilized   hand hygiene promoted   single patient room provided  Goal: Optimal Comfort and Wellbeing  Outcome: Ongoing, Progressing  Intervention: Monitor Pain and Promote Comfort  Flowsheets (Taken 6/2/2023 1453)  Pain Management Interventions:   medication offered   care clustered   quiet environment  facilitated   relaxation techniques promoted   position adjusted   pillow support provided  Intervention: Provide Person-Centered Care  Flowsheets (Taken 6/2/2023 1453)  Trust Relationship/Rapport:   care explained   choices provided   reassurance provided   questions answered  Goal: Readiness for Transition of Care  Outcome: Ongoing, Progressing     Problem: Diabetes Comorbidity  Goal: Blood Glucose Level Within Targeted Range  Outcome: Ongoing, Progressing  Intervention: Monitor and Manage Glycemia  Flowsheets (Taken 6/2/2023 1453)  Glycemic Management: blood glucose monitored     Problem: Adjustment to Illness (Sepsis/Septic Shock)  Goal: Optimal Coping  Outcome: Ongoing, Progressing     Problem: Bleeding (Sepsis/Septic Shock)  Goal: Absence of Bleeding  Outcome: Ongoing, Progressing     Problem: Glycemic Control Impaired (Sepsis/Septic Shock)  Goal: Blood Glucose Level Within Desired Range  Outcome: Ongoing, Progressing     Problem: Infection Progression (Sepsis/Septic Shock)  Goal: Absence of Infection Signs and Symptoms  Outcome: Ongoing, Progressing     Problem: Nutrition Impaired (Sepsis/Septic Shock)  Goal: Optimal Nutrition Intake  Outcome: Ongoing, Progressing     Problem: Infection  Goal: Absence of Infection Signs and Symptoms  Outcome: Ongoing, Progressing  Intervention: Prevent or Manage Infection  Flowsheets (Taken 6/2/2023 1453)  Fever Reduction/Comfort Measures: lightweight bedding  Infection Management: aseptic technique maintained     Problem: Impaired Wound Healing  Goal: Optimal Wound Healing  Outcome: Ongoing, Progressing     Problem: Fall Injury Risk  Goal: Absence of Fall and Fall-Related Injury  Outcome: Ongoing, Progressing     Problem: Skin Injury Risk Increased  Goal: Skin Health and Integrity  Outcome: Ongoing, Progressing     Problem: Mobility Impairment  Goal: Optimal Mobility  Outcome: Ongoing, Progressing

## 2023-06-02 NOTE — PT/OT/SLP PROGRESS
Speech Language Pathology Treatment    Patient Name:  Melodie Villarreal   MRN:  [id number]  Admitting Diagnosis: MRSA bacteremia    Recommendations:                 General Recommendations:  Cognitive-linguistic therapy  Diet recommendations:  Regular, Liquid Diet Level: Thin   Aspiration Precautions: Alternating bites/sips and HOB to 90 degrees   General Precautions: Standard, other (see comments)  Communication strategies:  provide increased time to answer, go to room if call light pushed, and use contextual mapping and use of binary choices to aid in recall    Assessment:     Melodie Villarreal is a 89 y.o. female with an SLP diagnosis of Cognitive-Linguistic Impairment.   She presents with confusion and impaired orientation, STM recall and LTM recall. Per pt's daughter, the pt presents w/ increased confusion w/ initiation of antibiotics. Pt's daughter reports her mother was doing well at home prior to hospital admit and was walking w/ RW. Pt's daughter cooks, cleans, manages finances, and manages medications. Pt would benefit from skilled SLP services at this time to promote a return to PLOF w/ cognitive skills.    Subjective     Pt awake and upright in bed.  She was compliant and participated in cognitive exercises easily.  Nursing gave medications; good swallowing observed with each pill.  Good productive cough upon request.  Pt not seen for swallowing.    Pain/Comfort:  Pt stated she was comfortable.     Respiratory Status: Room air    Objective:     Has the patient been evaluated by SLP for swallowing?   Yes  Keep patient NPO? No  Current Respiratory Status:     Pt oriented to name independently, required maximum verbal cues for place and situation, minimal cues needed for day and month, moderate cues needed for year; pt verbally sequenced steps to making her gumbo with moderate verbal cues; sequenced and problem solved the steps to eating with minimal cues (ex. An then what do you do next, etc.); pt had difficulty  recalling some LTM events independently    Cont SLP POC.    Goals:   Multidisciplinary Problems       SLP Goals          Problem: SLP    Goal Priority Disciplines Outcome   SLP Goal     SLP Ongoing, Progressing   Description: LTG: Pt will improve cognitive linguistic skills to allow for safe discharge home w/ family.    STG:  Pt will orient x4 modA.  Pt will utilize memory strategies to recall new information following a 2 minute filled delay modA.  Pt will answer LTM Qs w/ 90% acc modA.                       Plan:     Patient to be seen:  3 x/week   Plan of Care expires:  5/31/23  Plan of Care reviewed with:  patient, daughter   SLP Follow-Up:  Yes       Discharge recommendations:  home with home health   Barriers to Discharge:  Support/Caregiver at home warranted to ensure safety.    Time Tracking:     SLP Treatment Date:  06/02/23  Speech Start Time: 0910   Speech Stop Time: 0955      Speech Total Time (min): 45 min    Billable Minutes: Speech Therapy Individual 45    Zoya Laguna CCC-SLP   06/02/2023

## 2023-06-02 NOTE — PLAN OF CARE
Problem: Adult Inpatient Plan of Care  Goal: Plan of Care Review  Outcome: Ongoing, Progressing  Flowsheets (Taken 6/1/2023 1924)  Plan of Care Reviewed With:   patient   family  Goal: Patient-Specific Goal (Individualized)  Outcome: Ongoing, Progressing  Flowsheets (Taken 6/1/2023 1924)  Anxieties, Fears or Concerns: no concerns expressed  Individualized Care Needs: monitor blood surgars and pain, wound care  Goal: Absence of Hospital-Acquired Illness or Injury  Outcome: Ongoing, Progressing  Intervention: Identify and Manage Fall Risk  Flowsheets (Taken 6/1/2023 1924)  Safety Promotion/Fall Prevention:   nonskid shoes/socks when out of bed   assistive device/personal item within reach   bed alarm set   medications reviewed   instructed to call staff for mobility  Intervention: Prevent Skin Injury  Flowsheets (Taken 6/1/2023 1924)  Body Position:   heels elevated   sitting up in bed   turned  Skin Protection:   adhesive use limited   incontinence pads utilized   silicone foam dressing in place   tubing/devices free from skin contact  Intervention: Prevent and Manage VTE (Venous Thromboembolism) Risk  Flowsheets (Taken 6/1/2023 1924)  Activity Management:   Rolling - L1   Arm raise - L1   Straight leg raise - L1  VTE Prevention/Management:   bleeding precations maintained   bleeding risk assessed  Range of Motion: active ROM (range of motion) encouraged  Intervention: Prevent Infection  Flowsheets (Taken 6/1/2023 1924)  Infection Prevention:   cohorting utilized   hand hygiene promoted   single patient room provided  Goal: Optimal Comfort and Wellbeing  Outcome: Ongoing, Progressing  Intervention: Monitor Pain and Promote Comfort  Flowsheets (Taken 6/1/2023 1924)  Pain Management Interventions:   care clustered   pain management plan reviewed with patient/caregiver   pillow support provided   medication offered   position adjusted   relaxation techniques promoted  Intervention: Provide Person-Centered  Care  Flowsheets (Taken 6/1/2023 1924)  Trust Relationship/Rapport:   care explained   choices provided   reassurance provided  Goal: Readiness for Transition of Care  Outcome: Ongoing, Progressing     Problem: Diabetes Comorbidity  Goal: Blood Glucose Level Within Targeted Range  Outcome: Ongoing, Progressing  Intervention: Monitor and Manage Glycemia  Flowsheets (Taken 6/1/2023 1924)  Glycemic Management:   blood glucose monitored   supplemental insulin given     Problem: Adjustment to Illness (Sepsis/Septic Shock)  Goal: Optimal Coping  Outcome: Ongoing, Progressing     Problem: Bleeding (Sepsis/Septic Shock)  Goal: Absence of Bleeding  Outcome: Ongoing, Progressing     Problem: Glycemic Control Impaired (Sepsis/Septic Shock)  Goal: Blood Glucose Level Within Desired Range  Outcome: Ongoing, Progressing     Problem: Infection Progression (Sepsis/Septic Shock)  Goal: Absence of Infection Signs and Symptoms  Outcome: Ongoing, Progressing     Problem: Nutrition Impaired (Sepsis/Septic Shock)  Goal: Optimal Nutrition Intake  Outcome: Ongoing, Progressing     Problem: Infection  Goal: Absence of Infection Signs and Symptoms  Outcome: Ongoing, Progressing     Problem: Impaired Wound Healing  Goal: Optimal Wound Healing  Outcome: Ongoing, Progressing     Problem: Fall Injury Risk  Goal: Absence of Fall and Fall-Related Injury  Outcome: Ongoing, Progressing     Problem: Skin Injury Risk Increased  Goal: Skin Health and Integrity  Outcome: Ongoing, Progressing     Problem: Mobility Impairment  Goal: Optimal Mobility  Outcome: Ongoing, Progressing

## 2023-06-02 NOTE — PROGRESS NOTES
Pharmacokinetic Assessment Follow Up: IV Vancomycin    Vancomycin serum concentration assessment(s):    The trough level was drawn correctly and can be used to guide therapy at this time. The measurement is within the desired definitive target range of 15 to 20 mcg/mL.    Vancomycin Regimen Plan:    Continue regimen to Vancomycin 500 mg IV every 24 hours with next serum trough concentration measured at 60 minutes prior to fifth dose on 6/6    Drug levels (last 3 results):  Recent Labs   Lab Result Units 06/02/23  0846   Vancomycin Trough ug/ml 18.3       Pharmacy will continue to follow and monitor vancomycin.    Please contact pharmacy at extension [phone] for questions regarding this assessment.    Thank you for the consult,   Michael Stockton       Patient brief summary:  Melodie Villarreal is a 89 y.o. female initiated on antimicrobial therapy with IV Vancomycin for treatment of bacteremia    The patient's current regimen is vancomycin 500 mg q24hr    Drug Allergies:   Review of patient's allergies indicates:   Allergen Reactions    Ace inhibitors Other (See Comments)    Adhesive      Allergic to tape    Bactrim [sulfamethoxazole-trimethoprim] Other (See Comments)     Confusion, Hypoglycemia    Meperidine Other (See Comments)    Midazolam Other (See Comments)    Atorvastatin Nausea Only    Codeine Other (See Comments) and Anxiety    Tapentadol Rash       Actual Body Weight:   59.6 kg    Renal Function:   Estimated Creatinine Clearance: 28.6 mL/min (A) (based on SCr of 1.06 mg/dL (H)).,     Dialysis Method (if applicable):  N/A    CBC (last 72 hours):  Recent Labs   Lab Result Units 05/31/23  1000   WBC x10(3)/mcL 8.44   Hgb g/dL 8.3*   Hct % 27.6*   Platelet x10(3)/mcL 322   Mono % % 7.8   Eos % % 5.2   Basophil % % 0.2       Metabolic Panel (last 72 hours):  Recent Labs   Lab Result Units 05/31/23  1000 05/31/23  1127   Sodium Level mmol/L 128*  --    Potassium Level mmol/L 3.9  --    Chloride mmol/L 94*  --    Carbon  Dioxide mmol/L 25  --    Glucose Level mg/dL 206*  --    Glucose, UA mg/dL  --  Negative   Blood Urea Nitrogen mg/dL 32.9*  --    Creatinine mg/dL 1.06*  --        Vancomycin Administrations:  vancomycin given in the last 96 hours                     vancomycin (VANCOCIN) 500 mg in dextrose 5 % in water (D5W) 5 % 100 mL IVPB (MB+) (mg) 500 mg New Bag 06/01/23 0910     500 mg New Bag 05/31/23 0808     500 mg New Bag 05/30/23 0928                    Microbiologic Results:  Microbiology Results (last 7 days)       Procedure Component Value Units Date/Time    Urine culture [[id number]]  (Abnormal) Collected: 05/31/23 1127    Order Status: Completed Specimen: Urine Updated: 06/01/23 1310     Urine Culture Less than 10,000 colonies/ml Escherichia coli     Comment: [place] counts <10,000/ml are of questionable significance and may or may not indicate contamination.  Therefore organisms are identified only.  If further workup is desired please notify Microbiology

## 2023-06-02 NOTE — PROGRESS NOTES
Re-assessment performed to all wound care sites. Ecchymosis to back unchanged to lateral chest wall. No open areas noted. Recommend leaving foam dressing to bony prominence on mid back for protection.  No erythema present to area today.   Recommend changing once weekly or as needed for dislodgement.  L heel with 100% eschar. Hyperpigmentation to L medial lower leg/ankle remains unchanged. All other sites remain stable at this time with current wound care regimen. Recommend continuing to apply Santyl/mesalt and cover dressings to L lateral ankle, L posterior and lateral lower leg, and L heel. Heel protectors in use.  IAD with satellite lesions present to sacrum/coccyx/perineal /inner thighs.   Recommend discontinuing use of foam dressing and applying mixture of antifungal ointment mixed with z-guard paste to area BID and prn.   Daughter at bedside for assessment. Reports increase in appetite and drinking nutrition supplements given.   Recommend ordering a repeat pre-albumin level to re-evaluate effectiveness in relationship to wound healing.   Continue with strict pressure prevention measures during hospitalization and frequent incontinence care/moisture management. Orders in place.    06/02/23 1037   WOCN Assessment   WOCN Total Time (mins) 45   Visit Date 06/02/23   Visit Time 1037   Consult Type Follow Up   WOCN Speciality Wound   Wound yeast;moisture;pressure;arterial   Continence Type Urinary;Fecal   Intervention assessed;chart review;orders   Teaching on-going   Skin Interventions   Device Skin Pressure Protection absorbent pad utilized/changed;adhesive use limited;positioning supports utilized;pressure points protected   Pressure Reduction Devices pressure-redistributing mattress utilized;positioning supports utilized   Pressure Reduction Techniques frequent weight shift encouraged;heels elevated off bed;weight shift assistance provided   Skin Protection adhesive use limited;incontinence pads utilized;skin  sealant/moisture barrier applied   Pressure Injury Prevention    Check Moisture Management Pad Done   Sacral Foam Dressing Patient incontinent, barrier cream applied  (mixed with antifungal ointment)   Heel protection technique Pillow   Check Medical Devices Done        Altered Skin Integrity 04/17/23 Left posterior Ankle Full thickness tissue loss. Subcutaneous fat may be visible but bone, tendon or muscle are not exposed   Date First Assessed: 04/17/23   Altered Skin Integrity Present on Admission - Did Patient arrive to the hospital with altered skin?: yes  Side: Left  Orientation: posterior  Location: Ankle  Description of Altered Skin Integrity: Full thickness tissue...   Wound Image    Description of Altered Skin Integrity Full thickness tissue loss with exposed bone, tendon, or muscle. Often includes undermining and tunneling. May extend into muscle and/or supporting structures.   Dressing Appearance Intact;Moist drainage   Drainage Amount Small   Drainage Characteristics/Odor Serous;Tan;No odor;Bleeding controlled   Appearance Pink;Epithelialization;Black;Tan;Yellow;Slough;Necrotic;Tendon;Granulating   Tissue loss description Full thickness   Black (%), Wound Tissue Color 15 %   Periwound Area Intact;Scar tissue;Redness   Wound Edges Irregular   Wound Length (cm) 8 cm   Wound Width (cm) 1.5 cm   Wound Depth (cm) 0.3 cm   Wound Volume (cm^3) 3.6 cm^3   Wound Surface Area (cm^2) 12 cm^2   Care Cleansed with:;Antimicrobial agent   Dressing Applied;Sodium chloride impregnated;Gauze;Absorptive Pad;Rolled gauze;Other (comment)  (santyl)   Dressing Change Due 06/03/23        Altered Skin Integrity 04/17/23 Left lateral Ankle Full thickness tissue loss. Subcutaneous fat may be visible but bone, tendon or muscle are not exposed   Date First Assessed: 04/17/23   Altered Skin Integrity Present on Admission - Did Patient arrive to the hospital with altered skin?: yes  Side: Left  Orientation: lateral  Location: Ankle   Description of Altered Skin Integrity: Full thickness tissue l...   Wound Image    Description of Altered Skin Integrity Full thickness tissue loss with exposed bone, tendon, or muscle. Often includes undermining and tunneling. May extend into muscle and/or supporting structures.   Drainage Amount Scant   Drainage Characteristics/Odor Serous;No odor;Bleeding controlled   Appearance Red;Granulating;Muscle;White;Fibrin   Tissue loss description Full thickness   Red (%), Wound Tissue Color 85 %   Yellow (%), Wound Tissue Color 15 %   Wound Edges Defined   Wound Length (cm) 1.3 cm   Wound Width (cm) 1 cm   Wound Depth (cm) 0.1 cm   Wound Volume (cm^3) 0.13 cm^3   Wound Surface Area (cm^2) 1.3 cm^2   Care Cleansed with:;Antimicrobial agent   Dressing Applied;Sodium chloride impregnated;Gauze;Absorptive Pad;Rolled gauze  (santyl)   Dressing Change Due 06/03/23        Altered Skin Integrity 04/24/23 Left Heel Purple or maroon localized area of discolored intact skin or non-intact skin or a blood-filled blister.   Date First Assessed: 04/24/23   Altered Skin Integrity Present on Admission - Did Patient arrive to the hospital with altered skin?: yes  Side: Left  Location: Heel  Description of Altered Skin Integrity: Purple or maroon localized area of discolored ...   Wound Image    Description of Altered Skin Integrity Full thickness tissue loss. Base is covered by slough and/or eschar in the wound bed   Dressing Appearance Intact;Dry   Drainage Amount None   Drainage Characteristics/Odor No odor   Appearance Black;Dry;Eschar   Black (%), Wound Tissue Color 100 %   Periwound Area Dry   Wound Edges Defined   Wound Length (cm) 2 cm   Wound Width (cm) 2.2 cm   Wound Surface Area (cm^2) 4.4 cm^2   Care Cleansed with:;Antimicrobial agent   Dressing Applied;Sodium chloride impregnated;Gauze;Absorptive Pad;Rolled gauze  (santyl)   Dressing Change Due 06/03/23        Altered Skin Integrity 04/17/23 1730 Coccyx Ulceration Intact skin  with non-blanchable redness of localized area   Date First Assessed/Time First Assessed: 04/17/23 1730   Altered Skin Integrity Present on Admission - Did Patient arrive to the hospital with altered skin?: yes  Location: Coccyx  Is this injury device related?: No  Primary Wound Type: Ulceration  De...   Wound Image    Dressing Appearance Intact   Drainage Amount None   Appearance Closed/resurfaced;Red;Other (see comments)  (rash, satellite lesions)   Periwound Area Satellite lesion;Redness;Denuded   Wound Edges Irregular   Care Cleansed with:;Soap and water;Applied:;Skin Barrier;Other (see comments)  (mixture of antifungal ointment with z-guard paste)        Altered Skin Integrity 05/12/23 1057 Left lower;lateral Leg Other (comment) Purple or maroon localized area of discolored intact skin or non-intact skin or a blood-filled blister.   Date First Assessed/Time First Assessed: 05/12/23 1057   Altered Skin Integrity Present on Admission - Did Patient arrive to the hospital with altered skin?: yes  Side: Left  Orientation: lower;lateral  Location: Leg  Primary Wound Type: (c) Other (co...   Dressing Appearance Intact;Dry   Drainage Amount None   Appearance Eschar;Dry   Tissue loss description Full thickness   Black (%), Wound Tissue Color 100 %   Periwound Area Intact   Wound Edges Defined   Wound Length (cm) 4.8 cm   Wound Width (cm) 1 cm   Wound Surface Area (cm^2) 4.8 cm^2   Care Cleansed with:;Antimicrobial agent   Dressing Applied;Sodium chloride impregnated;Gauze;Absorptive Pad;Rolled gauze  (santyl)   Off Loading Other (see comments)  (pillow / heel protectors)   Dressing Change Due 06/03/23

## 2023-06-02 NOTE — PLAN OF CARE
[facility] - Medical Surgical Unit  Discharge Reassessment    Primary Care Provider: Wisam Espinosa Jr, MD    Expected Discharge Date:     Reassessment (most recent)       Discharge Reassessment - 06/02/23 1335          Discharge Reassessment    Assessment Type Discharge Planning Reassessment     Did the patient's condition or plan change since previous assessment? No     Discharge Plan discussed with: Patient;Adult children     Name(s) and Number(s) William Villarreal [phone]     Discharge Plan A Home;Home with family;Home Health     DME Needed Upon Discharge  lift device     Why the patient remains in the hospital Requires continued medical care        Post-Acute Status    Home Health Status Referrals Sent     Discharge Delays None known at this time

## 2023-06-02 NOTE — PT/OT/SLP PROGRESS
Occupational Therapy  Treatment    Name: Melodie Villarreal    : 1931 (89 y.o.)  MRN: [id number]           TREATMENT SUMMARY AND RECOMMENDATIONS:      OT Date of Treatment: 23  OT Start Time: 1100  OT Stop Time: 1120  OT Total Time (min): 20 min      Subjective Assessment:   No complaints  Lethargic   x Awake, alert, cooperative  Impulsive    Uncooperative   Flat affect    Agitated  c/o pain   x Appropriate  c/o fatigue    Confused x Treated at bedside     Emotionally labile  Treated in gym/dept.      Other:        Therapy Precautions:   x Cognitive deficits  Spinal precautions    Collar - hard  Sternal precautions    Collar - soft   TLSO   x Fall risk  LSO    Hip precautions - posterior  Knee immobilizer    Hip precautions - anterior  WBAT    Impaired communication  Partial weightbearing    Oxygen  TTWB    PEG tube  NWB    Visual deficits      Hearing deficits   Other:        Treatment Objectives:     Mobility Training:    Mobility task Assist level Comments    Bed mobility Mod A Supine<rolling R<supine<rolling L<supine using the bed rails    Transfer Total A Tashia t/f from bed<recliner    Sit to stands transitions     Functional mobility     Sitting balance     Standing balance      Other:        ADL Training:    ADL Assist level Comments    Feeding     Grooming/hygiene     Bathing     Upper body dressing     Lower body dressing     Toileting     Toilet transfer     Adaptive equipment training     Other:           Therapeutic Exercise:   Exercise Sets Reps Comments                               Additional Comments:      Assessment: Patient tolerated session well. Family training with pt's daughter using tashia lift to practice before d/c home. Pt demonstrated ability to use lift correctly, and had no questions after.     OT Plan: Continue with POC  Revisions made to plan of care: No    GOALS:   Multidisciplinary Problems       Occupational Therapy Goals          Problem: Occupational Therapy    Goal Priority  Disciplines Outcome Interventions   Occupational Therapy Goal     OT, PT/OT Ongoing, Not Progressing    Description: Goals to be met by: Discharge    Patient will increase functional independence with ADLs by performing:    Toileting from bedside commode with Max Assistance for hygiene and clothing management. -Total A  Bathing from edge of bed with Moderate Assistance.  Toilet transfer to bedside commode with Max Assistance. - Total A  Hygiene/grooming from edge of bed with minimal assistance.                          Skilled OT Minutes Provided: 20  Communication with Treatment Team:     Equipment recommendations:       At end of treatment, patient remained:  x Up in chair     Up in wheelchair in room    In bed   x With alarm activated    Bed rails up   x Call bell in reach    x Family/friends present    Restraints secured properly    In bathroom with CNA/RN notified    In gym with PT/PTA/tech    Nurse aware    Other:

## 2023-06-02 NOTE — ASSESSMENT & PLAN NOTE
Continue tamsulosin.She will need to follow-up with urology as an outpatient 1-2 weeks following discharge for right ureteral stent removal. She is s/p cystoscopy with bilateral retrograde pyelograms right ureteral stent placement on 4/25/2023 due to right hydronephrosis with ureteral obstruction. Renal ultrasound from 4/21/2023 revealed mild-to-moderate hydronephrosis on the right and CT of the abdomen and pelvis showed mild to moderate right hydronephrosis with no significant right ureteral dilatation.

## 2023-06-02 NOTE — PROGRESS NOTES
Inpatient Nutrition Evaluation    Admit Date: 5/11/2023   Total duration of encounter: 22 days    Nutrition Recommendation/Prescription     Continue  diabetic 1200 mL fluid restriction, used lactaid milk. 2. Continue Tanner BID 3. Add boost breeze daily    Nutrition Assessment     Chart Review    Reason Seen: continuous nutrition monitoring, length of stay, and follow-up    Malnutrition Screening Tool Results   Have you recently lost weight without trying?: No  Have you been eating poorly because of a decreased appetite?: No   MST Score: 0     Diagnosis:  MRSA bacteremia    Relevant Medical History:   Hydronephrosis  Date Unknown  Adenocarcinoma of uterus  Date Unknown  Bladder cancer  Date Unknown  Deep vein thrombosis  Date Unknown  Essential (primary) hypertension  Date Unknown  Heart murmur  Date Unknown  High cholesterol  Date Unknown  Hypertriglyceridemia  Date Unknown  Insomnia  Date Unknown  Osteopenia  Date Unknown  Other pulmonary embolism without acute cor pulmonale  Date Unknown  Personal history of colonic polyps  Date Unknown  Rheumatoid arthritis, unspecified  Date Unknown  Type 2 diabetes mellitus without complications  Nutrition-Related Medications: ferrous sulfate, insulin aspart, lactobacillus acidophilus, magnesium oxide, metformin, polyethylene glycol, pravastatin, senna, vancomycin,     Nutrition-Related Labs:  CBG's 196 mg/dL    Diet Order: Diet diabetic Fluid - 1200mL  Oral Supplement Order:  Tanner  Appetite/Oral Intake: fair/50-75% of meals  Factors Affecting Nutritional Intake: decreased appetite  Food/Mandaen/Cultural Preferences:  lactaid milk  Food Allergies: none reported    Skin Integrity: wound  Wound(s):      Altered Skin Integrity 04/17/23 Left posterior Ankle Full thickness tissue loss. Subcutaneous fat may be visible but bone, tendon or muscle are not exposed-Tissue loss description: Full thickness       Altered Skin Integrity 04/17/23 Left lateral Ankle Full thickness tissue  "loss. Subcutaneous fat may be visible but bone, tendon or muscle are not exposed-Tissue loss description: Full thickness       Altered Skin Integrity 04/17/23 1730 Coccyx Ulceration Intact skin with non-blanchable redness of localized area-Tissue loss description: Not applicable       Altered Skin Integrity 05/12/23 1057 Left lower;lateral Leg Other (comment) Purple or maroon localized area of discolored intact skin or non-intact skin or a blood-filled blister.-Tissue loss description: Full thickness     Comments    Pt intake fair, 50%. Pt has consuming more protein this week. Family reports. Drinking boost breeze and consuming pork stew, fish, etc.     Anthropometrics    Height: 5' 2.99" (160 cm)    Last Weight: 59.6 kg (131 lb 6.3 oz) (06/01/23 0500) Weight Method: Bed Scale  BMI (Calculated): 23.3  BMI Classification: normal (BMI 18.5-24.9)     Ideal Body Weight (IBW), Female: 114.95 lb     % Ideal Body Weight, Female (lb): 115.27 %                             Usual Weight Provided By: unable to obtain usual weight    Wt Readings from Last 5 Encounters:   06/01/23 59.6 kg (131 lb 6.3 oz)   04/24/23 56.6 kg (124 lb 12.5 oz)   04/10/23 54.4 kg (120 lb)   02/09/23 85.3 kg (188 lb)   01/17/23 55.8 kg (123 lb)     Weight Change(s) Since Admission:  Admit Weight: 60.1 kg (132 lb 7.9 oz) (05/11/23 1515)    No new wt recorded.   Patient Education    Not applicable.    Monitoring & Evaluation     Dietitian will monitor food and beverage intake, weight, weight change, electrolyte/renal panel, glucose/endocrine profile, and gastrointestinal profile.  Nutrition Risk/Follow-Up: low (follow-up in 5-7 days)  Patients assigned 'low nutrition risk' status do not qualify for a full nutritional assessment but will be monitored and re-evaluated in a 5-7 day time period. Please consult if re-evaluation needed sooner.   "

## 2023-06-02 NOTE — DISCHARGE SUMMARY
Hospital Medicine  Discharge Summary    Patient Name: Melodie Villarreal  MRN: [id number]  Admit Date: 4/16/2023  Discharge Date: 5/11/2023   Status: IP- Inpatient   Length of Stay: 25      PHYSICIANS   Admitting Physician: Willie Pereira MD  Discharging Physician: Crescencio Villeda MD.  Primary Care Physician: Wisam Espinosa Jr, MD  Consults: Cardiology, Cardiothoracic Surgery, Infectious Disease, Nephrology, Neurology, Urology, and Podiatry      DISCHARGE DIAGNOSES   Persistent MRSA Sepsis/Bacteremia  Left ankle chronic non-healing wound   Acute Encephalopathy, etiology unclear   Hyponatremia, Hypokalemia, Hypomagnesemia    Acute on chronic diastolic heart failure  Mild to moderate Aortic stenosis  Pulmonary HTN  NIDDM II  Normochromic anemia   Chronic low back pain   Constipation associated with colonic fecal impaction      PROCEDURES   4/21/23 - STARR - Cardiology  4/25/23 - Cystoscopy with right ureteral stent placement - Urology      HOSPITAL COURSE    89-year-old female with a history that includes type 2 diabetes mellitus, prior DVT/IVC filter no longer on anticoagulation and non-healing left ankle wound, presented to the ED on 4/16 after she became lethargic. Patient normally alert and oriented, active and able to carry out own ADLs.  She apparently has chronic open wounds on her left lower extremity  followed closely by Wound Care.  On the day of presentation she became drowsy/lethargic, and EMS activated, she was found to be hypotensive with a blood pressure of 66/36 on arrival, requiring 3 L nasal cannula.  Laboratory work showed leukocytosis of 34,000, mild anemia, lactic acid of 3.5, elevated BNP of 2000 and a troponin of 0.115.  Urinalysis was unremarkable, CT chest abdomen and pelvis was negative for pulmonary embolus, pulmonary infiltrates or any intra-abdominal or pelvic pathology.  CT head was normal.  She was started on broad-spectrum antibiotics for undifferentiated sepsis admitted to the hospitalist  services for further management.  Blood cultures returned positive for MRSA bacteremia. ID consulted and pt continued on IV Vancomycin. Noted left lower extremity non-healing ulcer. NM bone scan without clear evidence of OM. LLE arterial U/S showed monophasic flow throughout suggestive of inflow disease. CT T and L spine  with no evidence of discitis/osteomyelitis. 2D TTE negative for valvular vegetation. Cardiology consulted for STARR, performed on 4/22, negative valvular vegetations. Pt is noted to have  hydronephrosis despite Newton decompression. Urology consulted to evaluate persistent Rt hydronephrosis.  She was taken to the OR on 04/25 for cystoscopy and right ureteral stent placement.  Plan to remove stent in 2 weeks after DC from hospital in the office. Blood cultures persistently positive, 4/16, 4/18, 4/19, 4/21.  Eventually cultures from 4/23 remained negative.  Vascular surgery consulted for PAD, CTA run-off noted severe flow-limiting disease in the left common femoral. Vascular surgery considering left femoral endarterectomy, though family defering intervention for now. Hospital course complicated by intermittent hyponatremia initially treated with Furosemide given diastolic heart failure exacerbation. However hyponatremia persisted despite pt being euvolemic with diuresis and Nephrology was consulted to assist. Nephro treated pt with Samsca and subsequent fluid restriction to 1 Liter with improvement of sodium level.   PT/OT suggested SNF placement. CM consulted to assist with DC planning; arranged for swing bed placement at [facility], stable for transfer      STATUS  Improved    DISPOSITION  Discharge to Swing-bed unit    DIET  Diabetic    ACTIVITY  As tolerated      FOLLOW-UP      Anyi Bearden MD Follow up on 6/1/2023.    Specialty: Urology  Why: at 1045 for cysto with stent removal in the office  Contact information:  [facility] [address]  [facility] [address]  [phone]                NURSING SPECIALTIES Follow up.    Specialties: Home Health Services, Home Therapy Services, Home Living Aide Services  Why: This is your home health company.  Contact information:  Melina Shah  [facility] [place]  [phone]               DISCHARGE MEDICATION RECONCILIATION     PRESCRIBED     carvediloL 6.25 MG tablet  Commonly known as: COREG  Take 1 tablet (6.25 mg total) by mouth 2 (two) times daily.     insulin aspart U-100 100 unit/mL injection  Commonly known as: NovoLOG  Inject 0-5 Units into the skin before meals and at bedtime as needed for High Blood Sugar.     insulin detemir U-100 100 unit/mL injection  Commonly known as: Levemir  Inject 7 Units into the skin every evening.     Lactobacillus acidophilus 500 million cell Cap  Take 1 capsule by mouth 3 (three) times daily with meals.     magnesium oxide 400 mg (241.3 mg magnesium) tablet  Commonly known as: MAG-OX  Take 1 tablet (400 mg total) by mouth once daily.     polyethylene glycol 17 gram Pwpk  Commonly known as: GLYCOLAX  Take 17 g by mouth once daily.     tamsulosin 0.4 mg Cap  Commonly known as: FLOMAX  Take 1 capsule (0.4 mg total) by mouth once daily.     VANCOMYCIN 500 MG/100 ML D5W (READY TO MIX)  Inject 100 mLs (500 mg total) into the vein every 12 (twelve) hours.            CONTINUE      blood sugar diagnostic Strp  Commonly known as: EASY TOUCH TEST STRIP  1 each by Misc.(Non-Drug; Combo Route) route once daily. Use to test blood glucose once daily     blood-glucose meter Misc  Easy Touch use as directed     calcium-vitamin D3 500 mg-5 mcg (200 unit) per tablet  Commonly known as: OS-SUSAN 500 + D3     ferrous gluconate 324 MG tablet  Commonly known as: FERGON     lancets Misc  Easy Touch Twist 30G Lancets to use with insurance preferred meter Diagnosis Code: E11.9.  Test once daily.     losartan-hydrochlorothiazide 100-12.5 mg 100-12.5 mg Tab  Commonly known as: HYZAAR  Take 1 tablet by mouth once daily.     metFORMIN 1000  MG tablet  Commonly known as: GLUCOPHAGE  Take 1 tablet (1,000 mg total) by mouth 2 (two) times daily with meals.     multivitamin capsule     pravastatin 40 MG tablet  Commonly known as: PRAVACHOL  Take 1 tablet (40 mg total) by mouth once daily.     SantyL ointment  Generic drug: collagenase     traMADoL 50 mg tablet  Commonly known as: ULTRAM  Take 1 tablet (50 mg total) by mouth every 6 (six) hours as needed for Pain.     traZODone 100 MG tablet  Commonly known as: DESYREL  Take 1 tablet (100 mg total) by mouth nightly as needed for Insomnia.            DISCONTINUE     glimepiride 4 MG tablet  Commonly known as: AMARYL     mupirocin 2 % ointment  Commonly known as: BACTROBAN         PHYSICAL EXAM   VITALS: T 97.7 °F (36.5 °C)   /63   P 85   RR 18   O2 95 %    GENERAL: awake and in no acute distress  LUNGS: Respirations non-labored, no peripheral cyanosis  CVS: Normal rate  ABD: Soft, non-tender  EXTREMITIES: Radial pulse 2+  NEURO: AAOx3  PSYCHIATRIC: Cooperative        Discharge time: 33 minutes     Crescencio Villeda MD  [facility]

## 2023-06-02 NOTE — PLAN OF CARE
Problem: Adult Inpatient Plan of Care  Goal: Patient-Specific Goal (Individualized)  Outcome: Ongoing, Progressing  Flowsheets (Taken 6/1/2023 2103)  Individualized Care Needs: Administer pain meds to aid with sleep during the night     Problem: Diabetes Comorbidity  Goal: Blood Glucose Level Within Targeted Range  Outcome: Ongoing, Progressing  Intervention: Monitor and Manage Glycemia  Flowsheets (Taken 6/1/2023 2103)  Glycemic Management: blood glucose monitored     Problem: Fall Injury Risk  Goal: Absence of Fall and Fall-Related Injury  Outcome: Ongoing, Progressing  Intervention: Identify and Manage Contributors  Flowsheets (Taken 6/1/2023 2103)  Self-Care Promotion:   independence encouraged   safe use of adaptive equipment encouraged  Medication Review/Management:   medications reviewed   high-risk medications identified  Intervention: Promote Injury-Free Environment  Flowsheets (Taken 6/1/2023 2103)  Safety Promotion/Fall Prevention:   assistive device/personal item within reach   instructed to call staff for mobility   bed alarm set   family to remain at bedside

## 2023-06-03 NOTE — PLAN OF CARE
Problem: Adult Inpatient Plan of Care  Goal: Plan of Care Review  Outcome: Ongoing, Progressing  Flowsheets (Taken 6/2/2023 2100)  Plan of Care Reviewed With:   patient   daughter  Goal: Patient-Specific Goal (Individualized)  Outcome: Ongoing, Progressing  Flowsheets (Taken 6/2/2023 2100)  Anxieties, Fears or Concerns: Continuing to improve and preparing to go home this week.  Individualized Care Needs:   Monitor wounds for contined healing while preventing other areas of breakdown   monitor blood glucose levels   pain management   safety   turn every 2 hours.  Goal: Absence of Hospital-Acquired Illness or Injury  Outcome: Ongoing, Progressing  Intervention: Identify and Manage Fall Risk  Flowsheets (Taken 6/2/2023 2100)  Safety Promotion/Fall Prevention:   bed alarm set   assistive device/personal item within reach   Fall Risk reviewed with patient/family   family to remain at bedside   high risk medications identified   lighting adjusted   medications reviewed   nonskid shoes/socks when out of bed   room near unit station   instructed to call staff for mobility  Intervention: Prevent and Manage VTE (Venous Thromboembolism) Risk  Flowsheets (Taken 6/2/2023 2100)  VTE Prevention/Management:   bleeding risk assessed   bleeding precations maintained   dorsiflexion/plantar flexion performed   fluids promoted   ROM (passive) performed   ROM (active) performed  Range of Motion: active ROM (range of motion) encouraged  Intervention: Prevent Infection  Flowsheets (Taken 6/2/2023 2100)  Infection Prevention:   hand hygiene promoted   rest/sleep promoted   personal protective equipment utilized   equipment surfaces disinfected  Goal: Optimal Comfort and Wellbeing  Outcome: Ongoing, Progressing  Intervention: Monitor Pain and Promote Comfort  Flowsheets (Taken 6/2/2023 2100)  Pain Management Interventions:   care clustered   pain management plan reviewed with patient/caregiver   medication offered   pillow support  provided   position adjusted   quiet environment facilitated  Intervention: Provide Person-Centered Care  Flowsheets (Taken 6/2/2023 2100)  Trust Relationship/Rapport:   care explained   choices provided   emotional support provided   empathic listening provided   questions answered   questions encouraged   reassurance provided   thoughts/feelings acknowledged     Problem: Diabetes Comorbidity  Goal: Blood Glucose Level Within Targeted Range  Outcome: Ongoing, Progressing  Intervention: Monitor and Manage Glycemia  Flowsheets (Taken 6/2/2023 2100)  Glycemic Management: blood glucose monitored     Problem: Infection  Goal: Absence of Infection Signs and Symptoms  Outcome: Ongoing, Progressing  Intervention: Prevent or Manage Infection  Flowsheets (Taken 6/2/2023 2100)  Fever Reduction/Comfort Measures:   lightweight clothing   lightweight bedding  Infection Management: aseptic technique maintained     Problem: Mobility Impairment  Goal: Optimal Mobility  Outcome: Ongoing, Progressing

## 2023-06-03 NOTE — PROGRESS NOTES
[facility] Medicine  Telehospitalist Progress Note    Patient Name: Melodie Villarreal  MRN: [id number]  Patient Class: IP- Swing   Admission Date: 5/11/2023  Length of Stay: 23 days  Attending Physician: Naun Pope MD  Primary Care Provider: Wisam Espinosa Jr, MD      Subjective:     Principal Problem:MRSA bacteremia      HPI:  Ms. Villarreal is a 89-year-old  female with history of hypertension, hyperlipidemia, type II diabetes mellitus, DVT/PE status post IVC filter placement, and bladder cancer who originally presented to [facility] on 4/16/2023 with fatigue, generalized weakness, dysuria, dark urine, and lethargy and she was hypotensive with a blood pressure of 66/33 on EMS arrival.  Her initial lab work was remarkable for a WBC count of 34.3, lactic acid of 3.8, troponin of 0.115, and BNP of 2571.8 and CT of the head showed no acute intracranial abnormality.  CXR revealed no acute cardiopulmonary process and CT of the chest, abdomen, pelvis demonstrated no evidence of PE and no acute intraabdominal or pelvic solid organ or bowel pathology.  X-ray of the left foot showed minimally displaced 5th proximal phalanx fracture and possible minimally displaced 4th proximal phalanx fracture and x-ray of the left tibia/fibula revealed no acute osseous process.  She was started on broad-spectrum antibiotics with IV vancomycin and zosyn for sepsis and her blood cultures from admission returned positive for MRSA.  ID was consulted and TTE from 4/17/2023 demonstrated no evidence of vegetation. She was scheduled for NM bone scan on 4/19/2023 which was positive for a left lower extremity cellulitis without focal intense concentration. Arterial Doppler of the left lower extremity from 4/20/2023 showed monophasic flow throughout suggestive of inflow disease and MRI of the thoracic and lumbar spine revealed no evidence to suggest discitis/osteomyelitis or drainable fluid collection and  new right-sided hydronephrosis.  Repeat blood cultures remained positive and cardiology was consulted for STARR on 4/21/2023 which demonstrated no valvular vegetations and moderate to severe aortic stenosis.  Her chronic left ankle wound was thought to be the source of her persistent MRSA bacteremia and 6 weeks of IV vancomycin was recommended by ID.  Renal ultrasound from 4/21/2023 confirmed mild-to-moderate hydronephrosis on the right and CT of the abdomen and pelvis showed mild to moderate right hydronephrosis with no significant right ureteral dilatation.  Urology was consulted and she was taken to the OR on 4/25/2023 for cystoscopy with bilateral retrograde pyelograms right ureteral stent placement due to right hydronephrosis with ureteral obstruction.  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  She was evaluated by neurology on 5/02/2023 due to mental status changes thought to be toxic metabolic encephalopathy and CT of the head demonstrated no acute intracranial abnormality.  EEG from 5/2/2023 showed no evidence of seizure activity and repeat CT of the head was unremarkable.  Nephrology was consulted on 5/03/2023 due to worsening hyponatremia and she was treated with samsca and a 1 L fluid restriction with improvement in her sodium levels.  She had no other complications throughout her hospital course and she was discharged to [facility] for PT/OT, completion of IV antibiotics, and management of her medical comorbidities.      Overview/Hospital Course:  She was admitted to [facility] on 5/11/2023 for rehab s/p  hospitalization for persistent MRSA bacteremia with sepsis due to nonhealing left ankle wound, right hydronephrosis with ureteral obstruction s/p right ureteral stent placement, critical limb ischemia of the left lower extremity, toxic metabolic encephalopathy, and hyponatremia.  She was found to have a UTI on urinalysis from 5/11/2023 and her urine culture grew 10,000-25,000 cfu/ml of Pseudomonas aeruginosa.  She was started on a 3 day course of ciprofloxacin 250 mg by mouth twice daily which she completed on 5/16/2023.  Her hemoglobin and hematocrit dropped to 7.6 and 25.1 on 5/12/2023 and she was transfused with 1 unit packed RBC.  She was started on sodium chloride 1 g by mouth daily on 5/15/2023 due to persistent hyponatremia.  Detemir was discontinued on 5/17/2023 due to intermittent hypoglycemia and she was started on metformin 1000 mg by mouth twice daily and glimepiride 2 mg by mouth in the morning. Her scheduled nighttime norco was discontinued on 5/20/2023 due to lethargy. She had a blood sugar of 50 on the afternoon of 5/21/2023 and 57 on the morning of 5/22/2023 and her metformin and glimepiride were held. Her metformin was decreased from 1000 mg to 500 mg PO BID and her glimepiride was discontinued on 5/24/2023 due to persistent hypoglycemia. She complained of left shoulder on 5/25/2023 and she was started on diclofenac 1% gel 4 gm topical to the left shoulder BID prn. Her trazodone was increased from 100 mg to 150 mg PO QHS on 5/28/2023 due to worsening insomnia.      Interval History: She participated in PT and OT yesterday and she required total assistance with bed mobility and transfers. She was seen by the dietician yesterday who recommended a diabetic diet with 1200 ml fluid restriction, gurdeep BID, and boost breeze daily. She has had a decreased appetite but she needs encouragement. She remains confused and she receives tramadol during the day and norco at bedtime for pain.      Review of Systems    All other systems reviewed and are negative.  Objective:     Vital Signs (Most Recent):  Temp: 97.4 °F (36.3 °C) (23)  Pulse: 81 (23)  Resp: 18 (23)  BP: (!) 113/59 (23)  SpO2: 98 % (23) Vital Signs (24h Range):  Temp:  [97.4 °F (36.3 °C)-98 °F (36.7 °C)] 97.4 °F (36.3 °C)  Pulse:  [81-96] 81  Resp:  [16-18] 18  SpO2:  [94 %-98 %] 98 %  BP: (105-116)/(59-67) 113/59     Weight: 68.4 kg (150 lb 12.7 oz)  Body mass index is 26.72 kg/m².    Intake/Output Summary (Last 24 hours) at 6/3/2023 0860  Last data filed at 6/3/2023 0719  Gross per 24 hour   Intake 720 ml   Output 950 ml   Net -230 ml      Physical Exam  General - Elderly  female in no acute distress.  HEENT - NCAT. No scleral icterus. Oropharynx clear. Mucous membranes moist.  Neck - No lymphadenopathy, thyromegaly, or JVD.  CVS - Regular rate and rhythm. No murmurs, rubs, or gallops.  Resp - Lungs are clear to auscultation bilaterally. No rales, wheeze, or rhonchi.   GI - Soft, nontender, nondistended, normoactive bowel sounds present.   Extremities - No clubbing, cyanosis, or edema. Right upper extremity PICC line.  Neuro - CN II through XII are grossly intact. No focal neurological deficits. Alert and oriented to name and  only.  Psych - Flat affect.  Skin -  Ecchymosis to left lateral chest pain. Blanchable erythema noted to dorsal aspect of the left 2nd toe. Left heel with 100% eschar. Hyperpigmentation to left medial lower leg/ankle. Denuded areas present to sacrum and blanchable erythema to periwound.     Significant Labs: All pertinent labs within the past 24 hours have been reviewed.     2023:  CBC: WBC count 8.44, hemoglobin 8.3, hematocrit 27.6, platelet count 322.  BMP: Sodium 128, potassium 3.9, chloride 94, CO2 25, BUN 32.9, creatinine 1.06, glucose 206, calcium 7.4.  UA: 11-20 WBCs, 6-10 RBCs, small leukocyte esterase, trace bacteria, moderate occult blood, 30 protein,  negative nitrite.  Urine culture: <10,000 cfu/ml of E.coli.     5/28/2023:  CBC: WBC count 9.87, hemoglobin 8.1, hematocrit 26.6, platelet count 338.  CMP: Sodium 128, potassium 3.6, chloride 94, CO2 27, BUN 31.9, creatinine 0.87, glucose 128, calcium 7.6, magnesium 1.8, alkaline phosphatase 81, total protein 5.7, albumin 1.7, total bilirubin 0.3, AST 9, ALT 6.  Prealbumin 7.8.     5/27/2023:  Vancomycin trough 20.8.     5/25/2023:  Vancomycin trough 23.0.     5/22/2023:  CBC: WBC count 8.86, hemoglobin 8.5, hematocrit 28.5, platelet count 258.  CMP: Sodium 131, potassium 3.4, chloride 94, CO2 27, BUN 17.3, creatinine 0.67, glucose 54, calcium 7.7, magnesium 1.6, alkaline phosphatase 94, total protein 5.6, albumin 1.8, total bilirubin 0.5, AST 10, ALT 10.  Vancomycin trough 24.1.     5/19/2023:  CBC: WBC count 10.83, hemoglobin 9, hematocrit 30.4, platelet count 274.  BMP: Sodium 130, potassium 3.5, chloride 94, CO2 28, BUN 18.4, creatinine 0.68, glucose 125, calcium 8.3.     5/18/2023:  CBC: WBC count 9.84, hemoglobin 9, hematocrit 30.7,  platelet count 261.  BMP: Sodium 134, potassium 3.7, chloride 94, CO2 33, BUN 20.6, creatinine 0.69, glucose 167, calcium 7.8, magnesium 1.9.     5/16/2023:  BMP: Sodium 131, potassium 4.2, chloride 4.2, chloride 90, CO2 33, BUN 18.9, creatinine 0.65, glucose 188, calcium 7.9, magnesium 1.9.     5/15/2023:  BMP: Sodium 128, potassium 3.3, chloride 91, CO2 34, BUN 18, creatinine 0.67, glucose 76, calcium 7.9.     5/13/2023:  CBC: WBC count 10.82, hemoglobin 9.1, hematocrit 30, platelet count 248.  BMP: Sodium 128, potassium 3.7, chloride 89, CO2 36, BUN 17.3, creatinine 0.66, glucose 103, calcium 8.2.     Significant Imaging: I have reviewed all pertinent imaging results/findings within the past 24 hours.     CXR 5/16/2023: Left-sided PICC projects over the distal SVC.  Prominent interstitial markings with no focal opacification.     CXR 5/6/2023: The heart is not significantly  enlarged.  There is aortic atherosclerosis.  Similar prominent central pulmonary vasculature.  No new dense consolidation or pneumothorax.     CT head 5/5/2023:   1. No acute intracranial abnormality.  2. Chronic microvascular ischemic changes.     EEG 5/2/2023: No evidence of seizure activity.     CT head 5/2/2023: No acute intracranial abnormality.     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     Renal ultrasound 4/21/2023: Mild-to-moderate hydronephrosis on the right.     CT abdomen/pelvis 4/21/2023: Mild to moderate right hydronephrosis with no significant right ureteral dilatation.       STARR 4/21/2023: No valvular vegetations and moderate to severe aortic stenosis.      MRI thoracic/lumbar spine 4/20/2023: No evidence to suggest discitis/osteomyelitis or drainable fluid collection and new right-sided hydronephrosis.     Arterial Doppler left lower extremity 4/20/2023: Monophasic flow throughout suggestive of inflow disease     NM bone scan 4/19/2023: Left lower extremity cellulitis without focal intense concentration more typically seen with an abscess or osteomyelitis.      TTE 4/17/2023:  Low-normal systolic function with an EF of 54%, grade II left ventricular diastolic dysfunction, mild-to-moderate aortic valve stenosis, and no evidence of vegetation.     CT head 4/16/2023: No acute intracranial abnormality.       CXR 4/16/2023: No acute cardiopulmonary process.     CT chest/abdomen/pelvis 4/16/2023: No evidence of PE and no acute intraabdominal or pelvic solid organ or bowel pathology identified.       X-ray left foot 4/16/2023: Minimally displaced 5th proximal phalanx fracture and possible minimally displaced 4th proximal phalanx fracture.     X-ray left  tibia/fibula 4/16/2023: No acute osseous process.       Assessment/Plan:      * MRSA bacteremia  Continue 6 week course of IV vancomycin until 6/4/2023.  Repeat blood culture from 4/23/2023 were negative.  TTE from 4/17/2023 and STARR from 4/23/2023 showed no evidence of vegetation and NM bone scan on 4/19/2023 was positive for a left lower extremity cellulitis without focal intense concentration more typically seen with an abscess or osteomyelitis.     Sepsis  Resolved.  Continue 6 week course of IV vancomycin until 6/4/2023 as per above.    Right hydronephrosis  Continue tamsulosin.She will need to follow-up with urology as an outpatient 1-2 weeks following discharge for right ureteral stent removal. She is s/p cystoscopy with bilateral retrograde pyelograms right ureteral stent placement on 4/25/2023 due to right hydronephrosis with ureteral obstruction. Renal ultrasound from 4/21/2023 revealed mild-to-moderate hydronephrosis on the right and CT of the abdomen and pelvis showed mild to moderate right hydronephrosis with no significant right ureteral dilatation.      Toxic metabolic encephalopathy  Improved. Her scheduled nighttime norco was discontinued on 5/20/2023.  Consider titrating down trazodone.  CT of the head from 5/02/2023 and 5/06/2023 showed no acute intracranial abnormality and EEG from 5/02/2023 revealed no evidence of seizure activity.    Hyponatremia  Continue sodium chloride tablets and 1200 ml fluid restriction. HCTZ has been discontinued. She is s/p treatment with samsca on 5/7/2023.    Severe protein-calorie malnutrition  Continue dietary nutritional supplement per the dietician recommendations. Her prealbumin from 5/28/2023 was 7.8.     Critical limb ischemia of left lower extremity  Continue statin and percocet as needed. She is not currently on any blood thinners. CTA of the abdomen and pelvis with bilateral runoff from 4/26/2023 revealed significant stenosis of the left common iliac artery,  severe narrowing versus occlusion at the left common femoral artery, occlusion at the distal superficial femoral artery, and stenosis at the origin of the superior mesenteric artery and left femoral endarterectomy was recommended by vascular surgery with the family declined.      Urinary tract infection  Resolved.  She completed a 3 day course of ciprofloxacin 250 mg by mouth twice daily on 5/16/2023.  Urine culture from 5/11/2023 grew 10,000-25,000 cfu/ml of Pseudomonas aeruginosa.    Debility  Continue PT/OT.    Hypomagnesemia  Improved.  Continue magnesium oxide.    Hypokalemia  Improved.    Hypertension  Continue carvedilol. She is no longer on losartan-HCTZ.    Type II diabetes mellitus  Continue metformin which was decreased on 5/24/2023. Detemir was discontinued on 5/17/2023 and glimepiride was stopped on 5/24/2023 due to hypoglycemia.  Her hemoglobin A1c from 3/07/2023 was 6.3.      Iron deficiency anemia  Continue ferrous sulfate.  She is s/p blood transfusion with 1 unit packed RBC on 5/12/2023 and she was treated with 3 days of IV ferrlecit.    Chronic diastolic CHF (congestive heart failure)  Compensated. She is not currently on any diuretics.  TTE from 4/17/2023 showed low-normal systolic function with an EF of 54% and grade II left ventricular diastolic dysfunction.     Insomnia  Continue trazodone which was increased on 5/28/2023.    Hyperlipidemia  Continue pravastatin.    Severe aortic stenosis  No acute issues.  She can follow-up with cardiology as an outpatient as she may be a candidate for TAVR.    Chronic constipation  Continue polyethylene glycol.    Disposition  Anticipate discharge home with [facility], a manish lift, and a hospital bed on 6/5/2023 following completion of IV antibiotics on 6/4/2023. Her family has completed manish lift training.        VTE Risk Mitigation (From admission, onward)         Ordered     enoxaparin injection 40 mg  Daily         05/11/23 1412      Place sequential compression device  Until discontinued         05/11/23 1414                Discharge Planning   ERIN:      Code Status: DNR   Is the patient medically ready for discharge?:     Reason for patient still in hospital (select all that apply): Treatment, PT / OT recommendations and Pending disposition  Discharge Plan A: Home, Home with family, Home Health   Discharge Delays: None known at this time      This service was provided via telemedicine.  Type of Software: Audio/Visual.  [facility]  Distant Site: [place], [place].  Her exam was performed with the assistance of Belkys Momin RN.      Naun Pope MD  Department of Hospital Medicine   [facility]

## 2023-06-03 NOTE — PT/OT/SLP PROGRESS
Name: Melodie Villarreal    : 1931 (89 y.o.)  MRN: [id number]           Patient Unavailable      Patient unable to be seen at this time secondary to: pt was asleep, daughter stated that she has had multiple bad nights in a row and is requesting pt not be moved and allowed to rest today. Discussed with nurse Bullard and agreed to hold PT today.

## 2023-06-03 NOTE — SUBJECTIVE & OBJECTIVE
Interval History: She participated in PT and OT yesterday and she required total assistance with bed mobility and transfers. She was seen by the dietician yesterday who recommended a diabetic diet with 1200 ml fluid restriction, gurdeep BID, and boost breeze daily. She has had a decreased appetite but she needs encouragement. She remains confused and she receives tramadol during the day and norco at bedtime for pain.      Review of Systems   All other systems reviewed and are negative.  Objective:     Vital Signs (Most Recent):  Temp: 97.4 °F (36.3 °C) (23)  Pulse: 81 (23)  Resp: 18 (23)  BP: (!) 113/59 (23)  SpO2: 98 % (23) Vital Signs (24h Range):  Temp:  [97.4 °F (36.3 °C)-98 °F (36.7 °C)] 97.4 °F (36.3 °C)  Pulse:  [81-96] 81  Resp:  [16-18] 18  SpO2:  [94 %-98 %] 98 %  BP: (105-116)/(59-67) 113/59     Weight: 68.4 kg (150 lb 12.7 oz)  Body mass index is 26.72 kg/m².    Intake/Output Summary (Last 24 hours) at 6/3/2023 0844  Last data filed at 6/3/2023 0719  Gross per 24 hour   Intake 720 ml   Output 950 ml   Net -230 ml      Physical Exam  General - Elderly  female in no acute distress.  HEENT - NCAT. No scleral icterus. Oropharynx clear. Mucous membranes moist.  Neck - No lymphadenopathy, thyromegaly, or JVD.  CVS - Regular rate and rhythm. No murmurs, rubs, or gallops.  Resp - Lungs are clear to auscultation bilaterally. No rales, wheeze, or rhonchi.   GI - Soft, nontender, nondistended, normoactive bowel sounds present.   Extremities - No clubbing, cyanosis, or edema. Right upper extremity PICC line.  Neuro - CN II through XII are grossly intact. No focal neurological deficits. Alert and oriented to name and  only.  Psych - Flat affect.  Skin -  Ecchymosis to left lateral chest pain. Blanchable erythema noted to dorsal aspect of the left 2nd toe. Left heel with 100% eschar. Hyperpigmentation to left medial lower leg/ankle. Denuded areas present  to sacrum and blanchable erythema to periwound.     Significant Labs: All pertinent labs within the past 24 hours have been reviewed.     5/31/2023:  CBC: WBC count 8.44, hemoglobin 8.3, hematocrit 27.6, platelet count 322.  BMP: Sodium 128, potassium 3.9, chloride 94, CO2 25, BUN 32.9, creatinine 1.06, glucose 206, calcium 7.4.  UA: 11-20 WBCs, 6-10 RBCs, small leukocyte esterase, trace bacteria, moderate occult blood, 30 protein, negative nitrite.  Urine culture: <10,000 cfu/ml of E.coli.     5/28/2023:  CBC: WBC count 9.87, hemoglobin 8.1, hematocrit 26.6, platelet count 338.  CMP: Sodium 128, potassium 3.6, chloride 94, CO2 27, BUN 31.9, creatinine 0.87, glucose 128, calcium 7.6, magnesium 1.8, alkaline phosphatase 81, total protein 5.7, albumin 1.7, total bilirubin 0.3, AST 9, ALT 6.  Prealbumin 7.8.     5/27/2023:  Vancomycin trough 20.8.     5/25/2023:  Vancomycin trough 23.0.     5/22/2023:  CBC: WBC count 8.86, hemoglobin 8.5, hematocrit 28.5, platelet count 258.  CMP: Sodium 131, potassium 3.4, chloride 94, CO2 27, BUN 17.3, creatinine 0.67, glucose 54, calcium 7.7, magnesium 1.6, alkaline phosphatase 94, total protein 5.6, albumin 1.8, total bilirubin 0.5, AST 10, ALT 10.  Vancomycin trough 24.1.     5/19/2023:  CBC: WBC count 10.83, hemoglobin 9, hematocrit 30.4, platelet count 274.  BMP: Sodium 130, potassium 3.5, chloride 94, CO2 28, BUN 18.4, creatinine 0.68, glucose 125, calcium 8.3.     5/18/2023:  CBC: WBC count 9.84, hemoglobin 9, hematocrit 30.7,  platelet count 261.  BMP: Sodium 134, potassium 3.7, chloride 94, CO2 33, BUN 20.6, creatinine 0.69, glucose 167, calcium 7.8, magnesium 1.9.     5/16/2023:  BMP: Sodium 131, potassium 4.2, chloride 4.2, chloride 90, CO2 33, BUN 18.9, creatinine 0.65, glucose 188, calcium 7.9, magnesium 1.9.     5/15/2023:  BMP: Sodium 128, potassium 3.3, chloride 91, CO2 34, BUN 18, creatinine 0.67, glucose 76, calcium 7.9.     5/13/2023:  CBC: WBC count 10.82,  hemoglobin 9.1, hematocrit 30, platelet count 248.  BMP: Sodium 128, potassium 3.7, chloride 89, CO2 36, BUN 17.3, creatinine 0.66, glucose 103, calcium 8.2.     Significant Imaging: I have reviewed all pertinent imaging results/findings within the past 24 hours.     CXR 5/16/2023: Left-sided PICC projects over the distal SVC.  Prominent interstitial markings with no focal opacification.     CXR 5/6/2023: The heart is not significantly enlarged.  There is aortic atherosclerosis.  Similar prominent central pulmonary vasculature.  No new dense consolidation or pneumothorax.     CT head 5/5/2023:   1. No acute intracranial abnormality.  2. Chronic microvascular ischemic changes.     EEG 5/2/2023: No evidence of seizure activity.     CT head 5/2/2023: No acute intracranial abnormality.     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     Renal ultrasound 4/21/2023: Mild-to-moderate hydronephrosis on the right.     CT abdomen/pelvis 4/21/2023: Mild to moderate right hydronephrosis with no significant right ureteral dilatation.       STARR 4/21/2023: No valvular vegetations and moderate to severe aortic stenosis.      MRI thoracic/lumbar spine 4/20/2023: No evidence to suggest discitis/osteomyelitis or drainable fluid collection and new right-sided hydronephrosis.     Arterial Doppler left lower extremity 4/20/2023: Monophasic flow throughout suggestive of inflow disease     NM bone scan 4/19/2023: Left lower extremity cellulitis without focal intense concentration more typically seen with an abscess or osteomyelitis.      TTE 4/17/2023:  Low-normal systolic function with an EF of 54%, grade II left ventricular diastolic dysfunction, mild-to-moderate aortic valve stenosis, and no  evidence of vegetation.     CT head 4/16/2023: No acute intracranial abnormality.       CXR 4/16/2023: No acute cardiopulmonary process.     CT chest/abdomen/pelvis 4/16/2023: No evidence of PE and no acute intraabdominal or pelvic solid organ or bowel pathology identified.       X-ray left foot 4/16/2023: Minimally displaced 5th proximal phalanx fracture and possible minimally displaced 4th proximal phalanx fracture.     X-ray left tibia/fibula 4/16/2023: No acute osseous process.

## 2023-06-03 NOTE — PLAN OF CARE
Problem: Adult Inpatient Plan of Care  Goal: Plan of Care Review  Outcome: Ongoing, Progressing  Flowsheets (Taken 6/3/2023 1753)  Plan of Care Reviewed With:   patient   family  Goal: Patient-Specific Goal (Individualized)  Outcome: Ongoing, Progressing  Flowsheets (Taken 6/3/2023 1753)  Anxieties, Fears or Concerns: family concerned about fatigue, confusion  Individualized Care Needs:   MD spoke with daughter about medications, labs   continue abt therapy and wound care, encourage patient to drink Tanner and Boost for protein for wound healing  Goal: Absence of Hospital-Acquired Illness or Injury  Outcome: Ongoing, Progressing  Intervention: Identify and Manage Fall Risk  Flowsheets (Taken 6/3/2023 1753)  Safety Promotion/Fall Prevention:   assistive device/personal item within reach   bed alarm set   medications reviewed   nonskid shoes/socks when out of bed   instructed to call staff for mobility  Intervention: Prevent Skin Injury  Flowsheets (Taken 6/3/2023 1753)  Body Position:   turned   heels elevated   sitting up in bed  Skin Protection:   adhesive use limited   incontinence pads utilized   skin sealant/moisture barrier applied   tubing/devices free from skin contact  Intervention: Prevent and Manage VTE (Venous Thromboembolism) Risk  Flowsheets (Taken 6/3/2023 1753)  Activity Management:   Rolling - L1   Arm raise - L1   Straight leg raise - L1  VTE Prevention/Management:   bleeding precations maintained   bleeding risk assessed  Range of Motion: active ROM (range of motion) encouraged  Intervention: Prevent Infection  Flowsheets (Taken 6/3/2023 1753)  Infection Prevention:   cohorting utilized   hand hygiene promoted   single patient room provided  Goal: Optimal Comfort and Wellbeing  Outcome: Ongoing, Progressing  Intervention: Monitor Pain and Promote Comfort  Flowsheets (Taken 6/3/2023 1753)  Pain Management Interventions:   care clustered   position adjusted   pillow support provided  Intervention:  Provide Person-Centered Care  Flowsheets (Taken 6/3/2023 1755)  Trust Relationship/Rapport:   care explained   choices provided   reassurance provided  Goal: Readiness for Transition of Care  Outcome: Ongoing, Progressing     Problem: Diabetes Comorbidity  Goal: Blood Glucose Level Within Targeted Range  Outcome: Ongoing, Progressing  Intervention: Monitor and Manage Glycemia  Flowsheets (Taken 6/3/2023 1752)  Glycemic Management:   blood glucose monitored   supplemental insulin given     Problem: Adjustment to Illness (Sepsis/Septic Shock)  Goal: Optimal Coping  Outcome: Ongoing, Progressing     Problem: Bleeding (Sepsis/Septic Shock)  Goal: Absence of Bleeding  Outcome: Ongoing, Progressing     Problem: Glycemic Control Impaired (Sepsis/Septic Shock)  Goal: Blood Glucose Level Within Desired Range  Outcome: Ongoing, Progressing     Problem: Infection Progression (Sepsis/Septic Shock)  Goal: Absence of Infection Signs and Symptoms  Outcome: Ongoing, Progressing     Problem: Nutrition Impaired (Sepsis/Septic Shock)  Goal: Optimal Nutrition Intake  Outcome: Ongoing, Progressing     Problem: Infection  Goal: Absence of Infection Signs and Symptoms  Outcome: Ongoing, Progressing     Problem: Impaired Wound Healing  Goal: Optimal Wound Healing  Outcome: Ongoing, Progressing     Problem: Fall Injury Risk  Goal: Absence of Fall and Fall-Related Injury  Outcome: Ongoing, Progressing     Problem: Skin Injury Risk Increased  Goal: Skin Health and Integrity  Outcome: Ongoing, Progressing     Problem: Mobility Impairment  Goal: Optimal Mobility  Outcome: Ongoing, Progressing

## 2023-06-04 NOTE — SUBJECTIVE & OBJECTIVE
Interval History: She has had a decreased appetite and confusion since yesterday. She did not participate in PT yesterday because she was asleep. She has not been sleeping well per family.      Review of Systems   All other systems reviewed and are negative.  Objective:     Vital Signs (Most Recent):  Temp: 98.4 °F (36.9 °C) (23 0640)  Pulse: 84 (23 0801)  Resp: 18 (23 08)  BP: 122/66 (23 0640)  SpO2: 98 % (23 08) Vital Signs (24h Range):  Temp:  [97.7 °F (36.5 °C)-98.4 °F (36.9 °C)] 98.4 °F (36.9 °C)  Pulse:  [83-98] 84  Resp:  [18-20] 18  SpO2:  [94 %-98 %] 98 %  BP: (107-122)/(59-71) 122/66     Weight: 68.4 kg (150 lb 12.7 oz)  Body mass index is 26.72 kg/m².    Intake/Output Summary (Last 24 hours) at 2023 0829  Last data filed at 2023 0504  Gross per 24 hour   Intake 240 ml   Output 750 ml   Net -510 ml      Physical Exam  General - Elderly  female in no acute distress.  HEENT - NCAT. No scleral icterus. Oropharynx clear. Mucous membranes moist.  Neck - No lymphadenopathy, thyromegaly, or JVD.  CVS - Regular rate and rhythm. No murmurs, rubs, or gallops.  Resp - Lungs are clear to auscultation bilaterally. No rales, wheeze, or rhonchi.   GI - Soft, nontender, nondistended, normoactive bowel sounds present.   Extremities - No clubbing, cyanosis, or edema. Right upper extremity PICC line.  Neuro - CN II through XII are grossly intact. No focal neurological deficits. Alert and oriented to name and  only.  Psych - Flat affect.  Skin -  Ecchymosis to left lateral chest pain. Blanchable erythema noted to dorsal aspect of the left 2nd toe. Left heel with 100% eschar. Hyperpigmentation to left medial lower leg/ankle. Denuded areas present to sacrum and blanchable erythema to periwound.     Significant Labs: All pertinent labs within the past 24 hours have been reviewed.     2023:  CBC: WBC count 8.44, hemoglobin 8.3, hematocrit 27.6, platelet count 322.  BMP:  Sodium 128, potassium 3.9, chloride 94, CO2 25, BUN 32.9, creatinine 1.06, glucose 206, calcium 7.4.  UA: 11-20 WBCs, 6-10 RBCs, small leukocyte esterase, trace bacteria, moderate occult blood, 30 protein, negative nitrite.  Urine culture: <10,000 cfu/ml of E.coli.     5/28/2023:  CBC: WBC count 9.87, hemoglobin 8.1, hematocrit 26.6, platelet count 338.  CMP: Sodium 128, potassium 3.6, chloride 94, CO2 27, BUN 31.9, creatinine 0.87, glucose 128, calcium 7.6, magnesium 1.8, alkaline phosphatase 81, total protein 5.7, albumin 1.7, total bilirubin 0.3, AST 9, ALT 6.  Prealbumin 7.8.     5/27/2023:  Vancomycin trough 20.8.     5/25/2023:  Vancomycin trough 23.0.     5/22/2023:  CBC: WBC count 8.86, hemoglobin 8.5, hematocrit 28.5, platelet count 258.  CMP: Sodium 131, potassium 3.4, chloride 94, CO2 27, BUN 17.3, creatinine 0.67, glucose 54, calcium 7.7, magnesium 1.6, alkaline phosphatase 94, total protein 5.6, albumin 1.8, total bilirubin 0.5, AST 10, ALT 10.  Vancomycin trough 24.1.     5/19/2023:  CBC: WBC count 10.83, hemoglobin 9, hematocrit 30.4, platelet count 274.  BMP: Sodium 130, potassium 3.5, chloride 94, CO2 28, BUN 18.4, creatinine 0.68, glucose 125, calcium 8.3.     5/18/2023:  CBC: WBC count 9.84, hemoglobin 9, hematocrit 30.7,  platelet count 261.  BMP: Sodium 134, potassium 3.7, chloride 94, CO2 33, BUN 20.6, creatinine 0.69, glucose 167, calcium 7.8, magnesium 1.9.     5/16/2023:  BMP: Sodium 131, potassium 4.2, chloride 4.2, chloride 90, CO2 33, BUN 18.9, creatinine 0.65, glucose 188, calcium 7.9, magnesium 1.9.     5/15/2023:  BMP: Sodium 128, potassium 3.3, chloride 91, CO2 34, BUN 18, creatinine 0.67, glucose 76, calcium 7.9.     5/13/2023:  CBC: WBC count 10.82, hemoglobin 9.1, hematocrit 30, platelet count 248.  BMP: Sodium 128, potassium 3.7, chloride 89, CO2 36, BUN 17.3, creatinine 0.66, glucose 103, calcium 8.2.     Significant Imaging: I have reviewed all pertinent imaging results/findings  within the past 24 hours.     CXR 5/16/2023: Left-sided PICC projects over the distal SVC.  Prominent interstitial markings with no focal opacification.     CXR 5/6/2023: The heart is not significantly enlarged.  There is aortic atherosclerosis.  Similar prominent central pulmonary vasculature.  No new dense consolidation or pneumothorax.     CT head 5/5/2023:   1. No acute intracranial abnormality.  2. Chronic microvascular ischemic changes.     EEG 5/2/2023: No evidence of seizure activity.     CT head 5/2/2023: No acute intracranial abnormality.     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     Renal ultrasound 4/21/2023: Mild-to-moderate hydronephrosis on the right.     CT abdomen/pelvis 4/21/2023: Mild to moderate right hydronephrosis with no significant right ureteral dilatation.       STARR 4/21/2023: No valvular vegetations and moderate to severe aortic stenosis.      MRI thoracic/lumbar spine 4/20/2023: No evidence to suggest discitis/osteomyelitis or drainable fluid collection and new right-sided hydronephrosis.     Arterial Doppler left lower extremity 4/20/2023: Monophasic flow throughout suggestive of inflow disease     NM bone scan 4/19/2023: Left lower extremity cellulitis without focal intense concentration more typically seen with an abscess or osteomyelitis.      TTE 4/17/2023:  Low-normal systolic function with an EF of 54%, grade II left ventricular diastolic dysfunction, mild-to-moderate aortic valve stenosis, and no evidence of vegetation.     CT head 4/16/2023: No acute intracranial abnormality.       CXR 4/16/2023: No acute cardiopulmonary process.     CT chest/abdomen/pelvis 4/16/2023: No evidence of PE and no acute intraabdominal or pelvic solid  organ or bowel pathology identified.       X-ray left foot 4/16/2023: Minimally displaced 5th proximal phalanx fracture and possible minimally displaced 4th proximal phalanx fracture.     X-ray left tibia/fibula 4/16/2023: No acute osseous process.

## 2023-06-04 NOTE — DISCHARGE INSTRUCTIONS
Please follow all discharge instructions as given. KEEP ALL FOLLOW UP APPOINTMENTS!           If you experience any worsening or NEW signs or symptoms please call your primary care provider or head to your nearest emergency department.               THANK YOU FOR CHOOSING [facility].  If you have any questions please call [phone].

## 2023-06-04 NOTE — PLAN OF CARE
"  Problem: Adult Inpatient Plan of Care  Goal: Plan of Care Review  Outcome: Ongoing, Progressing  Flowsheets (Taken 6/4/2023 1234)  Plan of Care Reviewed With:   patient   son  Goal: Patient-Specific Goal (Individualized)  Outcome: Ongoing, Progressing  Flowsheets (Taken 6/4/2023 1234)  Anxieties, Fears or Concerns: Family concerned with pt's increased confusion, they say "it's the antibiotics."  Individualized Care Needs: Monitor blood glucose, Frequent re-orientation, wound care to left foot  Goal: Absence of Hospital-Acquired Illness or Injury  Outcome: Ongoing, Progressing  Intervention: Identify and Manage Fall Risk  Flowsheets (Taken 6/4/2023 1234)  Safety Promotion/Fall Prevention:   assistive device/personal item within reach   bed alarm set   gait belt with ambulation   lighting adjusted   medications reviewed   muscle strengthening facilitated   nonskid shoes/socks when out of bed   instructed to call staff for mobility  Intervention: Prevent Skin Injury  Flowsheets (Taken 6/4/2023 1234)  Body Position: sitting up in bed  Skin Protection:   adhesive use limited   incontinence pads utilized   silicone foam dressing in place   skin sealant/moisture barrier applied   skin-to-skin areas padded   skin-to-device areas padded   transparent dressing maintained   tubing/devices free from skin contact  Intervention: Prevent and Manage VTE (Venous Thromboembolism) Risk  Flowsheets (Taken 6/4/2023 1234)  Activity Management: Walk with assistive devise and /or staff member - L3  VTE Prevention/Management:   bleeding risk assessed   bleeding precations maintained   ROM (active) performed  Range of Motion: ROM (range of motion) performed  Intervention: Prevent Infection  Flowsheets (Taken 6/4/2023 1234)  Infection Prevention:   cohorting utilized   equipment surfaces disinfected   single patient room provided   hand hygiene promoted  Goal: Optimal Comfort and Wellbeing  Outcome: Ongoing, Progressing  Intervention: " Monitor Pain and Promote Comfort  Flowsheets (Taken 6/4/2023 1234)  Pain Management Interventions:   diversional activity provided   pillow support provided   position adjusted   relaxation techniques promoted   quiet environment facilitated  Intervention: Provide Person-Centered Care  Flowsheets (Taken 6/4/2023 1234)  Trust Relationship/Rapport:   care explained   choices provided   empathic listening provided   thoughts/feelings acknowledged     Problem: Diabetes Comorbidity  Goal: Blood Glucose Level Within Targeted Range  Outcome: Ongoing, Progressing  Intervention: Monitor and Manage Glycemia  Flowsheets (Taken 6/4/2023 1234)  Glycemic Management: blood glucose monitored     Problem: Adjustment to Illness (Sepsis/Septic Shock)  Goal: Optimal Coping  Outcome: Ongoing, Progressing  Intervention: Optimize Psychosocial Adjustment to Illness  Flowsheets (Taken 6/4/2023 1234)  Supportive Measures:   active listening utilized   goal-setting facilitated   relaxation techniques promoted  Family/Support System Care: support provided     Problem: Bleeding (Sepsis/Septic Shock)  Goal: Absence of Bleeding  Outcome: Ongoing, Progressing  Intervention: Monitor and Manage Bleeding  Flowsheets (Taken 6/4/2023 1234)  Bleeding Precautions: monitored for signs of bleeding     Problem: Glycemic Control Impaired (Sepsis/Septic Shock)  Goal: Blood Glucose Level Within Desired Range  Outcome: Ongoing, Progressing  Intervention: Optimize Glycemic Control  Flowsheets (Taken 6/4/2023 1234)  Glycemic Management: blood glucose monitored     Problem: Infection Progression (Sepsis/Septic Shock)  Goal: Absence of Infection Signs and Symptoms  Outcome: Ongoing, Progressing  Intervention: Initiate Sepsis Management  Flowsheets (Taken 6/4/2023 1234)  Infection Prevention:   cohorting utilized   equipment surfaces disinfected   single patient room provided   hand hygiene promoted  Infection Management: aseptic technique maintained  Isolation  Precautions:   protective   precautions maintained  Intervention: Promote Stabilization  Flowsheets (Taken 6/4/2023 1234)  Fever Reduction/Comfort Measures:   lightweight bedding   lightweight clothing  Fluid/Electrolyte Management: fluids provided  Intervention: Promote Recovery  Flowsheets (Taken 6/4/2023 1234)  Sleep/Rest Enhancement:   awakenings minimized   consistent schedule promoted   family presence promoted   relaxation techniques promoted  Activity Management: Walk with assistive devise and /or staff member - L3     Problem: Nutrition Impaired (Sepsis/Septic Shock)  Goal: Optimal Nutrition Intake  Outcome: Ongoing, Progressing  Intervention: Promote and Optimize Nutrition Delivery  Flowsheets (Taken 6/4/2023 1234)  Nutrition Support Management: (Decreased appetite) other (see comments)     Problem: Infection  Goal: Absence of Infection Signs and Symptoms  Outcome: Ongoing, Progressing  Intervention: Prevent or Manage Infection  Flowsheets (Taken 6/4/2023 1234)  Fever Reduction/Comfort Measures:   lightweight bedding   lightweight clothing  Infection Management: aseptic technique maintained  Isolation Precautions:   protective   precautions maintained     Problem: Impaired Wound Healing  Goal: Optimal Wound Healing  Outcome: Ongoing, Progressing  Intervention: Promote Wound Healing  Flowsheets (Taken 6/4/2023 1234)  Oral Nutrition Promotion:   calorie-dense foods provided   calorie-dense liquids provided   social interaction promoted  Sleep/Rest Enhancement:   awakenings minimized   consistent schedule promoted   family presence promoted   relaxation techniques promoted  Activity Management: Walk with assistive devise and /or staff member - L3  Pain Management Interventions:   diversional activity provided   pillow support provided   position adjusted   relaxation techniques promoted   quiet environment facilitated     Problem: Fall Injury Risk  Goal: Absence of Fall and Fall-Related Injury  Outcome:  Ongoing, Progressing  Intervention: Identify and Manage Contributors  Flowsheets (Taken 6/4/2023 1234)  Self-Care Promotion:   independence encouraged   meal set-up provided  Medication Review/Management: medications reviewed  Intervention: Promote Injury-Free Environment  Flowsheets (Taken 6/4/2023 1234)  Safety Promotion/Fall Prevention:   assistive device/personal item within reach   bed alarm set   gait belt with ambulation   lighting adjusted   medications reviewed   muscle strengthening facilitated   nonskid shoes/socks when out of bed   instructed to call staff for mobility     Problem: Skin Injury Risk Increased  Goal: Skin Health and Integrity  Outcome: Ongoing, Progressing  Intervention: Optimize Skin Protection  Flowsheets (Taken 6/4/2023 1234)  Pressure Reduction Techniques:   frequent weight shift encouraged   heels elevated off bed   weight shift assistance provided  Pressure Reduction Devices: positioning supports utilized  Skin Protection:   adhesive use limited   incontinence pads utilized   silicone foam dressing in place   skin sealant/moisture barrier applied   skin-to-skin areas padded   skin-to-device areas padded   transparent dressing maintained   tubing/devices free from skin contact  Head of Bed (HOB) Positioning: HOB at 30-45 degrees  Intervention: Promote and Optimize Oral Intake  Flowsheets (Taken 6/4/2023 1234)  Oral Nutrition Promotion:   calorie-dense foods provided   calorie-dense liquids provided   social interaction promoted     Problem: Mobility Impairment  Goal: Optimal Mobility  Outcome: Ongoing, Progressing  Intervention: Optimize Mobility  Flowsheets (Taken 6/4/2023 1234)  Assistive Device Utilized: lift device  Activity Management: Walk with assistive devise and /or staff member - L3  Positioning/Transfer Devices:   pillows   in use

## 2023-06-04 NOTE — PLAN OF CARE
"  Problem: Adult Inpatient Plan of Care  Goal: Plan of Care Review  6/4/2023 0256 by Ronda Emery RN  Outcome: Ongoing, Progressing  Flowsheets (Taken 6/3/2023 2100)  Plan of Care Reviewed With:   patient   son  6/4/2023 0234 by Ronda Emery RN  Outcome: Ongoing, Progressing  Goal: Patient-Specific Goal (Individualized)  6/4/2023 0256 by Ronda Emery RN  Outcome: Ongoing, Progressing  Flowsheets (Taken 6/3/2023 2100)  Anxieties, Fears or Concerns: Patient being more confused "we think it's the antibiotics".  Individualized Care Needs:   Blood glucose monitoring/control   Safety   Attempt to reorient to time and situation   Wound care.  6/4/2023 0234 by Ronda Emery RN  Outcome: Ongoing, Progressing  Goal: Absence of Hospital-Acquired Illness or Injury  6/4/2023 0256 by Ronda Emery RN  Outcome: Ongoing, Progressing  6/4/2023 0234 by Ronda Emery RN  Outcome: Ongoing, Progressing  Intervention: Identify and Manage Fall Risk  Flowsheets (Taken 6/3/2023 2105)  Safety Promotion/Fall Prevention:   bed alarm set   assistive device/personal item within reach   high risk medications identified   medications reviewed   Fall Risk reviewed with patient/family   room near unit station   family to remain at bedside   instructed to call staff for mobility  Intervention: Prevent Skin Injury  Flowsheets (Taken 6/3/2023 2105)  Body Position:   weight shifting   supine  Skin Protection:   skin sealant/moisture barrier applied   tubing/devices free from skin contact  Intervention: Prevent and Manage VTE (Venous Thromboembolism) Risk  Flowsheets (Taken 6/3/2023 2105)  Activity Management: Rolling - L1  VTE Prevention/Management:   bleeding risk assessed   bleeding precations maintained   fluids promoted   ROM (active) performed   dorsiflexion/plantar flexion performed  Range of Motion: active ROM (range of motion) encouraged  Intervention: Prevent Infection  Flowsheets (Taken 6/3/2023 2105)  Infection Prevention:   " hand hygiene promoted   equipment surfaces disinfected   rest/sleep promoted  Goal: Optimal Comfort and Wellbeing  6/4/2023 0256 by Ronda Emery RN  Outcome: Ongoing, Progressing  6/4/2023 0234 by Ronda Emery RN  Outcome: Ongoing, Progressing  Intervention: Monitor Pain and Promote Comfort  Flowsheets (Taken 6/3/2023 2105)  Pain Management Interventions:   care clustered   pain management plan reviewed with patient/caregiver   pillow support provided   medication offered   quiet environment facilitated  Intervention: Provide Person-Centered Care  Flowsheets (Taken 6/3/2023 2105)  Trust Relationship/Rapport:   care explained   choices provided   emotional support provided   empathic listening provided   questions answered   questions encouraged   reassurance provided   thoughts/feelings acknowledged  Goal: Readiness for Transition of Care  Outcome: Ongoing, Progressing     Problem: Diabetes Comorbidity  Goal: Blood Glucose Level Within Targeted Range  6/4/2023 0256 by Ronda Emery RN  Outcome: Ongoing, Progressing  6/4/2023 0234 by Ronda Emery RN  Outcome: Ongoing, Progressing  Intervention: Monitor and Manage Glycemia  Flowsheets (Taken 6/3/2023 2105)  Glycemic Management: blood glucose monitored     Problem: Adjustment to Illness (Sepsis/Septic Shock)  Goal: Optimal Coping  Outcome: Ongoing, Progressing     Problem: Bleeding (Sepsis/Septic Shock)  Goal: Absence of Bleeding  Outcome: Ongoing, Progressing     Problem: Glycemic Control Impaired (Sepsis/Septic Shock)  Goal: Blood Glucose Level Within Desired Range  Outcome: Ongoing, Progressing     Problem: Infection Progression (Sepsis/Septic Shock)  Goal: Absence of Infection Signs and Symptoms  Outcome: Ongoing, Progressing     Problem: Nutrition Impaired (Sepsis/Septic Shock)  Goal: Optimal Nutrition Intake  Outcome: Ongoing, Progressing     Problem: Infection  Goal: Absence of Infection Signs and Symptoms  Outcome: Ongoing, Progressing     Problem:  Impaired Wound Healing  Goal: Optimal Wound Healing  6/4/2023 0256 by Ronda Emery RN  Outcome: Ongoing, Progressing  6/4/2023 0234 by Ronda Emery RN  Outcome: Ongoing, Progressing  Intervention: Promote Wound Healing  Flowsheets (Taken 6/3/2023 2105)  Oral Nutrition Promotion: calorie-dense liquids provided  Sleep/Rest Enhancement:   regular sleep/rest pattern promoted   noise level reduced   room darkened  Activity Management: Rolling - L1  Pain Management Interventions:   care clustered   pain management plan reviewed with patient/caregiver   pillow support provided   medication offered   quiet environment facilitated     Problem: Fall Injury Risk  Goal: Absence of Fall and Fall-Related Injury  Outcome: Ongoing, Progressing     Problem: Skin Injury Risk Increased  Goal: Skin Health and Integrity  Outcome: Ongoing, Progressing     Problem: Mobility Impairment  Goal: Optimal Mobility  Outcome: Ongoing, Progressing

## 2023-06-04 NOTE — PROGRESS NOTES
[facility] Medicine  Telehospitalist Progress Note    Patient Name: Melodie Villarreal  MRN: [id number]  Patient Class: IP- Swing   Admission Date: 5/11/2023  Length of Stay: 24 days  Attending Physician: Naun Pope MD  Primary Care Provider: Wisam Espinosa Jr, MD      Subjective:     Principal Problem:MRSA bacteremia      HPI:  Ms. Villarreal is a 89-year-old  female with history of hypertension, hyperlipidemia, type II diabetes mellitus, DVT/PE status post IVC filter placement, and bladder cancer who originally presented to [facility] ER on 4/16/2023 with fatigue, generalized weakness, dysuria, dark urine, and lethargy and she was hypotensive with a blood pressure of 66/33 on EMS arrival.  Her initial lab work was remarkable for a WBC count of 34.3, lactic acid of 3.8, troponin of 0.115, and BNP of 2571.8 and CT of the head showed no acute intracranial abnormality.  CXR revealed no acute cardiopulmonary process and CT of the chest, abdomen, pelvis demonstrated no evidence of PE and no acute intraabdominal or pelvic solid organ or bowel pathology.  X-ray of the left foot showed minimally displaced 5th proximal phalanx fracture and possible minimally displaced 4th proximal phalanx fracture and x-ray of the left tibia/fibula revealed no acute osseous process.  She was started on broad-spectrum antibiotics with IV vancomycin and zosyn for sepsis and her blood cultures from admission returned positive for MRSA.  ID was consulted and TTE from 4/17/2023 demonstrated no evidence of vegetation. She was scheduled for NM bone scan on 4/19/2023 which was positive for a left lower extremity cellulitis without focal intense concentration. Arterial Doppler of the left lower extremity from 4/20/2023 showed monophasic flow throughout suggestive of inflow disease and MRI of the thoracic and lumbar spine revealed no evidence to suggest discitis/osteomyelitis or drainable fluid collection and  new right-sided hydronephrosis.  Repeat blood cultures remained positive and cardiology was consulted for STARR on 4/21/2023 which demonstrated no valvular vegetations and moderate to severe aortic stenosis.  Her chronic left ankle wound was thought to be the source of her persistent MRSA bacteremia and 6 weeks of IV vancomycin was recommended by ID.  Renal ultrasound from 4/21/2023 confirmed mild-to-moderate hydronephrosis on the right and CT of the abdomen and pelvis showed mild to moderate right hydronephrosis with no significant right ureteral dilatation.  Urology was consulted and she was taken to the OR on 4/25/2023 for cystoscopy with bilateral retrograde pyelograms right ureteral stent placement due to right hydronephrosis with ureteral obstruction.  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  She was evaluated by neurology on 5/02/2023 due to mental status changes thought to be toxic metabolic encephalopathy and CT of the head demonstrated no acute intracranial abnormality.  EEG from 5/2/2023 showed no evidence of seizure activity and repeat CT of the head was unremarkable.  Nephrology was consulted on 5/03/2023 due to worsening hyponatremia and she was treated with samsca and a 1 L fluid restriction with improvement in her sodium levels.  She had no other complications throughout her hospital course and she was discharged to [facility] for PT/OT, completion of IV antibiotics, and management of her medical comorbidities.      Overview/Hospital Course:  She was admitted to [facility] on 5/11/2023 for rehab s/p  hospitalization for persistent MRSA bacteremia with sepsis due to nonhealing left ankle wound, right hydronephrosis with ureteral obstruction s/p right ureteral stent placement, critical limb ischemia of the left lower extremity, toxic metabolic encephalopathy, and hyponatremia.  She was found to have a UTI on urinalysis from 5/11/2023 and her urine culture grew 10,000-25,000 cfu/ml of Pseudomonas aeruginosa.  She was started on a 3 day course of ciprofloxacin 250 mg by mouth twice daily which she completed on 5/16/2023.  Her hemoglobin and hematocrit dropped to 7.6 and 25.1 on 5/12/2023 and she was transfused with 1 unit packed RBC.  She was started on sodium chloride 1 g by mouth daily on 5/15/2023 due to persistent hyponatremia.  Detemir was discontinued on 5/17/2023 due to intermittent hypoglycemia and she was started on metformin 1000 mg by mouth twice daily and glimepiride 2 mg by mouth in the morning. Her scheduled nighttime norco was discontinued on 5/20/2023 due to lethargy. She had a blood sugar of 50 on the afternoon of 5/21/2023 and 57 on the morning of 5/22/2023 and her metformin and glimepiride were held. Her metformin was decreased from 1000 mg to 500 mg PO BID and her glimepiride was discontinued on 5/24/2023 due to persistent hypoglycemia. She complained of left shoulder on 5/25/2023 and she was started on diclofenac 1% gel 4 gm topical to the left shoulder BID prn. Her trazodone was increased from 100 mg to 150 mg PO QHS on 5/28/2023 due to worsening insomnia.      Interval History: She has had a decreased appetite and confusion since yesterday. She did not participate in PT yesterday because she was asleep. She has not been sleeping well per family.      Review of Systems   All other systems reviewed and are negative.  Objective:     Vital Signs (Most Recent):  Temp: 98.4 °F (36.9 °C) (06/04/23 0640)  Pulse: 84 (06/04/23 0801)  Resp: 18 (06/04/23 0801)  BP: 122/66 (06/04/23 0640)  SpO2: 98 %  (23 0801) Vital Signs (24h Range):  Temp:  [97.7 °F (36.5 °C)-98.4 °F (36.9 °C)] 98.4 °F (36.9 °C)  Pulse:  [83-98] 84  Resp:  [18-20] 18  SpO2:  [94 %-98 %] 98 %  BP: (107-122)/(59-71) 122/66     Weight: 68.4 kg (150 lb 12.7 oz)  Body mass index is 26.72 kg/m².    Intake/Output Summary (Last 24 hours) at 2023 0829  Last data filed at 2023 0504  Gross per 24 hour   Intake 240 ml   Output 750 ml   Net -510 ml      Physical Exam  General - Elderly  female in no acute distress.  HEENT - NCAT. No scleral icterus. Oropharynx clear. Mucous membranes moist.  Neck - No lymphadenopathy, thyromegaly, or JVD.  CVS - Regular rate and rhythm. No murmurs, rubs, or gallops.  Resp - Lungs are clear to auscultation bilaterally. No rales, wheeze, or rhonchi.   GI - Soft, nontender, nondistended, normoactive bowel sounds present.   Extremities - No clubbing, cyanosis, or edema. Right upper extremity PICC line.  Neuro - CN II through XII are grossly intact. No focal neurological deficits. Alert and oriented to name and  only.  Psych - Flat affect.  Skin -  Ecchymosis to left lateral chest pain. Blanchable erythema noted to dorsal aspect of the left 2nd toe. Left heel with 100% eschar. Hyperpigmentation to left medial lower leg/ankle. Denuded areas present to sacrum and blanchable erythema to periwound.     Significant Labs: All pertinent labs within the past 24 hours have been reviewed.     2023:  CBC: WBC count 8.44, hemoglobin 8.3, hematocrit 27.6, platelet count 322.  BMP: Sodium 128, potassium 3.9, chloride 94, CO2 25, BUN 32.9, creatinine 1.06, glucose 206, calcium 7.4.  UA: 11-20 WBCs, 6-10 RBCs, small leukocyte esterase, trace bacteria, moderate occult blood, 30 protein, negative nitrite.  Urine culture: <10,000 cfu/ml of E.coli.     2023:  CBC: WBC count 9.87, hemoglobin 8.1, hematocrit 26.6, platelet count 338.  CMP: Sodium 128, potassium 3.6, chloride 94, CO2 27, BUN 31.9, creatinine 0.87,  glucose 128, calcium 7.6, magnesium 1.8, alkaline phosphatase 81, total protein 5.7, albumin 1.7, total bilirubin 0.3, AST 9, ALT 6.  Prealbumin 7.8.     5/27/2023:  Vancomycin trough 20.8.     5/25/2023:  Vancomycin trough 23.0.     5/22/2023:  CBC: WBC count 8.86, hemoglobin 8.5, hematocrit 28.5, platelet count 258.  CMP: Sodium 131, potassium 3.4, chloride 94, CO2 27, BUN 17.3, creatinine 0.67, glucose 54, calcium 7.7, magnesium 1.6, alkaline phosphatase 94, total protein 5.6, albumin 1.8, total bilirubin 0.5, AST 10, ALT 10.  Vancomycin trough 24.1.     5/19/2023:  CBC: WBC count 10.83, hemoglobin 9, hematocrit 30.4, platelet count 274.  BMP: Sodium 130, potassium 3.5, chloride 94, CO2 28, BUN 18.4, creatinine 0.68, glucose 125, calcium 8.3.     5/18/2023:  CBC: WBC count 9.84, hemoglobin 9, hematocrit 30.7,  platelet count 261.  BMP: Sodium 134, potassium 3.7, chloride 94, CO2 33, BUN 20.6, creatinine 0.69, glucose 167, calcium 7.8, magnesium 1.9.     5/16/2023:  BMP: Sodium 131, potassium 4.2, chloride 4.2, chloride 90, CO2 33, BUN 18.9, creatinine 0.65, glucose 188, calcium 7.9, magnesium 1.9.     5/15/2023:  BMP: Sodium 128, potassium 3.3, chloride 91, CO2 34, BUN 18, creatinine 0.67, glucose 76, calcium 7.9.     5/13/2023:  CBC: WBC count 10.82, hemoglobin 9.1, hematocrit 30, platelet count 248.  BMP: Sodium 128, potassium 3.7, chloride 89, CO2 36, BUN 17.3, creatinine 0.66, glucose 103, calcium 8.2.     Significant Imaging: I have reviewed all pertinent imaging results/findings within the past 24 hours.     CXR 5/16/2023: Left-sided PICC projects over the distal SVC.  Prominent interstitial markings with no focal opacification.     CXR 5/6/2023: The heart is not significantly enlarged.  There is aortic atherosclerosis.  Similar prominent central pulmonary vasculature.  No new dense consolidation or pneumothorax.     CT head 5/5/2023:   1. No acute intracranial abnormality.  2. Chronic microvascular  ischemic changes.     EEG 5/2/2023: No evidence of seizure activity.     CT head 5/2/2023: No acute intracranial abnormality.     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     Renal ultrasound 4/21/2023: Mild-to-moderate hydronephrosis on the right.     CT abdomen/pelvis 4/21/2023: Mild to moderate right hydronephrosis with no significant right ureteral dilatation.       STARR 4/21/2023: No valvular vegetations and moderate to severe aortic stenosis.      MRI thoracic/lumbar spine 4/20/2023: No evidence to suggest discitis/osteomyelitis or drainable fluid collection and new right-sided hydronephrosis.     Arterial Doppler left lower extremity 4/20/2023: Monophasic flow throughout suggestive of inflow disease     NM bone scan 4/19/2023: Left lower extremity cellulitis without focal intense concentration more typically seen with an abscess or osteomyelitis.      TTE 4/17/2023:  Low-normal systolic function with an EF of 54%, grade II left ventricular diastolic dysfunction, mild-to-moderate aortic valve stenosis, and no evidence of vegetation.     CT head 4/16/2023: No acute intracranial abnormality.       CXR 4/16/2023: No acute cardiopulmonary process.     CT chest/abdomen/pelvis 4/16/2023: No evidence of PE and no acute intraabdominal or pelvic solid organ or bowel pathology identified.       X-ray left foot 4/16/2023: Minimally displaced 5th proximal phalanx fracture and possible minimally displaced 4th proximal phalanx fracture.     X-ray left tibia/fibula 4/16/2023: No acute osseous process.       Assessment/Plan:      * MRSA bacteremia  Continue 6 week course of IV vancomycin until 6/4/2023.  Repeat blood culture from 4/23/2023 were negative.  TTE from 4/17/2023 and STARR  from 4/23/2023 showed no evidence of vegetation and NM bone scan on 4/19/2023 was positive for a left lower extremity cellulitis without focal intense concentration more typically seen with an abscess or osteomyelitis.     Sepsis  Resolved.  Continue 6 week course of IV vancomycin until 6/4/2023 as per above.    Right hydronephrosis  Continue tamsulosin.She will need to follow-up with urology as an outpatient 1-2 weeks following discharge for right ureteral stent removal. She is s/p cystoscopy with bilateral retrograde pyelograms right ureteral stent placement on 4/25/2023 due to right hydronephrosis with ureteral obstruction. Renal ultrasound from 4/21/2023 revealed mild-to-moderate hydronephrosis on the right and CT of the abdomen and pelvis showed mild to moderate right hydronephrosis with no significant right ureteral dilatation.      Toxic metabolic encephalopathy  Improved. Her scheduled nighttime norco was discontinued on 5/20/2023.  Consider titrating down trazodone.  CT of the head from 5/02/2023 and 5/06/2023 showed no acute intracranial abnormality and EEG from 5/02/2023 revealed no evidence of seizure activity.    Hyponatremia  Continue sodium chloride tablets and 1200 ml fluid restriction. HCTZ has been discontinued. She is s/p treatment with samsca on 5/7/2023.    Severe protein-calorie malnutrition  Continue dietary nutritional supplement per the dietician recommendations. Her prealbumin from 5/28/2023 was 7.8.     Critical limb ischemia of left lower extremity  Continue statin and percocet as needed. She is not currently on any blood thinners. CTA of the abdomen and pelvis with bilateral runoff from 4/26/2023 revealed significant stenosis of the left common iliac artery, severe narrowing versus occlusion at the left common femoral artery, occlusion at the distal superficial femoral artery, and stenosis at the origin of the superior mesenteric artery and left femoral endarterectomy was recommended by  vascular surgery with the family declined.      Urinary tract infection  Resolved.  She completed a 3 day course of ciprofloxacin 250 mg by mouth twice daily on 5/16/2023.  Urine culture from 5/11/2023 grew 10,000-25,000 cfu/ml of Pseudomonas aeruginosa.    Debility  Continue PT/OT.    Hypomagnesemia  Improved.  Continue magnesium oxide.    Hypokalemia  Improved.    Hypertension  Continue carvedilol. She is no longer on losartan-HCTZ.    Type II diabetes mellitus  Continue metformin which was decreased on 5/24/2023. Detemir was discontinued on 5/17/2023 and glimepiride was stopped on 5/24/2023 due to hypoglycemia.  Her hemoglobin A1c from 3/07/2023 was 6.3.      Iron deficiency anemia  Continue ferrous sulfate.  She is s/p blood transfusion with 1 unit packed RBC on 5/12/2023 and she was treated with 3 days of IV ferrlecit.    Chronic diastolic CHF (congestive heart failure)  Compensated. She is not currently on any diuretics.  TTE from 4/17/2023 showed low-normal systolic function with an EF of 54% and grade II left ventricular diastolic dysfunction.     Insomnia  Continue trazodone which was increased on 5/28/2023.    Hyperlipidemia  Continue pravastatin.    Severe aortic stenosis  No acute issues.  She can follow-up with cardiology as an outpatient as she may be a candidate for TAVR.    Chronic constipation  Continue polyethylene glycol.    Disposition  Anticipate discharge home with [facility], a manish lift, and a hospital bed on 6/5/2023 following completion of IV antibiotics on 6/4/2023. Her family has completed manish lift training.        VTE Risk Mitigation (From admission, onward)         Ordered     enoxaparin injection 40 mg  Daily         05/11/23 1414     Place sequential compression device  Until discontinued         05/11/23 1414                Discharge Planning   ERIN:      Code Status: DNR   Is the patient medically ready for discharge?:     Reason for patient still in hospital  (select all that apply): Treatment, PT / OT recommendations and Pending disposition  Discharge Plan A: Home, Home with family, Home Health   Discharge Delays: None known at this time      This service was provided via telemedicine.  Type of Software: Audio/Visual.  Originating Site: [facility].  Distant Site: [place], [place].  Her exam was performed with the assistance of Kylah Vieira RN.      Naun Pope MD  Department of Hospital Medicine   [facility]

## 2023-06-05 NOTE — DISCHARGE SUMMARY
[facility] Medicine  Telehospitalist Discharge Summary      Patient Name: Melodie Villarreal  MRN: [id number]  KRISTA: [id number]  Patient Class: IP- Swing  Admission Date: 5/11/2023  Hospital Length of Stay: 25 days  Discharge Date and Time: No discharge date for patient encounter.  Attending Physician: Shruthi Velasquez MD   Discharging Provider: Naun Pope MD  Primary Care Provider: Wisam Espinosa Jr, MD    Primary Care Team: Networked reference to record PCT     HPI:   Ms. Villarreal is a 89-year-old  female with history of hypertension, hyperlipidemia, type II diabetes mellitus, DVT/PE status post IVC filter placement, and bladder cancer who originally presented to [facility] ER on 4/16/2023 with fatigue, generalized weakness, dysuria, dark urine, and lethargy and she was hypotensive with a blood pressure of 66/33 on EMS arrival.  Her initial lab work was remarkable for a WBC count of 34.3, lactic acid of 3.8, troponin of 0.115, and BNP of 2571.8 and CT of the head showed no acute intracranial abnormality.  CXR revealed no acute cardiopulmonary process and CT of the chest, abdomen, pelvis demonstrated no evidence of PE and no acute intraabdominal or pelvic solid organ or bowel pathology.  X-ray of the left foot showed minimally displaced 5th proximal phalanx fracture and possible minimally displaced 4th proximal phalanx fracture and x-ray of the left tibia/fibula revealed no acute osseous process.  She was started on broad-spectrum antibiotics with IV vancomycin and zosyn for sepsis and her blood cultures from admission returned positive for MRSA.  ID was consulted and TTE from 4/17/2023 demonstrated no evidence of vegetation. She was scheduled for NM bone scan on 4/19/2023 which was positive for a left lower extremity cellulitis without focal intense concentration. Arterial Doppler of the left lower extremity from 4/20/2023 showed monophasic flow throughout suggestive  of inflow disease and MRI of the thoracic and lumbar spine revealed no evidence to suggest discitis/osteomyelitis or drainable fluid collection and new right-sided hydronephrosis.  Repeat blood cultures remained positive and cardiology was consulted for STARR on 4/21/2023 which demonstrated no valvular vegetations and moderate to severe aortic stenosis.  Her chronic left ankle wound was thought to be the source of her persistent MRSA bacteremia and 6 weeks of IV vancomycin was recommended by ID.  Renal ultrasound from 4/21/2023 confirmed mild-to-moderate hydronephrosis on the right and CT of the abdomen and pelvis showed mild to moderate right hydronephrosis with no significant right ureteral dilatation.  Urology was consulted and she was taken to the OR on 4/25/2023 for cystoscopy with bilateral retrograde pyelograms right ureteral stent placement due to right hydronephrosis with ureteral obstruction.  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  She was evaluated by neurology on 5/02/2023 due to mental status changes thought to be toxic metabolic encephalopathy and CT of the head demonstrated no acute intracranial abnormality.  EEG from 5/2/2023 showed no evidence of seizure activity and repeat CT of the head was unremarkable.  Nephrology was consulted on 5/03/2023 due to worsening hyponatremia and she was treated with samsca and a 1 L fluid restriction with improvement in her sodium levels.  She had no other complications throughout her hospital course and she was discharged to [facility] swing bed for PT/OT, completion of IV antibiotics, and  management of her medical comorbidities.      Hospital Course:   She was admitted to [facility] swing bed on 5/11/2023 for rehab s/p hospitalization for persistent MRSA bacteremia with sepsis due to nonhealing left ankle wound, right hydronephrosis with ureteral obstruction s/p right ureteral stent placement, critical limb ischemia of the left lower extremity, toxic metabolic encephalopathy, and hyponatremia.  She was found to have a UTI on urinalysis from 5/11/2023 and her urine culture grew 10,000-25,000 cfu/ml of Pseudomonas aeruginosa.  She was started on a 3 day course of ciprofloxacin 250 mg by mouth twice daily which she completed on 5/16/2023.  Her hemoglobin and hematocrit dropped to 7.6 and 25.1 on 5/12/2023 and she was transfused with 1 unit packed RBC.  She was started on sodium chloride 1 g by mouth daily on 5/15/2023 due to persistent hyponatremia.  Detemir was discontinued on 5/17/2023 due to intermittent hypoglycemia and she was started on metformin 1000 mg by mouth twice daily and glimepiride 2 mg by mouth in the morning. Her scheduled nighttime norco was discontinued on 5/20/2023 due to lethargy. She had a blood sugar of 50 on the afternoon of 5/21/2023 and 57 on the morning of 5/22/2023 and her metformin and glimepiride were held. Her metformin was decreased from 1000 mg to 500 mg PO BID and her glimepiride was discontinued on 5/24/2023 due to persistent hypoglycemia. She complained of left shoulder on 5/25/2023 and she was started on diclofenac 1% gel 4 gm topical to the left shoulder BID prn. Her trazodone was increased from 100 mg to 150 mg PO QHS on 5/28/2023 due to worsening insomnia. She had chronic hyponatremia with a sodium as low as 123 on 6/4/2023 and she will remain on a 1200 ml fluid restriction following discharge. She completed a 6 week course of antibiotics on 6/4/2023 and she was discharged home with home health, a hospital bed, wheelchair, and a manish lift in stable  condition. She has a follow-up appointment with Dr. Darwin Bueno in vascular surgery clinic on 6/14/2023 at 2:30 pm and with Dr. Martin Ruvalcaba in ID clinic on 8/10/2023 at 9:40 am.       Goals of Care Treatment Preferences:  Code Status: DNR      Consults:   Consults (From admission, onward)        Status Ordering Provider     [facility]  Once        Provider:  (Not yet assigned)    Acknowledged REYES, THAIRY G     Pharmacy to dose Vancomycin consult  Once        Provider:  (Not yet assigned)   See [facility] for full Linked Orders Report.    Acknowledged REYES, THAIRY G          Final Active Diagnoses:    Diagnosis Date Noted POA    PRINCIPAL PROBLEM:  MRSA bacteremia [R78.81, B95.62] 05/11/2023 Yes    Sepsis [A41.9] 04/17/2023 Yes    Right hydronephrosis [N13.30] 04/25/2023 Yes    Toxic metabolic encephalopathy [G92.8] 05/03/2023 Yes    Hyponatremia [E87.1] 05/20/2023 Unknown    Severe protein-calorie malnutrition [E43] 05/28/2023 Yes    Critical limb ischemia of left lower extremity [I70.222] 04/20/2023 Yes    Urinary tract infection [N39.0] 05/20/2023 Unknown    Debility [R53.81] 05/02/2023 Yes    Hypomagnesemia [E83.42] 05/20/2023 Unknown    Hypokalemia [E87.6] 05/20/2023 Unknown    Hypertension [I10] 05/03/2022 Unknown    Type II diabetes mellitus [E11.9] 05/03/2022 Yes    Iron deficiency anemia [D50.9] 05/20/2023 Unknown    Chronic diastolic CHF (congestive heart failure) [I50.32] 05/20/2023 Unknown    Insomnia [G47.00] 05/03/2022 Yes    Hyperlipidemia [E78.5] 05/03/2022 Yes    Severe aortic stenosis [I35.0] 05/20/2023 Unknown    Chronic constipation [K59.09] 05/20/2023 Unknown      Problems Resolved During this Admission:       Discharged Condition: good    Disposition: Home-Health Care c    Follow Up:   Follow-up Information     Anyi Bearden MD. Go on 6/20/2023.    Specialty: Urology  Why: Appointment with Dr. Bear to remove Stent on Tuesday, June  "20, 2023 @ 4:45 pm. Spoke to Peggy.  Contact information:  [address] [facility]  [address]  [facility] [address]  [phone]             Wisam Espinosa Jr, MD. Go on 6/12/2023.    Specialty: Family Medicine  Why: Follow up with Wisam Espinosa MD on Monday, June 12, 2023 @ 11:15 am.  Spoke to Annalisa.  Contact information:  [address] [facility] [address]  [phone]             [facility] Follow up.    Specialties: Home Health Services, Home Therapy Services, Home Living Aide Services  Contact information:  [address] [facility] [address]  [phone]           [employer], [facility] Follow up.    Why: Tashia lift, hospital bed, WC, will all be delivered to patient home on day of discharge  Contact information:  [address]  [address]  [facility] [address]  [phone]                     Patient Instructions:      PATIENT (TASHIA) LIFT FOR HOME USE     Order Specific Question Answer Comments   Height: 5' 2.99" (1.6 m)    Weight: 55 kg (121 lb 4.1 oz)    Does patient have medical equipment at home? bedside commode transport chair / transport chair / transport chair   Does patient have medical equipment at home? rollator    Does patient have medical equipment at home? grab bar    Length of need (1-99 months): 99      WHEELCHAIR FOR HOME USE     Order Specific Question Answer Comments   Hours in W/C per day: 6    Type of Wheelchair: Standard    Size(Width): 18"(STD adult)    Leg Support: Elevating leg rests    Arm Height: Detachable    Lap Belt: Velcro    Accessories: Anti-tippers    Accessories: Front brakes    Cushion: Basic    Reclining Back No    Height: 5' 2.99" (1.6 m)    Weight: 55 kg (121 lb 4.1 oz)    Does patient have medical equipment at home? bedside commode transport chair / transport chair / transport chair   Does patient have medical equipment at home? rollator    Does patient have medical equipment at home? grab bar    Length of need (1-99 months): 99 " "   Please check all that apply: Caregiver is capable and willing to operate wheelchair safely.    Please check all that apply: Patient's upper body strength is sufficient for propulsion.    Please check all that apply: The patient has a cast, brace or muscloskeletal condition which prevents 90 degree flexion of the knee.    Please check all that apply: The patient has significant edema of the lower extremities that requires an elevating leg rest.    Please check all that apply: The patient requires the use of a w/c for activities of daily living within the Home.    Please check all that apply: Patient mobility limitations cannot be sufficiently resolved by the use of other ambulatory therapies.      HOSPITAL BED FOR HOME USE     Order Specific Question Answer Comments   Type: Semi-electric    Length of need (1-99 months): 99    Does patient have medical equipment at home? bedside commode transport chair / transport chair / transport chair   Does patient have medical equipment at home? rollator    Does patient have medical equipment at home? grab bar    Height: 5' 2.99" (1.6 m)    Weight: 55 kg (121 lb 4.1 oz)    Please check all that apply: Patient requires positioning of the body in ways not feasible in an ordinary bed due to a medical condition which is expected to last at least one month.    Please check all that apply: Patient requires, for the alleviation of pain, positioning of the body in ways not feasible in an ordinary bed.    Please check all that apply: Patient requires the head of bed to be elevated more than 30 degrees most of the time due to congestive heart failure, chronic pulmonary disease, or aspiration.  Pillows and wedges have been considered and ruled out.    Please check all that apply: Patient requires a bed height different than a fixed height hospital bed to permit transfers to chair, wheelchair, or standing.    Please check all that apply: Patient requires frequent changes in body position " and/or has an immediate need for a change in body position.      Diet diabetic     Notify your health care provider if you experience any of the following:  severe uncontrolled pain     Notify your health care provider if you experience any of the following:  increased confusion or weakness     Weight bearing restrictions (specify):   Order Comments: Tashia lift for transfers       Significant Diagnostic Studies: Labs:   CMP   Recent Labs   Lab 06/04/23 0300 06/05/23 0328   * 125*   K 3.9 3.6   CO2 24 26   BUN 52.8* 52.0*   CREATININE 1.24* 1.17*   CALCIUM 8.0* 7.7*   ALBUMIN 1.7* 1.7*   BILITOT 0.3 0.3   ALKPHOS 84 85   AST 7 5   ALT <5 6    and CBC   Recent Labs   Lab 06/04/23 0300 06/05/23 0328   WBC 9.36 9.21   HGB 7.9* 8.5*   HCT 26.1* 27.3*    329       Pending Diagnostic Studies:     Procedure Component Value Units Date/Time    Occult Blood, Stool 2nd Specimen [[id number]]     Order Status: Sent Lab Status: No result     Specimen: Stool     Occult Blood, Stool 3rd Specimen [[id number]]     Order Status: Sent Lab Status: No result     Specimen: Stool     Occult Blood, Stool, Diagnostic (1-3) [[id number]] Collected: 05/13/23 0610    Order Status: Sent Lab Status: In process Updated: 05/13/23 0624    Specimen: Stool     Narrative:      The following orders were created for panel order Occult Blood, Stool, Diagnostic (1-3).  Procedure                               Abnormality         Status                     ---------                               -----------         ------                     Occult blood x 3, stool[[id number]]                          Final result               Occult Blood, Stool 2nd ...[[id number]]                                                 Occult Blood, Stool 3rd ...[[id number]]                                                   Please view results for these tests on the individual orders.         Medications:  Reconciled Home Medications:      Medication List      CHANGE how  you take these medications    metFORMIN 500 MG tablet  Commonly known as: GLUCOPHAGE  Take 1 tablet (500 mg total) by mouth 2 (two) times daily with meals.  What changed:   · medication strength  · how much to take        CONTINUE taking these medications    blood sugar diagnostic Strp  Commonly known as: EASY TOUCH TEST STRIP  1 each by Misc.(Non-Drug; Combo Route) route once daily. Use to test blood glucose once daily     blood-glucose meter Misc  Easy Touch use as directed     calcium-vitamin D3 500 mg-5 mcg (200 unit) per tablet  Commonly known as: OS-SUSAN 500 + D3  Take 1 tablet by mouth once daily.     carvediloL 6.25 MG tablet  Commonly known as: COREG  Take 1 tablet (6.25 mg total) by mouth 2 (two) times daily.     ferrous gluconate 324 MG tablet  Commonly known as: FERGON  Take 324 mg by mouth daily with breakfast.     Lactobacillus acidophilus 500 million cell Cap  Take 1 capsule by mouth 3 (three) times daily with meals.     lancets Misc  Easy Touch Twist 30G Lancets to use with insurance preferred meter Diagnosis Code: E11.9.  Test once daily.     magnesium oxide 400 mg (241.3 mg magnesium) tablet  Commonly known as: MAG-OX  Take 1 tablet (400 mg total) by mouth once daily.     multivitamin capsule  Take 1 capsule by mouth once daily.     polyethylene glycol 17 gram Pwpk  Commonly known as: GLYCOLAX  Take 17 g by mouth once daily.     pravastatin 40 MG tablet  Commonly known as: PRAVACHOL  Take 1 tablet (40 mg total) by mouth once daily.     tamsulosin 0.4 mg Cap  Commonly known as: FLOMAX  Take 1 capsule (0.4 mg total) by mouth once daily.     traMADoL 50 mg tablet  Commonly known as: ULTRAM  Take 1 tablet (50 mg total) by mouth every 6 (six) hours as needed for Pain.     traZODone 100 MG tablet  Commonly known as: DESYREL  Take 1 tablet (100 mg total) by mouth nightly as needed for Insomnia.        STOP taking these medications    insulin aspart U-100 100 unit/mL injection  Commonly known as: NovoLOG      losartan-hydrochlorothiazide 100-12.5 mg 100-12.5 mg Tab  Commonly known as: HYZAAR     SantyL ointment  Generic drug: collagenase     VANCOMYCIN 500 MG/100 ML D5W (READY TO MIX)        ASK your doctor about these medications    DEXCOM G6  Misc  Generic drug: blood-glucose meter,continuous  Use as directed DX: E11.9     DEXCOM G6 SENSOR Carla  Generic drug: blood-glucose sensor  Use as directed Dx: E11.9     DEXCOM G6 TRANSMITTER Carla  Generic drug: blood-glucose transmitter  Use as directed DX: E11.9     HYDROcodone-acetaminophen 5-325 mg per tablet  Commonly known as: NORCO  Take 1 tablet by mouth every 6 (six) hours as needed for Pain.     insulin detemir U-100 100 unit/mL injection  Commonly known as: Levemir  Inject 7 Units into the skin every evening.  Ask about: Which instructions should I use?            Indwelling Lines/Drains at time of discharge:   Lines/Drains/Airways     Peripherally Inserted Central Catheter Line  Duration           PICC Double Lumen 05/16/23 0240 right basilic 20 days          Drain  Duration           Female External Urinary Catheter 04/28/23 38 days                Time spent on the discharge of patient: 35 minutes      This service was provided via telemedicine.  Type of Software: Audio/Visual.  Originating Site: [facility].  Distant Site: [place], [place].  Her exam was performed with the assistance of Kylah Vieira RN.    Naun Pope MD  Department of [facility] Medicine  [facility]

## 2023-06-05 NOTE — NURSING
Hospital bed, WC, and manish brought to home by [employer]. Called [facility] to set-up transport to home with home health.

## 2023-06-05 NOTE — NURSING
Gave Lesvia (daughter), Tao (son), and pt  discharge instructions. They agreed to keep FU appointments and all questions were answered. All pt belongings were returned and their were no medications to return. I removed the patient's PICC line and the procedure was well tolerated. [facility] EMTs picked up pt via stretcher and she was transported via ambulance. Patient is now off unit.

## 2023-06-05 NOTE — PLAN OF CARE
Problem: Adult Inpatient Plan of Care  Goal: Plan of Care Review  Outcome: Ongoing, Progressing  Flowsheets (Taken 6/5/2023 1043)  Plan of Care Reviewed With:   patient   daughter  Goal: Patient-Specific Goal (Individualized)  Outcome: Ongoing, Progressing  Flowsheets (Taken 6/5/2023 1043)  Anxieties, Fears or Concerns: None verbalized, DC today  Individualized Care Needs: Getting pt ready for discharge today  Goal: Absence of Hospital-Acquired Illness or Injury  Outcome: Ongoing, Progressing  Intervention: Identify and Manage Fall Risk  Flowsheets (Taken 6/5/2023 1043)  Safety Promotion/Fall Prevention:   assistive device/personal item within reach   bed alarm set   high risk medications identified   lighting adjusted   medications reviewed   muscle strengthening facilitated   nonskid shoes/socks when out of bed   instructed to call staff for mobility   room near unit station  Intervention: Prevent Skin Injury  Flowsheets (Taken 6/5/2023 1043)  Body Position: sitting up in bed  Skin Protection:   adhesive use limited   incontinence pads utilized   skin sealant/moisture barrier applied   skin-to-device areas padded   skin-to-skin areas padded   transparent dressing maintained   tubing/devices free from skin contact  Intervention: Prevent and Manage VTE (Venous Thromboembolism) Risk  Flowsheets (Taken 6/5/2023 1043)  Activity Management: Rolling - L1  VTE Prevention/Management:   bleeding risk assessed   bleeding precations maintained   ROM (passive) performed  Range of Motion: ROM (range of motion) performed  Intervention: Prevent Infection  Flowsheets (Taken 6/5/2023 1043)  Infection Prevention:   cohorting utilized   equipment surfaces disinfected   hand hygiene promoted   single patient room provided  Goal: Optimal Comfort and Wellbeing  Outcome: Ongoing, Progressing  Intervention: Monitor Pain and Promote Comfort  Flowsheets (Taken 6/5/2023 1043)  Pain Management Interventions:   diversional activity  provided   pillow support provided   position adjusted   relaxation techniques promoted  Intervention: Provide Person-Centered Care  Flowsheets (Taken 6/5/2023 1043)  Trust Relationship/Rapport:   care explained   choices provided   questions answered   thoughts/feelings acknowledged     Problem: Diabetes Comorbidity  Goal: Blood Glucose Level Within Targeted Range  Outcome: Ongoing, Progressing  Intervention: Monitor and Manage Glycemia  Flowsheets (Taken 6/5/2023 1043)  Glycemic Management: blood glucose monitored     Problem: Adjustment to Illness (Sepsis/Septic Shock)  Goal: Optimal Coping  Outcome: Ongoing, Progressing  Intervention: Optimize Psychosocial Adjustment to Illness  Flowsheets (Taken 6/5/2023 1043)  Supportive Measures:   active listening utilized   positive reinforcement provided   self-care encouraged  Family/Support System Care: support provided     Problem: Nutrition Impaired (Sepsis/Septic Shock)  Goal: Optimal Nutrition Intake  Outcome: Ongoing, Progressing  Intervention: Promote and Optimize Nutrition Delivery  Flowsheets (Taken 6/5/2023 1043)  Nutrition Support Management: (Decreased appetite) other (see comments)     Problem: Infection  Goal: Absence of Infection Signs and Symptoms  Outcome: Ongoing, Progressing  Intervention: Prevent or Manage Infection  Flowsheets (Taken 6/5/2023 1043)  Fever Reduction/Comfort Measures:   lightweight bedding   lightweight clothing  Infection Management: aseptic technique maintained  Isolation Precautions:   protective   precautions maintained     Problem: Impaired Wound Healing  Goal: Optimal Wound Healing  Outcome: Ongoing, Progressing  Intervention: Promote Wound Healing  Flowsheets (Taken 6/5/2023 1043)  Oral Nutrition Promotion:   social interaction promoted   calorie-dense foods provided   calorie-dense liquids provided  Sleep/Rest Enhancement:   awakenings minimized   consistent schedule promoted   relaxation techniques promoted  Activity  Management: Rolling - L1  Pain Management Interventions:   diversional activity provided   pillow support provided   position adjusted   relaxation techniques promoted     Problem: Fall Injury Risk  Goal: Absence of Fall and Fall-Related Injury  Outcome: Ongoing, Progressing  Intervention: Identify and Manage Contributors  Flowsheets (Taken 6/5/2023 1043)  Self-Care Promotion:   meal set-up provided   BADL personal objects within reach   BADL personal routines maintained  Medication Review/Management: medications reviewed  Intervention: Promote Injury-Free Environment  Flowsheets (Taken 6/5/2023 1043)  Safety Promotion/Fall Prevention:   assistive device/personal item within reach   bed alarm set   high risk medications identified   lighting adjusted   medications reviewed   muscle strengthening facilitated   nonskid shoes/socks when out of bed   instructed to call staff for mobility   room near unit station     Problem: Skin Injury Risk Increased  Goal: Skin Health and Integrity  Outcome: Ongoing, Progressing  Intervention: Optimize Skin Protection  Flowsheets (Taken 6/5/2023 1043)  Pressure Reduction Techniques:   frequent weight shift encouraged   heels elevated off bed   weight shift assistance provided   positioned off wounds  Pressure Reduction Devices: positioning supports utilized  Skin Protection:   adhesive use limited   incontinence pads utilized   skin sealant/moisture barrier applied   skin-to-device areas padded   skin-to-skin areas padded   transparent dressing maintained   tubing/devices free from skin contact  Head of Bed (HOB) Positioning: HOB at 30 degrees  Intervention: Promote and Optimize Oral Intake  Flowsheets (Taken 6/5/2023 1043)  Oral Nutrition Promotion:   social interaction promoted   calorie-dense foods provided   calorie-dense liquids provided     Problem: Mobility Impairment  Goal: Optimal Mobility  Outcome: Ongoing, Progressing  Intervention: Optimize Mobility  Flowsheets (Taken  6/5/2023 1043)  Assistive Device Utilized: lift device  Activity Management: Rolling - L1  Positioning/Transfer Devices:   pillows   in use

## 2023-06-05 NOTE — PLAN OF CARE
"  Problem: Adult Inpatient Plan of Care  Goal: Plan of Care Review  Outcome: Ongoing, Progressing  Flowsheets (Taken 6/4/2023 2045)  Plan of Care Reviewed With:   patient   daughter  Goal: Patient-Specific Goal (Individualized)  Outcome: Ongoing, Progressing  Flowsheets (Taken 6/4/2023 2045)  Anxieties, Fears or Concerns: Being discharged to home and having "everything we need".  Individualized Care Needs:   Wound care   pain management   turn Q2 hrs.  Goal: Absence of Hospital-Acquired Illness or Injury  Outcome: Ongoing, Progressing  Intervention: Identify and Manage Fall Risk  Flowsheets (Taken 6/4/2023 2045)  Safety Promotion/Fall Prevention:   bed alarm set   assistive device/personal item within reach   Fall Risk reviewed with patient/family   medications reviewed   high risk medications identified   room near unit station   family to remain at bedside  Intervention: Prevent Infection  Flowsheets (Taken 6/4/2023 2045)  Infection Prevention:   hand hygiene promoted   equipment surfaces disinfected   personal protective equipment utilized   rest/sleep promoted  Goal: Optimal Comfort and Wellbeing  Outcome: Ongoing, Progressing  Intervention: Monitor Pain and Promote Comfort  Flowsheets (Taken 6/4/2023 2045)  Pain Management Interventions:   care clustered   pain management plan reviewed with patient/caregiver   medication offered   position adjusted   quiet environment facilitated  Intervention: Provide Person-Centered Care  Flowsheets (Taken 6/4/2023 2045)  Trust Relationship/Rapport:   care explained   choices provided   emotional support provided   empathic listening provided   questions answered   questions encouraged   reassurance provided   thoughts/feelings acknowledged     Problem: Diabetes Comorbidity  Goal: Blood Glucose Level Within Targeted Range  Outcome: Ongoing, Progressing  Intervention: Monitor and Manage Glycemia  Flowsheets (Taken 6/4/2023 2045)  Glycemic Management:   blood glucose " monitored   supplemental insulin given     Problem: Infection  Goal: Absence of Infection Signs and Symptoms  Outcome: Ongoing, Progressing  Intervention: Prevent or Manage Infection  Flowsheets (Taken 6/4/2023 2045)  Infection Management: aseptic technique maintained

## 2023-06-06 NOTE — PROGRESS NOTES
C3 nurse spoke with Melodie Villarreal's daughter for a TCC post hospital discharge follow up call. Pts daughter denied any new symptoms with her mother, stating home health is coming today and her brother is picking up the pts insulin from the pharmacy today. The patient has a scheduled appt with Anyi Bearden MD (Urology) on 6/20/2023 at 1645, Wisam Espinosa Jr, MD (Family Medicine) on 6/12/2023 at 1115, Dr. Darwin Bueno in vascular surgery clinic on 6/14/2023 at 1430 and Dr. Mratin Ruvalcaba in ID clinic on 8/10/2023 at 0940. Message routed to pts PCP, Wisam Espinosa Jr, MD.

## 2023-06-06 NOTE — PROGRESS NOTES
C3 nurse attempted to contact Melodie Villarreal for a TCC post hospital discharge follow up call. The patient's daughter requested a callback around 1210. The patient has a scheduled appt with Anyi Bearden MD (Urology) on 6/20/2023 at 1645, Wisam Espinosa Jr, MD (Family Medicine) on 6/12/2023 at 1115, Dr. Darwin Bueno in vascular surgery clinic on 6/14/2023 at 1430 and Dr. Martin Ruvalcaba in ID clinic on 8/10/2023 at 0940. Will route to pts PCP, Wisam Espinosa Jr, MD after speaking with the pts daughter. Will attempt an additional TCC call at a later time.

## 2023-06-06 NOTE — PROGRESS NOTES
2nd attempt by C3 nurse to contact Melodie Villarreal for a TCC post hospital discharge follow up call. The patient's daughter had a call on the other line and requested a callback to finish TCC call. No answer or voicemail on the pts daughterps phone upon calling again. The patient has a scheduled appt with Anyi Bearden MD (Urology) on 6/20/2023 at 1645, Wisam Espinosa Jr, MD (Family Medicine) on 6/12/2023 at 1115, Dr. Darwin Bueno in vascular surgery clinic on 6/14/2023 at 1430 and Dr. Martin Ruvalcaba in ID clinic on 8/10/2023 at 0940. Will route to pts PCP, Wisam Espinosa Jr, MD after speaking with the pts daughter. Will attempt an additional TCC call at a later time.

## 2023-06-07 NOTE — PLAN OF CARE
Problem: Physical Therapy  Goal: Physical Therapy Goal  Description: Goals to be met by: Discharge     Patient will increase functional independence with mobility by performin. Supine to sit with MInimal Assistance  2. Sit to supine with MInimal Assistance  3. Bed to chair transfer with Minimal Assistance using Rolling Walker  4. Gait  x 15 feet with Minimal Assistance using Rolling Walker.   5. Sitting at edge of bed x10 minutes with Stand-by Assistance    Outcome: Unable to Meet, Plan Revised

## 2023-06-07 NOTE — PT/OT/SLP DISCHARGE
Physical Therapy Discharge Summary    Name: Melodie Villarreal  MRN: [id number]   Principal Problem: MRSA bacteremia     Patient Discharged from acute Physical Therapy on 2023.  Please refer to prior PT noted date on 6/3/2023 for functional status.     Assessment:     Patient is no longer making progress. Patient has not met goals.    Objective:     GOALS:   Multidisciplinary Problems       Physical Therapy Goals          Problem: Physical Therapy    Goal Priority Disciplines Outcome Goal Variances Interventions   Physical Therapy Goal     PT, PT/OT Unable to Meet, Plan Revised     Description: Goals to be met by: Discharge     Patient will increase functional independence with mobility by performin. Supine to sit with MInimal Assistance  2. Sit to supine with MInimal Assistance  3. Bed to chair transfer with Minimal Assistance using Rolling Walker  4. Gait  x 15 feet with Minimal Assistance using Rolling Walker.   5. Sitting at edge of bed x10 minutes with Stand-by Assistance                         Reasons for Discontinuation of Therapy Services  Transfer to alternate level of care.      Plan:     Patient Discharged to: Home with Home Health Service.      2023

## 2023-06-12 NOTE — PLAN OF CARE
[facility] - [facility] Surgical Unit  Discharge Final Note    Primary Care Provider: Wisam Espinosa Jr, MD    Expected Discharge Date: 6/5/2023    Final Discharge Note (most recent)       Final Note - 06/12/23 1854          Final Note    Assessment Type Final Discharge Note     Anticipated Discharge Disposition Home-Health Care [facility] [place]     What phone number can be called within the next 1-3 days to see how you are doing after discharge? [phone]     Hospital Resources/Appts/Education Provided Provided patient/caregiver with written discharge plan information        Post-Acute Status    Post-Acute Authorization Home Health     Home Health Status Set-up Complete/Auth obtained     Discharge Delays None known at this time                     Important Message from Medicare             Contact Info       Anyi Bearden MD   Specialty: Urology   Relationship: Consulting Physician    [facility] [address] [place] [address]   Phone: [phone]       Next Steps: Go on 6/20/2023    Instructions: Appointment with Dr. Bear to remove Stent on Tuesday, June 20, 2023 @ 4:45 pm. Spoke to Doug Espinosa Jr, MD   Specialty: Family Medicine   Relationship: PCP - General    [address] [address]   Phone: [phone]       Next Steps: Go on 6/12/2023    Instructions: Follow up with Wisam Espinosa MD on Monday, June 12, 2023 @ 11:15 am.  Spoke to Annalisa.    [facility]   Specialty: Home Health Services, Home Therapy Services, Home Living Aide Services    [address] [facility] [address]   Phone: [phone]       Next Steps: Follow up    [facility], [address]  [address]  [facility] [address]   Phone: [phone]       Next Steps: Follow up    Instructions: Tashia lift, hospital bed, WC, will all be delivered to patient home on day of discharge

## 2023-08-21 PROBLEM — A41.9 SEPSIS: Status: RESOLVED | Noted: 2023-01-01 | Resolved: 2023-01-01

## 2023-08-21 PROBLEM — N39.0 URINARY TRACT INFECTION: Status: RESOLVED | Noted: 2023-01-01 | Resolved: 2023-01-01

## 2025-02-27 NOTE — PROGRESS NOTES
Name: Melodie Villarreal    : 1931 (89 y.o.)  MRN: [id number]           Patient Unavailable      Patient unable to be seen at this time secondary to: Pt requesting to remain in bed for the day. Son reports had a bad night. Will continue POC tomorrow as able.            [Negative] : Genitourinary [As Noted in HPI] : as noted in HPI

## (undated) DEVICE — TRAY SKIN SCRUB WET PREMIUM

## (undated) DEVICE — TRAY CATH FOL SIL URIMTR 16FR

## (undated) DEVICE — GLOVE PROTEXIS HYDROGEL SZ6.5

## (undated) DEVICE — MARKER WRITESITE SKIN CHLRAPRP

## (undated) DEVICE — JUMPSUIT SURG W/TIE UNIV

## (undated) DEVICE — SUPPORT ULNA NERVE PROTECTOR

## (undated) DEVICE — CATH POLLACK OPEN-END FLEXI-TI

## (undated) DEVICE — BAG DRAIN UROLOGY AND HOSE

## (undated) DEVICE — SYR 30CC LUER LOCK

## (undated) DEVICE — SENSOR DUAL FLEX STR 150CM

## (undated) DEVICE — Device

## (undated) DEVICE — BOWL STERILE LARGE 32OZ

## (undated) DEVICE — POSITIONER HEAD ADULT

## (undated) DEVICE — SYR 10CC LUER LOCK

## (undated) DEVICE — KIT SURGICAL TURNOVER